# Patient Record
Sex: FEMALE | Race: WHITE | NOT HISPANIC OR LATINO | Employment: OTHER | URBAN - METROPOLITAN AREA
[De-identification: names, ages, dates, MRNs, and addresses within clinical notes are randomized per-mention and may not be internally consistent; named-entity substitution may affect disease eponyms.]

---

## 2017-01-03 ENCOUNTER — ANTI-COAG VISIT (OUTPATIENT)
Dept: CARDIOLOGY | Facility: CLINIC | Age: 67
End: 2017-01-03

## 2017-01-03 DIAGNOSIS — Z79.01 LONG TERM CURRENT USE OF ANTICOAGULANT THERAPY: ICD-10-CM

## 2017-01-03 DIAGNOSIS — I48.0 PAF (PAROXYSMAL ATRIAL FIBRILLATION): ICD-10-CM

## 2017-01-03 LAB — INR PPP: 1.9

## 2017-01-03 NOTE — PROGRESS NOTES
Pt disagrees with dosing, she states she has taken this dosing before and it knocked her level off the charts she would like for you to reconsider her dosing, she does not feel comfortable taking 5mg everyday except 2.5mg on Monday.

## 2017-01-10 ENCOUNTER — ANTI-COAG VISIT (OUTPATIENT)
Dept: CARDIOLOGY | Facility: CLINIC | Age: 67
End: 2017-01-10

## 2017-01-10 DIAGNOSIS — Z79.01 LONG TERM CURRENT USE OF ANTICOAGULANT THERAPY: ICD-10-CM

## 2017-01-10 DIAGNOSIS — I48.0 PAF (PAROXYSMAL ATRIAL FIBRILLATION): ICD-10-CM

## 2017-01-10 LAB — INR PPP: 3.2

## 2017-01-17 ENCOUNTER — ANTI-COAG VISIT (OUTPATIENT)
Dept: CARDIOLOGY | Facility: CLINIC | Age: 67
End: 2017-01-17

## 2017-01-17 DIAGNOSIS — Z79.01 LONG TERM CURRENT USE OF ANTICOAGULANT THERAPY: ICD-10-CM

## 2017-01-17 DIAGNOSIS — I48.0 PAF (PAROXYSMAL ATRIAL FIBRILLATION): ICD-10-CM

## 2017-01-17 LAB — INR PPP: 2.2

## 2017-01-18 DIAGNOSIS — J30.9 CHRONIC ALLERGIC RHINITIS: ICD-10-CM

## 2017-01-18 RX ORDER — CETIRIZINE HYDROCHLORIDE 10 MG/1
TABLET ORAL
Qty: 90 TABLET | Refills: 2 | Status: ON HOLD | OUTPATIENT
Start: 2017-01-18 | End: 2017-06-19 | Stop reason: CLARIF

## 2017-01-24 ENCOUNTER — ANTI-COAG VISIT (OUTPATIENT)
Dept: CARDIOLOGY | Facility: CLINIC | Age: 67
End: 2017-01-24
Payer: MEDICARE

## 2017-01-24 DIAGNOSIS — Z79.01 LONG TERM CURRENT USE OF ANTICOAGULANT THERAPY: ICD-10-CM

## 2017-01-24 DIAGNOSIS — I48.0 PAF (PAROXYSMAL ATRIAL FIBRILLATION): ICD-10-CM

## 2017-01-24 LAB — INR PPP: 2.4

## 2017-01-24 PROCEDURE — G0250 MD INR TEST REVIE INTER MGMT: HCPCS | Mod: ,,, | Performed by: INTERNAL MEDICINE

## 2017-01-30 DIAGNOSIS — T81.82XD EMPHYSEMA (SUBCUTANEOUS) (SURGICAL) RESULTING FROM A PROCEDURE, SUBSEQUENT ENCOUNTER: Primary | ICD-10-CM

## 2017-01-31 ENCOUNTER — ANTI-COAG VISIT (OUTPATIENT)
Dept: CARDIOLOGY | Facility: CLINIC | Age: 67
End: 2017-01-31

## 2017-01-31 DIAGNOSIS — C34.90 SMALL CELL LUNG CANCER, UNSPECIFIED LATERALITY: Primary | ICD-10-CM

## 2017-01-31 DIAGNOSIS — Z79.01 LONG TERM CURRENT USE OF ANTICOAGULANT THERAPY: ICD-10-CM

## 2017-01-31 DIAGNOSIS — I48.0 PAF (PAROXYSMAL ATRIAL FIBRILLATION): ICD-10-CM

## 2017-01-31 LAB — INR PPP: 2.1

## 2017-02-01 ENCOUNTER — PATIENT MESSAGE (OUTPATIENT)
Dept: DERMATOLOGY | Facility: CLINIC | Age: 67
End: 2017-02-01

## 2017-02-07 ENCOUNTER — TELEPHONE (OUTPATIENT)
Dept: FAMILY MEDICINE | Facility: CLINIC | Age: 67
End: 2017-02-07

## 2017-02-07 ENCOUNTER — ANTI-COAG VISIT (OUTPATIENT)
Dept: CARDIOLOGY | Facility: CLINIC | Age: 67
End: 2017-02-07

## 2017-02-07 DIAGNOSIS — Z79.01 LONG TERM CURRENT USE OF ANTICOAGULANT THERAPY: ICD-10-CM

## 2017-02-07 DIAGNOSIS — I48.0 PAF (PAROXYSMAL ATRIAL FIBRILLATION): ICD-10-CM

## 2017-02-07 LAB — INR PPP: 1.9

## 2017-02-07 NOTE — TELEPHONE ENCOUNTER
----- Message from Dalila Boswell sent at 2/7/2017  1:33 PM CST -----  Patient requesting excuse letter for sadia mackenzie/patient has trouble sitting for long time due to health issues/would like to /please call back at [phone] when ready.

## 2017-02-11 RX ORDER — ATORVASTATIN CALCIUM 40 MG/1
TABLET, FILM COATED ORAL
Qty: 90 TABLET | Refills: 2 | OUTPATIENT
Start: 2017-02-11

## 2017-02-14 ENCOUNTER — ANTI-COAG VISIT (OUTPATIENT)
Dept: CARDIOLOGY | Facility: CLINIC | Age: 67
End: 2017-02-14

## 2017-02-14 DIAGNOSIS — I48.0 PAF (PAROXYSMAL ATRIAL FIBRILLATION): ICD-10-CM

## 2017-02-14 DIAGNOSIS — Z79.01 LONG TERM CURRENT USE OF ANTICOAGULANT THERAPY: ICD-10-CM

## 2017-02-14 LAB — INR PPP: 2.9

## 2017-02-14 NOTE — PROGRESS NOTES
pt confirms correct dose; denies any changes; informed pt of dosing and next inr; pt expressed understanding

## 2017-02-21 ENCOUNTER — ANTI-COAG VISIT (OUTPATIENT)
Dept: CARDIOLOGY | Facility: CLINIC | Age: 67
End: 2017-02-21

## 2017-02-21 DIAGNOSIS — I48.0 PAF (PAROXYSMAL ATRIAL FIBRILLATION): ICD-10-CM

## 2017-02-21 DIAGNOSIS — Z79.01 LONG TERM CURRENT USE OF ANTICOAGULANT THERAPY: ICD-10-CM

## 2017-02-21 LAB — INR PPP: 2.4

## 2017-02-27 ENCOUNTER — TELEPHONE (OUTPATIENT)
Dept: FAMILY MEDICINE | Facility: CLINIC | Age: 67
End: 2017-02-27

## 2017-02-28 LAB — INR PPP: 1.4

## 2017-03-01 ENCOUNTER — ANTI-COAG VISIT (OUTPATIENT)
Dept: CARDIOLOGY | Facility: CLINIC | Age: 67
End: 2017-03-01
Payer: MEDICARE

## 2017-03-01 DIAGNOSIS — I48.0 PAF (PAROXYSMAL ATRIAL FIBRILLATION): ICD-10-CM

## 2017-03-01 DIAGNOSIS — Z79.01 LONG TERM CURRENT USE OF ANTICOAGULANT THERAPY: ICD-10-CM

## 2017-03-01 PROCEDURE — G0250 MD INR TEST REVIE INTER MGMT: HCPCS | Mod: ,,, | Performed by: INTERNAL MEDICINE

## 2017-03-06 RX ORDER — TRIAMCINOLONE ACETONIDE 1 MG/G
CREAM TOPICAL 2 TIMES DAILY
Qty: 1 TUBE | Refills: 11 | Status: SHIPPED | OUTPATIENT
Start: 2017-03-06 | End: 2019-01-01

## 2017-03-06 NOTE — TELEPHONE ENCOUNTER
----- Message from Willow Ortega sent at 3/6/2017  8:32 AM CST -----  Contact: self  Patient needs a new prescription and would like a call back. Please call patient at [phone]. Thanks!

## 2017-03-06 NOTE — TELEPHONE ENCOUNTER
Patient requesting a refill of Kenalog cream that she was originally prescribed by DR Zavaleta. States she spoke to you in reference to this and you agreed to refill it PRN for her. Needs a 3 mo supply sent to [facility].

## 2017-03-07 ENCOUNTER — ANTI-COAG VISIT (OUTPATIENT)
Dept: CARDIOLOGY | Facility: CLINIC | Age: 67
End: 2017-03-07

## 2017-03-07 DIAGNOSIS — I48.0 PAF (PAROXYSMAL ATRIAL FIBRILLATION): ICD-10-CM

## 2017-03-07 DIAGNOSIS — Z79.01 LONG TERM CURRENT USE OF ANTICOAGULANT THERAPY: ICD-10-CM

## 2017-03-07 LAB — INR PPP: 2.5

## 2017-03-14 ENCOUNTER — ANTI-COAG VISIT (OUTPATIENT)
Dept: CARDIOLOGY | Facility: CLINIC | Age: 67
End: 2017-03-14

## 2017-03-14 DIAGNOSIS — Z79.01 LONG TERM CURRENT USE OF ANTICOAGULANT THERAPY: ICD-10-CM

## 2017-03-14 DIAGNOSIS — I48.0 PAF (PAROXYSMAL ATRIAL FIBRILLATION): ICD-10-CM

## 2017-03-14 LAB — INR PPP: 2.1

## 2017-03-15 RX ORDER — DESOXIMETASONE 2.5 MG/G
CREAM TOPICAL 2 TIMES DAILY
Qty: 180 G | Refills: 3 | Status: SHIPPED | OUTPATIENT
Start: 2017-03-15 | End: 2019-01-01

## 2017-03-15 NOTE — TELEPHONE ENCOUNTER
Spoke with patient regarding her cream refill, [facility]. Please advise

## 2017-03-15 NOTE — TELEPHONE ENCOUNTER
----- Message from Neelam Santoyo sent at 3/14/2017  1:18 PM CDT -----  Contact: self  Patient called regarding a medication. Stating everything was done correctly except the way it was written. Please contact [phone] (home)

## 2017-03-21 ENCOUNTER — ANTI-COAG VISIT (OUTPATIENT)
Dept: CARDIOLOGY | Facility: CLINIC | Age: 67
End: 2017-03-21

## 2017-03-21 DIAGNOSIS — I48.0 PAF (PAROXYSMAL ATRIAL FIBRILLATION): ICD-10-CM

## 2017-03-21 DIAGNOSIS — Z79.01 LONG TERM CURRENT USE OF ANTICOAGULANT THERAPY: ICD-10-CM

## 2017-03-21 LAB — INR PPP: 2.1

## 2017-03-22 ENCOUNTER — OUTPATIENT CASE MANAGEMENT (OUTPATIENT)
Dept: ADMINISTRATIVE | Facility: OTHER | Age: 67
End: 2017-03-22

## 2017-03-22 DIAGNOSIS — I25.10 CORONARY ARTERY DISEASE INVOLVING NATIVE CORONARY ARTERY WITHOUT ANGINA PECTORIS, UNSPECIFIED WHETHER NATIVE OR TRANSPLANTED HEART: Primary | ICD-10-CM

## 2017-03-22 NOTE — PROGRESS NOTES
Please note the following patient has been assigned to  Lina Joiner RN with Outpatient Complex Care Mgmt for screening.    Please contact [facility] at ext [phone] with questions.    Thank you    Ines Douglass, SSC

## 2017-03-24 ENCOUNTER — OFFICE VISIT (OUTPATIENT)
Dept: FAMILY MEDICINE | Facility: CLINIC | Age: 67
End: 2017-03-24
Payer: MEDICARE

## 2017-03-24 VITALS
HEART RATE: 64 BPM | WEIGHT: 138.88 LBS | SYSTOLIC BLOOD PRESSURE: 110 MMHG | HEIGHT: 66 IN | BODY MASS INDEX: 22.32 KG/M2 | OXYGEN SATURATION: 98 % | DIASTOLIC BLOOD PRESSURE: 72 MMHG

## 2017-03-24 DIAGNOSIS — K59.00 CONSTIPATION, UNSPECIFIED CONSTIPATION TYPE: Primary | ICD-10-CM

## 2017-03-24 PROCEDURE — 99999 PR PBB SHADOW E&M-EST. PATIENT-LVL III: CPT | Mod: PBBFAC,,, | Performed by: FAMILY MEDICINE

## 2017-03-24 PROCEDURE — 99213 OFFICE O/P EST LOW 20 MIN: CPT | Mod: PBBFAC,PO | Performed by: FAMILY MEDICINE

## 2017-03-24 PROCEDURE — 90670 PCV13 VACCINE IM: CPT | Mod: PBBFAC,PO | Performed by: FAMILY MEDICINE

## 2017-03-24 PROCEDURE — 99214 OFFICE O/P EST MOD 30 MIN: CPT | Mod: S$PBB,,, | Performed by: FAMILY MEDICINE

## 2017-03-24 RX ORDER — BISACODYL 5 MG
5 TABLET, DELAYED RELEASE (ENTERIC COATED) ORAL DAILY PRN
Qty: 30 TABLET | Refills: 11 | COMMUNITY
Start: 2017-03-24

## 2017-03-24 RX ORDER — DILTIAZEM HYDROCHLORIDE 30 MG/1
TABLET, FILM COATED ORAL
COMMUNITY
Start: 2017-03-22 | End: 2017-03-24 | Stop reason: SDUPTHER

## 2017-03-24 RX ORDER — ESOMEPRAZOLE MAGNESIUM 40 MG/1
CAPSULE, DELAYED RELEASE ORAL
COMMUNITY
Start: 2017-01-05 | End: 2017-09-28 | Stop reason: CLARIF

## 2017-03-24 RX ORDER — DILTIAZEM HYDROCHLORIDE 30 MG/1
30 TABLET, FILM COATED ORAL
Qty: 360 TABLET | Refills: 3 | Status: SHIPPED | OUTPATIENT
Start: 2017-03-24 | End: 2018-01-31 | Stop reason: ALTCHOICE

## 2017-03-24 RX ORDER — POLYETHYLENE GLYCOL 3350 17 G/17G
17 POWDER, FOR SOLUTION ORAL DAILY
Qty: 1 BOTTLE | Refills: 11 | Status: SHIPPED | OUTPATIENT
Start: 2017-03-24 | End: 2017-07-24

## 2017-03-24 NOTE — PROGRESS NOTES
Subjective:       Patient ID: Steph Lira is a 66 y.o. female.    Chief Complaint: Follow-up (constipation and need prev 13?)    HPI     Constipation  Pt reports that she has been taking laxatives for this.   She has too loose stools after laxatives.   Symptoms have been present for a few months.   She has a colonoscopy scheduled for next year.   Otherwise, her previous exams have been negative.   Pt has taken Senokot which has helped some but she still feels like she has not emptied her bowels.   She has taken Ex-lax as a laxative.   No blood noted in stools.     Review of Systems   Constitutional: Negative for chills and fever.   HENT: Positive for rhinorrhea.    Eyes: Negative for visual disturbance.   Respiratory: Negative for cough and shortness of breath.    Cardiovascular: Negative for chest pain and leg swelling.   Gastrointestinal: Positive for constipation. Negative for abdominal pain, blood in stool, diarrhea and vomiting.   Genitourinary: Negative for difficulty urinating, dysuria and hematuria.   Musculoskeletal: Negative for arthralgias.   Neurological: Negative for dizziness and weakness.       Objective:      Physical Exam   Constitutional: She appears well-developed and well-nourished. No distress.   HENT:   Head: Normocephalic and atraumatic.   Mouth/Throat: Oropharynx is clear and moist. No oropharyngeal exudate.   Eyes: EOM are normal. Pupils are equal, round, and reactive to light.   Neck: Normal range of motion. Neck supple. No thyromegaly present.   Cardiovascular: Normal rate, regular rhythm, normal heart sounds and intact distal pulses.    Pulmonary/Chest: Effort normal and breath sounds normal. No respiratory distress. She has no wheezes.   Abdominal: Soft. Bowel sounds are normal. She exhibits no distension and no mass. There is no tenderness.   Musculoskeletal: She exhibits no edema.   Lymphadenopathy:     She has no cervical adenopathy.   Neurological: She is alert.   Skin: Skin is  warm. No rash noted. No erythema.   Psychiatric: She has a normal mood and affect. Her behavior is normal.   Vitals reviewed.      Assessment:       1. Constipation, unspecified constipation type        Plan:        1. Constipation, unspecified constipation type  - bisacodyl (DULCOLAX) 5 mg EC tablet; Take 1 tablet (5 mg total) by mouth daily as needed for Constipation.  Dispense: 30 tablet; Refill: 11  - polyethylene glycol (GLYCOLAX) 17 gram/dose powder; Take 17 g by mouth once daily.  Dispense: 1 Bottle; Refill: 11    Portions of this note were created using Dragon voice recognition software. There may be voice recognition errors found in the text, and attempts were made to correct these errors prior to signature    Stuart Landaverde MD    Family Medicine  3/24/2017

## 2017-03-24 NOTE — MR AVS SNAPSHOT
[facility]  [address]  [place] [address]  Phone: [phone]  Fax: [phone]                  Steph Lira   3/24/2017 8:00 AM   Office Visit    Description:  Female : 1950   Provider:  Stuart Landaverde MD   Department:  [facility]           Reason for Visit     Follow-up           Diagnoses this Visit        Comments    Constipation, unspecified constipation type    -  Primary            To Do List           Future Appointments        Provider Department Dept Phone    2017 9:30 AM [facility] CT1 64- LIMIT [address] [facility] [phone]    2017 10:30 AM Celina Flanagan MD [facility] - Pulmonary Services [phone]    2017 1:40 PM Stuart Landaverde MD [facility] [phone]      Goals (5 Years of Data)     None      Follow-Up and Disposition     Return in about 6 weeks (around 2017) for constipation.       These Medications        Disp Refills Start End    diltiaZEM (CARDIZEM) 30 MG tablet 360 tablet 3 3/24/2017     Take 1 tablet (30 mg total) by mouth 4 (four) times daily before meals and nightly. - Oral    Pharmacy: [facility] - [address] Ph #: [phone]       polyethylene glycol (GLYCOLAX) 17 gram/dose powder 1 Bottle 11 3/24/2017     Take 17 g by mouth once daily. - Oral    Pharmacy: [facility] [id number]  [place], [place] - [address] AT [facility] Ph #: [phone]         PURCHASE these Medications (No prescription required)        Start End    bisacodyl (DULCOLAX) 5 mg EC tablet 3/24/2017     Sig - Route: Take 1 tablet (5 mg total) by mouth daily as needed for Constipation. - Oral    Class: OTC      [facility] -  Assistance  Registered nurses in the [facility] Center provide clinical advisement, health education, appointment booking, and other advisory  services.  Call for this free service at [phone].             Medications           Message regarding Medications     Verify the changes and/or additions to your medication regime listed below are the same as discussed with your clinician today.  If any of these changes or additions are incorrect, please notify your healthcare provider.        START taking these NEW medications        Refills    diltiaZEM (CARDIZEM) 30 MG tablet 3    Sig: Take 1 tablet (30 mg total) by mouth 4 (four) times daily before meals and nightly.    Class: Normal    Route: Oral    bisacodyl (DULCOLAX) 5 mg EC tablet 11    Sig: Take 1 tablet (5 mg total) by mouth daily as needed for Constipation.    Class: OTC    Route: Oral    polyethylene glycol (GLYCOLAX) 17 gram/dose powder 11    Sig: Take 17 g by mouth once daily.    Class: Normal    Route: Oral           Verify that the below list of medications is an accurate representation of the medications you are currently taking.  If none reported, the list may be blank. If incorrect, please contact your healthcare provider. Carry this list with you in case of emergency.           Current Medications     albuterol (ACCUNEB) 1.25 mg/3 mL Nebu USE 1 VIAL (3 ML) VIA NEBULIZER EVERY 6 HOURS AS NEEDED    albuterol (PROVENTIL HFA) 90 mcg/actuation inhaler Inhale 2 puffs into the lungs every 6 (six) hours as needed for Wheezing.    atenolol (TENORMIN) 25 MG tablet Take 1 tablet (25 mg total) by mouth once daily.    atorvastatin (LIPITOR) 40 MG tablet Take 1 tablet (40 mg total) by mouth once daily.    CALCIUM CITRATE (CITRACAL) 500 mg TbEF Take 500 mg by mouth. 1 Tablet, Effervescent Oral  daily    cetirizine (ZYRTEC) 10 MG tablet TAKE 1 TABLET EVERY EVENING    desoximetasone (TOPICORT) 0.25 % cream Apply topically 2 (two) times daily.    diltiaZEM (CARDIZEM) 30 MG tablet Take 1 tablet (30 mg total) by mouth 4 (four) times daily before meals and nightly.    diphenhydrAMINE (BENADRYL) 25 mg capsule  "Take 25 mg by mouth every 6 (six) hours as needed for Itching.    flecainide (TAMBOCOR) 50 MG Tab Take 2 tablets (100 mg total) by mouth 2 (two) times daily.    fluticasone (FLONASE) 50 mcg/actuation nasal spray 1 spray by Each Nare route once daily.    fluticasone-salmeterol 250-50 mcg/dose (ADVAIR DISKUS) 250-50 mcg/dose diskus inhaler USE 1 INHALATION INTO THE LUNGS TWICE A DAY. RINSE MOUTH AFTER USE TO PREVENT THRUSH    ipratropium (ATROVENT) 0.03 % nasal spray 2 sprays by Nasal route 2 (two) times daily. As needed    levothyroxine (SYNTHROID) 88 MCG tablet TAKE 1 TABLET DAILY    montelukast (SINGULAIR) 10 mg tablet TAKE 1 TABLET EVERY EVENING    multivitamin-minerals-lutein (CENTRUM SILVER) Tab Take 1 tablet by mouth once daily. 1 Tablet Oral Every day    NEXIUM 40 mg capsule     RETIN-A 0.05 % cream APPLY THIN FILM TO FACE AT NIGHT AFTER MOISTURIZING    SPIRIVA WITH HANDIHALER 18 mcg inhalation capsule INHALE THE CONTENTS OF 1 CAPSULE DAILY    warfarin (COUMADIN) 5 MG tablet Take 0.5-1 Tablet QPM as instructed by coumadin clinic    bisacodyl (DULCOLAX) 5 mg EC tablet Take 1 tablet (5 mg total) by mouth daily as needed for Constipation.    polyethylene glycol (GLYCOLAX) 17 gram/dose powder Take 17 g by mouth once daily.    triamcinolone acetonide 0.1% (KENALOG) 0.1 % cream Apply topically 2 (two) times daily.           Clinical Reference Information           Your Vitals Were     BP Pulse Height Weight SpO2 BMI    110/72 (BP Location: Right arm, Patient Position: Sitting, BP Method: Manual) 64 5' 6" (1.676 m) 63 kg (138 lb 14.2 oz) 98% 22.42 kg/m2      Blood Pressure          Most Recent Value    BP  110/72      Allergies as of 3/24/2017     Ciprofloxacin    Doxycycline      Immunizations Administered on Date of Encounter - 3/24/2017     Name Date Dose VIS Date Route    Pneumococcal Conjugate - 13 Valent  Incomplete 0.5 mL 11/5/2015 Intramuscular      Orders Placed During Today's Visit      Normal Orders This " Visit    Pneumococcal Conjugate Vaccine (13 Valent) (IM)       Instructions    On day 1 take Miralax until adequate bowels movements. After this occurs, then stop Miralax/Glycolax and start Dulcolax, daily for 1 month. Then, as needed once daily.        Language Assistance Services     ATTENTION: Language assistance services are available, free of charge. Please call [phone].      ATENCIÓN: Si habla español, tiene a andres disposición servicios gratuitos de asistencia lingüística. Llame al [phone].     [facility] Ý: N?u b?n nói Ti?ng Vi?t, có các d?ch v? h? tr? ngôn ng? mi?n phí dành cho b?n. G?i s? [phone].         [facility] complies with applicable Federal civil rights laws and does not discriminate on the basis of race, color, national origin, age, disability, or sex.

## 2017-03-24 NOTE — PROGRESS NOTES
Administered prevnar 13 vaccine IM. Patient tolerated well. No bleeding at insertion site noted. Pain scale 0/10 with injection. 2 patient identifiers used (name, ), aseptic technique maintained.

## 2017-03-24 NOTE — PATIENT INSTRUCTIONS
On day 1 take Miralax until adequate bowels movements. After this occurs, then stop Miralax/Glycolax and start Dulcolax, daily for 1 month. Then, as needed once daily.

## 2017-03-28 ENCOUNTER — ANTI-COAG VISIT (OUTPATIENT)
Dept: CARDIOLOGY | Facility: CLINIC | Age: 67
End: 2017-03-28

## 2017-03-28 ENCOUNTER — OUTPATIENT CASE MANAGEMENT (OUTPATIENT)
Dept: ADMINISTRATIVE | Facility: OTHER | Age: 67
End: 2017-03-28

## 2017-03-28 DIAGNOSIS — Z79.01 LONG TERM CURRENT USE OF ANTICOAGULANT THERAPY: ICD-10-CM

## 2017-03-28 DIAGNOSIS — I48.0 PAF (PAROXYSMAL ATRIAL FIBRILLATION): ICD-10-CM

## 2017-03-28 LAB — INR PPP: 2.6

## 2017-03-29 ENCOUNTER — OUTPATIENT CASE MANAGEMENT (OUTPATIENT)
Dept: ADMINISTRATIVE | Facility: OTHER | Age: 67
End: 2017-03-29

## 2017-03-29 NOTE — PROGRESS NOTES
03/29/17-Received voice mail from patient to call her back at [phone]. Called patient back and left voice mail. 2'nd attempt to screen patient.

## 2017-04-03 ENCOUNTER — OUTPATIENT CASE MANAGEMENT (OUTPATIENT)
Dept: ADMINISTRATIVE | Facility: OTHER | Age: 67
End: 2017-04-03

## 2017-04-03 RX ORDER — LEVOTHYROXINE SODIUM 88 UG/1
88 TABLET ORAL DAILY
Qty: 90 TABLET | Refills: 11 | Status: SHIPPED | OUTPATIENT
Start: 2017-04-03 | End: 2018-01-24 | Stop reason: SDUPTHER

## 2017-04-03 NOTE — PROGRESS NOTES
04/03/17-Patient called back and declines OPCM at this time. Instructed her that I have a letter in the mail to her and my OPCM brochure if she would need OPCM services at a later time.

## 2017-04-03 NOTE — LETTER
April 3, 2017    Steph Lira  [address] [place] [address]             [facility] [address] [place] [address] Dear MS Lira:    I work with [facility]s outpatient case management department. We received a referral to call you to discuss your medical history. I attempted to reach you by telephone but I was unsuccessful. Please call our department that we may go over some questions with you regarding your health.     The outpatient case management department can be reached at [phone] from 8:00 am to 4:30 PM on Monday thru Friday. [facility] also has a program where a nurse is available 24/7 to answer questions or provide medical advice their number is [phone].  If you have any questions or concerns, please dont hesitate to call.     Sincerely,         Lina Joiner RN   [facility]  Outpatient Complex Care Management  [phone]

## 2017-04-03 NOTE — TELEPHONE ENCOUNTER
----- Message from Bre Centeno sent at 3/31/2017  1:23 PM CDT -----  Contact: Patient  [facility]roxine tabs 88 mcg) 90 supply with 3 refills.stated pharmacy advise her to call, Please call back at [phone] to advise when script has been sent. Thanks,

## 2017-04-03 NOTE — PROGRESS NOTES
04/03/17-Called and was unable to leave a voice mail. 3'rd attempt for Initial Screen. Will send letter, close case and dis enroll patient from the OPCM program.

## 2017-04-04 ENCOUNTER — ANTI-COAG VISIT (OUTPATIENT)
Dept: CARDIOLOGY | Facility: CLINIC | Age: 67
End: 2017-04-04
Payer: MEDICARE

## 2017-04-04 DIAGNOSIS — I48.0 PAF (PAROXYSMAL ATRIAL FIBRILLATION): ICD-10-CM

## 2017-04-04 DIAGNOSIS — Z79.01 LONG TERM CURRENT USE OF ANTICOAGULANT THERAPY: ICD-10-CM

## 2017-04-04 LAB — INR PPP: 1.7

## 2017-04-04 PROCEDURE — G0250 MD INR TEST REVIE INTER MGMT: HCPCS | Mod: ,,, | Performed by: INTERNAL MEDICINE

## 2017-04-06 ENCOUNTER — HOSPITAL ENCOUNTER (OUTPATIENT)
Dept: RADIOLOGY | Facility: HOSPITAL | Age: 67
Discharge: HOME | End: 2017-04-06
Attending: INTERNAL MEDICINE
Payer: MEDICARE

## 2017-04-06 ENCOUNTER — OFFICE VISIT (OUTPATIENT)
Dept: PULMONOLOGY | Facility: CLINIC | Age: 67
End: 2017-04-06
Payer: MEDICARE

## 2017-04-06 VITALS
DIASTOLIC BLOOD PRESSURE: 63 MMHG | HEART RATE: 56 BPM | OXYGEN SATURATION: 99 % | BODY MASS INDEX: 22.36 KG/M2 | WEIGHT: 139.13 LBS | SYSTOLIC BLOOD PRESSURE: 108 MMHG | HEIGHT: 66 IN

## 2017-04-06 DIAGNOSIS — T81.82XD EMPHYSEMA (SUBCUTANEOUS) (SURGICAL) RESULTING FROM A PROCEDURE, SUBSEQUENT ENCOUNTER: ICD-10-CM

## 2017-04-06 DIAGNOSIS — J41.0 SIMPLE CHRONIC BRONCHITIS: ICD-10-CM

## 2017-04-06 DIAGNOSIS — C34.92 SMALL CELL LUNG CANCER, LEFT: Primary | ICD-10-CM

## 2017-04-06 PROCEDURE — 99999 PR PBB SHADOW E&M-EST. PATIENT-LVL IV: CPT | Mod: PBBFAC,,, | Performed by: INTERNAL MEDICINE

## 2017-04-06 PROCEDURE — 99214 OFFICE O/P EST MOD 30 MIN: CPT | Mod: PBBFAC | Performed by: INTERNAL MEDICINE

## 2017-04-06 PROCEDURE — 99213 OFFICE O/P EST LOW 20 MIN: CPT | Mod: S$PBB,,, | Performed by: INTERNAL MEDICINE

## 2017-04-06 PROCEDURE — 71250 CT THORAX DX C-: CPT | Mod: 26,GC,, | Performed by: RADIOLOGY

## 2017-04-06 NOTE — PROGRESS NOTES
"Subjective:       Patient ID: Steph Lira is a 66 y.o. female.    Chief Complaint: Lung Cancer    HPI Comments: 66 year old here for yearly CT as history of small cell lung cancer in 1996 status post left upper lobectomy with chest wall resection.  Over the past year was hospitalized for pneumonia in May with mild RML infiltrate.  However, had been treated with antibiotics for sinus infection in March as she had no pulmonary infiltrate at that time.  Patient only has minimal dyspnea on exertion.  No chronic cough or wheeze.  Weight is stable.    Lung Cancer   Pertinent negatives include no chest pain or fever.     Review of Systems   Constitutional: Negative for fever.   HENT: Positive for trouble swallowing.    Respiratory: Negative for wheezing and dyspnea on extertion.    Cardiovascular: Positive for leg swelling. Negative for chest pain.       Adherent to advair, spiriva and only uses ventolin rarely  Objective:       Vitals:    04/06/17 1010   BP: 108/63   Pulse: (!) 56   SpO2: 99%   Weight: 63.1 kg (139 lb 1.8 oz)   Height: 5' 6" (1.676 m)     Physical Exam   Constitutional: She appears well-developed and well-nourished.   Neck: Normal range of motion. Neck supple.   Cardiovascular: Normal rate and regular rhythm.    Pulmonary/Chest: Normal expansion and hyperinflation. She has no wheezes.   Musculoskeletal: Normal range of motion. She exhibits no edema.   Lymphadenopathy: No supraclavicular adenopathy is present.     She has no cervical adenopathy.   Neurological: She is alert.        Personal Diagnostic Review  CT of chest performed on  without contrast revealed stable from previous (unofficial results).  No evidence of recurrence.  No flowsheet data found.      Assessment:       1. Small cell lung cancer, left    2. Simple chronic bronchitis        Outpatient Encounter Prescriptions as of 4/6/2017   Medication Sig Dispense Refill    atenolol (TENORMIN) 25 MG tablet Take 1 tablet (25 mg total) by mouth " once daily. 90 tablet 3    atorvastatin (LIPITOR) 40 MG tablet Take 1 tablet (40 mg total) by mouth once daily. 90 tablet 3    bisacodyl (DULCOLAX) 5 mg EC tablet Take 1 tablet (5 mg total) by mouth daily as needed for Constipation. 30 tablet 11    CALCIUM CITRATE (CITRACAL) 500 mg TbEF Take 500 mg by mouth. 1 Tablet, Effervescent Oral  daily      cetirizine (ZYRTEC) 10 MG tablet TAKE 1 TABLET EVERY EVENING 90 tablet 2    desoximetasone (TOPICORT) 0.25 % cream Apply topically 2 (two) times daily. 180 g 3    diltiaZEM (CARDIZEM) 30 MG tablet Take 1 tablet (30 mg total) by mouth 4 (four) times daily before meals and nightly. 360 tablet 3    flecainide (TAMBOCOR) 50 MG Tab Take 2 tablets (100 mg total) by mouth 2 (two) times daily. 360 tablet 3    fluticasone-salmeterol 250-50 mcg/dose (ADVAIR DISKUS) 250-50 mcg/dose diskus inhaler USE 1 INHALATION INTO THE LUNGS TWICE A DAY. RINSE MOUTH AFTER USE TO PREVENT THRUSH 3 each 3    levothyroxine (SYNTHROID) 88 MCG tablet Take 1 tablet (88 mcg total) by mouth once daily. 90 tablet 11    montelukast (SINGULAIR) 10 mg tablet TAKE 1 TABLET EVERY EVENING 90 tablet 3    multivitamin-minerals-lutein (CENTRUM SILVER) Tab Take 1 tablet by mouth once daily. 1 Tablet Oral Every day      NEXIUM 40 mg capsule       polyethylene glycol (GLYCOLAX) 17 gram/dose powder Take 17 g by mouth once daily. 1 Bottle 11    RETIN-A 0.05 % cream APPLY THIN FILM TO FACE AT NIGHT AFTER MOISTURIZING 45 g 3    SPIRIVA WITH HANDIHALER 18 mcg inhalation capsule INHALE THE CONTENTS OF 1 CAPSULE DAILY 90 capsule 3    warfarin (COUMADIN) 5 MG tablet Take 0.5-1 Tablet QPM as instructed by coumadin clinic 90 tablet 3    albuterol (ACCUNEB) 1.25 mg/3 mL Nebu USE 1 VIAL (3 ML) VIA NEBULIZER EVERY 6 HOURS AS NEEDED 90 mL 2    albuterol (PROVENTIL HFA) 90 mcg/actuation inhaler Inhale 2 puffs into the lungs every 6 (six) hours as needed for Wheezing. 3 Inhaler 4    diphenhydrAMINE (BENADRYL) 25 mg  capsule Take 25 mg by mouth every 6 (six) hours as needed for Itching.      fluticasone (FLONASE) 50 mcg/actuation nasal spray 1 spray by Each Nare route once daily. 3 Bottle 3    ipratropium (ATROVENT) 0.03 % nasal spray 2 sprays by Nasal route 2 (two) times daily. As needed (Patient taking differently: 2 sprays by Nasal route once daily. As needed) 90 mL 6    triamcinolone acetonide 0.1% (KENALOG) 0.1 % cream Apply topically 2 (two) times daily. 1 Tube 11     Facility-Administered Encounter Medications as of 4/6/2017   Medication Dose Route Frequency Provider Last Rate Last Dose    lidocaine  20 mg/mL (2%) 20 mg/mL (2 %) injection              No orders of the defined types were placed in this encounter.    Plan:       Continue current inhalers as above for COPD that is well controlled.  Follow up in a year with CT prior.

## 2017-04-11 ENCOUNTER — ANTI-COAG VISIT (OUTPATIENT)
Dept: CARDIOLOGY | Facility: CLINIC | Age: 67
End: 2017-04-11

## 2017-04-11 DIAGNOSIS — I48.0 PAF (PAROXYSMAL ATRIAL FIBRILLATION): ICD-10-CM

## 2017-04-11 DIAGNOSIS — Z79.01 LONG TERM CURRENT USE OF ANTICOAGULANT THERAPY: ICD-10-CM

## 2017-04-11 LAB — INR PPP: 2

## 2017-04-18 ENCOUNTER — ANTI-COAG VISIT (OUTPATIENT)
Dept: CARDIOLOGY | Facility: CLINIC | Age: 67
End: 2017-04-18

## 2017-04-18 DIAGNOSIS — Z79.01 LONG TERM CURRENT USE OF ANTICOAGULANT THERAPY: ICD-10-CM

## 2017-04-18 DIAGNOSIS — I48.0 PAF (PAROXYSMAL ATRIAL FIBRILLATION): ICD-10-CM

## 2017-04-18 LAB — INR PPP: 2.6

## 2017-04-25 ENCOUNTER — ANTI-COAG VISIT (OUTPATIENT)
Dept: CARDIOLOGY | Facility: CLINIC | Age: 67
End: 2017-04-25

## 2017-04-25 DIAGNOSIS — Z79.01 LONG TERM CURRENT USE OF ANTICOAGULANT THERAPY: ICD-10-CM

## 2017-04-25 DIAGNOSIS — I48.0 PAF (PAROXYSMAL ATRIAL FIBRILLATION): ICD-10-CM

## 2017-04-25 LAB — INR PPP: 1.5

## 2017-04-26 ENCOUNTER — HOSPITAL ENCOUNTER (EMERGENCY)
Facility: HOSPITAL | Age: 67
Discharge: HOME | End: 2017-04-26
Attending: EMERGENCY MEDICINE
Payer: MEDICARE

## 2017-04-26 VITALS
TEMPERATURE: 98 F | OXYGEN SATURATION: 100 % | SYSTOLIC BLOOD PRESSURE: 138 MMHG | RESPIRATION RATE: 16 BRPM | HEIGHT: 66 IN | BODY MASS INDEX: 22.18 KG/M2 | WEIGHT: 138 LBS | DIASTOLIC BLOOD PRESSURE: 80 MMHG | HEART RATE: 80 BPM

## 2017-04-26 DIAGNOSIS — R06.02 SOB (SHORTNESS OF BREATH): ICD-10-CM

## 2017-04-26 DIAGNOSIS — R06.09 EXERTIONAL DYSPNEA: ICD-10-CM

## 2017-04-26 LAB
ANION GAP SERPL CALC-SCNC: 7 MMOL/L
BASOPHILS # BLD AUTO: 0 K/UL
BASOPHILS NFR BLD: 0 %
BNP SERPL-MCNC: 115 PG/ML
BUN SERPL-MCNC: 12 MG/DL
CALCIUM SERPL-MCNC: 9.3 MG/DL
CHLORIDE SERPL-SCNC: 105 MMOL/L
CO2 SERPL-SCNC: 26 MMOL/L
CREAT SERPL-MCNC: 0.8 MG/DL
DIFFERENTIAL METHOD: ABNORMAL
EOSINOPHIL # BLD AUTO: 0.3 K/UL
EOSINOPHIL NFR BLD: 4.3 %
ERYTHROCYTE [DISTWIDTH] IN BLOOD BY AUTOMATED COUNT: 12.5 %
EST. GFR  (AFRICAN AMERICAN): >60 ML/MIN/1.73 M^2
EST. GFR  (NON AFRICAN AMERICAN): >60 ML/MIN/1.73 M^2
GLUCOSE SERPL-MCNC: 101 MG/DL
HCT VFR BLD AUTO: 35.3 %
HGB BLD-MCNC: 12.4 G/DL
LYMPHOCYTES # BLD AUTO: 1.6 K/UL
LYMPHOCYTES NFR BLD: 22.6 %
MCH RBC QN AUTO: 31.6 PG
MCHC RBC AUTO-ENTMCNC: 35.3 %
MCV RBC AUTO: 90 FL
MONOCYTES # BLD AUTO: 0.4 K/UL
MONOCYTES NFR BLD: 6.4 %
NEUTROPHILS # BLD AUTO: 4.7 K/UL
NEUTROPHILS NFR BLD: 66.7 %
PLATELET # BLD AUTO: 202 K/UL
PMV BLD AUTO: 6.9 FL
POTASSIUM SERPL-SCNC: 4.3 MMOL/L
RBC # BLD AUTO: 3.93 M/UL
SODIUM SERPL-SCNC: 138 MMOL/L
TROPONIN I SERPL DL<=0.01 NG/ML-MCNC: 0.01 NG/ML
WBC # BLD AUTO: 7 K/UL

## 2017-04-26 PROCEDURE — 25000003 PHARM REV CODE 250: Performed by: EMERGENCY MEDICINE

## 2017-04-26 PROCEDURE — 36415 COLL VENOUS BLD VENIPUNCTURE: CPT

## 2017-04-26 PROCEDURE — 83880 ASSAY OF NATRIURETIC PEPTIDE: CPT

## 2017-04-26 PROCEDURE — 99284 EMERGENCY DEPT VISIT MOD MDM: CPT | Mod: 25

## 2017-04-26 PROCEDURE — 84484 ASSAY OF TROPONIN QUANT: CPT

## 2017-04-26 PROCEDURE — 85025 COMPLETE CBC W/AUTO DIFF WBC: CPT

## 2017-04-26 PROCEDURE — 93005 ELECTROCARDIOGRAM TRACING: CPT

## 2017-04-26 PROCEDURE — 80048 BASIC METABOLIC PNL TOTAL CA: CPT

## 2017-04-26 RX ORDER — SCOLOPAMINE TRANSDERMAL SYSTEM 1 MG/1
1 PATCH, EXTENDED RELEASE TRANSDERMAL ONCE
Qty: 4 PATCH | Refills: 3 | Status: SHIPPED | OUTPATIENT
Start: 2017-04-26 | End: 2017-04-26

## 2017-04-26 RX ORDER — SCOLOPAMINE TRANSDERMAL SYSTEM 1 MG/1
1 PATCH, EXTENDED RELEASE TRANSDERMAL
Status: COMPLETED | OUTPATIENT
Start: 2017-04-26 | End: 2017-04-26

## 2017-04-26 RX ORDER — MECLIZINE HYDROCHLORIDE 25 MG/1
25 TABLET ORAL 3 TIMES DAILY PRN
Qty: 20 TABLET | Refills: 0 | Status: SHIPPED | OUTPATIENT
Start: 2017-04-26 | End: 2017-07-24

## 2017-04-26 RX ADMIN — SCOPALAMINE 1.5 MG: 1 PATCH, EXTENDED RELEASE TRANSDERMAL at 12:04

## 2017-04-26 NOTE — ED AVS SNAPSHOT
[facility] [address]  [place] [address]               Steph Lira   2017 11:54 AM   ED    Description:  Female : 1950   Department:  [facility]           Your Care was Coordinated By:     Provider Role From To    Jaquan Saravia III, MD Attending Provider [phone] --      Reason for Visit     Dizziness           Diagnoses this Visit        Comments    Positional vertigo, right    -  Primary     SOB (shortness of breath)         Exertional dyspnea           ED Disposition     None           To Do List           Follow-up Information     Follow up with Stuart Landaverde MD In 2 days.    Specialty:  Family Medicine    Contact information:    [address] [facility]  [address]  [place] [address] [phone]          Follow up with Yoanna Johnson MA.        Follow up with Samuel Covarrubias MD In 2 days.    Specialty:  Cardiology    Contact information:    [address]  [facility] [address]  [phone]         These Medications        Disp Refills Start End    scopolamine (TRANSDERM-SCOP) 1.5 mg (1 mg over 3 days) 4 patch 3 2017    Place 1 patch (1.5 mg total) onto the skin once. - Transdermal    meclizine (ANTIVERT) 25 mg tablet 20 tablet 0 2017     Take 1 tablet (25 mg total) by mouth 3 (three) times daily as needed. - Oral      [facility]     [facility] -  Assistance  Unless otherwise directed by your provider, please contact [facility], our nurse care line that is available for  assistance.     Registered nurses in the [facility] provide: appointment scheduling, clinical advisement, health education, and other advisory services.  Call: [phone] (toll free)               Medications           Message regarding Medications     Verify the changes and/or additions to your medication regime listed below are the same as discussed with your  clinician today.  If any of these changes or additions are incorrect, please notify your healthcare provider.        START taking these NEW medications        Refills    scopolamine (TRANSDERM-SCOP) 1.5 mg (1 mg over 3 days) 3    Sig: Place 1 patch (1.5 mg total) onto the skin once.    Class: Print    Route: Transdermal    meclizine (ANTIVERT) 25 mg tablet 0    Sig: Take 1 tablet (25 mg total) by mouth 3 (three) times daily as needed.    Class: Print    Route: Oral      These medications were administered today        Dose Freq    scopolamine 1.5 mg (1 mg over 3 days) 1.5 mg 1 patch ED 1 Time    Sig: Place 1 patch (1.5 mg total) onto the skin ED 1 Time.    Class: Normal    Route: Transdermal           Verify that the below list of medications is an accurate representation of the medications you are currently taking.  If none reported, the list may be blank. If incorrect, please contact your healthcare provider. Carry this list with you in case of emergency.           Current Medications     albuterol (ACCUNEB) 1.25 mg/3 mL Nebu USE 1 VIAL (3 ML) VIA NEBULIZER EVERY 6 HOURS AS NEEDED    albuterol (PROVENTIL HFA) 90 mcg/actuation inhaler Inhale 2 puffs into the lungs every 6 (six) hours as needed for Wheezing.    atenolol (TENORMIN) 25 MG tablet Take 1 tablet (25 mg total) by mouth once daily.    atorvastatin (LIPITOR) 40 MG tablet Take 1 tablet (40 mg total) by mouth once daily.    bisacodyl (DULCOLAX) 5 mg EC tablet Take 1 tablet (5 mg total) by mouth daily as needed for Constipation.    CALCIUM CITRATE (CITRACAL) 500 mg TbEF Take 500 mg by mouth. 1 Tablet, Effervescent Oral  daily    cetirizine (ZYRTEC) 10 MG tablet TAKE 1 TABLET EVERY EVENING    desoximetasone (TOPICORT) 0.25 % cream Apply topically 2 (two) times daily.    diltiaZEM (CARDIZEM) 30 MG tablet Take 1 tablet (30 mg total) by mouth 4 (four) times daily before meals and nightly.    diphenhydrAMINE (BENADRYL) 25 mg capsule Take 25 mg by mouth every 6  "(six) hours as needed for Itching.    flecainide (TAMBOCOR) 50 MG Tab Take 2 tablets (100 mg total) by mouth 2 (two) times daily.    fluticasone (FLONASE) 50 mcg/actuation nasal spray 1 spray by Each Nare route once daily.    fluticasone-salmeterol 250-50 mcg/dose (ADVAIR DISKUS) 250-50 mcg/dose diskus inhaler USE 1 INHALATION INTO THE LUNGS TWICE A DAY. RINSE MOUTH AFTER USE TO PREVENT THRUSH    ipratropium (ATROVENT) 0.03 % nasal spray 2 sprays by Nasal route 2 (two) times daily. As needed    levothyroxine (SYNTHROID) 88 MCG tablet Take 1 tablet (88 mcg total) by mouth once daily.    montelukast (SINGULAIR) 10 mg tablet TAKE 1 TABLET EVERY EVENING    multivitamin-minerals-lutein (CENTRUM SILVER) Tab Take 1 tablet by mouth once daily. 1 Tablet Oral Every day    NEXIUM 40 mg capsule     polyethylene glycol (GLYCOLAX) 17 gram/dose powder Take 17 g by mouth once daily.    RETIN-A 0.05 % cream APPLY THIN FILM TO FACE AT NIGHT AFTER MOISTURIZING    SPIRIVA WITH HANDIHALER 18 mcg inhalation capsule INHALE THE CONTENTS OF 1 CAPSULE DAILY    warfarin (COUMADIN) 5 MG tablet Take 0.5-1 Tablet QPM as instructed by coumadin clinic    lidocaine  20 mg/mL (2%) 20 mg/mL (2 %) injection     meclizine (ANTIVERT) 25 mg tablet Take 1 tablet (25 mg total) by mouth 3 (three) times daily as needed.    scopolamine (TRANSDERM-SCOP) 1.5 mg (1 mg over 3 days) Place 1 patch (1.5 mg total) onto the skin once.    triamcinolone acetonide 0.1% (KENALOG) 0.1 % cream Apply topically 2 (two) times daily.           Clinical Reference Information           Your Vitals Were     BP Pulse Temp Resp Height Weight    140/67 (BP Location: Left arm, Patient Position: Sitting) 88 97.6 °F (36.4 °C) (Oral) 20 5' 6" (1.676 m) 62.6 kg (138 lb)    SpO2 BMI             98% 22.27 kg/m2         Allergies as of 4/26/2017        Reactions    Ciprofloxacin Itching    Doxycycline Other (See Comments)    Patient had a rxn with the sun despite wearing spf 30    "   Immunizations Administered on Date of Encounter - 4/26/2017     None      ED Micro, Lab, POCT     Start Ordered       Status Ordering Provider    04/26/17 1211 04/26/17 1210  Brain natriuretic peptide  STAT      Final result     04/26/17 1211 04/26/17 1210  Basic metabolic panel  STAT      Final result     04/26/17 1211 04/26/17 1210  CBC auto differential  STAT      Final result     04/26/17 1211 04/26/17 1210  Troponin I  STAT      Final result       ED Imaging Orders     Start Ordered       Status Ordering Provider    04/26/17 1211 04/26/17 1210  X-Ray Chest AP Portable  1 time imaging      Final result         Discharge Instructions         Benign Paroxysmal Positional Vertigo  Benign paroxysmal positional vertigo (BPPV) is a problem with the inner ear. The inner ear contains the vestibular system. This system is what helps you keep your balance. BPPV causes a feeling of spinning. It is a common problem of the vestibular system.  Understanding the vestibular system  The vestibular system of the ear is made up of very tiny parts. They include the utricle, saccule, and semicircular canals. The utricle is a tiny organ that contains calcium crystals. In some people, the crystals can move into the semicircular canals. When this happens, the system no longer works as it should. This causes BPPV. Benign means it is not life-threatening. Paroxysmal means it happens suddenly. Positional means that it happens when you move your head. Vertigo is a feeling of spinning.  What causes BPPV?  Causes include injury to your head or neck. Other problems with the vestibular system may cause BPPV. In many people, the cause of BPPV is not known.  Symptoms of BPPV  You many have repeated feelings of spinning (vertigo). The vertigo usually lasts less than 1 minute. Some movements, suchas rolling over in bed, can bring on vertigo.  Diagnosing BPPV  Your primary health care provider may diagnose and treat your BPPV. Or you may see an  ear, nose, and throat doctor (otolaryngologist). In some cases, you may see a nervous system doctor (neurologist).  The health care provider will ask about your symptoms and your medical history. He or she will examine you. You may have hearing and balance tests. As part of the exam, your health care provider may have you move your head and body in certain ways. If you have BPPV, the movements can bring on vertigo. Your provider will also look for abnormal movements of your eyes. You may have other tests to check your vestibular or nervous systems.  Treatment for BPPV  Your health care provider may try to move the calcium crystals. This is done by having you move your head and neck in certain ways. This treatment is safe and often works well. You may also be told to do these movements at home. You may still have vertigo for a few weeks. Your health care provider will recheck your symptoms, usually in about a month. Special physical therapy may also be part of treatment.  In rare cases surgery may be needed for BPPV that does not go away.     When to call the health care provider  Call your health care provider right away if you have any of these:  · Symptoms that do not go away with treatment  · Symptoms that get worse  · New symptoms   Date Last Reviewed: 3/19/2015  © 8877-4111 [employer]. [address] [place] [address], [place] [address]. All rights reserved. This information is not intended as a substitute for professional medical care. Always follow your healthcare professional's instructions.          Your Scheduled Appointments     Jun 29, 2017 10:00 AM CDT   Established Patient Visit with MD [place] Hernandez - Dermatology ([facility])    [address] [facility]  [place] [address]   [phone]            Jul 13, 2017  9:20 AM CDT   Established Patient Visit with MD Colin Almanza - Family Medicine ([facility])    [address] [place] [address]  [place] [address]    [phone]              Smoking Cessation     If you would like to quit smoking:   You may be eligible for free services if you are a [place] resident and started smoking cigarettes before September 1, 1988.  Call the Smoking Cessation Trust (SCT) toll free at (502) 431-4302 or (895) 542-5211.   Call 1-800-QUIT-NOW if you do not meet the above criteria.   Contact us via email: [email or web address]   View our website for more information: [email or web address]         [facility] complies with applicable Federal civil rights laws and does not discriminate on the basis of race, color, national origin, age, disability, or sex.        Language Assistance Services     ATTENTION: Language assistance services are available, free of charge. Please call [phone].      ATENCIÓN: Si habla español, tiene a andres disposición servicios gratuitos de asistencia lingüística. Llame al [phone].     CHÚ Ý: N?u b?n nói Ti?ng Vi?t, có các d?ch v? h? tr? ngôn ng? mi?n phí dành cho b?n. G?i s? [phone].

## 2017-04-26 NOTE — DISCHARGE INSTRUCTIONS
Benign Paroxysmal Positional Vertigo  Benign paroxysmal positional vertigo (BPPV) is a problem with the inner ear. The inner ear contains the vestibular system. This system is what helps you keep your balance. BPPV causes a feeling of spinning. It is a common problem of the vestibular system.  Understanding the vestibular system  The vestibular system of the ear is made up of very tiny parts. They include the utricle, saccule, and semicircular canals. The utricle is a tiny organ that contains calcium crystals. In some people, the crystals can move into the semicircular canals. When this happens, the system no longer works as it should. This causes BPPV. Benign means it is not life-threatening. Paroxysmal means it happens suddenly. Positional means that it happens when you move your head. Vertigo is a feeling of spinning.  What causes BPPV?  Causes include injury to your head or neck. Other problems with the vestibular system may cause BPPV. In many people, the cause of BPPV is not known.  Symptoms of BPPV  You many have repeated feelings of spinning (vertigo). The vertigo usually lasts less than 1 minute. Some movements, suchas rolling over in bed, can bring on vertigo.  Diagnosing BPPV  Your primary health care provider may diagnose and treat your BPPV. Or you may see an ear, nose, and throat doctor (otolaryngologist). In some cases, you may see a nervous system doctor (neurologist).  The health care provider will ask about your symptoms and your medical history. He or she will examine you. You may have hearing and balance tests. As part of the exam, your health care provider may have you move your head and body in certain ways. If you have BPPV, the movements can bring on vertigo. Your provider will also look for abnormal movements of your eyes. You may have other tests to check your vestibular or nervous systems.  Treatment for BPPV  Your health care provider may try to move the calcium crystals. This is done  by having you move your head and neck in certain ways. This treatment is safe and often works well. You may also be told to do these movements at home. You may still have vertigo for a few weeks. Your health care provider will recheck your symptoms, usually in about a month. Special physical therapy may also be part of treatment.  In rare cases surgery may be needed for BPPV that does not go away.     When to call the health care provider  Call your health care provider right away if you have any of these:  · Symptoms that do not go away with treatment  · Symptoms that get worse  · New symptoms   Date Last Reviewed: 3/19/2015  © 9012-2883 [employer]. [address] [place] [address], [place] [address]. All rights reserved. This information is not intended as a substitute for professional medical care. Always follow your healthcare professional's instructions.

## 2017-04-26 NOTE — ED PROVIDER NOTES
"Encounter Date: 4/26/2017    SCRIBE #1 NOTE: I, Rosanna Valdez, am scribing for, and in the presence of, Dr. Saravia.       History     Chief Complaint   Patient presents with    Dizziness     "If I bend over, I almost pass out and I get short of breath".  Onset two days ago.  Worsening since yesterday.      Review of patient's allergies indicates:   Allergen Reactions    Ciprofloxacin Itching    Doxycycline Other (See Comments)     Patient had a rxn with the sun despite wearing spf 30     HPI Comments:   04/26/2017 12:03 PM     Chief Complaint: Dizziness      The patient is a 66 y.o. female who presents to the ED with an onset of worsening dizziness over the last couple of days. The dizziness is exacerbated when bending over or when ambulating. She also endorses SOB that is exacerbated with exertion over the last couple of days. The patient denies visual changes, palpitations, weakness, numbness, HA, or any other symptoms at this time. She has a PMHx of HLD, Afib, CAD, CHF, and COPD. The patient has a SHx including a double bypass with stent placement.     The history is provided by the patient.     Past Medical History:   Diagnosis Date    *Atrial fibrillation     and Hx PSVT    Allergy     chronic     Arthritis     fingers    Bronchitis March 2013    CAD (coronary artery disease) 2005    CABGx2 in 2005 and 2 MI after with 4 stents    Cancer 1996    small cell lung cancer s/p resection of 1/3 lung, 5 ribs and muscle mass then had chemo and radiation    Cataract     OD     CHF (congestive heart failure) past Hx    Chronic rhinitis     Clot past Hx    external jugular, now stented, occluded, had phlebitis; now revascularized    Colon polyp     COPD (chronic obstructive pulmonary disease)     no oxygen or nebulizers    COPD exacerbation 5/8/13    improved 5/14/13 after steroid pack,antibiotics    Former smoker     GERD (gastroesophageal reflux disease)     Heart block     Hyperlipidemia     " Hypertension     pt states does not have //    Posterior vitreous detachment of left eye     Thyroid disease      Past Surgical History:   Procedure Laterality Date    ADENOIDECTOMY      APPENDECTOMY      BACK SURGERY      lumbar    CATARACT EXTRACTION Left     OS    CATARACT EXTRACTION, BILATERAL      left eye only    CHOLECYSTECTOMY      COLONOSCOPY  03/2012    repeat in 5 years    CORONARY ARTERY BYPASS GRAFT  2005    2 vessel    EAR PINNA RECONSTRUCTION W/ RIB GRAFT  2010/2011    x2    EYE SURGERY  Dec 2012    ptosis repair    FIRST RIB REMOVAL  1996 with lung resection    HYSTERECTOMY      LUNG LOBECTOMY  1996    left lung for cancer    SHOULDER SURGERY  2010,2011    scapular entrapment after lung surgery, needed 5 ribs removed left side    SYMPOTHATIC NERVE LEFT SIDE      1996 with lung surgery    TONSILLECTOMY      TONSILLECTOMY, ADENOIDECTOMY, BILATERAL MYRINGOTOMY AND TUBES      UPPER GASTROINTESTINAL ENDOSCOPY  05/2016    Dr. Koo    VASCULAR SURGERY      stents place in jugualr vein     Family History   Problem Relation Age of Onset    Allergic rhinitis Father     Cancer Father     Hypertension Father     Allergies Father     Allergic rhinitis Son     Asthma Son     Stroke Mother     Allergies Mother     Allergic rhinitis Sister     Asthma Sister     Diverticulitis Brother     No Known Problems Daughter     No Known Problems Maternal Aunt     No Known Problems Maternal Uncle     No Known Problems Paternal Aunt     No Known Problems Paternal Uncle     No Known Problems Maternal Grandmother     No Known Problems Maternal Grandfather     No Known Problems Paternal Grandmother     No Known Problems Paternal Grandfather     No Known Problems Brother     No Known Problems Sister     Angioedema Neg Hx     Eczema Neg Hx     Immunodeficiency Neg Hx     Urticaria Neg Hx     Skin cancer Neg Hx     Amblyopia Neg Hx     Blindness Neg Hx     Cataracts Neg Hx      Diabetes Neg Hx     Glaucoma Neg Hx     Macular degeneration Neg Hx     Retinal detachment Neg Hx     Strabismus Neg Hx     Thyroid disease Neg Hx     Colon cancer Neg Hx     Colon polyps Neg Hx      Social History   Substance Use Topics    Smoking status: Former Smoker     Packs/day: 2.00     Years: 20.00     Types: Cigarettes     Quit date: 1/18/1994    Smokeless tobacco: Never Used    Alcohol use No     Review of Systems   Constitutional: Negative for chills and fever.   HENT: Negative for nosebleeds.    Eyes: Negative for visual disturbance.   Respiratory: Positive for shortness of breath.    Cardiovascular: Negative for palpitations.   Gastrointestinal: Negative for abdominal pain, diarrhea, nausea and vomiting.   Genitourinary: Negative for dysuria and hematuria.   Musculoskeletal: Negative for back pain and neck pain.   Skin: Negative for rash.   Neurological: Positive for dizziness. Negative for weakness, numbness and headaches.     Physical Exam   Initial Vitals   BP Pulse Resp Temp SpO2   04/26/17 1151 04/26/17 1151 04/26/17 1151 04/26/17 1151 04/26/17 1151   140/67 88 20 97.6 °F (36.4 °C) 98 %     Physical Exam    Nursing note and vitals reviewed.  Constitutional: She appears well-developed and well-nourished.   HENT:   Head: Normocephalic and atraumatic.   Serous effusion on right.    Eyes: Conjunctivae are normal.   Nystagmus with Courtney-Hallpike test on right fast component.    Neck: Normal range of motion. Neck supple. Carotid bruit is present (bilateral).   Cardiovascular: Normal rate. Exam reveals no gallop and no friction rub.    Murmur heard.   Systolic murmur is present with a grade of 2/6   Pulmonary/Chest: Effort normal and breath sounds normal. No respiratory distress. She has no wheezes. She has no rhonchi. She has no rales.   Abdominal: Soft. She exhibits no distension. There is no tenderness.   Musculoskeletal: Normal range of motion.   Neurological: She is alert and oriented to  person, place, and time.   Cranial nerves III-XII intact.    Skin: Skin is warm and dry. No erythema.   Psychiatric: She has a normal mood and affect.       ED Course   Procedures  Labs Reviewed   B-TYPE NATRIURETIC PEPTIDE - Abnormal; Notable for the following:        Result Value     (*)     All other components within normal limits   BASIC METABOLIC PANEL - Abnormal; Notable for the following:     Anion Gap 7 (*)     All other components within normal limits   CBC W/ AUTO DIFFERENTIAL - Abnormal; Notable for the following:     RBC 3.93 (*)     Hematocrit 35.3 (*)     MCH 31.6 (*)     MPV 6.9 (*)     All other components within normal limits   TROPONIN I     Imaging Results         X-Ray Chest AP Portable (Final result) Result time:  04/26/17 13:40:45    Final result by Klaus Neri MD (04/26/17 13:40:45)    Impression:     No acute cardiopulmonary process.  Postoperative change.  No significant change.      Electronically signed by: Klaus Neri MD  Date:     04/26/17  Time:    13:40     Narrative:    AP chest compared to 12/17/2016    Findings: There are postoperative changes of left thoracotomy.  There has been a prior sternotomy.  Vascular stents are present within the left cervical and subclavian regions.  The lungs demonstrate no consolidation, edema, or pleural effusion.  The heart size is normal.            EKG Readings: (Independently Interpreted)   Rhythm: Normal Sinus Rhythm. Heart Rate: 68. Ectopy: No Ectopy. Conduction: 1st Degree AV Block. ST Segments: Normal ST Segments. T Waves: Normal. Clinical Impression: Normal Sinus Rhythm        Medical Decision Making:   History:   Old Medical Records: I decided to obtain old medical records.  Clinical Tests:   Lab Tests: Ordered and Reviewed  Radiological Study: Reviewed and Ordered  Medical Tests: Reviewed and Ordered  ED Management:  Steph Lira is a 66 y.o. female who presents with  positional dizziness with a positive Hallpike maria e  maneuver suggestive of peripheral vertigo.  She is given a scopolamine with improved symptoms.  She is encouraged to follow-up with neurology for consideration of the Epley maneuver.  I do not suspect central vertigo.            Scribe Attestation:   Scribe #1: I performed the above scribed service and the documentation accurately describes the services I performed. I attest to the accuracy of the note.    Attending Attestation:           Physician Attestation for Scribe:  Physician Attestation Statement for Scribe #1: I, Dr. Saravia, reviewed documentation, as scribed by Rosanna Valdez in my presence, and it is both accurate and complete.                 ED Course     Clinical Impression:     1. Positional vertigo, right    2. SOB (shortness of breath)    3. Exertional dyspnea          Disposition:   Disposition: Discharged  Condition: Stable       Jaquan Saravia III, MD  04/26/17 1939

## 2017-05-02 ENCOUNTER — TELEPHONE (OUTPATIENT)
Dept: FAMILY MEDICINE | Facility: CLINIC | Age: 67
End: 2017-05-02

## 2017-05-02 ENCOUNTER — PATIENT MESSAGE (OUTPATIENT)
Dept: CARDIOLOGY | Facility: CLINIC | Age: 67
End: 2017-05-02

## 2017-05-02 ENCOUNTER — ANTI-COAG VISIT (OUTPATIENT)
Dept: CARDIOLOGY | Facility: CLINIC | Age: 67
End: 2017-05-02

## 2017-05-02 DIAGNOSIS — Z79.01 LONG TERM CURRENT USE OF ANTICOAGULANT THERAPY: ICD-10-CM

## 2017-05-02 DIAGNOSIS — I48.0 PAF (PAROXYSMAL ATRIAL FIBRILLATION): ICD-10-CM

## 2017-05-02 LAB — INR PPP: 1.6

## 2017-05-02 NOTE — TELEPHONE ENCOUNTER
Spoke with patient about appointment and referral needs. Explained about  and that the listed primary care physician needs to put the referrals in. She will be changing to Dr. Mon in July and I advised her to make sure she gets it changed when she see us. She was seen in the ER and needs a neurology referral and appointment. I advised I would send this to Dr. Landaverde since he is listed as her primary right now. I also advised her to call to be seen sooner if symptoms persist or get worse.

## 2017-05-09 ENCOUNTER — ANTI-COAG VISIT (OUTPATIENT)
Dept: CARDIOLOGY | Facility: CLINIC | Age: 67
End: 2017-05-09
Payer: MEDICARE

## 2017-05-09 DIAGNOSIS — I48.0 PAF (PAROXYSMAL ATRIAL FIBRILLATION): ICD-10-CM

## 2017-05-09 DIAGNOSIS — Z79.01 LONG TERM CURRENT USE OF ANTICOAGULANT THERAPY: ICD-10-CM

## 2017-05-09 LAB — INR PPP: 3.7

## 2017-05-09 PROCEDURE — G0250 MD INR TEST REVIE INTER MGMT: HCPCS | Mod: ,,,

## 2017-05-09 NOTE — PROGRESS NOTES
She seems sensitive to change and I believe has mentioned this in the past. She can either do as advised or hold today. Either is fine, I was just trying to avoid a low INR next week.

## 2017-05-09 NOTE — PROGRESS NOTES
Pt would like to know why you chose to have her take 2.5mg tonight instead of none since she is high at 3.7? She also wanted you to know she is leaving to go out of town today for two weeks she will be able to check her level but she is unsure what her diet will be.

## 2017-05-16 ENCOUNTER — ANTI-COAG VISIT (OUTPATIENT)
Dept: CARDIOLOGY | Facility: CLINIC | Age: 67
End: 2017-05-16

## 2017-05-16 DIAGNOSIS — Z79.01 LONG TERM CURRENT USE OF ANTICOAGULANT THERAPY: ICD-10-CM

## 2017-05-16 DIAGNOSIS — I48.0 PAF (PAROXYSMAL ATRIAL FIBRILLATION): ICD-10-CM

## 2017-05-16 LAB — INR PPP: 1.8

## 2017-05-16 RX ORDER — ATORVASTATIN CALCIUM 40 MG/1
40 TABLET, FILM COATED ORAL DAILY
Qty: 90 TABLET | Refills: 3 | Status: SHIPPED | OUTPATIENT
Start: 2017-05-16 | End: 2017-07-24 | Stop reason: SDUPTHER

## 2017-05-23 ENCOUNTER — ANTI-COAG VISIT (OUTPATIENT)
Dept: CARDIOLOGY | Facility: CLINIC | Age: 67
End: 2017-05-23

## 2017-05-23 DIAGNOSIS — Z79.01 LONG TERM CURRENT USE OF ANTICOAGULANT THERAPY: ICD-10-CM

## 2017-05-23 DIAGNOSIS — I48.0 PAF (PAROXYSMAL ATRIAL FIBRILLATION): ICD-10-CM

## 2017-05-23 LAB — INR PPP: 2

## 2017-05-24 ENCOUNTER — PATIENT MESSAGE (OUTPATIENT)
Dept: OTOLARYNGOLOGY | Facility: CLINIC | Age: 67
End: 2017-05-24

## 2017-05-30 ENCOUNTER — HOSPITAL ENCOUNTER (EMERGENCY)
Facility: HOSPITAL | Age: 67
Discharge: HOME | End: 2017-05-31
Attending: EMERGENCY MEDICINE
Payer: MEDICARE

## 2017-05-30 ENCOUNTER — ANTI-COAG VISIT (OUTPATIENT)
Dept: CARDIOLOGY | Facility: CLINIC | Age: 67
End: 2017-05-30

## 2017-05-30 VITALS
OXYGEN SATURATION: 99 % | DIASTOLIC BLOOD PRESSURE: 74 MMHG | WEIGHT: 138 LBS | RESPIRATION RATE: 17 BRPM | TEMPERATURE: 98 F | SYSTOLIC BLOOD PRESSURE: 146 MMHG | HEART RATE: 70 BPM | BODY MASS INDEX: 22.27 KG/M2

## 2017-05-30 DIAGNOSIS — Z79.01 LONG TERM CURRENT USE OF ANTICOAGULANT THERAPY: ICD-10-CM

## 2017-05-30 DIAGNOSIS — I48.0 PAF (PAROXYSMAL ATRIAL FIBRILLATION): ICD-10-CM

## 2017-05-30 DIAGNOSIS — T18.9XXA FOREIGN BODY ALIMENTARY TRACT, INITIAL ENCOUNTER: ICD-10-CM

## 2017-05-30 LAB — INR PPP: 2.3

## 2017-05-30 PROCEDURE — 63600175 PHARM REV CODE 636 W HCPCS

## 2017-05-30 PROCEDURE — 25000003 PHARM REV CODE 250

## 2017-05-30 PROCEDURE — 96374 THER/PROPH/DIAG INJ IV PUSH: CPT

## 2017-05-30 PROCEDURE — 96375 TX/PRO/DX INJ NEW DRUG ADDON: CPT

## 2017-05-30 PROCEDURE — 99284 EMERGENCY DEPT VISIT MOD MDM: CPT | Mod: 25

## 2017-05-30 RX ORDER — ONDANSETRON 2 MG/ML
INJECTION INTRAMUSCULAR; INTRAVENOUS
Status: COMPLETED
Start: 2017-05-30 | End: 2017-05-30

## 2017-05-30 RX ORDER — GLUCAGON 1 MG
KIT INJECTION
Status: COMPLETED
Start: 2017-05-30 | End: 2017-05-30

## 2017-05-30 RX ADMIN — GLUCAGON HYDROCHLORIDE 1 MG: 1 INJECTION, POWDER, FOR SOLUTION INTRAMUSCULAR; INTRAVENOUS; SUBCUTANEOUS at 02:05

## 2017-05-30 RX ADMIN — ONDANSETRON 4 MG: 2 INJECTION INTRAMUSCULAR; INTRAVENOUS at 02:05

## 2017-05-30 NOTE — ED NOTES
Presents to the ER with c/o food bolus. Patient reports that she was eating a burger one hour ago when she felt food get stuck in her throat. Patient reports that she vomited a small amount of burger and has excessive salvation. Patient reports that she is unable to keep fluids down. Patient does not have any difficulty speaking and does not show signs of respiratory distress. Mucous membranes are pink and moist. Skin is warm, dry and intact. Lungs are clear bilaterally, respirations are regular and unlabored. Denies cough, congestion, rhinorrhea or SOB. BS active x4, no tenderness with palpation, abd is soft and not distended. Denies any appetite or activity change. S1S2, capillary refill is < 2 seconds. Denies dysuria, difficulty urinating, frequency, numbness, tingling or weakness. GURWINDER AMATO

## 2017-05-30 NOTE — ED PROVIDER NOTES
"Encounter Date: 5/30/2017    SCRIBE #1 NOTE: I, Vince Dior, am scribing for, and in the presence of, Dr. Harrington.       History     Chief Complaint   Patient presents with    Foreign Body In Throat     pt reports hamburger is stuck in throat     Review of patient's allergies indicates:   Allergen Reactions    Ciprofloxacin Itching    Doxycycline Other (See Comments)     Patient had a rxn with the sun despite wearing spf 30     05/30/2017  2:32 PM     Chief Complaint: Foreign body stuck in throat      The patient is a 66 y.o. female with a PMHx of *atrial fibrillation; allergy; arthritis; bronchitis; CAD; cancer; CHF; clot; colon polyp; COPD; COPD exacerbation; former smoker; GERD; heart block; HLD; HTN; and thyroid disease who is presenting with a sudden onset of an acute foreign body stuck in throat that occurred 1 hr ago. Pt reported eating a hamburger 1 hr ago when food got stuck in her throat. Pt stated that the food will not go down and reported her previous episodes were never this bad. She reported taking Diltiazem 4 times per day. Associated symptoms of nausea, 1 episode of vomiting, and spitting up phlegm. She denied being able to keep fluids down. Pt reported vomiting up "some of the hamburger but not a whole lot." No fever. No SOB. No abd pain. Pt has a past surgical history that includes Tonsillectomy, Adenoidectomy, Bilateral yringotomy and tubes; Cholecystectomy; Hysterectomy; First rib removal; Lung lobectomy; CABG; Appendectomy; Sympothatic Nerve Left Side; Shoulder surgery; Back surgery; Ear pinna reconstruction w/ rib graft; Vascular surgery; Upper gastrointestinal endoscopy; and Colonoscopy.        The history is provided by the patient and the spouse.     Past Medical History:   Diagnosis Date    *Atrial fibrillation     and Hx PSVT    Allergy     chronic     Arthritis     fingers    Bronchitis March 2013    CAD (coronary artery disease) 2005    CABGx2 in 2005 and 2 MI after with 4 " stents    Cancer 1996    small cell lung cancer s/p resection of 1/3 lung, 5 ribs and muscle mass then had chemo and radiation    Cataract     OD     CHF (congestive heart failure) past Hx    Chronic rhinitis     Clot past Hx    external jugular, now stented, occluded, had phlebitis; now revascularized    Colon polyp     COPD (chronic obstructive pulmonary disease)     no oxygen or nebulizers    COPD exacerbation 5/8/13    improved 5/14/13 after steroid pack,antibiotics    Former smoker     GERD (gastroesophageal reflux disease)     Heart block     Hyperlipidemia     Hypertension     pt states does not have //    Posterior vitreous detachment of left eye     Thyroid disease      Past Surgical History:   Procedure Laterality Date    ADENOIDECTOMY      APPENDECTOMY      BACK SURGERY      lumbar    CATARACT EXTRACTION Left     OS    CATARACT EXTRACTION, BILATERAL      left eye only    CHOLECYSTECTOMY      COLONOSCOPY  03/2012    repeat in 5 years    CORONARY ARTERY BYPASS GRAFT  2005    2 vessel    EAR PINNA RECONSTRUCTION W/ RIB GRAFT  2010/2011    x2    EYE SURGERY  Dec 2012    ptosis repair    FIRST RIB REMOVAL  1996 with lung resection    HYSTERECTOMY      LUNG LOBECTOMY  1996    left lung for cancer    SHOULDER SURGERY  2010,2011    scapular entrapment after lung surgery, needed 5 ribs removed left side    SYMPOTHATIC NERVE LEFT SIDE      1996 with lung surgery    TONSILLECTOMY      TONSILLECTOMY, ADENOIDECTOMY, BILATERAL MYRINGOTOMY AND TUBES      UPPER GASTROINTESTINAL ENDOSCOPY  05/2016    Dr. Koo    VASCULAR SURGERY      stents place in jugualr vein     Family History   Problem Relation Age of Onset    Allergic rhinitis Father     Cancer Father     Hypertension Father     Allergies Father     Allergic rhinitis Son     Asthma Son     Stroke Mother     Allergies Mother     Allergic rhinitis Sister     Asthma Sister     Diverticulitis Brother     No Known Problems  Daughter     No Known Problems Maternal Aunt     No Known Problems Maternal Uncle     No Known Problems Paternal Aunt     No Known Problems Paternal Uncle     No Known Problems Maternal Grandmother     No Known Problems Maternal Grandfather     No Known Problems Paternal Grandmother     No Known Problems Paternal Grandfather     No Known Problems Brother     No Known Problems Sister     Angioedema Neg Hx     Eczema Neg Hx     Immunodeficiency Neg Hx     Urticaria Neg Hx     Skin cancer Neg Hx     Amblyopia Neg Hx     Blindness Neg Hx     Cataracts Neg Hx     Diabetes Neg Hx     Glaucoma Neg Hx     Macular degeneration Neg Hx     Retinal detachment Neg Hx     Strabismus Neg Hx     Thyroid disease Neg Hx     Colon cancer Neg Hx     Colon polyps Neg Hx      Social History   Substance Use Topics    Smoking status: Former Smoker     Packs/day: 2.00     Years: 20.00     Types: Cigarettes     Quit date: 1/18/1994    Smokeless tobacco: Never Used    Alcohol use No     Review of Systems   Constitutional: Negative for fever.   HENT:        +Foreign body stuck in throat.   Eyes: Negative for visual disturbance.   Respiratory: Negative for shortness of breath.    Cardiovascular: Negative for chest pain.   Gastrointestinal: Positive for nausea and vomiting (1 episode of vomiting.). Negative for abdominal pain.   Genitourinary: Negative for dysuria.   Musculoskeletal: Negative for back pain.   Skin: Negative for rash.   Neurological: Negative for weakness.   Hematological: Does not bruise/bleed easily.   Psychiatric/Behavioral: Negative for confusion.       Physical Exam     Initial Vitals [05/30/17 1427]   BP Pulse Resp Temp SpO2   (!) 146/74 70 17 98.4 °F (36.9 °C) 99 %     Physical Exam    Nursing note and vitals reviewed.  Constitutional: She appears well-developed.   HENT:   Head: Normocephalic and atraumatic.   Mouth/Throat: Oropharynx is clear and moist and mucous membranes are normal.   No  visible abnormality.   Eyes:   Pupils 3 mm and reactive to light.   Neck:   No crepitus of neck.  Trachea midline.   Cardiovascular: Normal rate, regular rhythm and intact distal pulses. Exam reveals no gallop and no friction rub.    Murmur heard.   Systolic murmur is present with a grade of 2/6   Pulses:       Radial pulses are 2+ on the right side, and 2+ on the left side.        Posterior tibial pulses are 2+ on the right side, and 2+ on the left side.   Pulmonary/Chest: Breath sounds normal. She has no wheezes. She has no rhonchi. She has no rales.   Abdominal: Soft. She exhibits no distension. There is no tenderness. There is no rebound and no guarding.   Active bowel sounds.  No palpable organomegaly.   Musculoskeletal: Normal range of motion.   No swelling noted in the legs.   Lymphadenopathy:     She has no cervical adenopathy (No anterior cervical adenopathy.).   Neurological: She is alert and oriented to person, place, and time.   Skin:   Capillary refill less than 2 seconds.   Psychiatric: She has a normal mood and affect.         ED Course   Procedures  Labs Reviewed - No data to display       X-Rays:   Independently Interpreted Readings:   Chest X-Ray: Normal heart size. X-ray chest: No acute abnormalities   Other Readings:  X-ray soft tissue neck: No foreign body visualized no acute abnormalities    Medical Decision Making:   Initial Assessment:   This is an emergent evaluation of a 66 y.o. female presenting to the ED with foreign body sensation in throat.    Differential diagnosis includes impacted food bolus, partial obstruction, perforation, and infection.              Scribe Attestation:   Scribe #1: I performed the above scribed service and the documentation accurately describes the services I performed. I attest to the accuracy of the note.    Attending Attestation:           Physician Attestation for Scribe:  Physician Attestation Statement for Scribe #1: I, Dr. Harrington, reviewed documentation,  as scribed by Vince Dior in my presence, and it is both accurate and complete.         Attending ED Notes:   Patient has food bolus likely secondary to Hamburger.  After administration of glucagon patient vomited and this cleared food bolus.  Patient reports she feels much better and would like to go home.  Patient is tolerating liquids in ER she is placed on liquid diet for the next 48 hours and instructed to advance diet slowly from there.  Patient is to follow-up with GI in the next 2-3 days for reevaluation and is to continue course of nexium.  Patient cautioned to return immediately to the ER for any worsening or any further concerns          ED Course     Clinical Impression:   The primary encounter diagnosis was Difficulty controlling bolus of food. A diagnosis of Foreign body alimentary tract, initial encounter was also pertinent to this visit.          Kristopher Harrington MD  05/31/17 0205

## 2017-06-01 NOTE — PROGRESS NOTES
Pt was in hospital day before yesterday with problems of food getting stuck when she swallowed; the gave her IV meds to relax the muscles in the hospital but no med changes post d/c

## 2017-06-05 ENCOUNTER — OFFICE VISIT (OUTPATIENT)
Dept: GASTROENTEROLOGY | Facility: CLINIC | Age: 67
End: 2017-06-05
Payer: MEDICARE

## 2017-06-05 VITALS
BODY MASS INDEX: 22.18 KG/M2 | HEART RATE: 58 BPM | SYSTOLIC BLOOD PRESSURE: 109 MMHG | WEIGHT: 138 LBS | DIASTOLIC BLOOD PRESSURE: 62 MMHG | RESPIRATION RATE: 20 BRPM | HEIGHT: 66 IN

## 2017-06-05 DIAGNOSIS — R13.14 PHARYNGOESOPHAGEAL DYSPHAGIA: Primary | ICD-10-CM

## 2017-06-05 DIAGNOSIS — R13.10 DYSPHAGIA, UNSPECIFIED TYPE: Primary | ICD-10-CM

## 2017-06-05 PROCEDURE — 99214 OFFICE O/P EST MOD 30 MIN: CPT | Mod: S$PBB,,, | Performed by: INTERNAL MEDICINE

## 2017-06-05 PROCEDURE — 99213 OFFICE O/P EST LOW 20 MIN: CPT | Mod: PBBFAC,PO | Performed by: INTERNAL MEDICINE

## 2017-06-05 PROCEDURE — 99999 PR PBB SHADOW E&M-EST. PATIENT-LVL III: CPT | Mod: PBBFAC,,, | Performed by: INTERNAL MEDICINE

## 2017-06-05 NOTE — PROGRESS NOTES
"[facility]    CC: dysphagia    HPI 66 y.o. female presents for evaluation of moderate solid food dysphagia associated with a food bolus impaction after eating a hamburger that she eventually vomited after 3-4 hours.  Since then she has difficulty swallowing any soft solids.  She feels that symptoms are getting worse.  No odynophagia.    Past Medical History:   Diagnosis Date    *Atrial fibrillation     and Hx PSVT    Allergy     chronic     Arthritis     fingers    Bronchitis March 2013    CAD (coronary artery disease) 2005    CABGx2 in 2005 and 2 MI after with 4 stents    Cancer 1996    small cell lung cancer s/p resection of 1/3 lung, 5 ribs and muscle mass then had chemo and radiation    Cataract     OD     CHF (congestive heart failure) past Hx    Chronic rhinitis     Clot past Hx    external jugular, now stented, occluded, had phlebitis; now revascularized    Colon polyp     COPD (chronic obstructive pulmonary disease)     no oxygen or nebulizers    COPD exacerbation 5/8/13    improved 5/14/13 after steroid pack,antibiotics    Former smoker     GERD (gastroesophageal reflux disease)     Heart block     Hyperlipidemia     Hypertension     pt states does not have //    Posterior vitreous detachment of left eye     Thyroid disease          Review of Systems  General ROS: negative for - chills, fever or weight loss  Cardiovascular ROS: no chest pain or dyspnea on exertion  Gastrointestinal ROS: +dysphagia; no nausea or diarrhhea    Physical Examination  /62   Pulse (!) 58   Resp 20   Ht 5' 6" (1.676 m)   Wt 62.6 kg (138 lb 0.1 oz)   BMI 22.28 kg/m²   General appearance: alert, cooperative, no distress  HENT: Normocephalic, atraumatic, neck symmetrical, no nasal discharge   Lungs: clear to auscultation bilaterally, symmetric chest wall expansion bilaterally  Heart: regular rate and rhythm without rub; no displacement of the PMI   Abdomen: soft, NT ND BS " present    Labs:  H/H 12/35    Imaging:  CXR -    Prior left lobectomy and chest surgery with removal of the first 5 ribs and vascular stents are seen in the subclavian and carotid vessels.  Prior sternotomy, multiple surgical clips noted.  No acute findings     Assessment:   65 yo female with worsening dysphagia with previous esophageal stenosis - dilated.      Plan:  Schedule EGD with dilation +/- biopsy for EoE  If symptoms persist I will consider esophagram as the next step    Kal Elise MD  [facility]  [address], [address]  [place] [address]  Office: [phone]  Fax: [phone]

## 2017-06-06 ENCOUNTER — ANTI-COAG VISIT (OUTPATIENT)
Dept: CARDIOLOGY | Facility: CLINIC | Age: 67
End: 2017-06-06

## 2017-06-06 DIAGNOSIS — Z79.01 LONG TERM CURRENT USE OF ANTICOAGULANT THERAPY: ICD-10-CM

## 2017-06-06 DIAGNOSIS — I48.0 PAF (PAROXYSMAL ATRIAL FIBRILLATION): ICD-10-CM

## 2017-06-06 LAB — INR PPP: 2.2

## 2017-06-07 ENCOUNTER — ANTI-COAG VISIT (OUTPATIENT)
Dept: CARDIOLOGY | Facility: CLINIC | Age: 67
End: 2017-06-07

## 2017-06-07 DIAGNOSIS — I48.0 PAF (PAROXYSMAL ATRIAL FIBRILLATION): ICD-10-CM

## 2017-06-07 DIAGNOSIS — Z79.01 LONG TERM CURRENT USE OF ANTICOAGULANT THERAPY: ICD-10-CM

## 2017-06-07 LAB — INR PPP: 1.8

## 2017-06-07 NOTE — PROGRESS NOTES
Pt was to have her esophagus stretched today but she states they have r/s'd the procedure to next wed the 14th due to her INR not being low enough she was told by Dr to stop taking coumadin on sat the 10th until next wed the 14th until this what will she need to take based off level from today?

## 2017-06-08 ENCOUNTER — DOCUMENTATION ONLY (OUTPATIENT)
Dept: FAMILY MEDICINE | Facility: CLINIC | Age: 67
End: 2017-06-08

## 2017-06-08 ENCOUNTER — OFFICE VISIT (OUTPATIENT)
Dept: FAMILY MEDICINE | Facility: CLINIC | Age: 67
End: 2017-06-08
Payer: MEDICARE

## 2017-06-08 VITALS
HEIGHT: 66 IN | DIASTOLIC BLOOD PRESSURE: 70 MMHG | SYSTOLIC BLOOD PRESSURE: 118 MMHG | TEMPERATURE: 98 F | OXYGEN SATURATION: 97 % | WEIGHT: 137.13 LBS | BODY MASS INDEX: 22.04 KG/M2 | HEART RATE: 70 BPM

## 2017-06-08 DIAGNOSIS — I10 ESSENTIAL HYPERTENSION: ICD-10-CM

## 2017-06-08 DIAGNOSIS — N30.01 ACUTE CYSTITIS WITH HEMATURIA: Primary | ICD-10-CM

## 2017-06-08 LAB
BILIRUB SERPL-MCNC: ABNORMAL MG/DL
BLOOD URINE, POC: 250
COLOR, POC UA: YELLOW
GLUCOSE UR QL STRIP: ABNORMAL
KETONES UR QL STRIP: ABNORMAL
LEUKOCYTE ESTERASE URINE, POC: ABNORMAL
NITRITE, POC UA: ABNORMAL
PH, POC UA: 6
PROTEIN, POC: ABNORMAL
SPECIFIC GRAVITY, POC UA: 1
UROBILINOGEN, POC UA: ABNORMAL

## 2017-06-08 PROCEDURE — 87086 URINE CULTURE/COLONY COUNT: CPT

## 2017-06-08 PROCEDURE — 99213 OFFICE O/P EST LOW 20 MIN: CPT | Mod: 25,S$GLB,, | Performed by: NURSE PRACTITIONER

## 2017-06-08 PROCEDURE — 87186 SC STD MICRODIL/AGAR DIL: CPT

## 2017-06-08 PROCEDURE — 81002 URINALYSIS NONAUTO W/O SCOPE: CPT | Mod: S$GLB,,, | Performed by: NURSE PRACTITIONER

## 2017-06-08 PROCEDURE — 87077 CULTURE AEROBIC IDENTIFY: CPT

## 2017-06-08 PROCEDURE — 1159F MED LIST DOCD IN RCRD: CPT | Mod: S$GLB,,, | Performed by: NURSE PRACTITIONER

## 2017-06-08 PROCEDURE — 87088 URINE BACTERIA CULTURE: CPT

## 2017-06-08 RX ORDER — SULFAMETHOXAZOLE AND TRIMETHOPRIM 800; 160 MG/1; MG/1
1 TABLET ORAL 2 TIMES DAILY
Qty: 6 TABLET | Refills: 0 | Status: SHIPPED | OUTPATIENT
Start: 2017-06-08 | End: 2017-06-11

## 2017-06-08 NOTE — PROGRESS NOTES
Subjective:       Patient ID: Steph Lira is a 66 y.o. female.    Chief Complaint: Urinary Tract Infection    Urinary Tract Infection    This is a new problem. The current episode started today. The problem occurs every urination. The problem has been unchanged. The quality of the pain is described as burning. There has been no fever. Associated symptoms include constipation (now resolved with stool softeners). Pertinent negatives include no chills, flank pain or possible pregnancy. She has tried nothing for the symptoms. The treatment provided no relief.     Review of Systems   Constitutional: Negative for chills and fever.   Gastrointestinal: Positive for constipation (now resolved with stool softeners). Negative for diarrhea.   Genitourinary: Positive for dysuria. Negative for difficulty urinating and flank pain.       Objective:      Physical Exam   Constitutional: She is oriented to person, place, and time. She appears well-developed and well-nourished. No distress.   HENT:   Head: Normocephalic and atraumatic.   Right Ear: External ear normal.   Left Ear: External ear normal.   Eyes: Conjunctivae and EOM are normal.   Cardiovascular: Normal rate and regular rhythm.    Pulmonary/Chest: Effort normal.   Abdominal: There is tenderness in the suprapubic area. There is no CVA tenderness.   Musculoskeletal: Normal range of motion.   Neurological: She is alert and oriented to person, place, and time.   Skin: Skin is warm and dry.   Psychiatric: She has a normal mood and affect. Her behavior is normal.   Vitals reviewed.          Current Outpatient Prescriptions:     albuterol (ACCUNEB) 1.25 mg/3 mL Nebu, USE 1 VIAL (3 ML) VIA NEBULIZER EVERY 6 HOURS AS NEEDED, Disp: 90 mL, Rfl: 2    albuterol (PROVENTIL HFA) 90 mcg/actuation inhaler, Inhale 2 puffs into the lungs every 6 (six) hours as needed for Wheezing., Disp: 3 Inhaler, Rfl: 4    atenolol (TENORMIN) 25 MG tablet, Take 1 tablet (25 mg total) by mouth once  daily., Disp: 90 tablet, Rfl: 3    atorvastatin (LIPITOR) 40 MG tablet, Take 1 tablet (40 mg total) by mouth once daily., Disp: 90 tablet, Rfl: 3    bisacodyl (DULCOLAX) 5 mg EC tablet, Take 1 tablet (5 mg total) by mouth daily as needed for Constipation., Disp: 30 tablet, Rfl: 11    CALCIUM CITRATE (CITRACAL) 500 mg TbEF, Take 500 mg by mouth. 1 Tablet, Effervescent Oral  daily, Disp: , Rfl:     cetirizine (ZYRTEC) 10 MG tablet, TAKE 1 TABLET EVERY EVENING, Disp: 90 tablet, Rfl: 2    desoximetasone (TOPICORT) 0.25 % cream, Apply topically 2 (two) times daily., Disp: 180 g, Rfl: 3    diltiaZEM (CARDIZEM) 30 MG tablet, Take 1 tablet (30 mg total) by mouth 4 (four) times daily before meals and nightly., Disp: 360 tablet, Rfl: 3    diphenhydrAMINE (BENADRYL) 25 mg capsule, Take 25 mg by mouth every 6 (six) hours as needed for Itching., Disp: , Rfl:     flecainide (TAMBOCOR) 50 MG Tab, Take 2 tablets (100 mg total) by mouth 2 (two) times daily., Disp: 360 tablet, Rfl: 3    fluticasone (FLONASE) 50 mcg/actuation nasal spray, 1 spray by Each Nare route once daily., Disp: 3 Bottle, Rfl: 3    fluticasone-salmeterol 250-50 mcg/dose (ADVAIR DISKUS) 250-50 mcg/dose diskus inhaler, USE 1 INHALATION INTO THE LUNGS TWICE A DAY. RINSE MOUTH AFTER USE TO PREVENT THRUSH, Disp: 3 each, Rfl: 3    ipratropium (ATROVENT) 0.03 % nasal spray, 2 sprays by Nasal route 2 (two) times daily. As needed (Patient taking differently: 2 sprays by Nasal route once daily. As needed), Disp: 90 mL, Rfl: 6    levothyroxine (SYNTHROID) 88 MCG tablet, Take 1 tablet (88 mcg total) by mouth once daily., Disp: 90 tablet, Rfl: 11    meclizine (ANTIVERT) 25 mg tablet, Take 1 tablet (25 mg total) by mouth 3 (three) times daily as needed., Disp: 20 tablet, Rfl: 0    montelukast (SINGULAIR) 10 mg tablet, TAKE 1 TABLET EVERY EVENING, Disp: 90 tablet, Rfl: 3    multivitamin-minerals-lutein (CENTRUM SILVER) Tab, Take 1 tablet by mouth once daily. 1  Tablet Oral Every day, Disp: , Rfl:     NEXIUM 40 mg capsule, , Disp: , Rfl:     polyethylene glycol (GLYCOLAX) 17 gram/dose powder, Take 17 g by mouth once daily., Disp: 1 Bottle, Rfl: 11    RETIN-A 0.05 % cream, APPLY THIN FILM TO FACE AT NIGHT AFTER MOISTURIZING, Disp: 45 g, Rfl: 3    SPIRIVA WITH HANDIHALER 18 mcg inhalation capsule, INHALE THE CONTENTS OF 1 CAPSULE DAILY, Disp: 90 capsule, Rfl: 3    warfarin (COUMADIN) 5 MG tablet, Take 0.5-1 Tablet QPM as instructed by coumadin clinic, Disp: 90 tablet, Rfl: 3    sulfamethoxazole-trimethoprim 800-160mg (BACTRIM DS) 800-160 mg Tab, Take 1 tablet by mouth 2 (two) times daily., Disp: 6 tablet, Rfl: 0    triamcinolone acetonide 0.1% (KENALOG) 0.1 % cream, Apply topically 2 (two) times daily., Disp: 1 Tube, Rfl: 11  No current facility-administered medications for this visit.     Facility-Administered Medications Ordered in Other Visits:     lidocaine  20 mg/mL (2%) 20 mg/mL (2 %) injection, , , ,   Assessment:       1. Acute cystitis with hematuria    2. Essential hypertension        Plan:     Acute cystitis with hematuria  Drink plenty fluids.  Notify provider if no improvement with treatment.  May take AZO OTC as needed for dysuria.  -     POCT URINE DIPSTICK WITHOUT MICROSCOPE  -     Urine culture; Future; Expected date: 06/08/2017  -     sulfamethoxazole-trimethoprim 800-160mg (BACTRIM DS) 800-160 mg Tab; Take 1 tablet by mouth 2 (two) times daily.  Dispense: 6 tablet; Refill: 0    Essential hypertension  Stable on current medication.  Patient readiness: acceptance and barriers:none    During the course of the visit the patient was educated and counseled about the following:     Hypertension:   Medication: no change.    Goals: Hypertension: Reduce Blood Pressure    Did patient meet goals/outcomes: Yes    The following self management tools provided: declined    Patient Instructions (the written plan) was given to the patient/family.     Time spent with  patient: 15 minutes

## 2017-06-08 NOTE — PATIENT INSTRUCTIONS

## 2017-06-08 NOTE — PROGRESS NOTES
Health Maintenance Due   Topic Date Due    Lipid Panel  01/26/2017    Colonoscopy  03/28/2017

## 2017-06-12 ENCOUNTER — OFFICE VISIT (OUTPATIENT)
Dept: OPTOMETRY | Facility: CLINIC | Age: 67
End: 2017-06-12
Payer: MEDICARE

## 2017-06-12 DIAGNOSIS — H04.123 DRY EYE SYNDROME, BILATERAL: Primary | ICD-10-CM

## 2017-06-12 DIAGNOSIS — H52.7 REFRACTIVE ERROR: ICD-10-CM

## 2017-06-12 DIAGNOSIS — Z96.1 PSEUDOPHAKIA: ICD-10-CM

## 2017-06-12 LAB — BACTERIA UR CULT: NORMAL

## 2017-06-12 PROCEDURE — 99211 OFF/OP EST MAY X REQ PHY/QHP: CPT | Mod: PBBFAC,PO | Performed by: OPTOMETRIST

## 2017-06-12 PROCEDURE — 92014 COMPRE OPH EXAM EST PT 1/>: CPT | Mod: S$PBB,,, | Performed by: OPTOMETRIST

## 2017-06-12 PROCEDURE — 99999 PR PBB SHADOW E&M-EST. PATIENT-LVL I: CPT | Mod: PBBFAC,,, | Performed by: OPTOMETRIST

## 2017-06-12 NOTE — PROGRESS NOTES
HPI     Presenting Complaint: Pt herer today for yearly eye exam,. Pt staets   vision has been stable woith current glasses.    (+) Dry eyes / art tears as needed    (-) headaches  (-) diplopia   (-) flashes / (-) floaters      Last edited by William Trujillo, OD on 6/12/2017  3:30 PM. (History)        ROS     Positive for: Cardiovascular (HTN), Eyes    Negative for: Constitutional, Gastrointestinal, Neurological, Skin,   Genitourinary, Musculoskeletal, HENT, Endocrine, Respiratory, Psychiatric,   Allergic/Imm, Heme/Lymph    Last edited by William Trujillo, OD on 6/12/2017  3:30 PM. (History)        Assessment /Plan     For exam results, see Encounter Report.    Dry eye syndrome, bilateral    Pseudophakia    Refractive error      DARIO OU, managed well with OTC tears. Continue drops as directed, return if worsening of symptoms or no alleviation with OTC drops.    S/p cataract extraction with good results. Mild central PCO OD, open capsule OS. Non-visually significant at this time. Monitor yearly.    Pt doing well with current specs, denies refraction. Return prn for updated spec rx.      RTC in 1 year for comprehensive eye exam, or sooner prn.

## 2017-06-13 ENCOUNTER — ANTI-COAG VISIT (OUTPATIENT)
Dept: CARDIOLOGY | Facility: CLINIC | Age: 67
End: 2017-06-13

## 2017-06-13 DIAGNOSIS — I48.0 PAF (PAROXYSMAL ATRIAL FIBRILLATION): ICD-10-CM

## 2017-06-13 DIAGNOSIS — Z79.01 LONG TERM CURRENT USE OF ANTICOAGULANT THERAPY: ICD-10-CM

## 2017-06-13 DIAGNOSIS — Z86.010 HX OF COLONIC POLYPS: Primary | ICD-10-CM

## 2017-06-13 LAB — INR PPP: 1.1

## 2017-06-13 NOTE — PROGRESS NOTES
Pt states she is not on coumadin and did not see the point in testing while she had not been no coumadin, she states she is having a colonoscopy done on 06/19

## 2017-06-13 NOTE — PROGRESS NOTES
"Pt states she knows she is low enough because "to be honest I didn't follow your instructions because I wasn't going to have it cancelled again". Pt states she didn't have time to talk right now because she is in the middle of getting tires put on the car. We do need holding instructions for colonoscopy on 6/19  "

## 2017-06-13 NOTE — PROGRESS NOTES
pt advised of dosing, holding and next INR chk date; pt advised of importance of following clinic instructions; pt expressed understanding

## 2017-06-14 ENCOUNTER — SURGERY (OUTPATIENT)
Age: 67
End: 2017-06-14

## 2017-06-14 ENCOUNTER — ANESTHESIA EVENT (OUTPATIENT)
Dept: ENDOSCOPY | Facility: HOSPITAL | Age: 67
End: 2017-06-14
Payer: MEDICARE

## 2017-06-14 ENCOUNTER — ANESTHESIA (OUTPATIENT)
Dept: ENDOSCOPY | Facility: HOSPITAL | Age: 67
End: 2017-06-14
Payer: MEDICARE

## 2017-06-14 ENCOUNTER — HOSPITAL ENCOUNTER (OUTPATIENT)
Facility: HOSPITAL | Age: 67
Discharge: HOME | End: 2017-06-14
Attending: INTERNAL MEDICINE | Admitting: INTERNAL MEDICINE
Payer: MEDICARE

## 2017-06-14 VITALS — RESPIRATION RATE: 13 BRPM

## 2017-06-14 DIAGNOSIS — R13.10 DYSPHAGIA: ICD-10-CM

## 2017-06-14 PROCEDURE — 27201012 HC FORCEPS, HOT/COLD, DISP: Performed by: INTERNAL MEDICINE

## 2017-06-14 PROCEDURE — D9220A PRA ANESTHESIA: Mod: CRNA,,, | Performed by: NURSE ANESTHETIST, CERTIFIED REGISTERED

## 2017-06-14 PROCEDURE — 25000003 PHARM REV CODE 250: Performed by: NURSE ANESTHETIST, CERTIFIED REGISTERED

## 2017-06-14 PROCEDURE — 37000009 HC ANESTHESIA EA ADD 15 MINS: Performed by: INTERNAL MEDICINE

## 2017-06-14 PROCEDURE — D9220A PRA ANESTHESIA: Mod: ANES,,, | Performed by: ANESTHESIOLOGY

## 2017-06-14 PROCEDURE — 43239 EGD BIOPSY SINGLE/MULTIPLE: CPT | Mod: ,,, | Performed by: INTERNAL MEDICINE

## 2017-06-14 PROCEDURE — 88305 TISSUE EXAM BY PATHOLOGIST: CPT | Performed by: PATHOLOGY

## 2017-06-14 PROCEDURE — 43239 EGD BIOPSY SINGLE/MULTIPLE: CPT | Performed by: INTERNAL MEDICINE

## 2017-06-14 PROCEDURE — 43248 EGD GUIDE WIRE INSERTION: CPT | Mod: ,,, | Performed by: INTERNAL MEDICINE

## 2017-06-14 PROCEDURE — 63600175 PHARM REV CODE 636 W HCPCS: Performed by: NURSE ANESTHETIST, CERTIFIED REGISTERED

## 2017-06-14 PROCEDURE — 25000003 PHARM REV CODE 250: Performed by: INTERNAL MEDICINE

## 2017-06-14 PROCEDURE — 37000008 HC ANESTHESIA 1ST 15 MINUTES: Performed by: INTERNAL MEDICINE

## 2017-06-14 PROCEDURE — 43248 EGD GUIDE WIRE INSERTION: CPT | Performed by: INTERNAL MEDICINE

## 2017-06-14 RX ORDER — LIDOCAINE HCL/PF 100 MG/5ML
SYRINGE (ML) INTRAVENOUS
Status: DISCONTINUED | OUTPATIENT
Start: 2017-06-14 | End: 2017-06-14

## 2017-06-14 RX ORDER — PROPOFOL 10 MG/ML
VIAL (ML) INTRAVENOUS
Status: DISCONTINUED | OUTPATIENT
Start: 2017-06-14 | End: 2017-06-14

## 2017-06-14 RX ORDER — PROPOFOL 10 MG/ML
INJECTION, EMULSION INTRAVENOUS
Status: DISCONTINUED
Start: 2017-06-14 | End: 2017-06-14 | Stop reason: HOSPADM

## 2017-06-14 RX ORDER — LIDOCAINE HYDROCHLORIDE 20 MG/ML
5 SOLUTION OROPHARYNGEAL ONCE
Status: COMPLETED | OUTPATIENT
Start: 2017-06-14 | End: 2017-06-14

## 2017-06-14 RX ORDER — LIDOCAINE HYDROCHLORIDE 20 MG/ML
INJECTION, SOLUTION EPIDURAL; INFILTRATION; INTRACAUDAL; PERINEURAL
Status: DISCONTINUED
Start: 2017-06-14 | End: 2017-06-14 | Stop reason: HOSPADM

## 2017-06-14 RX ORDER — SODIUM CHLORIDE 9 MG/ML
INJECTION, SOLUTION INTRAVENOUS CONTINUOUS
Status: DISCONTINUED | OUTPATIENT
Start: 2017-06-14 | End: 2017-06-14 | Stop reason: HOSPADM

## 2017-06-14 RX ADMIN — PROPOFOL 40 MG: 10 INJECTION, EMULSION INTRAVENOUS at 08:06

## 2017-06-14 RX ADMIN — SODIUM CHLORIDE 1000 ML: 0.9 INJECTION, SOLUTION INTRAVENOUS at 08:06

## 2017-06-14 RX ADMIN — PROPOFOL 80 MG: 10 INJECTION, EMULSION INTRAVENOUS at 08:06

## 2017-06-14 RX ADMIN — LIDOCAINE HYDROCHLORIDE 75 MG: 20 INJECTION, SOLUTION INTRAVENOUS at 08:06

## 2017-06-14 RX ADMIN — LIDOCAINE HYDROCHLORIDE 5 ML: 20 SOLUTION ORAL; TOPICAL at 09:06

## 2017-06-14 NOTE — PLAN OF CARE

## 2017-06-14 NOTE — H&P (VIEW-ONLY)
"[facility]    CC: dysphagia    HPI 66 y.o. female presents for evaluation of moderate solid food dysphagia associated with a food bolus impaction after eating a hamburger that she eventually vomited after 3-4 hours.  Since then she has difficulty swallowing any soft solids.  She feels that symptoms are getting worse.  No odynophagia.    Past Medical History:   Diagnosis Date    *Atrial fibrillation     and Hx PSVT    Allergy     chronic     Arthritis     fingers    Bronchitis March 2013    CAD (coronary artery disease) 2005    CABGx2 in 2005 and 2 MI after with 4 stents    Cancer 1996    small cell lung cancer s/p resection of 1/3 lung, 5 ribs and muscle mass then had chemo and radiation    Cataract     OD     CHF (congestive heart failure) past Hx    Chronic rhinitis     Clot past Hx    external jugular, now stented, occluded, had phlebitis; now revascularized    Colon polyp     COPD (chronic obstructive pulmonary disease)     no oxygen or nebulizers    COPD exacerbation 5/8/13    improved 5/14/13 after steroid pack,antibiotics    Former smoker     GERD (gastroesophageal reflux disease)     Heart block     Hyperlipidemia     Hypertension     pt states does not have //    Posterior vitreous detachment of left eye     Thyroid disease          Review of Systems  General ROS: negative for - chills, fever or weight loss  Cardiovascular ROS: no chest pain or dyspnea on exertion  Gastrointestinal ROS: +dysphagia; no nausea or diarrhhea    Physical Examination  /62   Pulse (!) 58   Resp 20   Ht 5' 6" (1.676 m)   Wt 62.6 kg (138 lb 0.1 oz)   BMI 22.28 kg/m²   General appearance: alert, cooperative, no distress  HENT: Normocephalic, atraumatic, neck symmetrical, no nasal discharge   Lungs: clear to auscultation bilaterally, symmetric chest wall expansion bilaterally  Heart: regular rate and rhythm without rub; no displacement of the PMI   Abdomen: soft, NT ND BS " present    Labs:  H/H 12/35    Imaging:  CXR -    Prior left lobectomy and chest surgery with removal of the first 5 ribs and vascular stents are seen in the subclavian and carotid vessels.  Prior sternotomy, multiple surgical clips noted.  No acute findings     Assessment:   65 yo female with worsening dysphagia with previous esophageal stenosis - dilated.      Plan:  Schedule EGD with dilation +/- biopsy for EoE  If symptoms persist I will consider esophagram as the next step    Kal Elise MD  [facility]  [address], [address]  [place] [address]  Office: [phone]  Fax: [phone]

## 2017-06-14 NOTE — ANESTHESIA PREPROCEDURE EVALUATION
06/14/2017  Steph Lira is a 66 y.o., female.    Pre-op Assessment    I have reviewed the Patient Summary Reports.     I have reviewed the Nursing Notes.   I have reviewed the Medications.     Review of Systems  Anesthesia Hx:  No problems with previous Anesthesia    Cardiovascular:   Hypertension, well controlled CAD asymptomatic CABG/stent Dysrhythmias  Angina CHF CAD - S/P CABG.  MI with stents post subsequent to CABG - stable now   Pulmonary:   Pneumonia COPD, mild Asthma asymptomatic    Hepatic/GI:   GERD, well controlled    Neurological:   Neuromuscular Disease,    Endocrine:   Hypothyroidism        Physical Exam  General:  Well nourished    Airway/Jaw/Neck:  Airway Findings: Mouth Opening: Normal Tongue: Normal  General Airway Assessment: Adult, Good  Mallampati: II  TM Distance: Normal, at least 6 cm       Chest/Lungs:  Chest/Lungs Findings: Clear to auscultation, Normal Respiratory Rate     Heart/Vascular:  Heart Findings: Rate: Normal  Rhythm: Regular Rhythm  Sounds: Normal  Heart Murmur  Systolic  Systolic Heart Murmur Description: L Upper Sternal Border  Systolic Heart Murmur Grade: Grade II     Abdomen:  Abdomen Findings: Normal    Musculoskeletal:  Musculoskeletal Findings: Normal   Skin:  Skin Findings: Normal    Mental Status:  Mental Status Findings:  Cooperative, Alert and Oriented         Anesthesia Plan  Type of Anesthesia, risks & benefits discussed:  Anesthesia Type:  general  Patient's Preference:   Intra-op Monitoring Plan:   Intra-op Monitoring Plan Comments:   Post Op Pain Control Plan:   Post Op Pain Control Plan Comments:   Induction:   IV  Beta Blocker:  Patient is on a Beta-Blocker and has received one dose within the past 24 hours (No further documentation required).       Informed Consent: Patient understands risks and agrees with Anesthesia plan.  Questions answered.  Anesthesia consent signed with patient.  ASA Score: 3     Day of Surgery Review of History & Physical: I have interviewed and examined the patient. I have reviewed the patient's H&P dated:  There are no significant changes.  H&P update referred to the provider.         Ready For Surgery From Anesthesia Perspective.

## 2017-06-14 NOTE — ANESTHESIA POSTPROCEDURE EVALUATION
"Anesthesia Post Evaluation    Patient: Steph Lira    Procedure(s) Performed: Procedure(s) (LRB):  ESOPHAGOGASTRODUODENOSCOPY (EGD) (N/A)    Final Anesthesia Type: general  Patient location during evaluation: PACU  Patient participation: Yes- Able to Participate  Level of consciousness: awake and alert  Post-procedure vital signs: reviewed and stable  Pain management: adequate  Airway patency: patent  PONV status at discharge: No PONV  Anesthetic complications: no      Cardiovascular status: hemodynamically stable  Respiratory status: unassisted and room air  Hydration status: euvolemic  Follow-up not needed.        Visit Vitals  /62   Pulse 68   Temp 36.7 °C (98.1 °F)   Resp 16   Ht 5' 7" (1.702 m)   Wt 63.5 kg (140 lb)   SpO2 96%   Breastfeeding? No   BMI 21.93 kg/m²       Pain/Tamera Score: Pain Assessment Performed: Yes (6/14/2017  9:15 AM)  Presence of Pain: denies (6/14/2017  9:15 AM)  Tamera Score: 10 (6/14/2017  9:15 AM)      "

## 2017-06-14 NOTE — TRANSFER OF CARE
"Anesthesia Transfer of Care Note    Patient: Steph Lira    Procedure(s) Performed: Procedure(s) (LRB):  ESOPHAGOGASTRODUODENOSCOPY (EGD) (N/A)    Patient location: PACU    Anesthesia Type: general    Transport from OR: Transported from OR on room air with adequate spontaneous ventilation    Post pain: adequate analgesia    Post assessment: no apparent anesthetic complications and tolerated procedure well    Post vital signs: stable    Level of consciousness: awake, alert and oriented    Nausea/Vomiting: no nausea/vomiting    Complications: none    Transfer of care protocol was followed      Last vitals:   Visit Vitals  /62   Pulse 64   Temp 36.7 °C (98.1 °F)   Resp 16   Ht 5' 7" (1.702 m)   Wt 63.5 kg (140 lb)   SpO2 97%   Breastfeeding? No   BMI 21.93 kg/m²     "

## 2017-06-15 VITALS
HEART RATE: 68 BPM | OXYGEN SATURATION: 96 % | DIASTOLIC BLOOD PRESSURE: 62 MMHG | TEMPERATURE: 98 F | HEIGHT: 67 IN | WEIGHT: 140 LBS | RESPIRATION RATE: 16 BRPM | SYSTOLIC BLOOD PRESSURE: 131 MMHG | BODY MASS INDEX: 21.97 KG/M2

## 2017-06-19 ENCOUNTER — SURGERY (OUTPATIENT)
Age: 67
End: 2017-06-19

## 2017-06-19 ENCOUNTER — ANESTHESIA EVENT (OUTPATIENT)
Dept: ENDOSCOPY | Facility: HOSPITAL | Age: 67
End: 2017-06-19
Payer: MEDICARE

## 2017-06-19 ENCOUNTER — ANESTHESIA (OUTPATIENT)
Dept: ENDOSCOPY | Facility: HOSPITAL | Age: 67
End: 2017-06-19
Payer: MEDICARE

## 2017-06-19 ENCOUNTER — HOSPITAL ENCOUNTER (OUTPATIENT)
Facility: HOSPITAL | Age: 67
Discharge: HOME | End: 2017-06-19
Attending: INTERNAL MEDICINE | Admitting: INTERNAL MEDICINE
Payer: MEDICARE

## 2017-06-19 VITALS — RESPIRATION RATE: 14 BRPM

## 2017-06-19 DIAGNOSIS — Z86.010 HISTORY OF COLON POLYPS: ICD-10-CM

## 2017-06-19 PROCEDURE — 45380 COLONOSCOPY AND BIOPSY: CPT | Performed by: INTERNAL MEDICINE

## 2017-06-19 PROCEDURE — 63600175 PHARM REV CODE 636 W HCPCS: Performed by: NURSE ANESTHETIST, CERTIFIED REGISTERED

## 2017-06-19 PROCEDURE — 27201012 HC FORCEPS, HOT/COLD, DISP: Performed by: INTERNAL MEDICINE

## 2017-06-19 PROCEDURE — 25000003 PHARM REV CODE 250: Performed by: INTERNAL MEDICINE

## 2017-06-19 PROCEDURE — 88305 TISSUE EXAM BY PATHOLOGIST: CPT | Performed by: PATHOLOGY

## 2017-06-19 PROCEDURE — D9220A PRA ANESTHESIA: Mod: PT,ANES,, | Performed by: ANESTHESIOLOGY

## 2017-06-19 PROCEDURE — 25000003 PHARM REV CODE 250: Performed by: NURSE ANESTHETIST, CERTIFIED REGISTERED

## 2017-06-19 PROCEDURE — D9220A PRA ANESTHESIA: Mod: PT,CRNA,,

## 2017-06-19 PROCEDURE — 37000008 HC ANESTHESIA 1ST 15 MINUTES: Performed by: INTERNAL MEDICINE

## 2017-06-19 PROCEDURE — 63600175 PHARM REV CODE 636 W HCPCS: Performed by: INTERNAL MEDICINE

## 2017-06-19 PROCEDURE — 37000009 HC ANESTHESIA EA ADD 15 MINS: Performed by: INTERNAL MEDICINE

## 2017-06-19 PROCEDURE — 45380 COLONOSCOPY AND BIOPSY: CPT | Mod: PT,,, | Performed by: INTERNAL MEDICINE

## 2017-06-19 RX ORDER — ONDANSETRON 2 MG/ML
INJECTION INTRAMUSCULAR; INTRAVENOUS
Status: DISCONTINUED
Start: 2017-06-19 | End: 2017-06-19 | Stop reason: HOSPADM

## 2017-06-19 RX ORDER — PROPOFOL 10 MG/ML
INJECTION, EMULSION INTRAVENOUS
Status: DISCONTINUED
Start: 2017-06-19 | End: 2017-06-19 | Stop reason: HOSPADM

## 2017-06-19 RX ORDER — ONDANSETRON 2 MG/ML
4 INJECTION INTRAMUSCULAR; INTRAVENOUS ONCE
Status: COMPLETED | OUTPATIENT
Start: 2017-06-19 | End: 2017-06-19

## 2017-06-19 RX ORDER — SODIUM CHLORIDE 9 MG/ML
INJECTION, SOLUTION INTRAVENOUS CONTINUOUS
Status: DISCONTINUED | OUTPATIENT
Start: 2017-06-19 | End: 2017-06-19 | Stop reason: HOSPADM

## 2017-06-19 RX ORDER — LIDOCAINE HYDROCHLORIDE 10 MG/ML
INJECTION, SOLUTION INTRAVENOUS
Status: DISCONTINUED | OUTPATIENT
Start: 2017-06-19 | End: 2017-06-19

## 2017-06-19 RX ORDER — PROPOFOL 10 MG/ML
VIAL (ML) INTRAVENOUS
Status: DISCONTINUED | OUTPATIENT
Start: 2017-06-19 | End: 2017-06-19

## 2017-06-19 RX ORDER — LIDOCAINE HYDROCHLORIDE 10 MG/ML
INJECTION, SOLUTION EPIDURAL; INFILTRATION; INTRACAUDAL; PERINEURAL
Status: DISCONTINUED
Start: 2017-06-19 | End: 2017-06-19 | Stop reason: HOSPADM

## 2017-06-19 RX ADMIN — PROPOFOL 30 MG: 10 INJECTION, EMULSION INTRAVENOUS at 09:06

## 2017-06-19 RX ADMIN — PROPOFOL 50 MG: 10 INJECTION, EMULSION INTRAVENOUS at 09:06

## 2017-06-19 RX ADMIN — PROPOFOL 50 MG: 10 INJECTION, EMULSION INTRAVENOUS at 08:06

## 2017-06-19 RX ADMIN — ONDANSETRON 4 MG: 2 INJECTION INTRAMUSCULAR; INTRAVENOUS at 09:06

## 2017-06-19 RX ADMIN — LIDOCAINE HYDROCHLORIDE 50 MG: 10 INJECTION, SOLUTION INTRAVENOUS at 08:06

## 2017-06-19 RX ADMIN — SODIUM CHLORIDE 1000 ML: 0.9 INJECTION, SOLUTION INTRAVENOUS at 08:06

## 2017-06-19 RX ADMIN — PROPOFOL 20 MG: 10 INJECTION, EMULSION INTRAVENOUS at 09:06

## 2017-06-19 RX ADMIN — PROPOFOL 100 MG: 10 INJECTION, EMULSION INTRAVENOUS at 08:06

## 2017-06-19 NOTE — TRANSFER OF CARE
Anesthesia Transfer of Care Note    Patient: Stpeh Lira    Procedure(s) Performed: Procedure(s) (LRB):  COLONOSCOPY (N/A)    Patient location: GI    Anesthesia Type: general    Transport from OR: Transported from OR on room air with adequate spontaneous ventilation    Post pain: adequate analgesia    Post assessment: no apparent anesthetic complications    Post vital signs: stable    Level of consciousness: awake    Nausea/Vomiting: no nausea/vomiting    Complications: none    Transfer of care protocol was followed      Last vitals:   Visit Vitals  /65   Breastfeeding? No

## 2017-06-19 NOTE — ANESTHESIA POSTPROCEDURE EVALUATION
Anesthesia Post Evaluation    Patient: Steph Lira    Procedure(s) Performed: Procedure(s) (LRB):  COLONOSCOPY (N/A)    Final Anesthesia Type: general  Patient location during evaluation: PACU  Patient participation: Yes- Able to Participate  Level of consciousness: awake and alert and oriented  Post-procedure vital signs: reviewed and stable  Pain management: adequate  Airway patency: patent  PONV status at discharge: No PONV  Anesthetic complications: no      Cardiovascular status: blood pressure returned to baseline  Respiratory status: unassisted, spontaneous ventilation and room air  Hydration status: euvolemic  Follow-up not needed.        Visit Vitals  BP (!) 133/58   Pulse 66   Resp 18   SpO2 (!) 65%   Breastfeeding? No       Pain/Tamera Score: Pain Assessment Performed: Yes (6/19/2017  9:40 AM)  Presence of Pain: denies (6/19/2017  9:40 AM)  Tamera Score: 10 (6/19/2017  9:58 AM)  Modified Tamera Score: 12 (6/19/2017  9:40 AM)

## 2017-06-19 NOTE — H&P
[facility]    CC: colon polyps    HPI 66 y.o. female presents for surveillance colonoscopy due to a personal history of colon polyps.      Past Medical History:   Diagnosis Date    *Atrial fibrillation     and Hx PSVT    Allergy     chronic     Arthritis     fingers    Bronchitis March 2013    CAD (coronary artery disease) 2005    CABGx2 in 2005 and 2 MI after with 4 stents    Cancer 1996    small cell lung cancer s/p resection of 1/3 lung, 5 ribs and muscle mass then had chemo and radiation    Cataract     OD     CHF (congestive heart failure) past Hx    Chronic rhinitis     Clot past Hx    external jugular, now stented, occluded, had phlebitis; now revascularized    Colon polyp     COPD (chronic obstructive pulmonary disease)     no oxygen or nebulizers    COPD exacerbation 5/8/13    improved 5/14/13 after steroid pack,antibiotics    Former smoker     GERD (gastroesophageal reflux disease)     Heart block     Hyperlipidemia     Hypertension     pt states does not have //    Posterior vitreous detachment of left eye     Thyroid disease          Review of Systems  General ROS: negative for - chills, fever or weight loss    Physical Examination  /65   Breastfeeding? No   General appearance: alert, cooperative, no distress  HENT: Normocephalic, atraumatic, neck symmetrical, no nasal discharge   Abdomen: soft, non tender, non distended BS present  Extremities: extremities symmetric; no clubbing, cyanosis, or edema    Assessment:   66 y.o. female presents for surveillance colonoscopy due to a personal history of colon polyps..    Plan:  Surveillance colonoscopy today    [facility]  [address], [address]  [place] [address]  Office: [phone]  Fax: [phone]

## 2017-06-19 NOTE — ANESTHESIA PREPROCEDURE EVALUATION
06/19/2017  Steph Lira is a 66 y.o., female.    Anesthesia Evaluation    I have reviewed the Patient Summary Reports.    I have reviewed the Nursing Notes.   I have reviewed the Medications.     Review of Systems  Cardiovascular:   Hypertension CAD  Dysrhythmias atrial fibrillation Angina, with exertion CHF    Pulmonary:   Pneumonia COPD, moderate Asthma asymptomatic    Hepatic/GI:   GERD, well controlled    Neurological:   Neuromuscular Disease,    Endocrine:   Hypothyroidism           Anesthesia Plan  Type of Anesthesia, risks & benefits discussed:  Anesthesia Type:  general  Patient's Preference:   Intra-op Monitoring Plan:   Intra-op Monitoring Plan Comments:   Post Op Pain Control Plan:   Post Op Pain Control Plan Comments:   Induction:   IV  Beta Blocker:  Patient is on a Beta-Blocker and has received one dose within the past 24 hours (No further documentation required).       Informed Consent: Patient understands risks and agrees with Anesthesia plan.  Questions answered. Anesthesia consent signed with patient.  ASA Score: 3     Day of Surgery Review of History & Physical: I have interviewed and examined the patient. I have reviewed the patient's H&P dated:  There are no significant changes.          Ready For Surgery From Anesthesia Perspective.                                                                                                                  06/19/2017  Steph Lira is a 66 y.o., female.    Pre-op Assessment    I have reviewed the Patient Summary Reports.     I have reviewed the Nursing Notes.   I have reviewed the Medications.     Review of Systems  Anesthesia Hx:  No problems with previous Anesthesia    Cardiovascular:   Hypertension, well controlled CAD asymptomatic CABG/stent Dysrhythmias  Angina CHF CAD - S/P CABG.  MI with stents post subsequent to CABG - stable now    Pulmonary:   Pneumonia COPD, mild Asthma asymptomatic    Hepatic/GI:   GERD, well controlled    Neurological:   Neuromuscular Disease,    Endocrine:   Hypothyroidism        Physical Exam  General:  Well nourished    Airway/Jaw/Neck:  Airway Findings: Mouth Opening: Normal Tongue: Normal  General Airway Assessment: Adult, Good  Mallampati: II  TM Distance: Normal, at least 6 cm       Chest/Lungs:  Chest/Lungs Findings: Clear to auscultation, Normal Respiratory Rate     Heart/Vascular:  Heart Findings: Rate: Normal  Rhythm: Regular Rhythm  Sounds: Normal  Heart Murmur  Systolic  Systolic Heart Murmur Description: L Upper Sternal Border  Systolic Heart Murmur Grade: Grade II     Abdomen:  Abdomen Findings: Normal    Musculoskeletal:  Musculoskeletal Findings: Normal   Skin:  Skin Findings: Normal    Mental Status:  Mental Status Findings:  Cooperative, Alert and Oriented         Anesthesia Plan  Type of Anesthesia, risks & benefits discussed:  Anesthesia Type:  general  Patient's Preference:   Intra-op Monitoring Plan:   Intra-op Monitoring Plan Comments:   Post Op Pain Control Plan:   Post Op Pain Control Plan Comments:   Induction:   IV  Beta Blocker:  Patient is on a Beta-Blocker and has received one dose within the past 24 hours (No further documentation required).       Informed Consent: Patient understands risks and agrees with Anesthesia plan.  Questions answered. Anesthesia consent signed with patient.  ASA Score: 3     Day of Surgery Review of History & Physical: I have interviewed and examined the patient. I have reviewed the patient's H&P dated:  There are no significant changes.  H&P update referred to the provider.         Ready For Surgery From Anesthesia Perspective.

## 2017-06-19 NOTE — OR NURSING
Pt arrived from GI lab awake and c/o gassy pain in abd and nausea.   MD informed and new order for zofran 4 mg obtained.  Zofran administered. Pt vomiting small amount of yellowish liquid with lots of bubbles.  Passing moderate amt of air without difficulty.

## 2017-06-19 NOTE — OR NURSING
"Pt is passing larger amts of air and is consequently feeling less nauseaous.  Pt has not vomited since last note entry.  States she "is feeling better"   Continuing to monitor  "

## 2017-06-20 ENCOUNTER — PATIENT MESSAGE (OUTPATIENT)
Dept: GASTROENTEROLOGY | Facility: CLINIC | Age: 67
End: 2017-06-20

## 2017-06-20 VITALS
OXYGEN SATURATION: 96 % | DIASTOLIC BLOOD PRESSURE: 69 MMHG | HEART RATE: 66 BPM | RESPIRATION RATE: 18 BRPM | SYSTOLIC BLOOD PRESSURE: 138 MMHG

## 2017-06-23 RX ORDER — FLUTICASONE PROPIONATE AND SALMETEROL 250; 50 UG/1; UG/1
POWDER RESPIRATORY (INHALATION)
Qty: 3 EACH | Refills: 3 | Status: SHIPPED | OUTPATIENT
Start: 2017-06-23 | End: 2018-07-17 | Stop reason: SDUPTHER

## 2017-06-23 RX ORDER — ATENOLOL 25 MG/1
25 TABLET ORAL DAILY
Qty: 90 TABLET | Refills: 3 | Status: SHIPPED | OUTPATIENT
Start: 2017-06-23 | End: 2018-01-31

## 2017-06-23 RX ORDER — MONTELUKAST SODIUM 10 MG/1
10 TABLET ORAL NIGHTLY
Qty: 90 TABLET | Refills: 3 | Status: SHIPPED | OUTPATIENT
Start: 2017-06-23 | End: 2017-06-28 | Stop reason: SDUPTHER

## 2017-06-23 NOTE — TELEPHONE ENCOUNTER
----- Message from Johnathan Chavarria sent at 6/23/2017  9:59 AM CDT -----  Contact: [facility]   Patient is needing a new 90 day prescription with 3 refills for on   Rx Advair Discus 250/50  Rx Montelukast Sodium tablets 10 mg  Rx Atenolol tablets 25 mg    Call back [phone] fax [phone]  If it is sent electronically [address] [place] [address]  Thanks!

## 2017-06-26 ENCOUNTER — PATIENT MESSAGE (OUTPATIENT)
Dept: GASTROENTEROLOGY | Facility: CLINIC | Age: 67
End: 2017-06-26

## 2017-06-27 ENCOUNTER — ANTI-COAG VISIT (OUTPATIENT)
Dept: CARDIOLOGY | Facility: CLINIC | Age: 67
End: 2017-06-27

## 2017-06-27 DIAGNOSIS — I48.0 PAF (PAROXYSMAL ATRIAL FIBRILLATION): ICD-10-CM

## 2017-06-27 DIAGNOSIS — Z79.01 LONG TERM CURRENT USE OF ANTICOAGULANT THERAPY: ICD-10-CM

## 2017-06-27 LAB — INR PPP: 1.4

## 2017-06-28 NOTE — TELEPHONE ENCOUNTER
----- Message from Rocío Vasquez sent at 6/28/2017  9:03 AM CDT -----  Contact: Patient  Steph, patient [phone], Calling about notice from [facility] for failure to reach doctor regarding the Rx, montelukast (SINGULAIR) 10 mg tablet. Please send NEW Rx to pharmacy.   90 day supply with three refills.    [facility] - [address] [address]  Phone: [phone] Fax: [phone]    Please advise. Thanks.

## 2017-06-29 ENCOUNTER — OFFICE VISIT (OUTPATIENT)
Dept: DERMATOLOGY | Facility: CLINIC | Age: 67
End: 2017-06-29
Payer: MEDICARE

## 2017-06-29 DIAGNOSIS — B07.9 VIRAL WARTS, UNSPECIFIED TYPE: Primary | ICD-10-CM

## 2017-06-29 PROCEDURE — 17110 DESTRUCTION B9 LES UP TO 14: CPT | Mod: S$PBB,,, | Performed by: DERMATOLOGY

## 2017-06-29 PROCEDURE — 1159F MED LIST DOCD IN RCRD: CPT | Mod: ,,, | Performed by: DERMATOLOGY

## 2017-06-29 PROCEDURE — 17110 DESTRUCTION B9 LES UP TO 14: CPT | Mod: PBBFAC,PO | Performed by: DERMATOLOGY

## 2017-06-29 PROCEDURE — 99213 OFFICE O/P EST LOW 20 MIN: CPT | Mod: 25,S$PBB,, | Performed by: DERMATOLOGY

## 2017-06-29 PROCEDURE — 99999 PR PBB SHADOW E&M-EST. PATIENT-LVL II: CPT | Mod: PBBFAC,,, | Performed by: DERMATOLOGY

## 2017-06-29 PROCEDURE — 99212 OFFICE O/P EST SF 10 MIN: CPT | Mod: PBBFAC,PO | Performed by: DERMATOLOGY

## 2017-06-29 RX ORDER — MONTELUKAST SODIUM 10 MG/1
10 TABLET ORAL NIGHTLY
Qty: 90 TABLET | Refills: 3 | Status: SHIPPED | OUTPATIENT
Start: 2017-06-29 | End: 2018-11-20 | Stop reason: SDUPTHER

## 2017-06-29 NOTE — PROGRESS NOTES
Subjective:       Patient ID:  Steph Lira is a 66 y.o. female who presents for   Chief Complaint   Patient presents with    Lesion     Pt c/o tender lesions on left hand x 6 months. Pt had 3 lesions on left finger.  No prev tx.  Pt feels one lesion raised and peels off but is still sore and tender.  Getting larger. Other 2 lesions  Are still present.         Review of Systems   Constitutional: Negative for fever and chills.   Respiratory: Negative for cough and shortness of breath.    Skin: Negative for itching and rash.        Objective:    Physical Exam   Constitutional: She appears well-developed and well-nourished. No distress.   Neurological: She is alert and oriented to person, place, and time. She is not disoriented.   Psychiatric: She has a normal mood and affect.   Skin:   Areas Examined (abnormalities noted in diagram):   RUE Inspected  LUE Inspection Performed             Diagram Legend     Erythematous scaling macule/papule c/w actinic keratosis       Vascular papule c/w angioma      Pigmented verrucoid papule/plaque c/w seborrheic keratosis      Yellow umbilicated papule c/w sebaceous hyperplasia      Irregularly shaped tan macule c/w lentigo     1-2 mm smooth white papules consistent with Milia      Movable subcutaneous cyst with punctum c/w epidermal inclusion cyst      Subcutaneous movable cyst c/w pilar cyst      Firm pink to brown papule c/w dermatofibroma      Pedunculated fleshy papule(s) c/w skin tag(s)      Evenly pigmented macule c/w junctional nevus     Mildly variegated pigmented, slightly irregular-bordered macule c/w mildly atypical nevus      Flesh colored to evenly pigmented papule c/w intradermal nevus       Pink pearly papule/plaque c/w basal cell carcinoma      Erythematous hyperkeratotic cursted plaque c/w SCC      Surgical scar with no sign of skin cancer recurrence      Open and closed comedones      Inflammatory papules and pustules      Verrucoid papule consistent  consistent with wart     Erythematous eczematous patches and plaques     Dystrophic onycholytic nail with subungual debris c/w onychomycosis     Umbilicated papule    Erythematous-base heme-crusted tan verrucoid plaque consistent with inflamed seborrheic keratosis     Erythematous Silvery Scaling Plaque c/w Psoriasis     See annotation      Assessment / Plan:        Viral warts, unspecified type    Cryosurgery procedure note:    Verbal consent from the patient is obtained. Liquid nitrogen cryosurgery is applied to 3 verruca  as detailed in the physical exam, to produce a freeze injury. 1 consecutive freeze thaw cycles are applied to each verruca. The patient is aware that blisters (possibly blood blisters) may form.      Recommend home maintenance for wart(s). After nightly soaking, patient should apply 40% salicylic acid patch and cover. Remove in a.m. (or 12 hours later). Repeat nightly except night prior to next appointment. Instructional brochure provided.           Return in about 6 weeks (around 8/10/2017).

## 2017-07-03 ENCOUNTER — ANTI-COAG VISIT (OUTPATIENT)
Dept: CARDIOLOGY | Facility: CLINIC | Age: 67
End: 2017-07-03
Payer: MEDICARE

## 2017-07-03 DIAGNOSIS — I48.0 PAF (PAROXYSMAL ATRIAL FIBRILLATION): ICD-10-CM

## 2017-07-03 DIAGNOSIS — Z79.01 LONG TERM CURRENT USE OF ANTICOAGULANT THERAPY: ICD-10-CM

## 2017-07-03 LAB — INR PPP: 2.2

## 2017-07-03 PROCEDURE — G0250 MD INR TEST REVIE INTER MGMT: HCPCS | Mod: ,,, | Performed by: INTERNAL MEDICINE

## 2017-07-07 ENCOUNTER — LAB VISIT (OUTPATIENT)
Dept: LAB | Facility: HOSPITAL | Age: 67
End: 2017-07-07
Attending: INTERNAL MEDICINE
Payer: MEDICARE

## 2017-07-07 DIAGNOSIS — I25.10 CORONARY ARTERY DISEASE INVOLVING NATIVE CORONARY ARTERY OF NATIVE HEART WITHOUT ANGINA PECTORIS: ICD-10-CM

## 2017-07-07 DIAGNOSIS — Z79.01 LONG TERM CURRENT USE OF ANTICOAGULANT THERAPY: ICD-10-CM

## 2017-07-07 DIAGNOSIS — I10 ESSENTIAL HYPERTENSION: ICD-10-CM

## 2017-07-07 LAB
ALBUMIN SERPL BCP-MCNC: 3.6 G/DL
ALP SERPL-CCNC: 67 U/L
ALT SERPL W/O P-5'-P-CCNC: 21 U/L
ANION GAP SERPL CALC-SCNC: 6 MMOL/L
AST SERPL-CCNC: 26 U/L
BASOPHILS # BLD AUTO: 0.09 K/UL
BASOPHILS NFR BLD: 1.3 %
BILIRUB SERPL-MCNC: 0.7 MG/DL
BUN SERPL-MCNC: 8 MG/DL
CALCIUM SERPL-MCNC: 9.1 MG/DL
CHLORIDE SERPL-SCNC: 104 MMOL/L
CHOLEST/HDLC SERPL: 3 {RATIO}
CO2 SERPL-SCNC: 28 MMOL/L
CREAT SERPL-MCNC: 0.7 MG/DL
DIFFERENTIAL METHOD: ABNORMAL
EOSINOPHIL # BLD AUTO: 0.4 K/UL
EOSINOPHIL NFR BLD: 5.1 %
ERYTHROCYTE [DISTWIDTH] IN BLOOD BY AUTOMATED COUNT: 12.9 %
EST. GFR  (AFRICAN AMERICAN): >60 ML/MIN/1.73 M^2
EST. GFR  (NON AFRICAN AMERICAN): >60 ML/MIN/1.73 M^2
GLUCOSE SERPL-MCNC: 84 MG/DL
HCT VFR BLD AUTO: 40.3 %
HDL/CHOLESTEROL RATIO: 32.9 %
HDLC SERPL-MCNC: 146 MG/DL
HDLC SERPL-MCNC: 48 MG/DL
HGB BLD-MCNC: 12.8 G/DL
LDLC SERPL CALC-MCNC: 80.2 MG/DL
LYMPHOCYTES # BLD AUTO: 2.5 K/UL
LYMPHOCYTES NFR BLD: 37.1 %
MCH RBC QN AUTO: 29.9 PG
MCHC RBC AUTO-ENTMCNC: 31.8 %
MCV RBC AUTO: 94 FL
MONOCYTES # BLD AUTO: 0.6 K/UL
MONOCYTES NFR BLD: 8.1 %
NEUTROPHILS # BLD AUTO: 3.3 K/UL
NEUTROPHILS NFR BLD: 48.3 %
NONHDLC SERPL-MCNC: 98 MG/DL
PLATELET # BLD AUTO: 222 K/UL
PMV BLD AUTO: 9.4 FL
POTASSIUM SERPL-SCNC: 4.2 MMOL/L
PROT SERPL-MCNC: 7 G/DL
RBC # BLD AUTO: 4.28 M/UL
SODIUM SERPL-SCNC: 138 MMOL/L
TRIGL SERPL-MCNC: 89 MG/DL
WBC # BLD AUTO: 6.8 K/UL

## 2017-07-07 PROCEDURE — 80053 COMPREHEN METABOLIC PANEL: CPT

## 2017-07-07 PROCEDURE — 85025 COMPLETE CBC W/AUTO DIFF WBC: CPT

## 2017-07-07 PROCEDURE — 36415 COLL VENOUS BLD VENIPUNCTURE: CPT | Mod: PO

## 2017-07-07 PROCEDURE — 80061 LIPID PANEL: CPT

## 2017-07-11 ENCOUNTER — ANTI-COAG VISIT (OUTPATIENT)
Dept: CARDIOLOGY | Facility: CLINIC | Age: 67
End: 2017-07-11

## 2017-07-11 DIAGNOSIS — Z79.01 LONG TERM CURRENT USE OF ANTICOAGULANT THERAPY: ICD-10-CM

## 2017-07-11 DIAGNOSIS — I48.0 PAF (PAROXYSMAL ATRIAL FIBRILLATION): ICD-10-CM

## 2017-07-11 LAB — INR PPP: 2.7

## 2017-07-12 ENCOUNTER — OFFICE VISIT (OUTPATIENT)
Dept: CARDIOLOGY | Facility: CLINIC | Age: 67
End: 2017-07-12
Payer: MEDICARE

## 2017-07-12 VITALS
DIASTOLIC BLOOD PRESSURE: 56 MMHG | BODY MASS INDEX: 22.08 KG/M2 | HEART RATE: 70 BPM | WEIGHT: 137.38 LBS | SYSTOLIC BLOOD PRESSURE: 122 MMHG | HEIGHT: 66 IN | OXYGEN SATURATION: 96 %

## 2017-07-12 DIAGNOSIS — I25.810 CORONARY ARTERY DISEASE INVOLVING AUTOLOGOUS ARTERY CORONARY BYPASS GRAFT WITHOUT ANGINA PECTORIS: ICD-10-CM

## 2017-07-12 DIAGNOSIS — I48.0 PAROXYSMAL ATRIAL FIBRILLATION: ICD-10-CM

## 2017-07-12 DIAGNOSIS — I10 ESSENTIAL HYPERTENSION: ICD-10-CM

## 2017-07-12 DIAGNOSIS — I77.9 CAROTID ARTERY DISEASE, UNSPECIFIED LATERALITY: ICD-10-CM

## 2017-07-12 PROCEDURE — 1126F AMNT PAIN NOTED NONE PRSNT: CPT | Mod: ,,, | Performed by: INTERNAL MEDICINE

## 2017-07-12 PROCEDURE — 1159F MED LIST DOCD IN RCRD: CPT | Mod: ,,, | Performed by: INTERNAL MEDICINE

## 2017-07-12 PROCEDURE — 99999 PR PBB SHADOW E&M-EST. PATIENT-LVL V: CPT | Mod: PBBFAC,,, | Performed by: INTERNAL MEDICINE

## 2017-07-12 PROCEDURE — 99215 OFFICE O/P EST HI 40 MIN: CPT | Mod: PBBFAC,PO | Performed by: INTERNAL MEDICINE

## 2017-07-12 PROCEDURE — 99215 OFFICE O/P EST HI 40 MIN: CPT | Mod: S$PBB,,, | Performed by: INTERNAL MEDICINE

## 2017-07-12 NOTE — PROGRESS NOTES
[facility]    CC: Atrial fibrillation  Chief Complaint   Patient presents with    [facility]       Patient ID: Steph Lira is a 66 y.o. female with a past medical history of CAD s/p CABG, Carotid stent, PAF  HPI  She presented today with request to switch from coumadin to NOAC. She has PAF and has undergone cardioversion and has been on flecainide for years.She had a stress test in 2014 which was negative for ischemia and has not had any change in her clinical status since then. She specifically denies chest pain, orthopnea, palpitation, PND, Syncope or pre-syncope.      Past Medical History:   Diagnosis Date    *Atrial fibrillation     and Hx PSVT    Allergy     chronic     Arthritis     fingers    Bronchitis March 2013    CAD (coronary artery disease) 2005    CABGx2 in 2005 and 2 MI after with 4 stents    Cancer 1996    small cell lung cancer s/p resection of 1/3 lung, 5 ribs and muscle mass then had chemo and radiation    Cataract     OD     CHF (congestive heart failure) past Hx    Chronic rhinitis     Clot past Hx    external jugular, now stented, occluded, had phlebitis; now revascularized    Colon polyp     COPD (chronic obstructive pulmonary disease)     no oxygen or nebulizers    COPD exacerbation 5/8/13    improved 5/14/13 after steroid pack,antibiotics    Former smoker     GERD (gastroesophageal reflux disease)     Heart block     Hyperlipidemia     Hypertension     pt states does not have //    Posterior vitreous detachment of left eye     Thyroid disease      Past Surgical History:   Procedure Laterality Date    ADENOIDECTOMY      APPENDECTOMY      BACK SURGERY      lumbar    CARDIAC SURGERY      CATARACT EXTRACTION Left     OS    CATARACT EXTRACTION, BILATERAL      left eye only    CHOLECYSTECTOMY      COLONOSCOPY  03/2012    repeat in 5 years    COLONOSCOPY N/A 6/19/2017    Procedure: COLONOSCOPY;  Surgeon: Kal Elise MD;  Location: [facility];   Service: Endoscopy;  Laterality: N/A;    CORONARY ARTERY BYPASS GRAFT      2 vessel    EAR PINNA RECONSTRUCTION W/ RIB GRAFT  2010/2011    x2    EYE SURGERY  Dec 2012    ptosis repair    FIRST RIB REMOVAL   with lung resection    HYSTERECTOMY      LUNG LOBECTOMY      left lung for cancer    SHOULDER SURGERY      scapular entrapment after lung surgery, needed 5 ribs removed left side    SYMPOTHATIC NERVE LEFT SIDE       with lung surgery    TONSILLECTOMY      TONSILLECTOMY, ADENOIDECTOMY, BILATERAL MYRINGOTOMY AND TUBES      UPPER GASTROINTESTINAL ENDOSCOPY  2016    Dr. Koo    VASCULAR SURGERY      stents place in jugualr vein     Social History     Social History    Marital status:      Spouse name: N/A    Number of children: N/A    Years of education: N/A     Occupational History    retired      Social History Main Topics    Smoking status: Former Smoker     Packs/day: 2.00     Years: 20.00     Types: Cigarettes     Quit date: 1994    Smokeless tobacco: Never Used    Alcohol use No    Drug use: No    Sexual activity: Yes     Partners: Male     Other Topics Concern    Are You Pregnant Or Think You May Be? No    Breast-Feeding No     Social History Narrative    No narrative on file     Family History   Problem Relation Age of Onset    Allergic rhinitis Father     Cancer Father     Hypertension Father     Allergies Father     Allergic rhinitis Son     Asthma Son     Stroke Mother     Allergies Mother     Allergic rhinitis Sister     Asthma Sister     Diverticulitis Brother     No Known Problems Daughter     No Known Problems Maternal Aunt     No Known Problems Maternal Uncle     No Known Problems Paternal Aunt     No Known Problems Paternal Uncle     Colon polyps Maternal Grandmother       of colon blockage    No Known Problems Maternal Grandfather     No Known Problems Paternal Grandmother     No Known Problems Paternal  Grandfather     No Known Problems Brother     No Known Problems Sister     Angioedema Neg Hx     Eczema Neg Hx     Immunodeficiency Neg Hx     Urticaria Neg Hx     Skin cancer Neg Hx     Amblyopia Neg Hx     Blindness Neg Hx     Cataracts Neg Hx     Diabetes Neg Hx     Glaucoma Neg Hx     Macular degeneration Neg Hx     Retinal detachment Neg Hx     Strabismus Neg Hx     Thyroid disease Neg Hx     Colon cancer Neg Hx     Melanoma Neg Hx        Review of patient's allergies indicates:   Allergen Reactions    Ciprofloxacin Itching    Doxycycline Other (See Comments)     Patient had a rxn with the sun despite wearing spf 30       Medication List with Changes/Refills   New Medications    APIXABAN 5 MG TAB    Take 1 tablet (5 mg total) by mouth 2 (two) times daily.   Current Medications    ALBUTEROL (ACCUNEB) 1.25 MG/3 ML NEBU    USE 1 VIAL (3 ML) VIA NEBULIZER EVERY 6 HOURS AS NEEDED    ALBUTEROL (PROVENTIL HFA) 90 MCG/ACTUATION INHALER    Inhale 2 puffs into the lungs every 6 (six) hours as needed for Wheezing.    ATENOLOL (TENORMIN) 25 MG TABLET    Take 1 tablet (25 mg total) by mouth once daily.    ATORVASTATIN (LIPITOR) 40 MG TABLET    Take 1 tablet (40 mg total) by mouth once daily.    BISACODYL (DULCOLAX) 5 MG EC TABLET    Take 1 tablet (5 mg total) by mouth daily as needed for Constipation.    CALCIUM CITRATE (CITRACAL) 500 MG TBEF    Take 500 mg by mouth. 1 Tablet, Effervescent Oral  daily    DESOXIMETASONE (TOPICORT) 0.25 % CREAM    Apply topically 2 (two) times daily.    DILTIAZEM (CARDIZEM) 30 MG TABLET    Take 1 tablet (30 mg total) by mouth 4 (four) times daily before meals and nightly.    DIPHENHYDRAMINE (BENADRYL) 25 MG CAPSULE    Take 25 mg by mouth every 6 (six) hours as needed for Itching.    FLECAINIDE (TAMBOCOR) 50 MG TAB    Take 2 tablets (100 mg total) by mouth 2 (two) times daily.    FLUTICASONE (FLONASE) 50 MCG/ACTUATION NASAL SPRAY    1 spray by Each Nare route once daily.     "FLUTICASONE-SALMETEROL 250-50 MCG/DOSE (ADVAIR DISKUS) 250-50 MCG/DOSE DISKUS INHALER    USE 1 INHALATION INTO THE LUNGS TWICE A DAY. RINSE MOUTH AFTER USE TO PREVENT THRUSH    IPRATROPIUM (ATROVENT) 0.03 % NASAL SPRAY    2 sprays by Nasal route 2 (two) times daily. As needed    LEVOTHYROXINE (SYNTHROID) 88 MCG TABLET    Take 1 tablet (88 mcg total) by mouth once daily.    MECLIZINE (ANTIVERT) 25 MG TABLET    Take 1 tablet (25 mg total) by mouth 3 (three) times daily as needed.    MONTELUKAST (SINGULAIR) 10 MG TABLET    Take 1 tablet (10 mg total) by mouth every evening.    MULTIVITAMIN-MINERALS-LUTEIN (CENTRUM SILVER) TAB    Take 1 tablet by mouth once daily. 1 Tablet Oral Every day    NEXIUM 40 MG CAPSULE        POLYETHYLENE GLYCOL (GLYCOLAX) 17 GRAM/DOSE POWDER    Take 17 g by mouth once daily.    RETIN-A 0.05 % CREAM    APPLY THIN FILM TO FACE AT NIGHT AFTER MOISTURIZING    SPIRIVA WITH HANDIHALER 18 MCG INHALATION CAPSULE    INHALE THE CONTENTS OF 1 CAPSULE DAILY    TRIAMCINOLONE ACETONIDE 0.1% (KENALOG) 0.1 % CREAM    Apply topically 2 (two) times daily.   Discontinued Medications    WARFARIN (COUMADIN) 5 MG TABLET    Take 0.5-1 Tablet QPM as instructed by coumadin clinic       Review of Systems  Constitution: Denies chills, fever, and sweats.  HENT: Denies headaches or blurry vision.  Cardiovascular: Denies chest pain or irregular heart beat.  Respiratory: Denies cough or shortness of breath.  Gastrointestinal: Denies abdominal pain, nausea, or vomiting.  Musculoskeletal: Denies muscle cramps.  Neurological: Denies dizziness or focal weakness.  Psychiatric/Behavioral: Normal mental status.  Hematologic/Lymphatic: Denies bleeding problem or easy bruising/bleeding.  Skin: Denies rash or suspicious lesions    Physical Examination  BP (!) 122/56 (BP Location: Left arm)   Pulse 70   Ht 5' 6" (1.676 m)   Wt 62.3 kg (137 lb 5.6 oz)   SpO2 96%   BMI 22.17 kg/m²     Constitutional: No acute distress, " conversant  HEENT: Sclera anicteric, Pupils equal, round and reactive to light, extraocular motions intact, Oropharynx clear  Neck: No JVD, BILATERAL  carotid bruits  Cardiovascular: regular rate and rhythm,  normal S1/S2,soft ESM  Pulmonary: Clear to auscultation bilaterally  Abdominal: Abdomen soft, nontender, nondistended, positive bowel sounds  Extremities: No lower extremity edema,   Pulses:  Carotid pulses are 2+ on the right side, and 2+ on the left side.  Radial pulses are 2+ on the right side, and 2+ on the left side.   Femoral pulses are 2+ on the right side, and 2+ on the left side.  Popliteal pulses are 2+ on the right side, and 2+ on the left side.   Dorsalis pedis pulses are 2+ on the right side, and 2+ on the left side.   Posterior tibial pulses are 2+ on the right side, and 2+ on the left side.    Skin: No ecchymosis, erythema, or ulcers  Psych: Alert and oriented x 3, appropriate affect  Neuro: CNII-XII intact, no focal deficits    Labs:  Most Recent Data  CBC:   Lab Results   Component Value Date    WBC 6.80 07/07/2017    HGB 12.8 07/07/2017    HCT 40.3 07/07/2017     07/07/2017    MCV 94 07/07/2017    RDW 12.9 07/07/2017     BMP:   Lab Results   Component Value Date     07/07/2017    K 4.2 07/07/2017     07/07/2017    CO2 28 07/07/2017    BUN 8 07/07/2017    CREATININE 0.7 07/07/2017    GLU 84 07/07/2017    CALCIUM 9.1 07/07/2017    MG 2.0 09/30/2016     LFTS;   Lab Results   Component Value Date    PROT 7.0 07/07/2017    ALBUMIN 3.6 07/07/2017    BILITOT 0.7 07/07/2017    AST 26 07/07/2017    ALKPHOS 67 07/07/2017    ALT 21 07/07/2017     COAGS:   Lab Results   Component Value Date    INR 2.7 07/11/2017     FLP:   Lab Results   Component Value Date    CHOL 146 07/07/2017    HDL 48 07/07/2017    LDLCALC 80.2 07/07/2017    TRIG 89 07/07/2017    CHOLHDL 32.9 07/07/2017     CARDIAC:   Lab Results   Component Value Date    TROPONINI 0.008 04/26/2017     (H) 04/26/2017        Imaging:    EKG: NSR. Non specific ST-T wave changes.    Echo: 2016  CONCLUSIONS     1 - Low normal to mildly depressed left ventricular systolic function (EF 50-55%).     2 - Mild mitral regurgitation.     3 - The estimated PA systolic pressure is 39 mmHg.     4 - Intermediate central venous pressure.     5 - Suggestion for reduce LVEF from Echo in 2014.     6 - Difficult windows.       Stress Testin-No evidence of ischemia.  I have personally reviewed these images and echo data    Assessment/Plan:  Steph was seen today for establish care.    Diagnoses and all orders for this visit:    Paroxysmal atrial fibrillation  -     CBC auto differential; Future  -     Comprehensive metabolic panel; Future    Essential hypertension    Carotid artery disease, unspecified laterality    Coronary artery disease involving autologous artery coronary bypass graft without angina pectoris    Other orders  -     apixaban 5 mg Tab; Take 1 tablet (5 mg total) by mouth 2 (two) times daily.    We discussed the pro-cons of NOAC vs coumadin. NOAC have been shown to be non-inferior to coumadin in both bleeding and clotting risk. NOAC does not have significant dietary interactions.   Withing the NOAC Elliquis has the lowest bleeding risk . I have recommended Eliquis 5 mg bid. She is aware that she needs to stop coumadin and few days and once INR <2 SHE CAN START eliquis 5 mg bid.  We will see her in 4 month with repeat labs to assess her tolerability for Eliquis. She is aware that she is at a higher risk for bleeding while she is on Eliquis sicne it is a blood thinner.  He [id number] score is 4 and her HAS Bled score is 1 so the risk of bleeding risk is much lower when compared to the benefits that anti-coagulation will offer her.  We also discussed that use of flecanide would have to be carefully monitored in her given her underlying CAD. We will perform a stress test in the next 1 year . If at any point her CAD becomes  unstable wither by history or by objective evidence we will stop the flecanide and look for alternatives.             Total duration of face to face visit time 60 minutes.  Total time spent counseling greater than fifty percent of total visit time.  Counseling included discussion regarding imaging findings, diagnosis, possibilities, treatment options, risks and benefits.  The patient had many questions regarding the options and long-term effect which were all answered to my best ability.      Henry Wei MD,MRCP,RPVI,FACC,FSCAI.  Interventional Cardiology   Phone [phone]

## 2017-07-12 NOTE — PATIENT INSTRUCTIONS
1. Stop coumadin.  2.When INR drops below 2 then start Eliquis 5mg BID.  3. CBC,CMP IN 4 MONTHS.  4.f/u IN 4 months

## 2017-07-14 ENCOUNTER — ANTI-COAG VISIT (OUTPATIENT)
Dept: CARDIOLOGY | Facility: CLINIC | Age: 67
End: 2017-07-14

## 2017-07-21 ENCOUNTER — DOCUMENTATION ONLY (OUTPATIENT)
Dept: FAMILY MEDICINE | Facility: CLINIC | Age: 67
End: 2017-07-21

## 2017-07-21 NOTE — PROGRESS NOTES
Pre-Visit Chart Review  For Appointment Scheduled on 7-24-17    There are no preventive care reminders to display for this patient.

## 2017-07-24 ENCOUNTER — OFFICE VISIT (OUTPATIENT)
Dept: FAMILY MEDICINE | Facility: CLINIC | Age: 67
End: 2017-07-24
Payer: MEDICARE

## 2017-07-24 ENCOUNTER — HOSPITAL ENCOUNTER (OUTPATIENT)
Dept: RADIOLOGY | Facility: CLINIC | Age: 67
Discharge: HOME | End: 2017-07-24
Attending: FAMILY MEDICINE
Payer: MEDICARE

## 2017-07-24 VITALS
BODY MASS INDEX: 22.53 KG/M2 | SYSTOLIC BLOOD PRESSURE: 120 MMHG | HEIGHT: 66 IN | HEART RATE: 74 BPM | TEMPERATURE: 98 F | WEIGHT: 140.19 LBS | DIASTOLIC BLOOD PRESSURE: 68 MMHG

## 2017-07-24 DIAGNOSIS — M25.531 RIGHT WRIST PAIN: Primary | ICD-10-CM

## 2017-07-24 DIAGNOSIS — E78.00 HYPERCHOLESTEREMIA: ICD-10-CM

## 2017-07-24 DIAGNOSIS — I10 ESSENTIAL HYPERTENSION: ICD-10-CM

## 2017-07-24 DIAGNOSIS — E03.9 ACQUIRED HYPOTHYROIDISM: ICD-10-CM

## 2017-07-24 DIAGNOSIS — M25.531 RIGHT WRIST PAIN: ICD-10-CM

## 2017-07-24 PROCEDURE — 99999 PR PBB SHADOW E&M-EST. PATIENT-LVL III: CPT | Mod: PBBFAC,,, | Performed by: FAMILY MEDICINE

## 2017-07-24 PROCEDURE — 73110 X-RAY EXAM OF WRIST: CPT | Mod: 26,RT,S$GLB, | Performed by: RADIOLOGY

## 2017-07-24 PROCEDURE — 99214 OFFICE O/P EST MOD 30 MIN: CPT | Mod: S$PBB,,, | Performed by: FAMILY MEDICINE

## 2017-07-24 PROCEDURE — 1126F AMNT PAIN NOTED NONE PRSNT: CPT | Mod: ,,, | Performed by: FAMILY MEDICINE

## 2017-07-24 PROCEDURE — 1159F MED LIST DOCD IN RCRD: CPT | Mod: ,,, | Performed by: FAMILY MEDICINE

## 2017-07-24 PROCEDURE — 73110 X-RAY EXAM OF WRIST: CPT | Mod: TC,PO,RT

## 2017-07-24 RX ORDER — ATORVASTATIN CALCIUM 40 MG/1
40 TABLET, FILM COATED ORAL DAILY
Qty: 90 TABLET | Refills: 3 | Status: SHIPPED | OUTPATIENT
Start: 2017-07-24 | End: 2018-05-30 | Stop reason: SDUPTHER

## 2017-07-24 RX ORDER — DICLOFENAC SODIUM 10 MG/G
2 GEL TOPICAL DAILY
Qty: 100 G | Status: SHIPPED | OUTPATIENT
Start: 2017-07-24 | End: 2018-12-17 | Stop reason: ALTCHOICE

## 2017-07-24 NOTE — PROGRESS NOTES
Subjective:       Patient ID: Steph Lira is a 66 y.o. female.    Chief Complaint: Establish Care    Patient Active Problem List   Diagnosis    GERD (gastroesophageal reflux disease)    Chronic rhinitis    Hypothyroid    Aneurysm of heart (wall)    Benign neoplasm of skin of upper limb, including shoulder    Intermediate coronary syndrome    Phlebitis and thrombophlebitis of superficial veins of upper extremities    Simple chronic conjunctivitis    Supraventricular premature beats    Vascular disorder of skin    Chronic external jugular vein thrombosis    Long term current use of anticoagulant therapy    Magdiel's syndrome - Left Eye    Ptosis    Post-operative state    Chest pain at rest    Anxiety    CAD (coronary artery disease)    S/P CABG x 2, 2005    HTN (hypertension)    Hypercholesteremia    PAF (paroxysmal atrial fibrillation), onset 2002    Intercostal neuralgia    Pain    Small cell lung cancer    Unstable angina    Radius and ulna distal fracture    Personal history of lung cancer in 1996 S/P left lobectomy    Blurred vision, bilateral    Panlobular emphysema    Carotid artery disease    Pneumonia    Atrial fibrillation with RVR    COPD (chronic obstructive pulmonary disease)    Uncontrolled atrial flutter with rvr    Anticoagulated on Coumadin    Allergic rhinitis    Hypoalbuminemia    History of smoking 10-25 pack years, 15 pack-years, quit 1994    Persistent cough for 3 weeks or longer, onset 12/2015    S/P CABG (coronary artery bypass graft)    Supraventricular bigeminy    Chest pain, unspecified    Allergic drug rash    Dysphagia    History of colon polyps    Coronary artery disease involving autologous artery coronary bypass graft     C/o right wrist pain lately.  Worse with use.  Had fracture 2 years ago    HPI  Review of Systems   Constitutional: Negative for fatigue and unexpected weight change.   Respiratory: Negative for chest tightness and  shortness of breath.    Cardiovascular: Negative for chest pain, palpitations and leg swelling.   Gastrointestinal: Negative for abdominal pain.   Endocrine: Negative for polydipsia, polyphagia and polyuria.   Musculoskeletal: Positive for arthralgias and joint swelling.   Neurological: Negative for dizziness, syncope, light-headedness and headaches.       Objective:      Physical Exam   Constitutional: She is oriented to person, place, and time. She appears well-developed and well-nourished.   Cardiovascular: Normal rate, regular rhythm and normal heart sounds.    Pulmonary/Chest: Effort normal and breath sounds normal.   Musculoskeletal: She exhibits no edema.        Right wrist: She exhibits normal range of motion, no tenderness, no bony tenderness, no swelling, no effusion, no crepitus, no deformity and no laceration.   Neurological: She is alert and oriented to person, place, and time.   Skin: Skin is warm and dry.   Psychiatric: She has a normal mood and affect.   Nursing note and vitals reviewed.      Assessment:       1. Right wrist pain    2. Essential hypertension    3. Hypercholesteremia        Plan:       1. Right wrist pain  Suspect OA.  Tylenol prn and wrist brace  - X-Ray Wrist Complete Right; Future  - Ambulatory Referral to Orthopedics  - diclofenac sodium (VOLTAREN) 1 % Gel; Apply 2 g topically once daily.  Dispense: 100 g; Refill: prn    2. Essential hypertension  Controlled on current medications.  Continue current medications.      3. Hypercholesteremia  Stable condition.  Continue current medications.  Will adjust based on lab findings or if condition changes.    - atorvastatin (LIPITOR) 40 MG tablet; Take 1 tablet (40 mg total) by mouth once daily.  Dispense: 90 tablet; Refill: 3        [facility] goal documentation:  Patient readiness: acceptance and barriers:readiness  During the course of the visit the patient was educated and counseled about the following: Hypertension:   Dietary sodium  restriction.  Regular aerobic exercise.  Goals: Hypertension: Reduce Blood Pressure  Goal/Outcomes met:Hypertension  The following self management tools provided:none  Patient Instructions (the written plan) was given to the patient/family: Yes  Time spent with patient: 20 minutes    Patient with be reevaluated in 6 months or sooner prn    Greater than 50% of this visit was spent counseling as described in above documentation:Yes

## 2017-08-10 ENCOUNTER — OFFICE VISIT (OUTPATIENT)
Dept: DERMATOLOGY | Facility: CLINIC | Age: 67
End: 2017-08-10
Payer: MEDICARE

## 2017-08-10 DIAGNOSIS — B07.9 VIRAL WARTS, UNSPECIFIED TYPE: ICD-10-CM

## 2017-08-10 DIAGNOSIS — L29.9 PRURITIC DISORDER: Primary | ICD-10-CM

## 2017-08-10 DIAGNOSIS — L57.0 AK (ACTINIC KERATOSIS): ICD-10-CM

## 2017-08-10 PROCEDURE — 3008F BODY MASS INDEX DOCD: CPT | Mod: ,,, | Performed by: DERMATOLOGY

## 2017-08-10 PROCEDURE — 99999 PR PBB SHADOW E&M-EST. PATIENT-LVL II: CPT | Mod: PBBFAC,,, | Performed by: DERMATOLOGY

## 2017-08-10 PROCEDURE — 99213 OFFICE O/P EST LOW 20 MIN: CPT | Mod: 25,S$PBB,, | Performed by: DERMATOLOGY

## 2017-08-10 PROCEDURE — 17000 DESTRUCT PREMALG LESION: CPT | Mod: 59,S$PBB,, | Performed by: DERMATOLOGY

## 2017-08-10 PROCEDURE — 17110 DESTRUCTION B9 LES UP TO 14: CPT | Mod: PBBFAC,PO | Performed by: DERMATOLOGY

## 2017-08-10 PROCEDURE — 17110 DESTRUCTION B9 LES UP TO 14: CPT | Mod: S$PBB,,, | Performed by: DERMATOLOGY

## 2017-08-10 PROCEDURE — 1159F MED LIST DOCD IN RCRD: CPT | Mod: ,,, | Performed by: DERMATOLOGY

## 2017-08-10 PROCEDURE — 17000 DESTRUCT PREMALG LESION: CPT | Mod: 59,PBBFAC,PO | Performed by: DERMATOLOGY

## 2017-08-10 PROCEDURE — 99212 OFFICE O/P EST SF 10 MIN: CPT | Mod: PBBFAC,PO | Performed by: DERMATOLOGY

## 2017-08-10 NOTE — PROGRESS NOTES
Subjective:       Patient ID:  Steph Lira is a 66 y.o. female who presents for   Chief Complaint   Patient presents with    Warts     Pt here today for a wart follow up tx with cryosurgery and 40 % sal acid paste.Tx helped.  Last use was a few weeks ago. Pt still sees small white area there. Rough. Not tender.   C/o itching on left shoulder. Itches.  Had surgery on this site.  Unsure what is going on here.  Used cerave lotion .  Did not help.   C/o scaly area on nose .  Pt states was bx years ago. newe area of scaling .        Warts         Review of Systems   Skin: Negative for itching and rash.   Hematologic/Lymphatic: Does not bruise/bleed easily.        Objective:    Physical Exam   Constitutional: She appears well-developed and well-nourished. No distress.   Neurological: She is alert and oriented to person, place, and time. She is not disoriented.   Psychiatric: She has a normal mood and affect.   Skin:   Areas Examined (abnormalities noted in diagram):   Head / Face Inspection Performed                  Diagram Legend     Erythematous scaling macule/papule c/w actinic keratosis       Vascular papule c/w angioma      Pigmented verrucoid papule/plaque c/w seborrheic keratosis      Yellow umbilicated papule c/w sebaceous hyperplasia      Irregularly shaped tan macule c/w lentigo     1-2 mm smooth white papules consistent with Milia      Movable subcutaneous cyst with punctum c/w epidermal inclusion cyst      Subcutaneous movable cyst c/w pilar cyst      Firm pink to brown papule c/w dermatofibroma      Pedunculated fleshy papule(s) c/w skin tag(s)      Evenly pigmented macule c/w junctional nevus     Mildly variegated pigmented, slightly irregular-bordered macule c/w mildly atypical nevus      Flesh colored to evenly pigmented papule c/w intradermal nevus       Pink pearly papule/plaque c/w basal cell carcinoma      Erythematous hyperkeratotic cursted plaque c/w SCC      Surgical scar with no sign of skin  cancer recurrence      Open and closed comedones      Inflammatory papules and pustules      Verrucoid papule consistent consistent with wart     Erythematous eczematous patches and plaques     Dystrophic onycholytic nail with subungual debris c/w onychomycosis     Umbilicated papule    Erythematous-base heme-crusted tan verrucoid plaque consistent with inflamed seborrheic keratosis     Erythematous Silvery Scaling Plaque c/w Psoriasis     See annotation      Assessment / Plan:        Pruritic disorder  Likely secondary to nerve damage in this area from surgery   Good skin care regimen discussed including limiting to one bath or shower/day, using lukewarm water with mild soap and moisturizing cream to skin 1 - 2x/day. Brochure was provided and reviewed with patient.  Tac 0.1% Cream bid   Ice prn     Viral warts, unspecified type  Cryosurgery procedure note:    Verbal consent from the patient is obtained. Liquid nitrogen cryosurgery is applied to 1 verruca  as detailed in the physical exam, to produce a freeze injury. 1 consecutive freeze thaw cycles are applied to each verruca. The patient is aware that blisters (possibly blood blisters) may form.    AK (actinic keratosis)- no record of bx in this site in epic or OCW on nose  Cryosurgery Procedure Note    Verbal consent from the patient is obtained and the patient is aware of the precancerous quality and need for treatment of these lesions. Liquid nitrogen cryosurgery is applied to the 1 actinic keratoses, as detailed in the physical exam, to produce a freeze injury. The patient is aware that blisters may form and is instructed on wound care with gentle cleansing and use of vaseline ointment to keep moist until healed. The patient is supplied a handout on cryosurgery and is instructed to call if lesions do not completely resolve.             Return in about 6 months (around 2/10/2018), or if symptoms worsen or fail to improve.

## 2017-08-16 ENCOUNTER — OFFICE VISIT (OUTPATIENT)
Dept: ORTHOPEDICS | Facility: CLINIC | Age: 67
End: 2017-08-16
Attending: ORTHOPAEDIC SURGERY
Payer: MEDICARE

## 2017-08-16 VITALS — HEIGHT: 66 IN | BODY MASS INDEX: 22.5 KG/M2 | WEIGHT: 140 LBS

## 2017-08-16 DIAGNOSIS — M19.039 WRIST ARTHRITIS: Primary | ICD-10-CM

## 2017-08-16 PROCEDURE — 20605 DRAIN/INJ JOINT/BURSA W/O US: CPT | Mod: S$PBB,RT,, | Performed by: ORTHOPAEDIC SURGERY

## 2017-08-16 PROCEDURE — 99999 PR PBB SHADOW E&M-EST. PATIENT-LVL III: CPT | Mod: PBBFAC,,, | Performed by: ORTHOPAEDIC SURGERY

## 2017-08-16 PROCEDURE — 1125F AMNT PAIN NOTED PAIN PRSNT: CPT | Mod: ,,, | Performed by: ORTHOPAEDIC SURGERY

## 2017-08-16 PROCEDURE — 99213 OFFICE O/P EST LOW 20 MIN: CPT | Mod: PBBFAC | Performed by: ORTHOPAEDIC SURGERY

## 2017-08-16 PROCEDURE — 99203 OFFICE O/P NEW LOW 30 MIN: CPT | Mod: S$PBB,25,, | Performed by: ORTHOPAEDIC SURGERY

## 2017-08-16 PROCEDURE — 20605 DRAIN/INJ JOINT/BURSA W/O US: CPT | Mod: PBBFAC | Performed by: ORTHOPAEDIC SURGERY

## 2017-08-16 PROCEDURE — 1159F MED LIST DOCD IN RCRD: CPT | Mod: ,,, | Performed by: ORTHOPAEDIC SURGERY

## 2017-08-16 RX ORDER — CELECOXIB 100 MG/1
100 CAPSULE ORAL DAILY
Qty: 30 CAPSULE | Refills: 3 | Status: ON HOLD | OUTPATIENT
Start: 2017-08-16 | End: 2017-12-22 | Stop reason: CLARIF

## 2017-08-16 RX ORDER — TRIAMCINOLONE ACETONIDE 40 MG/ML
40 INJECTION, SUSPENSION INTRA-ARTICULAR; INTRAMUSCULAR
Status: COMPLETED | OUTPATIENT
Start: 2017-08-16 | End: 2017-08-16

## 2017-08-16 RX ADMIN — TRIAMCINOLONE ACETONIDE 40 MG: 40 INJECTION, SUSPENSION INTRA-ARTICULAR; INTRAMUSCULAR at 02:08

## 2017-08-16 NOTE — PROGRESS NOTES
INITIAL VISIT    HISTORY:  A 66-year-old female presents for evaluation of right wrist pain of   several months' duration.  She did break her wrist a few years ago and thinks   this may be related to that.  Symptoms are worse with use, occasionally having   some difficulty with gripping.    PAST MEDICAL HISTORY:  Significant for atrial fibrillation, allergies,   arthritis, bronchitis, coronary artery disease, cancer, congestive heart   failure, rhinitis, COPD, GERD, heart block, hyperlipidemia, hypertension,   thyroid disease.    PAST SURGICAL HISTORY:  Includes tonsillectomy, adenoidectomy, cholecystectomy,   hysterectomy, lung lobectomy, appendectomy, tonsillectomy, eye surgery, back   surgery, vascular surgery, heart surgery, colonoscopy.    FAMILY HISTORY:  Positive for diverticulitis, allergic rhinitis, allergies.    SOCIAL HISTORY:  The patient is a former smoker, 40-pack-year history, not   currently.  Denies alcohol.    REVIEW OF SYSTEMS:  Negative fever, chills, rashes.    CURRENT MEDICATIONS:  Reviewed on chart.    ALLERGIES:  Cipro and doxycycline.    PHYSICAL EXAMINATION:  GENERAL:  Well-developed, well-nourished female in no acute distress, alert,   oriented x3.  EXTREMITIES:  Examination of the upper extremities significant for the right   hand and wrist, demonstrating no significant swelling.  There is some tenderness   over the radioscaphoid articulation, just distal to Remi tubercle.  Range of   motion in wrist full.  Some mild pain on hyperextension of the wrist.  No   instability.  Tinel sign negative.  Range of motion of fingers full.     strength slightly decreased.  Sensation intact in all digits.    X-RAYS:  AP and lateral, right wrist, demonstrate evidence of an old fracture,   well healed, of the distal radius, but there is a slight irregularity,   interarticular, which may represent an intraarticular step-off with some   moderate degenerative changes noted at the radioscaphoid  articulation.    IMPRESSION:  Posttraumatic arthritis, right wrist.    PLAN:  I explained the nature of the problem to the patient.  Recommended an   injection.  She was in agreement.  After pause for timeout, the patient   identified the right wrist; injected dorsally with a combination of Kenalog 20   mg and 0.5 mL Xylocaine, sterile technique.    I have also given her a wrist splint for parttime use and recommended some   Celebrex for occasional use only.  She is on a blood thinner, but I think a low   dose on occasion would be fine.  Follow up in one to two months or p.r.n.      YURIY/DEIDRA  dd: 08/16/2017 14:06:53 (CDT)  td: 08/17/2017 00:53:44 (CDT)  Doc ID   #[id number]  Job ID #[id number]    CC:

## 2017-08-16 NOTE — LETTER
August 16, 2017        Maida Mon MD  [address] [facility] [address]             [facility]  [address], [address]  [facility] [address]  Phone: [phone]   Patient: Steph Lira   MR Number: [id number]   YOB: 1950   Date of Visit: 8/16/2017       Dear Dr. Mon:    Thank you for referring Steph Lira to me for evaluation. Below are the relevant portions of my assessment and plan of care.            If you have questions, please do not hesitate to call me. I look forward to following Steph along with you.    Sincerely,      Mahesh Zepeda Jr., MD           CC  No Recipients

## 2017-08-16 NOTE — Clinical Note
August 16, 2017      Maida Mon MD  [address] [facility] [address]           [facility]  [address], [address]  [facility] [address]  Phone: [phone]          Patient: Steph Lira   MR Number: [id number]   YOB: 1950   Date of Visit: 8/16/2017       Dear Dr. Maida Mon:    Thank you for referring Steph Lira to me for evaluation. Attached you will find relevant portions of my assessment and plan of care.    If you have questions, please do not hesitate to call me. I look forward to following Steph Lira along with you.    Sincerely,    Mahesh Zepeda Jr., MD    Enclosure  CC:  No Recipients    If you would like to receive this communication electronically, please contact [email or web address] or [phone] to request more information on light Link access.    For providers and/or their staff who would like to refer a patient to [facility], please contact us through our one-stop-shop provider referral line, [facility] , at [phone].    If you feel you have received this communication in error or would no longer like to receive these types of communications, please e-mail [email or web address]

## 2017-09-26 DIAGNOSIS — L73.8 SEBACEOUS HYPERPLASIA: ICD-10-CM

## 2017-09-27 RX ORDER — TRETINOIN 0.5 MG/G
CREAM TOPICAL
Qty: 45 G | Refills: 3 | Status: SHIPPED | OUTPATIENT
Start: 2017-09-27 | End: 2019-02-25 | Stop reason: SDUPTHER

## 2017-09-28 ENCOUNTER — OFFICE VISIT (OUTPATIENT)
Dept: GASTROENTEROLOGY | Facility: CLINIC | Age: 67
End: 2017-09-28
Payer: MEDICARE

## 2017-09-28 ENCOUNTER — OFFICE VISIT (OUTPATIENT)
Dept: FAMILY MEDICINE | Facility: CLINIC | Age: 67
End: 2017-09-28
Payer: MEDICARE

## 2017-09-28 VITALS
OXYGEN SATURATION: 96 % | HEIGHT: 66 IN | TEMPERATURE: 99 F | SYSTOLIC BLOOD PRESSURE: 112 MMHG | DIASTOLIC BLOOD PRESSURE: 64 MMHG | BODY MASS INDEX: 22.78 KG/M2 | RESPIRATION RATE: 20 BRPM | WEIGHT: 141.75 LBS | HEART RATE: 71 BPM

## 2017-09-28 VITALS
WEIGHT: 140.44 LBS | BODY MASS INDEX: 22.57 KG/M2 | DIASTOLIC BLOOD PRESSURE: 74 MMHG | HEIGHT: 66 IN | HEART RATE: 70 BPM | SYSTOLIC BLOOD PRESSURE: 134 MMHG | RESPIRATION RATE: 20 BRPM

## 2017-09-28 DIAGNOSIS — R13.10 DYSPHAGIA, UNSPECIFIED TYPE: Primary | ICD-10-CM

## 2017-09-28 DIAGNOSIS — R12 HEARTBURN: ICD-10-CM

## 2017-09-28 DIAGNOSIS — N39.0 URINARY TRACT INFECTION WITHOUT HEMATURIA, SITE UNSPECIFIED: Primary | ICD-10-CM

## 2017-09-28 LAB
BILIRUB SERPL-MCNC: NEGATIVE MG/DL
BLOOD, POC UA: NEGATIVE
GLUCOSE UR QL STRIP: 50
KETONES UR QL STRIP: NORMAL
LEUKOCYTE ESTERASE URINE, POC: NORMAL
NITRITE, POC UA: POSITIVE
PH, POC UA: 6
PROTEIN, POC: NEGATIVE
SPECIFIC GRAVITY, POC UA: 1
UROBILINOGEN, POC UA: NORMAL

## 2017-09-28 PROCEDURE — 87086 URINE CULTURE/COLONY COUNT: CPT

## 2017-09-28 PROCEDURE — 99999 PR PBB SHADOW E&M-EST. PATIENT-LVL III: CPT | Mod: PBBFAC,,, | Performed by: FAMILY MEDICINE

## 2017-09-28 PROCEDURE — 1159F MED LIST DOCD IN RCRD: CPT | Mod: ,,, | Performed by: FAMILY MEDICINE

## 2017-09-28 PROCEDURE — 87077 CULTURE AEROBIC IDENTIFY: CPT

## 2017-09-28 PROCEDURE — 99213 OFFICE O/P EST LOW 20 MIN: CPT | Mod: PBBFAC,PO | Performed by: FAMILY MEDICINE

## 2017-09-28 PROCEDURE — 99999 PR PBB SHADOW E&M-EST. PATIENT-LVL III: CPT | Mod: PBBFAC,,, | Performed by: INTERNAL MEDICINE

## 2017-09-28 PROCEDURE — 87186 SC STD MICRODIL/AGAR DIL: CPT

## 2017-09-28 PROCEDURE — 81000 URINALYSIS NONAUTO W/SCOPE: CPT | Mod: PBBFAC,PO | Performed by: FAMILY MEDICINE

## 2017-09-28 PROCEDURE — 99214 OFFICE O/P EST MOD 30 MIN: CPT | Mod: S$PBB,,, | Performed by: INTERNAL MEDICINE

## 2017-09-28 PROCEDURE — 3075F SYST BP GE 130 - 139MM HG: CPT | Mod: ,,, | Performed by: INTERNAL MEDICINE

## 2017-09-28 PROCEDURE — 3078F DIAST BP <80 MM HG: CPT | Mod: ,,, | Performed by: INTERNAL MEDICINE

## 2017-09-28 PROCEDURE — 3078F DIAST BP <80 MM HG: CPT | Mod: ,,, | Performed by: FAMILY MEDICINE

## 2017-09-28 PROCEDURE — 1126F AMNT PAIN NOTED NONE PRSNT: CPT | Mod: ,,, | Performed by: INTERNAL MEDICINE

## 2017-09-28 PROCEDURE — 99214 OFFICE O/P EST MOD 30 MIN: CPT | Mod: S$PBB,,, | Performed by: FAMILY MEDICINE

## 2017-09-28 PROCEDURE — 99213 OFFICE O/P EST LOW 20 MIN: CPT | Mod: PBBFAC,27,PO | Performed by: INTERNAL MEDICINE

## 2017-09-28 PROCEDURE — 1159F MED LIST DOCD IN RCRD: CPT | Mod: ,,, | Performed by: INTERNAL MEDICINE

## 2017-09-28 PROCEDURE — 87088 URINE BACTERIA CULTURE: CPT

## 2017-09-28 PROCEDURE — 3074F SYST BP LT 130 MM HG: CPT | Mod: ,,, | Performed by: FAMILY MEDICINE

## 2017-09-28 RX ORDER — SULFAMETHOXAZOLE AND TRIMETHOPRIM 800; 160 MG/1; MG/1
1 TABLET ORAL 2 TIMES DAILY
Qty: 20 TABLET | Refills: 0 | Status: SHIPPED | OUTPATIENT
Start: 2017-09-28 | End: 2017-10-08

## 2017-09-28 RX ORDER — PHENAZOPYRIDINE HYDROCHLORIDE 200 MG/1
200 TABLET, FILM COATED ORAL 3 TIMES DAILY PRN
Qty: 6 TABLET | Refills: 0 | Status: SHIPPED | OUTPATIENT
Start: 2017-09-28 | End: 2017-09-30

## 2017-09-28 RX ORDER — PANTOPRAZOLE SODIUM 40 MG/1
40 TABLET, DELAYED RELEASE ORAL DAILY
Qty: 90 TABLET | Refills: 3 | Status: SHIPPED | OUTPATIENT
Start: 2017-09-28 | End: 2018-09-06 | Stop reason: SDUPTHER

## 2017-09-28 NOTE — PROGRESS NOTES
"[facility]    CC: Dysphagia    HPI 67 y.o. female with history of afib on Eliquis presents with chronic, intermittent, solid food dysphagia not associated with weight loss. These symptoms are chronic however more severe lately. She underwent EGD with esophageal biopsies (negative for EoE) and dilation of distal esophageal intrinsic narrowing in June 2017, which improved her dysphagia. She takes PPI daily and notes occasional heartburn. She also had a barium esophagram October 2016 which showed esophageal dysmotility, and has been on Diltizem 30 mg qid since that time (unsure if this improves her symptoms).     Past Medical History:   Diagnosis Date    *Atrial fibrillation     and Hx PSVT    Allergy     chronic     Arthritis     fingers    Bronchitis March 2013    CAD (coronary artery disease) 2005    CABGx2 in 2005 and 2 MI after with 4 stents    Cancer 1996    small cell lung cancer s/p resection of 1/3 lung, 5 ribs and muscle mass then had chemo and radiation    Cataract     OD     CHF (congestive heart failure) past Hx    Chronic rhinitis     Clot past Hx    external jugular, now stented, occluded, had phlebitis; now revascularized    Colon polyp     COPD (chronic obstructive pulmonary disease)     no oxygen or nebulizers    COPD exacerbation 5/8/13    improved 5/14/13 after steroid pack,antibiotics    Former smoker     GERD (gastroesophageal reflux disease)     Heart block     Hyperlipidemia     Hypertension     pt states does not have //    Posterior vitreous detachment of left eye     Thyroid disease          Review of Systems  General ROS: negative for - chills, fever or weight loss  Cardiovascular ROS: no chest pain or dyspnea on exertion  Gastrointestinal ROS: + dysphagia for solids, no blood in stool, + heartburn    Physical Examination  /74   Pulse 70   Resp 20   Ht 5' 6" (1.676 m)   Wt 63.7 kg (140 lb 6.9 oz)   BMI 22.67 kg/m²   General appearance: alert, " cooperative, no distress  HENT: Normocephalic, atraumatic, neck symmetrical, no nasal discharge, sclera anicteric   Lungs: clear to auscultation bilaterally, symmetric chest wall expansion bilaterally  Heart: regular rate and rhythm without rub; no displacement of the PMI   Abdomen: soft, nontender, nondistended, BS normoactive, no organomegaly appreciated  Extremities: extremities symmetric; no clubbing, cyanosis, or edema    Labs:  Lab Results   Component Value Date    WBC 6.80 07/07/2017    HGB 12.8 07/07/2017    HCT 40.3 07/07/2017    MCV 94 07/07/2017     07/07/2017         Imaging:  Barium esophagram was independently visualized and reviewed by me and showed esophageal dysmotility, lower esophageal narrowing    Assessment:   67 y.o. female with chronic dysphagia, esophageal dysmotility and esophageal narrowing that previously responded to dilation with 54 Fr Savary dilator.     Plan:  -EGD with dilation- pending endscopic appearance 54 vs 56 Fr Savary  -Chew food thoroughly  -Continue PPI for heartburn- will change to Pantoprazole per insurance formulary    Hue Wright MD  [facility]  [address], [address]  [place] [address]  Office: [phone]  Fax: [phone]

## 2017-09-28 NOTE — PROGRESS NOTES
Subjective:       Patient ID: Steph Lira is a 67 y.o. female.    Chief Complaint: Urinary Tract Infection    Urinary Tract Infection    This is a new problem. The current episode started yesterday. The problem occurs every urination. The problem has been gradually worsening. The quality of the pain is described as aching and burning. The pain is moderate. There has been no fever. Associated symptoms include frequency and urgency. Pertinent negatives include no flank pain, hematuria, nausea or rash. She has tried increased fluids for the symptoms. The treatment provided mild relief.     Review of Systems   Constitutional: Negative for fever.   Respiratory: Negative for shortness of breath.    Cardiovascular: Negative for chest pain.   Gastrointestinal: Negative for abdominal pain and nausea.   Genitourinary: Positive for frequency and urgency. Negative for flank pain and hematuria.   Skin: Negative for rash.   All other systems reviewed and are negative.      Objective:      Physical Exam   Constitutional: She appears well-developed. No distress.   Cardiovascular: Normal rate and regular rhythm.    No murmur heard.  Pulmonary/Chest: Effort normal and breath sounds normal.   Abdominal: Soft. There is tenderness in the suprapubic area. There is no CVA tenderness.       Assessment:       1. Urinary tract infection without hematuria, site unspecified        Plan:         Steph was seen today for urinary tract infection.    Diagnoses and all orders for this visit:    Urinary tract infection without hematuria, site unspecified  -     POCT Urinalysis  -     Urine culture  -     sulfamethoxazole-trimethoprim 800-160mg (BACTRIM DS) 800-160 mg Tab; Take 1 tablet by mouth 2 (two) times daily.  -     phenazopyridine (PYRIDIUM) 200 MG tablet; Take 1 tablet (200 mg total) by mouth 3 (three) times daily as needed for Pain.

## 2017-10-01 LAB — BACTERIA UR CULT: NORMAL

## 2017-10-02 ENCOUNTER — TELEPHONE (OUTPATIENT)
Dept: FAMILY MEDICINE | Facility: CLINIC | Age: 67
End: 2017-10-02

## 2017-10-02 NOTE — TELEPHONE ENCOUNTER
----- Message from Licha Anglin PA-C sent at 10/2/2017  9:37 AM CDT -----  Antibiotics prescribed are appropriate for the infection. Call the clinic if symptoms worsen, new symptoms develop or if you are not any better after completion of your antibiotics.

## 2017-10-02 NOTE — TELEPHONE ENCOUNTER
Results given to patient.  Patient states she is feeling much better.  Verbalized understanding.

## 2017-10-03 ENCOUNTER — ANESTHESIA (OUTPATIENT)
Dept: ENDOSCOPY | Facility: HOSPITAL | Age: 67
End: 2017-10-03
Payer: MEDICARE

## 2017-10-03 ENCOUNTER — ANESTHESIA EVENT (OUTPATIENT)
Dept: ENDOSCOPY | Facility: HOSPITAL | Age: 67
End: 2017-10-03
Payer: MEDICARE

## 2017-10-03 ENCOUNTER — SURGERY (OUTPATIENT)
Age: 67
End: 2017-10-03

## 2017-10-03 ENCOUNTER — HOSPITAL ENCOUNTER (OUTPATIENT)
Facility: HOSPITAL | Age: 67
Discharge: HOME | End: 2017-10-03
Attending: INTERNAL MEDICINE | Admitting: INTERNAL MEDICINE
Payer: MEDICARE

## 2017-10-03 VITALS — RESPIRATION RATE: 65 BRPM

## 2017-10-03 DIAGNOSIS — R13.10 DYSPHAGIA, UNSPECIFIED TYPE: ICD-10-CM

## 2017-10-03 DIAGNOSIS — K22.89 ESOPHAGEAL MASS: Primary | ICD-10-CM

## 2017-10-03 PROBLEM — K29.70 GASTRITIS: Status: ACTIVE | Noted: 2017-10-03

## 2017-10-03 PROBLEM — B37.81 ESOPHAGEAL CANDIDIASIS: Status: ACTIVE | Noted: 2017-10-03

## 2017-10-03 PROCEDURE — 88341 IMHCHEM/IMCYTCHM EA ADD ANTB: CPT | Mod: 26,,, | Performed by: PATHOLOGY

## 2017-10-03 PROCEDURE — 27200946 HC BRUSH, CYTOLOGY: Performed by: INTERNAL MEDICINE

## 2017-10-03 PROCEDURE — 37000008 HC ANESTHESIA 1ST 15 MINUTES: Performed by: INTERNAL MEDICINE

## 2017-10-03 PROCEDURE — 27201012 HC FORCEPS, HOT/COLD, DISP: Performed by: INTERNAL MEDICINE

## 2017-10-03 PROCEDURE — 25000003 PHARM REV CODE 250: Performed by: INTERNAL MEDICINE

## 2017-10-03 PROCEDURE — D9220A PRA ANESTHESIA: Mod: CRNA,,, | Performed by: NURSE ANESTHETIST, CERTIFIED REGISTERED

## 2017-10-03 PROCEDURE — 87210 SMEAR WET MOUNT SALINE/INK: CPT

## 2017-10-03 PROCEDURE — 37000009 HC ANESTHESIA EA ADD 15 MINS: Performed by: INTERNAL MEDICINE

## 2017-10-03 PROCEDURE — D9220A PRA ANESTHESIA: Mod: ANES,,, | Performed by: ANESTHESIOLOGY

## 2017-10-03 PROCEDURE — 43239 EGD BIOPSY SINGLE/MULTIPLE: CPT | Performed by: INTERNAL MEDICINE

## 2017-10-03 PROCEDURE — 43239 EGD BIOPSY SINGLE/MULTIPLE: CPT | Mod: ,,, | Performed by: INTERNAL MEDICINE

## 2017-10-03 PROCEDURE — 88305 TISSUE EXAM BY PATHOLOGIST: CPT | Performed by: PATHOLOGY

## 2017-10-03 PROCEDURE — 88342 IMHCHEM/IMCYTCHM 1ST ANTB: CPT | Mod: 26,,, | Performed by: PATHOLOGY

## 2017-10-03 PROCEDURE — 63600175 PHARM REV CODE 636 W HCPCS: Performed by: NURSE ANESTHETIST, CERTIFIED REGISTERED

## 2017-10-03 RX ORDER — LIDOCAINE HYDROCHLORIDE 20 MG/ML
INJECTION, SOLUTION EPIDURAL; INFILTRATION; INTRACAUDAL; PERINEURAL
Status: DISCONTINUED
Start: 2017-10-03 | End: 2017-10-03 | Stop reason: HOSPADM

## 2017-10-03 RX ORDER — PROPOFOL 10 MG/ML
INJECTION, EMULSION INTRAVENOUS
Status: DISCONTINUED
Start: 2017-10-03 | End: 2017-10-03 | Stop reason: HOSPADM

## 2017-10-03 RX ORDER — SODIUM CHLORIDE 9 MG/ML
INJECTION, SOLUTION INTRAVENOUS CONTINUOUS
Status: DISCONTINUED | OUTPATIENT
Start: 2017-10-03 | End: 2017-10-03 | Stop reason: HOSPADM

## 2017-10-03 RX ORDER — PROPOFOL 10 MG/ML
VIAL (ML) INTRAVENOUS
Status: DISCONTINUED | OUTPATIENT
Start: 2017-10-03 | End: 2017-10-03

## 2017-10-03 RX ORDER — LIDOCAINE HCL/PF 100 MG/5ML
SYRINGE (ML) INTRAVENOUS
Status: DISCONTINUED | OUTPATIENT
Start: 2017-10-03 | End: 2017-10-03

## 2017-10-03 RX ADMIN — LIDOCAINE HYDROCHLORIDE 50 MG: 20 INJECTION, SOLUTION INTRAVENOUS at 09:10

## 2017-10-03 RX ADMIN — SODIUM CHLORIDE: 0.9 INJECTION, SOLUTION INTRAVENOUS at 08:10

## 2017-10-03 RX ADMIN — PROPOFOL 80 MG: 10 INJECTION, EMULSION INTRAVENOUS at 09:10

## 2017-10-03 RX ADMIN — PROPOFOL 40 MG: 10 INJECTION, EMULSION INTRAVENOUS at 09:10

## 2017-10-03 NOTE — OR NURSING
Patient awake, alert and oriented. Vital signs within defined limits. No distress observed. Tolerating PO fluids.No complaints of any pain or discomfort.  and MD present at bedside. Tolerated procedure well. Patient ready for transport home.

## 2017-10-03 NOTE — ANESTHESIA POSTPROCEDURE EVALUATION
"Anesthesia Post Evaluation    Patient: Steph Lira    Procedure(s) Performed: Procedure(s) (LRB):  ESOPHAGOGASTRODUODENOSCOPY (EGD) (N/A)    Final Anesthesia Type: general  Patient location during evaluation: PACU  Patient participation: Yes- Able to Participate  Level of consciousness: awake and alert and oriented  Post-procedure vital signs: reviewed and stable  Pain management: adequate  Airway patency: patent  PONV status at discharge: No PONV  Anesthetic complications: no      Cardiovascular status: blood pressure returned to baseline  Respiratory status: unassisted, spontaneous ventilation and room air  Hydration status: euvolemic  Follow-up not needed.        Visit Vitals  /72   Pulse 86   Temp 36.9 °C (98.4 °F) (Tympanic)   Resp (!) 22   Ht 5' 6" (1.676 m)   Wt 63.5 kg (140 lb)   SpO2 99%   Breastfeeding? No   BMI 22.60 kg/m²       Pain/Tamera Score: No Data Recorded      "

## 2017-10-03 NOTE — TRANSFER OF CARE
"Anesthesia Transfer of Care Note    Patient: Steph Lira    Procedure(s) Performed: Procedure(s) (LRB):  ESOPHAGOGASTRODUODENOSCOPY (EGD) (N/A)    Patient location: PACU    Anesthesia Type: general    Transport from OR: Transported from OR on room air with adequate spontaneous ventilation    Post pain: adequate analgesia    Post assessment: no apparent anesthetic complications and tolerated procedure well    Post vital signs: stable    Level of consciousness: awake, alert and oriented    Nausea/Vomiting: no nausea/vomiting    Complications: none    Transfer of care protocol was followed      Last vitals:   Visit Vitals  /72   Pulse 86   Temp 36.9 °C (98.4 °F) (Tympanic)   Resp (!) 22   Ht 5' 6" (1.676 m)   Wt 63.5 kg (140 lb)   SpO2 99%   Breastfeeding? No   BMI 22.60 kg/m²     "

## 2017-10-03 NOTE — DISCHARGE INSTRUCTIONS
Gastritis (Adult)    Gastritis is inflammation and irritation of the stomach lining. It can be present for a short time (acute) or be long lasting (chronic). Gastritis is often caused by infection with bacteria called H pylori. More than a third of people in the [place] have this bacteria in their bodies. In many cases, H pylori causes no problems or symptoms. In some people, though, the infection irritates the stomach lining and causes gastritis. Other causes of stomach irritation include drinking alcohol or taking pain-relieving medicines called NSAIDs (such as aspirin or ibuprofen).   Symptoms of gastritis can include:  · Abdominal pain or bloating  · Loss of appetite  · Nausea or vomiting  · Vomiting blood or having black stools  · Feeling more tired than usual  An inflamed and irritated stomach lining is more likely to develop a sore called an ulcer. To help prevent this, gastritis should be treated.  Home care  If needed, medicines may be prescribed. If you have H pylori infection, treating it will likely relieve your symptoms. Other changes can help reduce stomach irritation and help it heal.  · If you have been prescribed medicines for H pylori infection, take them as directed. Take all of the medicine until it is finished or your healthcare provider tells you to stop, even if you feel better.  · Your healthcare provider may recommend avoiding NSAIDs. If you take daily aspirin for your heart or other medical reasons, do not stop without talking to your healthcare provider first.  · Avoid drinking alcohol.  · Stop smoking. Smoking can irritate the stomach and delay healing. As much as possible, stay away from second hand smoke.  Follow-up care  Follow up with your healthcare provider, or as advised by our staff. Testing may be needed to check for inflammation or an ulcer.  When to seek medical advice  Call your healthcare provider for any of the following:  · Stomach pain that gets worse or moves to the lower  right abdomen (appendix area)  · Chest pain that appears or gets worse, or spreads to the back, neck, shoulder, or arm  · Frequent vomiting (cant keep down liquids)  · Blood in the stool or vomit (red or black in color)  · Feeling weak or dizzy  · Fever of 100.4ºF (38ºC) or higher, or as directed by your healthcare provider  Date Last Reviewed: 6/22/2015  © 6529-2291 [employer]. [address] [place] [address]. All rights reserved. This information is not intended as a substitute for professional medical care. Always follow your healthcare professional's instructions.

## 2017-10-03 NOTE — H&P
H&P completed on 9-28-18has been reviewed, the patient has been examined and:  I concur with the findings and no changes have occurred since H&P was written.    There are no hospital problems to display for this patient.

## 2017-10-03 NOTE — ANESTHESIA PREPROCEDURE EVALUATION
10/03/2017  Steph Lira is a 67 y.o., female.    Pre-op Assessment    I have reviewed the Patient Summary Reports.     I have reviewed the Nursing Notes.   I have reviewed the Medications.     Review of Systems  Anesthesia Hx:  No problems with previous Anesthesia    Cardiovascular:   Hypertension CAD  Dysrhythmias atrial fibrillation Angina, with exertion CHF    Pulmonary:   Pneumonia COPD, moderate Asthma asymptomatic    Hepatic/GI:   GERD, well controlled    Neurological:   Neuromuscular Disease,    Endocrine:   Hypothyroidism             Anesthesia Plan  Type of Anesthesia, risks & benefits discussed:  Anesthesia Type:  general  Patient's Preference:   Intra-op Monitoring Plan: standard ASA monitors  Intra-op Monitoring Plan Comments:   Post Op Pain Control Plan:   Post Op Pain Control Plan Comments:   Induction:   IV  Beta Blocker:  Patient is on a Beta-Blocker and has received one dose within the past 24 hours (No further documentation required).       Informed Consent: Patient understands risks and agrees with Anesthesia plan.  Questions answered. Anesthesia consent signed with patient.  ASA Score: 3     Day of Surgery Review of History & Physical: I have interviewed and examined the patient. I have reviewed the patient's H&P dated:  There are no significant changes.          Ready For Surgery From Anesthesia Perspective.                                                                                                                  10/03/2017  Steph Lira is a 67 y.o., female.    Pre-op Assessment    I have reviewed the Patient Summary Reports.     I have reviewed the Nursing Notes.   I have reviewed the Medications.     Review of Systems  Anesthesia Hx:  No problems with previous Anesthesia    Cardiovascular:   Hypertension, well controlled CAD asymptomatic CABG/stent Dysrhythmias  Angina CHF  CAD - S/P CABG.  MI with stents post subsequent to CABG - stable now   Pulmonary:   Pneumonia COPD, mild Asthma asymptomatic    Hepatic/GI:   GERD, well controlled    Neurological:   Neuromuscular Disease,    Endocrine:   Hypothyroidism        Physical Exam  General:  Well nourished    Airway/Jaw/Neck:  Airway Findings: Mouth Opening: Normal Tongue: Normal  General Airway Assessment: Adult, Good  Mallampati: II  TM Distance: Normal, at least 6 cm       Chest/Lungs:  Chest/Lungs Findings: Clear to auscultation, Normal Respiratory Rate     Heart/Vascular:  Heart Findings: Rate: Normal  Rhythm: Regular Rhythm  Sounds: Normal  Heart Murmur  Systolic  Systolic Heart Murmur Description: L Upper Sternal Border  Systolic Heart Murmur Grade: Grade II     Abdomen:  Abdomen Findings: Normal    Musculoskeletal:  Musculoskeletal Findings: Normal   Skin:  Skin Findings: Normal    Mental Status:  Mental Status Findings:  Cooperative, Alert and Oriented         Anesthesia Plan  Type of Anesthesia, risks & benefits discussed:  Anesthesia Type:  general  Patient's Preference:   Intra-op Monitoring Plan:   Intra-op Monitoring Plan Comments:   Post Op Pain Control Plan:   Post Op Pain Control Plan Comments:   Induction:   IV  Beta Blocker:  Patient is on a Beta-Blocker and has received one dose within the past 24 hours (No further documentation required).       Informed Consent: Patient understands risks and agrees with Anesthesia plan.  Questions answered. Anesthesia consent signed with patient.  ASA Score: 3     Day of Surgery Review of History & Physical: I have interviewed and examined the patient. I have reviewed the patient's H&P dated:  There are no significant changes.  H&P update referred to the provider.         Ready For Surgery From Anesthesia Perspective.

## 2017-10-04 VITALS
TEMPERATURE: 98 F | BODY MASS INDEX: 22.5 KG/M2 | DIASTOLIC BLOOD PRESSURE: 67 MMHG | WEIGHT: 140 LBS | HEART RATE: 84 BPM | HEIGHT: 66 IN | OXYGEN SATURATION: 98 % | SYSTOLIC BLOOD PRESSURE: 136 MMHG | RESPIRATION RATE: 20 BRPM

## 2017-10-04 LAB — KOH PREP SPEC: NORMAL

## 2017-10-09 ENCOUNTER — PATIENT MESSAGE (OUTPATIENT)
Dept: GASTROENTEROLOGY | Facility: CLINIC | Age: 67
End: 2017-10-09

## 2017-10-10 ENCOUNTER — TELEPHONE (OUTPATIENT)
Dept: GASTROENTEROLOGY | Facility: CLINIC | Age: 67
End: 2017-10-10

## 2017-10-10 DIAGNOSIS — K22.9 ESOPHAGEAL LESION: Primary | ICD-10-CM

## 2017-10-10 NOTE — TELEPHONE ENCOUNTER
----- Message from Lamont Akins MD sent at 10/10/2017  4:03 PM CDT -----  Ok. Make sure you copy Sarah on responses so that she can follow-up with scheduling. Thx.    ----- Message -----  From: Robbie Gonzales MD  Sent: 10/10/2017  12:28 PM  To: Lamont Akins MD    I think is better at [facility]. This look like an EMR.  Robbie  ----- Message -----  From: Lamont Akins MD  Sent: 10/10/2017  12:00 PM  To: Adriane Mcdonough MA, Robbie Gonzales MD, #    Hi Hue- Interesting. Will definitely get her in soon. EMR and/or ablation would be appropriate. Will likely schedule with Robbie Gonzales, as he's been doing cryoablation for our group. Thx.    Sarah- Please arrange for EGD/EUS/EMR +/- cryoablation. They have cryo in the endo unit at [place], so it might also be easier to do this there, instead of getting it from the OR at [facility] (will leave this to whatever Dr. Gonzales thinks is best).    Robbie- squamous papilloma causing dysphagia. Please look at images, and then decide whether this would be best done by you at [facility] or [place].      ----- Message -----  From: Hue Wright MD  Sent: 10/10/2017  10:44 AM  To: Lamont Akins MD    Hey! 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

## 2017-10-11 ENCOUNTER — TELEPHONE (OUTPATIENT)
Dept: GASTROENTEROLOGY | Facility: CLINIC | Age: 67
End: 2017-10-11

## 2017-10-11 ENCOUNTER — TELEPHONE (OUTPATIENT)
Dept: GASTROENTEROLOGY | Facility: HOSPITAL | Age: 67
End: 2017-10-11

## 2017-10-11 DIAGNOSIS — K22.9 ESOPHAGEAL LESION: Primary | ICD-10-CM

## 2017-10-11 NOTE — TELEPHONE ENCOUNTER
----- Message from Robbie Gonzales MD sent at 10/10/2017  5:49 PM CDT -----  This patient need to be schedule at the main [place] for EGD/EUS and possible EMR. Indication esophageal lesion.  Robbie Gonzales MD  ----- Message -----  From: Lamont Akins MD  Sent: 10/10/2017   4:03 PM  To: Robbie Gonzales MD, Tashi Mejia. Make sure you copy Sarah on responses so that she can follow-up with scheduling. Thx.    ----- Message -----  From: Robbie Gonzales MD  Sent: 10/10/2017  12:28 PM  To: Lamont Akins MD    I think is better at [facility]. This look like an EMR.  Robbie  ----- Message -----  From: Lamont Akins MD  Sent: 10/10/2017  12:00 PM  To: Adriane Mcdonough MA, Robbie Gonzales MD, #    Hi Hue- Interesting. Will definitely get her in soon. EMR and/or ablation would be appropriate. Will likely schedule with Robbie Gonzales, as he's been doing cryoablation for our group. Thx.    Sarah- Please arrange for EGD/EUS/EMR +/- cryoablation. They have cryo in the endo unit at [facility], so it might also be easier to do this there, instead of getting it from the OR at [facility] (will leave this to whatever Dr. Gonzales thinks is best).    Robbie- squamous papilloma causing dysphagia. Please look at images, and then decide whether this would be best done by you at [facility] or [place].      ----- Message -----  From: Hue Wright MD  Sent: 10/10/2017  10:44 AM  To: Lamont Akins MD    Hey! 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

## 2017-10-11 NOTE — TELEPHONE ENCOUNTER
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 We dicussed the next step is to follow up with advanced endoscopist for removal, either with EMR or ablation. She understands and agrees to this plan. I have messaged  who is coordinating procedure with his nursing staff (likely  to perform procedure).

## 2017-10-12 ENCOUNTER — IMMUNIZATION (OUTPATIENT)
Dept: FAMILY MEDICINE | Facility: CLINIC | Age: 67
End: 2017-10-12
Payer: MEDICARE

## 2017-10-12 PROCEDURE — G0008 ADMIN INFLUENZA VIRUS VAC: HCPCS | Mod: PBBFAC,PO

## 2017-10-17 ENCOUNTER — TELEPHONE (OUTPATIENT)
Dept: ENDOSCOPY | Facility: HOSPITAL | Age: 67
End: 2017-10-17

## 2017-10-17 NOTE — TELEPHONE ENCOUNTER
Patient needs to be scheduled for an EGD/ possible EMR and EUS. Is it okay for patient to hold Eliquis 2 days prior to procedure? Please advise.    Thank you,  Shelli

## 2017-10-17 NOTE — TELEPHONE ENCOUNTER
Contacted patient. EGD/EUS scheduled 10/31/17. Patient instructed to hold Eliquis 2 days prior to procedures, per Dr. Wei. Patient agrees to and understands risks while hold Eliquis. Patient instructed to do a PM Prep. Prep instructions mailed to patient. Patient verbalized understanding and has no further questions at this time.

## 2017-10-17 NOTE — TELEPHONE ENCOUNTER
Patient is scheduled with you on 10/31/17. Please see message below regarding restarting Eliquis after procedure. Note also made in case.    Thank you,  Shelli

## 2017-10-19 PROBLEM — K29.70 GASTRITIS: Status: ACTIVE | Noted: 2017-10-19

## 2017-10-19 NOTE — H&P
"[facility]    CC: Dysphagia    HPI 67 y.o. female with history of esophageal ring s/p dilation presents with recurrent dysphagia    Past Medical History:   Diagnosis Date    *Atrial fibrillation     and Hx PSVT    Allergy     chronic     Arthritis     fingers    Bronchitis March 2013    CAD (coronary artery disease) 2005    CABGx2 in 2005 and 2 MI after with 4 stents    Cancer 1996    small cell lung cancer s/p resection of 1/3 lung, 5 ribs and muscle mass then had chemo and radiation    Cataract     OD     CHF (congestive heart failure) past Hx    Chronic rhinitis     Clot past Hx    external jugular, now stented, occluded, had phlebitis; now revascularized    Colon polyp     COPD (chronic obstructive pulmonary disease)     no oxygen or nebulizers    COPD exacerbation 5/8/13    improved 5/14/13 after steroid pack,antibiotics    Former smoker     GERD (gastroesophageal reflux disease)     Heart block     Hyperlipidemia     Hypertension     pt states does not have //    MI (myocardial infarction) 2005    Posterior vitreous detachment of left eye     Thyroid disease          Review of Systems  General ROS: negative for - chills, fever or weight loss  Cardiovascular ROS: no chest pain or dyspnea on exertion  Gastrointestinal ROS: + dysphagia, no blood in stool, no hematemesis    Physical Examination  /67   Pulse 84   Temp 98.1 °F (36.7 °C)   Resp 20   Ht 5' 6" (1.676 m)   Wt 63.5 kg (140 lb)   SpO2 98%   Breastfeeding? No   BMI 22.60 kg/m²   General appearance: alert, cooperative, no distress  HENT: Normocephalic, atraumatic, neck symmetrical, no nasal discharge, sclera anicteric   Lungs: clear to auscultation bilaterally, symmetric chest wall expansion bilaterally  Heart: regular rate and rhythm without rub; no displacement of the PMI   Abdomen: soft, nontender, nondistended  Extremities: extremities symmetric; no clubbing, cyanosis, or edema    Assessment:   67 " y.o. female with history of esophageal ring presents with dysphagia    Plan:  -proceed to EGD    Hue Wright MD  [facility]  [address], [address]  [place] [address]  Office: [phone]  Fax: [phone]

## 2017-10-26 ENCOUNTER — TELEPHONE (OUTPATIENT)
Dept: FAMILY MEDICINE | Facility: CLINIC | Age: 67
End: 2017-10-26

## 2017-10-26 ENCOUNTER — OFFICE VISIT (OUTPATIENT)
Dept: FAMILY MEDICINE | Facility: CLINIC | Age: 67
End: 2017-10-26
Payer: MEDICARE

## 2017-10-26 VITALS
HEART RATE: 65 BPM | OXYGEN SATURATION: 98 % | BODY MASS INDEX: 22.77 KG/M2 | SYSTOLIC BLOOD PRESSURE: 108 MMHG | DIASTOLIC BLOOD PRESSURE: 55 MMHG | RESPIRATION RATE: 17 BRPM | TEMPERATURE: 98 F | WEIGHT: 141.13 LBS

## 2017-10-26 DIAGNOSIS — J32.4 PANSINUSITIS, UNSPECIFIED CHRONICITY: ICD-10-CM

## 2017-10-26 PROCEDURE — 96372 THER/PROPH/DIAG INJ SC/IM: CPT | Mod: PBBFAC,PO

## 2017-10-26 PROCEDURE — 99213 OFFICE O/P EST LOW 20 MIN: CPT | Mod: PBBFAC,PO | Performed by: FAMILY MEDICINE

## 2017-10-26 PROCEDURE — 99214 OFFICE O/P EST MOD 30 MIN: CPT | Mod: S$PBB,,, | Performed by: FAMILY MEDICINE

## 2017-10-26 PROCEDURE — 99999 PR PBB SHADOW E&M-EST. PATIENT-LVL III: CPT | Mod: PBBFAC,,, | Performed by: FAMILY MEDICINE

## 2017-10-26 RX ORDER — AMOXICILLIN AND CLAVULANATE POTASSIUM 875; 125 MG/1; MG/1
1 TABLET, FILM COATED ORAL 2 TIMES DAILY
Qty: 20 TABLET | Refills: 0 | Status: SHIPPED | OUTPATIENT
Start: 2017-10-26 | End: 2017-11-05

## 2017-10-26 RX ORDER — BETAMETHASONE SODIUM PHOSPHATE AND BETAMETHASONE ACETATE 3; 3 MG/ML; MG/ML
9 INJECTION, SUSPENSION INTRA-ARTICULAR; INTRALESIONAL; INTRAMUSCULAR; SOFT TISSUE
Status: COMPLETED | OUTPATIENT
Start: 2017-10-26 | End: 2017-10-26

## 2017-10-26 RX ADMIN — BETAMETHASONE SODIUM PHOSPHATE AND BETAMETHASONE ACETATE 9 MG: 3; 3 INJECTION, SUSPENSION INTRA-ARTICULAR; INTRALESIONAL; INTRAMUSCULAR at 02:10

## 2017-10-26 NOTE — PROGRESS NOTES
Subjective:       Patient ID: Steph Lira is a 67 y.o. female.    Chief Complaint: URI    Sinusitis   This is a new problem. The current episode started yesterday. The problem has been rapidly worsening since onset. Maximum temperature: subj. The pain is mild. Associated symptoms include congestion, coughing, shortness of breath and sinus pressure.     Review of Systems   Constitutional: Negative for fever.   HENT: Positive for congestion and sinus pressure.    Respiratory: Positive for cough and shortness of breath.    Cardiovascular: Negative for chest pain.   Gastrointestinal: Negative for abdominal pain and nausea.   Skin: Negative for rash.   All other systems reviewed and are negative.      Objective:      Physical Exam   Constitutional: She appears well-developed. No distress.   HENT:   Right Ear: Tympanic membrane is not erythematous.   Left Ear: Tympanic membrane is not erythematous.   Nose: Mucosal edema present. Right sinus exhibits maxillary sinus tenderness. Left sinus exhibits maxillary sinus tenderness.   Mouth/Throat: Posterior oropharyngeal erythema present.   Neck: Neck supple.   Cardiovascular: Normal rate and regular rhythm.    No murmur heard.  Pulmonary/Chest: Effort normal and breath sounds normal.   Lymphadenopathy:     She has no cervical adenopathy.       Assessment:       1. Pansinusitis, unspecified chronicity        Plan:         Steph was seen today for uri.    Diagnoses and all orders for this visit:    Pansinusitis, unspecified chronicity  -     amoxicillin-clavulanate 875-125mg (AUGMENTIN) 875-125 mg per tablet; Take 1 tablet by mouth 2 (two) times daily. Take after meals.  -     betamethasone acetate-betamethasone sodium phosphate injection 9 mg; Inject 1.5 mLs (9 mg total) into the muscle one time.

## 2017-10-26 NOTE — TELEPHONE ENCOUNTER
----- Message from Nancy Miller sent at 10/25/2017  1:07 PM CDT -----  Contact: pt  Pt is having surgery on Tuesday to remove tumor from throat and today came up with green discharge nose/coughing up white mucus would like to be seen as soon as possible with anyone,please...[phone] (home)

## 2017-10-26 NOTE — PROGRESS NOTES
2 patient identifiers used ( name &  ).  Administered 9 mg Celestone IM RUOQG.  Patient tolerated well.  No bleeding at insertion site noted.  Pain scale 0/10.  Allergies reviewed.    Aseptic technique maintained

## 2017-10-31 ENCOUNTER — SURGERY (OUTPATIENT)
Age: 67
End: 2017-10-31

## 2017-10-31 ENCOUNTER — ANESTHESIA EVENT (OUTPATIENT)
Dept: ENDOSCOPY | Facility: HOSPITAL | Age: 67
End: 2017-10-31
Payer: MEDICARE

## 2017-10-31 ENCOUNTER — ANESTHESIA (OUTPATIENT)
Dept: ENDOSCOPY | Facility: HOSPITAL | Age: 67
End: 2017-10-31
Payer: MEDICARE

## 2017-10-31 ENCOUNTER — HOSPITAL ENCOUNTER (OUTPATIENT)
Facility: HOSPITAL | Age: 67
Discharge: HOME | End: 2017-10-31
Attending: INTERNAL MEDICINE | Admitting: INTERNAL MEDICINE
Payer: MEDICARE

## 2017-10-31 VITALS
TEMPERATURE: 98 F | SYSTOLIC BLOOD PRESSURE: 126 MMHG | WEIGHT: 140 LBS | HEIGHT: 66 IN | OXYGEN SATURATION: 99 % | BODY MASS INDEX: 22.5 KG/M2 | DIASTOLIC BLOOD PRESSURE: 73 MMHG | RESPIRATION RATE: 18 BRPM | HEART RATE: 70 BPM

## 2017-10-31 DIAGNOSIS — K22.9 ESOPHAGEAL LESION: Primary | ICD-10-CM

## 2017-10-31 LAB — KOH PREP SPEC: NORMAL

## 2017-10-31 PROCEDURE — 88305 TISSUE EXAM BY PATHOLOGIST: CPT | Performed by: PATHOLOGY

## 2017-10-31 PROCEDURE — 37000009 HC ANESTHESIA EA ADD 15 MINS: Performed by: INTERNAL MEDICINE

## 2017-10-31 PROCEDURE — 43237 ENDOSCOPIC US EXAM ESOPH: CPT | Mod: ,,, | Performed by: INTERNAL MEDICINE

## 2017-10-31 PROCEDURE — 25000003 PHARM REV CODE 250: Performed by: INTERNAL MEDICINE

## 2017-10-31 PROCEDURE — 88312 SPECIAL STAINS GROUP 1: CPT | Mod: 26,,, | Performed by: PATHOLOGY

## 2017-10-31 PROCEDURE — 88305 TISSUE EXAM BY PATHOLOGIST: CPT | Mod: 26,,, | Performed by: PATHOLOGY

## 2017-10-31 PROCEDURE — 37000008 HC ANESTHESIA 1ST 15 MINUTES: Performed by: INTERNAL MEDICINE

## 2017-10-31 PROCEDURE — 43239 EGD BIOPSY SINGLE/MULTIPLE: CPT | Mod: 59,,, | Performed by: INTERNAL MEDICINE

## 2017-10-31 PROCEDURE — D9220A PRA ANESTHESIA: Mod: ANES,,, | Performed by: ANESTHESIOLOGY

## 2017-10-31 PROCEDURE — 63600175 PHARM REV CODE 636 W HCPCS: Performed by: NURSE ANESTHETIST, CERTIFIED REGISTERED

## 2017-10-31 PROCEDURE — 27201012 HC FORCEPS, HOT/COLD, DISP: Performed by: INTERNAL MEDICINE

## 2017-10-31 PROCEDURE — 87210 SMEAR WET MOUNT SALINE/INK: CPT

## 2017-10-31 PROCEDURE — 27200946 HC BRUSH, CYTOLOGY: Performed by: INTERNAL MEDICINE

## 2017-10-31 PROCEDURE — D9220A PRA ANESTHESIA: Mod: CRNA,,, | Performed by: NURSE ANESTHETIST, CERTIFIED REGISTERED

## 2017-10-31 PROCEDURE — 43239 EGD BIOPSY SINGLE/MULTIPLE: CPT | Performed by: INTERNAL MEDICINE

## 2017-10-31 RX ORDER — PROPOFOL 10 MG/ML
VIAL (ML) INTRAVENOUS CONTINUOUS PRN
Status: DISCONTINUED | OUTPATIENT
Start: 2017-10-31 | End: 2017-10-31

## 2017-10-31 RX ORDER — MIDAZOLAM HYDROCHLORIDE 1 MG/ML
INJECTION, SOLUTION INTRAMUSCULAR; INTRAVENOUS
Status: DISCONTINUED | OUTPATIENT
Start: 2017-10-31 | End: 2017-10-31

## 2017-10-31 RX ORDER — FENTANYL CITRATE 50 UG/ML
INJECTION, SOLUTION INTRAMUSCULAR; INTRAVENOUS
Status: DISCONTINUED | OUTPATIENT
Start: 2017-10-31 | End: 2017-10-31

## 2017-10-31 RX ORDER — PROPOFOL 10 MG/ML
VIAL (ML) INTRAVENOUS
Status: DISCONTINUED | OUTPATIENT
Start: 2017-10-31 | End: 2017-10-31

## 2017-10-31 RX ORDER — LIDOCAINE HCL/PF 100 MG/5ML
SYRINGE (ML) INTRAVENOUS
Status: DISCONTINUED | OUTPATIENT
Start: 2017-10-31 | End: 2017-10-31

## 2017-10-31 RX ORDER — SODIUM CHLORIDE 9 MG/ML
INJECTION, SOLUTION INTRAVENOUS CONTINUOUS
Status: DISCONTINUED | OUTPATIENT
Start: 2017-10-31 | End: 2017-10-31 | Stop reason: HOSPADM

## 2017-10-31 RX ADMIN — PROPOFOL 200 MCG/KG/MIN: 10 INJECTION, EMULSION INTRAVENOUS at 01:10

## 2017-10-31 RX ADMIN — PROPOFOL 40 MG: 10 INJECTION, EMULSION INTRAVENOUS at 01:10

## 2017-10-31 RX ADMIN — FENTANYL CITRATE 50 MCG: 50 INJECTION, SOLUTION INTRAMUSCULAR; INTRAVENOUS at 01:10

## 2017-10-31 RX ADMIN — LIDOCAINE HYDROCHLORIDE 50 MG: 20 INJECTION, SOLUTION INTRAVENOUS at 01:10

## 2017-10-31 RX ADMIN — SODIUM CHLORIDE: 0.9 INJECTION, SOLUTION INTRAVENOUS at 01:10

## 2017-10-31 RX ADMIN — MIDAZOLAM HYDROCHLORIDE 2 MG: 1 INJECTION, SOLUTION INTRAMUSCULAR; INTRAVENOUS at 01:10

## 2017-10-31 NOTE — DISCHARGE INSTRUCTIONS
Home Care Instructions  UPPER EUS/EGD    ACTIVITY LEVEL:  If you received sedation or an anesthetic, you may feel sleepy for several hours. Resume your normal activity in 24 hours.    DIET:  At home, if there is no nausea, eat a soft diet. Gradually resume your normal diet. You may experience a sore throat for 24-48 hours. You may use throat lozenges or gargle  with warm salt water to relieve the discomfort. Because air was put into your stomach during the procedure, you may also experience some belching.    BATHING:  No restrictions.    Go directly to the emergency room if you notice any of the following:   Fever and/or chills greater than 101°F (38.4ºC)   Persistent vomiting or vomiting with blood   Severe abdominal pain, other than gas cramps   Severe chest pain   Black, tarry stools    FOR EMERGENCIES:  If any unusual problems or difficulties occur, contact Dr. Robbie Gonzales or the resident at [phone] (page ) or at the Clinic office, [phone].

## 2017-10-31 NOTE — ANESTHESIA POSTPROCEDURE EVALUATION
"Anesthesia Post Evaluation    Patient: Steph Lira    Procedure(s) Performed: Procedure(s) (LRB):  ESOPHAGOGASTRODUODENOSCOPY (EGD)/Poss EMR (N/A)  ULTRASOUND-ENDOSCOPIC-UPPER (N/A)    Final Anesthesia Type: general  Patient location during evaluation: PACU  Patient participation: Yes- Able to Participate  Level of consciousness: awake and alert and oriented  Post-procedure vital signs: reviewed and stable  Pain management: adequate  Airway patency: patent  PONV status at discharge: No PONV  Anesthetic complications: no      Cardiovascular status: stable  Respiratory status: unassisted, spontaneous ventilation and room air  Hydration status: euvolemic  Follow-up not needed.        Visit Vitals  BP (!) 142/82 (BP Location: Left arm, Patient Position: Sitting)   Pulse 67   Temp 36.6 °C (97.9 °F) (Temporal)   Resp 18   Ht 5' 6" (1.676 m)   Wt 63.5 kg (140 lb)   SpO2 98%   Breastfeeding? No   BMI 22.60 kg/m²       Pain/Tamera Score: Pain Assessment Performed: Yes (10/31/2017 12:56 PM)  Presence of Pain: denies (10/31/2017 12:56 PM)      "

## 2017-10-31 NOTE — ANESTHESIA PREPROCEDURE EVALUATION
10/31/2017  Steph Lira is a 67 y.o., female.    Anesthesia Evaluation         Review of Systems  Anesthesia Hx:  No problems with previous Anesthesia   Social:  Non-Smoker, Alcohol Use    Hematology/Oncology:  Hematology Normal      Oncology Comments: SCCA lung 1996, ROSALVA now.    Cardiovascular:   Hypertension, well controlled Past MI (2005 MI times 2.) CAD asymptomatic CABG/stent (cabg 2005)   Denies Angina.        Pulmonary:   Asthma mild    Renal/:   Denies Chronic Renal Disease.     Hepatic/GI:   Denies Liver Disease.    Neurological:   Denies TIA. Denies CVA. Denies Seizures.    Endocrine:   Denies Diabetes.        Physical Exam  General:  Well nourished    Airway/Jaw/Neck:  Airway Findings: Mouth Opening: Normal Tongue: Normal  General Airway Assessment: Adult, Average  Mallampati: II  TM Distance: Normal, at least 6 cm  Jaw/Neck Findings:  Neck ROM: Normal ROM        Heart/Vascular:  Heart Findings: Rate: Normal  Rhythm: Regular Rhythm        Mental Status:  Mental Status Findings:  Cooperative, Alert and Oriented         Anesthesia Plan  Type of Anesthesia, risks & benefits discussed:  Anesthesia Type:  general  Patient's Preference:   Intra-op Monitoring Plan:   Intra-op Monitoring Plan Comments:   Post Op Pain Control Plan:   Post Op Pain Control Plan Comments: As per surgeon's plan  Induction:   IV  Beta Blocker:  Patient is not currently on a Beta-Blocker (No further documentation required).       Informed Consent: Patient understands risks and agrees with Anesthesia plan.  Questions answered. Anesthesia consent signed with patient.  ASA Score: 2     Day of Surgery Review of History & Physical:    H&P update referred to the surgeon.         Ready For Surgery From Anesthesia Perspective.

## 2017-10-31 NOTE — ANESTHESIA RELEASE NOTE
"Anesthesia Release from PACU Note    Patient: Steph Lira    Procedure(s) Performed: Procedure(s) (LRB):  ESOPHAGOGASTRODUODENOSCOPY (EGD)/Poss EMR (N/A)  ULTRASOUND-ENDOSCOPIC-UPPER (N/A)    Anesthesia type: GEN    Post pain: Adequate analgesia reported    Post assessment: no apparent anesthetic complications, tolerated procedure well and no evidence of recall    Post vital signs: BP (!) 142/82 (BP Location: Left arm, Patient Position: Sitting)   Pulse 67   Temp 36.6 °C (97.9 °F) (Temporal)   Resp 18   Ht 5' 6" (1.676 m)   Wt 63.5 kg (140 lb)   SpO2 98%   Breastfeeding? No   BMI 22.60 kg/m²     Level of consciousness: awake, alert and oriented    Nausea/Vomiting: no nausea/no vomiting    Complications: none    Airway Patency: patent    Respiratory: unassisted, spontaneous ventilation, room air    Cardiovascular: stable and blood pressure at baseline    Hydration: euvolemic    "

## 2017-10-31 NOTE — DISCHARGE SUMMARY
Discharge Summary/Instructions after an Endoscopic Procedure    Patient Name: Steph Raymundo  Patient MRN: [id number]  Patient YOB: 1950 Tuesday, October 31, 2017  Robbie Gonzales MD    RESTRICTIONS:  During your procedure today, you received medications for sedation.  These medications may affect your judgment, balance and coordination.  Therefore, for 24 hours, you have the following restrictions:     - DO NOT drive a car, operate machinery, make legal/financial decisions, sign important papers or drink alcohol.      ACTIVITY:  The following day: return to full activity including work, except no heavy lifting, straining or running for 3 days if polyps were removed.    DIET:  Eat and drink normally unless instructed otherwise.     TREATMENT FOR COMMON SIDE EFFECTS:  - Mild abdominal pain, belching, bloating or excessive gas: rest, eat lightly and use a heating pad.  - Sore Throat: treat with throat lozenges and/or gargle with warm salt water.    SYMPTOMS TO WATCH FOR AND REPORT TO YOUR PHYSICIAN:  1. Abdominal pain or bloating, other than gas cramps.  2. Chest pain.  3. Back pain.  4. Chills or fever occurring within 24 hours after the procedure.  5. Rectal bleeding, which would show as bright red, maroon, or black stools. (A tablespoon of blood from the rectum is not serious, especially if hemorrhoids are present.)  6. Vomiting.  7. Weakness or dizziness.  8. Because air was used during the procedure, expelling large amounts of air from your rectum or belching is normal.  9. If a bowel prep was taken, you may not have a bowel movement for 1-3 days.  This is normal.    GO DIRECTLY TO THE NEAREST EMERGENCY ROOM IF YOU HAVE ANY OF THE FOLLOWING:     Difficulty breathing  Chills and/or fever over 101 F   Persistent vomiting and/or vomiting blood   Severe abdominal pain   Severe chest pain   Black, tarry stools   Bleeding- more than one tablespoon   Any other symptom or condition that you may feel needs urgent  attention    Your doctor recommends these additional instructions:  If any biopsies were taken, your doctor s clinic will contact you in 1 to 2 weeks with any results.    You are being discharged to home.   Await path results and brushing.   Your physician has recommended a CT scan (computed tomography) of the chest with contrast at appointment to be scheduled.   Eat a mechanical soft diet.    For questions, problems or results please call your physician - Robbie Gonzales MD at Work:  [phone].    [facility] EMERGENCY ROOM PHONE NUMBER: [phone]    IF A COMPLICATION OR EMERGENCY SITUATION ARISES AND YOU ARE UNABLE TO REACH YOUR PHYSICIAN - GO DIRECTLY TO THE EMERGENCY ROOM.

## 2017-10-31 NOTE — PATIENT INSTRUCTIONS
Discharge Summary/Instructions after an Endoscopic Procedure  Patient Name: Steph Raymundo  Patient MRN: [id number]  Patient YOB: 1950 Tuesday, October 31, 2017  Robbie Gonzales MD  RESTRICTIONS:  During your procedure today, you received medications for sedation.  These   medications may affect your judgment, balance and coordination.  Therefore,   for 24 hours, you have the following restrictions:   - DO NOT drive a car, operate machinery, make legal/financial decisions,   sign important papers or drink alcohol.    ACTIVITY:  The following day: return to full activity including work, except no heavy   lifting, straining or running for 3 days if polyps were removed.  DIET:  Eat and drink normally unless instructed otherwise.     TREATMENT FOR COMMON SIDE EFFECTS:  - Mild abdominal pain, belching, bloating or excessive gas: rest, eat   lightly and use a heating pad.  - Sore Throat: treat with throat lozenges and/or gargle with warm salt   water.  SYMPTOMS TO WATCH FOR AND REPORT TO YOUR PHYSICIAN:  1. Abdominal pain or bloating, other than gas cramps.  2. Chest pain.  3. Back pain.  4. Chills or fever occurring within 24 hours after the procedure.  5. Rectal bleeding, which would show as bright red, maroon, or black stools.   (A tablespoon of blood from the rectum is not serious, especially if   hemorrhoids are present.)  6. Vomiting.  7. Weakness or dizziness.  8. Because air was used during the procedure, expelling large amounts of air   from your rectum or belching is normal.  9. If a bowel prep was taken, you may not have a bowel movement for 1-3   days.  This is normal.  GO DIRECTLY TO THE NEAREST EMERGENCY ROOM IF YOU HAVE ANY OF THE FOLLOWING:      Difficulty breathing  Chills and/or fever over 101 F   Persistent vomiting and/or vomiting blood   Severe abdominal pain   Severe chest pain   Black, tarry stools   Bleeding- more than one tablespoon   Any other symptom or condition that you may feel needs  urgent attention  Your doctor recommends these additional instructions:  If any biopsies were taken, your doctor s clinic will contact you in 1 to 2   weeks with any results.  You are being discharged to home.   Await path results and brushing.   Your physician has recommended a CT scan (computed tomography) of the chest   with contrast at appointment to be scheduled.   Eat a mechanical soft diet.  For questions, problems or results please call your physician - Robbie Gonzales MD at Work:  [phone].  [facility], EMERGENCY ROOM PHONE NUMBER: [phone]  IF A COMPLICATION OR EMERGENCY SITUATION ARISES AND YOU ARE UNABLE TO REACH   YOUR PHYSICIAN - GO DIRECTLY TO THE EMERGENCY ROOM.  Robbie Gonzales MD  10/31/2017 2:35:48 PM  This report has been verified and signed electronically.

## 2017-10-31 NOTE — PLAN OF CARE
Plan of care reviewed with pt & spouse, both verbalized understanding, pt progressing with plan of care, denies nausea & pain, tolerating PO, reviewed all DC instructions, home meds, scripts, when to call MD, when to follow-up, answered questions.

## 2017-10-31 NOTE — H&P
History & Physical - Short Stay  Gastroenterology      SUBJECTIVE:     Procedure: EUS    Chief Complaint/Indication for Procedure: Esophageal lesion    History of Present Illness:  Patient is a 67 y.o. female presents with dysphagia and recent abnormal upper endoscopy. The procedure was done for symptoms of dysphagia to solid. Currently on soft diet.    EGD on 10/3/2017    Partially obstructing, likely malignant esophageal                        tumor was found in the upper third of the esophagus.                        Biopsied.                       - Monilial esophagitis. Brushings performed.                       - Erythematous mucosa in the stomach. Biopsied.    PTA Medications   Medication Sig    albuterol (ACCUNEB) 1.25 mg/3 mL Nebu USE 1 VIAL (3 ML) VIA NEBULIZER EVERY 6 HOURS AS NEEDED    albuterol (PROVENTIL HFA) 90 mcg/actuation inhaler Inhale 2 puffs into the lungs every 6 (six) hours as needed for Wheezing.    amoxicillin-clavulanate 875-125mg (AUGMENTIN) 875-125 mg per tablet Take 1 tablet by mouth 2 (two) times daily. Take after meals.    apixaban 5 mg Tab Take 1 tablet (5 mg total) by mouth 2 (two) times daily.    atenolol (TENORMIN) 25 MG tablet Take 1 tablet (25 mg total) by mouth once daily.    atorvastatin (LIPITOR) 40 MG tablet Take 1 tablet (40 mg total) by mouth once daily.    bisacodyl (DULCOLAX) 5 mg EC tablet Take 1 tablet (5 mg total) by mouth daily as needed for Constipation.    CALCIUM CITRATE (CITRACAL) 500 mg TbEF Take 500 mg by mouth. 1 Tablet, Effervescent Oral  daily    celecoxib (CELEBREX) 100 MG capsule Take 1 capsule (100 mg total) by mouth once daily.    desoximetasone (TOPICORT) 0.25 % cream Apply topically 2 (two) times daily.    diclofenac sodium (VOLTAREN) 1 % Gel Apply 2 g topically once daily.    diltiaZEM (CARDIZEM) 30 MG tablet Take 1 tablet (30 mg total) by mouth 4 (four) times daily before meals and nightly.    diphenhydrAMINE (BENADRYL) 25 mg capsule Take  25 mg by mouth every 6 (six) hours as needed for Itching.    flecainide (TAMBOCOR) 50 MG Tab Take 2 tablets (100 mg total) by mouth 2 (two) times daily.    fluticasone (FLONASE) 50 mcg/actuation nasal spray 1 spray by Each Nare route once daily.    fluticasone-salmeterol 250-50 mcg/dose (ADVAIR DISKUS) 250-50 mcg/dose diskus inhaler USE 1 INHALATION INTO THE LUNGS TWICE A DAY. RINSE MOUTH AFTER USE TO PREVENT THRUSH    ipratropium (ATROVENT) 0.03 % nasal spray 2 sprays by Nasal route 2 (two) times daily. As needed (Patient taking differently: 2 sprays by Nasal route once daily. As needed)    levothyroxine (SYNTHROID) 88 MCG tablet Take 1 tablet (88 mcg total) by mouth once daily.    montelukast (SINGULAIR) 10 mg tablet Take 1 tablet (10 mg total) by mouth every evening.    multivitamin-minerals-lutein (CENTRUM SILVER) Tab Take 1 tablet by mouth once daily. 1 Tablet Oral Every day    pantoprazole (PROTONIX) 40 MG tablet Take 1 tablet (40 mg total) by mouth once daily. Take 30 minutes prior to breakfast    RETIN-A 0.05 % cream APPLY THIN FILM TO FACE AT NIGHT AFTER MOISTURIZING    SPIRIVA WITH HANDIHALER 18 mcg inhalation capsule INHALE THE CONTENTS OF 1 CAPSULE DAILY    triamcinolone acetonide 0.1% (KENALOG) 0.1 % cream Apply topically 2 (two) times daily.       Review of patient's allergies indicates:   Allergen Reactions    Ciprofloxacin Itching    Doxycycline Other (See Comments)     Patient had a rxn with the sun despite wearing spf 30        Past Medical History:   Diagnosis Date    *Atrial fibrillation     and Hx PSVT    Allergy     chronic     Arthritis     fingers    Bronchitis March 2013    CAD (coronary artery disease) 2005    CABGx2 in 2005 and 2 MI after with 4 stents    Cancer 1996    small cell lung cancer s/p resection of 1/3 lung, 5 ribs and muscle mass then had chemo and radiation    Cataract     OD     CHF (congestive heart failure) past Hx    Chronic rhinitis     Clot past Hx     external jugular, now stented, occluded, had phlebitis; now revascularized    Colon polyp     COPD (chronic obstructive pulmonary disease)     no oxygen or nebulizers    COPD exacerbation 5/8/13    improved 5/14/13 after steroid pack,antibiotics    Former smoker     GERD (gastroesophageal reflux disease)     Heart block     Hyperlipidemia     Hypertension     pt states does not have //    MI (myocardial infarction) 2005    Posterior vitreous detachment of left eye     Thyroid disease      Past Surgical History:   Procedure Laterality Date    ADENOIDECTOMY      APPENDECTOMY      BACK SURGERY      lumbar    CARDIAC SURGERY      CATARACT EXTRACTION Left     OS    CATARACT EXTRACTION, BILATERAL      left eye only    CHOLECYSTECTOMY      COLONOSCOPY  03/2012    repeat in 5 years    COLONOSCOPY N/A 6/19/2017    Procedure: COLONOSCOPY;  Surgeon: Kal Elise MD;  Location: [facility];  Service: Endoscopy;  Laterality: N/A;    CORONARY ARTERY BYPASS GRAFT  2005    2 vessel    EAR PINNA RECONSTRUCTION W/ RIB GRAFT  2010/2011    x2    EYE SURGERY  Dec 2012    ptosis repair    FIRST RIB REMOVAL  1996 with lung resection    HYSTERECTOMY      LUNG LOBECTOMY  1996    left lung for cancer    SHOULDER SURGERY  2010,2011    scapular entrapment after lung surgery, needed 5 ribs removed left side    SYMPOTHATIC NERVE LEFT SIDE      1996 with lung surgery    TONSILLECTOMY      TONSILLECTOMY, ADENOIDECTOMY, BILATERAL MYRINGOTOMY AND TUBES      UPPER GASTROINTESTINAL ENDOSCOPY  05/2016    Dr. Koo    VASCULAR SURGERY      stents place in jugualr vein     Family History   Problem Relation Age of Onset    Allergic rhinitis Father     Cancer Father     Hypertension Father     Allergies Father     Allergic rhinitis Son     Asthma Son     Stroke Mother     Allergies Mother     Allergic rhinitis Sister     Asthma Sister     Diverticulitis Brother     No Known Problems Daughter     No Known  Problems Maternal Aunt     No Known Problems Maternal Uncle     No Known Problems Paternal Aunt     No Known Problems Paternal Uncle     Colon polyps Maternal Grandmother       of colon blockage    No Known Problems Maternal Grandfather     No Known Problems Paternal Grandmother     No Known Problems Paternal Grandfather     No Known Problems Brother     No Known Problems Sister     Angioedema Neg Hx     Eczema Neg Hx     Immunodeficiency Neg Hx     Urticaria Neg Hx     Skin cancer Neg Hx     Amblyopia Neg Hx     Blindness Neg Hx     Cataracts Neg Hx     Diabetes Neg Hx     Glaucoma Neg Hx     Macular degeneration Neg Hx     Retinal detachment Neg Hx     Strabismus Neg Hx     Thyroid disease Neg Hx     Colon cancer Neg Hx     Melanoma Neg Hx      Social History   Substance Use Topics    Smoking status: Former Smoker     Packs/day: 2.00     Years: 20.00     Types: Cigarettes     Quit date: 1994    Smokeless tobacco: Never Used    Alcohol use No       Review of Systems:  Constitutional: no fever or chills  Gastrointestinal: positive for dysphagia    OBJECTIVE:     Vital Signs (Most Recent)       Physical Exam:  General: well developed, well nourished  Lungs:  clear to auscultation bilaterally and normal respiratory effort  Heart: regular rate, S1, S2 normal  Abdomen: soft, non-tender non-distented; bowel sounds normal; no masses,  no organomegaly    Laboratory  CBC: No results for input(s): WBC, RBC, HGB, HCT, PLT, MCV, MCH, MCHC in the last 168 hours.  CMP: No results for input(s): GLU, CALCIUM, ALBUMIN, PROT, NA, K, CO2, CL, BUN, CREATININE, ALKPHOS, ALT, AST, BILITOT in the last 168 hours.  Coagulation: No results for input(s): LABPROT, INR, APTT in the last 168 hours.      Diagnostic Results:      ASSESSMENT/PLAN:     Esophageal lesion    Plan: EUS    Anesthesia Plan: MAC/General    ASA Grade: ASA 2 - Patient with mild systemic disease with no functional limitations      The  impression and plan was discussed in detail with the patient. All questions have been answered and the patient voices understanding of our plan at this point. The risk of the procedure was discussed in detail which includes but not limited to bleeding, infection, perforation in some cases requiring surgery with its spectrum of complications.

## 2017-10-31 NOTE — TRANSFER OF CARE
"Anesthesia Transfer of Care Note    Patient: Steph Lira    Procedure(s) Performed: Procedure(s) (LRB):  ESOPHAGOGASTRODUODENOSCOPY (EGD)/Poss EMR (N/A)  ULTRASOUND-ENDOSCOPIC-UPPER (N/A)    Patient location: [facility]    Anesthesia Type: general    Transport from OR: Transported from OR on room air with adequate spontaneous ventilation    Post pain: adequate analgesia    Post assessment: no apparent anesthetic complications    Post vital signs: stable    Level of consciousness: awake, alert and oriented    Nausea/Vomiting: no nausea/vomiting    Complications: none    Transfer of care protocol was followed      Last vitals:   Visit Vitals  BP (!) 142/82 (BP Location: Left arm, Patient Position: Sitting)   Pulse 70   Temp 37.1 °C (98.8 °F) (Oral)   Resp 18   Ht 5' 6" (1.676 m)   Wt 63.5 kg (140 lb)   SpO2 97%   Breastfeeding? No   BMI 22.60 kg/m²     "

## 2017-11-01 ENCOUNTER — TELEPHONE (OUTPATIENT)
Dept: GASTROENTEROLOGY | Facility: CLINIC | Age: 67
End: 2017-11-01

## 2017-11-01 DIAGNOSIS — K22.9 ESOPHAGEAL LESION: Primary | ICD-10-CM

## 2017-11-01 NOTE — TELEPHONE ENCOUNTER
The patient need referral to Cardiothoracic surgery for upper esophageal mass. She also need CT scan of the chest. Pathology pending.

## 2017-11-02 ENCOUNTER — PATIENT MESSAGE (OUTPATIENT)
Dept: FAMILY MEDICINE | Facility: CLINIC | Age: 67
End: 2017-11-02

## 2017-11-02 ENCOUNTER — TELEPHONE (OUTPATIENT)
Dept: CARDIOTHORACIC SURGERY | Facility: CLINIC | Age: 67
End: 2017-11-02

## 2017-11-02 NOTE — TELEPHONE ENCOUNTER
"Received a referral from Dr. Gonzales re: esophageal lesion noted on EUS.     EUS from 10/31/17 revealed   "The Olympus scope CV--NI5 ([id number]) was introduced  through the mouth, and advanced to the upper third of esophagus. The Olympus scope GIF- ([id number]) was introduced through the mouth, and advanced to the second part of duodenum. The upper EUS was technically difficult and complex due to a partially obstructing mass. The patient tolerated    the procedure well.  Endoscopic Finding :  A medium-sized, with finger like projection lesion with no bleeding and no stigmata of recent bleeding was found in the upper third of the esophagus, 18 to 21 cm from the incisors. The lesion was partially obstructing and partially circumferential (involving two thirds of the lumen circumference). Biopsies were taken with a cold  forceps for histology. Diffuse candidiasis was found in the middle third of the esophagus  and in the lower third of the esophagus."    Dr. Gonzales ordered a CT chest, scheduled in [place] for 11/8/17. Scheduled appt with Dr. Laird on 11/9/17 at 11:15a. Spoke with the pt and she is agreeable to all appointments. Moved her lab appt to [facility] prior to her appt with Dr. Laird.   Mailed appt slip along with campus map. Dr. Gonzales's staff is aware of the scheduled appts.  "

## 2017-11-08 ENCOUNTER — TELEPHONE (OUTPATIENT)
Dept: GASTROENTEROLOGY | Facility: CLINIC | Age: 67
End: 2017-11-08

## 2017-11-08 ENCOUNTER — HOSPITAL ENCOUNTER (OUTPATIENT)
Dept: RADIOLOGY | Facility: HOSPITAL | Age: 67
Discharge: HOME | End: 2017-11-08
Attending: INTERNAL MEDICINE
Payer: MEDICARE

## 2017-11-08 DIAGNOSIS — K22.9 ESOPHAGEAL LESION: ICD-10-CM

## 2017-11-08 PROCEDURE — 25500020 PHARM REV CODE 255

## 2017-11-08 PROCEDURE — 71260 CT THORAX DX C+: CPT | Mod: 26,,, | Performed by: RADIOLOGY

## 2017-11-08 PROCEDURE — 71260 CT THORAX DX C+: CPT | Mod: TC

## 2017-11-08 RX ADMIN — IOHEXOL 75 ML: 350 INJECTION, SOLUTION INTRAVENOUS at 08:11

## 2017-11-08 NOTE — TELEPHONE ENCOUNTER
----- Message from Robbie Gonzales MD sent at 11/8/2017  3:27 PM CST -----  Please let the patient know the CT scan showed    1.  Stable appearance to the chest demonstrating left upper lobectomy and thoracotomy/thoracoplasty changes are without definite recurrent mass or new metastatic disease in this patient with history of small cell lung cancer.  2. Left internal jugular, subclavian and brachiocephalic vein stents which appear occluded, likely a chronic finding. There is collateralization of venous flow within the left anterior chest wall.      The biopsies of the esophageal lesion showed papilloma, no cancer.    Need to be seen by Cardiothoracic surgery for an opinion.  Case need to be presented and the [facility]

## 2017-11-09 ENCOUNTER — OFFICE VISIT (OUTPATIENT)
Dept: CARDIOTHORACIC SURGERY | Facility: CLINIC | Age: 67
End: 2017-11-09
Payer: MEDICARE

## 2017-11-09 ENCOUNTER — TELEPHONE (OUTPATIENT)
Dept: CARDIOLOGY | Facility: CLINIC | Age: 67
End: 2017-11-09

## 2017-11-09 VITALS
HEART RATE: 65 BPM | WEIGHT: 140.19 LBS | DIASTOLIC BLOOD PRESSURE: 66 MMHG | OXYGEN SATURATION: 96 % | HEIGHT: 66 IN | SYSTOLIC BLOOD PRESSURE: 113 MMHG | BODY MASS INDEX: 22.53 KG/M2

## 2017-11-09 DIAGNOSIS — K22.89 ESOPHAGEAL MASS: Primary | ICD-10-CM

## 2017-11-09 PROCEDURE — 99999 PR PBB SHADOW E&M-EST. PATIENT-LVL IV: CPT | Mod: PBBFAC,,, | Performed by: THORACIC SURGERY (CARDIOTHORACIC VASCULAR SURGERY)

## 2017-11-09 PROCEDURE — 99214 OFFICE O/P EST MOD 30 MIN: CPT | Mod: PBBFAC | Performed by: THORACIC SURGERY (CARDIOTHORACIC VASCULAR SURGERY)

## 2017-11-09 PROCEDURE — 99205 OFFICE O/P NEW HI 60 MIN: CPT | Mod: S$PBB,,, | Performed by: THORACIC SURGERY (CARDIOTHORACIC VASCULAR SURGERY)

## 2017-11-09 RX ORDER — CETIRIZINE HYDROCHLORIDE 10 MG/1
TABLET ORAL
COMMUNITY
Start: 2017-09-26 | End: 2018-01-19 | Stop reason: SDUPTHER

## 2017-11-09 NOTE — PROGRESS NOTES
History & Physical    SUBJECTIVE:     History of Present Illness:    Patient is a 66 yo female with history of small cell lung cancer (s/p resection 1996, chemo, and radiation) with multiple reconstructions of left chest wall (2011 and 2012), HTN, CABG x2 in 2005 with 2 MI after now with 4 cardiac stents on Apixaban, atrial flutter s/p cardioversion x 2, COPD, and hypothyroidism presenting for surgical evaluation of upper 1/3 esophageal mass. Patient notes onset of dysphagia apx 6 months ago. Subsequent dilation. Dysphagia/odynophagia progressed and underwent EGD with a lesion identified and biopsied at OSH. Told it was a benign lesion and referred to Dr. Gonzales. EUS 10/31/17 noted finger like projection partially obstructing at 18-21cm from incisors and diffuse esophageal candidiasis. She is only able to tolerate a soft diet. Consumes mostly liquids/soups. She notes more recently started with hoarseness and left neck tightness. Denies fever, chills, CP, SOB, nausea, vomiting, weight loss.     PSH: Left thoracotomy with JUDD resection, left chest wall resection - 5 ribs and reconstruction x 2, jugular vein stent (occuluded), CABG x 2, lumbar sx, tonsillectomy, cholecystectomy, hysterectomy     Former smoker. Quit 22 years ago. 40 pack year history.       Chief Complaint   Patient presents with    Consult       Review of patient's allergies indicates:   Allergen Reactions    Ciprofloxacin Itching    Doxycycline Other (See Comments)     Patient had a rxn with the sun despite wearing spf 30       Current Outpatient Prescriptions   Medication Sig Dispense Refill    albuterol (ACCUNEB) 1.25 mg/3 mL Nebu USE 1 VIAL (3 ML) VIA NEBULIZER EVERY 6 HOURS AS NEEDED 90 mL 2    albuterol (PROVENTIL HFA) 90 mcg/actuation inhaler Inhale 2 puffs into the lungs every 6 (six) hours as needed for Wheezing. 3 Inhaler 4    apixaban 5 mg Tab Take 1 tablet (5 mg total) by mouth 2 (two) times daily. 180 tablet 3    atenolol (TENORMIN)  25 MG tablet Take 1 tablet (25 mg total) by mouth once daily. 90 tablet 3    atorvastatin (LIPITOR) 40 MG tablet Take 1 tablet (40 mg total) by mouth once daily. 90 tablet 3    bisacodyl (DULCOLAX) 5 mg EC tablet Take 1 tablet (5 mg total) by mouth daily as needed for Constipation. 30 tablet 11    CALCIUM CITRATE (CITRACAL) 500 mg TbEF Take 500 mg by mouth. 1 Tablet, Effervescent Oral  daily      celecoxib (CELEBREX) 100 MG capsule Take 1 capsule (100 mg total) by mouth once daily. 30 capsule 3    cetirizine (ZYRTEC) 10 MG tablet       desoximetasone (TOPICORT) 0.25 % cream Apply topically 2 (two) times daily. 180 g 3    diclofenac sodium (VOLTAREN) 1 % Gel Apply 2 g topically once daily. 100 g prn    diltiaZEM (CARDIZEM) 30 MG tablet Take 1 tablet (30 mg total) by mouth 4 (four) times daily before meals and nightly. 360 tablet 3    diphenhydrAMINE (BENADRYL) 25 mg capsule Take 25 mg by mouth every 6 (six) hours as needed for Itching.      flecainide (TAMBOCOR) 50 MG Tab Take 2 tablets (100 mg total) by mouth 2 (two) times daily. 360 tablet 3    fluticasone (FLONASE) 50 mcg/actuation nasal spray 1 spray by Each Nare route once daily. 3 Bottle 3    fluticasone-salmeterol 250-50 mcg/dose (ADVAIR DISKUS) 250-50 mcg/dose diskus inhaler USE 1 INHALATION INTO THE LUNGS TWICE A DAY. RINSE MOUTH AFTER USE TO PREVENT THRUSH 3 each 3    ipratropium (ATROVENT) 0.03 % nasal spray 2 sprays by Nasal route 2 (two) times daily. As needed (Patient taking differently: 2 sprays by Nasal route once daily. As needed) 90 mL 6    levothyroxine (SYNTHROID) 88 MCG tablet Take 1 tablet (88 mcg total) by mouth once daily. 90 tablet 11    montelukast (SINGULAIR) 10 mg tablet Take 1 tablet (10 mg total) by mouth every evening. 90 tablet 3    multivitamin-minerals-lutein (CENTRUM SILVER) Tab Take 1 tablet by mouth once daily. 1 Tablet Oral Every day      pantoprazole (PROTONIX) 40 MG tablet Take 1 tablet (40 mg total) by mouth  once daily. Take 30 minutes prior to breakfast 90 tablet 3    RETIN-A 0.05 % cream APPLY THIN FILM TO FACE AT NIGHT AFTER MOISTURIZING 45 g 3    SPIRIVA WITH HANDIHALER 18 mcg inhalation capsule INHALE THE CONTENTS OF 1 CAPSULE DAILY 90 capsule 3    triamcinolone acetonide 0.1% (KENALOG) 0.1 % cream Apply topically 2 (two) times daily. 1 Tube 11     No current facility-administered medications for this visit.      Facility-Administered Medications Ordered in Other Visits   Medication Dose Route Frequency Provider Last Rate Last Dose    lidocaine  20 mg/mL (2%) 20 mg/mL (2 %) injection                Past Medical History:   Diagnosis Date    *Atrial fibrillation     and Hx PSVT    Allergy     chronic     Arthritis     fingers    Bronchitis March 2013    CAD (coronary artery disease) 2005    CABGx2 in 2005 and 2 MI after with 4 stents    Cancer 1996    small cell lung cancer s/p resection of 1/3 lung, 5 ribs and muscle mass then had chemo and radiation    Cataract     OD     CHF (congestive heart failure) past Hx    Chronic rhinitis     Clot past Hx    external jugular, now stented, occluded, had phlebitis; now revascularized    Colon polyp     COPD (chronic obstructive pulmonary disease)     no oxygen or nebulizers    COPD exacerbation 5/8/13    improved 5/14/13 after steroid pack,antibiotics    Former smoker     GERD (gastroesophageal reflux disease)     Heart block     Hyperlipidemia     Hypertension     pt states does not have //    MI (myocardial infarction) 2005    Posterior vitreous detachment of left eye     Thyroid disease      Past Surgical History:   Procedure Laterality Date    ADENOIDECTOMY      APPENDECTOMY      BACK SURGERY      lumbar    CARDIAC SURGERY      CATARACT EXTRACTION Left     OS    CATARACT EXTRACTION, BILATERAL      left eye only    CHOLECYSTECTOMY      COLONOSCOPY  03/2012    repeat in 5 years    COLONOSCOPY N/A 6/19/2017    Procedure: COLONOSCOPY;   Surgeon: Kal Elise MD;  Location: [facility] END[facility];  Service: Endoscopy;  Laterality: N/A;    CORONARY ARTERY BYPASS GRAFT      2 vessel    EAR PINNA RECONSTRUCTION W/ RIB GRAFT  2010/2011    x2    EYE SURGERY  Dec 2012    ptosis repair    FIRST RIB REMOVAL   with lung resection    HYSTERECTOMY      LUNG LOBECTOMY  1996    left lung for cancer    SHOULDER SURGERY      scapular entrapment after lung surgery, needed 5 ribs removed left side    SYMPOTHATIC NERVE LEFT SIDE       with lung surgery    TONSILLECTOMY      TONSILLECTOMY, ADENOIDECTOMY, BILATERAL MYRINGOTOMY AND TUBES      UPPER GASTROINTESTINAL ENDOSCOPY  2016    Dr. Koo    VASCULAR SURGERY      stents place in jugualr vein     Family History   Problem Relation Age of Onset    Allergic rhinitis Father     Cancer Father     Hypertension Father     Allergies Father     Allergic rhinitis Son     Asthma Son     Stroke Mother     Allergies Mother     Allergic rhinitis Sister     Asthma Sister     Diverticulitis Brother     No Known Problems Daughter     No Known Problems Maternal Aunt     No Known Problems Maternal Uncle     No Known Problems Paternal Aunt     No Known Problems Paternal Uncle     Colon polyps Maternal Grandmother       of colon blockage    No Known Problems Maternal Grandfather     No Known Problems Paternal Grandmother     No Known Problems Paternal Grandfather     No Known Problems Brother     No Known Problems Sister     Angioedema Neg Hx     Eczema Neg Hx     Immunodeficiency Neg Hx     Urticaria Neg Hx     Skin cancer Neg Hx     Amblyopia Neg Hx     Blindness Neg Hx     Cataracts Neg Hx     Diabetes Neg Hx     Glaucoma Neg Hx     Macular degeneration Neg Hx     Retinal detachment Neg Hx     Strabismus Neg Hx     Thyroid disease Neg Hx     Colon cancer Neg Hx     Melanoma Neg Hx      Social History   Substance Use Topics    Smoking status: Former Smoker      "Packs/day: 2.00     Years: 20.00     Types: Cigarettes     Quit date: 1/18/1994    Smokeless tobacco: Never Used    Alcohol use No        Review of Systems:  Review of Systems   Constitutional: Positive for appetite change. Negative for activity change, chills, fatigue and fever.   HENT:        Hoarseness   Respiratory: Positive for shortness of breath (stable OLIVARES). Negative for apnea and cough.    Cardiovascular: Negative for chest pain and palpitations.   Gastrointestinal: Negative for abdominal pain, nausea and vomiting.        Dysphagia, odynophagia   Genitourinary: Negative for difficulty urinating.   Skin: Negative for color change and rash.   Neurological: Negative for dizziness.   Hematological: Negative for adenopathy.   Psychiatric/Behavioral: Negative for agitation. The patient is not nervous/anxious.        OBJECTIVE:     Vital Signs (Most Recent)  Pulse: 65 (11/09/17 1130)  BP: 113/66 (11/09/17 1130)  SpO2: 96 % (11/09/17 1130)  5' 6" (1.676 m)  63.6 kg (140 lb 3.4 oz)     Physical Exam:  Physical Exam   Constitutional: She is oriented to person, place, and time. She appears well-developed and well-nourished.   HENT:   Head: Normocephalic and atraumatic.   Eyes: EOM are normal.   Neck: Normal range of motion. Neck supple. No tracheal deviation present.   Cardiovascular: Normal rate and regular rhythm.    Pulmonary/Chest: Effort normal and breath sounds normal. She exhibits deformity (posterior post-op changes; well healed thoracotomy scar). She exhibits no tenderness.   Abdominal: Soft.   Musculoskeletal: Normal range of motion.   Lymphadenopathy:     She has no cervical adenopathy.   Neurological: She is alert and oriented to person, place, and time.   Skin: Skin is warm and dry.   Psychiatric: She has a normal mood and affect. Thought content normal.   Vitals reviewed.    Diagnostic Results:    Pathology   ESOPHAGUS, 18-21 CM, HALF CIRCUMFERENCE OF THE LESION (BIOPSY):  -Inflamed squamous papilloma " with reactive atypia  -GMS is positive for fungus    CT chest 11/8/17:  1.  Stable appearance to the chest demonstrating left upper lobectomy and thoracotomy/thoracoplasty changes are without definite recurrent mass or new metastatic disease in this patient with history of small cell lung cancer.  2. Left internal jugular, subclavian and brachiocephalic vein stents which appear occluded, likely a chronic finding. There is collateralization of venous flow within the left anterior chest wall.    ASSESSMENT/PLAN:     67 year old with complicated past medical history here today for evaluation of upper 1/3 partially obstructing esophageal lesion s/p biopsy revealing inflamed squamous papilloma with reactive atypia here today for surgical evaluation. EUS unable to assess layers involved.      PLAN:Plan     Will discuss case with Dr. Gonzales at upper GI conference neck week and call patient with further recommendations.   Continue soft diet and diflucan.     ATTENDING ATTESTATION:    I evaluated the patient and I agree with the assessment and plan.  Proximal esophageal mass causing dysphagia.  Unlikely to have an surgical options for management, but will present at multi-disciplinary Upper GI oncology conference

## 2017-11-09 NOTE — LETTER
November 10, 2017      Robbie Gonzales MD  [address]  [facility] [address]           [facility]  [address]  [facility] [address]  Phone: [phone]  Fax: [phone]          Patient: Steph Lira   MR Number: [id number]   YOB: 1950   Date of Visit: 11/9/2017       Dear Dr. Robbie Gonzales:    Thank you for referring Steph Lira to me for evaluation. Attached you will find relevant portions of my assessment and plan of care.    If you have questions, please do not hesitate to call me. I look forward to following Steph Lira along with you.    Sincerely,    Bola Laird MD    Enclosure  CC:  No Recipients    If you would like to receive this communication electronically, please contact [email or web address] or [phone] to request more information on [employer] access.    For providers and/or their staff who would like to refer a patient to [facility], please contact us through our one-stop-shop provider referral line, [facility], at [phone].    If you feel you have received this communication in error or would no longer like to receive these types of communications, please e-mail [email or web address]

## 2017-11-09 NOTE — TELEPHONE ENCOUNTER
Called pt to reschedule appointment on 11/16/2017 pt rescheduled to 11/27/2017 at 1:00pm. Pt said she has a tumor in her throat and has an appointment today at main campus and if she has to change her appointment she will call us. Pt verbalized understanding. No further issues discussed.

## 2017-11-13 RX ORDER — FLECAINIDE ACETATE 50 MG/1
TABLET ORAL
Qty: 360 TABLET | Refills: 3 | Status: SHIPPED | OUTPATIENT
Start: 2017-11-13 | End: 2018-01-31 | Stop reason: ALTCHOICE

## 2017-11-15 ENCOUNTER — PATIENT MESSAGE (OUTPATIENT)
Dept: CARDIOTHORACIC SURGERY | Facility: CLINIC | Age: 67
End: 2017-11-15

## 2017-11-17 ENCOUNTER — PATIENT MESSAGE (OUTPATIENT)
Dept: CARDIOTHORACIC SURGERY | Facility: CLINIC | Age: 67
End: 2017-11-17

## 2017-11-21 ENCOUNTER — TELEPHONE (OUTPATIENT)
Dept: GASTROENTEROLOGY | Facility: CLINIC | Age: 67
End: 2017-11-21

## 2017-11-21 ENCOUNTER — PATIENT MESSAGE (OUTPATIENT)
Dept: GASTROENTEROLOGY | Facility: CLINIC | Age: 67
End: 2017-11-21

## 2017-11-21 ENCOUNTER — PATIENT MESSAGE (OUTPATIENT)
Dept: PULMONOLOGY | Facility: CLINIC | Age: 67
End: 2017-11-21

## 2017-11-21 DIAGNOSIS — K22.9 LESION OF ESOPHAGUS: Primary | ICD-10-CM

## 2017-11-21 RX ORDER — FLUTICASONE PROPIONATE 50 MCG
1 SPRAY, SUSPENSION (ML) NASAL DAILY
Qty: 3 BOTTLE | Refills: 3 | Status: SHIPPED | OUTPATIENT
Start: 2017-11-21 | End: 2020-01-01 | Stop reason: SDUPTHER

## 2017-11-22 ENCOUNTER — TELEPHONE (OUTPATIENT)
Dept: GASTROENTEROLOGY | Facility: CLINIC | Age: 67
End: 2017-11-22

## 2017-11-22 ENCOUNTER — TELEPHONE (OUTPATIENT)
Dept: ENDOSCOPY | Facility: HOSPITAL | Age: 67
End: 2017-11-22

## 2017-11-22 NOTE — TELEPHONE ENCOUNTER
Spoke with patient. EGD scheduled for 11/29 at 9:30a pending ok to hold Eliquis. Reviewed prep instructions with patient. Ms Lira verbalized understanding and understands that if we do not get Ok to hold Eliquis, procedure will need to be rescheduled.

## 2017-11-22 NOTE — TELEPHONE ENCOUNTER
Spoke with patient about instructions for EGD scheduled 11/29/17 at 0930.  She will hold Eliquis for 2 days prior to procedure as she did for procedure on 10/31/17 with permission from Dr Henry Wei.

## 2017-11-22 NOTE — TELEPHONE ENCOUNTER
Dr Henry Wei, may we hold Eliquis for 2 days prior to EGD scheduled 11/29/17 as we did for procedure done on 10/31/17?  Thank you.

## 2017-11-27 ENCOUNTER — OFFICE VISIT (OUTPATIENT)
Dept: CARDIOLOGY | Facility: CLINIC | Age: 67
End: 2017-11-27
Payer: MEDICARE

## 2017-11-27 VITALS
HEIGHT: 66 IN | OXYGEN SATURATION: 97 % | BODY MASS INDEX: 22.92 KG/M2 | HEART RATE: 67 BPM | SYSTOLIC BLOOD PRESSURE: 115 MMHG | DIASTOLIC BLOOD PRESSURE: 58 MMHG | WEIGHT: 142.63 LBS

## 2017-11-27 DIAGNOSIS — I48.0 PAF (PAROXYSMAL ATRIAL FIBRILLATION): ICD-10-CM

## 2017-11-27 DIAGNOSIS — Z95.1 S/P CABG X 2: ICD-10-CM

## 2017-11-27 DIAGNOSIS — I10 ESSENTIAL HYPERTENSION: Primary | ICD-10-CM

## 2017-11-27 PROCEDURE — 99999 PR PBB SHADOW E&M-EST. PATIENT-LVL III: CPT | Mod: PBBFAC,,, | Performed by: INTERNAL MEDICINE

## 2017-11-27 PROCEDURE — 99213 OFFICE O/P EST LOW 20 MIN: CPT | Mod: PBBFAC,PO | Performed by: INTERNAL MEDICINE

## 2017-11-27 PROCEDURE — 99213 OFFICE O/P EST LOW 20 MIN: CPT | Mod: S$PBB,,, | Performed by: INTERNAL MEDICINE

## 2017-11-27 NOTE — PROGRESS NOTES
[facility]    CC: Recommendation regarding Eliquis  Chief Complaint   Patient presents with    Follow-up       Patient ID: Steph Lira is a 67 y.o. female with a past medical history of paroxysmal atrial fibrillation    HPI  He is due to undergo an esophageal biopsy for possible neoplastic lesion.  She was here to get recommendations regarding whether to discontinue the Eliquis.    Past Medical History:   Diagnosis Date    *Atrial fibrillation     and Hx PSVT    Allergy     chronic     Arthritis     fingers    Bronchitis March 2013    CAD (coronary artery disease) 2005    CABGx2 in 2005 and 2 MI after with 4 stents    Cancer 1996    small cell lung cancer s/p resection of 1/3 lung, 5 ribs and muscle mass then had chemo and radiation    Cataract     OD     CHF (congestive heart failure) past Hx    Chronic rhinitis     Clot past Hx    external jugular, now stented, occluded, had phlebitis; now revascularized    Colon polyp     COPD (chronic obstructive pulmonary disease)     no oxygen or nebulizers    COPD exacerbation 5/8/13    improved 5/14/13 after steroid pack,antibiotics    Former smoker     GERD (gastroesophageal reflux disease)     Heart block     Hyperlipidemia     Hypertension     pt states does not have //    MI (myocardial infarction) 2005    Posterior vitreous detachment of left eye     Thyroid disease      Past Surgical History:   Procedure Laterality Date    ADENOIDECTOMY      APPENDECTOMY      BACK SURGERY      lumbar    CARDIAC SURGERY      CATARACT EXTRACTION Left     OS    CATARACT EXTRACTION, BILATERAL      left eye only    CHOLECYSTECTOMY      COLONOSCOPY  03/2012    repeat in 5 years    COLONOSCOPY N/A 6/19/2017    Procedure: COLONOSCOPY;  Surgeon: Kal Elise MD;  Location: [facility];  Service: Endoscopy;  Laterality: N/A;    CORONARY ARTERY BYPASS GRAFT  2005    2 vessel    EAR PINNA RECONSTRUCTION W/ RIB GRAFT  2010/2011    x2    EYE  SURGERY  Dec 2012    ptosis repair    FIRST RIB REMOVAL  1996 with lung resection    HYSTERECTOMY      LUNG LOBECTOMY  1996    left lung for cancer    SHOULDER SURGERY  ,    scapular entrapment after lung surgery, needed 5 ribs removed left side    SYMPOTHATIC NERVE LEFT SIDE       with lung surgery    TONSILLECTOMY      TONSILLECTOMY, ADENOIDECTOMY, BILATERAL MYRINGOTOMY AND TUBES      UPPER GASTROINTESTINAL ENDOSCOPY  2016    Dr. Koo    VASCULAR SURGERY      stents place in jugualr vein     Social History     Social History    Marital status:      Spouse name: N/A    Number of children: N/A    Years of education: N/A     Occupational History    retired      Social History Main Topics    Smoking status: Former Smoker     Packs/day: 2.00     Years: 20.00     Types: Cigarettes     Quit date: 1994    Smokeless tobacco: Never Used    Alcohol use No    Drug use: No    Sexual activity: Yes     Partners: Male     Other Topics Concern    Are You Pregnant Or Think You May Be? No    Breast-Feeding No     Social History Narrative    No narrative on file     Family History   Problem Relation Age of Onset    Allergic rhinitis Father     Cancer Father     Hypertension Father     Allergies Father     Allergic rhinitis Son     Asthma Son     Stroke Mother     Allergies Mother     Allergic rhinitis Sister     Asthma Sister     Diverticulitis Brother     No Known Problems Daughter     No Known Problems Maternal Aunt     No Known Problems Maternal Uncle     No Known Problems Paternal Aunt     No Known Problems Paternal Uncle     Colon polyps Maternal Grandmother       of colon blockage    No Known Problems Maternal Grandfather     No Known Problems Paternal Grandmother     No Known Problems Paternal Grandfather     No Known Problems Brother     No Known Problems Sister     Angioedema Neg Hx     Eczema Neg Hx     Immunodeficiency Neg Hx     Urticaria Neg  Hx     Skin cancer Neg Hx     Amblyopia Neg Hx     Blindness Neg Hx     Cataracts Neg Hx     Diabetes Neg Hx     Glaucoma Neg Hx     Macular degeneration Neg Hx     Retinal detachment Neg Hx     Strabismus Neg Hx     Thyroid disease Neg Hx     Colon cancer Neg Hx     Melanoma Neg Hx        Review of patient's allergies indicates:   Allergen Reactions    Ciprofloxacin Itching    Doxycycline Other (See Comments)     Patient had a rxn with the sun despite wearing spf 30       Medication List with Changes/Refills   Current Medications    ALBUTEROL (ACCUNEB) 1.25 MG/3 ML NEBU    USE 1 VIAL (3 ML) VIA NEBULIZER EVERY 6 HOURS AS NEEDED    ALBUTEROL (PROVENTIL HFA) 90 MCG/ACTUATION INHALER    Inhale 2 puffs into the lungs every 6 (six) hours as needed for Wheezing.    APIXABAN 5 MG TAB    Take 1 tablet (5 mg total) by mouth 2 (two) times daily.    ATENOLOL (TENORMIN) 25 MG TABLET    Take 1 tablet (25 mg total) by mouth once daily.    ATORVASTATIN (LIPITOR) 40 MG TABLET    Take 1 tablet (40 mg total) by mouth once daily.    BISACODYL (DULCOLAX) 5 MG EC TABLET    Take 1 tablet (5 mg total) by mouth daily as needed for Constipation.    CALCIUM CITRATE (CITRACAL) 500 MG TBEF    Take 500 mg by mouth. 1 Tablet, Effervescent Oral  daily    CELECOXIB (CELEBREX) 100 MG CAPSULE    Take 1 capsule (100 mg total) by mouth once daily.    CETIRIZINE (ZYRTEC) 10 MG TABLET        DESOXIMETASONE (TOPICORT) 0.25 % CREAM    Apply topically 2 (two) times daily.    DICLOFENAC SODIUM (VOLTAREN) 1 % GEL    Apply 2 g topically once daily.    DILTIAZEM (CARDIZEM) 30 MG TABLET    Take 1 tablet (30 mg total) by mouth 4 (four) times daily before meals and nightly.    DIPHENHYDRAMINE (BENADRYL) 25 MG CAPSULE    Take 25 mg by mouth every 6 (six) hours as needed for Itching.    FLECAINIDE (TAMBOCOR) 50 MG TAB    TAKE 2 TABLETS TWICE A DAY    FLUTICASONE (FLONASE) 50 MCG/ACTUATION NASAL SPRAY    1 spray by Each Nare route once daily.     "FLUTICASONE-SALMETEROL 250-50 MCG/DOSE (ADVAIR DISKUS) 250-50 MCG/DOSE DISKUS INHALER    USE 1 INHALATION INTO THE LUNGS TWICE A DAY. RINSE MOUTH AFTER USE TO PREVENT THRUSH    IPRATROPIUM (ATROVENT) 0.03 % NASAL SPRAY    2 sprays by Nasal route 2 (two) times daily. As needed    LEVOTHYROXINE (SYNTHROID) 88 MCG TABLET    Take 1 tablet (88 mcg total) by mouth once daily.    MONTELUKAST (SINGULAIR) 10 MG TABLET    Take 1 tablet (10 mg total) by mouth every evening.    MULTIVITAMIN-MINERALS-LUTEIN (CENTRUM SILVER) TAB    Take 1 tablet by mouth once daily. 1 Tablet Oral Every day    PANTOPRAZOLE (PROTONIX) 40 MG TABLET    Take 1 tablet (40 mg total) by mouth once daily. Take 30 minutes prior to breakfast    RETIN-A 0.05 % CREAM    APPLY THIN FILM TO FACE AT NIGHT AFTER MOISTURIZING    SPIRIVA WITH HANDIHALER 18 MCG INHALATION CAPSULE    INHALE THE CONTENTS OF 1 CAPSULE DAILY    TRIAMCINOLONE ACETONIDE 0.1% (KENALOG) 0.1 % CREAM    Apply topically 2 (two) times daily.       Review of Systems  Constitution: Denies chills, fever, and sweats.  HENT: Denies headaches or blurry vision.  Cardiovascular: Denies chest pain or irregular heart beat.  Respiratory: Denies cough or shortness of breath.  Gastrointestinal: Denies abdominal pain, nausea, or vomiting.  Musculoskeletal: Denies muscle cramps.  Neurological: Denies dizziness or focal weakness.  Psychiatric/Behavioral: Normal mental status.  Hematologic/Lymphatic: Denies bleeding problem or easy bruising/bleeding.  Skin: Denies rash or suspicious lesions    Physical Examination  BP (!) 115/58 (BP Location: Left arm)   Pulse 67   Ht 5' 6" (1.676 m)   Wt 64.7 kg (142 lb 10.2 oz)   SpO2 97%   BMI 23.02 kg/m²     Constitutional: No acute distress, conversant  HEENT: Sclera anicteric, Pupils equal, round and reactive to light, extraocular motions intact, Oropharynx clear  Neck: No JVD, no carotid bruits  Cardiovascular: regular rate and rhythm, no murmur, rubs or gallops, " normal S1/S2  Pulmonary: Clear to auscultation bilaterally  Abdominal: Abdomen soft, nontender, nondistended, positive bowel sounds  Extremities: No lower extremity edema,   Pulses:  Carotid pulses are 2+ on the right side, and 2+ on the left side.  Radial pulses are 2+ on the right side, and 2+ on the left side.     Skin: No ecchymosis, erythema, or ulcers  Psych: Alert and oriented x 3, appropriate affect  Neuro: CNII-XII intact, no focal deficits    Labs:  Most Recent Data  CBC:   Lab Results   Component Value Date    WBC 6.80 07/07/2017    HGB 12.8 07/07/2017    HCT 40.3 07/07/2017     07/07/2017    MCV 94 07/07/2017    RDW 12.9 07/07/2017     BMP:   Lab Results   Component Value Date     11/08/2017    K 4.5 11/08/2017     11/08/2017    CO2 27 11/08/2017    BUN 15 11/08/2017    CREATININE 0.8 11/08/2017    GLU 91 11/08/2017    CALCIUM 9.6 11/08/2017    MG 2.0 09/30/2016     LFTS;   Lab Results   Component Value Date    PROT 7.2 11/08/2017    ALBUMIN 3.5 11/08/2017    BILITOT 0.7 11/08/2017    AST 19 11/08/2017    ALKPHOS 70 11/08/2017    ALT 15 11/08/2017     COAGS:   Lab Results   Component Value Date    INR 2.7 07/11/2017     FLP:   Lab Results   Component Value Date    CHOL 146 07/07/2017    HDL 48 07/07/2017    LDLCALC 80.2 07/07/2017    TRIG 89 07/07/2017    CHOLHDL 32.9 07/07/2017     CARDIAC:   Lab Results   Component Value Date    TROPONINI 0.008 04/26/2017     (H) 04/26/2017       Imaging:      I have personally reviewed these images and echo data    Assessment/Plan:  Steph was seen today for follow-up.    Diagnoses and all orders for this visit:    Essential hypertension    S/P CABG x 2, 2005    PAF (paroxysmal atrial fibrillation), onset 2002      I am okay for her to come off the Eliquis 2 days it to her esophageal biopsy.  Postbiopsy she should be started back on Eliquis soon as the surgeon deems it safe.  I will see her back in the office in a year's time.    Return in  about 1 year (around 11/27/2018).           Total duration of face to face visit time 30 minutes.  Total time spent counseling greater than fifty percent of total visit time.  Counseling included discussion regarding imaging findings, diagnosis, possibilities, treatment options, risks and benefits.  The patient had many questions regarding the options and long-term effect which were all answered to my best ability.      Henry Wei MD,MRCP,RPVI,FACC,FSCAI.  Interventional Cardiology   Phone [phone]

## 2017-11-28 ENCOUNTER — ANESTHESIA EVENT (OUTPATIENT)
Dept: ENDOSCOPY | Facility: HOSPITAL | Age: 67
End: 2017-11-28
Payer: MEDICARE

## 2017-11-29 ENCOUNTER — SURGERY (OUTPATIENT)
Age: 67
End: 2017-11-29

## 2017-11-29 ENCOUNTER — ANESTHESIA (OUTPATIENT)
Dept: ENDOSCOPY | Facility: HOSPITAL | Age: 67
End: 2017-11-29
Payer: MEDICARE

## 2017-11-29 ENCOUNTER — HOSPITAL ENCOUNTER (OUTPATIENT)
Facility: HOSPITAL | Age: 67
Discharge: HOME | End: 2017-11-29
Attending: INTERNAL MEDICINE | Admitting: INTERNAL MEDICINE
Payer: MEDICARE

## 2017-11-29 VITALS
WEIGHT: 141 LBS | TEMPERATURE: 98 F | SYSTOLIC BLOOD PRESSURE: 123 MMHG | HEART RATE: 63 BPM | HEIGHT: 66 IN | BODY MASS INDEX: 22.66 KG/M2 | OXYGEN SATURATION: 100 % | RESPIRATION RATE: 18 BRPM | DIASTOLIC BLOOD PRESSURE: 57 MMHG

## 2017-11-29 DIAGNOSIS — K22.89 ESOPHAGEAL MASS: ICD-10-CM

## 2017-11-29 PROCEDURE — D9220A PRA ANESTHESIA: Mod: ANES,,, | Performed by: ANESTHESIOLOGY

## 2017-11-29 PROCEDURE — 88342 IMHCHEM/IMCYTCHM 1ST ANTB: CPT | Mod: 26,,, | Performed by: PATHOLOGY

## 2017-11-29 PROCEDURE — 88312 SPECIAL STAINS GROUP 1: CPT | Mod: 26,,, | Performed by: PATHOLOGY

## 2017-11-29 PROCEDURE — 43239 EGD BIOPSY SINGLE/MULTIPLE: CPT | Mod: ,,, | Performed by: INTERNAL MEDICINE

## 2017-11-29 PROCEDURE — D9220A PRA ANESTHESIA: Mod: CRNA,,, | Performed by: NURSE ANESTHETIST, CERTIFIED REGISTERED

## 2017-11-29 PROCEDURE — 88305 TISSUE EXAM BY PATHOLOGIST: CPT | Mod: 26,,, | Performed by: PATHOLOGY

## 2017-11-29 PROCEDURE — 27201012 HC FORCEPS, HOT/COLD, DISP: Performed by: INTERNAL MEDICINE

## 2017-11-29 PROCEDURE — 25000003 PHARM REV CODE 250: Performed by: INTERNAL MEDICINE

## 2017-11-29 PROCEDURE — 88305 TISSUE EXAM BY PATHOLOGIST: CPT | Performed by: PATHOLOGY

## 2017-11-29 PROCEDURE — 37000008 HC ANESTHESIA 1ST 15 MINUTES: Performed by: INTERNAL MEDICINE

## 2017-11-29 PROCEDURE — 43239 EGD BIOPSY SINGLE/MULTIPLE: CPT | Performed by: INTERNAL MEDICINE

## 2017-11-29 PROCEDURE — 37000009 HC ANESTHESIA EA ADD 15 MINS: Performed by: INTERNAL MEDICINE

## 2017-11-29 PROCEDURE — 63600175 PHARM REV CODE 636 W HCPCS: Performed by: NURSE ANESTHETIST, CERTIFIED REGISTERED

## 2017-11-29 RX ORDER — PROPOFOL 10 MG/ML
VIAL (ML) INTRAVENOUS
Status: DISCONTINUED | OUTPATIENT
Start: 2017-11-29 | End: 2017-11-29

## 2017-11-29 RX ORDER — PROPOFOL 10 MG/ML
VIAL (ML) INTRAVENOUS CONTINUOUS PRN
Status: DISCONTINUED | OUTPATIENT
Start: 2017-11-29 | End: 2017-11-29

## 2017-11-29 RX ORDER — SODIUM CHLORIDE 9 MG/ML
INJECTION, SOLUTION INTRAVENOUS CONTINUOUS
Status: DISCONTINUED | OUTPATIENT
Start: 2017-11-29 | End: 2017-11-29 | Stop reason: HOSPADM

## 2017-11-29 RX ORDER — FENTANYL CITRATE 50 UG/ML
25 INJECTION, SOLUTION INTRAMUSCULAR; INTRAVENOUS EVERY 5 MIN PRN
Status: DISCONTINUED | OUTPATIENT
Start: 2017-11-29 | End: 2017-11-29 | Stop reason: HOSPADM

## 2017-11-29 RX ORDER — ONDANSETRON 2 MG/ML
4 INJECTION INTRAMUSCULAR; INTRAVENOUS DAILY PRN
Status: DISCONTINUED | OUTPATIENT
Start: 2017-11-29 | End: 2017-11-29 | Stop reason: HOSPADM

## 2017-11-29 RX ORDER — LIDOCAINE HCL/PF 100 MG/5ML
SYRINGE (ML) INTRAVENOUS
Status: DISCONTINUED | OUTPATIENT
Start: 2017-11-29 | End: 2017-11-29

## 2017-11-29 RX ADMIN — SODIUM CHLORIDE: 0.9 INJECTION, SOLUTION INTRAVENOUS at 08:11

## 2017-11-29 RX ADMIN — PROPOFOL 40 MG: 10 INJECTION, EMULSION INTRAVENOUS at 09:11

## 2017-11-29 RX ADMIN — PROPOFOL 150 MCG/KG/MIN: 10 INJECTION, EMULSION INTRAVENOUS at 09:11

## 2017-11-29 RX ADMIN — LIDOCAINE HYDROCHLORIDE 60 MG: 20 INJECTION, SOLUTION INTRAVENOUS at 09:11

## 2017-11-29 NOTE — TRANSFER OF CARE
"Anesthesia Transfer of Care Note    Patient: Steph Lira    Procedure(s) Performed: Procedure(s) (LRB):  ESOPHAGOGASTRODUODENOSCOPY (EGD) (N/A)    Patient location: [facility]    Anesthesia Type: general    Transport from OR: Transported from OR on room air with adequate spontaneous ventilation    Post pain: adequate analgesia    Post assessment: no apparent anesthetic complications    Post vital signs: stable    Level of consciousness: awake, alert and oriented    Nausea/Vomiting: no nausea/vomiting    Complications: none    Transfer of care protocol was followed      Last vitals:   Visit Vitals  BP (!) 105/55 (BP Location: Left arm, Patient Position: Lying)   Pulse 61   Temp 36.6 °C (97.9 °F) (Temporal)   Resp 18   Ht 5' 6" (1.676 m)   Wt 64 kg (141 lb)   SpO2 98%   Breastfeeding? No   BMI 22.76 kg/m²     "

## 2017-11-29 NOTE — ANESTHESIA POSTPROCEDURE EVALUATION
"Anesthesia Post Evaluation    Patient: Steph Lira    Procedure(s) Performed: Procedure(s) (LRB):  ESOPHAGOGASTRODUODENOSCOPY (EGD) (N/A)    Final Anesthesia Type: general  Patient location during evaluation: PACU  Patient participation: Yes- Able to Participate  Level of consciousness: awake and alert and oriented  Post-procedure vital signs: reviewed and stable  Pain management: adequate  Airway patency: patent  PONV status at discharge: No PONV  Anesthetic complications: no      Cardiovascular status: hemodynamically stable  Respiratory status: unassisted  Hydration status: euvolemic  Follow-up not needed.        Visit Vitals  BP (!) 123/57   Pulse 63   Temp 36.7 °C (98.1 °F) (Temporal)   Resp 18   Ht 5' 6" (1.676 m)   Wt 64 kg (141 lb)   SpO2 100%   Breastfeeding? No   BMI 22.76 kg/m²       Pain/Tamera Score: Pain Assessment Performed: Yes (11/29/2017 10:16 AM)  Presence of Pain: denies (11/29/2017 10:16 AM)  Tamera Score: 9 (11/29/2017  9:39 AM)      "

## 2017-11-29 NOTE — H&P
Short Stay Endoscopy History and Physical    PCP - Maida Mon MD  Referring Physician - Bola Laird MD  [address] [place] [address]    Procedure - egd  ASA - per anesthesia  Mallampati - per anesthesia  History of Anesthesia problems - no  Family history Anesthesia problems -  no   Plan of anesthesia - General    HPI:  This is a 67 y.o. female here for evaluation of: esophageal lesion    Reflux - no  Dysphagia - no  Abdominal pain - no  Diarrhea - no    ROS:  Constitutional: No fevers, chills, No weight loss  CV: No chest pain  Pulm: No cough, No shortness of breath  Ophtho: No vision changes  GI: see HPI  Derm: No rash    Medical History:  has a past medical history of *Atrial fibrillation; Allergy; Arthritis; Bronchitis (March 2013); CAD (coronary artery disease) (2005); Cancer (1996); Cataract; CHF (congestive heart failure) (past Hx); Chronic rhinitis; Clot (past Hx); Colon polyp; COPD (chronic obstructive pulmonary disease); COPD exacerbation (5/8/13); Former smoker; GERD (gastroesophageal reflux disease); Heart block; Hyperlipidemia; Hypertension; MI (myocardial infarction) (2005); Posterior vitreous detachment of left eye; and Thyroid disease.    Surgical History:  has a past surgical history that includes TONSILLECTOMY, ADENOIDECTOMY, BILATERAL yringotomy and tubes; Cholecystectomy; Hysterectomy; Cataract extraction, bilateral; First rib removal (1996 with lung resection); Lung lobectomy (1996); Coronary artery bypass graft (2005); Appendectomy; SYMPOTHATIC NERVE LEFT SIDE; Tonsillectomy; Adenoidectomy; Eye surgery (Dec 2012); Shoulder surgery (2010,2011); Back surgery; Cataract extraction (Left); Ear pinna reconstruction w/ rib graft (2010/2011); Vascular surgery; Upper gastrointestinal endoscopy (05/2016); Colonoscopy (03/2012); Cardiac surgery; and Colonoscopy (N/A, 6/19/2017).    Family History: family history includes Allergic rhinitis in her father, sister, and son;  Allergies in her father and mother; Asthma in her sister and son; Cancer in her father; Colon polyps in her maternal grandmother; Diverticulitis in her brother; Hypertension in her father; No Known Problems in her brother, daughter, maternal aunt, maternal grandfather, maternal uncle, paternal aunt, paternal grandfather, paternal grandmother, paternal uncle, and sister; Stroke in her mother..    Social History:  reports that she quit smoking about 23 years ago. Her smoking use included Cigarettes. She has a 40.00 pack-year smoking history. She has never used smokeless tobacco. She reports that she does not drink alcohol or use drugs.    Review of patient's allergies indicates:   Allergen Reactions    Ciprofloxacin Itching    Doxycycline Other (See Comments)     Patient had a rxn with the sun despite wearing spf 30       Medications:   Prescriptions Prior to Admission   Medication Sig Dispense Refill Last Dose    albuterol (ACCUNEB) 1.25 mg/3 mL Nebu USE 1 VIAL (3 ML) VIA NEBULIZER EVERY 6 HOURS AS NEEDED 90 mL 2 Past Week at Unknown time    albuterol (PROVENTIL HFA) 90 mcg/actuation inhaler Inhale 2 puffs into the lungs every 6 (six) hours as needed for Wheezing. 3 Inhaler 4 Past Week at Unknown time    apixaban 5 mg Tab Take 1 tablet (5 mg total) by mouth 2 (two) times daily. 180 tablet 3 11/28/2017 at Unknown time    atenolol (TENORMIN) 25 MG tablet Take 1 tablet (25 mg total) by mouth once daily. 90 tablet 3 11/29/2017 at Unknown time    atorvastatin (LIPITOR) 40 MG tablet Take 1 tablet (40 mg total) by mouth once daily. 90 tablet 3 11/28/2017 at Unknown time    bisacodyl (DULCOLAX) 5 mg EC tablet Take 1 tablet (5 mg total) by mouth daily as needed for Constipation. 30 tablet 11 11/28/2017 at Unknown time    CALCIUM CITRATE (CITRACAL) 500 mg TbEF Take 500 mg by mouth. 1 Tablet, Effervescent Oral  daily   11/28/2017 at Unknown time    celecoxib (CELEBREX) 100 MG capsule Take 1 capsule (100 mg total) by  mouth once daily. 30 capsule 3 11/28/2017 at Unknown time    cetirizine (ZYRTEC) 10 MG tablet    11/28/2017 at Unknown time    desoximetasone (TOPICORT) 0.25 % cream Apply topically 2 (two) times daily. 180 g 3 11/28/2017 at Unknown time    diclofenac sodium (VOLTAREN) 1 % Gel Apply 2 g topically once daily. 100 g prn 11/28/2017 at Unknown time    diltiaZEM (CARDIZEM) 30 MG tablet Take 1 tablet (30 mg total) by mouth 4 (four) times daily before meals and nightly. 360 tablet 3 11/29/2017 at Unknown time    diphenhydrAMINE (BENADRYL) 25 mg capsule Take 25 mg by mouth every 6 (six) hours as needed for Itching.   11/28/2017 at Unknown time    flecainide (TAMBOCOR) 50 MG Tab TAKE 2 TABLETS TWICE A  tablet 3 11/29/2017 at Unknown time    fluticasone (FLONASE) 50 mcg/actuation nasal spray 1 spray by Each Nare route once daily. 3 Bottle 3 Past Week at Unknown time    fluticasone-salmeterol 250-50 mcg/dose (ADVAIR DISKUS) 250-50 mcg/dose diskus inhaler USE 1 INHALATION INTO THE LUNGS TWICE A DAY. RINSE MOUTH AFTER USE TO PREVENT THRUSH 3 each 3 11/29/2017 at Unknown time    ipratropium (ATROVENT) 0.03 % nasal spray 2 sprays by Nasal route 2 (two) times daily. As needed (Patient taking differently: 2 sprays by Nasal route once daily. As needed) 90 mL 6 11/28/2017 at Unknown time    levothyroxine (SYNTHROID) 88 MCG tablet Take 1 tablet (88 mcg total) by mouth once daily. 90 tablet 11 11/28/2017 at Unknown time    montelukast (SINGULAIR) 10 mg tablet Take 1 tablet (10 mg total) by mouth every evening. 90 tablet 3 11/28/2017 at Unknown time    multivitamin-minerals-lutein (CENTRUM SILVER) Tab Take 1 tablet by mouth once daily. 1 Tablet Oral Every day   11/28/2017 at Unknown time    pantoprazole (PROTONIX) 40 MG tablet Take 1 tablet (40 mg total) by mouth once daily. Take 30 minutes prior to breakfast 90 tablet 3 11/28/2017 at Unknown time    RETIN-A 0.05 % cream APPLY THIN FILM TO FACE AT NIGHT AFTER  MOISTURIZING 45 g 3 11/28/2017 at Unknown time    SPIRIVA WITH HANDIHALER 18 mcg inhalation capsule INHALE THE CONTENTS OF 1 CAPSULE DAILY 90 capsule 3 11/29/2017 at Unknown time    triamcinolone acetonide 0.1% (KENALOG) 0.1 % cream Apply topically 2 (two) times daily. 1 Tube 11 Taking       Physical Exam:    Vital Signs:   Vitals:    11/29/17 0849   BP: 136/75   Pulse: 64   Resp: 18   Temp: 97.6 °F (36.4 °C)       General Appearance: Well appearing in no acute distress  Eyes:    No scleral icterus  ENT: Neck supple  Lungs: CTA anteriorly  Heart:  Regular rate  Abdomen: Soft, non tender, non distended with normal bowel sounds.  Extremities: No edema  Skin: No rash    Labs:  Lab Results   Component Value Date    WBC 6.80 07/07/2017    HGB 12.8 07/07/2017    HCT 40.3 07/07/2017     07/07/2017    CHOL 146 07/07/2017    TRIG 89 07/07/2017    HDL 48 07/07/2017    ALT 15 11/08/2017    AST 19 11/08/2017     11/08/2017    K 4.5 11/08/2017     11/08/2017    CREATININE 0.8 11/08/2017    BUN 15 11/08/2017    CO2 27 11/08/2017    TSH 1.164 10/26/2016    INR 2.7 07/11/2017       I have explained the risks and benefits of this endoscopic procedure to the patient including but not limited to bleeding, inflammation, infection, perforation, and death.      Daniel Gillette MD

## 2017-11-29 NOTE — ANESTHESIA PREPROCEDURE EVALUATION
11/29/2017  Steph Lira is a 67 y.o., female.  Pre-operative evaluation for ESOPHAGOGASTRODUODENOSCOPY (EGD) (N/A)    Chief Complaint:esophageal mass    PMH:   *Atrial fibrillation       and Hx PSVT    Allergy       chronic     Arthritis       fingers    Bronchitis March 2013    CAD (coronary artery disease) 2005     CABGx2 in 2005 and 2 MI after with 4 stents    Cancer 1996     small cell lung cancer s/p resection of 1/3 lung, 5 ribs and muscle mass then had chemo and radiation    Cataract       OD     CHF (congestive heart failure) past Hx    Chronic rhinitis      Clot past Hx     external jugular, now stented, occluded, had phlebitis; now revascularized    Colon polyp      COPD (chronic obstructive pulmonary disease)       no oxygen or nebulizers    COPD exacerbation 5/8/13     improved 5/14/13 after steroid pack,antibiotics    Former smoker      GERD (gastroesophageal reflux disease)      Heart block      Hyperlipidemia      Hypertension       pt states does not have //    Posterior vitreous detachment of left eye      Thyroid             Past Surgical History:   Procedure Laterality Date    ADENOIDECTOMY      APPENDECTOMY      BACK SURGERY      lumbar    CARDIAC SURGERY      CATARACT EXTRACTION Left     OS    CATARACT EXTRACTION, BILATERAL      left eye only    CHOLECYSTECTOMY      COLONOSCOPY  03/2012    repeat in 5 years    COLONOSCOPY N/A 6/19/2017    Procedure: COLONOSCOPY;  Surgeon: Kal Elise MD;  [facility];  Service: Endoscopy;  Laterality: N/A;    CORONARY ARTERY BYPASS GRAFT  2005    2 vessel    EAR PINNA RECONSTRUCTION W/ RIB GRAFT  2010/2011    x2    EYE SURGERY  Dec 2012    ptosis repair    FIRST RIB REMOVAL  1996 with lung resection    HYSTERECTOMY      LUNG LOBECTOMY  1996    left lung for cancer    SHOULDER SURGERY  2010,2011     scapular entrapment after lung surgery, needed 5 ribs removed left side    SYMPOTHATIC NERVE LEFT SIDE       with lung surgery    TONSILLECTOMY      TONSILLECTOMY, ADENOIDECTOMY, BILATERAL MYRINGOTOMY AND TUBES      UPPER GASTROINTESTINAL ENDOSCOPY  2016    Dr. Koo    VASCULAR SURGERY      stents place in jugualr vein         Vital Signs Range (Last 24H):  Temp:  [36.4 °C (97.6 °F)]   Pulse:  [64]   Resp:  [18]   BP: (136)/(75)   SpO2:  [99 %]       CBC:   No results for input(s): WBC, RBC, HGB, HCT, PLT, MCV, MCH, MCHC in the last 720 hours.    CMP:   Recent Labs  Lab 17  0815      K 4.5      CO2 27   BUN 15   CREATININE 0.8   GLU 91   CALCIUM 9.6   ALBUMIN 3.5   PROT 7.2   ALKPHOS 70   ALT 15   AST 19   BILITOT 0.7       INR:  No results for input(s): INR, PROTIME, APTT in the last 720 hours.    Invalid input(s): PT      Diagnostic Studies:      E[facility] Echo:  Anesthesia Evaluation    I have reviewed the Patient Summary Reports.    I have reviewed the Nursing Notes.   I have reviewed the Medications.     Review of Systems  Anesthesia Hx:  No problems with previous Anesthesia    Social:  Former Smoker    Cardiovascular:  Cardiovascular Normal     Pulmonary:  Pulmonary Normal    Neurological:  Neurology Normal        Physical Exam  General:  Well nourished    Airway/Jaw/Neck:  Airway Findings: Mouth Opening: Normal Tongue: Normal  General Airway Assessment: Good  Mallampati: I  TM Distance: Normal, at least 6 cm  Jaw/Neck Findings:  Neck ROM: Normal ROM      Dental:  Dental Findings: In tact    Chest/Lungs:  Chest/Lungs Findings: Clear to auscultation, Normal Respiratory Rate     Heart/Vascular:  Heart Findings: Rate: Normal  Rhythm: Regular Rhythm  Sounds: Normal        Mental Status:  Mental Status Findings:  Cooperative, Alert and Oriented         Anesthesia Plan  Type of Anesthesia, risks & benefits discussed:  Anesthesia Type:  general  Patient's Preference:   Intra-op  Monitoring Plan:   Intra-op Monitoring Plan Comments:   Post Op Pain Control Plan:   Post Op Pain Control Plan Comments:   Induction:   IV  Beta Blocker:  Patient is on a Beta-Blocker and has received one dose within the past 24 hours (No further documentation required).       Informed Consent: Patient understands risks and agrees with Anesthesia plan.  Questions answered. Anesthesia consent signed with patient.  ASA Score: 4     Day of Surgery Review of History & Physical:    H&P update referred to the surgeon.         Ready For Surgery From Anesthesia Perspective.

## 2017-11-30 NOTE — PATIENT INSTRUCTIONS
Discharge Summary/Instructions after an Endoscopic Procedure  Patient Name: Steph Raymundo  Patient MRN: [id number]  Patient YOB: 1950 Wednesday, November 29, 2017  Daniel Gillette MD  RESTRICTIONS:  During your procedure today, you received medications for sedation.  These   medications may affect your judgment, balance and coordination.  Therefore,   for 24 hours, you have the following restrictions:   - DO NOT drive a car, operate machinery, make legal/financial decisions,   sign important papers or drink alcohol.    ACTIVITY:  The following day: return to full activity including work, except no heavy   lifting, straining or running for 3 days if polyps were removed.  DIET:  Eat and drink normally unless instructed otherwise.     TREATMENT FOR COMMON SIDE EFFECTS:  - Mild abdominal pain, belching, bloating or excessive gas: rest, eat   lightly and use a heating pad.  - Sore Throat: treat with throat lozenges and/or gargle with warm salt   water.  SYMPTOMS TO WATCH FOR AND REPORT TO YOUR PHYSICIAN:  1. Abdominal pain or bloating, other than gas cramps.  2. Chest pain.  3. Back pain.  4. Chills or fever occurring within 24 hours after the procedure.  5. Rectal bleeding, which would show as bright red, maroon, or black stools.   (A tablespoon of blood from the rectum is not serious, especially if   hemorrhoids are present.)  6. Vomiting.  7. Weakness or dizziness.  8. Because air was used during the procedure, expelling large amounts of air   from your rectum or belching is normal.  9. If a bowel prep was taken, you may not have a bowel movement for 1-3   days.  This is normal.  GO DIRECTLY TO THE NEAREST EMERGENCY ROOM IF YOU HAVE ANY OF THE FOLLOWING:      Difficulty breathing  Chills and/or fever over 101 F   Persistent vomiting and/or vomiting blood   Severe abdominal pain   Severe chest pain   Black, tarry stools   Bleeding- more than one tablespoon   Any other symptom or condition that you may feel  needs urgent attention  Your doctor recommends these additional instructions:  If any biopsies were taken, your doctor s clinic will contact you in 1 to 2   weeks with any results.  We are waiting for your pathology results.   You have a contact number available for emergencies.  The signs and symptoms   of potential delayed complications were discussed with you.  You may return   to normal activities tomorrow.  Written discharge instructions were   provided to you.   You are being discharged to home.   Continue your present medications.   Return to your referring physician as previously scheduled.  For questions, problems or results please call your physician - Daniel Gillette MD at Work:  [phone].  [facility], EMERGENCY ROOM PHONE NUMBER: [phone]  IF A COMPLICATION OR EMERGENCY SITUATION ARISES AND YOU ARE UNABLE TO REACH   YOUR PHYSICIAN - GO DIRECTLY TO THE EMERGENCY ROOM.  Daniel Gillette MD  11/30/2017 8:44:40 AM

## 2017-12-04 ENCOUNTER — PATIENT MESSAGE (OUTPATIENT)
Dept: GASTROENTEROLOGY | Facility: CLINIC | Age: 67
End: 2017-12-04

## 2017-12-06 ENCOUNTER — TELEPHONE (OUTPATIENT)
Dept: GASTROENTEROLOGY | Facility: CLINIC | Age: 67
End: 2017-12-06

## 2017-12-06 DIAGNOSIS — C15.9 MALIGNANT NEOPLASM OF ESOPHAGUS, UNSPECIFIED LOCATION: Primary | ICD-10-CM

## 2017-12-06 NOTE — TELEPHONE ENCOUNTER
----- Message from Anuradha Minor MA sent at 12/6/2017  2:45 PM CST -----  Contact: Home: [phone]   Christian  Calling for  Pathology results  Home: [phone]

## 2017-12-08 ENCOUNTER — TELEPHONE (OUTPATIENT)
Dept: GASTROENTEROLOGY | Facility: CLINIC | Age: 67
End: 2017-12-08

## 2017-12-08 ENCOUNTER — TELEPHONE (OUTPATIENT)
Dept: ENDOSCOPY | Facility: HOSPITAL | Age: 67
End: 2017-12-08

## 2017-12-08 NOTE — TELEPHONE ENCOUNTER
Spoke with patient about instructions for EGD scheduled 12/14/17 at 1330.  Instructions sent to patient's [facility].  She will hold Eliquis for 2 days prior to procedure with permission from Dr Henry Wei.

## 2017-12-08 NOTE — TELEPHONE ENCOUNTER
Spoke with patient and . EGD with Cryo scheduled for 12/15 at 2pm. Reviewed prep instructions. Mr and Mrs Lira verbalized understanding.

## 2017-12-14 ENCOUNTER — SURGERY (OUTPATIENT)
Age: 67
End: 2017-12-14

## 2017-12-14 ENCOUNTER — HOSPITAL ENCOUNTER (OUTPATIENT)
Facility: HOSPITAL | Age: 67
Discharge: HOME | End: 2017-12-14
Attending: INTERNAL MEDICINE | Admitting: INTERNAL MEDICINE
Payer: MEDICARE

## 2017-12-14 ENCOUNTER — ANESTHESIA EVENT (OUTPATIENT)
Dept: ENDOSCOPY | Facility: HOSPITAL | Age: 67
End: 2017-12-14
Payer: MEDICARE

## 2017-12-14 ENCOUNTER — ANESTHESIA (OUTPATIENT)
Dept: ENDOSCOPY | Facility: HOSPITAL | Age: 67
End: 2017-12-14
Payer: MEDICARE

## 2017-12-14 VITALS
HEART RATE: 70 BPM | SYSTOLIC BLOOD PRESSURE: 138 MMHG | DIASTOLIC BLOOD PRESSURE: 71 MMHG | BODY MASS INDEX: 23.3 KG/M2 | TEMPERATURE: 98 F | OXYGEN SATURATION: 100 % | WEIGHT: 145 LBS | HEIGHT: 66 IN | RESPIRATION RATE: 16 BRPM

## 2017-12-14 DIAGNOSIS — K22.9 ESOPHAGEAL LESION: Primary | ICD-10-CM

## 2017-12-14 LAB — KOH PREP SPEC: NORMAL

## 2017-12-14 PROCEDURE — 37000009 HC ANESTHESIA EA ADD 15 MINS: Performed by: INTERNAL MEDICINE

## 2017-12-14 PROCEDURE — D9220A PRA ANESTHESIA: Mod: ANES,,, | Performed by: ANESTHESIOLOGY

## 2017-12-14 PROCEDURE — 25000003 PHARM REV CODE 250: Performed by: INTERNAL MEDICINE

## 2017-12-14 PROCEDURE — 37000008 HC ANESTHESIA 1ST 15 MINUTES: Performed by: INTERNAL MEDICINE

## 2017-12-14 PROCEDURE — 43235 EGD DIAGNOSTIC BRUSH WASH: CPT | Mod: ,,, | Performed by: INTERNAL MEDICINE

## 2017-12-14 PROCEDURE — 63600175 PHARM REV CODE 636 W HCPCS: Performed by: NURSE ANESTHETIST, CERTIFIED REGISTERED

## 2017-12-14 PROCEDURE — D9220A PRA ANESTHESIA: Mod: CRNA,,, | Performed by: NURSE ANESTHETIST, CERTIFIED REGISTERED

## 2017-12-14 PROCEDURE — 43235 EGD DIAGNOSTIC BRUSH WASH: CPT | Performed by: INTERNAL MEDICINE

## 2017-12-14 PROCEDURE — 87210 SMEAR WET MOUNT SALINE/INK: CPT

## 2017-12-14 RX ORDER — FLUCONAZOLE 10 MG/ML
100 POWDER, FOR SUSPENSION ORAL DAILY
Qty: 210 ML | Refills: 0 | Status: SHIPPED | OUTPATIENT
Start: 2017-12-14 | End: 2018-01-04

## 2017-12-14 RX ORDER — SODIUM CHLORIDE 9 MG/ML
INJECTION, SOLUTION INTRAVENOUS CONTINUOUS
Status: DISCONTINUED | OUTPATIENT
Start: 2017-12-14 | End: 2017-12-14 | Stop reason: HOSPADM

## 2017-12-14 RX ORDER — LIDOCAINE HCL/PF 100 MG/5ML
SYRINGE (ML) INTRAVENOUS
Status: DISCONTINUED | OUTPATIENT
Start: 2017-12-14 | End: 2017-12-14

## 2017-12-14 RX ORDER — PROPOFOL 10 MG/ML
VIAL (ML) INTRAVENOUS CONTINUOUS PRN
Status: DISCONTINUED | OUTPATIENT
Start: 2017-12-14 | End: 2017-12-14

## 2017-12-14 RX ORDER — PROPOFOL 10 MG/ML
VIAL (ML) INTRAVENOUS
Status: DISCONTINUED | OUTPATIENT
Start: 2017-12-14 | End: 2017-12-14

## 2017-12-14 RX ADMIN — PROPOFOL 150 MCG/KG/MIN: 10 INJECTION, EMULSION INTRAVENOUS at 02:12

## 2017-12-14 RX ADMIN — SODIUM CHLORIDE: 0.9 INJECTION, SOLUTION INTRAVENOUS at 02:12

## 2017-12-14 RX ADMIN — LIDOCAINE HYDROCHLORIDE 50 MG: 20 INJECTION, SOLUTION INTRAVENOUS at 02:12

## 2017-12-14 RX ADMIN — PROPOFOL 50 MG: 10 INJECTION, EMULSION INTRAVENOUS at 02:12

## 2017-12-14 NOTE — DISCHARGE SUMMARY
Discharge Summary/Instructions after an Endoscopic Procedure    Patient Name: Steph Raymundo  Patient MRN: [id number]  Patient YOB: 1950 Thursday, December 14, 2017  Robbie Gonzales MD    RESTRICTIONS:  During your procedure today, you received medications for sedation.  These medications may affect your judgment, balance and coordination.  Therefore, for 24 hours, you have the following restrictions:     - DO NOT drive a car, operate machinery, make legal/financial decisions, sign important papers or drink alcohol.      ACTIVITY:  The following day: return to full activity including work, except no heavy lifting, straining or running for 3 days if polyps were removed.    DIET:  Eat and drink normally unless instructed otherwise.     TREATMENT FOR COMMON SIDE EFFECTS:  - Mild abdominal pain, belching, bloating or excessive gas: rest, eat lightly and use a heating pad.  - Sore Throat: treat with throat lozenges and/or gargle with warm salt water.    SYMPTOMS TO WATCH FOR AND REPORT TO YOUR PHYSICIAN:  1. Abdominal pain or bloating, other than gas cramps.  2. Chest pain.  3. Back pain.  4. Chills or fever occurring within 24 hours after the procedure.  5. Rectal bleeding, which would show as bright red, maroon, or black stools. (A tablespoon of blood from the rectum is not serious, especially if hemorrhoids are present.)  6. Vomiting.  7. Weakness or dizziness.  8. Because air was used during the procedure, expelling large amounts of air from your rectum or belching is normal.  9. If a bowel prep was taken, you may not have a bowel movement for 1-3 days.  This is normal.    GO DIRECTLY TO THE NEAREST EMERGENCY ROOM IF YOU HAVE ANY OF THE FOLLOWING:     Difficulty breathing  Chills and/or fever over 101 F   Persistent vomiting and/or vomiting blood   Severe abdominal pain   Severe chest pain   Black, tarry stools   Bleeding- more than one tablespoon   Any other symptom or condition that you may feel needs  urgent attention    Your doctor recommends these additional instructions:  If any biopsies were taken, your doctor s clinic will contact you in 1 to 2 weeks with any results.    You are being discharged to home.   Take Diflucan (fluconazole) 50 mg by mouth once a day for 13 days.    For questions, problems or results please call your physician - Robbie Gonzales MD at Work:  [phone].    [facility], EMERGENCY ROOM PHONE NUMBER: [phone]    IF A COMPLICATION OR EMERGENCY SITUATION ARISES AND YOU ARE UNABLE TO REACH YOUR PHYSICIAN - GO DIRECTLY TO THE EMERGENCY ROOM.

## 2017-12-14 NOTE — ANESTHESIA POSTPROCEDURE EVALUATION
"Anesthesia Post Evaluation    Patient: Steph Lira    Procedure(s) Performed: Procedure(s) (LRB):  ESOPHAGOGASTRODUODENOSCOPY (EGD)/poss Cryo (N/A)    Final Anesthesia Type: general  Patient location during evaluation: PACU  Patient participation: Yes- Able to Participate  Level of consciousness: awake and alert  Post-procedure vital signs: reviewed and stable  Pain management: adequate  Airway patency: patent  PONV status at discharge: No PONV  Anesthetic complications: no      Cardiovascular status: stable  Respiratory status: unassisted, spontaneous ventilation and room air  Hydration status: euvolemic  Follow-up not needed.        Visit Vitals  /70   Pulse 66   Temp 36.5 °C (97.7 °F) (Temporal)   Resp 17   Ht 5' 6" (1.676 m)   Wt 65.8 kg (145 lb)   SpO2 100%   Breastfeeding? No   BMI 23.40 kg/m²       Pain/Tamera Score: Pain Assessment Performed: Yes (12/14/2017  2:55 PM)  Presence of Pain: denies (12/14/2017  2:55 PM)  Tamera Score: 7 (12/14/2017  2:55 PM)      "

## 2017-12-14 NOTE — ANESTHESIA RELEASE NOTE
"Anesthesia Release from PACU Note    Patient: Steph Lira    Procedure(s) Performed: Procedure(s) (LRB):  ESOPHAGOGASTRODUODENOSCOPY (EGD)/poss Cryo (N/A)    Anesthesia type: general    Post pain: Adequate analgesia    Post assessment: no apparent anesthetic complications    Last Vitals:   Visit Vitals  /70   Pulse 66   Temp 36.5 °C (97.7 °F) (Temporal)   Resp 17   Ht 5' 6" (1.676 m)   Wt 65.8 kg (145 lb)   SpO2 100%   Breastfeeding? No   BMI 23.40 kg/m²       Post vital signs: stable    Level of consciousness: awake    Nausea/Vomiting: no nausea/no vomiting    Complications: none    Airway Patency: patent    Respiratory: unassisted    Cardiovascular: stable and blood pressure at baseline    Hydration: euvolemic  "

## 2017-12-14 NOTE — PROVATION PATIENT INSTRUCTIONS
Discharge Summary/Instructions after an Endoscopic Procedure  Patient Name: Steph Raymundo  Patient MRN: [id number]  Patient YOB: 1950 Thursday, December 14, 2017  Robbie Gonzales MD  RESTRICTIONS:  During your procedure today, you received medications for sedation.  These   medications may affect your judgment, balance and coordination.  Therefore,   for 24 hours, you have the following restrictions:   - DO NOT drive a car, operate machinery, make legal/financial decisions,   sign important papers or drink alcohol.    ACTIVITY:  The following day: return to full activity including work, except no heavy   lifting, straining or running for 3 days if polyps were removed.  DIET:  Eat and drink normally unless instructed otherwise.     TREATMENT FOR COMMON SIDE EFFECTS:  - Mild abdominal pain, belching, bloating or excessive gas: rest, eat   lightly and use a heating pad.  - Sore Throat: treat with throat lozenges and/or gargle with warm salt   water.  SYMPTOMS TO WATCH FOR AND REPORT TO YOUR PHYSICIAN:  1. Abdominal pain or bloating, other than gas cramps.  2. Chest pain.  3. Back pain.  4. Chills or fever occurring within 24 hours after the procedure.  5. Rectal bleeding, which would show as bright red, maroon, or black stools.   (A tablespoon of blood from the rectum is not serious, especially if   hemorrhoids are present.)  6. Vomiting.  7. Weakness or dizziness.  8. Because air was used during the procedure, expelling large amounts of air   from your rectum or belching is normal.  9. If a bowel prep was taken, you may not have a bowel movement for 1-3   days.  This is normal.  GO DIRECTLY TO THE NEAREST EMERGENCY ROOM IF YOU HAVE ANY OF THE FOLLOWING:      Difficulty breathing  Chills and/or fever over 101 F   Persistent vomiting and/or vomiting blood   Severe abdominal pain   Severe chest pain   Black, tarry stools   Bleeding- more than one tablespoon   Any other symptom or condition that you may feel  needs urgent attention  Your doctor recommends these additional instructions:  If any biopsies were taken, your doctor s clinic will contact you in 1 to 2   weeks with any results.  You are being discharged to home.   Take Diflucan (fluconazole) 50 mg by mouth once a day for 13 days.  For questions, problems or results please call your physician - Robbie Gonzales MD at Work:  [phone].  [facility], EMERGENCY ROOM PHONE NUMBER: [phone]  IF A COMPLICATION OR EMERGENCY SITUATION ARISES AND YOU ARE UNABLE TO REACH   YOUR PHYSICIAN - GO DIRECTLY TO THE EMERGENCY ROOM.  Robbie Gonzales MD  12/14/2017 3:05:06 PM  This report has been verified and signed electronically.

## 2017-12-14 NOTE — ANESTHESIA PREPROCEDURE EVALUATION
12/14/2017  Steph Lira is a 67 y.o., female.  Pre-operative evaluation for Procedure(s) (LRB):  ESOPHAGOGASTRODUODENOSCOPY (EGD)/poss Cryo (N/A)    Steph Lira is a 67 y.o. female     Patient Active Problem List   Diagnosis    GERD (gastroesophageal reflux disease)    Chronic rhinitis    Hypothyroid    Aneurysm of heart (wall)    Benign neoplasm of skin of upper limb, including shoulder    Intermediate coronary syndrome    Phlebitis and thrombophlebitis of superficial veins of upper extremities    Simple chronic conjunctivitis    Supraventricular premature beats    Vascular disorder of skin    Chronic external jugular vein thrombosis    Long term current use of anticoagulant therapy    Magdiel's syndrome - Left Eye    Ptosis    Post-operative state    Chest pain at rest    Anxiety    CAD (coronary artery disease)    S/P CABG x 2, 2005    HTN (hypertension)    Hypercholesteremia    PAF (paroxysmal atrial fibrillation), onset 2002    Intercostal neuralgia    Pain    Small cell lung cancer    Unstable angina    Radius and ulna distal fracture    Personal history of lung cancer in 1996 S/P left lobectomy    Blurred vision, bilateral    Panlobular emphysema    Carotid artery disease    Pneumonia    Atrial fibrillation with RVR    COPD (chronic obstructive pulmonary disease)    Uncontrolled atrial flutter with rvr    Anticoagulated on Coumadin    Allergic rhinitis    Hypoalbuminemia    History of smoking 10-25 pack years, 15 pack-years, quit 1994    Persistent cough for 3 weeks or longer, onset 12/2015    S/P CABG (coronary artery bypass graft)    Supraventricular bigeminy    Chest pain, unspecified    Allergic drug rash    Dysphagia    History of colon polyps    Coronary artery disease involving autologous artery coronary bypass graft    Wrist arthritis     Esophageal mass    Esophageal candidiasis    Gastritis    Esophageal lesion       Review of patient's allergies indicates:   Allergen Reactions    Ciprofloxacin Itching    Doxycycline Other (See Comments)     Patient had a rxn with the sun despite wearing spf 30       Current Facility-Administered Medications on File Prior to Encounter   Medication Dose Route Frequency Provider Last Rate Last Dose    lidocaine  20 mg/mL (2%) 20 mg/mL (2 %) injection              Current Outpatient Prescriptions on File Prior to Encounter   Medication Sig Dispense Refill    albuterol (ACCUNEB) 1.25 mg/3 mL Nebu USE 1 VIAL (3 ML) VIA NEBULIZER EVERY 6 HOURS AS NEEDED 90 mL 2    albuterol (PROVENTIL HFA) 90 mcg/actuation inhaler Inhale 2 puffs into the lungs every 6 (six) hours as needed for Wheezing. 3 Inhaler 4    atenolol (TENORMIN) 25 MG tablet Take 1 tablet (25 mg total) by mouth once daily. 90 tablet 3    atorvastatin (LIPITOR) 40 MG tablet Take 1 tablet (40 mg total) by mouth once daily. 90 tablet 3    bisacodyl (DULCOLAX) 5 mg EC tablet Take 1 tablet (5 mg total) by mouth daily as needed for Constipation. 30 tablet 11    CALCIUM CITRATE (CITRACAL) 500 mg TbEF Take 500 mg by mouth. 1 Tablet, Effervescent Oral  daily      cetirizine (ZYRTEC) 10 MG tablet       desoximetasone (TOPICORT) 0.25 % cream Apply topically 2 (two) times daily. 180 g 3    diclofenac sodium (VOLTAREN) 1 % Gel Apply 2 g topically once daily. 100 g prn    diltiaZEM (CARDIZEM) 30 MG tablet Take 1 tablet (30 mg total) by mouth 4 (four) times daily before meals and nightly. 360 tablet 3    diphenhydrAMINE (BENADRYL) 25 mg capsule Take 25 mg by mouth every 6 (six) hours as needed for Itching.      flecainide (TAMBOCOR) 50 MG Tab TAKE 2 TABLETS TWICE A  tablet 3    fluticasone (FLONASE) 50 mcg/actuation nasal spray 1 spray by Each Nare route once daily. 3 Bottle 3    fluticasone-salmeterol 250-50 mcg/dose (ADVAIR DISKUS) 250-50 mcg/dose  diskus inhaler USE 1 INHALATION INTO THE LUNGS TWICE A DAY. RINSE MOUTH AFTER USE TO PREVENT THRUSH 3 each 3    ipratropium (ATROVENT) 0.03 % nasal spray 2 sprays by Nasal route 2 (two) times daily. As needed (Patient taking differently: 2 sprays by Nasal route once daily. As needed) 90 mL 6    levothyroxine (SYNTHROID) 88 MCG tablet Take 1 tablet (88 mcg total) by mouth once daily. 90 tablet 11    montelukast (SINGULAIR) 10 mg tablet Take 1 tablet (10 mg total) by mouth every evening. 90 tablet 3    multivitamin-minerals-lutein (CENTRUM SILVER) Tab Take 1 tablet by mouth once daily. 1 Tablet Oral Every day      pantoprazole (PROTONIX) 40 MG tablet Take 1 tablet (40 mg total) by mouth once daily. Take 30 minutes prior to breakfast 90 tablet 3    RETIN-A 0.05 % cream APPLY THIN FILM TO FACE AT NIGHT AFTER MOISTURIZING 45 g 3    apixaban 5 mg Tab Take 1 tablet (5 mg total) by mouth 2 (two) times daily. 180 tablet 3    celecoxib (CELEBREX) 100 MG capsule Take 1 capsule (100 mg total) by mouth once daily. 30 capsule 3    SPIRIVA WITH HANDIHALER 18 mcg inhalation capsule INHALE THE CONTENTS OF 1 CAPSULE DAILY 90 capsule 3    triamcinolone acetonide 0.1% (KENALOG) 0.1 % cream Apply topically 2 (two) times daily. 1 Tube 11       Past Surgical History:   Procedure Laterality Date    ADENOIDECTOMY      APPENDECTOMY      BACK SURGERY      lumbar    CARDIAC SURGERY      CATARACT EXTRACTION Left     OS    CATARACT EXTRACTION, BILATERAL      left eye only    CHOLECYSTECTOMY      COLONOSCOPY  03/2012    repeat in 5 years    COLONOSCOPY N/A 6/19/2017    Procedure: COLONOSCOPY;  Surgeon: Kal Elise MD;  Location[facility];  Service: Endoscopy;  Laterality: N/A;    CORONARY ARTERY BYPASS GRAFT  2005    2 vessel    EAR PINNA RECONSTRUCTION W/ RIB GRAFT  2010/2011    x2    EYE SURGERY  Dec 2012    ptosis repair    FIRST RIB REMOVAL  1996 with lung resection    HYSTERECTOMY      LUNG LOBECTOMY  1996     left lung for cancer    SHOULDER SURGERY  ,    scapular entrapment after lung surgery, needed 5 ribs removed left side    SYMPOTHATIC NERVE LEFT SIDE       with lung surgery    TONSILLECTOMY      TONSILLECTOMY, ADENOIDECTOMY, BILATERAL MYRINGOTOMY AND TUBES      UPPER GASTROINTESTINAL ENDOSCOPY  2016    Dr. Koo    VASCULAR SURGERY      stents place in jugualr vein       Social History     Social History    Marital status:      Spouse name: N/A    Number of children: N/A    Years of education: N/A     Occupational History    retired      Social History Main Topics    Smoking status: Former Smoker     Packs/day: 2.00     Years: 20.00     Types: Cigarettes     Quit date: 1994    Smokeless tobacco: Never Used    Alcohol use No    Drug use: No    Sexual activity: Yes     Partners: Male     Other Topics Concern    Are You Pregnant Or Think You May Be? No    Breast-Feeding No     Social History Narrative    No narrative on file         Vital Signs Range (Last 24H):  Temp:  [36.7 °C (98.1 °F)]   Pulse:  [65]   Resp:  [16]   BP: (125)/(67)   SpO2:  [100 %]       CBC: No results for input(s): WBC, RBC, HGB, HCT, PLT, MCV, MCH, MCHC in the last 72 hours.    CMP: No results for input(s): NA, K, CL, CO2, BUN, CREATININE, GLU, MG, PHOS, CALCIUM, ALBUMIN, PROT, ALKPHOS, ALT, AST, BILITOT in the last 72 hours.    INR  No results for input(s): PT, INR, PROTIME, APTT in the last 72 hours.        Diagnostic Studies:      [facility] Echo:      Anesthesia Evaluation    I have reviewed the Patient Summary Reports.    I have reviewed the Nursing Notes.   I have reviewed the Medications.     Review of Systems  Anesthesia Hx:  No problems with previous Anesthesia  History of prior surgery of interest to airway management or planning: Denies Family Hx of Anesthesia complications.   Denies Personal Hx of Anesthesia complications.   Social:  Former Smoker    Cardiovascular:   Hypertension Past  MI CAD  CABG/stent Dysrhythmias  Angina CHF            Pulmonary:   Pneumonia COPD, moderate Asthma moderate    Hepatic/GI:   GERD    Neurological:   Neuromuscular Disease,    Endocrine:   Hypothyroidism        Physical Exam  General:  Well nourished    Airway/Jaw/Neck:  Airway Findings: Mouth Opening: Normal Tongue: Normal  Mallampati: I  TM Distance: Normal, at least 6 cm      Dental:  Dental Findings:    Chest/Lungs:  Chest/Lungs Findings: Clear to auscultation, Normal Respiratory Rate     Heart/Vascular:  Heart Findings: Rate: Normal  Rhythm: Regular Rhythm  Sounds: Normal             Anesthesia Plan  Type of Anesthesia, risks & benefits discussed:  Anesthesia Type:  general  Patient's Preference:   Intra-op Monitoring Plan: standard ASA monitors  Intra-op Monitoring Plan Comments:   Post Op Pain Control Plan:   Post Op Pain Control Plan Comments:   Induction:   IV  Beta Blocker:  Patient is on a Beta-Blocker and has received one dose within the past 24 hours (No further documentation required).       Informed Consent: Patient understands risks and agrees with Anesthesia plan.  Questions answered. Anesthesia consent signed with patient.  ASA Score: 4     Day of Surgery Review of History & Physical:    H&P update referred to the provider.         Ready For Surgery From Anesthesia Perspective.

## 2017-12-14 NOTE — H&P
History & Physical - Short Stay  Gastroenterology      SUBJECTIVE:     Procedure: EGD    Chief Complaint/Indication for Procedure: Esophageal lesion    History of Present Illness:  Patient is a 67 y.o. female presents with esophageal lesion at 20 cm. The lesion is partially obstructive. 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 Patient is here for possible ablation.    PTA Medications   Medication Sig    albuterol (ACCUNEB) 1.25 mg/3 mL Nebu USE 1 VIAL (3 ML) VIA NEBULIZER EVERY 6 HOURS AS NEEDED    albuterol (PROVENTIL HFA) 90 mcg/actuation inhaler Inhale 2 puffs into the lungs every 6 (six) hours as needed for Wheezing.    atenolol (TENORMIN) 25 MG tablet Take 1 tablet (25 mg total) by mouth once daily.    atorvastatin (LIPITOR) 40 MG tablet Take 1 tablet (40 mg total) by mouth once daily.    bisacodyl (DULCOLAX) 5 mg EC tablet Take 1 tablet (5 mg total) by mouth daily as needed for Constipation.    CALCIUM CITRATE (CITRACAL) 500 mg TbEF Take 500 mg by mouth. 1 Tablet, Effervescent Oral  daily    cetirizine (ZYRTEC) 10 MG tablet     desoximetasone (TOPICORT) 0.25 % cream Apply topically 2 (two) times daily.    diclofenac sodium (VOLTAREN) 1 % Gel Apply 2 g topically once daily.    diltiaZEM (CARDIZEM) 30 MG tablet Take 1 tablet (30 mg total) by mouth 4 (four) times daily before meals and nightly.    diphenhydrAMINE (BENADRYL) 25 mg capsule Take 25 mg by mouth every 6 (six) hours as needed for Itching.    flecainide (TAMBOCOR) 50 MG Tab TAKE 2 TABLETS TWICE A DAY    fluticasone (FLONASE) 50 mcg/actuation nasal spray 1 spray by Each Nare route once daily.    fluticasone-salmeterol 250-50 mcg/dose (ADVAIR DISKUS) 250-50 mcg/dose diskus inhaler USE 1 INHALATION INTO THE LUNGS TWICE A DAY. RINSE MOUTH AFTER USE TO PREVENT THRUSH    ipratropium (ATROVENT) 0.03 % nasal spray 2 sprays by Nasal route 2 (two) times daily. As needed (Patient taking differently: 2 sprays by Nasal route once daily. As  needed)    levothyroxine (SYNTHROID) 88 MCG tablet Take 1 tablet (88 mcg total) by mouth once daily.    montelukast (SINGULAIR) 10 mg tablet Take 1 tablet (10 mg total) by mouth every evening.    multivitamin-minerals-lutein (CENTRUM SILVER) Tab Take 1 tablet by mouth once daily. 1 Tablet Oral Every day    pantoprazole (PROTONIX) 40 MG tablet Take 1 tablet (40 mg total) by mouth once daily. Take 30 minutes prior to breakfast    RETIN-A 0.05 % cream APPLY THIN FILM TO FACE AT NIGHT AFTER MOISTURIZING    apixaban 5 mg Tab Take 1 tablet (5 mg total) by mouth 2 (two) times daily.    celecoxib (CELEBREX) 100 MG capsule Take 1 capsule (100 mg total) by mouth once daily.    SPIRIVA WITH HANDIHALER 18 mcg inhalation capsule INHALE THE CONTENTS OF 1 CAPSULE DAILY    triamcinolone acetonide 0.1% (KENALOG) 0.1 % cream Apply topically 2 (two) times daily.       Review of patient's allergies indicates:   Allergen Reactions    Ciprofloxacin Itching    Doxycycline Other (See Comments)     Patient had a rxn with the sun despite wearing spf 30        Past Medical History:   Diagnosis Date    *Atrial fibrillation     and Hx PSVT    Allergy     chronic     Arthritis     fingers    Bronchitis March 2013    CAD (coronary artery disease) 2005    CABGx2 in 2005 and 2 MI after with 4 stents    Cancer 1996    small cell lung cancer s/p resection of 1/3 lung, 5 ribs and muscle mass then had chemo and radiation    Cataract     OD     CHF (congestive heart failure) past Hx    Chronic rhinitis     Clot past Hx    external jugular, now stented, occluded, had phlebitis; now revascularized    Colon polyp     COPD (chronic obstructive pulmonary disease)     no oxygen or nebulizers    COPD exacerbation 5/8/13    improved 5/14/13 after steroid pack,antibiotics    Former smoker     GERD (gastroesophageal reflux disease)     Heart block     Hyperlipidemia     Hypertension     pt states does not have //    MI (myocardial  infarction)     Posterior vitreous detachment of left eye     Thyroid disease      Past Surgical History:   Procedure Laterality Date    ADENOIDECTOMY      APPENDECTOMY      BACK SURGERY      lumbar    CARDIAC SURGERY      CATARACT EXTRACTION Left     OS    CATARACT EXTRACTION, BILATERAL      left eye only    CHOLECYSTECTOMY      COLONOSCOPY  2012    repeat in 5 years    COLONOSCOPY N/A 2017    Procedure: COLONOSCOPY;  Surgeon: Kal Elise MD;  Location: [facility] ENDO;  Service: Endoscopy;  Laterality: N/A;    CORONARY ARTERY BYPASS GRAFT      2 vessel    EAR PINNA RECONSTRUCTION W/ RIB GRAFT  2010/2011    x2    EYE SURGERY  Dec 2012    ptosis repair    FIRST RIB REMOVAL   with lung resection    HYSTERECTOMY      LUNG LOBECTOMY  1996    left lung for cancer    SHOULDER SURGERY      scapular entrapment after lung surgery, needed 5 ribs removed left side    SYMPOTHATIC NERVE LEFT SIDE       with lung surgery    TONSILLECTOMY      TONSILLECTOMY, ADENOIDECTOMY, BILATERAL MYRINGOTOMY AND TUBES      UPPER GASTROINTESTINAL ENDOSCOPY  2016    Dr. Koo    VASCULAR SURGERY      stents place in jugualr vein     Family History   Problem Relation Age of Onset    Allergic rhinitis Father     Cancer Father     Hypertension Father     Allergies Father     Allergic rhinitis Son     Asthma Son     Stroke Mother     Allergies Mother     Allergic rhinitis Sister     Asthma Sister     Diverticulitis Brother     No Known Problems Daughter     No Known Problems Maternal Aunt     No Known Problems Maternal Uncle     No Known Problems Paternal Aunt     No Known Problems Paternal Uncle     Colon polyps Maternal Grandmother       of colon blockage    No Known Problems Maternal Grandfather     No Known Problems Paternal Grandmother     No Known Problems Paternal Grandfather     No Known Problems Brother     No Known Problems Sister     Angioedema Neg Hx      Eczema Neg Hx     Immunodeficiency Neg Hx     Urticaria Neg Hx     Skin cancer Neg Hx     Amblyopia Neg Hx     Blindness Neg Hx     Cataracts Neg Hx     Diabetes Neg Hx     Glaucoma Neg Hx     Macular degeneration Neg Hx     Retinal detachment Neg Hx     Strabismus Neg Hx     Thyroid disease Neg Hx     Colon cancer Neg Hx     Melanoma Neg Hx      Social History   Substance Use Topics    Smoking status: Former Smoker     Packs/day: 2.00     Years: 20.00     Types: Cigarettes     Quit date: 1/18/1994    Smokeless tobacco: Never Used    Alcohol use No       Review of Systems:  Constitutional: no fever or chills  Respiratory: no cough or shortness of breath  Cardiovascular: no chest pain or palpitations  Gastrointestinal: positive for dysphagia    OBJECTIVE:     Vital Signs (Most Recent)  Temp: 98.1 °F (36.7 °C) (12/14/17 1316)  Pulse: 65 (12/14/17 1316)  Resp: 16 (12/14/17 1316)  BP: 125/67 (12/14/17 1316)  SpO2: 100 % (12/14/17 1316)    Physical Exam:  General: well developed, well nourished  Lungs:  clear to auscultation bilaterally and normal respiratory effort  Heart: regular rate, S1, S2 normal  Abdomen: soft, non-tender non-distented; bowel sounds normal; no masses,  no organomegaly    Laboratory  CBC: No results for input(s): WBC, RBC, HGB, HCT, PLT, MCV, MCH, MCHC in the last 168 hours.  CMP: No results for input(s): GLU, CALCIUM, ALBUMIN, PROT, NA, K, CO2, CL, BUN, CREATININE, ALKPHOS, ALT, AST, BILITOT in the last 168 hours.  Coagulation: No results for input(s): LABPROT, INR, APTT in the last 168 hours.      Diagnostic Results:      ASSESSMENT/PLAN:     Esophageal lesion    Plan: EGD and possible ablation therapy    Anesthesia Plan: MAC    ASA Grade: ASA 2 - Patient with mild systemic disease with no functional limitations     The impression and plan was discussed in detail with the patient. All questions have been answered and the patient voices understanding of our plan at this point.  The risk of the procedure was discussed in detail which includes but not limited to bleeding, infection, perforation in some cases requiring surgery with its spectrum of complications.

## 2017-12-14 NOTE — DISCHARGE INSTRUCTIONS

## 2017-12-14 NOTE — TRANSFER OF CARE
"Anesthesia Transfer of Care Note    Patient: Steph Lira    Procedure(s) Performed: Procedure(s) (LRB):  ESOPHAGOGASTRODUODENOSCOPY (EGD)/poss Cryo (N/A)    Patient location: PACU    Anesthesia Type: general    Transport from OR: Transported from OR on 2-3 L/min O2 by NC with adequate spontaneous ventilation    Post pain: adequate analgesia    Post assessment: no apparent anesthetic complications    Post vital signs: stable    Level of consciousness: sedated    Nausea/Vomiting: no nausea/vomiting    Complications: none    Transfer of care protocol was followed      Last vitals:   Visit Vitals  /67   Pulse 65   Temp 36.7 °C (98.1 °F)   Resp 16   Ht 5' 6" (1.676 m)   Wt 65.8 kg (145 lb)   SpO2 100%   Breastfeeding? No   BMI 23.40 kg/m²     "

## 2017-12-15 ENCOUNTER — TELEPHONE (OUTPATIENT)
Dept: GASTROENTEROLOGY | Facility: CLINIC | Age: 67
End: 2017-12-15

## 2017-12-15 DIAGNOSIS — K22.9 ESOPHAGEAL LESION: Primary | ICD-10-CM

## 2017-12-15 NOTE — TELEPHONE ENCOUNTER
----- Message from Adriane Mcdonough MA sent at 12/15/2017  1:15 PM CST -----  Can you get her in next Thursday or Friday?  ----- Message -----  From: Robbie Gonzales MD  Sent: 12/15/2017  12:58 PM  To: Christian Avalos V Staff    Please let the patient know the brushing of the esophagus showed fungus. She will need to take diflucan for 21 days. Need EGD with cryotherapy in 7-10 days.

## 2017-12-15 NOTE — TELEPHONE ENCOUNTER
Message   Received: Today   Message Contents   MD OMAR Hudson Staff             Please let the patient know the brushing of the esophagus showed fungus. She will need to take diflucan for 21 days. Need EGD with cryotherapy in 7-10 days.      Patient informed. She will have EGD at [place].

## 2017-12-20 ENCOUNTER — PATIENT MESSAGE (OUTPATIENT)
Dept: GASTROENTEROLOGY | Facility: CLINIC | Age: 67
End: 2017-12-20

## 2017-12-20 DIAGNOSIS — J43.1 PANLOBULAR EMPHYSEMA: ICD-10-CM

## 2017-12-20 RX ORDER — ALBUTEROL SULFATE 1.25 MG/3ML
SOLUTION RESPIRATORY (INHALATION)
Qty: 90 ML | Refills: 2 | Status: SHIPPED | OUTPATIENT
Start: 2017-12-20

## 2017-12-21 RX ORDER — TIOTROPIUM BROMIDE 18 UG/1
CAPSULE ORAL; RESPIRATORY (INHALATION)
Qty: 90 CAPSULE | Refills: 3 | Status: SHIPPED | OUTPATIENT
Start: 2017-12-21 | End: 2019-01-01 | Stop reason: SDUPTHER

## 2017-12-22 ENCOUNTER — ANESTHESIA (OUTPATIENT)
Dept: ENDOSCOPY | Facility: HOSPITAL | Age: 67
End: 2017-12-22
Payer: MEDICARE

## 2017-12-22 ENCOUNTER — HOSPITAL ENCOUNTER (OUTPATIENT)
Facility: HOSPITAL | Age: 67
Discharge: HOME | End: 2017-12-22
Attending: INTERNAL MEDICINE | Admitting: INTERNAL MEDICINE
Payer: MEDICARE

## 2017-12-22 ENCOUNTER — SURGERY (OUTPATIENT)
Age: 67
End: 2017-12-22

## 2017-12-22 ENCOUNTER — ANESTHESIA EVENT (OUTPATIENT)
Dept: ENDOSCOPY | Facility: HOSPITAL | Age: 67
End: 2017-12-22
Payer: MEDICARE

## 2017-12-22 VITALS
OXYGEN SATURATION: 100 % | DIASTOLIC BLOOD PRESSURE: 68 MMHG | BODY MASS INDEX: 22.5 KG/M2 | TEMPERATURE: 98 F | WEIGHT: 140 LBS | HEIGHT: 66 IN | SYSTOLIC BLOOD PRESSURE: 115 MMHG | RESPIRATION RATE: 17 BRPM | HEART RATE: 68 BPM

## 2017-12-22 DIAGNOSIS — K22.9 LESION OF ESOPHAGUS: ICD-10-CM

## 2017-12-22 DIAGNOSIS — K22.9 ESOPHAGEAL LESION: Primary | ICD-10-CM

## 2017-12-22 DIAGNOSIS — Z01.818 PREOP EXAMINATION: ICD-10-CM

## 2017-12-22 PROCEDURE — 43257 EGD W/THRML TXMNT GERD: CPT | Performed by: INTERNAL MEDICINE

## 2017-12-22 PROCEDURE — 63600175 PHARM REV CODE 636 W HCPCS: Performed by: NURSE ANESTHETIST, CERTIFIED REGISTERED

## 2017-12-22 PROCEDURE — 25000003 PHARM REV CODE 250: Performed by: INTERNAL MEDICINE

## 2017-12-22 PROCEDURE — 43270 EGD LESION ABLATION: CPT | Performed by: INTERNAL MEDICINE

## 2017-12-22 PROCEDURE — 25000003 PHARM REV CODE 250: Performed by: NURSE ANESTHETIST, CERTIFIED REGISTERED

## 2017-12-22 PROCEDURE — 37000009 HC ANESTHESIA EA ADD 15 MINS: Performed by: INTERNAL MEDICINE

## 2017-12-22 PROCEDURE — 93005 ELECTROCARDIOGRAM TRACING: CPT

## 2017-12-22 PROCEDURE — 37000008 HC ANESTHESIA 1ST 15 MINUTES: Performed by: INTERNAL MEDICINE

## 2017-12-22 PROCEDURE — 27201679 HC KIT, CRYOTHERAPY SPRAY: Performed by: INTERNAL MEDICINE

## 2017-12-22 PROCEDURE — 43270 EGD LESION ABLATION: CPT | Mod: ,,, | Performed by: INTERNAL MEDICINE

## 2017-12-22 RX ORDER — PROPOFOL 10 MG/ML
VIAL (ML) INTRAVENOUS CONTINUOUS PRN
Status: DISCONTINUED | OUTPATIENT
Start: 2017-12-22 | End: 2017-12-22

## 2017-12-22 RX ORDER — SODIUM CHLORIDE 9 MG/ML
INJECTION, SOLUTION INTRAVENOUS CONTINUOUS
Status: DISCONTINUED | OUTPATIENT
Start: 2017-12-22 | End: 2017-12-22 | Stop reason: HOSPADM

## 2017-12-22 RX ORDER — LIDOCAINE HCL/PF 100 MG/5ML
SYRINGE (ML) INTRAVENOUS
Status: DISCONTINUED | OUTPATIENT
Start: 2017-12-22 | End: 2017-12-22

## 2017-12-22 RX ORDER — PROPOFOL 10 MG/ML
VIAL (ML) INTRAVENOUS
Status: DISCONTINUED | OUTPATIENT
Start: 2017-12-22 | End: 2017-12-22

## 2017-12-22 RX ORDER — SUCRALFATE 1 G/10ML
1 SUSPENSION ORAL 2 TIMES DAILY
Qty: 280 ML | Refills: 0 | Status: SHIPPED | OUTPATIENT
Start: 2017-12-22 | End: 2018-01-05

## 2017-12-22 RX ADMIN — PROPOFOL 150 MCG/KG/MIN: 10 INJECTION, EMULSION INTRAVENOUS at 12:12

## 2017-12-22 RX ADMIN — SODIUM CHLORIDE: 0.9 INJECTION, SOLUTION INTRAVENOUS at 10:12

## 2017-12-22 RX ADMIN — PROPOFOL 50 MG: 10 INJECTION, EMULSION INTRAVENOUS at 12:12

## 2017-12-22 RX ADMIN — LIDOCAINE HYDROCHLORIDE 100 MG: 20 INJECTION, SOLUTION INTRAVENOUS at 12:12

## 2017-12-22 RX ADMIN — TOPICAL ANESTHETIC 1 EACH: 200 SPRAY DENTAL; PERIODONTAL at 12:12

## 2017-12-22 NOTE — OR NURSING
Dr. Gonzales spoke with patient and spouse at bedside  Dc order discussed at length  Both verbalized understanding

## 2017-12-22 NOTE — DISCHARGE SUMMARY
Discharge Summary/Instructions for after EGD with or without Biopsy    Steph Raymundo    Friday, December 22, 2017  Robbie Gonzales MD    1.  Do Not eat or drink anything for 1 hour.  Try sips of water first.  If tolerated, resume your regular diet or one recommended by your physician.  2.  Do not drive, operate machinery, make critical decisions, or do activities that require coordination or balance for 24 hours.  3.  You may experience a sore throat for 24 to 48 hours.  You may use throat lozenges or gargle with warm salt water to relieve the discomfort.  4.  Because air was put into your stomach during the procedure, you may experience some belching.  5.  Go directly to the emergency room if you notice any of the following:     Chills and/or fever over 101   Persistent vomiting or vomiting with blood   Severe abdominal pain, other than gas cramps   Severe chest pain   Black, tarry stools    Your doctor recommends these additional instructions:    None    You are being discharged to home.   Eat a soft diet.   Take Protonix (pantoprazole) 40 mg by mouth twice a day.   Take Carafate (sucralfate) suspension 1 gram by mouth twice a day.   Your physician has recommended a repeat upper endoscopy in 10 weeks for retreatment.      If you have any questions or problems, please call your physician.  EMERGENCY PHONE NUMBER: [phone]  LAB RESULTS: [phone]

## 2017-12-22 NOTE — H&P
History & Physical - Short Stay  Gastroenterology      SUBJECTIVE:     Procedure: EGD    Chief Complaint/Indication for Procedure: Dysphagia    History of Present Illness:  Patient is a 67 y.o. female presents with evidence of an esophageal lesion with biopsies demonstrating evidence of papilloma. The patient is here for possible Cryotherapy. Last EGD showed small amount of Candida. Patient is currently on Diflucan.    PTA Medications   Medication Sig    albuterol (ACCUNEB) 1.25 mg/3 mL Nebu USE 1 VIAL (3 ML) VIA NEBULIZER EVERY 6 HOURS AS NEEDED    albuterol (PROVENTIL HFA) 90 mcg/actuation inhaler Inhale 2 puffs into the lungs every 6 (six) hours as needed for Wheezing.    apixaban 5 mg Tab Take 1 tablet (5 mg total) by mouth 2 (two) times daily.    atenolol (TENORMIN) 25 MG tablet Take 1 tablet (25 mg total) by mouth once daily.    atorvastatin (LIPITOR) 40 MG tablet Take 1 tablet (40 mg total) by mouth once daily.    bisacodyl (DULCOLAX) 5 mg EC tablet Take 1 tablet (5 mg total) by mouth daily as needed for Constipation.    CALCIUM CITRATE (CITRACAL) 500 mg TbEF Take 500 mg by mouth. 1 Tablet, Effervescent Oral  daily    celecoxib (CELEBREX) 100 MG capsule Take 1 capsule (100 mg total) by mouth once daily.    cetirizine (ZYRTEC) 10 MG tablet     desoximetasone (TOPICORT) 0.25 % cream Apply topically 2 (two) times daily.    diclofenac sodium (VOLTAREN) 1 % Gel Apply 2 g topically once daily.    diltiaZEM (CARDIZEM) 30 MG tablet Take 1 tablet (30 mg total) by mouth 4 (four) times daily before meals and nightly.    diphenhydrAMINE (BENADRYL) 25 mg capsule Take 25 mg by mouth every 6 (six) hours as needed for Itching.    flecainide (TAMBOCOR) 50 MG Tab TAKE 2 TABLETS TWICE A DAY    fluconazole (DIFLUCAN) 10 mg/mL suspension Take 10 mLs (100 mg total) by mouth once daily.    fluticasone (FLONASE) 50 mcg/actuation nasal spray 1 spray by Each Nare route once daily.    fluticasone-salmeterol 250-50  mcg/dose (ADVAIR DISKUS) 250-50 mcg/dose diskus inhaler USE 1 INHALATION INTO THE LUNGS TWICE A DAY. RINSE MOUTH AFTER USE TO PREVENT THRUSH    ipratropium (ATROVENT) 0.03 % nasal spray 2 sprays by Nasal route 2 (two) times daily. As needed (Patient taking differently: 2 sprays by Nasal route once daily. As needed)    levothyroxine (SYNTHROID) 88 MCG tablet Take 1 tablet (88 mcg total) by mouth once daily.    montelukast (SINGULAIR) 10 mg tablet Take 1 tablet (10 mg total) by mouth every evening.    multivitamin-minerals-lutein (CENTRUM SILVER) Tab Take 1 tablet by mouth once daily. 1 Tablet Oral Every day    pantoprazole (PROTONIX) 40 MG tablet Take 1 tablet (40 mg total) by mouth once daily. Take 30 minutes prior to breakfast    RETIN-A 0.05 % cream APPLY THIN FILM TO FACE AT NIGHT AFTER MOISTURIZING    SPIRIVA WITH HANDIHALER 18 mcg inhalation capsule INHALE THE CONTENTS OF 1 CAPSULE DAILY    triamcinolone acetonide 0.1% (KENALOG) 0.1 % cream Apply topically 2 (two) times daily.       Review of patient's allergies indicates:   Allergen Reactions    Ciprofloxacin Itching    Doxycycline Other (See Comments)     Patient had a rxn with the sun despite wearing spf 30        Past Medical History:   Diagnosis Date    *Atrial fibrillation     and Hx PSVT    Allergy     chronic     Arthritis     fingers    Bronchitis March 2013    CAD (coronary artery disease) 2005    CABGx2 in 2005 and 2 MI after with 4 stents    Cancer 1996    small cell lung cancer s/p resection of 1/3 lung, 5 ribs and muscle mass then had chemo and radiation    Cataract     OD     CHF (congestive heart failure) past Hx    Chronic rhinitis     Clot past Hx    external jugular, now stented, occluded, had phlebitis; now revascularized    Colon polyp     COPD (chronic obstructive pulmonary disease)     no oxygen or nebulizers    COPD exacerbation 5/8/13    improved 5/14/13 after steroid pack,antibiotics    Former smoker     GERD  (gastroesophageal reflux disease)     Heart block     Hyperlipidemia     Hypertension     pt states does not have //    MI (myocardial infarction)     Posterior vitreous detachment of left eye     Thyroid disease      Past Surgical History:   Procedure Laterality Date    ADENOIDECTOMY      APPENDECTOMY      BACK SURGERY      lumbar    CARDIAC SURGERY      CATARACT EXTRACTION Left     OS    CATARACT EXTRACTION, BILATERAL      left eye only    CHOLECYSTECTOMY      COLONOSCOPY  2012    repeat in 5 years    COLONOSCOPY N/A 2017    Procedure: COLONOSCOPY;  Surgeon: Kal Elise MD;  Location[facility] END[facility];  Service: Endoscopy;  Laterality: N/A;    CORONARY ARTERY BYPASS GRAFT      2 vessel    EAR PINNA RECONSTRUCTION W/ RIB GRAFT  2010/2011    x2    EYE SURGERY  Dec 2012    ptosis repair    FIRST RIB REMOVAL   with lung resection    HYSTERECTOMY      LUNG LOBECTOMY      left lung for cancer    SHOULDER SURGERY      scapular entrapment after lung surgery, needed 5 ribs removed left side    SYMPOTHATIC NERVE LEFT SIDE       with lung surgery    TONSILLECTOMY      TONSILLECTOMY, ADENOIDECTOMY, BILATERAL MYRINGOTOMY AND TUBES      UPPER GASTROINTESTINAL ENDOSCOPY  2016    Dr. Koo    VASCULAR SURGERY      stents place in jugualr vein     Family History   Problem Relation Age of Onset    Allergic rhinitis Father     Cancer Father     Hypertension Father     Allergies Father     Allergic rhinitis Son     Asthma Son     Stroke Mother     Allergies Mother     Allergic rhinitis Sister     Asthma Sister     Diverticulitis Brother     No Known Problems Daughter     No Known Problems Maternal Aunt     No Known Problems Maternal Uncle     No Known Problems Paternal Aunt     No Known Problems Paternal Uncle     Colon polyps Maternal Grandmother       of colon blockage    No Known Problems Maternal Grandfather     No Known Problems Paternal  Grandmother     No Known Problems Paternal Grandfather     No Known Problems Brother     No Known Problems Sister     Angioedema Neg Hx     Eczema Neg Hx     Immunodeficiency Neg Hx     Urticaria Neg Hx     Skin cancer Neg Hx     Amblyopia Neg Hx     Blindness Neg Hx     Cataracts Neg Hx     Diabetes Neg Hx     Glaucoma Neg Hx     Macular degeneration Neg Hx     Retinal detachment Neg Hx     Strabismus Neg Hx     Thyroid disease Neg Hx     Colon cancer Neg Hx     Melanoma Neg Hx      Social History   Substance Use Topics    Smoking status: Former Smoker     Packs/day: 2.00     Years: 20.00     Types: Cigarettes     Quit date: 1/18/1994    Smokeless tobacco: Never Used    Alcohol use No       Review of Systems:  No change    OBJECTIVE:     Vital Signs (Most Recent)       Physical Exam:  General: well developed, well nourished  Lungs:  clear to auscultation bilaterally and normal respiratory effort  Heart: regular rate, S1, S2 normal  Abdomen: soft, non-tender non-distented; bowel sounds normal; no masses,  no organomegaly    Laboratory  CBC: No results for input(s): WBC, RBC, HGB, HCT, PLT, MCV, MCH, MCHC in the last 168 hours.  CMP: No results for input(s): GLU, CALCIUM, ALBUMIN, PROT, NA, K, CO2, CL, BUN, CREATININE, ALKPHOS, ALT, AST, BILITOT in the last 168 hours.  Coagulation: No results for input(s): LABPROT, INR, APTT in the last 168 hours.      Diagnostic Results:      ASSESSMENT/PLAN:     Esophageal papilloma  Dysphagia    Plan: EGD and possible cryotherapy    Anesthesia Plan: MAC    ASA Grade: ASA 2 - Patient with mild systemic disease with no functional limitations     The impression and plan was discussed in detail with the patient and family. All questions have been answered and the patient voices understanding of our plan at this point. The risk of the procedure was discussed in detail which includes but not limited to bleeding, infection, perforation in some cases requiring  surgery with its spectrum of complications.

## 2017-12-22 NOTE — ANESTHESIA POSTPROCEDURE EVALUATION
"Anesthesia Post Evaluation    Patient: Steph Lira    Procedure(s) Performed: Procedure(s) (LRB):  ESOPHAGOGASTRODUODENOSCOPY (EGD) W/CRYOTHERAPY (N/A)    Final Anesthesia Type: MAC  Patient location during evaluation: GI PACU  Patient participation: Yes- Able to Participate  Level of consciousness: awake and alert and oriented  Post-procedure vital signs: reviewed and stable  Pain management: adequate  Airway patency: patent  PONV status at discharge: No PONV  Anesthetic complications: no      Cardiovascular status: blood pressure returned to baseline and hemodynamically stable  Respiratory status: unassisted  Hydration status: euvolemic  Follow-up not needed.        Visit Vitals  BP (!) 123/58   Pulse 60   Temp 36.7 °C (98.1 °F)   Resp 16   Ht 5' 6" (1.676 m)   Wt 63.5 kg (140 lb)   SpO2 100%   Breastfeeding? No   BMI 22.60 kg/m²       Pain/Tamera Score: Pain Assessment Performed: Yes (12/22/2017 10:48 AM)  Presence of Pain: denies (12/22/2017 10:48 AM)      "

## 2017-12-22 NOTE — TRANSFER OF CARE
"Anesthesia Transfer of Care Note    Patient: Steph Lira    Procedure(s) Performed: Procedure(s) (LRB):  ESOPHAGOGASTRODUODENOSCOPY (EGD) W/CRYOTHERAPY (N/A)    Patient location: GI    Anesthesia Type: MAC    Transport from OR: Transported from OR on room air with adequate spontaneous ventilation    Post pain: adequate analgesia    Post assessment: no apparent anesthetic complications and tolerated procedure well    Post vital signs: stable    Level of consciousness: awake, alert and oriented    Nausea/Vomiting: no nausea/vomiting    Complications: none          Last vitals:   Visit Vitals  BP (!) 123/58   Pulse 60   Temp 36.7 °C (98.1 °F)   Resp 16   Ht 5' 6" (1.676 m)   Wt 63.5 kg (140 lb)   SpO2 100%   Breastfeeding? No   BMI 22.60 kg/m²     "

## 2017-12-22 NOTE — DISCHARGE INSTRUCTIONS
Instructions Post EGD with Radio Frequency Ablation (RFA):    Steph ROMERO Soraya  12/22/2017  Robbie Gonzales MD    1. Do not drive car or operate machinery, make critical decisions, or do activities that   require coordination or balance for 24 hours.  2. The day following your procedure, you may return to usual activities including work.  3. Full liquid diet for 24 hours, then advance to soft, bland diet for 7 days.  4. Avoid aspirin or non-steroidal anti-inflammatory medications for 7 days (as directed by physician).    You may experience:  *Chest discomfort  *Sore throat  *Pain with swallowing  *Nausea/vomiting    These symptoms should improve with each day. Should any of your symptoms become more severe in nature or longer in duration please contact your physician.     Go directly to the emergency room if you notice any of the following:                              Fever over 100.4                Severe abdominal pain     Chest pain                Difficulty breathing     Difficulty swallowing                Vomiting with blood                Blood in stool              If you have any questions or problems, please call your Physician:    Robbie Gonzales MD    If a complication or emergency situation arises and you are unable to reach your Physician - GO TO THE EMERGENCY ROOM.

## 2017-12-22 NOTE — ANESTHESIA PREPROCEDURE EVALUATION
12/22/2017  Steph Lira is a 67 y.o., female.  Pre-operative evaluation for Procedure(s) (LRB):  ESOPHAGOGASTRODUODENOSCOPY (EGD)/poss Cryo (N/A)    Steph Lira is a 67 y.o. female     Patient Active Problem List   Diagnosis    GERD (gastroesophageal reflux disease)    Chronic rhinitis    Hypothyroid    Aneurysm of heart (wall)    Benign neoplasm of skin of upper limb, including shoulder    Intermediate coronary syndrome    Phlebitis and thrombophlebitis of superficial veins of upper extremities    Simple chronic conjunctivitis    Supraventricular premature beats    Vascular disorder of skin    Chronic external jugular vein thrombosis    Long term current use of anticoagulant therapy    Magdiel's syndrome - Left Eye    Ptosis    Post-operative state    Chest pain at rest    Anxiety    CAD (coronary artery disease)    S/P CABG x 2, 2005    HTN (hypertension)    Hypercholesteremia    PAF (paroxysmal atrial fibrillation), onset 2002    Intercostal neuralgia    Pain    Small cell lung cancer    Unstable angina    Radius and ulna distal fracture    Personal history of lung cancer in 1996 S/P left lobectomy    Blurred vision, bilateral    Panlobular emphysema    Carotid artery disease    Pneumonia    Atrial fibrillation with RVR    COPD (chronic obstructive pulmonary disease)    Uncontrolled atrial flutter with rvr    Anticoagulated on Coumadin    Allergic rhinitis    Hypoalbuminemia    History of smoking 10-25 pack years, 15 pack-years, quit 1994    Persistent cough for 3 weeks or longer, onset 12/2015    S/P CABG (coronary artery bypass graft)    Supraventricular bigeminy    Chest pain, unspecified    Allergic drug rash    Dysphagia    History of colon polyps    Coronary artery disease involving autologous artery coronary bypass graft    Wrist arthritis     Esophageal mass    Esophageal candidiasis    Gastritis    Esophageal lesion    Lesion of esophagus       Review of patient's allergies indicates:   Allergen Reactions    Ciprofloxacin Itching    Doxycycline Other (See Comments)     Patient had a rxn with the sun despite wearing spf 30     Pre-op Assessment    I have reviewed the Patient Summary Reports.     I have reviewed the Nursing Notes.   I have reviewed the Medications.     Review of Systems  Anesthesia Hx:  No problems with previous Anesthesia  History of prior surgery of interest to airway management or planning: Denies Family Hx of Anesthesia complications.   Denies Personal Hx of Anesthesia complications.   Social:  Former Smoker    Cardiovascular:   Exercise tolerance: good Hypertension Past MI CAD  CABG/stent Dysrhythmias atrial fibrillation Angina CHF ECG has been reviewed.    Pulmonary:   Pneumonia COPD, moderate Asthma moderate Pulm HTN PAS 39   Hepatic/GI:   GERD    Endocrine:   Hypothyroidism        Physical Exam  General:  Well nourished    Airway/Jaw/Neck:  Airway Findings: Mouth Opening: Normal Tongue: Normal  Mallampati: I  TM Distance: Normal, at least 6 cm      Dental:  Dental Findings:    Chest/Lungs:  Chest/Lungs Findings: Clear to auscultation, Normal Respiratory Rate     Heart/Vascular:  Heart Findings: Rate: Normal  Rhythm: Regular Rhythm  Sounds: Normal           Lab Results   Component Value Date    WBC 6.80 07/07/2017    HGB 12.8 07/07/2017    HCT 40.3 07/07/2017     07/07/2017    CHOL 146 07/07/2017    TRIG 89 07/07/2017    HDL 48 07/07/2017    ALT 15 11/08/2017    AST 19 11/08/2017     11/08/2017    K 4.5 11/08/2017     11/08/2017    CREATININE 0.8 11/08/2017    BUN 15 11/08/2017    CO2 27 11/08/2017    TSH 1.164 10/26/2016    INR 2.7 07/11/2017     CONCLUSIONS     1 - Low normal to mildly depressed left ventricular systolic function (EF 50-55%).     2 - Mild mitral regurgitation.     3 - The estimated PA  systolic pressure is 39 mmHg.     4 - Intermediate central venous pressure.     5 - Suggestion for reduce LVEF from Echo in 11/2014.     6 - Difficult windows.             This document has been electronically    SIGNED BY: Federico Quigley MD On: 05/19/2016 13:10    Anesthesia Plan  Type of Anesthesia, risks & benefits discussed:  Anesthesia Type:  general  Patient's Preference:   Intra-op Monitoring Plan: standard ASA monitors  Intra-op Monitoring Plan Comments:   Post Op Pain Control Plan:   Post Op Pain Control Plan Comments:   Induction:   IV  Beta Blocker:  Patient is on a Beta-Blocker and has received one dose within the past 24 hours (No further documentation required).       Informed Consent: Patient understands risks and agrees with Anesthesia plan.  Questions answered. Anesthesia consent signed with patient.  ASA Score: 3     Day of Surgery Review of History & Physical:    H&P update referred to the provider.         Ready For Surgery From Anesthesia Perspective.

## 2017-12-29 ENCOUNTER — TELEPHONE (OUTPATIENT)
Dept: ENDOSCOPY | Facility: HOSPITAL | Age: 67
End: 2017-12-29

## 2018-01-01 ENCOUNTER — PATIENT MESSAGE (OUTPATIENT)
Dept: CARDIOTHORACIC SURGERY | Facility: CLINIC | Age: 68
End: 2018-01-01

## 2018-01-02 ENCOUNTER — OFFICE VISIT (OUTPATIENT)
Dept: CARDIOLOGY | Facility: CLINIC | Age: 68
End: 2018-01-02
Payer: MEDICARE

## 2018-01-02 ENCOUNTER — PATIENT MESSAGE (OUTPATIENT)
Dept: CARDIOLOGY | Facility: CLINIC | Age: 68
End: 2018-01-02

## 2018-01-02 ENCOUNTER — CLINICAL SUPPORT (OUTPATIENT)
Dept: CARDIOLOGY | Facility: CLINIC | Age: 68
End: 2018-01-02
Attending: INTERNAL MEDICINE
Payer: MEDICARE

## 2018-01-02 ENCOUNTER — LAB VISIT (OUTPATIENT)
Dept: LAB | Facility: HOSPITAL | Age: 68
End: 2018-01-02
Attending: INTERNAL MEDICINE
Payer: MEDICARE

## 2018-01-02 ENCOUNTER — TELEPHONE (OUTPATIENT)
Dept: CARDIOTHORACIC SURGERY | Facility: CLINIC | Age: 68
End: 2018-01-02

## 2018-01-02 VITALS
HEART RATE: 70 BPM | HEIGHT: 66 IN | WEIGHT: 142.44 LBS | SYSTOLIC BLOOD PRESSURE: 120 MMHG | OXYGEN SATURATION: 98 % | DIASTOLIC BLOOD PRESSURE: 69 MMHG | BODY MASS INDEX: 22.89 KG/M2

## 2018-01-02 DIAGNOSIS — R53.83 FATIGUE, UNSPECIFIED TYPE: Primary | ICD-10-CM

## 2018-01-02 DIAGNOSIS — I48.0 PAF (PAROXYSMAL ATRIAL FIBRILLATION): ICD-10-CM

## 2018-01-02 DIAGNOSIS — I48.0 PAF (PAROXYSMAL ATRIAL FIBRILLATION): Primary | ICD-10-CM

## 2018-01-02 DIAGNOSIS — R53.83 FATIGUE, UNSPECIFIED TYPE: ICD-10-CM

## 2018-01-02 LAB
ALBUMIN SERPL BCP-MCNC: 3.9 G/DL
ALP SERPL-CCNC: 74 U/L
ALT SERPL W/O P-5'-P-CCNC: 19 U/L
ANION GAP SERPL CALC-SCNC: 6 MMOL/L
AST SERPL-CCNC: 20 U/L
BASOPHILS # BLD AUTO: 0.1 K/UL
BASOPHILS NFR BLD: 0.8 %
BILIRUB SERPL-MCNC: 0.6 MG/DL
BUN SERPL-MCNC: 11 MG/DL
CALCIUM SERPL-MCNC: 9.7 MG/DL
CHLORIDE SERPL-SCNC: 103 MMOL/L
CO2 SERPL-SCNC: 29 MMOL/L
CREAT SERPL-MCNC: 0.8 MG/DL
DIFFERENTIAL METHOD: ABNORMAL
EOSINOPHIL # BLD AUTO: 0.3 K/UL
EOSINOPHIL NFR BLD: 3.4 %
ERYTHROCYTE [DISTWIDTH] IN BLOOD BY AUTOMATED COUNT: 12.6 %
EST. GFR  (AFRICAN AMERICAN): >60 ML/MIN/1.73 M^2
EST. GFR  (NON AFRICAN AMERICAN): >60 ML/MIN/1.73 M^2
GLUCOSE SERPL-MCNC: 87 MG/DL
HCT VFR BLD AUTO: 40 %
HGB BLD-MCNC: 13.6 G/DL
LYMPHOCYTES # BLD AUTO: 2.1 K/UL
LYMPHOCYTES NFR BLD: 27.8 %
MCH RBC QN AUTO: 31.3 PG
MCHC RBC AUTO-ENTMCNC: 34 G/DL
MCV RBC AUTO: 92 FL
MONOCYTES # BLD AUTO: 0.4 K/UL
MONOCYTES NFR BLD: 4.7 %
NEUTROPHILS # BLD AUTO: 4.8 K/UL
NEUTROPHILS NFR BLD: 63.3 %
PLATELET # BLD AUTO: 298 K/UL
PMV BLD AUTO: 7.6 FL
POTASSIUM SERPL-SCNC: 4.6 MMOL/L
PROT SERPL-MCNC: 7.6 G/DL
RBC # BLD AUTO: 4.34 M/UL
SODIUM SERPL-SCNC: 138 MMOL/L
T4 FREE SERPL-MCNC: 1.39 NG/DL
TSH SERPL DL<=0.005 MIU/L-ACNC: 0.36 UIU/ML
WBC # BLD AUTO: 7.5 K/UL

## 2018-01-02 PROCEDURE — 99213 OFFICE O/P EST LOW 20 MIN: CPT | Mod: PBBFAC,PO,25 | Performed by: INTERNAL MEDICINE

## 2018-01-02 PROCEDURE — 84443 ASSAY THYROID STIM HORMONE: CPT

## 2018-01-02 PROCEDURE — 93226 XTRNL ECG REC<48 HR SCAN A/R: CPT | Mod: PBBFAC | Performed by: INTERNAL MEDICINE

## 2018-01-02 PROCEDURE — 36415 COLL VENOUS BLD VENIPUNCTURE: CPT

## 2018-01-02 PROCEDURE — 93225 XTRNL ECG REC<48 HRS REC: CPT | Mod: PBBFAC,PO | Performed by: INTERNAL MEDICINE

## 2018-01-02 PROCEDURE — 99213 OFFICE O/P EST LOW 20 MIN: CPT | Mod: S$PBB,,, | Performed by: INTERNAL MEDICINE

## 2018-01-02 PROCEDURE — 93010 ELECTROCARDIOGRAM REPORT: CPT | Mod: S$PBB,,, | Performed by: INTERNAL MEDICINE

## 2018-01-02 PROCEDURE — 85025 COMPLETE CBC W/AUTO DIFF WBC: CPT

## 2018-01-02 PROCEDURE — 93005 ELECTROCARDIOGRAM TRACING: CPT | Mod: PBBFAC,PO | Performed by: INTERNAL MEDICINE

## 2018-01-02 PROCEDURE — 99999 PR PBB SHADOW E&M-EST. PATIENT-LVL III: CPT | Mod: PBBFAC,,, | Performed by: INTERNAL MEDICINE

## 2018-01-02 PROCEDURE — 80053 COMPREHEN METABOLIC PANEL: CPT

## 2018-01-02 PROCEDURE — 84439 ASSAY OF FREE THYROXINE: CPT

## 2018-01-02 PROCEDURE — 93227 XTRNL ECG REC<48 HR R&I: CPT | Mod: ,,, | Performed by: INTERNAL MEDICINE

## 2018-01-02 NOTE — TELEPHONE ENCOUNTER
Called and offered Ms. Choi an appt for today 01/02/2018 @ 2pm due to her going in and out of Afib. She accepted no further issues noted

## 2018-01-02 NOTE — PROGRESS NOTES
"[facility]    CC: Fatigue, tiredness    Patient ID: Steph iLra is a 67 y.o. female with a past medical history of paroxysmal atrial fibrillation  HPI  She underwent a procedure for the neoplastic lesion in her esophagus 22nd of December.  4 days later she started feeling significant amount of fatigue.   She had some "buzzing" sensation in her chest to  thought that could be related to episodes of atrial fibrillation.  Denies any exertional chest pains.  She denies any blood in her stools.    Past Medical History:   Diagnosis Date    *Atrial fibrillation     and Hx PSVT    Allergy     chronic     Arthritis     fingers    Bronchitis March 2013    CAD (coronary artery disease) 2005    CABGx2 in 2005 and 2 MI after with 4 stents    Cancer 1996    small cell lung cancer s/p resection of 1/3 lung, 5 ribs and muscle mass then had chemo and radiation    Cataract     OD     CHF (congestive heart failure) past Hx    Chronic rhinitis     Clot past Hx    external jugular, now stented, occluded, had phlebitis; now revascularized    Colon polyp     COPD (chronic obstructive pulmonary disease)     no oxygen or nebulizers    COPD exacerbation 5/8/13    improved 5/14/13 after steroid pack,antibiotics    Former smoker     GERD (gastroesophageal reflux disease)     Heart block     Hyperlipidemia     Hypertension     pt states does not have //    MI (myocardial infarction) 2005    Posterior vitreous detachment of left eye     Thyroid disease      Past Surgical History:   Procedure Laterality Date    ADENOIDECTOMY      APPENDECTOMY      BACK SURGERY      lumbar    CARDIAC SURGERY      CATARACT EXTRACTION Left     OS    CATARACT EXTRACTION, BILATERAL      left eye only    CHOLECYSTECTOMY      COLONOSCOPY  03/2012    repeat in 5 years    COLONOSCOPY N/A 6/19/2017    Procedure: COLONOSCOPY;  Surgeon: Kal Elise MD;  Location: [facility];  Service: Endoscopy;  Laterality: N/A;    " CORONARY ARTERY BYPASS GRAFT      2 vessel    EAR PINNA RECONSTRUCTION W/ RIB GRAFT  2010/2011    x2    EYE SURGERY  Dec 2012    ptosis repair    FIRST RIB REMOVAL   with lung resection    HYSTERECTOMY      LUNG LOBECTOMY  1996    left lung for cancer    SHOULDER SURGERY      scapular entrapment after lung surgery, needed 5 ribs removed left side    SYMPOTHATIC NERVE LEFT SIDE       with lung surgery    TONSILLECTOMY      TONSILLECTOMY, ADENOIDECTOMY, BILATERAL MYRINGOTOMY AND TUBES      UPPER GASTROINTESTINAL ENDOSCOPY  2016    Dr. Koo    VASCULAR SURGERY      stents place in jugualr vein     Social History     Social History    Marital status:      Spouse name: N/A    Number of children: N/A    Years of education: N/A     Occupational History    retired      Social History Main Topics    Smoking status: Former Smoker     Packs/day: 2.00     Years: 20.00     Types: Cigarettes     Quit date: 1994    Smokeless tobacco: Never Used    Alcohol use No    Drug use: No    Sexual activity: Yes     Partners: Male     Other Topics Concern    Are You Pregnant Or Think You May Be? No    Breast-Feeding No     Social History Narrative    No narrative on file     Family History   Problem Relation Age of Onset    Allergic rhinitis Father     Cancer Father     Hypertension Father     Allergies Father     Allergic rhinitis Son     Asthma Son     Stroke Mother     Allergies Mother     Allergic rhinitis Sister     Asthma Sister     Diverticulitis Brother     No Known Problems Daughter     No Known Problems Maternal Aunt     No Known Problems Maternal Uncle     No Known Problems Paternal Aunt     No Known Problems Paternal Uncle     Colon polyps Maternal Grandmother       of colon blockage    No Known Problems Maternal Grandfather     No Known Problems Paternal Grandmother     No Known Problems Paternal Grandfather     No Known Problems Brother      No Known Problems Sister     Angioedema Neg Hx     Eczema Neg Hx     Immunodeficiency Neg Hx     Urticaria Neg Hx     Skin cancer Neg Hx     Amblyopia Neg Hx     Blindness Neg Hx     Cataracts Neg Hx     Diabetes Neg Hx     Glaucoma Neg Hx     Macular degeneration Neg Hx     Retinal detachment Neg Hx     Strabismus Neg Hx     Thyroid disease Neg Hx     Colon cancer Neg Hx     Melanoma Neg Hx        Review of patient's allergies indicates:   Allergen Reactions    Ciprofloxacin Itching    Doxycycline Other (See Comments)     Patient had a rxn with the sun despite wearing spf 30       Medication List with Changes/Refills   Current Medications    ALBUTEROL (ACCUNEB) 1.25 MG/3 ML NEBU    USE 1 VIAL (3 ML) VIA NEBULIZER EVERY 6 HOURS AS NEEDED    ALBUTEROL (PROVENTIL HFA) 90 MCG/ACTUATION INHALER    Inhale 2 puffs into the lungs every 6 (six) hours as needed for Wheezing.    APIXABAN 5 MG TAB    Take 1 tablet (5 mg total) by mouth 2 (two) times daily.    ATENOLOL (TENORMIN) 25 MG TABLET    Take 1 tablet (25 mg total) by mouth once daily.    ATORVASTATIN (LIPITOR) 40 MG TABLET    Take 1 tablet (40 mg total) by mouth once daily.    BISACODYL (DULCOLAX) 5 MG EC TABLET    Take 1 tablet (5 mg total) by mouth daily as needed for Constipation.    CALCIUM CITRATE (CITRACAL) 500 MG TBEF    Take 500 mg by mouth. 1 Tablet, Effervescent Oral  daily    CETIRIZINE (ZYRTEC) 10 MG TABLET        DESOXIMETASONE (TOPICORT) 0.25 % CREAM    Apply topically 2 (two) times daily.    DICLOFENAC SODIUM (VOLTAREN) 1 % GEL    Apply 2 g topically once daily.    DILTIAZEM (CARDIZEM) 30 MG TABLET    Take 1 tablet (30 mg total) by mouth 4 (four) times daily before meals and nightly.    DIPHENHYDRAMINE (BENADRYL) 25 MG CAPSULE    Take 25 mg by mouth every 6 (six) hours as needed for Itching.    FLECAINIDE (TAMBOCOR) 50 MG TAB    TAKE 2 TABLETS TWICE A DAY    FLUCONAZOLE (DIFLUCAN) 10 MG/ML SUSPENSION    Take 10 mLs (100 mg total) by  "mouth once daily.    FLUTICASONE (FLONASE) 50 MCG/ACTUATION NASAL SPRAY    1 spray by Each Nare route once daily.    FLUTICASONE-SALMETEROL 250-50 MCG/DOSE (ADVAIR DISKUS) 250-50 MCG/DOSE DISKUS INHALER    USE 1 INHALATION INTO THE LUNGS TWICE A DAY. RINSE MOUTH AFTER USE TO PREVENT THRUSH    IPRATROPIUM (ATROVENT) 0.03 % NASAL SPRAY    2 sprays by Nasal route 2 (two) times daily. As needed    LEVOTHYROXINE (SYNTHROID) 88 MCG TABLET    Take 1 tablet (88 mcg total) by mouth once daily.    MONTELUKAST (SINGULAIR) 10 MG TABLET    Take 1 tablet (10 mg total) by mouth every evening.    MULTIVITAMIN-MINERALS-LUTEIN (CENTRUM SILVER) TAB    Take 1 tablet by mouth once daily. 1 Tablet Oral Every day    PANTOPRAZOLE (PROTONIX) 40 MG TABLET    Take 1 tablet (40 mg total) by mouth once daily. Take 30 minutes prior to breakfast    RETIN-A 0.05 % CREAM    APPLY THIN FILM TO FACE AT NIGHT AFTER MOISTURIZING    SPIRIVA WITH HANDIHALER 18 MCG INHALATION CAPSULE    INHALE THE CONTENTS OF 1 CAPSULE DAILY    SUCRALFATE (CARAFATE) 100 MG/ML SUSPENSION    Take 10 mLs (1 g total) by mouth 2 (two) times daily.    TRIAMCINOLONE ACETONIDE 0.1% (KENALOG) 0.1 % CREAM    Apply topically 2 (two) times daily.       Review of Systems  Constitution: Denies chills, fever, and sweats.  HENT: Denies headaches or blurry vision.  Cardiovascular: Denies chest pain or irregular heart beat.  Respiratory: Denies cough or shortness of breath.  Gastrointestinal: Denies abdominal pain, nausea, or vomiting.  Musculoskeletal: Denies muscle cramps.  Neurological: Denies dizziness or focal weakness.  Psychiatric/Behavioral: Normal mental status.  Hematologic/Lymphatic: Denies bleeding problem or easy bruising/bleeding.  Skin: Denies rash or suspicious lesions    Physical Examination  /69 (BP Location: Right arm, Patient Position: Sitting)   Pulse 70   Ht 5' 6" (1.676 m)   Wt 64.6 kg (142 lb 6.7 oz)   SpO2 98%   BMI 22.99 kg/m²     Constitutional: No " acute distress, conversant  HEENT: Sclera anicteric, Pupils equal, round and reactive to light, extraocular motions intact, Oropharynx clear  Neck: No JVD, no carotid bruits  Cardiovascular: regular rate and rhythm, esm murmur, rubs or gallops, normal S1/S2  Pulmonary: Clear to auscultation bilaterally  Abdominal: Abdomen soft, nontender, nondistended, positive bowel sounds  Extremities: No lower extremity edema,   Pulses:  Carotid pulses are 2+ on the right side, and 2+ on the left side.  Radial pulses are 2+ on the right side, and 2+ on the left side.     Skin: No ecchymosis, erythema, or ulcers  Psych: Alert and oriented x 3, appropriate affect  Neuro: CNII-XII intact, no focal deficits    Labs:  Most Recent Data  CBC:   Lab Results   Component Value Date    WBC 6.80 07/07/2017    HGB 12.8 07/07/2017    HCT 40.3 07/07/2017     07/07/2017    MCV 94 07/07/2017    RDW 12.9 07/07/2017     BMP:   Lab Results   Component Value Date     11/08/2017    K 4.5 11/08/2017     11/08/2017    CO2 27 11/08/2017    BUN 15 11/08/2017    CREATININE 0.8 11/08/2017    GLU 91 11/08/2017    CALCIUM 9.6 11/08/2017    MG 2.0 09/30/2016     LFTS;   Lab Results   Component Value Date    PROT 7.2 11/08/2017    ALBUMIN 3.5 11/08/2017    BILITOT 0.7 11/08/2017    AST 19 11/08/2017    ALKPHOS 70 11/08/2017    ALT 15 11/08/2017     COAGS:   Lab Results   Component Value Date    INR 2.7 07/11/2017     FLP:   Lab Results   Component Value Date    CHOL 146 07/07/2017    HDL 48 07/07/2017    LDLCALC 80.2 07/07/2017    TRIG 89 07/07/2017    CHOLHDL 32.9 07/07/2017     CARDIAC:   Lab Results   Component Value Date    TROPONINI 0.008 04/26/2017     (H) 04/26/2017       Imaging:    EKG: Normal sinus rhythm.  Nonspecific ST-T wave changes.      I have personally reviewed these images and echo data    Assessment/Plan:  Diagnoses and all orders for this visit:    Fatigue, unspecified type  -     CBC auto differential; Future  -      Comprehensive metabolic panel; Future  -     TSH; Future  -     Holter monitor - 48 hour; Future  -     2D Echo w/ Color Flow Doppler; Future    PAF (paroxysmal atrial fibrillation)  -     EKG 12-lead  -     CBC auto differential; Future  -     Comprehensive metabolic panel; Future  -     TSH; Future  -     Holter monitor - 48 hour; Future  -     2D Echo w/ Color Flow Doppler; Future       I am not clear about the etiology of this.  I don't want to make changes still I have objective evidence of paroxysmal atrial fibrillation.  We will get the above tests and see her back in a few weeks time  Return in about 2 weeks (around 1/16/2018).          Total duration of face to face visit time 30 minutes.  Total time spent counseling greater than fifty percent of total visit time.  Counseling included discussion regarding imaging findings, diagnosis, possibilities, treatment options, risks and benefits.  The patient had many questions regarding the options and long-term effect which were all answered to my best ability.      Henry Wei MD,MRCP,RPVI,FACC,FSCAI.  Interventional Cardiology   Phone [phone]

## 2018-01-02 NOTE — TELEPHONE ENCOUNTER
Returned call in reference to [facility] message concerning feeling tired and going in and out of a-fib.  Given name of cardiologist in [place] that she recently saw to f/u on a-fib.  Verbalized understanding.

## 2018-01-04 ENCOUNTER — CLINICAL SUPPORT (OUTPATIENT)
Dept: CARDIOLOGY | Facility: CLINIC | Age: 68
End: 2018-01-04
Attending: INTERNAL MEDICINE
Payer: MEDICARE

## 2018-01-04 ENCOUNTER — PATIENT MESSAGE (OUTPATIENT)
Dept: FAMILY MEDICINE | Facility: CLINIC | Age: 68
End: 2018-01-04

## 2018-01-04 DIAGNOSIS — R53.83 FATIGUE, UNSPECIFIED TYPE: ICD-10-CM

## 2018-01-04 DIAGNOSIS — I48.0 PAF (PAROXYSMAL ATRIAL FIBRILLATION): ICD-10-CM

## 2018-01-04 LAB
AORTIC VALVE REGURGITATION: ABNORMAL
DIASTOLIC DYSFUNCTION: YES
ESTIMATED PA SYSTOLIC PRESSURE: 16.16
MITRAL VALVE REGURGITATION: ABNORMAL
RETIRED EF AND QEF - SEE NOTES: 40 (ref 55–65)

## 2018-01-04 PROCEDURE — 93306 TTE W/DOPPLER COMPLETE: CPT | Mod: PBBFAC,PO | Performed by: INTERNAL MEDICINE

## 2018-01-16 ENCOUNTER — OFFICE VISIT (OUTPATIENT)
Dept: CARDIOLOGY | Facility: CLINIC | Age: 68
End: 2018-01-16
Payer: MEDICARE

## 2018-01-16 VITALS
WEIGHT: 143.75 LBS | SYSTOLIC BLOOD PRESSURE: 97 MMHG | HEART RATE: 66 BPM | HEIGHT: 66 IN | OXYGEN SATURATION: 95 % | BODY MASS INDEX: 23.1 KG/M2 | DIASTOLIC BLOOD PRESSURE: 67 MMHG

## 2018-01-16 DIAGNOSIS — Z95.1 S/P CABG (CORONARY ARTERY BYPASS GRAFT): Primary | ICD-10-CM

## 2018-01-16 DIAGNOSIS — I10 ESSENTIAL HYPERTENSION: ICD-10-CM

## 2018-01-16 DIAGNOSIS — I48.0 PAF (PAROXYSMAL ATRIAL FIBRILLATION): ICD-10-CM

## 2018-01-16 DIAGNOSIS — R53.83 FATIGUE, UNSPECIFIED TYPE: ICD-10-CM

## 2018-01-16 PROCEDURE — 99999 PR PBB SHADOW E&M-EST. PATIENT-LVL III: CPT | Mod: PBBFAC,,, | Performed by: INTERNAL MEDICINE

## 2018-01-16 PROCEDURE — 99213 OFFICE O/P EST LOW 20 MIN: CPT | Mod: PBBFAC,PO | Performed by: INTERNAL MEDICINE

## 2018-01-16 PROCEDURE — 99213 OFFICE O/P EST LOW 20 MIN: CPT | Mod: S$PBB,,, | Performed by: INTERNAL MEDICINE

## 2018-01-16 NOTE — PROGRESS NOTES
[facility]    CC: Follow up for test results  Chief Complaint   Patient presents with    Follow-up       Patient ID: Steph Lira is a 67 y.o. female with a past medical history of coronary artery bypass grafting, paroxysmal atrial fibrillation  HPI  Her symptoms of fatigue have mostly resolved .  Based on the Holter I do not see excessive burdens of atrial fibrillation.  I spoke to her GI and her primary care physician.  Her GI specialist recommended not to perform any cardiac intervention which may require antiplatelet therapy for at least next few weeks.  She is due for repeat endoscopy end of  February      Past Medical History:   Diagnosis Date    *Atrial fibrillation     and Hx PSVT    Allergy     chronic     Arthritis     fingers    Bronchitis March 2013    CAD (coronary artery disease) 2005    CABGx2 in 2005 and 2 MI after with 4 stents    Cancer 1996    small cell lung cancer s/p resection of 1/3 lung, 5 ribs and muscle mass then had chemo and radiation    Cataract     OD     CHF (congestive heart failure) past Hx    Chronic rhinitis     Clot past Hx    external jugular, now stented, occluded, had phlebitis; now revascularized    Colon polyp     COPD (chronic obstructive pulmonary disease)     no oxygen or nebulizers    COPD exacerbation 5/8/13    improved 5/14/13 after steroid pack,antibiotics    Former smoker     GERD (gastroesophageal reflux disease)     Heart block     Hyperlipidemia     Hypertension     pt states does not have //    MI (myocardial infarction) 2005    Posterior vitreous detachment of left eye     Thyroid disease      Past Surgical History:   Procedure Laterality Date    ADENOIDECTOMY      APPENDECTOMY      BACK SURGERY      lumbar    CARDIAC SURGERY      CATARACT EXTRACTION Left     OS    CATARACT EXTRACTION, BILATERAL      left eye only    CHOLECYSTECTOMY      COLONOSCOPY  03/2012    repeat in 5 years    COLONOSCOPY N/A 6/19/2017     Procedure: COLONOSCOPY;  Surgeon: Kal Elise MD;  Location: [facility] END[facility];  Service: Endoscopy;  Laterality: N/A;    CORONARY ARTERY BYPASS GRAFT      2 vessel    EAR PINNA RECONSTRUCTION W/ RIB GRAFT  2010/2011    x2    EYE SURGERY  Dec 2012    ptosis repair    FIRST RIB REMOVAL   with lung resection    HYSTERECTOMY      LUNG LOBECTOMY  1996    left lung for cancer    SHOULDER SURGERY      scapular entrapment after lung surgery, needed 5 ribs removed left side    SYMPOTHATIC NERVE LEFT SIDE       with lung surgery    TONSILLECTOMY      TONSILLECTOMY, ADENOIDECTOMY, BILATERAL MYRINGOTOMY AND TUBES      UPPER GASTROINTESTINAL ENDOSCOPY  2016    Dr. Koo    VASCULAR SURGERY      stents place in jugualr vein     Social History     Social History    Marital status:      Spouse name: N/A    Number of children: N/A    Years of education: N/A     Occupational History    retired      Social History Main Topics    Smoking status: Former Smoker     Packs/day: 2.00     Years: 20.00     Types: Cigarettes     Quit date: 1994    Smokeless tobacco: Never Used    Alcohol use No    Drug use: No    Sexual activity: Yes     Partners: Male     Other Topics Concern    Are You Pregnant Or Think You May Be? No    Breast-Feeding No     Social History Narrative    No narrative on file     Family History   Problem Relation Age of Onset    Allergic rhinitis Father     Cancer Father     Hypertension Father     Allergies Father     Allergic rhinitis Son     Asthma Son     Stroke Mother     Allergies Mother     Allergic rhinitis Sister     Asthma Sister     Diverticulitis Brother     No Known Problems Daughter     No Known Problems Maternal Aunt     No Known Problems Maternal Uncle     No Known Problems Paternal Aunt     No Known Problems Paternal Uncle     Colon polyps Maternal Grandmother       of colon blockage    No Known Problems Maternal Grandfather      No Known Problems Paternal Grandmother     No Known Problems Paternal Grandfather     No Known Problems Brother     No Known Problems Sister     Angioedema Neg Hx     Eczema Neg Hx     Immunodeficiency Neg Hx     Urticaria Neg Hx     Skin cancer Neg Hx     Amblyopia Neg Hx     Blindness Neg Hx     Cataracts Neg Hx     Diabetes Neg Hx     Glaucoma Neg Hx     Macular degeneration Neg Hx     Retinal detachment Neg Hx     Strabismus Neg Hx     Thyroid disease Neg Hx     Colon cancer Neg Hx     Melanoma Neg Hx        Review of patient's allergies indicates:   Allergen Reactions    Ciprofloxacin Itching    Doxycycline Other (See Comments)     Patient had a rxn with the sun despite wearing spf 30       Medication List with Changes/Refills   Current Medications    ALBUTEROL (ACCUNEB) 1.25 MG/3 ML NEBU    USE 1 VIAL (3 ML) VIA NEBULIZER EVERY 6 HOURS AS NEEDED    ALBUTEROL (PROVENTIL HFA) 90 MCG/ACTUATION INHALER    Inhale 2 puffs into the lungs every 6 (six) hours as needed for Wheezing.    APIXABAN 5 MG TAB    Take 1 tablet (5 mg total) by mouth 2 (two) times daily.    ATENOLOL (TENORMIN) 25 MG TABLET    Take 1 tablet (25 mg total) by mouth once daily.    ATORVASTATIN (LIPITOR) 40 MG TABLET    Take 1 tablet (40 mg total) by mouth once daily.    BISACODYL (DULCOLAX) 5 MG EC TABLET    Take 1 tablet (5 mg total) by mouth daily as needed for Constipation.    CALCIUM CITRATE (CITRACAL) 500 MG TBEF    Take 500 mg by mouth. 1 Tablet, Effervescent Oral  daily    CETIRIZINE (ZYRTEC) 10 MG TABLET        DESOXIMETASONE (TOPICORT) 0.25 % CREAM    Apply topically 2 (two) times daily.    DICLOFENAC SODIUM (VOLTAREN) 1 % GEL    Apply 2 g topically once daily.    DILTIAZEM (CARDIZEM) 30 MG TABLET    Take 1 tablet (30 mg total) by mouth 4 (four) times daily before meals and nightly.    DIPHENHYDRAMINE (BENADRYL) 25 MG CAPSULE    Take 25 mg by mouth every 6 (six) hours as needed for Itching.    FLECAINIDE (TAMBOCOR) 50  "MG TAB    TAKE 2 TABLETS TWICE A DAY    FLUTICASONE (FLONASE) 50 MCG/ACTUATION NASAL SPRAY    1 spray by Each Nare route once daily.    FLUTICASONE-SALMETEROL 250-50 MCG/DOSE (ADVAIR DISKUS) 250-50 MCG/DOSE DISKUS INHALER    USE 1 INHALATION INTO THE LUNGS TWICE A DAY. RINSE MOUTH AFTER USE TO PREVENT THRUSH    IPRATROPIUM (ATROVENT) 0.03 % NASAL SPRAY    2 sprays by Nasal route 2 (two) times daily. As needed    LEVOTHYROXINE (SYNTHROID) 88 MCG TABLET    Take 1 tablet (88 mcg total) by mouth once daily.    MONTELUKAST (SINGULAIR) 10 MG TABLET    Take 1 tablet (10 mg total) by mouth every evening.    MULTIVITAMIN-MINERALS-LUTEIN (CENTRUM SILVER) TAB    Take 1 tablet by mouth once daily. 1 Tablet Oral Every day    PANTOPRAZOLE (PROTONIX) 40 MG TABLET    Take 1 tablet (40 mg total) by mouth once daily. Take 30 minutes prior to breakfast    RETIN-A 0.05 % CREAM    APPLY THIN FILM TO FACE AT NIGHT AFTER MOISTURIZING    SPIRIVA WITH HANDIHALER 18 MCG INHALATION CAPSULE    INHALE THE CONTENTS OF 1 CAPSULE DAILY    TRIAMCINOLONE ACETONIDE 0.1% (KENALOG) 0.1 % CREAM    Apply topically 2 (two) times daily.       Review of Systems  Constitution: Denies chills, fever, and sweats.  HENT: Denies headaches or blurry vision.  Cardiovascular: Denies chest pain or irregular heart beat.  Respiratory: Denies cough or shortness of breath.  Gastrointestinal: Denies abdominal pain, nausea, or vomiting.  Musculoskeletal: Denies muscle cramps.  Neurological: Denies dizziness or focal weakness.  Psychiatric/Behavioral: Normal mental status.  Hematologic/Lymphatic: Denies bleeding problem or easy bruising/bleeding.  Skin: Denies rash or suspicious lesions    Physical Examination  BP 97/67 (BP Location: Left arm)   Pulse 66   Ht 5' 6" (1.676 m)   Wt 65.2 kg (143 lb 11.8 oz)   SpO2 95%   BMI 23.20 kg/m²     Constitutional: No acute distress, conversant  HEENT: Sclera anicteric, Pupils equal, round and reactive to light, extraocular motions " intact, Oropharynx clear  Neck: No JVD, no carotid bruits  Cardiovascular: regular rate and rhythm, no murmur, rubs or gallops, normal S1/S2  Pulmonary: Clear to auscultation bilaterally  Abdominal: Abdomen soft, nontender, nondistended, positive bowel sounds  Extremities: No lower extremity edema,   Pulses:  Carotid pulses are 2+ on the right side, and 2+ on the left side.  Radial pulses are 2+ on the right side, and 2+ on the left side.   .    Skin: No ecchymosis, erythema, or ulcers  Psych: Alert and oriented x 3, appropriate affect  Neuro: CNII-XII intact, no focal deficits    Labs:  Most Recent Data  CBC:   Lab Results   Component Value Date    WBC 7.50 01/02/2018    HGB 13.6 01/02/2018    HCT 40.0 01/02/2018     01/02/2018    MCV 92 01/02/2018    RDW 12.6 01/02/2018     BMP:   Lab Results   Component Value Date     01/02/2018    K 4.6 01/02/2018     01/02/2018    CO2 29 01/02/2018    BUN 11 01/02/2018    CREATININE 0.8 01/02/2018    GLU 87 01/02/2018    CALCIUM 9.7 01/02/2018    MG 2.0 09/30/2016     LFTS;   Lab Results   Component Value Date    PROT 7.6 01/02/2018    ALBUMIN 3.9 01/02/2018    BILITOT 0.6 01/02/2018    AST 20 01/02/2018    ALKPHOS 74 01/02/2018    ALT 19 01/02/2018     COAGS:   Lab Results   Component Value Date    INR 2.7 07/11/2017     FLP:   Lab Results   Component Value Date    CHOL 146 07/07/2017    HDL 48 07/07/2017    LDLCALC 80.2 07/07/2017    TRIG 89 07/07/2017    CHOLHDL 32.9 07/07/2017     CARDIAC:   Lab Results   Component Value Date    TROPONINI 0.008 04/26/2017     (H) 04/26/2017       Imaging:    Holter  DIARY  1. The diary was returned, but not completed    2. There were 2 episodes of heart raced reported. The corresponding rhythm strips revealed the following:             During event 1 the rhythm was sinus rhythm at 74 bpm.             During event 2 the rhythm was sinus rhythm at 75 bpm.     3. There was 1 episode of legs weak reported. The  corresponding rhythm strips revealed the following:             During event 1 the rhythm was sinus rhythm at 72 bpm.     4. There were 2 episodes of weak reported. The corresponding rhythm strips revealed the following:             During event 1 the rhythm was sinus rhythm at 72 bpm.             During event 2 the rhythm was sinus rhythm at 96 bpm.     MISCELLANEOUS  1. This was a tape of adequate length (48 hrs).     Echo:  CONCLUSIONS     1 - Mildly to moderately depressed left ventricular systolic function (EF 40-45%).     2 - Impaired LV relaxation, normal LAP (grade 1 diastolic dysfunction).     3 - Mild aortic regurgitation.     4 - Mild mitral regurgitation.     Stress Testing:      I have personally reviewed these images and echo data    Assessment/Plan:  Steph was seen today for follow-up.    Diagnoses and all orders for this visit:    S/P CABG (coronary artery bypass graft)    PAF (paroxysmal atrial fibrillation), onset 2002    Essential hypertension    Fatigue, unspecified type        Went over the results of her echo and her Holter with her.  She is due to see her primary care physician.  I had spoken with her PCP regarding the abnormal thyroid results.  I don't think we need to do anything currently  from a cardiovascular perspective .We will see her back in few months time to reassess her    Follow-up in about 3 months (around 4/16/2018).          Total duration of face to face visit time 30 minutes.  Total time spent counseling greater than fifty percent of total visit time.  Counseling included discussion regarding imaging findings, diagnosis, possibilities, treatment options, risks and benefits.  The patient had many questions regarding the options and long-term effect which were all answered to my best ability.      Henry Wei MD,MRCP,RPVI,FACC,FSCAI.  Interventional Cardiology   Phone [phone]

## 2018-01-18 ENCOUNTER — LAB VISIT (OUTPATIENT)
Dept: LAB | Facility: HOSPITAL | Age: 68
End: 2018-01-18
Attending: FAMILY MEDICINE
Payer: MEDICARE

## 2018-01-18 DIAGNOSIS — E03.9 ACQUIRED HYPOTHYROIDISM: ICD-10-CM

## 2018-01-18 DIAGNOSIS — E78.00 HYPERCHOLESTEREMIA: ICD-10-CM

## 2018-01-18 LAB
ALBUMIN SERPL BCP-MCNC: 3.8 G/DL
ALP SERPL-CCNC: 66 U/L
ALT SERPL W/O P-5'-P-CCNC: 20 U/L
ANION GAP SERPL CALC-SCNC: 7 MMOL/L
AST SERPL-CCNC: 21 U/L
BASOPHILS # BLD AUTO: 0.1 K/UL
BASOPHILS NFR BLD: 1.4 %
BILIRUB SERPL-MCNC: 0.8 MG/DL
BUN SERPL-MCNC: 10 MG/DL
CALCIUM SERPL-MCNC: 9.7 MG/DL
CHLORIDE SERPL-SCNC: 104 MMOL/L
CHOLEST SERPL-MCNC: 160 MG/DL
CHOLEST/HDLC SERPL: 2.8 {RATIO}
CO2 SERPL-SCNC: 28 MMOL/L
CREAT SERPL-MCNC: 0.8 MG/DL
DIFFERENTIAL METHOD: NORMAL
EOSINOPHIL # BLD AUTO: 0.3 K/UL
EOSINOPHIL NFR BLD: 3.9 %
ERYTHROCYTE [DISTWIDTH] IN BLOOD BY AUTOMATED COUNT: 12.2 %
EST. GFR  (AFRICAN AMERICAN): >60 ML/MIN/1.73 M^2
EST. GFR  (NON AFRICAN AMERICAN): >60 ML/MIN/1.73 M^2
GLUCOSE SERPL-MCNC: 89 MG/DL
HCT VFR BLD AUTO: 40.8 %
HDLC SERPL-MCNC: 57 MG/DL
HDLC SERPL: 35.6 %
HGB BLD-MCNC: 13.3 G/DL
IMM GRANULOCYTES # BLD AUTO: 0.01 K/UL
IMM GRANULOCYTES NFR BLD AUTO: 0.1 %
LDLC SERPL CALC-MCNC: 79.2 MG/DL
LYMPHOCYTES # BLD AUTO: 2.5 K/UL
LYMPHOCYTES NFR BLD: 34.1 %
MCH RBC QN AUTO: 30.3 PG
MCHC RBC AUTO-ENTMCNC: 32.6 G/DL
MCV RBC AUTO: 93 FL
MONOCYTES # BLD AUTO: 0.5 K/UL
MONOCYTES NFR BLD: 7.4 %
NEUTROPHILS # BLD AUTO: 3.8 K/UL
NEUTROPHILS NFR BLD: 53.1 %
NONHDLC SERPL-MCNC: 103 MG/DL
NRBC BLD-RTO: 0 /100 WBC
PLATELET # BLD AUTO: 268 K/UL
PMV BLD AUTO: 9.6 FL
POTASSIUM SERPL-SCNC: 4.3 MMOL/L
PROT SERPL-MCNC: 7.4 G/DL
RBC # BLD AUTO: 4.39 M/UL
SODIUM SERPL-SCNC: 139 MMOL/L
T4 FREE SERPL-MCNC: 1.44 NG/DL
TRIGL SERPL-MCNC: 119 MG/DL
TSH SERPL DL<=0.005 MIU/L-ACNC: 0.62 UIU/ML
WBC # BLD AUTO: 7.18 K/UL

## 2018-01-18 PROCEDURE — 36415 COLL VENOUS BLD VENIPUNCTURE: CPT | Mod: PO

## 2018-01-18 PROCEDURE — 85025 COMPLETE CBC W/AUTO DIFF WBC: CPT

## 2018-01-18 PROCEDURE — 84443 ASSAY THYROID STIM HORMONE: CPT

## 2018-01-18 PROCEDURE — 84439 ASSAY OF FREE THYROXINE: CPT

## 2018-01-18 PROCEDURE — 80061 LIPID PANEL: CPT

## 2018-01-18 PROCEDURE — 80053 COMPREHEN METABOLIC PANEL: CPT

## 2018-01-19 DIAGNOSIS — J30.9 CHRONIC ALLERGIC RHINITIS, UNSPECIFIED SEASONALITY, UNSPECIFIED TRIGGER: Primary | ICD-10-CM

## 2018-01-19 RX ORDER — CETIRIZINE HYDROCHLORIDE 10 MG/1
10 TABLET ORAL DAILY
Qty: 90 TABLET | Refills: 3 | COMMUNITY
Start: 2018-01-19 | End: 2018-01-24 | Stop reason: SDUPTHER

## 2018-01-20 NOTE — PROGRESS NOTES
pt confirms correct dose; pt informed of dosing and next inr   normal normal/delayed, but at baseline as per FOC

## 2018-01-22 ENCOUNTER — PATIENT MESSAGE (OUTPATIENT)
Dept: GASTROENTEROLOGY | Facility: CLINIC | Age: 68
End: 2018-01-22

## 2018-01-23 ENCOUNTER — TELEPHONE (OUTPATIENT)
Dept: GASTROENTEROLOGY | Facility: CLINIC | Age: 68
End: 2018-01-23

## 2018-01-23 DIAGNOSIS — C15.9 MALIGNANT NEOPLASM OF ESOPHAGUS, UNSPECIFIED LOCATION: Primary | ICD-10-CM

## 2018-01-24 ENCOUNTER — TELEPHONE (OUTPATIENT)
Dept: GASTROENTEROLOGY | Facility: CLINIC | Age: 68
End: 2018-01-24

## 2018-01-24 ENCOUNTER — TELEPHONE (OUTPATIENT)
Dept: CARDIOLOGY | Facility: CLINIC | Age: 68
End: 2018-01-24

## 2018-01-24 ENCOUNTER — DOCUMENTATION ONLY (OUTPATIENT)
Dept: FAMILY MEDICINE | Facility: CLINIC | Age: 68
End: 2018-01-24

## 2018-01-24 ENCOUNTER — OFFICE VISIT (OUTPATIENT)
Dept: FAMILY MEDICINE | Facility: CLINIC | Age: 68
End: 2018-01-24
Payer: MEDICARE

## 2018-01-24 VITALS
HEART RATE: 128 BPM | HEIGHT: 66 IN | WEIGHT: 144.63 LBS | DIASTOLIC BLOOD PRESSURE: 67 MMHG | TEMPERATURE: 98 F | BODY MASS INDEX: 23.24 KG/M2 | SYSTOLIC BLOOD PRESSURE: 101 MMHG

## 2018-01-24 DIAGNOSIS — I48.91 ATRIAL FIBRILLATION, UNSPECIFIED TYPE: ICD-10-CM

## 2018-01-24 DIAGNOSIS — E78.00 HYPERCHOLESTEREMIA: ICD-10-CM

## 2018-01-24 DIAGNOSIS — J43.1 PANLOBULAR EMPHYSEMA: ICD-10-CM

## 2018-01-24 DIAGNOSIS — I77.9 CAROTID ARTERY DISEASE, UNSPECIFIED LATERALITY: ICD-10-CM

## 2018-01-24 DIAGNOSIS — Z85.118 PERSONAL HISTORY OF LUNG CANCER: ICD-10-CM

## 2018-01-24 DIAGNOSIS — K22.9 ESOPHAGEAL LESION: ICD-10-CM

## 2018-01-24 DIAGNOSIS — I10 ESSENTIAL HYPERTENSION: ICD-10-CM

## 2018-01-24 DIAGNOSIS — E03.9 ACQUIRED HYPOTHYROIDISM: ICD-10-CM

## 2018-01-24 DIAGNOSIS — I20.0 INTERMEDIATE CORONARY SYNDROME: ICD-10-CM

## 2018-01-24 DIAGNOSIS — J41.0 SIMPLE CHRONIC BRONCHITIS: ICD-10-CM

## 2018-01-24 DIAGNOSIS — J30.9 CHRONIC ALLERGIC RHINITIS, UNSPECIFIED SEASONALITY, UNSPECIFIED TRIGGER: ICD-10-CM

## 2018-01-24 DIAGNOSIS — I48.91 ATRIAL FIBRILLATION WITH RAPID VENTRICULAR RESPONSE: Primary | ICD-10-CM

## 2018-01-24 DIAGNOSIS — I48.0 PAF (PAROXYSMAL ATRIAL FIBRILLATION): ICD-10-CM

## 2018-01-24 DIAGNOSIS — R00.0 TACHYCARDIA: ICD-10-CM

## 2018-01-24 PROBLEM — B37.81 ESOPHAGEAL CANDIDIASIS: Status: RESOLVED | Noted: 2017-10-03 | Resolved: 2018-01-24

## 2018-01-24 PROCEDURE — 99213 OFFICE O/P EST LOW 20 MIN: CPT | Mod: PBBFAC,25,PO | Performed by: FAMILY MEDICINE

## 2018-01-24 PROCEDURE — 1159F MED LIST DOCD IN RCRD: CPT | Mod: ,,, | Performed by: FAMILY MEDICINE

## 2018-01-24 PROCEDURE — 93010 ELECTROCARDIOGRAM REPORT: CPT | Mod: ,,, | Performed by: INTERNAL MEDICINE

## 2018-01-24 PROCEDURE — 99999 PR PBB SHADOW E&M-EST. PATIENT-LVL III: CPT | Mod: PBBFAC,,, | Performed by: FAMILY MEDICINE

## 2018-01-24 PROCEDURE — 1126F AMNT PAIN NOTED NONE PRSNT: CPT | Mod: ,,, | Performed by: FAMILY MEDICINE

## 2018-01-24 PROCEDURE — 93005 ELECTROCARDIOGRAM TRACING: CPT | Mod: PBBFAC,PO | Performed by: FAMILY MEDICINE

## 2018-01-24 PROCEDURE — 99214 OFFICE O/P EST MOD 30 MIN: CPT | Mod: S$PBB,,, | Performed by: FAMILY MEDICINE

## 2018-01-24 RX ORDER — CETIRIZINE HYDROCHLORIDE 10 MG/1
10 TABLET ORAL DAILY
Qty: 90 TABLET | Refills: 3 | Status: SHIPPED | OUTPATIENT
Start: 2018-01-24 | End: 2018-03-09 | Stop reason: SDUPTHER

## 2018-01-24 RX ORDER — LEVOTHYROXINE SODIUM 88 UG/1
88 TABLET ORAL DAILY
Qty: 90 TABLET | Refills: 3 | Status: SHIPPED | OUTPATIENT
Start: 2018-01-24 | End: 2018-11-20 | Stop reason: SDUPTHER

## 2018-01-24 NOTE — TELEPHONE ENCOUNTER
Robbie Gonzales MD   to Steph Lira           1/22/18 3:54 PM   Sorry to hear that you continue to have problems. We probably will have to the EGD in 6-8 weeks instead of 10 weeks.   Thanks   Robbie Gonzales MD     Malena,   Please see Dr Gonzales's message. Please schedule patient for EGD  Thanks  Sarah

## 2018-01-24 NOTE — TELEPHONE ENCOUNTER
----- Message from Camilla Padilla sent at 1/24/2018 12:24 PM CST -----  Contact: Chance turner/ GOKUL turner/ GOKUL calling in regards to a Doctor to Doctor call. Dr Swain wants to speak with Dr Wei. Please advise. Call to pod. No answer.   Call back Dr Swain at   Thanks!

## 2018-01-24 NOTE — PROGRESS NOTES
Subjective:       Patient ID: Steph Lira is a 67 y.o. female.    Chief Complaint: Follow-up    Patient Active Problem List   Diagnosis    GERD (gastroesophageal reflux disease)    Chronic rhinitis    Hypothyroid    Aneurysm of heart (wall)    Benign neoplasm of skin of upper limb, including shoulder    Intermediate coronary syndrome    Phlebitis and thrombophlebitis of superficial veins of upper extremities    Simple chronic conjunctivitis    Supraventricular premature beats    Vascular disorder of skin    Chronic external jugular vein thrombosis    Long term current use of anticoagulant therapy    Magdiel's syndrome - Left Eye    Ptosis    Post-operative state    Chest pain at rest    Anxiety    CAD (coronary artery disease)    S/P CABG x 2, 2005    HTN (hypertension)    Hypercholesteremia    PAF (paroxysmal atrial fibrillation), onset 2002    Intercostal neuralgia    Pain    Radius and ulna distal fracture    Personal history of lung cancer in 1996 S/P left lobectomy-small cell    Blurred vision, bilateral    Panlobular emphysema    Carotid artery disease    Atrial fibrillation with RVR    COPD (chronic obstructive pulmonary disease)    Uncontrolled atrial flutter with rvr    Anticoagulated on Coumadin    Allergic rhinitis    Hypoalbuminemia    History of smoking 10-25 pack years, 15 pack-years, quit 1994    Persistent cough for 3 weeks or longer, onset 12/2015    S/P CABG (coronary artery bypass graft)    Supraventricular bigeminy    Chest pain, unspecified    Allergic drug rash    Dysphagia    History of colon polyps    Coronary artery disease involving autologous artery coronary bypass graft    Wrist arthritis    Esophageal mass    Gastritis    Esophageal lesion    Lesion of esophagus    Fatigue   c/o feeling tired and heart pounding in chest intermittently for 3-4 weeks.    Reviewed recent labs  Lab Visit on 01/18/2018   Component Date Value Ref Range Status     WBC 01/18/2018 7.18  3.90 - 12.70 K/uL Final    RBC 01/18/2018 4.39  4.00 - 5.40 M/uL Final    Hemoglobin 01/18/2018 13.3  12.0 - 16.0 g/dL Final    Hematocrit 01/18/2018 40.8  37.0 - 48.5 % Final    MCV 01/18/2018 93  82 - 98 fL Final    MCH 01/18/2018 30.3  27.0 - 31.0 pg Final    MCHC 01/18/2018 32.6  32.0 - 36.0 g/dL Final    RDW 01/18/2018 12.2  11.5 - 14.5 % Final    Platelets 01/18/2018 268  150 - 350 K/uL Final    MPV 01/18/2018 9.6  9.2 - 12.9 fL Final    Immature Granulocytes 01/18/2018 0.1  0.0 - 0.5 % Final    Gran # 01/18/2018 3.8  1.8 - 7.7 K/uL Final    Immature Grans (Abs) 01/18/2018 0.01  0.00 - 0.04 K/uL Final    Lymph # 01/18/2018 2.5  1.0 - 4.8 K/uL Final    Mono # 01/18/2018 0.5  0.3 - 1.0 K/uL Final    Eos # 01/18/2018 0.3  0.0 - 0.5 K/uL Final    Baso # 01/18/2018 0.10  0.00 - 0.20 K/uL Final    nRBC 01/18/2018 0  0 /100 WBC Final    Gran% 01/18/2018 53.1  38.0 - 73.0 % Final    Lymph% 01/18/2018 34.1  18.0 - 48.0 % Final    Mono% 01/18/2018 7.4  4.0 - 15.0 % Final    Eosinophil% 01/18/2018 3.9  0.0 - 8.0 % Final    Basophil% 01/18/2018 1.4  0.0 - 1.9 % Final    Differential Method 01/18/2018 Automated   Final    Sodium 01/18/2018 139  136 - 145 mmol/L Final    Potassium 01/18/2018 4.3  3.5 - 5.1 mmol/L Final    Chloride 01/18/2018 104  95 - 110 mmol/L Final    CO2 01/18/2018 28  23 - 29 mmol/L Final    Glucose 01/18/2018 89  70 - 110 mg/dL Final    BUN, Bld 01/18/2018 10  8 - 23 mg/dL Final    Creatinine 01/18/2018 0.8  0.5 - 1.4 mg/dL Final    Calcium 01/18/2018 9.7  8.7 - 10.5 mg/dL Final    Total Protein 01/18/2018 7.4  6.0 - 8.4 g/dL Final    Albumin 01/18/2018 3.8  3.5 - 5.2 g/dL Final    Total Bilirubin 01/18/2018 0.8  0.1 - 1.0 mg/dL Final    Alkaline Phosphatase 01/18/2018 66  55 - 135 U/L Final    AST 01/18/2018 21  10 - 40 U/L Final    ALT 01/18/2018 20  10 - 44 U/L Final    Anion Gap 01/18/2018 7* 8 - 16 mmol/L Final    eGFR if African  American 01/18/2018 >60  >60 mL/min/1.73 m^2 Final    eGFR if non African American 01/18/2018 >60  >60 mL/min/1.73 m^2 Final    TSH 01/18/2018 0.621  0.400 - 4.000 uIU/mL Final    Free T4 01/18/2018 1.44  0.71 - 1.51 ng/dL Final    Cholesterol 01/18/2018 160  120 - 199 mg/dL Final    Triglycerides 01/18/2018 119  30 - 150 mg/dL Final    HDL 01/18/2018 57  40 - 75 mg/dL Final    LDL Cholesterol 01/18/2018 79.2  63.0 - 159.0 mg/dL Final    HDL/Chol Ratio 01/18/2018 35.6  20.0 - 50.0 % Final    Total Cholesterol/HDL Ratio 01/18/2018 2.8  2.0 - 5.0 Final    Non-HDL Cholesterol 01/18/2018 103  mg/dL Final   Clinical Support on 01/04/2018   Component Date Value Ref Range Status    EF 01/04/2018 40* 55 - 65 Final    Mitral Valve Regurgitation 01/04/2018 MILD   Final    Diastolic Dysfunction 01/04/2018 Yes*  Final    Aortic Valve Regurgitation 01/04/2018 MILD   Final    Est. PA Systolic Pressure 01/04/2018 16.16   Final   Lab Visit on 01/02/2018   Component Date Value Ref Range Status    WBC 01/02/2018 7.50  3.90 - 12.70 K/uL Final    RBC 01/02/2018 4.34  4.00 - 5.40 M/uL Final    Hemoglobin 01/02/2018 13.6  12.0 - 16.0 g/dL Final    Hematocrit 01/02/2018 40.0  37.0 - 48.5 % Final    MCV 01/02/2018 92  82 - 98 fL Final    MCH 01/02/2018 31.3* 27.0 - 31.0 pg Final    MCHC 01/02/2018 34.0  32.0 - 36.0 g/dL Final    RDW 01/02/2018 12.6  11.5 - 14.5 % Final    Platelets 01/02/2018 298  150 - 350 K/uL Final    MPV 01/02/2018 7.6* 9.2 - 12.9 fL Final    Gran # 01/02/2018 4.8  1.8 - 7.7 K/uL Final    Lymph # 01/02/2018 2.1  1.0 - 4.8 K/uL Final    Mono # 01/02/2018 0.4  0.3 - 1.0 K/uL Final    Eos # 01/02/2018 0.3  0.0 - 0.5 K/uL Final    Baso # 01/02/2018 0.10  0.00 - 0.20 K/uL Final    Gran% 01/02/2018 63.3  38.0 - 73.0 % Final    Lymph% 01/02/2018 27.8  18.0 - 48.0 % Final    Mono% 01/02/2018 4.7  4.0 - 15.0 % Final    Eosinophil% 01/02/2018 3.4  0.0 - 8.0 % Final    Basophil% 01/02/2018 0.8   0.0 - 1.9 % Final    Differential Method 01/02/2018 Automated   Final    Sodium 01/02/2018 138  136 - 145 mmol/L Final    Potassium 01/02/2018 4.6  3.5 - 5.1 mmol/L Final    Chloride 01/02/2018 103  95 - 110 mmol/L Final    CO2 01/02/2018 29  23 - 29 mmol/L Final    Glucose 01/02/2018 87  70 - 110 mg/dL Final    BUN, Bld 01/02/2018 11  8 - 23 mg/dL Final    Creatinine 01/02/2018 0.8  0.5 - 1.4 mg/dL Final    Calcium 01/02/2018 9.7  8.7 - 10.5 mg/dL Final    Total Protein 01/02/2018 7.6  6.0 - 8.4 g/dL Final    Albumin 01/02/2018 3.9  3.5 - 5.2 g/dL Final    Total Bilirubin 01/02/2018 0.6  0.1 - 1.0 mg/dL Final    Alkaline Phosphatase 01/02/2018 74  55 - 135 U/L Final    AST 01/02/2018 20  10 - 40 U/L Final    ALT 01/02/2018 19  10 - 44 U/L Final    Anion Gap 01/02/2018 6* 8 - 16 mmol/L Final    eGFR if African American 01/02/2018 >60  >60 mL/min/1.73 m^2 Final    eGFR if non African American 01/02/2018 >60  >60 mL/min/1.73 m^2 Final    TSH 01/02/2018 0.362* 0.400 - 4.000 uIU/mL Final    Free T4 01/02/2018 1.39  0.71 - 1.51 ng/dL Final   [facility]Ad EGD 12/22/17-had squamous papilloma with atypia of esophagus s/p cryo ablation causing persistent dysphagia.        Dr. Wei Card 1/16/18 reviewed and found holter -no evidence of afib.  Fairly normal echo and stress.    HPI  Review of Systems   Constitutional: Negative for fatigue and unexpected weight change.   Respiratory: Negative for chest tightness and shortness of breath.    Cardiovascular: Negative for chest pain, palpitations and leg swelling.   Gastrointestinal: Negative for abdominal pain.   Musculoskeletal: Negative for arthralgias.   Neurological: Negative for dizziness, syncope, light-headedness and headaches.       Objective:      Physical Exam   Constitutional: She is oriented to person, place, and time. She appears well-developed and well-nourished.   Cardiovascular: Normal rate, regular rhythm and normal heart sounds.    Pulmonary/Chest:  Effort normal and breath sounds normal.   Musculoskeletal: She exhibits no edema.   Neurological: She is alert and oriented to person, place, and time.   Skin: Skin is warm and dry.   Psychiatric: She has a normal mood and affect.   Nursing note and vitals reviewed.      Assessment:       1. Atrial fibrillation with rapid ventricular response    2. Chronic allergic rhinitis, unspecified seasonality, unspecified trigger    3. Tachycardia    4. Atrial fibrillation, unspecified type    5. PAF (paroxysmal atrial fibrillation), onset 2002    6. Essential hypertension    7. Hypercholesteremia    8. Acquired hypothyroidism    9. Esophageal lesion    10. Panlobular emphysema    11. Simple chronic bronchitis    12. Intermediate coronary syndrome    13. Carotid artery disease, unspecified laterality    14. Personal history of lung cancer in 1996 S/P left lobectomy-small cell        Plan:       1. Chronic allergic rhinitis, unspecified seasonality, unspecified trigger  Recommend otc non-sedating antihistamine such as Loratadine and/or steroid nasal spray such as Flonase as directed and as needed.  Please return to clinic if symptoms persist after these interventions.    - cetirizine (ZYRTEC) 10 MG tablet; Take 1 tablet (10 mg total) by mouth once daily.  Dispense: 90 tablet; Refill: 3    2. Tachycardia  Screen and treat as indicated:    - IN OFFICE EKG 12-LEAD (to Muse)    3. Atrial fibrillation, unspecified type  Screen and treat as indicated:    - IN OFFICE EKG 12-LEAD (to Muse)    4. PAF (paroxysmal atrial fibrillation), onset 2002  Cont current meds      5. Essential hypertension  Controlled on current medications.  Continue current medications.      6. Hypercholesteremia  Stable condition.  Continue current medications.  Will adjust based on lab findings or if condition changes.      7. Acquired hypothyroidism  Stable condition.  Continue current medications.  Will adjust based on lab findings or if condition  changes.      8. Esophageal lesion  Cont treatment as per gi    9. Panlobular emphysema  Cont pulm caere2    10. Simple chronic bronchitis    11. Intermediate coronary syndrome  Cont card care and monitoring     12. Carotid artery disease, unspecified laterality  Cont card care    13. Personal history of lung cancer in 1996 S/P left lobectomy-small cell  cONT oNC MONTIORING    14. Atrial fibrillation with rapid ventricular response  CONT CARD MONITORING2  - Refer to Emergency Dept.    [facility] goal documentation:  Patient readiness: acceptance and barriers:readiness  During the course of the visit the patient was educated and counseled about the following: Hypertension:   Dietary sodium restriction.  Regular aerobic exercise.  Goals: Hypertension: Reduce Blood Pressure  Goal/Outcomes met:Hypertension  The following self management tools provided:none  Patient Instructions (the written plan) was given to the patient/family: Yes  Time spent with patient: 20 minutes    Patient with be reevaluated in 3 months or sooner prn    Greater than 50% of this visit was spent counseling as described in above documentation:Yes

## 2018-01-24 NOTE — PROGRESS NOTES
Pre-Visit Chart Review  For Appointment Scheduled on 1-24-18    There are no preventive care reminders to display for this patient.

## 2018-01-25 NOTE — TELEPHONE ENCOUNTER
----- Message from Adriane Mcdonough MA sent at 1/25/2018 10:07 AM CST -----  Contact: [phone]/self  Malena,   Patient is returning your call. She is currently in the hospital, but will likely be discharged tomorrow.  Sarah  ----- Message -----  From: Sandra Rosenberg  Sent: 1/25/2018   9:05 AM  To: Christian Avalos V Staff    Patient states she is returning your call. Please advise.

## 2018-01-27 ENCOUNTER — PATIENT MESSAGE (OUTPATIENT)
Dept: CARDIOLOGY | Facility: CLINIC | Age: 68
End: 2018-01-27

## 2018-01-29 ENCOUNTER — TELEPHONE (OUTPATIENT)
Dept: CARDIOLOGY | Facility: CLINIC | Age: 68
End: 2018-01-29

## 2018-01-29 ENCOUNTER — TELEPHONE (OUTPATIENT)
Dept: GASTROENTEROLOGY | Facility: CLINIC | Age: 68
End: 2018-01-29

## 2018-01-29 NOTE — TELEPHONE ENCOUNTER
Spoke with patient. EGD with Cryo scheduled for 2/1 at 2p. Reviewed prep instructions. Ms Lira verbalized understanding.

## 2018-01-29 NOTE — TELEPHONE ENCOUNTER
Called and spoke with Ms. Lira, Offered her an appt with Dr. Wei on 01/31/2018 @ 3:40. She accepted. She also asked that we send in a Rx for Torpol XL 50mg. I advised I will have Dr. Wei do it tomorrow when he gets back in clinic. She verbalized understnding. No further issues noted.

## 2018-01-29 NOTE — TELEPHONE ENCOUNTER
----- Message from Johnathan Chavarria sent at 1/29/2018  9:35 AM CST -----  Contact: Steph   Calling to get an appointment for this week she was discharged from [facility] on Friday. Dr. Wei wants to see her 1 week. Please call [phone] (home)   thanks

## 2018-01-30 ENCOUNTER — TELEPHONE (OUTPATIENT)
Dept: GASTROENTEROLOGY | Facility: CLINIC | Age: 68
End: 2018-01-30

## 2018-01-30 RX ORDER — FLUCONAZOLE 200 MG/1
200 TABLET ORAL DAILY
Qty: 14 TABLET | Refills: 0 | Status: SHIPPED | OUTPATIENT
Start: 2018-01-30 | End: 2018-02-13

## 2018-01-31 ENCOUNTER — OFFICE VISIT (OUTPATIENT)
Dept: CARDIOLOGY | Facility: CLINIC | Age: 68
End: 2018-01-31
Payer: MEDICARE

## 2018-01-31 VITALS
WEIGHT: 144.81 LBS | HEIGHT: 66 IN | OXYGEN SATURATION: 97 % | HEART RATE: 66 BPM | SYSTOLIC BLOOD PRESSURE: 108 MMHG | BODY MASS INDEX: 23.27 KG/M2 | DIASTOLIC BLOOD PRESSURE: 55 MMHG

## 2018-01-31 DIAGNOSIS — I10 ESSENTIAL HYPERTENSION: ICD-10-CM

## 2018-01-31 DIAGNOSIS — I48.0 PAF (PAROXYSMAL ATRIAL FIBRILLATION): Primary | ICD-10-CM

## 2018-01-31 DIAGNOSIS — Z95.1 S/P CABG X 2: ICD-10-CM

## 2018-01-31 DIAGNOSIS — I48.0 PAF (PAROXYSMAL ATRIAL FIBRILLATION): ICD-10-CM

## 2018-01-31 DIAGNOSIS — I25.10 CORONARY ARTERY DISEASE INVOLVING NATIVE CORONARY ARTERY OF NATIVE HEART WITHOUT ANGINA PECTORIS: Primary | ICD-10-CM

## 2018-01-31 PROCEDURE — 1159F MED LIST DOCD IN RCRD: CPT | Mod: ,,, | Performed by: INTERNAL MEDICINE

## 2018-01-31 PROCEDURE — 93010 ELECTROCARDIOGRAM REPORT: CPT | Mod: S$PBB,,, | Performed by: INTERNAL MEDICINE

## 2018-01-31 PROCEDURE — 93005 ELECTROCARDIOGRAM TRACING: CPT | Mod: PBBFAC,PO | Performed by: INTERNAL MEDICINE

## 2018-01-31 PROCEDURE — 1126F AMNT PAIN NOTED NONE PRSNT: CPT | Mod: ,,, | Performed by: INTERNAL MEDICINE

## 2018-01-31 PROCEDURE — 99999 PR PBB SHADOW E&M-EST. PATIENT-LVL III: CPT | Mod: PBBFAC,,, | Performed by: INTERNAL MEDICINE

## 2018-01-31 PROCEDURE — 99214 OFFICE O/P EST MOD 30 MIN: CPT | Mod: S$PBB,,, | Performed by: INTERNAL MEDICINE

## 2018-01-31 PROCEDURE — 99213 OFFICE O/P EST LOW 20 MIN: CPT | Mod: PBBFAC,PO | Performed by: INTERNAL MEDICINE

## 2018-01-31 RX ORDER — LISINOPRIL 5 MG/1
5 TABLET ORAL DAILY
Qty: 90 TABLET | Refills: 3 | Status: SHIPPED | OUTPATIENT
Start: 2018-01-31 | End: 2018-03-19 | Stop reason: SDUPTHER

## 2018-01-31 RX ORDER — METOPROLOL SUCCINATE 50 MG/1
TABLET, EXTENDED RELEASE ORAL
COMMUNITY
Start: 2018-01-26 | End: 2018-03-13 | Stop reason: SDUPTHER

## 2018-01-31 RX ORDER — METOPROLOL SUCCINATE 50 MG/1
50 TABLET, EXTENDED RELEASE ORAL DAILY
Qty: 30 TABLET | Refills: 11 | Status: SHIPPED | OUTPATIENT
Start: 2018-01-31 | End: 2018-02-01 | Stop reason: SDUPTHER

## 2018-01-31 RX ORDER — FLUCONAZOLE 10 MG/ML
POWDER, FOR SUSPENSION ORAL
Refills: 0 | COMMUNITY
Start: 2017-12-15 | End: 2018-01-31

## 2018-01-31 RX ORDER — FLECAINIDE ACETATE 50 MG/1
100 TABLET ORAL EVERY 12 HOURS
Qty: 120 TABLET | Refills: 11 | Status: SHIPPED | OUTPATIENT
Start: 2018-01-31 | End: 2019-01-20 | Stop reason: SDUPTHER

## 2018-01-31 NOTE — PROGRESS NOTES
[facility]    CC: Follow-up from hospital visit  Chief Complaint   Patient presents with    Hospital Follow Up       Patient ID: Steph Lira is a 67 y.o. female with a past medical history of paroxysmal atrial fibrillation, coronary artery disease, cardiomyopathy    HPI  She was in the hospital recently with ULYSSES retana with RVR.  I performed a DC cardioversion. She is in sinus rhythm today.  However she is still tired.    Past Medical History:   Diagnosis Date    *Atrial fibrillation     and Hx PSVT    Allergy     chronic     Arthritis     fingers    Bronchitis March 2013    CAD (coronary artery disease) 2005    CABGx2 in 2005 and 2 MI after with 4 stents    Cancer 1996    small cell lung cancer s/p resection of 1/3 lung, 5 ribs and muscle mass then had chemo and radiation    Cataract     OD     CHF (congestive heart failure) past Hx    Chronic rhinitis     Clot past Hx    external jugular, now stented, occluded, had phlebitis; now revascularized    Colon polyp     COPD (chronic obstructive pulmonary disease)     no oxygen or nebulizers    COPD exacerbation 5/8/13    improved 5/14/13 after steroid pack,antibiotics    Former smoker     GERD (gastroesophageal reflux disease)     Heart block     Hyperlipidemia     Hypertension     pt states does not have //    MI (myocardial infarction) 2005    Posterior vitreous detachment of left eye     Thyroid disease      Past Surgical History:   Procedure Laterality Date    ADENOIDECTOMY      APPENDECTOMY      BACK SURGERY      lumbar    CARDIAC SURGERY      CATARACT EXTRACTION Left     OS    CATARACT EXTRACTION, BILATERAL      left eye only    CHOLECYSTECTOMY      COLONOSCOPY  03/2012    repeat in 5 years    COLONOSCOPY N/A 6/19/2017    Procedure: COLONOSCOPY;  Surgeon: Kal Elise MD;  Location: [facility];  Service: Endoscopy;  Laterality: N/A;    CORONARY ARTERY BYPASS GRAFT  2005    2 vessel    EAR PINNA RECONSTRUCTION W/  RIB GRAFT  2010/2011    x2    EYE SURGERY  Dec 2012    ptosis repair    FIRST RIB REMOVAL   with lung resection    HYSTERECTOMY      LUNG LOBECTOMY  1996    left lung for cancer    SHOULDER SURGERY      scapular entrapment after lung surgery, needed 5 ribs removed left side    SYMPOTHATIC NERVE LEFT SIDE       with lung surgery    TONSILLECTOMY      TONSILLECTOMY, ADENOIDECTOMY, BILATERAL MYRINGOTOMY AND TUBES      UPPER GASTROINTESTINAL ENDOSCOPY  2016    Dr. Koo    VASCULAR SURGERY      stents place in jugualr vein     Social History     Social History    Marital status:      Spouse name: N/A    Number of children: N/A    Years of education: N/A     Occupational History    retired      Social History Main Topics    Smoking status: Former Smoker     Packs/day: 2.00     Years: 20.00     Types: Cigarettes     Quit date: 1994    Smokeless tobacco: Never Used    Alcohol use No    Drug use: No    Sexual activity: Yes     Partners: Male     Other Topics Concern    Are You Pregnant Or Think You May Be? No    Breast-Feeding No     Social History Narrative    No narrative on file     Family History   Problem Relation Age of Onset    Allergic rhinitis Father     Cancer Father     Hypertension Father     Allergies Father     Allergic rhinitis Son     Asthma Son     Stroke Mother     Allergies Mother     Allergic rhinitis Sister     Asthma Sister     Diverticulitis Brother     No Known Problems Daughter     No Known Problems Maternal Aunt     No Known Problems Maternal Uncle     No Known Problems Paternal Aunt     No Known Problems Paternal Uncle     Colon polyps Maternal Grandmother       of colon blockage    No Known Problems Maternal Grandfather     No Known Problems Paternal Grandmother     No Known Problems Paternal Grandfather     No Known Problems Brother     No Known Problems Sister     Angioedema Neg Hx     Eczema Neg Hx      Immunodeficiency Neg Hx     Urticaria Neg Hx     Skin cancer Neg Hx     Amblyopia Neg Hx     Blindness Neg Hx     Cataracts Neg Hx     Diabetes Neg Hx     Glaucoma Neg Hx     Macular degeneration Neg Hx     Retinal detachment Neg Hx     Strabismus Neg Hx     Thyroid disease Neg Hx     Colon cancer Neg Hx     Melanoma Neg Hx        Review of patient's allergies indicates:   Allergen Reactions    Ciprofloxacin Itching    Doxycycline Other (See Comments)     Patient had a rxn with the sun despite wearing spf 30       Medication List with Changes/Refills   New Medications    FLECAINIDE (TAMBOCOR) 50 MG TAB    Take 2 tablets (100 mg total) by mouth every 12 (twelve) hours.    LISINOPRIL (PRINIVIL,ZESTRIL) 5 MG TABLET    Take 1 tablet (5 mg total) by mouth once daily.    METOPROLOL SUCCINATE (TOPROL-XL) 50 MG 24 HR TABLET    Take 1 tablet (50 mg total) by mouth once daily.   Current Medications    ALBUTEROL (ACCUNEB) 1.25 MG/3 ML NEBU    USE 1 VIAL (3 ML) VIA NEBULIZER EVERY 6 HOURS AS NEEDED    ALBUTEROL (PROVENTIL HFA) 90 MCG/ACTUATION INHALER    Inhale 2 puffs into the lungs every 6 (six) hours as needed for Wheezing.    APIXABAN 5 MG TAB    Take 1 tablet (5 mg total) by mouth 2 (two) times daily.    ATORVASTATIN (LIPITOR) 40 MG TABLET    Take 1 tablet (40 mg total) by mouth once daily.    BISACODYL (DULCOLAX) 5 MG EC TABLET    Take 1 tablet (5 mg total) by mouth daily as needed for Constipation.    CALCIUM CITRATE (CITRACAL) 500 MG TBEF    Take 500 mg by mouth. 1 Tablet, Effervescent Oral  daily    CETIRIZINE (ZYRTEC) 10 MG TABLET    Take 1 tablet (10 mg total) by mouth once daily.    DESOXIMETASONE (TOPICORT) 0.25 % CREAM    Apply topically 2 (two) times daily.    DICLOFENAC SODIUM (VOLTAREN) 1 % GEL    Apply 2 g topically once daily.    DIPHENHYDRAMINE (BENADRYL) 25 MG CAPSULE    Take 25 mg by mouth every 6 (six) hours as needed for Itching.    FLUCONAZOLE (DIFLUCAN) 200 MG TAB    Take 1 tablet (200  mg total) by mouth once daily.    FLUTICASONE (FLONASE) 50 MCG/ACTUATION NASAL SPRAY    1 spray by Each Nare route once daily.    FLUTICASONE-SALMETEROL 250-50 MCG/DOSE (ADVAIR DISKUS) 250-50 MCG/DOSE DISKUS INHALER    USE 1 INHALATION INTO THE LUNGS TWICE A DAY. RINSE MOUTH AFTER USE TO PREVENT THRUSH    IPRATROPIUM (ATROVENT) 0.03 % NASAL SPRAY    2 sprays by Nasal route 2 (two) times daily. As needed    LEVOTHYROXINE (SYNTHROID) 88 MCG TABLET    Take 1 tablet (88 mcg total) by mouth once daily.    METOPROLOL SUCCINATE (TOPROL-XL) 50 MG 24 HR TABLET        MONTELUKAST (SINGULAIR) 10 MG TABLET    Take 1 tablet (10 mg total) by mouth every evening.    MULTIVITAMIN-MINERALS-LUTEIN (CENTRUM SILVER) TAB    Take 1 tablet by mouth once daily. 1 Tablet Oral Every day    PANTOPRAZOLE (PROTONIX) 40 MG TABLET    Take 1 tablet (40 mg total) by mouth once daily. Take 30 minutes prior to breakfast    RETIN-A 0.05 % CREAM    APPLY THIN FILM TO FACE AT NIGHT AFTER MOISTURIZING    SPIRIVA WITH HANDIHALER 18 MCG INHALATION CAPSULE    INHALE THE CONTENTS OF 1 CAPSULE DAILY    TRIAMCINOLONE ACETONIDE 0.1% (KENALOG) 0.1 % CREAM    Apply topically 2 (two) times daily.   Discontinued Medications    ATENOLOL (TENORMIN) 25 MG TABLET    Take 1 tablet (25 mg total) by mouth once daily.    DILTIAZEM (CARDIZEM) 30 MG TABLET    Take 1 tablet (30 mg total) by mouth 4 (four) times daily before meals and nightly.    FLECAINIDE (TAMBOCOR) 50 MG TAB    TAKE 2 TABLETS TWICE A DAY    FLUCONAZOLE (DIFLUCAN) 10 MG/ML SUSPENSION    TK 10 ML PO ONCE D       Review of Systems  Constitution: Denies chills, fever, and sweats.  HENT: Denies headaches or blurry vision.  Cardiovascular: Denies chest pain or irregular heart beat.  Respiratory: Denies cough or shortness of breath.  Gastrointestinal: Denies abdominal pain, nausea, or vomiting.  Musculoskeletal: Denies muscle cramps.  Neurological: Denies dizziness or focal weakness.  Psychiatric/Behavioral:  "Normal mental status.  Hematologic/Lymphatic: Denies bleeding problem or easy bruising/bleeding.  Skin: Denies rash or suspicious lesions    Physical Examination  BP (!) 108/55 (BP Location: Left arm)   Pulse 66   Ht 5' 6" (1.676 m)   Wt 65.7 kg (144 lb 13.5 oz)   SpO2 97%   BMI 23.38 kg/m²     Constitutional: No acute distress, conversant  HEENT: Sclera anicteric, Pupils equal, round and reactive to light, extraocular motions intact, Oropharynx clear  Neck: No JVD, no carotid bruits  Cardiovascular: regular rate and rhythm, no murmur, rubs or gallops, normal S1/S2  Pulmonary: Clear to auscultation bilaterally  Abdominal: Abdomen soft, nontender, nondistended, positive bowel sounds  Extremities: No lower extremity edema,   Pulses:  Carotid pulses are 2+ on the right side, and 2+ on the left side.  Radial pulses are 2+ on the right side, and 2+ on the left side.       Skin: No ecchymosis, erythema, or ulcers  Psych: Alert and oriented x 3, appropriate affect  Neuro: CNII-XII intact, no focal deficits    Labs:  Most Recent Data  CBC:   Lab Results   Component Value Date    WBC 7.18 01/18/2018    HGB 13.3 01/18/2018    HCT 40.8 01/18/2018     01/18/2018    MCV 93 01/18/2018    RDW 12.2 01/18/2018     BMP:   Lab Results   Component Value Date     01/18/2018    K 4.3 01/18/2018     01/18/2018    CO2 28 01/18/2018    BUN 10 01/18/2018    CREATININE 0.8 01/18/2018    GLU 89 01/18/2018    CALCIUM 9.7 01/18/2018    MG 2.0 09/30/2016     LFTS;   Lab Results   Component Value Date    PROT 7.4 01/18/2018    ALBUMIN 3.8 01/18/2018    BILITOT 0.8 01/18/2018    AST 21 01/18/2018    ALKPHOS 66 01/18/2018    ALT 20 01/18/2018     COAGS:   Lab Results   Component Value Date    INR 2.7 07/11/2017     FLP:   Lab Results   Component Value Date    CHOL 160 01/18/2018    HDL 57 01/18/2018    LDLCALC 79.2 01/18/2018    TRIG 119 01/18/2018    CHOLHDL 35.6 01/18/2018     CARDIAC:   Lab Results   Component Value Date "    TROPONINI 0.008 04/26/2017     (H) 04/26/2017       Imaging:    EKG: Normal sinus rhythm.      I have personally reviewed these images and echo data    Assessment/Plan:  Steph was seen today for hospital follow up.    Diagnoses and all orders for this visit:    Coronary artery disease involving native coronary artery of native heart without angina pectoris    PAF (paroxysmal atrial fibrillation)  -     EKG 12-lead    S/P CABG x 2, 2005    Essential hypertension    Other orders  -     metoprolol succinate (TOPROL-XL) 50 MG 24 hr tablet; Take 1 tablet (50 mg total) by mouth once daily.  -     flecainide (TAMBOCOR) 50 MG Tab; Take 2 tablets (100 mg total) by mouth every 12 (twelve) hours.  -     lisinopril (PRINIVIL,ZESTRIL) 5 MG tablet; Take 1 tablet (5 mg total) by mouth once daily.         We will continue with antiarrhythmics for the time being.  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  Her ejection fraction is at least moderately reduced.  I have started her on an ACE inhibitor..  We will try Entresto as the next line.  She can come off the anticoagulation 3 weeks after the successful cardioversion.  It has to be restarted back as soon as patient is done with the esophageal procedure.        Total duration of face to face visit time 30 minutes.  Total time spent counseling greater than fifty percent of total visit time.  Counseling included discussion regarding imaging findings, diagnosis, possibilities, treatment options, risks and benefits.  The patient had many questions regarding the options and long-term effect which were all answered to my best ability.      Henry Wei MD,MRCP,RPVI,FACC,FSCAI.  Interventional Cardiology   Phone [phone]

## 2018-02-01 ENCOUNTER — TELEPHONE (OUTPATIENT)
Dept: GASTROENTEROLOGY | Facility: CLINIC | Age: 68
End: 2018-02-01

## 2018-02-01 DIAGNOSIS — C15.9 MALIGNANT NEOPLASM OF ESOPHAGUS, UNSPECIFIED LOCATION: Primary | ICD-10-CM

## 2018-02-01 RX ORDER — METOPROLOL SUCCINATE 50 MG/1
50 TABLET, EXTENDED RELEASE ORAL DAILY
Qty: 90 TABLET | Refills: 3 | Status: SHIPPED | OUTPATIENT
Start: 2018-02-01 | End: 2018-02-09 | Stop reason: SDUPTHER

## 2018-02-01 NOTE — TELEPHONE ENCOUNTER
EGD scheduled for 03/05/2018 with Dr. Gonzales. Patient had cardioversion yesterday 01/31/2018) and must wait a minimum of three weeks for procedure.

## 2018-02-12 RX ORDER — METOPROLOL SUCCINATE 50 MG/1
50 TABLET, EXTENDED RELEASE ORAL DAILY
Qty: 90 TABLET | Refills: 3 | Status: SHIPPED | OUTPATIENT
Start: 2018-02-12 | End: 2018-06-01

## 2018-02-23 ENCOUNTER — TELEPHONE (OUTPATIENT)
Dept: FAMILY MEDICINE | Facility: CLINIC | Age: 68
End: 2018-02-23

## 2018-02-23 NOTE — TELEPHONE ENCOUNTER
----- Message from Dionne Valenzuela sent at 2/19/2018  1:04 PM CST -----  Contact: patient   Patient calling to speak with the Nurse. Please advise. Call to pod. No answer.   Call back   Thanks!

## 2018-02-23 NOTE — TELEPHONE ENCOUNTER
Patient calling to schedule an appt to follow up recent hospitalization. Appt scheduled with ANTONIA Baer.

## 2018-02-28 ENCOUNTER — LAB VISIT (OUTPATIENT)
Dept: LAB | Facility: HOSPITAL | Age: 68
End: 2018-02-28
Attending: PHYSICIAN ASSISTANT
Payer: MEDICARE

## 2018-02-28 ENCOUNTER — OFFICE VISIT (OUTPATIENT)
Dept: FAMILY MEDICINE | Facility: CLINIC | Age: 68
End: 2018-02-28
Payer: MEDICARE

## 2018-02-28 VITALS
SYSTOLIC BLOOD PRESSURE: 121 MMHG | TEMPERATURE: 98 F | HEART RATE: 71 BPM | HEIGHT: 66 IN | OXYGEN SATURATION: 98 % | WEIGHT: 141.13 LBS | BODY MASS INDEX: 22.68 KG/M2 | DIASTOLIC BLOOD PRESSURE: 69 MMHG | RESPIRATION RATE: 12 BRPM

## 2018-02-28 DIAGNOSIS — L98.9 SKIN LESION: ICD-10-CM

## 2018-02-28 DIAGNOSIS — I10 ESSENTIAL HYPERTENSION: ICD-10-CM

## 2018-02-28 DIAGNOSIS — R79.89 ELEVATED BRAIN NATRIURETIC PEPTIDE (BNP) LEVEL: ICD-10-CM

## 2018-02-28 DIAGNOSIS — R73.9 HYPERGLYCEMIA: ICD-10-CM

## 2018-02-28 DIAGNOSIS — I48.91 ATRIAL FIBRILLATION WITH RVR: ICD-10-CM

## 2018-02-28 DIAGNOSIS — L65.9 HAIR LOSS: ICD-10-CM

## 2018-02-28 DIAGNOSIS — K22.89 ESOPHAGEAL MASS: Primary | ICD-10-CM

## 2018-02-28 LAB
ANION GAP SERPL CALC-SCNC: 6 MMOL/L
BNP SERPL-MCNC: 91 PG/ML
BUN SERPL-MCNC: 10 MG/DL
CALCIUM SERPL-MCNC: 9.7 MG/DL
CHLORIDE SERPL-SCNC: 101 MMOL/L
CO2 SERPL-SCNC: 27 MMOL/L
CREAT SERPL-MCNC: 0.8 MG/DL
EST. GFR  (AFRICAN AMERICAN): >60 ML/MIN/1.73 M^2
EST. GFR  (NON AFRICAN AMERICAN): >60 ML/MIN/1.73 M^2
ESTIMATED AVG GLUCOSE: 105 MG/DL
GLUCOSE SERPL-MCNC: 96 MG/DL
HBA1C MFR BLD HPLC: 5.3 %
POTASSIUM SERPL-SCNC: 4.6 MMOL/L
SODIUM SERPL-SCNC: 134 MMOL/L

## 2018-02-28 PROCEDURE — 99999 PR PBB SHADOW E&M-EST. PATIENT-LVL V: CPT | Mod: PBBFAC,,, | Performed by: PHYSICIAN ASSISTANT

## 2018-02-28 PROCEDURE — 99214 OFFICE O/P EST MOD 30 MIN: CPT | Mod: S$PBB,,, | Performed by: PHYSICIAN ASSISTANT

## 2018-02-28 PROCEDURE — 83036 HEMOGLOBIN GLYCOSYLATED A1C: CPT

## 2018-02-28 PROCEDURE — 83880 ASSAY OF NATRIURETIC PEPTIDE: CPT

## 2018-02-28 PROCEDURE — 36415 COLL VENOUS BLD VENIPUNCTURE: CPT | Mod: PO

## 2018-02-28 PROCEDURE — 99215 OFFICE O/P EST HI 40 MIN: CPT | Mod: PBBFAC,PO | Performed by: PHYSICIAN ASSISTANT

## 2018-02-28 PROCEDURE — 80048 BASIC METABOLIC PNL TOTAL CA: CPT

## 2018-02-28 NOTE — PATIENT INSTRUCTIONS
Established High Blood Pressure    High blood pressure (hypertension) is a chronic disease. Often, healthcare providers dont know what causes it. But it can be caused by certain health conditions and medicines.  If you have high blood pressure, you may not have any symptoms. If you do have symptoms, they may include headache, dizziness, changes in your vision, chest pain, and shortness of breath. But even without symptoms, high blood pressure thats not treated raises your risk for heart attack and stroke. High blood pressure is a serious health risk and shouldnt be ignored.  A blood pressure reading is made up of two numbers: a higher number over a lower number. The top number is the systolic pressure. The bottom number is the diastolic pressure. A normal blood pressure is a systolic pressure of  less than 120 over a diastolic pressure of less than 80. You will see your blood pressure readings written together. For example, a person with a systolic pressure of 188 and a diastolic pressure of 78 will have 118/78 written in the medical record.  High blood pressure is when either the top number is 140 or higher, or the bottom number is 90 or higher. This must be the result when taking your blood pressure a number of times. The blood pressures between normal and high are called prehypertension.  Home care  If you have high blood pressure, you should do what is listed below to lower your blood pressure. If you are taking medicines for high blood pressure, these methods may reduce or end your need for medicines in the future.  · Begin a weight-loss program if you are overweight.  · Cut back on how much salt you get in your diet. Heres how to do this:  ¨ Dont eat foods that have a lot of salt. These include olives, pickles, smoked meats, and salted potato chips.  ¨ Dont add salt to your food at the table.  ¨ Use only small amounts of salt when cooking.  · Start an exercise program. Talk with your healthcare  provider about the type of exercise program that would be best for you. It doesn't have to be hard. Even brisk walking for 20 minutes 3 times a week is a good form of exercise.  · Dont take medicines that stimulate the heart. This includes many over-the-counter cold and sinus decongestant pills and sprays, as well as diet pills. Check the warnings about hypertension on the label. Before buying any over-the-counter medicines or supplements, always ask the pharmacist about the product's potential interaction with your high blood pressure and your high blood pressure medicines.  · Stimulants such as amphetamine or cocaine could be deadly for someone with high blood pressure. Never take these.  · Limit how much caffeine you get in your diet. Switch to caffeine-free products.  · Stop smoking. If you are a long-time smoker, this can be hard. Talk to your healthcare provider about medicines and nicotine replacement options to help you. Also, enroll in a stop-smoking program to make it more likely that you will quit for good.  · Learn how to handle stress. This is an important part of any program to lower blood pressure. Learn about relaxation methods like meditation, yoga, or biofeedback.  · If your provider prescribed medicines, take them exactly as directed. Missing doses may cause your blood pressure get out of control.  · If you miss a dose or doses, check with your healthcare provider or pharmacist about what to do.  · Consider buying an automatic blood pressure machine. Ask your provider for a recommendation. You can get one of these at most pharmacies.     The American Heart Association recommends the following guidelines for home blood pressure monitoring:  · Don't smoke or drink coffee for 30 minutes before taking your blood pressure.  · Go to the bathroom before the test.  · Relax for 5 minutes before taking the measurement.  · Sit with your back supported (don't sit on a couch or soft chair); keep your feet on  the floor uncrossed. Place your arm on a solid flat surface (like a table) with the upper part of the arm at heart level. Place the middle of the cuff directly above the eye of the elbow. Check the monitor's instruction manual for an illustration.  · Take multiple readings. When you measure, take 2 to 3 readings one minute apart and record all of the results.  · Take your blood pressure at the same time every day, or as your healthcare provider recommends.  · Record the date, time, and blood pressure reading.  · Take the record with you to your next medical appointment. If your blood pressure monitor has a built-in memory, simply take the monitor with you to your next appointment.  · Call your provider if you have several high readings. Don't be frightened by a single high blood pressure reading, but if you get several high readings, check in with your healthcare provider.  · Note: When blood pressure reaches a systolic (top number) of 180 or higher OR diastolic (bottom number) of 110 or higher, seek emergency medical treatment.  Follow-up care  You will need to see your healthcare provider regularly. This is to check your blood pressure and to make changes to your medicines. Make a follow-up appointment as directed. Bring the record of your home blood pressure readings to the appointment.  When to seek medical advice  Call your healthcare provider right away if any of these occur:  · Blood pressure reaches a systolic (upper number) of 180 or higher OR a diastolic (bottom number) of 110 or higher  · Chest pain or shortness of breath  · Severe headache  · Throbbing or rushing sound in the ears  · Nosebleed  · Sudden severe pain in your belly (abdomen)  · Extreme drowsiness, confusion, or fainting  · Dizziness or spinning sensation (vertigo)  · Weakness of an arm or leg or one side of the face  · You have problems speaking or seeing   Date Last Reviewed: 12/1/2016  © 0984-0261 [employer]. [address] [place] [address]  [place] [address]. All rights reserved. This information is not intended as a substitute for professional medical care. Always follow your healthcare professional's instructions.

## 2018-02-28 NOTE — PROGRESS NOTES
"Subjective:       Patient ID: Steph Lira is a 67 y.o. female.    Chief Complaint: Transitional Care    Ms. Lira comes to clinic today for hospital follow up. The patient was admitted to [facility] for atrial fibrillation with RVR. The patient was admitted on 01/24/18 and underwent cardioversion with Dr. Wei. The patient was discharged on 01/26/18. The patient reports that she is feeling well and tolerated the cardioversion well. She was started on Toprol XL and atenolol was discontinued. The patient had a mild elevated in BNP and mild elevation in blood sugar.  The patient is scheduled to follow up with Dr. Wei March 15th. The patient is undergoing an EGD with Dr. Gonzales to evaluate and esophageal mass. The patient will have EGD on March 5th. The patient does complain of a "spot" on her right leg. She reports that she believes there was a bug bit. It continues to itch. The patient also complains hair loss; she has noticed this in the last several months. Her recent labs revealed that her thyroid labs were within normal range.       Review of Systems   Constitutional: Negative for activity change, appetite change and fever.   HENT: Negative for postnasal drip, rhinorrhea and sinus pressure.    Eyes: Negative for visual disturbance.   Respiratory: Negative for cough and shortness of breath.    Cardiovascular: Negative for chest pain.   Gastrointestinal: Negative for abdominal distention and abdominal pain.   Genitourinary: Negative for difficulty urinating and dysuria.   Musculoskeletal: Negative for arthralgias and myalgias.   Skin: Positive for color change and wound.   Neurological: Negative for headaches.   Hematological: Negative for adenopathy.   Psychiatric/Behavioral: The patient is not nervous/anxious.        Objective:      Physical Exam   Constitutional: She is oriented to person, place, and time.   HENT:   Mouth/Throat: Oropharynx is clear and moist. No oropharyngeal exudate.   Eyes: Conjunctivae are " normal. Pupils are equal, round, and reactive to light.   Cardiovascular: Normal rate and regular rhythm.    Pulmonary/Chest: Effort normal and breath sounds normal. She has no wheezes.   Abdominal: Soft. Bowel sounds are normal. There is no tenderness.   Musculoskeletal: She exhibits no edema.   Lymphadenopathy:     She has no cervical adenopathy.   Neurological: She is alert and oriented to person, place, and time.   Skin: No erythema.        Psychiatric: Her behavior is normal.       Assessment:       1. Esophageal mass    2. Essential hypertension    3. Atrial fibrillation with RVR    4. Elevated brain natriuretic peptide (BNP) level    5. Hyperglycemia    6. Hair loss    7. Skin lesion        Plan:     Steph was seen today for transitional care.    Diagnoses and all orders for this visit:    Esophageal mass  Follow up with Dr. Gonzales as scheduled  Essential hypertension  Controlled  Low salt diet  Continue current medication  Atrial fibrillation with RVR  S/p cardioversion  Continue metoprolol  Follow up with Dr. Wei  Elevated brain natriuretic peptide (BNP) level  -     Brain natriuretic peptide; Future    Hyperglycemia  -     Basic metabolic panel; Future  -     Hemoglobin A1c; Future    Hair loss  -     Ambulatory referral to Dermatology    Skin lesion  -     Ambulatory referral to Dermatology

## 2018-03-01 DIAGNOSIS — E87.1 HYPONATREMIA: Primary | ICD-10-CM

## 2018-03-04 ENCOUNTER — ANESTHESIA EVENT (OUTPATIENT)
Dept: ENDOSCOPY | Facility: HOSPITAL | Age: 68
End: 2018-03-04
Payer: MEDICARE

## 2018-03-05 ENCOUNTER — SURGERY (OUTPATIENT)
Age: 68
End: 2018-03-05

## 2018-03-05 ENCOUNTER — HOSPITAL ENCOUNTER (OUTPATIENT)
Facility: HOSPITAL | Age: 68
Discharge: HOME | End: 2018-03-05
Attending: INTERNAL MEDICINE | Admitting: INTERNAL MEDICINE
Payer: MEDICARE

## 2018-03-05 ENCOUNTER — ANESTHESIA (OUTPATIENT)
Dept: ENDOSCOPY | Facility: HOSPITAL | Age: 68
End: 2018-03-05
Payer: MEDICARE

## 2018-03-05 DIAGNOSIS — K22.9 LESION OF ESOPHAGUS: Primary | ICD-10-CM

## 2018-03-05 PROCEDURE — 27201679 HC KIT, CRYOTHERAPY SPRAY: Performed by: INTERNAL MEDICINE

## 2018-03-05 PROCEDURE — 25000003 PHARM REV CODE 250: Performed by: NURSE ANESTHETIST, CERTIFIED REGISTERED

## 2018-03-05 PROCEDURE — 37000008 HC ANESTHESIA 1ST 15 MINUTES: Performed by: INTERNAL MEDICINE

## 2018-03-05 PROCEDURE — 43257 EGD W/THRML TXMNT GERD: CPT | Performed by: INTERNAL MEDICINE

## 2018-03-05 PROCEDURE — 37000009 HC ANESTHESIA EA ADD 15 MINS: Performed by: INTERNAL MEDICINE

## 2018-03-05 PROCEDURE — 43270 EGD LESION ABLATION: CPT | Mod: ,,, | Performed by: INTERNAL MEDICINE

## 2018-03-05 PROCEDURE — 25000003 PHARM REV CODE 250: Performed by: INTERNAL MEDICINE

## 2018-03-05 PROCEDURE — 63600175 PHARM REV CODE 636 W HCPCS: Performed by: NURSE ANESTHETIST, CERTIFIED REGISTERED

## 2018-03-05 RX ORDER — PROPOFOL 10 MG/ML
VIAL (ML) INTRAVENOUS
Status: DISCONTINUED | OUTPATIENT
Start: 2018-03-05 | End: 2018-03-05

## 2018-03-05 RX ORDER — SODIUM CHLORIDE 9 MG/ML
INJECTION, SOLUTION INTRAVENOUS CONTINUOUS
Status: DISCONTINUED | OUTPATIENT
Start: 2018-03-05 | End: 2018-03-05 | Stop reason: HOSPADM

## 2018-03-05 RX ORDER — LIDOCAINE HCL/PF 100 MG/5ML
SYRINGE (ML) INTRAVENOUS
Status: DISCONTINUED | OUTPATIENT
Start: 2018-03-05 | End: 2018-03-05

## 2018-03-05 RX ORDER — PROPOFOL 10 MG/ML
VIAL (ML) INTRAVENOUS CONTINUOUS PRN
Status: DISCONTINUED | OUTPATIENT
Start: 2018-03-05 | End: 2018-03-05

## 2018-03-05 RX ADMIN — PROPOFOL 50 MG: 10 INJECTION, EMULSION INTRAVENOUS at 08:03

## 2018-03-05 RX ADMIN — TOPICAL ANESTHETIC 1 EACH: 200 SPRAY DENTAL; PERIODONTAL at 08:03

## 2018-03-05 RX ADMIN — SODIUM CHLORIDE: 0.9 INJECTION, SOLUTION INTRAVENOUS at 08:03

## 2018-03-05 RX ADMIN — LIDOCAINE HYDROCHLORIDE 100 MG: 20 INJECTION, SOLUTION INTRAVENOUS at 08:03

## 2018-03-05 RX ADMIN — PROPOFOL 150 MCG/KG/MIN: 10 INJECTION, EMULSION INTRAVENOUS at 08:03

## 2018-03-05 NOTE — PLAN OF CARE
DISCHARGE INSTRUCTIONS GIVEN TO PATIENT AND SPOUSE. BOTH VERBALIZED UNDERSTANDING AND NO FURTHER QUESTIONS OR CONCERNS AT THIS TIME. DR. NGUYEN AT BEDSIDE AND SPOKE TO PATIENT AND SPOUSE. PATIENT VSS AND READY FOR DISCHARGE.

## 2018-03-05 NOTE — DISCHARGE SUMMARY
Discharge Summary/Instructions after an Endoscopic Procedure    Patient Name: Steph Raymundo  Patient MRN: [id number]  Patient YOB: 1950 Monday, March 05, 2018  Robbie Gonzales MD    RESTRICTIONS:  During your procedure today, you received medications for sedation.  These medications may affect your judgment, balance and coordination.  Therefore, for 24 hours, you have the following restrictions:     - DO NOT drive a car, operate machinery, make legal/financial decisions, sign important papers or drink alcohol.      ACTIVITY:  The following day: return to full activity including work, except no heavy lifting, straining or running for 3 days if polyps were removed.    DIET:  Eat and drink normally unless instructed otherwise.     TREATMENT FOR COMMON SIDE EFFECTS:  - Mild abdominal pain, nausea, belching, bloating or excessive gas:  rest, eat lightly and use a heating pad.  - Sore Throat: treat with throat lozenges and/or gargle with warm salt water.  - Because air was used during the procedure, expelling large amounts of air from your rectum or belching is normal.  - If a bowel prep was taken, you may not have a bowel movement for 1-3 days.  This is normal.      SYMPTOMS TO WATCH FOR AND REPORT TO YOUR PHYSICIAN:  1. Abdominal pain or bloating, other than gas cramps.  2. Chest pain.  3. Back pain.  4. Signs of infection such as: chills or fever occurring within 24 hours after the procedure.  5. Rectal bleeding, which would show as bright red, maroon, or black stools. (A tablespoon of blood from the rectum is not serious, especially if hemorrhoids are present.)  6. Vomiting.  7. Weakness or dizziness.      GO DIRECTLY TO THE NEAREST EMERGENCY ROOM IF YOU HAVE ANY OF THE FOLLOWING:     Difficulty breathing  Chills and/or fever over 101 F   Persistent vomiting and/or vomiting blood   Severe abdominal pain   Severe chest pain   Black, tarry stools   Bleeding- more than one tablespoon   Any other symptom or  condition that you feel may need urgent attention    Your doctor recommends these additional instructions:  If any biopsies were taken, your doctors clinic will contact you in 1 to 2 weeks with any results.    You are being discharged to home.   Eat a mechanical soft diet for two days, then advance as tolerated to resume your previous diet.   Your physician has recommended a repeat upper endoscopy in six weeks for retreatment.    For questions, problems or results please call your physician - Robbie Gonzales MD at Work:  [phone].    EMERGENCY PHONE NUMBER: [phone],  LAB RESULTS: [phone]    IF A COMPLICATION OR EMERGENCY SITUATION ARISES AND YOU ARE UNABLE TO REACH YOUR PHYSICIAN - GO DIRECTLY TO THE EMERGENCY ROOM.

## 2018-03-05 NOTE — TRANSFER OF CARE
"Anesthesia Transfer of Care Note    Patient: Steph Lira    Procedure(s) Performed: Procedure(s) (LRB):  ESOPHAGOGASTRODUODENOSCOPY (EGD) with cryo (N/A)    Patient location: GI    Anesthesia Type: MAC    Transport from OR: Transported from OR on room air with adequate spontaneous ventilation    Post pain: adequate analgesia    Post assessment: no apparent anesthetic complications and tolerated procedure well    Post vital signs: stable    Level of consciousness: awake, alert and oriented    Nausea/Vomiting: no nausea/vomiting    Complications: none          Last vitals:   Visit Vitals  BP (!) 111/58 (BP Location: Left arm, Patient Position: Lying)   Pulse 65   Temp 36.7 °C (98 °F) (Oral)   Resp 16   Ht 5' 6" (1.676 m)   Wt 63.5 kg (140 lb)   SpO2 95%   Breastfeeding? No   BMI 22.60 kg/m²     "

## 2018-03-05 NOTE — ANESTHESIA PREPROCEDURE EVALUATION
03/05/2018  Steph Lira is a 67 y.o., female.  Pre-operative evaluation for Procedure(s) (LRB):  ESOPHAGOGASTRODUODENOSCOPY (EGD)/poss Cryo (N/A)    Steph Lira is a 67 y.o. female     Patient Active Problem List   Diagnosis    GERD (gastroesophageal reflux disease)    Chronic rhinitis    Hypothyroid    Aneurysm of heart (wall)    Benign neoplasm of skin of upper limb, including shoulder    Intermediate coronary syndrome    Phlebitis and thrombophlebitis of superficial veins of upper extremities    Simple chronic conjunctivitis    Supraventricular premature beats    Vascular disorder of skin    Chronic external jugular vein thrombosis    Long term current use of anticoagulant therapy    Magdiel's syndrome - Left Eye    Ptosis    Post-operative state    Chest pain at rest    Anxiety    CAD (coronary artery disease)    S/P CABG x 2, 2005    HTN (hypertension)    Hypercholesteremia    PAF (paroxysmal atrial fibrillation), onset 2002    Intercostal neuralgia    Pain    Radius and ulna distal fracture    Personal history of lung cancer in 1996 S/P left lobectomy-small cell    Blurred vision, bilateral    Panlobular emphysema    Carotid artery disease    Atrial fibrillation with RVR    COPD (chronic obstructive pulmonary disease)    Uncontrolled atrial flutter with rvr    Anticoagulated on Coumadin    Allergic rhinitis    Hypoalbuminemia    History of smoking 10-25 pack years, 15 pack-years, quit 1994    Persistent cough for 3 weeks or longer, onset 12/2015    S/P CABG (coronary artery bypass graft)    Supraventricular bigeminy    Chest pain, unspecified    Allergic drug rash    Dysphagia    History of colon polyps    Coronary artery disease involving autologous artery coronary bypass graft    Wrist arthritis    Esophageal mass    Gastritis    Esophageal lesion     Lesion of esophagus    Fatigue       Review of patient's allergies indicates:   Allergen Reactions    Ciprofloxacin Itching    Doxycycline Other (See Comments)     Patient had a rxn with the sun despite wearing spf 30     Pre-op Assessment    I have reviewed the Patient Summary Reports.     I have reviewed the Nursing Notes.   I have reviewed the Medications.     Review of Systems  Anesthesia Hx:  No problems with previous Anesthesia  History of prior surgery of interest to airway management or planning: Denies Family Hx of Anesthesia complications.   Denies Personal Hx of Anesthesia complications.   Social:  Former Smoker    Cardiovascular:   Exercise tolerance: good Hypertension Past MI CAD  CABG/stent Dysrhythmias atrial fibrillation Angina CHF ECG has been reviewed.    Pulmonary:   Pneumonia COPD, moderate Asthma moderate Pulm HTN PAS 39   Hepatic/GI:   GERD    Endocrine:   Hypothyroidism        Physical Exam  General:  Well nourished    Airway/Jaw/Neck:  Airway Findings: Mouth Opening: Normal Tongue: Normal  Mallampati: I  TM Distance: Normal, at least 6 cm      Dental:  Dental Findings:    Chest/Lungs:  Chest/Lungs Findings: Clear to auscultation, Normal Respiratory Rate     Heart/Vascular:  Heart Findings: Rate: Normal  Rhythm: Regular Rhythm  Sounds: Normal           Lab Results   Component Value Date    WBC 7.18 01/18/2018    HGB 13.3 01/18/2018    HCT 40.8 01/18/2018     01/18/2018    CHOL 160 01/18/2018    TRIG 119 01/18/2018    HDL 57 01/18/2018    ALT 20 01/18/2018    AST 21 01/18/2018     (L) 02/28/2018    K 4.6 02/28/2018     02/28/2018    CREATININE 0.8 02/28/2018    BUN 10 02/28/2018    CO2 27 02/28/2018    TSH 0.621 01/18/2018    INR 2.7 07/11/2017    HGBA1C 5.3 02/28/2018     CONCLUSIONS     1 - Low normal to mildly depressed left ventricular systolic function (EF 50-55%).     2 - Mild mitral regurgitation.     3 - The estimated PA systolic pressure is 39 mmHg.     4 -  Intermediate central venous pressure.     5 - Suggestion for reduce LVEF from Echo in 11/2014.     6 - Difficult windows.             This document has been electronically    SIGNED BY: Federico Quigley MD On: 05/19/2016 13:10    Anesthesia Plan  Type of Anesthesia, risks & benefits discussed:  Anesthesia Type:  general  Patient's Preference:   Intra-op Monitoring Plan: standard ASA monitors  Intra-op Monitoring Plan Comments:   Post Op Pain Control Plan:   Post Op Pain Control Plan Comments:   Induction:   IV  Beta Blocker:  Patient is on a Beta-Blocker and has received one dose within the past 24 hours (No further documentation required).       Informed Consent: Patient understands risks and agrees with Anesthesia plan.  Questions answered. Anesthesia consent signed with patient.  ASA Score: 3     Day of Surgery Review of History & Physical:    H&P update referred to the provider.         Ready For Surgery From Anesthesia Perspective.

## 2018-03-05 NOTE — PLAN OF CARE
PIV DISCONTINUED WITH CATHETER INTACT. NO SIGNS/SYMPTOMS OF SWELLING OR REDNESS AT INSERTION SITE. PRESSURE DRESSING APPLIED WITH GAUZE AND COBAN. INSTRUCTED PATIENT TO KEEP DRESSING ON FOR AT LEAST 20-30 MINUTES.

## 2018-03-05 NOTE — ANESTHESIA POSTPROCEDURE EVALUATION
"Anesthesia Post Evaluation    Patient: Stpeh Lira    Procedure(s) Performed: Procedure(s) (LRB):  ESOPHAGOGASTRODUODENOSCOPY (EGD) with cryo (N/A)    Final Anesthesia Type: MAC  Patient location during evaluation: GI PACU  Patient participation: Yes- Able to Participate  Level of consciousness: awake and alert and oriented  Post-procedure vital signs: reviewed and stable  Pain management: adequate  Airway patency: patent  PONV status at discharge: No PONV  Anesthetic complications: no      Cardiovascular status: blood pressure returned to baseline and hemodynamically stable  Respiratory status: unassisted  Hydration status: euvolemic  Follow-up not needed.        Visit Vitals  BP (!) 111/58 (BP Location: Left arm, Patient Position: Lying)   Pulse 65   Temp 36.7 °C (98 °F) (Oral)   Resp 16   Ht 5' 6" (1.676 m)   Wt 63.5 kg (140 lb)   SpO2 95%   Breastfeeding? No   BMI 22.60 kg/m²       Pain/Tamera Score: Pain Assessment Performed: Yes (3/5/2018  8:01 AM)  Presence of Pain: denies (3/5/2018  8:01 AM)  Pain Rating Prior to Med Admin: 0 (3/5/2018  8:01 AM)  Pain Rating Post Med Admin: 0 (3/5/2018  8:01 AM)      "

## 2018-03-05 NOTE — DISCHARGE INSTRUCTIONS
Post EGD Discharge Instruction    Steph Lira  3/5/2018  Robbie Gonzales MD    RESTRICTIONS ON ACTIVITY:    -DO NOT drive a car, operate machinery or make critical decisions, or do activities that require coordination or balance for 24 hours.  -Following Day: Return to full activities including work.  -Diet: Eat and drink normally unless instructed otherwise.    TREATMENT FOR COMMON SIDE EFFECTS:  *Sore Throat - treat with throat lozenges, gargle with warm salt water.  *Mild abdominal pain & bloating- rest and take liquids only.    SYMPTOMS TO WATCH FOR AND REPORT TO YOUR PHYSICIAN:  1. Chills or fever occurring 24 hours after procedure.  2. Pain in chest.  3. SEVERE abdominal pain or bloating.  4. Rectal bleeding which could be maroon or black.    If you have any questions or problems, please call your Physician:    Robbie Gonzales MD     If a complication or emergency situation arises and you are unable to reach your Physician - GO TO THE EMERGENCY ROOM.

## 2018-03-05 NOTE — PROVATION PATIENT INSTRUCTIONS
Discharge Summary/Instructions after an Endoscopic Procedure  Patient Name: Steph Raymundo  Patient MRN: [id number]  Patient YOB: 1950 Monday, March 05, 2018  Robbie Gonzales MD  RESTRICTIONS:  During your procedure today, you received medications for sedation.  These   medications may affect your judgment, balance and coordination.  Therefore,   for 24 hours, you have the following restrictions:   - DO NOT drive a car, operate machinery, make legal/financial decisions,   sign important papers or drink alcohol.    ACTIVITY:  The following day: return to full activity including work, except no heavy   lifting, straining or running for 3 days if polyps were removed.  DIET:  Eat and drink normally unless instructed otherwise.     TREATMENT FOR COMMON SIDE EFFECTS:  - Mild abdominal pain, nausea, belching, bloating or excessive gas:  rest,   eat lightly and use a heating pad.  - Sore Throat: treat with throat lozenges and/or gargle with warm salt   water.  - Because air was used during the procedure, expelling large amounts of air   from your rectum or belching is normal.  - If a bowel prep was taken, you may not have a bowel movement for 1-3 days.    This is normal.  SYMPTOMS TO WATCH FOR AND REPORT TO YOUR PHYSICIAN:  1. Abdominal pain or bloating, other than gas cramps.  2. Chest pain.  3. Back pain.  4. Signs of infection such as: chills or fever occurring within 24 hours   after the procedure.  5. Rectal bleeding, which would show as bright red, maroon, or black stools.   (A tablespoon of blood from the rectum is not serious, especially if   hemorrhoids are present.)  6. Vomiting.  7. Weakness or dizziness.  GO DIRECTLY TO THE NEAREST EMERGENCY ROOM IF YOU HAVE ANY OF THE FOLLOWING:      Difficulty breathing  Chills and/or fever over 101 F   Persistent vomiting and/or vomiting blood   Severe abdominal pain   Severe chest pain   Black, tarry stools   Bleeding- more than one tablespoon   Any other symptom or  condition that you feel may need urgent attention  Your doctor recommends these additional instructions:  If any biopsies were taken, your doctors clinic will contact you in 1 to 2   weeks with any results.  You are being discharged to home.   Eat a mechanical soft diet for two days, then advance as tolerated to resume   your previous diet.   Your physician has recommended a repeat upper endoscopy in six weeks for   retreatment.  For questions, problems or results please call your physician - Robbie Gonzales MD at Work:  [phone].  EMERGENCY PHONE NUMBER: [phone],  LAB RESULTS: [phone]  IF A COMPLICATION OR EMERGENCY SITUATION ARISES AND YOU ARE UNABLE TO REACH   YOUR PHYSICIAN - GO DIRECTLY TO THE EMERGENCY ROOM.  Robbie Gonzales MD  3/5/2018 9:17:00 AM  This report has been verified and signed electronically.

## 2018-03-05 NOTE — H&P
History & Physical - Short Stay  Gastroenterology      SUBJECTIVE:     Procedure: EGD    Chief Complaint/Indication for Procedure: Esophageal lesion    History of Present Illness:  Patient is a 67 y.o. female presents with squamous papilloma with atypia S/P cryotherapy here for retreatment. Continue with dysphagia.    PTA Medications   Medication Sig    albuterol (ACCUNEB) 1.25 mg/3 mL Nebu USE 1 VIAL (3 ML) VIA NEBULIZER EVERY 6 HOURS AS NEEDED    albuterol (PROVENTIL HFA) 90 mcg/actuation inhaler Inhale 2 puffs into the lungs every 6 (six) hours as needed for Wheezing.    apixaban 5 mg Tab Take 1 tablet (5 mg total) by mouth 2 (two) times daily.    atorvastatin (LIPITOR) 40 MG tablet Take 1 tablet (40 mg total) by mouth once daily.    bisacodyl (DULCOLAX) 5 mg EC tablet Take 1 tablet (5 mg total) by mouth daily as needed for Constipation.    CALCIUM CITRATE (CITRACAL) 500 mg TbEF Take 500 mg by mouth. 1 Tablet, Effervescent Oral  daily    cetirizine (ZYRTEC) 10 MG tablet Take 1 tablet (10 mg total) by mouth once daily.    desoximetasone (TOPICORT) 0.25 % cream Apply topically 2 (two) times daily.    diclofenac sodium (VOLTAREN) 1 % Gel Apply 2 g topically once daily.    diphenhydrAMINE (BENADRYL) 25 mg capsule Take 25 mg by mouth every 6 (six) hours as needed for Itching.    flecainide (TAMBOCOR) 50 MG Tab Take 2 tablets (100 mg total) by mouth every 12 (twelve) hours.    fluticasone (FLONASE) 50 mcg/actuation nasal spray 1 spray by Each Nare route once daily.    fluticasone-salmeterol 250-50 mcg/dose (ADVAIR DISKUS) 250-50 mcg/dose diskus inhaler USE 1 INHALATION INTO THE LUNGS TWICE A DAY. RINSE MOUTH AFTER USE TO PREVENT THRUSH    ipratropium (ATROVENT) 0.03 % nasal spray 2 sprays by Nasal route 2 (two) times daily. As needed (Patient taking differently: 2 sprays by Nasal route once daily. As needed)    levothyroxine (SYNTHROID) 88 MCG tablet Take 1 tablet (88 mcg total) by mouth once daily.     lisinopril (PRINIVIL,ZESTRIL) 5 MG tablet Take 1 tablet (5 mg total) by mouth once daily.    metoprolol succinate (TOPROL-XL) 50 MG 24 hr tablet     metoprolol succinate (TOPROL-XL) 50 MG 24 hr tablet Take 1 tablet (50 mg total) by mouth once daily.    montelukast (SINGULAIR) 10 mg tablet Take 1 tablet (10 mg total) by mouth every evening.    multivitamin-minerals-lutein (CENTRUM SILVER) Tab Take 1 tablet by mouth once daily. 1 Tablet Oral Every day    pantoprazole (PROTONIX) 40 MG tablet Take 1 tablet (40 mg total) by mouth once daily. Take 30 minutes prior to breakfast    RETIN-A 0.05 % cream APPLY THIN FILM TO FACE AT NIGHT AFTER MOISTURIZING    SPIRIVA WITH HANDIHALER 18 mcg inhalation capsule INHALE THE CONTENTS OF 1 CAPSULE DAILY    triamcinolone acetonide 0.1% (KENALOG) 0.1 % cream Apply topically 2 (two) times daily.       Review of patient's allergies indicates:   Allergen Reactions    Ciprofloxacin Itching    Doxycycline Other (See Comments)     Patient had a rxn with the sun despite wearing spf 30        Past Medical History:   Diagnosis Date    *Atrial fibrillation     and Hx PSVT    Allergy     chronic     Arthritis     fingers    Bronchitis March 2013    CAD (coronary artery disease) 2005    CABGx2 in 2005 and 2 MI after with 4 stents    Cancer 1996    small cell lung cancer s/p resection of 1/3 lung, 5 ribs and muscle mass then had chemo and radiation    Cataract     OD     CHF (congestive heart failure) past Hx    Chronic rhinitis     Clot past Hx    external jugular, now stented, occluded, had phlebitis; now revascularized    Colon polyp     COPD (chronic obstructive pulmonary disease)     no oxygen or nebulizers    COPD exacerbation 5/8/13    improved 5/14/13 after steroid pack,antibiotics    Former smoker     GERD (gastroesophageal reflux disease)     Heart block     Hyperlipidemia     Hypertension     pt states does not have //    MI (myocardial infarction) 2005     Posterior vitreous detachment of left eye     Thyroid disease      Past Surgical History:   Procedure Laterality Date    ADENOIDECTOMY      APPENDECTOMY      BACK SURGERY      lumbar    CARDIAC SURGERY      CATARACT EXTRACTION Left     OS    CATARACT EXTRACTION, BILATERAL      left eye only    CHOLECYSTECTOMY      COLONOSCOPY  2012    repeat in 5 years    COLONOSCOPY N/A 2017    Procedure: COLONOSCOPY;  Surgeon: Kal Elise MD;  [facility] END[facility];  Service: Endoscopy;  Laterality: N/A;    CORONARY ARTERY BYPASS GRAFT      2 vessel    EAR PINNA RECONSTRUCTION W/ RIB GRAFT  2010/2011    x2    EYE SURGERY  Dec 2012    ptosis repair    FIRST RIB REMOVAL   with lung resection    HYSTERECTOMY      LUNG LOBECTOMY  1996    left lung for cancer    SHOULDER SURGERY      scapular entrapment after lung surgery, needed 5 ribs removed left side    SYMPOTHATIC NERVE LEFT SIDE       with lung surgery    TONSILLECTOMY      TONSILLECTOMY, ADENOIDECTOMY, BILATERAL MYRINGOTOMY AND TUBES      UPPER GASTROINTESTINAL ENDOSCOPY  2016    Dr. Koo    VASCULAR SURGERY      stents place in jugualr vein     Family History   Problem Relation Age of Onset    Allergic rhinitis Father     Cancer Father     Hypertension Father     Allergies Father     Allergic rhinitis Son     Asthma Son     Stroke Mother     Allergies Mother     Allergic rhinitis Sister     Asthma Sister     Diverticulitis Brother     No Known Problems Daughter     No Known Problems Maternal Aunt     No Known Problems Maternal Uncle     No Known Problems Paternal Aunt     No Known Problems Paternal Uncle     Colon polyps Maternal Grandmother       of colon blockage    No Known Problems Maternal Grandfather     No Known Problems Paternal Grandmother     No Known Problems Paternal Grandfather     No Known Problems Brother     No Known Problems Sister     Angioedema Neg Hx     Eczema Neg Hx      Immunodeficiency Neg Hx     Urticaria Neg Hx     Skin cancer Neg Hx     Amblyopia Neg Hx     Blindness Neg Hx     Cataracts Neg Hx     Diabetes Neg Hx     Glaucoma Neg Hx     Macular degeneration Neg Hx     Retinal detachment Neg Hx     Strabismus Neg Hx     Thyroid disease Neg Hx     Colon cancer Neg Hx     Melanoma Neg Hx      Social History   Substance Use Topics    Smoking status: Former Smoker     Packs/day: 2.00     Years: 20.00     Types: Cigarettes     Quit date: 1/18/1994    Smokeless tobacco: Never Used    Alcohol use No       Review of Systems:  Constitutional: no fever or chills  Respiratory: no cough or shortness of breath  Cardiovascular: no chest pain or palpitations  Gastrointestinal: positive for dysphagia    OBJECTIVE:     Vital Signs (Most Recent)       Physical Exam:  General: well developed, well nourished  Lungs:  clear to auscultation bilaterally and normal respiratory effort  Heart: regular rate, S1, S2 normal  Abdomen: soft, non-tender non-distented; bowel sounds normal; no masses,  no organomegaly    Laboratory  CBC: No results for input(s): WBC, RBC, HGB, HCT, PLT, MCV, MCH, MCHC in the last 168 hours.  CMP:   Recent Labs  Lab 02/28/18  1315   GLU 96   CALCIUM 9.7   *   K 4.6   CO2 27      BUN 10   CREATININE 0.8     Coagulation: No results for input(s): LABPROT, INR, APTT in the last 168 hours.      Diagnostic Results:      ASSESSMENT/PLAN:     Esophageal squamous papilloma    Plan: EGD    Anesthesia Plan: MAC    ASA Grade: ASA 3 - Patient with moderate systemic disease with functional limitations     The impression and plan was discussed in detail with the patient and family. All questions have been answered and the patient voices understanding of our plan at this point. The risk of the procedure was discussed in detail which includes but not limited to bleeding, infection, perforation in some cases requiring surgery with its spectrum of complications.

## 2018-03-06 VITALS
WEIGHT: 140 LBS | TEMPERATURE: 98 F | SYSTOLIC BLOOD PRESSURE: 103 MMHG | RESPIRATION RATE: 20 BRPM | OXYGEN SATURATION: 98 % | DIASTOLIC BLOOD PRESSURE: 65 MMHG | BODY MASS INDEX: 22.5 KG/M2 | HEART RATE: 70 BPM | HEIGHT: 66 IN

## 2018-03-07 ENCOUNTER — DOCUMENTATION ONLY (OUTPATIENT)
Dept: FAMILY MEDICINE | Facility: CLINIC | Age: 68
End: 2018-03-07

## 2018-03-07 ENCOUNTER — TELEPHONE (OUTPATIENT)
Dept: FAMILY MEDICINE | Facility: CLINIC | Age: 68
End: 2018-03-07

## 2018-03-07 ENCOUNTER — TELEPHONE (OUTPATIENT)
Dept: GASTROENTEROLOGY | Facility: CLINIC | Age: 68
End: 2018-03-07

## 2018-03-07 DIAGNOSIS — Z12.31 ENCOUNTER FOR SCREENING MAMMOGRAM FOR MALIGNANT NEOPLASM OF BREAST: Primary | ICD-10-CM

## 2018-03-07 DIAGNOSIS — C34.90 SMALL CELL CARCINOMA OF LUNG, UNSPECIFIED LATERALITY: Primary | ICD-10-CM

## 2018-03-07 DIAGNOSIS — C15.9 MALIGNANT NEOPLASM OF ESOPHAGUS, UNSPECIFIED LOCATION: Primary | ICD-10-CM

## 2018-03-07 NOTE — TELEPHONE ENCOUNTER
----- Message from Nancy Miller sent at 3/7/2018 12:23 PM CST -----  Contact: pt  Pt states that she has received a letter that she is past time for her to do her annual mammogram would like to have orders put in system then scheduled...[phone] (home)

## 2018-03-09 ENCOUNTER — PATIENT MESSAGE (OUTPATIENT)
Dept: DERMATOLOGY | Facility: CLINIC | Age: 68
End: 2018-03-09

## 2018-03-09 ENCOUNTER — TELEPHONE (OUTPATIENT)
Dept: GASTROENTEROLOGY | Facility: CLINIC | Age: 68
End: 2018-03-09

## 2018-03-09 DIAGNOSIS — C15.9 MALIGNANT NEOPLASM OF ESOPHAGUS, UNSPECIFIED LOCATION: Primary | ICD-10-CM

## 2018-03-09 DIAGNOSIS — J30.9 CHRONIC ALLERGIC RHINITIS, UNSPECIFIED SEASONALITY, UNSPECIFIED TRIGGER: ICD-10-CM

## 2018-03-09 RX ORDER — CETIRIZINE HYDROCHLORIDE 10 MG/1
TABLET ORAL
Qty: 90 TABLET | Refills: 2 | Status: SHIPPED | OUTPATIENT
Start: 2018-03-09 | End: 2018-12-04 | Stop reason: SDUPTHER

## 2018-03-12 ENCOUNTER — TELEPHONE (OUTPATIENT)
Dept: GASTROENTEROLOGY | Facility: CLINIC | Age: 68
End: 2018-03-12

## 2018-03-12 NOTE — TELEPHONE ENCOUNTER
Spoke with patient. EGD with Cryo scheduled for 4/16 at 11. Reviewed prep instructions. Ms Lira verbalized understanding.

## 2018-03-13 ENCOUNTER — OFFICE VISIT (OUTPATIENT)
Dept: SURGERY | Facility: CLINIC | Age: 68
End: 2018-03-13
Payer: MEDICARE

## 2018-03-13 ENCOUNTER — NURSE TRIAGE (OUTPATIENT)
Dept: ADMINISTRATIVE | Facility: CLINIC | Age: 68
End: 2018-03-13

## 2018-03-13 ENCOUNTER — TELEPHONE (OUTPATIENT)
Dept: FAMILY MEDICINE | Facility: CLINIC | Age: 68
End: 2018-03-13

## 2018-03-13 ENCOUNTER — OFFICE VISIT (OUTPATIENT)
Dept: FAMILY MEDICINE | Facility: CLINIC | Age: 68
End: 2018-03-13
Payer: MEDICARE

## 2018-03-13 VITALS
BODY MASS INDEX: 22.82 KG/M2 | TEMPERATURE: 99 F | HEIGHT: 66 IN | SYSTOLIC BLOOD PRESSURE: 103 MMHG | HEART RATE: 75 BPM | DIASTOLIC BLOOD PRESSURE: 68 MMHG | WEIGHT: 142 LBS

## 2018-03-13 VITALS
SYSTOLIC BLOOD PRESSURE: 103 MMHG | HEIGHT: 66 IN | BODY MASS INDEX: 22.82 KG/M2 | DIASTOLIC BLOOD PRESSURE: 68 MMHG | HEART RATE: 75 BPM | TEMPERATURE: 99 F | WEIGHT: 142 LBS

## 2018-03-13 DIAGNOSIS — K64.5 EXTERNAL HEMORRHOID, THROMBOSED: Primary | ICD-10-CM

## 2018-03-13 DIAGNOSIS — I10 ESSENTIAL HYPERTENSION: ICD-10-CM

## 2018-03-13 DIAGNOSIS — K64.3 GRADE IV HEMORRHOIDS: Primary | ICD-10-CM

## 2018-03-13 DIAGNOSIS — K64.9 HEMORRHOIDS, UNSPECIFIED HEMORRHOID TYPE: Primary | ICD-10-CM

## 2018-03-13 DIAGNOSIS — Z79.01 CHRONIC ANTICOAGULATION: ICD-10-CM

## 2018-03-13 DIAGNOSIS — I48.0 PAF (PAROXYSMAL ATRIAL FIBRILLATION): ICD-10-CM

## 2018-03-13 PROCEDURE — 99999 PR PBB SHADOW E&M-EST. PATIENT-LVL III: CPT | Mod: PBBFAC,,, | Performed by: SURGERY

## 2018-03-13 PROCEDURE — 99203 OFFICE O/P NEW LOW 30 MIN: CPT | Mod: S$PBB,,, | Performed by: SURGERY

## 2018-03-13 PROCEDURE — 99213 OFFICE O/P EST LOW 20 MIN: CPT | Mod: PBBFAC,27,PO | Performed by: SURGERY

## 2018-03-13 PROCEDURE — 99213 OFFICE O/P EST LOW 20 MIN: CPT | Mod: S$PBB,,, | Performed by: NURSE PRACTITIONER

## 2018-03-13 PROCEDURE — 99215 OFFICE O/P EST HI 40 MIN: CPT | Mod: PBBFAC,PO | Performed by: NURSE PRACTITIONER

## 2018-03-13 PROCEDURE — 99999 PR PBB SHADOW E&M-EST. PATIENT-LVL V: CPT | Mod: PBBFAC,,, | Performed by: NURSE PRACTITIONER

## 2018-03-13 RX ORDER — LIDOCAINE HYDROCHLORIDE 20 MG/ML
JELLY TOPICAL
Qty: 60 ML | Refills: 0 | Status: SHIPPED | OUTPATIENT
Start: 2018-03-13 | End: 2018-03-22

## 2018-03-13 RX ORDER — HYDROCODONE BITARTRATE AND ACETAMINOPHEN 5; 325 MG/1; MG/1
1 TABLET ORAL EVERY 6 HOURS PRN
Qty: 28 TABLET | Refills: 0 | Status: ON HOLD | OUTPATIENT
Start: 2018-03-13 | End: 2018-04-16 | Stop reason: HOSPADM

## 2018-03-13 NOTE — PROGRESS NOTES
Subjective:       Patient ID: Steph Lira is a 67 y.o. female.    Chief Complaint: Rectal Bleeding and Hemorrhoids    HPI   Chief Complaint  Chief Complaint   Patient presents with    Rectal Bleeding    Hemorrhoids       HPI  Steph Lira is a 67 y.o. female with medical diagnoses as listed in the medical history and problem list that presents with c/o rectal bleeding due to a painful hemorrhoid. Has noticed a small amount of blood to toilet tissue. On Sunday took sennokot 2 tablets, on Monday had some diarrhea and then soft stool.  Irritated hemorrhoid that describes as large, external and firm and painful.  Has been using Preparation H without relief. Aggravating factors include sitting, passing gas.  Symptoms are constant.  Patient regularly treated for hypertension and blood pressure is well controlled today 103/68.  BMI 22.60 and patient has gained 2 pounds since last visit. Established patient with last clinic appointment 2/28/2018.        PAST MEDICAL HISTORY:  Past Medical History:   Diagnosis Date    *Atrial fibrillation     and Hx PSVT    Allergy     chronic     Arthritis     fingers    Bronchitis March 2013    CAD (coronary artery disease) 2005    CABGx2 in 2005 and 2 MI after with 4 stents    Cancer 1996    small cell lung cancer s/p resection of 1/3 lung, 5 ribs and muscle mass then had chemo and radiation    Cataract     OD     CHF (congestive heart failure) past Hx    Chronic rhinitis     Clot past Hx    external jugular, now stented, occluded, had phlebitis; now revascularized    Colon polyp     COPD (chronic obstructive pulmonary disease)     no oxygen or nebulizers    COPD exacerbation 5/8/13    improved 5/14/13 after steroid pack,antibiotics    Former smoker     GERD (gastroesophageal reflux disease)     Heart block     Hyperlipidemia     Hypertension     pt states does not have //    MI (myocardial infarction) 2005    Posterior vitreous detachment of left eye      Thyroid disease        PAST SURGICAL HISTORY:  Past Surgical History:   Procedure Laterality Date    ADENOIDECTOMY      APPENDECTOMY      BACK SURGERY      lumbar    CARDIAC SURGERY      CATARACT EXTRACTION Left     OS    CATARACT EXTRACTION, BILATERAL      left eye only    CHOLECYSTECTOMY      COLONOSCOPY  03/2012    repeat in 5 years    COLONOSCOPY N/A 6/19/2017    Procedure: COLONOSCOPY;  Surgeon: Kal Elise MD;  [facility] END[facility];  Service: Endoscopy;  Laterality: N/A;    CORONARY ARTERY BYPASS GRAFT  2005    2 vessel    EAR PINNA RECONSTRUCTION W/ RIB GRAFT  2010/2011    x2    EYE SURGERY  Dec 2012    ptosis repair    FIRST RIB REMOVAL  1996 with lung resection    HYSTERECTOMY      LUNG LOBECTOMY  1996    left lung for cancer    SHOULDER SURGERY  2010,2011    scapular entrapment after lung surgery, needed 5 ribs removed left side    SYMPOTHATIC NERVE LEFT SIDE      1996 with lung surgery    TONSILLECTOMY      TONSILLECTOMY, ADENOIDECTOMY, BILATERAL MYRINGOTOMY AND TUBES      UPPER GASTROINTESTINAL ENDOSCOPY  05/2016    Dr. Koo    VASCULAR SURGERY      stents place in jugualr vein       SOCIAL HISTORY:  Social History     Social History    Marital status:      Spouse name: N/A    Number of children: N/A    Years of education: N/A     Occupational History    retired      Social History Main Topics    Smoking status: Former Smoker     Packs/day: 2.00     Years: 20.00     Types: Cigarettes     Quit date: 1/18/1994    Smokeless tobacco: Never Used    Alcohol use No    Drug use: No    Sexual activity: Yes     Partners: Male     Other Topics Concern    Are You Pregnant Or Think You May Be? No    Breast-Feeding No     Social History Narrative    No narrative on file       FAMILY HISTORY:  Family History   Problem Relation Age of Onset    Allergic rhinitis Father     Cancer Father     Hypertension Father     Allergies Father     Allergic rhinitis Son     Asthma  Son     Stroke Mother     Allergies Mother     Allergic rhinitis Sister     Asthma Sister     Diverticulitis Brother     No Known Problems Daughter     No Known Problems Maternal Aunt     No Known Problems Maternal Uncle     No Known Problems Paternal Aunt     No Known Problems Paternal Uncle     Colon polyps Maternal Grandmother       of colon blockage    No Known Problems Maternal Grandfather     No Known Problems Paternal Grandmother     No Known Problems Paternal Grandfather     No Known Problems Brother     No Known Problems Sister     Angioedema Neg Hx     Eczema Neg Hx     Immunodeficiency Neg Hx     Urticaria Neg Hx     Skin cancer Neg Hx     Amblyopia Neg Hx     Blindness Neg Hx     Cataracts Neg Hx     Diabetes Neg Hx     Glaucoma Neg Hx     Macular degeneration Neg Hx     Retinal detachment Neg Hx     Strabismus Neg Hx     Thyroid disease Neg Hx     Colon cancer Neg Hx     Melanoma Neg Hx        ALLERGIES AND MEDICATIONS: updated and reviewed.  Review of patient's allergies indicates:   Allergen Reactions    Ciprofloxacin Itching    Doxycycline Other (See Comments)     Patient had a rxn with the sun despite wearing spf 30     Current Outpatient Prescriptions   Medication Sig Dispense Refill    albuterol (ACCUNEB) 1.25 mg/3 mL Nebu USE 1 VIAL (3 ML) VIA NEBULIZER EVERY 6 HOURS AS NEEDED 90 mL 2    albuterol (PROVENTIL HFA) 90 mcg/actuation inhaler Inhale 2 puffs into the lungs every 6 (six) hours as needed for Wheezing. 3 Inhaler 4    apixaban 5 mg Tab Take 1 tablet (5 mg total) by mouth 2 (two) times daily. 180 tablet 3    atorvastatin (LIPITOR) 40 MG tablet Take 1 tablet (40 mg total) by mouth once daily. 90 tablet 3    bisacodyl (DULCOLAX) 5 mg EC tablet Take 1 tablet (5 mg total) by mouth daily as needed for Constipation. 30 tablet 11    CALCIUM CITRATE (CITRACAL) 500 mg TbEF Take 500 mg by mouth. 1 Tablet, Effervescent Oral  daily      cetirizine (ZYRTEC)  10 MG tablet TAKE 1 TABLET EVERY EVENING 90 tablet 2    desoximetasone (TOPICORT) 0.25 % cream Apply topically 2 (two) times daily. 180 g 3    diclofenac sodium (VOLTAREN) 1 % Gel Apply 2 g topically once daily. 100 g prn    diphenhydrAMINE (BENADRYL) 25 mg capsule Take 25 mg by mouth every 6 (six) hours as needed for Itching.      flecainide (TAMBOCOR) 50 MG Tab Take 2 tablets (100 mg total) by mouth every 12 (twelve) hours. 120 tablet 11    fluticasone (FLONASE) 50 mcg/actuation nasal spray 1 spray by Each Nare route once daily. 3 Bottle 3    fluticasone-salmeterol 250-50 mcg/dose (ADVAIR DISKUS) 250-50 mcg/dose diskus inhaler USE 1 INHALATION INTO THE LUNGS TWICE A DAY. RINSE MOUTH AFTER USE TO PREVENT THRUSH 3 each 3    ipratropium (ATROVENT) 0.03 % nasal spray 2 sprays by Nasal route 2 (two) times daily. As needed (Patient taking differently: 2 sprays by Nasal route once daily. As needed) 90 mL 6    levothyroxine (SYNTHROID) 88 MCG tablet Take 1 tablet (88 mcg total) by mouth once daily. 90 tablet 3    lisinopril (PRINIVIL,ZESTRIL) 5 MG tablet Take 1 tablet (5 mg total) by mouth once daily. 90 tablet 3    metoprolol succinate (TOPROL-XL) 50 MG 24 hr tablet Take 1 tablet (50 mg total) by mouth once daily. 90 tablet 3    montelukast (SINGULAIR) 10 mg tablet Take 1 tablet (10 mg total) by mouth every evening. 90 tablet 3    multivitamin-minerals-lutein (CENTRUM SILVER) Tab Take 1 tablet by mouth once daily. 1 Tablet Oral Every day      pantoprazole (PROTONIX) 40 MG tablet Take 1 tablet (40 mg total) by mouth once daily. Take 30 minutes prior to breakfast 90 tablet 3    RETIN-A 0.05 % cream APPLY THIN FILM TO FACE AT NIGHT AFTER MOISTURIZING 45 g 3    SPIRIVA WITH HANDIHALER 18 mcg inhalation capsule INHALE THE CONTENTS OF 1 CAPSULE DAILY 90 capsule 3    triamcinolone acetonide 0.1% (KENALOG) 0.1 % cream Apply topically 2 (two) times daily. 1 Tube 11     No current facility-administered medications  "for this visit.      Facility-Administered Medications Ordered in Other Visits   Medication Dose Route Frequency Provider Last Rate Last Dose    lidocaine  20 mg/mL (2%) 20 mg/mL (2 %) injection              Review of Systems   Constitutional: Negative for appetite change, chills, fatigue and fever.   Respiratory: Negative for cough and shortness of breath.    Cardiovascular: Negative for chest pain and palpitations.   Gastrointestinal: Positive for constipation. Negative for abdominal pain, diarrhea, nausea and vomiting.        Frequent constipation treated with OTC medication, current hemorrhoid   Genitourinary: Negative for difficulty urinating.   Skin: Negative for rash and wound.   Neurological: Negative for dizziness, numbness and headaches.   Psychiatric/Behavioral: Positive for sleep disturbance.        Sleep disturbed due to hemorrhoid pain.       Objective:       Vitals:    03/13/18 1033   BP: 103/68   BP Location: Right arm   Patient Position: Sitting   BP Method: Large (Automatic)   Pulse: 75   Temp: 98.9 °F (37.2 °C)   TempSrc: Oral   Weight: 64.4 kg (142 lb)   Height: 5' 6" (1.676 m)     Physical Exam   Constitutional: She is oriented to person, place, and time. Vital signs are normal. She appears well-developed and well-nourished. No distress.   HENT:   Head: Normocephalic and atraumatic.   Neck: Normal carotid pulses present. Carotid bruit is not present.   Cardiovascular: Normal rate, regular rhythm, S1 normal, S2 normal, normal heart sounds, intact distal pulses and normal pulses.    No murmur heard.  Sinus rhythm   Pulmonary/Chest: Effort normal and breath sounds normal. No respiratory distress. She has no decreased breath sounds. She has no wheezes. She has no rhonchi. She has no rales.   Genitourinary: Rectal exam shows external hemorrhoid.   Genitourinary Comments: 2 large external thrombosed hemorrhoids at rectum, one about 3 cm in diameter, 1 about 2 cm in diameter, purple discoloration " noted with some scant blood drainage, exquisitely painful to palpation   Musculoskeletal: Normal range of motion.   Neurological: She is alert and oriented to person, place, and time. She has normal strength.   Skin: Skin is warm, dry and intact. She is not diaphoretic. No pallor.   Psychiatric: She has a normal mood and affect. Her speech is normal and behavior is normal. Judgment and thought content normal. Cognition and memory are normal.   Nursing note and vitals reviewed.      Assessment:       1. External hemorrhoid, thrombosed    2. Essential hypertension    3. PAF (paroxysmal atrial fibrillation), onset 2002    4. Chronic anticoagulation    5. BMI 22.0-22.9, adult        Plan:   Steph was seen today for rectal bleeding and hemorrhoids.    Diagnoses and all orders for this visit:    External hemorrhoid, thrombosed  -     Ambulatory referral to General Surgery  - Patient sent straight to Dr. Nunes office    Essential hypertension   Controlled 103/68 on current medication  PAF (paroxysmal atrial fibrillation), onset 2002   Auscultated Sr, stable on current medications  Chronic anticoagulation   Stable on eliquis   Scant blood drainage noted from hemorrhoids  BMI 22.0-22.9, adult   Healthy weight    Follow-up if symptoms worsen or fail to improve.     Patient readiness: acceptance and barriers:none    During the course of the visit the patient was educated and counseled about the following:     Hypertension:   Medication: no change.  Dietary sodium restriction.  Regular aerobic exercise.    Goals: Hypertension: Reduce Blood Pressure    Did patient meet goals/outcomes: Yes    The following self management tools provided: declined    Patient Instructions (the written plan) was given to the patient/family.     Time spent with patient: 15 minutes    Barriers to medications present (no )    Adverse reactions to current medications (no)    Over the counter medications reviewed (Yes)

## 2018-03-13 NOTE — TELEPHONE ENCOUNTER
queried without inappropriate activity.  Hydrocodone acetaminophen 5/325 mg 1 PO Q6 hours as needed for pain #28, 0 TOBIN Delgado

## 2018-03-13 NOTE — PROGRESS NOTES
Subjective:       Patient ID: Steph Lira is a 67 y.o. female.    Chief Complaint: Consult (Thrombosed hemorrhoid)    HPI  Pleasant 66 yo F referred to me in consultation from Dr Mon. Pt notes that she developed significant  Swelling and pressure in the perianal area over the past 72 hours.  NOtes a firm hard knot in the area that is painful.  Denies drainage. n o fever/chills.  Pt with PMHx of CAD, HTN and carotid disease.  NO significant abdominal or surgical history.  Has had very minima bleeding with BM  Review of Systems   Constitutional: Negative for activity change, appetite change, fever and unexpected weight change.   HENT: Negative for congestion and facial swelling.    Respiratory: Negative for chest tightness, shortness of breath and wheezing.    Cardiovascular: Negative for chest pain.   Gastrointestinal: Positive for anal bleeding and rectal pain. Negative for abdominal distention, abdominal pain, blood in stool, constipation, diarrhea, nausea and vomiting.   Genitourinary: Negative for difficulty urinating, dysuria and frequency.   Musculoskeletal: Negative for arthralgias and joint swelling.   Skin: Negative for color change and wound.   Neurological: Negative for dizziness.   Hematological: Negative for adenopathy. Does not bruise/bleed easily.   Psychiatric/Behavioral: Negative for agitation and decreased concentration.       Objective:      Physical Exam   Constitutional: She is oriented to person, place, and time. She appears well-developed and well-nourished.   HENT:   Head: Normocephalic and atraumatic.   Eyes: Pupils are equal, round, and reactive to light.   Neck: Normal range of motion. Neck supple. No tracheal deviation present. No thyromegaly present.   Cardiovascular: Normal rate, regular rhythm and normal heart sounds.    No murmur heard.  Pulmonary/Chest: Effort normal and breath sounds normal. She exhibits no tenderness.   Abdominal: Soft. Bowel sounds are normal. She exhibits  no distension, no abdominal bruit, no pulsatile midline mass and no mass. There is no hepatosplenomegaly. There is no tenderness. There is no rigidity, no rebound, no guarding, no tenderness at McBurney's point and negative Solis's sign. No hernia. Hernia confirmed negative in the ventral area.   Genitourinary: Rectum normal.   Genitourinary Comments: Ext exam demonstrates large swollen inflamed Grade IV hemorrhoids in the R ant and R post position   Musculoskeletal: Normal range of motion.   Lymphadenopathy:     She has no cervical adenopathy.   Neurological: She is alert and oriented to person, place, and time.   Skin: Skin is warm. No rash noted. No erythema.   Psychiatric: She has a normal mood and affect.   Vitals reviewed.      Assessment:     Inflamed hemorrhoids  No diagnosis found.    Plan:       D/w pt.  Pt with Gr IV hemorrhoids and would benefit from hemorrhoidectomy of the R ant and R post columns.  D/w pt that she is at high risk for bleeding complications as she takes Eliquis.  Safest approach if able would be to defer surgery until Friday as the took a dose yesterday PM.  Pt prefers to defer surgery until Friday.  Unfortunately I am out of town and will arrange f/u with Dr Naranjo.

## 2018-03-13 NOTE — LETTER
March 13, 2018      Sandy Conklin, JANIE  [address]  [facility] [address]           [facility]  [address]  [facility] [address]  Phone: [phone]          Patient: Steph Lira   MR Number: [id number]   YOB: 1950   Date of Visit: 3/13/2018       Dear Sandy Conklin:    Thank you for referring Steph Lira to me for evaluation. Attached you will find relevant portions of my assessment and plan of care.    If you have questions, please do not hesitate to call me. I look forward to following Steph Lira along with you.    Sincerely,    Joaquim Nunes MD    Enclosure  CC:  No Recipients    If you would like to receive this communication electronically, please contact [email or web address] or [phone] to request more information on [employer] access.    For providers and/or their staff who would like to refer a patient to [facility], please contact us through our one-stop-shop provider referral line, [facility], at [phone].    If you feel you have received this communication in error or would no longer like to receive these types of communications, please e-mail [email or web address]

## 2018-03-13 NOTE — TELEPHONE ENCOUNTER
Notify patient :  Lidocaine jelly to affected area and referred to Dr. Nunes for eval-make appt for pt

## 2018-03-13 NOTE — TELEPHONE ENCOUNTER
Reason for Disposition   MODERATE-SEVERE rectal pain (i.e., interferes with school, work, or sleep)    Protocols used: ST RECTAL SYMPTOMS-A-OH    Pt states that she has a hemorrhoid and does not know what to do. Pain a 5/10 if she strains or sits down. Care advice given, appt made.

## 2018-03-14 ENCOUNTER — HOSPITAL ENCOUNTER (OUTPATIENT)
Dept: PREADMISSION TESTING | Facility: HOSPITAL | Age: 68
Discharge: HOME | End: 2018-03-14
Attending: SURGERY
Payer: MEDICARE

## 2018-03-14 ENCOUNTER — TELEPHONE (OUTPATIENT)
Dept: SURGERY | Facility: CLINIC | Age: 68
End: 2018-03-14

## 2018-03-14 ENCOUNTER — OFFICE VISIT (OUTPATIENT)
Dept: SURGERY | Facility: CLINIC | Age: 68
End: 2018-03-14
Payer: MEDICARE

## 2018-03-14 VITALS
WEIGHT: 142 LBS | HEIGHT: 66 IN | BODY MASS INDEX: 22.82 KG/M2 | SYSTOLIC BLOOD PRESSURE: 103 MMHG | DIASTOLIC BLOOD PRESSURE: 68 MMHG

## 2018-03-14 VITALS — WEIGHT: 142 LBS | BODY MASS INDEX: 22.82 KG/M2 | HEIGHT: 66 IN

## 2018-03-14 DIAGNOSIS — K64.1 GRADE II HEMORRHOIDS: Primary | ICD-10-CM

## 2018-03-14 DIAGNOSIS — K64.9 HEMORRHOIDS: ICD-10-CM

## 2018-03-14 LAB
ALBUMIN SERPL BCP-MCNC: 3.5 G/DL
ALP SERPL-CCNC: 70 U/L
ALT SERPL W/O P-5'-P-CCNC: 15 U/L
ANION GAP SERPL CALC-SCNC: 7 MMOL/L
AST SERPL-CCNC: 20 U/L
BASOPHILS # BLD AUTO: 0 K/UL
BASOPHILS NFR BLD: 0.6 %
BILIRUB SERPL-MCNC: 0.6 MG/DL
BUN SERPL-MCNC: 6 MG/DL
CALCIUM SERPL-MCNC: 9.4 MG/DL
CHLORIDE SERPL-SCNC: 101 MMOL/L
CO2 SERPL-SCNC: 27 MMOL/L
CREAT SERPL-MCNC: 0.7 MG/DL
DIFFERENTIAL METHOD: ABNORMAL
EOSINOPHIL # BLD AUTO: 0.2 K/UL
EOSINOPHIL NFR BLD: 2.9 %
ERYTHROCYTE [DISTWIDTH] IN BLOOD BY AUTOMATED COUNT: 12.6 %
EST. GFR  (AFRICAN AMERICAN): >60 ML/MIN/1.73 M^2
EST. GFR  (NON AFRICAN AMERICAN): >60 ML/MIN/1.73 M^2
GLUCOSE SERPL-MCNC: 86 MG/DL
HCT VFR BLD AUTO: 37 %
HGB BLD-MCNC: 12.4 G/DL
LYMPHOCYTES # BLD AUTO: 1.5 K/UL
LYMPHOCYTES NFR BLD: 22.1 %
MCH RBC QN AUTO: 30.6 PG
MCHC RBC AUTO-ENTMCNC: 33.5 G/DL
MCV RBC AUTO: 91 FL
MONOCYTES # BLD AUTO: 0.5 K/UL
MONOCYTES NFR BLD: 7 %
NEUTROPHILS # BLD AUTO: 4.7 K/UL
NEUTROPHILS NFR BLD: 67.4 %
PLATELET # BLD AUTO: 273 K/UL
PMV BLD AUTO: 7.1 FL
POTASSIUM SERPL-SCNC: 3.9 MMOL/L
PROT SERPL-MCNC: 6.6 G/DL
RBC # BLD AUTO: 4.06 M/UL
SODIUM SERPL-SCNC: 135 MMOL/L
WBC # BLD AUTO: 6.9 K/UL

## 2018-03-14 PROCEDURE — 99900103 DSU ONLY-NO CHARGE-INITIAL HR (STAT)

## 2018-03-14 PROCEDURE — 99213 OFFICE O/P EST LOW 20 MIN: CPT | Mod: PBBFAC,PO | Performed by: SURGERY

## 2018-03-14 PROCEDURE — 85025 COMPLETE CBC W/AUTO DIFF WBC: CPT

## 2018-03-14 PROCEDURE — 99999 PR PBB SHADOW E&M-EST. PATIENT-LVL III: CPT | Mod: PBBFAC,,, | Performed by: SURGERY

## 2018-03-14 PROCEDURE — 99213 OFFICE O/P EST LOW 20 MIN: CPT | Mod: S$PBB,,, | Performed by: SURGERY

## 2018-03-14 PROCEDURE — 80053 COMPREHEN METABOLIC PANEL: CPT

## 2018-03-14 PROCEDURE — 36415 COLL VENOUS BLD VENIPUNCTURE: CPT

## 2018-03-14 RX ORDER — SODIUM CHLORIDE 9 MG/ML
INJECTION, SOLUTION INTRAVENOUS CONTINUOUS
Status: CANCELLED | OUTPATIENT
Start: 2018-03-14

## 2018-03-14 NOTE — TELEPHONE ENCOUNTER
Patient was seen by Dr. Nunes on Tuesday, 3/13/18 at 11:30am and he referred her to Dr. Mandujano on Wednesday, 3/14/18 at 10:15am.  Dr. Nunes will be out of town on Friday.  [place]

## 2018-03-14 NOTE — PROGRESS NOTES
Subjective:       Patient ID: Steph Lira is a 67 y.o. female.    Chief Complaint: Consult (hemorrhoids, has stopped Eliquis)      HPI 67-year-old female presents after seeing Dr. Nunes yesterday for grade 4 internal hemorrhoids.  This started after she took some Senokot for constipation.  The hemorrhoids were very large.  She had a small amount of bleeding this morning.  She's got no real relief and this has not been improving.  Dr. Nunes recommended surgical excision with the patient is on Eliquis taken her last dose on Monday.  I was asked to see her to proceed with surgery as he will be out of town.    Past Medical History:   Diagnosis Date    *Atrial fibrillation     and Hx PSVT    Allergy     chronic     Arthritis     fingers    Benign tumor of throat     Bronchitis March 2013    CAD (coronary artery disease) 2005    CABGx2 in 2005 and 2 MI after with 4 stents    Cancer 1996    small cell lung cancer s/p resection of 1/3 lung, 5 ribs and muscle mass then had chemo and radiation    Cataract     OD     CHF (congestive heart failure) past Hx    Chronic rhinitis     Clot past Hx    external jugular, now stented, occluded, had phlebitis; now revascularized    Colon polyp     COPD (chronic obstructive pulmonary disease)     no oxygen or nebulizers    COPD exacerbation 5/8/13    improved 5/14/13 after steroid pack,antibiotics    Former smoker     GERD (gastroesophageal reflux disease)     Heart block     Hyperlipidemia     Hypertension     pt states does not have //    MI (myocardial infarction) 2005    Posterior vitreous detachment of left eye     Thyroid disease      Past Surgical History:   Procedure Laterality Date    ADENOIDECTOMY      APPENDECTOMY      BACK SURGERY      lumbar    CARDIAC SURGERY      CATARACT EXTRACTION Left     OS    CATARACT EXTRACTION, BILATERAL      left eye only    CHOLECYSTECTOMY      COLONOSCOPY  03/2012    repeat in 5 years    COLONOSCOPY N/A 6/19/2017     Procedure: COLONOSCOPY;  Surgeon: Kal Elise MD;  Location: [facility];  Service: Endoscopy;  Laterality: N/A;    CORONARY ARTERY BYPASS GRAFT  2005    2 vessel    EYE SURGERY  Dec 2012    ptosis repair    FIRST RIB REMOVAL  1996 with lung resection    HYSTERECTOMY      LUNG LOBECTOMY  1996    left lung for cancer    SHOULDER SURGERY  2010,2011    scapular entrapment after lung surgery, needed 5 ribs removed left side    SYMPOTHATIC NERVE LEFT SIDE      1996 with lung surgery    TONSILLECTOMY      TONSILLECTOMY, ADENOIDECTOMY, BILATERAL MYRINGOTOMY AND TUBES      UPPER GASTROINTESTINAL ENDOSCOPY  05/2016    Dr. Koo    VASCULAR SURGERY      stents place in jugualr vein         Current Outpatient Prescriptions:     albuterol (ACCUNEB) 1.25 mg/3 mL Nebu, USE 1 VIAL (3 ML) VIA NEBULIZER EVERY 6 HOURS AS NEEDED, Disp: 90 mL, Rfl: 2    albuterol (PROVENTIL HFA) 90 mcg/actuation inhaler, Inhale 2 puffs into the lungs every 6 (six) hours as needed for Wheezing., Disp: 3 Inhaler, Rfl: 4    atorvastatin (LIPITOR) 40 MG tablet, Take 1 tablet (40 mg total) by mouth once daily., Disp: 90 tablet, Rfl: 3    bisacodyl (DULCOLAX) 5 mg EC tablet, Take 1 tablet (5 mg total) by mouth daily as needed for Constipation., Disp: 30 tablet, Rfl: 11    CALCIUM CITRATE (CITRACAL) 500 mg TbEF, Take 500 mg by mouth. 1 Tablet, Effervescent Oral  daily, Disp: , Rfl:     cetirizine (ZYRTEC) 10 MG tablet, TAKE 1 TABLET EVERY EVENING, Disp: 90 tablet, Rfl: 2    desoximetasone (TOPICORT) 0.25 % cream, Apply topically 2 (two) times daily., Disp: 180 g, Rfl: 3    diclofenac sodium (VOLTAREN) 1 % Gel, Apply 2 g topically once daily., Disp: 100 g, Rfl: prn    diphenhydrAMINE (BENADRYL) 25 mg capsule, Take 25 mg by mouth every 6 (six) hours as needed for Itching., Disp: , Rfl:     flecainide (TAMBOCOR) 50 MG Tab, Take 2 tablets (100 mg total) by mouth every 12 (twelve) hours., Disp: 120 tablet, Rfl: 11    fluticasone  (FLONASE) 50 mcg/actuation nasal spray, 1 spray by Each Nare route once daily., Disp: 3 Bottle, Rfl: 3    fluticasone-salmeterol 250-50 mcg/dose (ADVAIR DISKUS) 250-50 mcg/dose diskus inhaler, USE 1 INHALATION INTO THE LUNGS TWICE A DAY. RINSE MOUTH AFTER USE TO PREVENT THRUSH, Disp: 3 each, Rfl: 3    hydrocodone-acetaminophen 5-325mg (NORCO) 5-325 mg per tablet, Take 1 tablet by mouth every 6 (six) hours as needed for Pain., Disp: 28 tablet, Rfl: 0    ipratropium (ATROVENT) 0.03 % nasal spray, 2 sprays by Nasal route 2 (two) times daily. As needed (Patient taking differently: 2 sprays by Nasal route once daily. As needed), Disp: 90 mL, Rfl: 6    levothyroxine (SYNTHROID) 88 MCG tablet, Take 1 tablet (88 mcg total) by mouth once daily., Disp: 90 tablet, Rfl: 3    lidocaine HCL 2% (XYLOCAINE) 2 % jelly, Apply topically as needed., Disp: 60 mL, Rfl: 0    lisinopril (PRINIVIL,ZESTRIL) 5 MG tablet, Take 1 tablet (5 mg total) by mouth once daily., Disp: 90 tablet, Rfl: 3    metoprolol succinate (TOPROL-XL) 50 MG 24 hr tablet, Take 1 tablet (50 mg total) by mouth once daily., Disp: 90 tablet, Rfl: 3    montelukast (SINGULAIR) 10 mg tablet, Take 1 tablet (10 mg total) by mouth every evening., Disp: 90 tablet, Rfl: 3    multivitamin-minerals-lutein (CENTRUM SILVER) Tab, Take 1 tablet by mouth once daily. 1 Tablet Oral Every day, Disp: , Rfl:     pantoprazole (PROTONIX) 40 MG tablet, Take 1 tablet (40 mg total) by mouth once daily. Take 30 minutes prior to breakfast, Disp: 90 tablet, Rfl: 3    RETIN-A 0.05 % cream, APPLY THIN FILM TO FACE AT NIGHT AFTER MOISTURIZING, Disp: 45 g, Rfl: 3    SPIRIVA WITH HANDIHALER 18 mcg inhalation capsule, INHALE THE CONTENTS OF 1 CAPSULE DAILY, Disp: 90 capsule, Rfl: 3    apixaban 5 mg Tab, Take 1 tablet (5 mg total) by mouth 2 (two) times daily., Disp: 180 tablet, Rfl: 3    triamcinolone acetonide 0.1% (KENALOG) 0.1 % cream, Apply topically 2 (two) times daily., Disp: 1 Tube,  Rfl: 11  No current facility-administered medications for this visit.     Facility-Administered Medications Ordered in Other Visits:     lidocaine  20 mg/mL (2%) 20 mg/mL (2 %) injection, , , ,     Review of patient's allergies indicates:   Allergen Reactions    Ciprofloxacin Itching    Doxycycline Other (See Comments)     Patient had a rxn with the sun despite wearing spf 30       Family History   Problem Relation Age of Onset    Allergic rhinitis Father     Cancer Father     Hypertension Father     Allergies Father     Allergic rhinitis Son     Asthma Son     Stroke Mother     Allergies Mother     Allergic rhinitis Sister     Asthma Sister     Diverticulitis Brother     No Known Problems Daughter     No Known Problems Maternal Aunt     No Known Problems Maternal Uncle     No Known Problems Paternal Aunt     No Known Problems Paternal Uncle     Colon polyps Maternal Grandmother       of colon blockage    No Known Problems Maternal Grandfather     No Known Problems Paternal Grandmother     No Known Problems Paternal Grandfather     No Known Problems Brother     No Known Problems Sister     Angioedema Neg Hx     Eczema Neg Hx     Immunodeficiency Neg Hx     Urticaria Neg Hx     Skin cancer Neg Hx     Amblyopia Neg Hx     Blindness Neg Hx     Cataracts Neg Hx     Diabetes Neg Hx     Glaucoma Neg Hx     Macular degeneration Neg Hx     Retinal detachment Neg Hx     Strabismus Neg Hx     Thyroid disease Neg Hx     Colon cancer Neg Hx     Melanoma Neg Hx      Social History     Social History    Marital status:      Spouse name: N/A    Number of children: N/A    Years of education: N/A     Occupational History    retired      Social History Main Topics    Smoking status: Former Smoker     Packs/day: 2.00     Years: 20.00     Types: Cigarettes     Quit date: 1994    Smokeless tobacco: Never Used    Alcohol use No    Drug use: No    Sexual activity: Yes      Partners: Male     Other Topics Concern    Are You Pregnant Or Think You May Be? No    Breast-Feeding No     Social History Narrative    No narrative on file       Review of Systems   Constitutional: Negative for activity change, chills, fever and unexpected weight change.   HENT: Negative for congestion, sore throat, trouble swallowing and voice change.    Eyes: Negative for redness and visual disturbance.   Respiratory: Negative for cough, shortness of breath and wheezing.    Cardiovascular: Negative for chest pain and palpitations.   Gastrointestinal: Positive for anal bleeding and rectal pain. Negative for abdominal pain, nausea and vomiting.   Endocrine: Negative.    Genitourinary: Negative for dysuria, frequency and hematuria.   Musculoskeletal: Negative for arthralgias, back pain and neck pain.   Skin: Negative for rash and wound.   Allergic/Immunologic: Negative.    Neurological: Negative for dizziness, weakness and headaches.   Hematological: Negative for adenopathy.   Psychiatric/Behavioral: Negative for agitation and dysphoric mood. The patient is not nervous/anxious.      Objective:     Physical Exam   Constitutional: She is oriented to person, place, and time. She appears well-developed and well-nourished. No distress.   HENT:   Head: Normocephalic and atraumatic.   Eyes: Conjunctivae and EOM are normal. Pupils are equal, round, and reactive to light. No scleral icterus.   Neck: Normal range of motion. No thyromegaly present.   Cardiovascular: Normal rate and regular rhythm.    No murmur heard.  Pulmonary/Chest: Effort normal and breath sounds normal. She has no wheezes. She has no rales.   Abdominal: Soft. Bowel sounds are normal. She exhibits no distension and no mass. There is no tenderness. No hernia.   Genitourinary:   Genitourinary Comments: Large prolapsing internal hemorrhoids on the right.  There are 2 distinct columns but these above each other immediately.  There is no active bleeding.   Rectal exam was quite difficult secondary to pain.   Musculoskeletal: Normal range of motion. She exhibits no edema.   Lymphadenopathy:     She has no cervical adenopathy.   Neurological: She is alert and oriented to person, place, and time. No cranial nerve deficit.   Skin: Skin is warm and dry. No rash noted. No erythema.   Psychiatric: She has a normal mood and affect. Her behavior is normal.     Assessment:     Encounter Diagnoses   Name Primary?    Grade II hemorrhoids Yes    Hemorrhoids        Plan:      1.  Plan hemorrhoidectomy.  2. Risks and benefits of the planned procedure were discussed at length with the patient.  Risks and benefits of not proceeding with the procedure were discussed as well. All questions were answered. The patient expressed clear understanding and would like to proceed with the procedure as discussed.

## 2018-03-14 NOTE — TELEPHONE ENCOUNTER
----- Message from Maria Guadalupe Davalos sent at 3/14/2018  9:25 AM CDT -----  Please call pt for appt for hemorrhoids

## 2018-03-15 ENCOUNTER — ANESTHESIA EVENT (OUTPATIENT)
Dept: SURGERY | Facility: HOSPITAL | Age: 68
End: 2018-03-15
Payer: MEDICARE

## 2018-03-15 ENCOUNTER — OFFICE VISIT (OUTPATIENT)
Dept: CARDIOLOGY | Facility: CLINIC | Age: 68
End: 2018-03-15
Payer: MEDICARE

## 2018-03-15 VITALS
OXYGEN SATURATION: 99 % | WEIGHT: 138 LBS | HEIGHT: 66 IN | SYSTOLIC BLOOD PRESSURE: 108 MMHG | HEART RATE: 64 BPM | BODY MASS INDEX: 22.18 KG/M2 | DIASTOLIC BLOOD PRESSURE: 72 MMHG

## 2018-03-15 DIAGNOSIS — I25.810 CORONARY ARTERY DISEASE INVOLVING AUTOLOGOUS ARTERY CORONARY BYPASS GRAFT WITHOUT ANGINA PECTORIS: ICD-10-CM

## 2018-03-15 DIAGNOSIS — Z01.818 PREOPERATIVE CLEARANCE: ICD-10-CM

## 2018-03-15 DIAGNOSIS — I25.5 ISCHEMIC CARDIOMYOPATHY: Primary | ICD-10-CM

## 2018-03-15 DIAGNOSIS — I10 ESSENTIAL HYPERTENSION: ICD-10-CM

## 2018-03-15 DIAGNOSIS — I48.0 PAF (PAROXYSMAL ATRIAL FIBRILLATION): ICD-10-CM

## 2018-03-15 PROCEDURE — 99213 OFFICE O/P EST LOW 20 MIN: CPT | Mod: PBBFAC,PO | Performed by: INTERNAL MEDICINE

## 2018-03-15 PROCEDURE — 99999 PR PBB SHADOW E&M-EST. PATIENT-LVL III: CPT | Mod: PBBFAC,,, | Performed by: INTERNAL MEDICINE

## 2018-03-15 PROCEDURE — 93010 ELECTROCARDIOGRAM REPORT: CPT | Mod: S$PBB,,, | Performed by: INTERNAL MEDICINE

## 2018-03-15 PROCEDURE — 93005 ELECTROCARDIOGRAM TRACING: CPT | Mod: PBBFAC,PO | Performed by: INTERNAL MEDICINE

## 2018-03-15 PROCEDURE — 99214 OFFICE O/P EST MOD 30 MIN: CPT | Mod: S$PBB,,, | Performed by: INTERNAL MEDICINE

## 2018-03-15 NOTE — PROGRESS NOTES
[facility]    CC: Preoperative risk stratification  Chief Complaint   Patient presents with    Follow-up     Cardiac Clearance       Patient ID: Steph Lira is a 67 y.o. female with a past medical history of coronary artery bypass grafting, moderately reduced ejection fraction, paroxysmal atrial fibrillation     HPI  Patient is due to undergo hemorrhoid surgery.  She has all ready stopped her Eliquis few days back.  She denies any chest pain, shortness of breath, orthopnea, PND, syncope or presyncope    Past Medical History:   Diagnosis Date    *Atrial fibrillation     and Hx PSVT    Allergy     chronic     Arthritis     fingers    Benign tumor of throat     Bronchitis March 2013    CAD (coronary artery disease) 2005    CABGx2 in 2005 and 2 MI after with 4 stents    Cancer 1996    small cell lung cancer s/p resection of 1/3 lung, 5 ribs and muscle mass then had chemo and radiation    Cataract     OD     CHF (congestive heart failure) past Hx    Chronic rhinitis     Clot past Hx    external jugular, now stented, occluded, had phlebitis; now revascularized    Colon polyp     COPD (chronic obstructive pulmonary disease)     no oxygen or nebulizers    COPD exacerbation 5/8/13    improved 5/14/13 after steroid pack,antibiotics    Former smoker     GERD (gastroesophageal reflux disease)     Heart block     Hyperlipidemia     Hypertension     pt states does not have //    MI (myocardial infarction) 2005    Posterior vitreous detachment of left eye     Thyroid disease      Past Surgical History:   Procedure Laterality Date    ADENOIDECTOMY      APPENDECTOMY      BACK SURGERY      lumbar    CARDIAC SURGERY      CATARACT EXTRACTION Left     OS    CATARACT EXTRACTION, BILATERAL      left eye only    CHOLECYSTECTOMY      COLONOSCOPY  03/2012    repeat in 5 years    COLONOSCOPY N/A 6/19/2017    Procedure: COLONOSCOPY;  Surgeon: Kal Elise MD;  [facility];  Service:  Endoscopy;  Laterality: N/A;    CORONARY ARTERY BYPASS GRAFT      2 vessel    EYE SURGERY  Dec 2012    ptosis repair    FIRST RIB REMOVAL   with lung resection    HYSTERECTOMY      LUNG LOBECTOMY      left lung for cancer    SHOULDER SURGERY  ,    scapular entrapment after lung surgery, needed 5 ribs removed left side    SYMPOTHATIC NERVE LEFT SIDE       with lung surgery    TONSILLECTOMY      TONSILLECTOMY, ADENOIDECTOMY, BILATERAL MYRINGOTOMY AND TUBES      UPPER GASTROINTESTINAL ENDOSCOPY  2016    Dr. Koo    VASCULAR SURGERY      stents place in jugualr vein     Social History     Social History    Marital status:      Spouse name: N/A    Number of children: N/A    Years of education: N/A     Occupational History    retired      Social History Main Topics    Smoking status: Former Smoker     Packs/day: 2.00     Years: 20.00     Types: Cigarettes     Quit date: 1994    Smokeless tobacco: Never Used    Alcohol use No    Drug use: No    Sexual activity: Yes     Partners: Male     Other Topics Concern    Are You Pregnant Or Think You May Be? No    Breast-Feeding No     Social History Narrative    No narrative on file     Family History   Problem Relation Age of Onset    Allergic rhinitis Father     Cancer Father     Hypertension Father     Allergies Father     Allergic rhinitis Son     Asthma Son     Stroke Mother     Allergies Mother     Allergic rhinitis Sister     Asthma Sister     Diverticulitis Brother     No Known Problems Daughter     No Known Problems Maternal Aunt     No Known Problems Maternal Uncle     No Known Problems Paternal Aunt     No Known Problems Paternal Uncle     Colon polyps Maternal Grandmother       of colon blockage    No Known Problems Maternal Grandfather     No Known Problems Paternal Grandmother     No Known Problems Paternal Grandfather     No Known Problems Brother     No Known Problems Sister      Angioedema Neg Hx     Eczema Neg Hx     Immunodeficiency Neg Hx     Urticaria Neg Hx     Skin cancer Neg Hx     Amblyopia Neg Hx     Blindness Neg Hx     Cataracts Neg Hx     Diabetes Neg Hx     Glaucoma Neg Hx     Macular degeneration Neg Hx     Retinal detachment Neg Hx     Strabismus Neg Hx     Thyroid disease Neg Hx     Colon cancer Neg Hx     Melanoma Neg Hx        Review of patient's allergies indicates:   Allergen Reactions    Ciprofloxacin Itching    Doxycycline Other (See Comments)     Patient had a rxn with the sun despite wearing spf 30       Medication List with Changes/Refills   Current Medications    ALBUTEROL (ACCUNEB) 1.25 MG/3 ML NEBU    USE 1 VIAL (3 ML) VIA NEBULIZER EVERY 6 HOURS AS NEEDED    ALBUTEROL (PROVENTIL HFA) 90 MCG/ACTUATION INHALER    Inhale 2 puffs into the lungs every 6 (six) hours as needed for Wheezing.    APIXABAN 5 MG TAB    Take 1 tablet (5 mg total) by mouth 2 (two) times daily.    ATORVASTATIN (LIPITOR) 40 MG TABLET    Take 1 tablet (40 mg total) by mouth once daily.    BISACODYL (DULCOLAX) 5 MG EC TABLET    Take 1 tablet (5 mg total) by mouth daily as needed for Constipation.    CALCIUM CITRATE (CITRACAL) 500 MG TBEF    Take 500 mg by mouth. 1 Tablet, Effervescent Oral  daily    CETIRIZINE (ZYRTEC) 10 MG TABLET    TAKE 1 TABLET EVERY EVENING    DESOXIMETASONE (TOPICORT) 0.25 % CREAM    Apply topically 2 (two) times daily.    DICLOFENAC SODIUM (VOLTAREN) 1 % GEL    Apply 2 g topically once daily.    DIPHENHYDRAMINE (BENADRYL) 25 MG CAPSULE    Take 25 mg by mouth every 6 (six) hours as needed for Itching.    FLECAINIDE (TAMBOCOR) 50 MG TAB    Take 2 tablets (100 mg total) by mouth every 12 (twelve) hours.    FLUTICASONE (FLONASE) 50 MCG/ACTUATION NASAL SPRAY    1 spray by Each Nare route once daily.    FLUTICASONE-SALMETEROL 250-50 MCG/DOSE (ADVAIR DISKUS) 250-50 MCG/DOSE DISKUS INHALER    USE 1 INHALATION INTO THE LUNGS TWICE A DAY. RINSE MOUTH AFTER USE  "TO PREVENT THRUSH    HYDROCODONE-ACETAMINOPHEN 5-325MG (NORCO) 5-325 MG PER TABLET    Take 1 tablet by mouth every 6 (six) hours as needed for Pain.    IPRATROPIUM (ATROVENT) 0.03 % NASAL SPRAY    2 sprays by Nasal route 2 (two) times daily. As needed    LEVOTHYROXINE (SYNTHROID) 88 MCG TABLET    Take 1 tablet (88 mcg total) by mouth once daily.    LIDOCAINE HCL 2% (XYLOCAINE) 2 % JELLY    Apply topically as needed.    LISINOPRIL (PRINIVIL,ZESTRIL) 5 MG TABLET    Take 1 tablet (5 mg total) by mouth once daily.    METOPROLOL SUCCINATE (TOPROL-XL) 50 MG 24 HR TABLET    Take 1 tablet (50 mg total) by mouth once daily.    MONTELUKAST (SINGULAIR) 10 MG TABLET    Take 1 tablet (10 mg total) by mouth every evening.    MULTIVITAMIN-MINERALS-LUTEIN (CENTRUM SILVER) TAB    Take 1 tablet by mouth once daily. 1 Tablet Oral Every day    PANTOPRAZOLE (PROTONIX) 40 MG TABLET    Take 1 tablet (40 mg total) by mouth once daily. Take 30 minutes prior to breakfast    RETIN-A 0.05 % CREAM    APPLY THIN FILM TO FACE AT NIGHT AFTER MOISTURIZING    SPIRIVA WITH HANDIHALER 18 MCG INHALATION CAPSULE    INHALE THE CONTENTS OF 1 CAPSULE DAILY    TRIAMCINOLONE ACETONIDE 0.1% (KENALOG) 0.1 % CREAM    Apply topically 2 (two) times daily.       Review of Systems  Constitution: Denies chills, fever, and sweats.  HENT: Denies headaches or blurry vision.  Cardiovascular: Denies chest pain or irregular heart beat.  Respiratory: Denies cough or shortness of breath.  Gastrointestinal: Denies abdominal pain, nausea, or vomiting.  Musculoskeletal: Denies muscle cramps.  Neurological: Denies dizziness or focal weakness.  Psychiatric/Behavioral: Normal mental status.  Hematologic/Lymphatic: Denies bleeding problem or easy bruising/bleeding.  Skin: Denies rash or suspicious lesions    Physical Examination  /72 (BP Location: Left arm)   Pulse 64   Ht 5' 6" (1.676 m)   Wt 62.6 kg (138 lb 0.1 oz)   SpO2 99%   BMI 22.28 kg/m²     Constitutional: No " acute distress, conversant  HEENT: Sclera anicteric, Pupils equal, round and reactive to light, extraocular motions intact, Oropharynx clear  Neck: No JVD, no carotid bruits  Cardiovascular: regular rate and rhythm, no murmur, rubs or gallops, normal S1/S2  Pulmonary: Clear to auscultation bilaterally  Abdominal: Abdomen soft, nontender, nondistended, positive bowel sounds  Extremities: No lower extremity edema,   Pulses:  Carotid pulses are 2+ on the right side, and 2+ on the left side.  Radial pulses are 2+ on the right side, and 2+ on the left side.       Skin: No ecchymosis, erythema, or ulcers  Psych: Alert and oriented x 3, appropriate affect  Neuro: CNII-XII intact, no focal deficits    Labs:  Most Recent Data  CBC:   Lab Results   Component Value Date    WBC 6.90 03/14/2018    HGB 12.4 03/14/2018    HCT 37.0 03/14/2018     03/14/2018    MCV 91 03/14/2018    RDW 12.6 03/14/2018     BMP:   Lab Results   Component Value Date     (L) 03/14/2018    K 3.9 03/14/2018     03/14/2018    CO2 27 03/14/2018    BUN 6 (L) 03/14/2018    CREATININE 0.7 03/14/2018    GLU 86 03/14/2018    CALCIUM 9.4 03/14/2018    MG 2.0 09/30/2016     LFTS;   Lab Results   Component Value Date    PROT 6.6 03/14/2018    ALBUMIN 3.5 03/14/2018    BILITOT 0.6 03/14/2018    AST 20 03/14/2018    ALKPHOS 70 03/14/2018    ALT 15 03/14/2018     COAGS:   Lab Results   Component Value Date    INR 2.7 07/11/2017     FLP:   Lab Results   Component Value Date    CHOL 160 01/18/2018    HDL 57 01/18/2018    LDLCALC 79.2 01/18/2018    TRIG 119 01/18/2018    CHOLHDL 35.6 01/18/2018     CARDIAC:   Lab Results   Component Value Date    TROPONINI 0.008 04/26/2017    BNP 91 02/28/2018       Imaging:    EKG: Normal sinus rhythm.  Non- Specific T-wave inversion in the septal leads    Echo:  2018  CONCLUSIONS     1 - Mildly to moderately depressed left ventricular systolic function (EF 40-45%).     2 - Impaired LV relaxation, normal LAP (grade 1  diastolic dysfunction).     3 - Mild aortic regurgitation.     4 - Mild mitral regurgitation.       I have personally reviewed these images and echo data    Assessment/Plan:  Steph was seen today for follow-up.    Diagnoses and all orders for this visit:    Ischemic cardiomyopathy  -     2D Echo w/ Color Flow Doppler; Future    Coronary artery disease involving autologous artery coronary bypass graft without angina pectoris    PAF (paroxysmal atrial fibrillation), onset 2002    Essential hypertension    Preoperative clearance        Patient is currently stable from a cardiovascular perspective.  She is due for hemorrhoid surgery.  She is already off her anticoagulation.  She needs to be started back on as soon as it is deemed to be appropriate by the surgeon.    As far as her overall cardiovascular mortality and morbidity goes she is at least at an intermediate risk because of her underlying cardiovascular status.  SHe is already on a beta blocker.  Her vitals need to be closely monitored during the surgery.  No further tests are currently required prior to her upcoming hemorrhoid surgery tomorrow    Follow-up in about 5 months (around 8/15/2018).          Total duration of face to face visit time 30 minutes.  Total time spent counseling greater than fifty percent of total visit time.  Counseling included discussion regarding imaging findings, diagnosis, possibilities, treatment options, risks and benefits.  The patient had many questions regarding the options and long-term effect which were all answered to my best ability.      Henry Wei MD,MRCP,RPVI,FACC,FSCAI.  Interventional Cardiology   Phone [phone]

## 2018-03-16 ENCOUNTER — TELEPHONE (OUTPATIENT)
Dept: SURGERY | Facility: CLINIC | Age: 68
End: 2018-03-16

## 2018-03-16 ENCOUNTER — HOSPITAL ENCOUNTER (OUTPATIENT)
Facility: HOSPITAL | Age: 68
Discharge: HOME | End: 2018-03-16
Attending: SURGERY | Admitting: SURGERY
Payer: MEDICARE

## 2018-03-16 ENCOUNTER — SURGERY (OUTPATIENT)
Age: 68
End: 2018-03-16

## 2018-03-16 ENCOUNTER — ANESTHESIA (OUTPATIENT)
Dept: SURGERY | Facility: HOSPITAL | Age: 68
End: 2018-03-16
Payer: MEDICARE

## 2018-03-16 ENCOUNTER — PATIENT MESSAGE (OUTPATIENT)
Dept: FAMILY MEDICINE | Facility: CLINIC | Age: 68
End: 2018-03-16

## 2018-03-16 DIAGNOSIS — K64.1 GRADE II HEMORRHOIDS: Primary | ICD-10-CM

## 2018-03-16 PROCEDURE — 25000003 PHARM REV CODE 250: Performed by: ANESTHESIOLOGY

## 2018-03-16 PROCEDURE — 71000033 HC RECOVERY, INTIAL HOUR: Performed by: SURGERY

## 2018-03-16 PROCEDURE — 36000707: Performed by: SURGERY

## 2018-03-16 PROCEDURE — 63600175 PHARM REV CODE 636 W HCPCS: Performed by: SURGERY

## 2018-03-16 PROCEDURE — 27201423 OPTIME MED/SURG SUP & DEVICES STERILE SUPPLY: Performed by: SURGERY

## 2018-03-16 PROCEDURE — 63600175 PHARM REV CODE 636 W HCPCS: Performed by: NURSE ANESTHETIST, CERTIFIED REGISTERED

## 2018-03-16 PROCEDURE — 37000008 HC ANESTHESIA 1ST 15 MINUTES: Performed by: SURGERY

## 2018-03-16 PROCEDURE — 25000003 PHARM REV CODE 250: Performed by: NURSE ANESTHETIST, CERTIFIED REGISTERED

## 2018-03-16 PROCEDURE — D9220A PRA ANESTHESIA: Mod: ANES,,, | Performed by: ANESTHESIOLOGY

## 2018-03-16 PROCEDURE — 63600175 PHARM REV CODE 636 W HCPCS: Performed by: ANESTHESIOLOGY

## 2018-03-16 PROCEDURE — 36000706: Performed by: SURGERY

## 2018-03-16 PROCEDURE — 88304 TISSUE EXAM BY PATHOLOGIST: CPT | Performed by: PATHOLOGY

## 2018-03-16 PROCEDURE — 25000003 PHARM REV CODE 250: Performed by: SURGERY

## 2018-03-16 PROCEDURE — C9290 INJ, BUPIVACAINE LIPOSOME: HCPCS | Performed by: SURGERY

## 2018-03-16 PROCEDURE — 37000009 HC ANESTHESIA EA ADD 15 MINS: Performed by: SURGERY

## 2018-03-16 PROCEDURE — D9220A PRA ANESTHESIA: Mod: CRNA,,, | Performed by: NURSE ANESTHETIST, CERTIFIED REGISTERED

## 2018-03-16 PROCEDURE — 99900103 DSU ONLY-NO CHARGE-INITIAL HR (STAT): Performed by: SURGERY

## 2018-03-16 PROCEDURE — 71000015 HC POSTOP RECOV 1ST HR: Performed by: SURGERY

## 2018-03-16 PROCEDURE — 46260 REMOVE IN/EX HEM GROUPS 2+: CPT | Mod: ,,, | Performed by: SURGERY

## 2018-03-16 PROCEDURE — 71000039 HC RECOVERY, EACH ADD'L HOUR: Performed by: SURGERY

## 2018-03-16 PROCEDURE — 99900104 DSU ONLY-NO CHARGE-EA ADD'L HR (STAT): Performed by: SURGERY

## 2018-03-16 RX ORDER — MIDAZOLAM HYDROCHLORIDE 1 MG/ML
INJECTION, SOLUTION INTRAMUSCULAR; INTRAVENOUS
Status: DISCONTINUED | OUTPATIENT
Start: 2018-03-16 | End: 2018-03-16

## 2018-03-16 RX ORDER — LIDOCAINE HYDROCHLORIDE 10 MG/ML
5 INJECTION, SOLUTION EPIDURAL; INFILTRATION; INTRACAUDAL; PERINEURAL ONCE
Status: DISCONTINUED | OUTPATIENT
Start: 2018-03-16 | End: 2018-03-16 | Stop reason: HOSPADM

## 2018-03-16 RX ORDER — LIDOCAINE HCL/PF 100 MG/5ML
SYRINGE (ML) INTRAVENOUS
Status: DISCONTINUED | OUTPATIENT
Start: 2018-03-16 | End: 2018-03-16

## 2018-03-16 RX ORDER — HYDROCODONE BITARTRATE AND ACETAMINOPHEN 5; 325 MG/1; MG/1
1 TABLET ORAL EVERY 4 HOURS PRN
Status: CANCELLED | OUTPATIENT
Start: 2018-03-16

## 2018-03-16 RX ORDER — FENTANYL CITRATE 50 UG/ML
25 INJECTION, SOLUTION INTRAMUSCULAR; INTRAVENOUS EVERY 5 MIN PRN
Status: DISCONTINUED | OUTPATIENT
Start: 2018-03-16 | End: 2018-03-16 | Stop reason: HOSPADM

## 2018-03-16 RX ORDER — HYDROCODONE BITARTRATE AND ACETAMINOPHEN 7.5; 325 MG/1; MG/1
1 TABLET ORAL EVERY 4 HOURS PRN
Qty: 30 TABLET | Refills: 0 | Status: SHIPPED | OUTPATIENT
Start: 2018-03-16 | End: 2018-03-22

## 2018-03-16 RX ORDER — SODIUM CHLORIDE 0.9 % (FLUSH) 0.9 %
3 SYRINGE (ML) INJECTION
Status: DISCONTINUED | OUTPATIENT
Start: 2018-03-16 | End: 2018-03-16 | Stop reason: HOSPADM

## 2018-03-16 RX ORDER — BUPIVACAINE HYDROCHLORIDE AND EPINEPHRINE 5; 5 MG/ML; UG/ML
INJECTION, SOLUTION EPIDURAL; INTRACAUDAL; PERINEURAL
Status: DISCONTINUED | OUTPATIENT
Start: 2018-03-16 | End: 2018-03-16 | Stop reason: HOSPADM

## 2018-03-16 RX ORDER — OXYCODONE AND ACETAMINOPHEN 5; 325 MG/1; MG/1
1 TABLET ORAL
Status: DISCONTINUED | OUTPATIENT
Start: 2018-03-16 | End: 2018-03-16 | Stop reason: HOSPADM

## 2018-03-16 RX ORDER — SODIUM CHLORIDE 9 MG/ML
INJECTION, SOLUTION INTRAVENOUS CONTINUOUS
Status: CANCELLED | OUTPATIENT
Start: 2018-03-16

## 2018-03-16 RX ORDER — METOCLOPRAMIDE HYDROCHLORIDE 5 MG/ML
10 INJECTION INTRAMUSCULAR; INTRAVENOUS EVERY 10 MIN PRN
Status: COMPLETED | OUTPATIENT
Start: 2018-03-16 | End: 2018-03-16

## 2018-03-16 RX ORDER — ACETAMINOPHEN 10 MG/ML
INJECTION, SOLUTION INTRAVENOUS
Status: DISCONTINUED | OUTPATIENT
Start: 2018-03-16 | End: 2018-03-16

## 2018-03-16 RX ORDER — ROCURONIUM BROMIDE 10 MG/ML
INJECTION, SOLUTION INTRAVENOUS
Status: DISCONTINUED | OUTPATIENT
Start: 2018-03-16 | End: 2018-03-16

## 2018-03-16 RX ORDER — KETOROLAC TROMETHAMINE 30 MG/ML
INJECTION, SOLUTION INTRAMUSCULAR; INTRAVENOUS
Status: DISCONTINUED | OUTPATIENT
Start: 2018-03-16 | End: 2018-03-16

## 2018-03-16 RX ORDER — FENTANYL CITRATE 50 UG/ML
INJECTION, SOLUTION INTRAMUSCULAR; INTRAVENOUS
Status: DISCONTINUED | OUTPATIENT
Start: 2018-03-16 | End: 2018-03-16

## 2018-03-16 RX ORDER — SUCCINYLCHOLINE CHLORIDE 20 MG/ML
INJECTION INTRAMUSCULAR; INTRAVENOUS
Status: DISCONTINUED | OUTPATIENT
Start: 2018-03-16 | End: 2018-03-16

## 2018-03-16 RX ORDER — PROPOFOL 10 MG/ML
VIAL (ML) INTRAVENOUS
Status: DISCONTINUED | OUTPATIENT
Start: 2018-03-16 | End: 2018-03-16

## 2018-03-16 RX ORDER — CEFAZOLIN SODIUM 2 G/50ML
2 SOLUTION INTRAVENOUS
Status: COMPLETED | OUTPATIENT
Start: 2018-03-16 | End: 2018-03-16

## 2018-03-16 RX ORDER — PHENYLEPHRINE HYDROCHLORIDE 10 MG/ML
INJECTION INTRAVENOUS
Status: DISCONTINUED | OUTPATIENT
Start: 2018-03-16 | End: 2018-03-16

## 2018-03-16 RX ORDER — DEXAMETHASONE SODIUM PHOSPHATE 4 MG/ML
INJECTION, SOLUTION INTRA-ARTICULAR; INTRALESIONAL; INTRAMUSCULAR; INTRAVENOUS; SOFT TISSUE
Status: DISCONTINUED | OUTPATIENT
Start: 2018-03-16 | End: 2018-03-16

## 2018-03-16 RX ORDER — ONDANSETRON 2 MG/ML
INJECTION INTRAMUSCULAR; INTRAVENOUS
Status: DISCONTINUED | OUTPATIENT
Start: 2018-03-16 | End: 2018-03-16

## 2018-03-16 RX ADMIN — MIDAZOLAM 2 MG: 1 INJECTION INTRAMUSCULAR; INTRAVENOUS at 01:03

## 2018-03-16 RX ADMIN — KETOROLAC TROMETHAMINE 30 MG: 30 INJECTION, SOLUTION INTRAMUSCULAR; INTRAVENOUS at 02:03

## 2018-03-16 RX ADMIN — SUCCINYLCHOLINE CHLORIDE 140 MG: 20 INJECTION, SOLUTION INTRAMUSCULAR; INTRAVENOUS at 01:03

## 2018-03-16 RX ADMIN — LIDOCAINE HYDROCHLORIDE 75 MG: 20 INJECTION, SOLUTION INTRAVENOUS at 01:03

## 2018-03-16 RX ADMIN — BUPIVACAINE 266 MG: 13.3 INJECTION, SUSPENSION, LIPOSOMAL INFILTRATION at 01:03

## 2018-03-16 RX ADMIN — FENTANYL CITRATE 50 MCG: 50 INJECTION, SOLUTION INTRAMUSCULAR; INTRAVENOUS at 01:03

## 2018-03-16 RX ADMIN — SODIUM CHLORIDE, SODIUM GLUCONATE, SODIUM ACETATE, POTASSIUM CHLORIDE, MAGNESIUM CHLORIDE, SODIUM PHOSPHATE, DIBASIC, AND POTASSIUM PHOSPHATE: .53; .5; .37; .037; .03; .012; .00082 INJECTION, SOLUTION INTRAVENOUS at 02:03

## 2018-03-16 RX ADMIN — PHENYLEPHRINE HYDROCHLORIDE 100 MCG: 10 INJECTION INTRAVENOUS at 01:03

## 2018-03-16 RX ADMIN — SODIUM CHLORIDE, SODIUM GLUCONATE, SODIUM ACETATE, POTASSIUM CHLORIDE, MAGNESIUM CHLORIDE, SODIUM PHOSPHATE, DIBASIC, AND POTASSIUM PHOSPHATE: .53; .5; .37; .037; .03; .012; .00082 INJECTION, SOLUTION INTRAVENOUS at 12:03

## 2018-03-16 RX ADMIN — BACITRACIN ZINC NEOMYCIN SULFATE POLYMYXIN B SULFATE 1 TUBE: 400; 3.5; 5 OINTMENT TOPICAL at 02:03

## 2018-03-16 RX ADMIN — PROPOFOL 120 MG: 10 INJECTION, EMULSION INTRAVENOUS at 01:03

## 2018-03-16 RX ADMIN — ONDANSETRON 4 MG: 2 INJECTION, SOLUTION INTRAMUSCULAR; INTRAVENOUS at 01:03

## 2018-03-16 RX ADMIN — FENTANYL CITRATE 50 MCG: 50 INJECTION, SOLUTION INTRAMUSCULAR; INTRAVENOUS at 02:03

## 2018-03-16 RX ADMIN — BUPIVACAINE HYDROCHLORIDE AND EPINEPHRINE BITARTRATE 30 ML: 5; .0091 INJECTION, SOLUTION EPIDURAL; INTRACAUDAL; PERINEURAL at 01:03

## 2018-03-16 RX ADMIN — GELATIN ABSORBABLE SPONGE SIZE 100 1 APPLICATOR: MISC at 02:03

## 2018-03-16 RX ADMIN — METOCLOPRAMIDE 10 MG: 5 INJECTION, SOLUTION INTRAMUSCULAR; INTRAVENOUS at 02:03

## 2018-03-16 RX ADMIN — ROCURONIUM BROMIDE 5 MG: 10 INJECTION, SOLUTION INTRAVENOUS at 01:03

## 2018-03-16 RX ADMIN — CEFAZOLIN SODIUM 2 G: 2 SOLUTION INTRAVENOUS at 01:03

## 2018-03-16 RX ADMIN — DEXAMETHASONE SODIUM PHOSPHATE 8 MG: 4 INJECTION, SOLUTION INTRAMUSCULAR; INTRAVENOUS at 01:03

## 2018-03-16 RX ADMIN — ACETAMINOPHEN 1000 MG: 10 INJECTION, SOLUTION INTRAVENOUS at 01:03

## 2018-03-16 NOTE — ANESTHESIA POSTPROCEDURE EVALUATION
"Anesthesia Post Evaluation    Patient: Steph Lira    Procedure(s) Performed: Procedure(s) (LRB):  HEMORRHOIDECTOMY (N/A)    Final Anesthesia Type: general  Patient location during evaluation: PACU  Patient participation: Yes- Able to Participate  Level of consciousness: awake and alert  Post-procedure vital signs: reviewed and stable  Pain management: adequate  Airway patency: patent  PONV status at discharge: No PONV  Anesthetic complications: no      Cardiovascular status: blood pressure returned to baseline  Respiratory status: unassisted  Hydration status: euvolemic  Follow-up not needed.        Visit Vitals  BP (!) 148/77 (BP Location: Right arm, Patient Position: Lying)   Pulse 60   Temp 36.5 °C (97.7 °F) (Skin)   Resp 18   Ht 5' 6" (1.676 m)   Wt 62.6 kg (138 lb)   SpO2 100%   Breastfeeding? No   BMI 22.27 kg/m²       Pain/Tamera Score: Pain Assessment Performed: Yes (3/16/2018 12:19 PM)  Presence of Pain: non-verbal indicators absent (3/16/2018  2:25 PM)  Tamera Score: 9 (3/16/2018  2:35 PM)      "

## 2018-03-16 NOTE — BRIEF OP NOTE
Patient: Steph Lira     Date of Procedure: 3/16/2018    Procedure: Hemorrhoidectomy    Surgeon: Klaus Naranjo MD    Assistant: Ade Denney    Pre-op Diagnosis: Grade II hemorrhoids [K64.1]     Post-op Diagnosis: Grade II hemorrhoids [K64.1]    Findings: Grade 3 hemorrhoids    Specimen: Left column  Right anterior column  Right posterior column    EBL: 35 cc    Complications: None

## 2018-03-16 NOTE — H&P (VIEW-ONLY)
Subjective:       Patient ID: Steph Lira is a 67 y.o. female.    Chief Complaint: Consult (hemorrhoids, has stopped Eliquis)      HPI 67-year-old female presents after seeing Dr. Nunes yesterday for grade 4 internal hemorrhoids.  This started after she took some Senokot for constipation.  The hemorrhoids were very large.  She had a small amount of bleeding this morning.  She's got no real relief and this has not been improving.  Dr. Nunes recommended surgical excision with the patient is on Eliquis taken her last dose on Monday.  I was asked to see her to proceed with surgery as he will be out of town.    Past Medical History:   Diagnosis Date    *Atrial fibrillation     and Hx PSVT    Allergy     chronic     Arthritis     fingers    Benign tumor of throat     Bronchitis March 2013    CAD (coronary artery disease) 2005    CABGx2 in 2005 and 2 MI after with 4 stents    Cancer 1996    small cell lung cancer s/p resection of 1/3 lung, 5 ribs and muscle mass then had chemo and radiation    Cataract     OD     CHF (congestive heart failure) past Hx    Chronic rhinitis     Clot past Hx    external jugular, now stented, occluded, had phlebitis; now revascularized    Colon polyp     COPD (chronic obstructive pulmonary disease)     no oxygen or nebulizers    COPD exacerbation 5/8/13    improved 5/14/13 after steroid pack,antibiotics    Former smoker     GERD (gastroesophageal reflux disease)     Heart block     Hyperlipidemia     Hypertension     pt states does not have //    MI (myocardial infarction) 2005    Posterior vitreous detachment of left eye     Thyroid disease      Past Surgical History:   Procedure Laterality Date    ADENOIDECTOMY      APPENDECTOMY      BACK SURGERY      lumbar    CARDIAC SURGERY      CATARACT EXTRACTION Left     OS    CATARACT EXTRACTION, BILATERAL      left eye only    CHOLECYSTECTOMY      COLONOSCOPY  03/2012    repeat in 5 years    COLONOSCOPY N/A 6/19/2017     Procedure: COLONOSCOPY;  Surgeon: Kal Elise MD;  Location: [facility];  Service: Endoscopy;  Laterality: N/A;    CORONARY ARTERY BYPASS GRAFT  2005    2 vessel    EYE SURGERY  Dec 2012    ptosis repair    FIRST RIB REMOVAL  1996 with lung resection    HYSTERECTOMY      LUNG LOBECTOMY  1996    left lung for cancer    SHOULDER SURGERY  2010,2011    scapular entrapment after lung surgery, needed 5 ribs removed left side    SYMPOTHATIC NERVE LEFT SIDE      1996 with lung surgery    TONSILLECTOMY      TONSILLECTOMY, ADENOIDECTOMY, BILATERAL MYRINGOTOMY AND TUBES      UPPER GASTROINTESTINAL ENDOSCOPY  05/2016    Dr. Koo    VASCULAR SURGERY      stents place in jugualr vein         Current Outpatient Prescriptions:     albuterol (ACCUNEB) 1.25 mg/3 mL Nebu, USE 1 VIAL (3 ML) VIA NEBULIZER EVERY 6 HOURS AS NEEDED, Disp: 90 mL, Rfl: 2    albuterol (PROVENTIL HFA) 90 mcg/actuation inhaler, Inhale 2 puffs into the lungs every 6 (six) hours as needed for Wheezing., Disp: 3 Inhaler, Rfl: 4    atorvastatin (LIPITOR) 40 MG tablet, Take 1 tablet (40 mg total) by mouth once daily., Disp: 90 tablet, Rfl: 3    bisacodyl (DULCOLAX) 5 mg EC tablet, Take 1 tablet (5 mg total) by mouth daily as needed for Constipation., Disp: 30 tablet, Rfl: 11    CALCIUM CITRATE (CITRACAL) 500 mg TbEF, Take 500 mg by mouth. 1 Tablet, Effervescent Oral  daily, Disp: , Rfl:     cetirizine (ZYRTEC) 10 MG tablet, TAKE 1 TABLET EVERY EVENING, Disp: 90 tablet, Rfl: 2    desoximetasone (TOPICORT) 0.25 % cream, Apply topically 2 (two) times daily., Disp: 180 g, Rfl: 3    diclofenac sodium (VOLTAREN) 1 % Gel, Apply 2 g topically once daily., Disp: 100 g, Rfl: prn    diphenhydrAMINE (BENADRYL) 25 mg capsule, Take 25 mg by mouth every 6 (six) hours as needed for Itching., Disp: , Rfl:     flecainide (TAMBOCOR) 50 MG Tab, Take 2 tablets (100 mg total) by mouth every 12 (twelve) hours., Disp: 120 tablet, Rfl: 11    fluticasone  (FLONASE) 50 mcg/actuation nasal spray, 1 spray by Each Nare route once daily., Disp: 3 Bottle, Rfl: 3    fluticasone-salmeterol 250-50 mcg/dose (ADVAIR DISKUS) 250-50 mcg/dose diskus inhaler, USE 1 INHALATION INTO THE LUNGS TWICE A DAY. RINSE MOUTH AFTER USE TO PREVENT THRUSH, Disp: 3 each, Rfl: 3    hydrocodone-acetaminophen 5-325mg (NORCO) 5-325 mg per tablet, Take 1 tablet by mouth every 6 (six) hours as needed for Pain., Disp: 28 tablet, Rfl: 0    ipratropium (ATROVENT) 0.03 % nasal spray, 2 sprays by Nasal route 2 (two) times daily. As needed (Patient taking differently: 2 sprays by Nasal route once daily. As needed), Disp: 90 mL, Rfl: 6    levothyroxine (SYNTHROID) 88 MCG tablet, Take 1 tablet (88 mcg total) by mouth once daily., Disp: 90 tablet, Rfl: 3    lidocaine HCL 2% (XYLOCAINE) 2 % jelly, Apply topically as needed., Disp: 60 mL, Rfl: 0    lisinopril (PRINIVIL,ZESTRIL) 5 MG tablet, Take 1 tablet (5 mg total) by mouth once daily., Disp: 90 tablet, Rfl: 3    metoprolol succinate (TOPROL-XL) 50 MG 24 hr tablet, Take 1 tablet (50 mg total) by mouth once daily., Disp: 90 tablet, Rfl: 3    montelukast (SINGULAIR) 10 mg tablet, Take 1 tablet (10 mg total) by mouth every evening., Disp: 90 tablet, Rfl: 3    multivitamin-minerals-lutein (CENTRUM SILVER) Tab, Take 1 tablet by mouth once daily. 1 Tablet Oral Every day, Disp: , Rfl:     pantoprazole (PROTONIX) 40 MG tablet, Take 1 tablet (40 mg total) by mouth once daily. Take 30 minutes prior to breakfast, Disp: 90 tablet, Rfl: 3    RETIN-A 0.05 % cream, APPLY THIN FILM TO FACE AT NIGHT AFTER MOISTURIZING, Disp: 45 g, Rfl: 3    SPIRIVA WITH HANDIHALER 18 mcg inhalation capsule, INHALE THE CONTENTS OF 1 CAPSULE DAILY, Disp: 90 capsule, Rfl: 3    apixaban 5 mg Tab, Take 1 tablet (5 mg total) by mouth 2 (two) times daily., Disp: 180 tablet, Rfl: 3    triamcinolone acetonide 0.1% (KENALOG) 0.1 % cream, Apply topically 2 (two) times daily., Disp: 1 Tube,  Rfl: 11  No current facility-administered medications for this visit.     Facility-Administered Medications Ordered in Other Visits:     lidocaine  20 mg/mL (2%) 20 mg/mL (2 %) injection, , , ,     Review of patient's allergies indicates:   Allergen Reactions    Ciprofloxacin Itching    Doxycycline Other (See Comments)     Patient had a rxn with the sun despite wearing spf 30       Family History   Problem Relation Age of Onset    Allergic rhinitis Father     Cancer Father     Hypertension Father     Allergies Father     Allergic rhinitis Son     Asthma Son     Stroke Mother     Allergies Mother     Allergic rhinitis Sister     Asthma Sister     Diverticulitis Brother     No Known Problems Daughter     No Known Problems Maternal Aunt     No Known Problems Maternal Uncle     No Known Problems Paternal Aunt     No Known Problems Paternal Uncle     Colon polyps Maternal Grandmother       of colon blockage    No Known Problems Maternal Grandfather     No Known Problems Paternal Grandmother     No Known Problems Paternal Grandfather     No Known Problems Brother     No Known Problems Sister     Angioedema Neg Hx     Eczema Neg Hx     Immunodeficiency Neg Hx     Urticaria Neg Hx     Skin cancer Neg Hx     Amblyopia Neg Hx     Blindness Neg Hx     Cataracts Neg Hx     Diabetes Neg Hx     Glaucoma Neg Hx     Macular degeneration Neg Hx     Retinal detachment Neg Hx     Strabismus Neg Hx     Thyroid disease Neg Hx     Colon cancer Neg Hx     Melanoma Neg Hx      Social History     Social History    Marital status:      Spouse name: N/A    Number of children: N/A    Years of education: N/A     Occupational History    retired      Social History Main Topics    Smoking status: Former Smoker     Packs/day: 2.00     Years: 20.00     Types: Cigarettes     Quit date: 1994    Smokeless tobacco: Never Used    Alcohol use No    Drug use: No    Sexual activity: Yes      Partners: Male     Other Topics Concern    Are You Pregnant Or Think You May Be? No    Breast-Feeding No     Social History Narrative    No narrative on file       Review of Systems   Constitutional: Negative for activity change, chills, fever and unexpected weight change.   HENT: Negative for congestion, sore throat, trouble swallowing and voice change.    Eyes: Negative for redness and visual disturbance.   Respiratory: Negative for cough, shortness of breath and wheezing.    Cardiovascular: Negative for chest pain and palpitations.   Gastrointestinal: Positive for anal bleeding and rectal pain. Negative for abdominal pain, nausea and vomiting.   Endocrine: Negative.    Genitourinary: Negative for dysuria, frequency and hematuria.   Musculoskeletal: Negative for arthralgias, back pain and neck pain.   Skin: Negative for rash and wound.   Allergic/Immunologic: Negative.    Neurological: Negative for dizziness, weakness and headaches.   Hematological: Negative for adenopathy.   Psychiatric/Behavioral: Negative for agitation and dysphoric mood. The patient is not nervous/anxious.      Objective:     Physical Exam   Constitutional: She is oriented to person, place, and time. She appears well-developed and well-nourished. No distress.   HENT:   Head: Normocephalic and atraumatic.   Eyes: Conjunctivae and EOM are normal. Pupils are equal, round, and reactive to light. No scleral icterus.   Neck: Normal range of motion. No thyromegaly present.   Cardiovascular: Normal rate and regular rhythm.    No murmur heard.  Pulmonary/Chest: Effort normal and breath sounds normal. She has no wheezes. She has no rales.   Abdominal: Soft. Bowel sounds are normal. She exhibits no distension and no mass. There is no tenderness. No hernia.   Genitourinary:   Genitourinary Comments: Large prolapsing internal hemorrhoids on the right.  There are 2 distinct columns but these above each other immediately.  There is no active bleeding.   Rectal exam was quite difficult secondary to pain.   Musculoskeletal: Normal range of motion. She exhibits no edema.   Lymphadenopathy:     She has no cervical adenopathy.   Neurological: She is alert and oriented to person, place, and time. No cranial nerve deficit.   Skin: Skin is warm and dry. No rash noted. No erythema.   Psychiatric: She has a normal mood and affect. Her behavior is normal.     Assessment:     Encounter Diagnoses   Name Primary?    Grade II hemorrhoids Yes    Hemorrhoids        Plan:      1.  Plan hemorrhoidectomy.  2. Risks and benefits of the planned procedure were discussed at length with the patient.  Risks and benefits of not proceeding with the procedure were discussed as well. All questions were answered. The patient expressed clear understanding and would like to proceed with the procedure as discussed.

## 2018-03-16 NOTE — OR NURSING
Pt tolerated procedure well. Pt states no complaints. Discharge instructions given to both patient and spouse with verbalized understanding. Pt did have an episode of incontinence when needing to go to the restroom, Dr. Mandujano informed and no new orders given.

## 2018-03-16 NOTE — DISCHARGE SUMMARY
Discharge Summary  General Surgery      Admit Date: 3/16/2018    Discharge Date :3/16/2018    Attending Physician: Klaus Mandujano     Discharge Physician: Klaus Mandujano    Discharged Condition: good    Discharge Diagnosis: Grade II hemorrhoids [K64.1]    Treatments/Procedures: Procedure(s) (LRB):  HEMORRHOIDECTOMY (N/A)    Hospital Course: Uneventful; Discharged home from [facility]    Significant Diagnostic Studies: none    Disposition: Home or Self Care    Diet: Regular    Follow up: Office 10-14 days    Activity: No heavy lifting till seen in office.    Patient Instructions:   Current Discharge Medication List      START taking these medications    Details   hydrocodone-acetaminophen 7.5-325mg (NORCO) 7.5-325 mg per tablet Take 1 tablet by mouth every 4 (four) hours as needed for Pain.  Qty: 30 tablet, Refills: 0         CONTINUE these medications which have NOT CHANGED    Details   albuterol (PROVENTIL HFA) 90 mcg/actuation inhaler Inhale 2 puffs into the lungs every 6 (six) hours as needed for Wheezing.  Qty: 3 Inhaler, Refills: 4      atorvastatin (LIPITOR) 40 MG tablet Take 1 tablet (40 mg total) by mouth once daily.  Qty: 90 tablet, Refills: 3    Associated Diagnoses: Hypercholesteremia      bisacodyl (DULCOLAX) 5 mg EC tablet Take 1 tablet (5 mg total) by mouth daily as needed for Constipation.  Qty: 30 tablet, Refills: 11    Associated Diagnoses: Constipation, unspecified constipation type      CALCIUM CITRATE (CITRACAL) 500 mg TbEF Take 500 mg by mouth. 1 Tablet, Effervescent Oral  daily      cetirizine (ZYRTEC) 10 MG tablet TAKE 1 TABLET EVERY EVENING  Qty: 90 tablet, Refills: 2    Associated Diagnoses: Chronic allergic rhinitis, unspecified seasonality, unspecified trigger      diclofenac sodium (VOLTAREN) 1 % Gel Apply 2 g topically once daily.  Qty: 100 g, Refills: prn    Associated Diagnoses: Right wrist pain      diphenhydrAMINE (BENADRYL) 25 mg capsule Take 25 mg by mouth every 6 (six) hours as  needed for Itching.      flecainide (TAMBOCOR) 50 MG Tab Take 2 tablets (100 mg total) by mouth every 12 (twelve) hours.  Qty: 120 tablet, Refills: 11      fluticasone (FLONASE) 50 mcg/actuation nasal spray 1 spray by Each Nare route once daily.  Qty: 3 Bottle, Refills: 3      fluticasone-salmeterol 250-50 mcg/dose (ADVAIR DISKUS) 250-50 mcg/dose diskus inhaler USE 1 INHALATION INTO THE LUNGS TWICE A DAY. RINSE MOUTH AFTER USE TO PREVENT THRUSH  Qty: 3 each, Refills: 3      hydrocodone-acetaminophen 5-325mg (NORCO) 5-325 mg per tablet Take 1 tablet by mouth every 6 (six) hours as needed for Pain.  Qty: 28 tablet, Refills: 0    Associated Diagnoses: External hemorrhoid, thrombosed      ipratropium (ATROVENT) 0.03 % nasal spray 2 sprays by Nasal route 2 (two) times daily. As needed  Qty: 90 mL, Refills: 6    Associated Diagnoses: Chronic allergic rhinitis      levothyroxine (SYNTHROID) 88 MCG tablet Take 1 tablet (88 mcg total) by mouth once daily.  Qty: 90 tablet, Refills: 3      lidocaine HCL 2% (XYLOCAINE) 2 % jelly Apply topically as needed.  Qty: 60 mL, Refills: 0    Associated Diagnoses: Hemorrhoids, unspecified hemorrhoid type      lisinopril (PRINIVIL,ZESTRIL) 5 MG tablet Take 1 tablet (5 mg total) by mouth once daily.  Qty: 90 tablet, Refills: 3      metoprolol succinate (TOPROL-XL) 50 MG 24 hr tablet Take 1 tablet (50 mg total) by mouth once daily.  Qty: 90 tablet, Refills: 3      montelukast (SINGULAIR) 10 mg tablet Take 1 tablet (10 mg total) by mouth every evening.  Qty: 90 tablet, Refills: 3      multivitamin-minerals-lutein (CENTRUM SILVER) Tab Take 1 tablet by mouth once daily. 1 Tablet Oral Every day      pantoprazole (PROTONIX) 40 MG tablet Take 1 tablet (40 mg total) by mouth once daily. Take 30 minutes prior to breakfast  Qty: 90 tablet, Refills: 3      SPIRIVA WITH HANDIHALER 18 mcg inhalation capsule INHALE THE CONTENTS OF 1 CAPSULE DAILY  Qty: 90 capsule, Refills: 3    Associated Diagnoses:  Panlobular emphysema      albuterol (ACCUNEB) 1.25 mg/3 mL Nebu USE 1 VIAL (3 ML) VIA NEBULIZER EVERY 6 HOURS AS NEEDED  Qty: 90 mL, Refills: 2      apixaban 5 mg Tab Take 1 tablet (5 mg total) by mouth 2 (two) times daily.  Qty: 180 tablet, Refills: 3      desoximetasone (TOPICORT) 0.25 % cream Apply topically 2 (two) times daily.  Qty: 180 g, Refills: 3      RETIN-A 0.05 % cream APPLY THIN FILM TO FACE AT NIGHT AFTER MOISTURIZING  Qty: 45 g, Refills: 3    Associated Diagnoses: Sebaceous hyperplasia      triamcinolone acetonide 0.1% (KENALOG) 0.1 % cream Apply topically 2 (two) times daily.  Qty: 1 Tube, Refills: 11               Discharge Procedure Orders  Diet general     Activity as tolerated

## 2018-03-16 NOTE — INTERVAL H&P NOTE
The patient has been examined and the H&P has been reviewed:    I concur with the findings and no changes have occurred since H&P was written.    Anesthesia/Surgery risks, benefits and alternative options discussed and understood by patient/family.          Active Hospital Problems    Diagnosis  POA    Grade II hemorrhoids [K64.1]  Yes      Resolved Hospital Problems    Diagnosis Date Resolved POA   No resolved problems to display.

## 2018-03-16 NOTE — TRANSFER OF CARE
"Anesthesia Transfer of Care Note    Patient: Steph Lira    Procedure(s) Performed: Procedure(s) (LRB):  HEMORRHOIDECTOMY (N/A)    Patient location: PACU    Anesthesia Type: general    Transport from OR: Transported from OR on 2-3 L/min O2 by NC with adequate spontaneous ventilation    Post pain: adequate analgesia    Post assessment: no apparent anesthetic complications    Post vital signs: stable    Level of consciousness: sedated    Nausea/Vomiting: no nausea/vomiting    Complications: none    Transfer of care protocol was followed      Last vitals:   Visit Vitals  BP (!) 148/77 (BP Location: Right arm, Patient Position: Lying)   Pulse 62   Temp 36.5 °C (97.7 °F) (Skin)   Resp 18   Ht 5' 6" (1.676 m)   Wt 62.6 kg (138 lb)   SpO2 100%   Breastfeeding? No   BMI 22.27 kg/m²     "

## 2018-03-16 NOTE — ANESTHESIA PREPROCEDURE EVALUATION
03/16/2018  Steph Lira is a 67 y.o., female.    Pre-op Assessment    I have reviewed the Patient Summary Reports.     I have reviewed the Nursing Notes.   I have reviewed the Medications.     Review of Systems  Anesthesia Hx:  No problems with previous Anesthesia  Denies Family Hx of Anesthesia complications.   Denies Personal Hx of Anesthesia complications.   Cardiovascular:   Hypertension, well controlled Past MI CAD asymptomatic CABG/stent Dysrhythmias  Angina CHF CAD - S/P CABG.  MI with stents post subsequent to CABG - stable now   Pulmonary:   Pneumonia COPD, mild Asthma mild    Hepatic/GI:   GERD, well controlled    Neurological:   Neuromuscular Disease,    Endocrine:   Hypothyroidism        Physical Exam  General:  Well nourished    Airway/Jaw/Neck:  Airway Findings: Mouth Opening: Normal Tongue: Normal  General Airway Assessment: Adult, Good  Mallampati: II  TM Distance: Normal, at least 6 cm       Chest/Lungs:  Chest/Lungs Findings: Clear to auscultation, Normal Respiratory Rate     Heart/Vascular:  Heart Findings: Rate: Normal  Rhythm: Regular Rhythm  Sounds: Normal  Heart Murmur  Systolic  Systolic Heart Murmur Description: L Upper Sternal Border  Systolic Heart Murmur Grade: Grade II     Abdomen:  Abdomen Findings: Normal    Musculoskeletal:  Musculoskeletal Findings: Normal   Skin:  Skin Findings: Normal    Mental Status:  Mental Status Findings:  Cooperative, Alert and Oriented         Anesthesia Plan  Type of Anesthesia, risks & benefits discussed:  Anesthesia Type:  general  Patient's Preference:   Intra-op Monitoring Plan: standard ASA monitors  Intra-op Monitoring Plan Comments:   Post Op Pain Control Plan:   Post Op Pain Control Plan Comments:   Induction:   IV  Beta Blocker:  Patient is on a Beta-Blocker and has received one dose within the past 24 hours (No further documentation  required).       Informed Consent: Patient understands risks and agrees with Anesthesia plan.  Questions answered. Anesthesia consent signed with patient.  ASA Score: 3     Day of Surgery Review of History & Physical:    H&P update referred to the surgeon.         Ready For Surgery From Anesthesia Perspective.

## 2018-03-16 NOTE — DISCHARGE INSTRUCTIONS
General Information:    1.  Do not drink alcoholic beverages including beer for 24 hours or as long as you are on pain medication..  2.  Do not drive a motor vehicle, operate machinery or power tools, or signs legal papers for 24 hours or as long as you are on pain medication.   3.  You may experience light-headedness, dizziness, and sleepiness following surgery. Please do not stay alone. A responsible adult should be with you for this 24 hour period.  4.  Go home and rest.    5. Progress slowly to a normal diet unless instructed.  Otherwise, begin with liquids such as soft drinks, then soup and crackers working up to solid foods. Drink plenty of nonalcoholic fluids.  6.  Certain anesthetics and pain medications produce nausea and vomiting in certain       individuals. If nausea becomes a problem at home, call you doctor.    7. A nurse will be calling you sometime after surgery. Do not be alarmed. This is our way of finding out how you are doing.    8. Several times every hour while you are awake, take 2-3 deep breaths and cough. If you had stomach surgery hold a pillow or rolled towel firmly against your stomach before you cough. This will help with any pain the cough might cause.  9. Several times every hour while you are awake, pump and flex your feet 5-6 times and do foot circles. This will help prevent blood clots.    10.Call your doctor for severe pain, bleeding, fever, or signs or symptoms of infection (pain, swelling, redness, foul odor, drainage).    Post op instructions for prevention of DVT  What is deep vein thrombosis?  Deep vein thrombosis (DVT) is the medical term for blood clots in the deep veins of the leg.  These blood clots can be dangerous.  A DVT can block a blood vessel and keep blood from getting where it needs to go.  Another problem is that the clot can travel to other parts of the body such as the lungs.  A clot that travels to the lungs is called a pulmonary embolus (PE) and can cause  serious problems with breathing which can lead to death.  Am I at risk for DVT/PE?  If you are not very active, you are at risk of DVT.  Anyone confined to bed, sitting for long periods of time, recovering from surgery, etc. increases the risk of DVT.  Other risk factors are cancer diagnosis, certain medications, estrogen replacement in any form,older age, obesity, pregnancy, smoking, history of clotting disorders, and dehydration.  How will I know if I have a DVT?   Swelling in the lower leg   Pain   Warmth, redness, hardness or bulging of the vein  If you have any of these symptoms, call your doctors office right away.  Some people will not have any symptoms until the clot moves to the lungs.  What are the symptoms of a PE?   Panting, shortness of breath, or trouble breathing   Sharp, knife-like chest pain when you breathe   Coughing or coughing up blood   Rapid heartbeat  If you have any of these symptoms or get worse quickly, call 911 for emergency treatment.  How can I prevent a DVT?   Avoid long periods of inactivity and dont cross your legs--get up and walk around every hour or so.   Stay active--walking after surgery is highly encouraged.  This means you should get out of the house and walk in the neighborhood.  Going up and down stairs will not impair healing (unless advised against such activity by your doctor).     Drink plenty of noncaffeinated, nonalcoholic fluids each day to prevent dehydration.   Wear special support stockings, if they have been advised by your doctor.   If you travel, stop at least once an hour and walk around.   Avoid smoking (assistance with stopping is available through your healthcare provider)  Always notify your doctor if you are not able to follow the post operative instructions that are given to you at the time of discharge.  It may be necessary to prescribe one of the medications available to prevent DVT.    Discharge Instructions: After Your  "Surgery/Procedure  Youve just had surgery. During surgery you were given medicine called anesthesia to keep you relaxed and free of pain. After surgery you may have some pain or nausea. This is common. Here are some tips for feeling better and getting well after surgery.     Stay on schedule with your medication.   Going home  Your doctor or nurse will show you how to take care of yourself when you go home. He or she will also answer your questions. Have an adult family member or friend drive you home.      For your safety we recommend these precaution for the first 24 hours after your procedure:  · Do not drive or use heavy equipment.  · Do not make important decisions or sign legal papers.  · Do not drink alcohol.  · Have someone stay with you, if needed. He or she can watch for problems and help keep you safe.  · Your concentration, balance, coordination, and judgement may be impaired for many hours after anesthesia.  Use caution when ambulating or standing up.     · You may feel weak and "washed out" after anesthesia and surgery.      Subtle residual effects of general anesthesia or sedation with regional / local anesthesia can last more than 24 hours.  Rest for the remainder of the day or longer if your Doctor/Surgeon has advised you to do so.  Although you may feel normal within the first 24 hours, your reflexes and mental ability may be impaired without you realizing it.  You may feel dizzy, lightheaded or sleepy for 24 hours or longer.      Be sure to go to all follow-up visits with your doctor. And rest after your surgery for as long as your doctor tells you to.  Coping with pain  If you have pain after surgery, pain medicine will help you feel better. Take it as told, before pain becomes severe. Also, ask your doctor or pharmacist about other ways to control pain. This might be with heat, ice, or relaxation. And follow any other instructions your surgeon or nurse gives you.  Tips for taking pain " medicine  To get the best relief possible, remember these points:  · Pain medicines can upset your stomach. Taking them with a little food may help.  · Most pain relievers taken by mouth need at least 20 to 30 minutes to start to work.  · Taking medicine on a schedule can help you remember to take it. Try to time your medicine so that you can take it before starting an activity. This might be before you get dressed, go for a walk, or sit down for dinner.  · Constipation is a common side effect of pain medicines. Call your doctor before taking any medicines such as laxatives or stool softeners to help ease constipation. Also ask if you should skip any foods. Drinking lots of fluids and eating foods such as fruits and vegetables that are high in fiber can also help. Remember, do not take laxatives unless your surgeon has prescribed them.  · Drinking alcohol and taking pain medicine can cause dizziness and slow your breathing. It can even be deadly. Do not drink alcohol while taking pain medicine.  · Pain medicine can make you react more slowly to things. Do not drive or run machinery while taking pain medicine.  Your health care provider may tell you to take acetaminophen to help ease your pain. Ask him or her how much you are supposed to take each day. Acetaminophen or other pain relievers may interact with your prescription medicines or other over-the-counter (OTC) drugs. Some prescription medicines have acetaminophen and other ingredients. Using both prescription and OTC acetaminophen for pain can cause you to overdose. Read the labels on your OTC medicines with care. This will help you to clearly know the list of ingredients, how much to take, and any warnings. It may also help you not take too much acetaminophen. If you have questions or do not understand the information, ask your pharmacist or health care provider to explain it to you before you take the OTC medicine.  Managing nausea  Some people have an upset  stomach after surgery. This is often because of anesthesia, pain, or pain medicine, or the stress of surgery. These tips will help you handle nausea and eat healthy foods as you get better. If you were on a special food plan before surgery, ask your doctor if you should follow it while you get better. These tips may help:  · Do not push yourself to eat. Your body will tell you when to eat and how much.  · Start off with clear liquids and soup. They are easier to digest.  · Next try semi-solid foods, such as mashed potatoes, applesauce, and gelatin, as you feel ready.  · Slowly move to solid foods. Dont eat fatty, rich, or spicy foods at first.  · Do not force yourself to have 3 large meals a day. Instead eat smaller amounts more often.  · Take pain medicines with a small amount of solid food, such as crackers or toast, to avoid nausea.     Call your surgeon if  · You still have pain an hour after taking medicine. The medicine may not be strong enough.  · You feel too sleepy, dizzy, or groggy. The medicine may be too strong.  · You have side effects like nausea, vomiting, or skin changes, such as rash, itching, or hives.       If you have obstructive sleep apnea  You were given anesthesia medicine during surgery to keep you comfortable and free of pain. After surgery, you may have more apnea spells because of this medicine and other medicines you were given. The spells may last longer than usual.   At home:  · Keep using the continuous positive airway pressure (CPAP) device when you sleep. Unless your health care provider tells you not to, use it when you sleep, day or night. CPAP is a common device used to treat obstructive sleep apnea.  · Talk with your provider before taking any pain medicine, muscle relaxants, or sedatives. Your provider will tell you about the possible dangers of taking these medicines.  © 7950-6313 [employer]. [address] [place] [address], [place] [address]. All rights reserved. This  information is not intended as a substitute for professional medical care. Always follow your healthcare professional's instructions.    Using Opioids for Pain Management     Your doctor has given instructions for you to take an opioid.  This is a drug for bad pain.  It helps control pain without causing bleeding and kidney problems.  Common opioid names are morphine, hydromorphone, oxycodone, and methadone. These drugs are called narcotics.    There are several safety concerns you need to know.     · It is against the law to give or sell this drug to another person.  You must keep this medicine safely locked.    · You may have side effects from taking this medication.  These include nausea, itching, sweating, sleepiness, a change in your ability to breathe, and depression.  · Do not take alcohol or sleeping pills opioids.    · Long-term opoid use may no longer giver you relief from pain.  It can cause you stomach pain, mental anxiety, and headaches.  Long-term opoid use can potentially lead to unlawful street drug abuse and reduce your ability to stay employed.    · Your body may become opioid tolerant if you need to take more to get relief.    · You must stop taking opioids if you begin having more pain as a result of the medicine.    · Opioid withdrawal occurs when you have to stop taking the drug.  It can cause you to have nausea, vomiting, diarrhea, stomach pain, anxiety, and dilated pupils in your eyes. This condition means you are opioid dependent.    · Addiction is a drug induced brain disease. It means there are changes in how your brain is working.  Children, teens, and young adults under 25 years old are more likely to get addicted to opioids.      · Addiction can happen with repeated opioid use.  It does not happen with short-term use of two weeks or less.       For more information, please speak with your doctor or pharmacist.        Discharge Instructions for Hemorrhoid Surgery    You had surgery to  remove hemorrhoids. These are large, swollen veins inside and outside the anus. Hemorrhoids are caused by too much pressure on the anus. This is often due to straining during bowel movements or pressure during pregnancy. After surgery, it may take a few weeks or longer to recover. This sheet tells you how to care for yourself once youre home.   Home care  You may have some bleeding, discharge, or itching for a short time after surgery. This is common. Once at home, be sure to:  · Take prescribed pain medicines on time as directed. Dont skip doses or wait until pain gets bad, as it may be harder to control.  · Take sitz baths. Fill a tub with 3 inches of warm water. Sit in the basin or tub for 10 to 20 minutes a few times a day and after each bowel movement.  · Avoid straining to pass stool. This can increase pressure on the anus. It can also lead to swelling.  · Avoid constipation:  ¨ Use a laxative or stool softener as advised.  ¨ Eat more high-fiber foods. These include whole grains, fruit, and veggies.  ¨ Drink plenty of fluids.  · Avoid heavy lifting and strenuous activity for 1 to 2 weeks.  · Use suppositories and pads, if needed. These can help relieve symptoms.  · Avoid driving until youre able to sit and move without pain. Ask someone to drive you to appointments, if needed.  · Practice good bowel habits. Dont ignore the urge to go. But avoid spending too much time on the toilet.  Follow-up  Youll have a follow-up visit with the healthcare provider. During this visit, the healthcare provider will check how well youre healing. This visit will likely happen within 1 to 2 weeks.  When to call your healthcare provider  Call your healthcare provider right away if you have any of the following:  · Fever of 100.4°F (38.0°C) or higher, or as directed by your healthcare provider  · A large amount of drainage or bleeding from the rectum  · Trouble urinating  · No bowel movement for more than 48 hours   Date Last  Reviewed: 7/1/2016 © 2000-2017 [employer]. [address] [place] [address], [place] [address]. All rights reserved. This information is not intended as a substitute for professional medical care. Always follow your healthcare professional's instructions.        Eating a High-Fiber Diet  Fiber is what gives strength and structure to plants. Most grains, beans, vegetables, and fruits contain fiber. Foods rich in fiber are often low in calories and fat, and they fill you up more. They may also reduce your risks for certain health problems. To find out the amount of fiber in canned, packaged, or frozen foods, read the Nutrition Facts label. It tells you how much fiber is in a serving.    Types of fiber and their benefits  There are two types of fiber: insoluble and soluble. They both aid digestion and help you maintain a healthy weight.  · Insoluble fiber. This is found in whole grains, cereals, certain fruits and vegetables such as apple skin, corn, and carrots. Insoluble fiber may prevent constipation and reduce the risk for certain types of cancer.  · Soluble fiber. This type of fiber is in oats, beans, and certain fruits and vegetables such as strawberries and peas. Soluble fiber can reduce cholesterol, which may help lower the risk for heart disease. It also helps control blood sugar levels.  Look for high-fiber foods  Try these foods to add fiber to your diet:  · Whole-grain breads and cereals. Try to eat 6 to 8 ounces a day. Include wheat and oat bran cereals, whole-wheat muffins or toast, and corn tortillas in your meals.  · Fruits. Try to eat 2 cups a day. Apples, oranges, strawberries, pears, and bananas are good sources. (Note: Fruit juice is low in fiber.)  · Vegetables. Try to eat at least 2.5 cups a day. Add asparagus, carrots, broccoli, peas, and corn to your meals.  · Beans. One cup of cooked lentils gives you over 15 grams of fiber. Try navy beans, lentils, and chickpeas.  · Seeds. A small  handful of seeds gives you about 3 grams of fiber. Try sunflower seeds.  Keep track of your fiber  Keep track of how much fiber you eat. Start by reading food labels. Then eat a variety of foods high in fiber. As you begin to eat more fiber, ask your healthcare provider how much water you should be drinking to keep your digestive system working smoothly.  You should aim for a certain amount of fiber in your diet each day. If you are a woman, that amount is between 25 and 28 grams per day. Men should aim for 30 to 33 grams per day. After age 50, your daily fiber needs drop to 22 grams for women and 28 grams for men.  Before you reach for the fiber supplements, think about this. Fiber is found naturally in healthy whole foods. It gives you that feeling of fullness after you eat. Taking fiber supplements or eating fiber-enriched foods will not give you this full feeling.  Your fiber intake is a good measure for the quality of your overall diet. If you are missing out on your daily amount of fiber, you may be lacking other important nutrients as well.  Date Last Reviewed: 5/11/2015  © 4160-0281 [employer]. [address] [place] [address]. All rights reserved. This information is not intended as a substitute for professional medical care. Always follow your healthcare professional's instructions.

## 2018-03-16 NOTE — TELEPHONE ENCOUNTER
----- Message from Scarlet Rosa sent at 3/16/2018  3:52 PM CDT -----  Contact: Nurse with Post-Op  Patient needs a 2 week post-op scheduled and there are no available appointments for me to scheduled.  Please call patient to schedule.   Call Back#[phone]  Thanks

## 2018-03-16 NOTE — PLAN OF CARE
"Nausea now a 1/0 almost gone states much better " declined pain pill for home had tylenol and toradol numbing meds  instr on high fiber diet and poc  No rectal bleeding noted HAS PACKING IN  02 sat on ra 98 % no emesis Ivf cont   "

## 2018-03-17 NOTE — OP NOTE
DATE OF PROCEDURE:  03/16/2018.    PREOPERATIVE DIAGNOSIS:  Grade III hemorrhoids.    POSTOPERATIVE DIAGNOSIS:  Grade III hemorrhoids.    SURGEON:  Klaus Mandujano Jr., M.D.    ASSISTANT:  Jennifer Denney.    SPECIMEN:  1.  Left hemorrhoid column.  2.  Right anterior hemorrhoid column.  3.  Right posterior hemorrhoid column.    SURGEON:  Klaus Mandujano Jr., M.D.    PROCEDURE IN DETAIL:  After appropriate consent was obtained, consent forms   signed and questions answered, the patient was taken to the Operating Room where   general endotracheal anesthesia was induced without difficulty.  She was placed   in the prone position.  The buttocks were exposed and taped in the usual   fashion to allow access to the anus.  All pressure points were padded.  Rectal   exam was performed.  There are no palpable masses other than the hemorrhoids.    Attention was first turned to the left hemorrhoid column.  This was excised with   the Harmonic scalpel and once that was completed, was sent to Pathology.  The   apex of the incision was transfixed with a 2-0 chromic suture and then the   defect was closed with a running 2-0 chromic suture and locking the suture out   on to the area of anal skin and then unlocked simple stitch.  This procedure was   then repeated for both anterior and posterior columns on the right side.  The   hemorrhoid columns were sent separately and the defects were closed with 2-0   chromic suture.  After that was completed, the area was irrigated.  Adequate   hemostasis was ensured.  Exparel and Marcaine with epinephrine were injected for   local anesthetic into the suture line and then antibiotic coated Gelfoam was   placed in the anus to aid hemostasis.  The patient was awakened, extubated and   transferred to the Recovery Room in stable condition.  She tolerated the   procedure without complication.  Estimated blood loss was approximately 35 mL.    Counts were correct at the end of the  procedure.      RTL/HN  dd: 03/16/2018 14:43:59 (CDT)  td: 03/16/2018 21:53:32 (CDT)  Doc ID   #[id number]  Job ID #[id number]    CC:

## 2018-03-18 ENCOUNTER — PATIENT MESSAGE (OUTPATIENT)
Dept: CARDIOLOGY | Facility: CLINIC | Age: 68
End: 2018-03-18

## 2018-03-19 VITALS
BODY MASS INDEX: 22.18 KG/M2 | RESPIRATION RATE: 18 BRPM | TEMPERATURE: 98 F | WEIGHT: 138 LBS | DIASTOLIC BLOOD PRESSURE: 65 MMHG | HEART RATE: 66 BPM | OXYGEN SATURATION: 98 % | HEIGHT: 66 IN | SYSTOLIC BLOOD PRESSURE: 128 MMHG

## 2018-03-19 RX ORDER — LISINOPRIL 5 MG/1
5 TABLET ORAL DAILY
Qty: 90 TABLET | Refills: 3 | Status: SHIPPED | OUTPATIENT
Start: 2018-03-19 | End: 2018-04-27 | Stop reason: ALTCHOICE

## 2018-03-21 ENCOUNTER — PATIENT MESSAGE (OUTPATIENT)
Dept: SURGERY | Facility: CLINIC | Age: 68
End: 2018-03-21

## 2018-03-22 ENCOUNTER — TELEPHONE (OUTPATIENT)
Dept: ENDOSCOPY | Facility: HOSPITAL | Age: 68
End: 2018-03-22

## 2018-03-22 ENCOUNTER — HOSPITAL ENCOUNTER (EMERGENCY)
Facility: HOSPITAL | Age: 68
Discharge: HOME | End: 2018-03-23
Attending: EMERGENCY MEDICINE
Payer: MEDICARE

## 2018-03-22 VITALS
HEIGHT: 66 IN | WEIGHT: 140 LBS | OXYGEN SATURATION: 97 % | BODY MASS INDEX: 22.5 KG/M2 | SYSTOLIC BLOOD PRESSURE: 135 MMHG | DIASTOLIC BLOOD PRESSURE: 65 MMHG | RESPIRATION RATE: 18 BRPM | TEMPERATURE: 98 F | HEART RATE: 70 BPM

## 2018-03-22 DIAGNOSIS — T17.208A FOREIGN BODY IN THROAT, INITIAL ENCOUNTER: Primary | ICD-10-CM

## 2018-03-22 PROCEDURE — 63600175 PHARM REV CODE 636 W HCPCS: Performed by: NURSE PRACTITIONER

## 2018-03-22 PROCEDURE — 96372 THER/PROPH/DIAG INJ SC/IM: CPT

## 2018-03-22 PROCEDURE — 99283 EMERGENCY DEPT VISIT LOW MDM: CPT | Mod: 25

## 2018-03-22 RX ORDER — NITROGLYCERIN 0.4 MG/1
0.4 TABLET SUBLINGUAL
Status: COMPLETED | OUTPATIENT
Start: 2018-03-22 | End: 2018-03-22

## 2018-03-22 RX ORDER — NITROGLYCERIN 0.4 MG/1
0.4 TABLET SUBLINGUAL EVERY 5 MIN PRN
Status: DISCONTINUED | OUTPATIENT
Start: 2018-03-23 | End: 2018-03-22

## 2018-03-22 RX ORDER — GLUCAGON 1 MG
1 KIT INJECTION
Status: COMPLETED | OUTPATIENT
Start: 2018-03-22 | End: 2018-03-22

## 2018-03-22 RX ADMIN — NITROGLYCERIN 0.4 MG: 0.4 TABLET SUBLINGUAL at 11:03

## 2018-03-22 RX ADMIN — GLUCAGON HYDROCHLORIDE 1 MG: 1 INJECTION, POWDER, FOR SOLUTION INTRAMUSCULAR; INTRAVENOUS; SUBCUTANEOUS at 11:03

## 2018-03-22 NOTE — TELEPHONE ENCOUNTER
Dr Henry Wei, may we hold Eliquis for 2 days prior to EGD tentatively scheduled 4/16/18?  Thank you.

## 2018-03-23 ENCOUNTER — TELEPHONE (OUTPATIENT)
Dept: ENDOSCOPY | Facility: HOSPITAL | Age: 68
End: 2018-03-23

## 2018-03-23 NOTE — TELEPHONE ENCOUNTER
Instructions mailed for EGD scheduled 4/16/18 at 1100.  She will hold Eliquis for 2 days prior to procedure with permission from Dr Henry Wei.

## 2018-03-23 NOTE — ED PROVIDER NOTES
Encounter Date: 3/22/2018    SCRIBE #1 NOTE: IDesirae, kathy scribing for, and in the presence of, .       History     Chief Complaint   Patient presents with    Foreign Body In Throat     food bolus        03/22/2018 11:15 PM     Chief complaint: throat complaint      Steph Lira is a 67 y.o. Female presenting with throat complaint. Patient states she was eating vegetable soup when she believes she got a piece of beef stuck in her throat. She explains do to a tumor growth in her throat she has a narrowed esophagus has been trying for 3 hours to dislodge the food bolus. Patient denies any difficulty breathing or vomiting. She states she otherwise feels fine however has had this problem in the pass             Review of patient's allergies indicates:   Allergen Reactions    Ciprofloxacin Itching    Doxycycline Other (See Comments)     Patient had a rxn with the sun despite wearing spf 30     Past Medical History:   Diagnosis Date    *Atrial fibrillation     and Hx PSVT    Allergy     chronic     Arthritis     fingers    Benign tumor of throat     Bronchitis March 2013    CAD (coronary artery disease) 2005    CABGx2 in 2005 and 2 MI after with 4 stents    Cancer 1996    small cell lung cancer s/p resection of 1/3 lung, 5 ribs and muscle mass then had chemo and radiation    Cataract     OD     CHF (congestive heart failure) past Hx    Chronic rhinitis     Clot past Hx    external jugular, now stented, occluded, had phlebitis; now revascularized    Colon polyp     COPD (chronic obstructive pulmonary disease)     no oxygen or nebulizers    COPD exacerbation 5/8/13    improved 5/14/13 after steroid pack,antibiotics    Former smoker     GERD (gastroesophageal reflux disease)     Heart block     Hyperlipidemia     Hypertension     pt states does not have //    MI (myocardial infarction) 2005    Posterior vitreous detachment of left eye     Thyroid disease      Past Surgical  History:   Procedure Laterality Date    ADENOIDECTOMY      APPENDECTOMY      BACK SURGERY      lumbar    CARDIAC SURGERY      CATARACT EXTRACTION Left     OS    CATARACT EXTRACTION, BILATERAL      left eye only    CHOLECYSTECTOMY      COLONOSCOPY  2012    repeat in 5 years    COLONOSCOPY N/A 2017    Procedure: COLONOSCOPY;  Surgeon: Kal Elise MD;  Location[facility];  Service: Endoscopy;  Laterality: N/A;    CORONARY ARTERY BYPASS GRAFT      2 vessel    EYE SURGERY  Dec 2012    ptosis repair    FIRST RIB REMOVAL   with lung resection    HYSTERECTOMY      LUNG LOBECTOMY      left lung for cancer    SHOULDER SURGERY      scapular entrapment after lung surgery, needed 5 ribs removed left side    SYMPOTHATIC NERVE LEFT SIDE       with lung surgery    TONSILLECTOMY      TONSILLECTOMY, ADENOIDECTOMY, BILATERAL MYRINGOTOMY AND TUBES      UPPER GASTROINTESTINAL ENDOSCOPY  2016    Dr. Koo    VASCULAR SURGERY      stents place in jugualr vein     Family History   Problem Relation Age of Onset    Allergic rhinitis Father     Cancer Father     Hypertension Father     Allergies Father     Allergic rhinitis Son     Asthma Son     Stroke Mother     Allergies Mother     Allergic rhinitis Sister     Asthma Sister     Diverticulitis Brother     No Known Problems Daughter     No Known Problems Maternal Aunt     No Known Problems Maternal Uncle     No Known Problems Paternal Aunt     No Known Problems Paternal Uncle     Colon polyps Maternal Grandmother       of colon blockage    No Known Problems Maternal Grandfather     No Known Problems Paternal Grandmother     No Known Problems Paternal Grandfather     No Known Problems Brother     No Known Problems Sister     Angioedema Neg Hx     Eczema Neg Hx     Immunodeficiency Neg Hx     Urticaria Neg Hx     Skin cancer Neg Hx     Amblyopia Neg Hx     Blindness Neg Hx     Cataracts Neg Hx      Diabetes Neg Hx     Glaucoma Neg Hx     Macular degeneration Neg Hx     Retinal detachment Neg Hx     Strabismus Neg Hx     Thyroid disease Neg Hx     Colon cancer Neg Hx     Melanoma Neg Hx      Social History   Substance Use Topics    Smoking status: Former Smoker     Packs/day: 2.00     Years: 20.00     Types: Cigarettes     Quit date: 1/18/1994    Smokeless tobacco: Never Used    Alcohol use No     Review of Systems   Constitutional: Negative for fever.   HENT: Positive for trouble swallowing. Negative for sore throat.    Respiratory: Negative for shortness of breath.    Cardiovascular: Negative for chest pain.   Gastrointestinal: Negative for nausea.   Genitourinary: Negative for dysuria.   Musculoskeletal: Negative for back pain.   Skin: Negative for rash.   Neurological: Negative for weakness.   Hematological: Does not bruise/bleed easily.       Physical Exam     Initial Vitals [03/22/18 2302]   BP Pulse Resp Temp SpO2   135/65 70 18 98.3 °F (36.8 °C) 97 %      MAP       88.33         Physical Exam    Nursing note and vitals reviewed.  Constitutional: Vital signs are normal. She appears well-developed and well-nourished.   HENT:   Head: Normocephalic and atraumatic.   Mouth/Throat: Oropharynx is clear and moist.   No food bolus noted in on physical examination.   Eyes: Pupils are equal, round, and reactive to light.   Neck: Trachea normal, full passive range of motion without pain and phonation normal. Neck supple.   Cardiovascular: Normal rate, regular rhythm, normal heart sounds and intact distal pulses. Exam reveals no gallop and no friction rub.    No murmur heard.  Pulmonary/Chest: Breath sounds normal. No respiratory distress. She has no wheezes. She has no rhonchi. She has no rales. She exhibits no tenderness.   Abdominal: Soft. Normal appearance and bowel sounds are normal.   Neurological: She is alert and oriented to person, place, and time. She has normal strength.   Skin: Skin is  warm, dry and intact.   Psychiatric: She has a normal mood and affect. Her speech is normal and behavior is normal.         ED Course   Procedures  Labs Reviewed - No data to display          Medical Decision Making:   History:   Old Medical Records: I decided to obtain old medical records.  Differential Diagnosis:   Esophagitis  FB in throat  ludwigs angina        APC / Resident Notes:   Patient is a 67 y.o. female who presents to the ED 03/23/2018 who underwent emergent evaluation for foreign body in throat.  Patient reports a piece of beef and her esophagus from 3 hours ago.  There was no bone in the mean according to the patient's  who conducted.  Do not think patient would benefit from further diagnostic studies.  Patient given sublingual nitroglycerin and glucagon and a Coke and patient with pleat resolution of symptoms.  Patient states she feels as though she passed the meat as she felt it go down with the coke.  She has no wheezing, stridor, tachypnea, hypoxia, dysphonia or dysphasia.  No signs of airway obstruction.  Do not think epiglottitis.  There is no mass.  Do not think Norberto's angina.  Than resolution of symptoms with treatment do not think esophagitis.  Based on my clinical evaluation, I do not appreciate any immediate, emergent, or life threatening condition or etiology that warrants additional workup today and feel that the patient can be discharged with close follow up care. Case discussed with Dr. Hadley who is agreeable to plan of care. Follow up and return precautions discussed; patient verbalized understanding and is agreeable to plan of care. Patient discharged home in stable condition.          Scribe Attestation:   Scribe #1: I performed the above scribed service and the documentation accurately describes the services I performed. I attest to the accuracy of the note.    Attending Attestation:     Physician Attestation Statement for NP/PA:   I discussed this assessment and plan of  this patient with the NP/PA, but I did not personally examine the patient. The face to face encounter was performed by the NP/PA.    Other NP/PA Attestation Additions:    History of Present Illness: 67-year-old female presented with a chief complaint of a possible food bolus.    Medical Decision Making: Initial differential included but not limited to food bolus, esophagitis, and pharyngitis.  The patient was treated with glucagon, sublingual nitroglycerin, and a Coke, with resolution of her likely food bolus in the emergency department.  The patient was tolerating by mouth after this.  She is stable for discharge to home.  She is to follow-up with her physician for further care.  I am in agreement with the nurse practitioner's assessment, treatment, and plan of care.       Physician Attestation for Scribe:  Physician Attestation Statement for Scribe #1: I, Keira Austin, reviewed documentation, as scribed by in my presence, and it is both accurate and complete.     Comments: I, CORRINE Cazares, personally performed the services described in this documentation. All medical record entries made by the scribe were at my direction and in my presence.  I have reviewed the chart and agree that the record reflects my personal performance and is accurate and complete. CORRINE Cazares.  1:07 AM 03/23/2018                Clinical Impression:   The encounter diagnosis was Foreign body in throat, initial encounter.    Disposition:   Disposition: Discharged  Condition: Stable                        Keira Austin NP  03/23/18 0108       Hermann Hadley MD  03/23/18 0146

## 2018-03-27 ENCOUNTER — PATIENT MESSAGE (OUTPATIENT)
Dept: ENDOSCOPY | Facility: HOSPITAL | Age: 68
End: 2018-03-27

## 2018-03-27 RX ORDER — FLUCONAZOLE 40 MG/ML
200 POWDER, FOR SUSPENSION ORAL DAILY
Qty: 105 ML | Refills: 0 | Status: SHIPPED | OUTPATIENT
Start: 2018-03-27 | End: 2018-04-17

## 2018-03-28 ENCOUNTER — OFFICE VISIT (OUTPATIENT)
Dept: SURGERY | Facility: CLINIC | Age: 68
End: 2018-03-28
Payer: MEDICARE

## 2018-03-28 VITALS
DIASTOLIC BLOOD PRESSURE: 58 MMHG | SYSTOLIC BLOOD PRESSURE: 106 MMHG | BODY MASS INDEX: 22.92 KG/M2 | HEART RATE: 66 BPM | WEIGHT: 142 LBS

## 2018-03-28 DIAGNOSIS — Z98.890 POST-OPERATIVE STATE: Primary | ICD-10-CM

## 2018-03-28 PROCEDURE — 99213 OFFICE O/P EST LOW 20 MIN: CPT | Mod: PBBFAC,PO | Performed by: SURGERY

## 2018-03-28 PROCEDURE — 99024 POSTOP FOLLOW-UP VISIT: CPT | Mod: POP,,, | Performed by: SURGERY

## 2018-03-28 PROCEDURE — 99999 PR PBB SHADOW E&M-EST. PATIENT-LVL III: CPT | Mod: PBBFAC,,, | Performed by: SURGERY

## 2018-03-28 NOTE — TELEPHONE ENCOUNTER
Dr Wei,  Please see Dr Gonzales's question below.    Please check with Dr Henry Mckinley office ([facility]) if is ok to use the Diflucan with her antiarrythmic medication (Flecainide)?   Thanks   Robbie Gonzales MD (Routing comment)     Thanks  Sarah

## 2018-03-28 NOTE — PROGRESS NOTES
POST-OP NOTE    PROCEDURE: Hemorrhoidecomy    COMPLAINTS: None. Had significant pain after Experel wore off but getting better now. No continence problems.    EXAM: Incision well approximated. No drainage. No infection.      IMPRESSION: Doing well    PLAN: FU as needed.

## 2018-03-29 ENCOUNTER — TELEPHONE (OUTPATIENT)
Dept: GASTROENTEROLOGY | Facility: CLINIC | Age: 68
End: 2018-03-29

## 2018-03-29 DIAGNOSIS — I49.9 ARRHYTHMIA: ICD-10-CM

## 2018-03-29 DIAGNOSIS — I49.1 SUPRAVENTRICULAR PREMATURE BEATS: Primary | ICD-10-CM

## 2018-04-02 ENCOUNTER — OFFICE VISIT (OUTPATIENT)
Dept: DERMATOLOGY | Facility: CLINIC | Age: 68
End: 2018-04-02
Payer: MEDICARE

## 2018-04-02 ENCOUNTER — PATIENT MESSAGE (OUTPATIENT)
Dept: FAMILY MEDICINE | Facility: CLINIC | Age: 68
End: 2018-04-02

## 2018-04-02 DIAGNOSIS — L28.0 LSC (LICHEN SIMPLEX CHRONICUS): Primary | ICD-10-CM

## 2018-04-02 DIAGNOSIS — B07.8 COMMON WART: ICD-10-CM

## 2018-04-02 DIAGNOSIS — L65.0 TELOGEN EFFLUVIUM: ICD-10-CM

## 2018-04-02 PROCEDURE — 99213 OFFICE O/P EST LOW 20 MIN: CPT | Mod: 25,S$PBB,, | Performed by: DERMATOLOGY

## 2018-04-02 PROCEDURE — 11900 INJECT SKIN LESIONS </W 7: CPT | Mod: PBBFAC,PO | Performed by: DERMATOLOGY

## 2018-04-02 PROCEDURE — 17110 DESTRUCTION B9 LES UP TO 14: CPT | Mod: 59,PBBFAC,PO | Performed by: DERMATOLOGY

## 2018-04-02 PROCEDURE — 99212 OFFICE O/P EST SF 10 MIN: CPT | Mod: PBBFAC,PO | Performed by: DERMATOLOGY

## 2018-04-02 PROCEDURE — 17110 DESTRUCTION B9 LES UP TO 14: CPT | Mod: S$PBB,,, | Performed by: DERMATOLOGY

## 2018-04-02 PROCEDURE — 99999 PR PBB SHADOW E&M-EST. PATIENT-LVL II: CPT | Mod: PBBFAC,,, | Performed by: DERMATOLOGY

## 2018-04-02 PROCEDURE — 11900 INJECT SKIN LESIONS </W 7: CPT | Mod: 59,S$PBB,, | Performed by: DERMATOLOGY

## 2018-04-02 RX ORDER — KETOCONAZOLE 20 MG/ML
SHAMPOO, SUSPENSION TOPICAL
Qty: 120 ML | Refills: 5 | Status: SHIPPED | OUTPATIENT
Start: 2018-04-02 | End: 2019-01-01

## 2018-04-02 NOTE — PROGRESS NOTES
Subjective:       Patient ID:  Steph Lira is a 67 y.o. female who presents for   Chief Complaint   Patient presents with    Hair Loss    Warts    Insect Bite     Pt presnets for several issues.  Most concerning hair loss x 2 months.  Has thyroid problems and also a few surgeries/health problems since 01/2018.  Has had 8 endoscopies bc of papilloma virus. Also had cardioversion.  Not itchy. No tx.  Also has recurrent wart left index.  X years.  Went away for awhile.  No current tx.  Also has on-going bug bite x 3 months right thigh.  Was a blister.  tx with rx topical steroid. No change.       Hair Loss     Warts     Insect Bite         Review of Systems   Constitutional: Positive for malaise. Negative for fever, chills and fatigue.   Skin: Positive for daily sunscreen use. Negative for tendency to form keloidal scars.   Hematologic/Lymphatic: Does not bruise/bleed easily.        Objective:    Physical Exam   Constitutional: She appears well-developed and well-nourished. No distress.   Neurological: She is alert and oriented to person, place, and time. She is not disoriented.   Psychiatric: She has a normal mood and affect.   Skin:   Areas Examined (abnormalities noted in diagram):   Scalp / Hair Palpated and Inspected  RLE Inspected  Nails and Digits Inspection Performed                       Diagram Legend     Erythematous scaling macule/papule c/w actinic keratosis       Vascular papule c/w angioma      Pigmented verrucoid papule/plaque c/w seborrheic keratosis      Yellow umbilicated papule c/w sebaceous hyperplasia      Irregularly shaped tan macule c/w lentigo     1-2 mm smooth white papules consistent with Milia      Movable subcutaneous cyst with punctum c/w epidermal inclusion cyst      Subcutaneous movable cyst c/w pilar cyst      Firm pink to brown papule c/w dermatofibroma      Pedunculated fleshy papule(s) c/w skin tag(s)      Evenly pigmented macule c/w junctional nevus     Mildly variegated  pigmented, slightly irregular-bordered macule c/w mildly atypical nevus      Flesh colored to evenly pigmented papule c/w intradermal nevus       Pink pearly papule/plaque c/w basal cell carcinoma      Erythematous hyperkeratotic cursted plaque c/w SCC      Surgical scar with no sign of skin cancer recurrence      Open and closed comedones      Inflammatory papules and pustules      Verrucoid papule consistent consistent with wart     Erythematous eczematous patches and plaques     Dystrophic onycholytic nail with subungual debris c/w onychomycosis     Umbilicated papule    Erythematous-base heme-crusted tan verrucoid plaque consistent with inflamed seborrheic keratosis     Erythematous Silvery Scaling Plaque c/w Psoriasis     See annotation      Assessment / Plan:        LSC (lichen simplex chronicus)  Intralesional Kenalog 2.0mg/cc (0.2 cc total) injected into 1 lesions on the right thigh today after obtaining verbal consent including risk of surrounding hypopigmentation. Patient tolerated procedure well.  D/c manipulation and use ice prn itching    NDC for Kenalog 10mg/cc:  0399-6583-96    Common wart  Cryosurgery procedure note:    Verbal consent from the patient is obtained. Liquid nitrogen cryosurgery is applied to 1 verruca with prior paring, as detailed in the physical exam, to produce a freeze injury. 1 consecutive freeze thaw cycles are applied to each verruca. The patient is aware that blisters (possibly blood blisters) may form.    Telogen effluvium  Reassurance, secondary to multiple procedures  Viviscal or Nutrafol   -     ketoconazole (NIZORAL) 2 % shampoo; Wash hair with medicated shampoo at least 2x/week - let sit on scalp at least 5 minutes prior to rinsing  Dispense: 120 mL; Refill: 5             Follow-up in about 2 months (around 6/2/2018).

## 2018-04-05 ENCOUNTER — TELEPHONE (OUTPATIENT)
Dept: CARDIOLOGY | Facility: CLINIC | Age: 68
End: 2018-04-05

## 2018-04-05 NOTE — TELEPHONE ENCOUNTER
Called pt to schedule weekly ECG per Dr Wei for a long as pt is taking Diflucan.  Scheduled pt for 4/12 @ 930am. Pt verbalized understanding and OK with date and time.   No further issues discussed.

## 2018-04-12 ENCOUNTER — CLINICAL SUPPORT (OUTPATIENT)
Dept: CARDIOLOGY | Facility: CLINIC | Age: 68
End: 2018-04-12
Payer: MEDICARE

## 2018-04-12 DIAGNOSIS — I48.0 PAF (PAROXYSMAL ATRIAL FIBRILLATION): ICD-10-CM

## 2018-04-12 PROCEDURE — 93005 ELECTROCARDIOGRAM TRACING: CPT | Mod: PBBFAC,PO | Performed by: INTERNAL MEDICINE

## 2018-04-12 PROCEDURE — 93010 ELECTROCARDIOGRAM REPORT: CPT | Mod: S$PBB,,, | Performed by: INTERNAL MEDICINE

## 2018-04-16 ENCOUNTER — ANESTHESIA (OUTPATIENT)
Dept: ENDOSCOPY | Facility: HOSPITAL | Age: 68
End: 2018-04-16
Payer: MEDICARE

## 2018-04-16 ENCOUNTER — ANESTHESIA EVENT (OUTPATIENT)
Dept: ENDOSCOPY | Facility: HOSPITAL | Age: 68
End: 2018-04-16
Payer: MEDICARE

## 2018-04-16 ENCOUNTER — SURGERY (OUTPATIENT)
Age: 68
End: 2018-04-16

## 2018-04-16 ENCOUNTER — HOSPITAL ENCOUNTER (OUTPATIENT)
Facility: HOSPITAL | Age: 68
Discharge: HOME | End: 2018-04-16
Attending: INTERNAL MEDICINE | Admitting: INTERNAL MEDICINE
Payer: MEDICARE

## 2018-04-16 VITALS
HEART RATE: 66 BPM | WEIGHT: 140 LBS | SYSTOLIC BLOOD PRESSURE: 123 MMHG | DIASTOLIC BLOOD PRESSURE: 57 MMHG | BODY MASS INDEX: 22.5 KG/M2 | OXYGEN SATURATION: 100 % | RESPIRATION RATE: 18 BRPM | HEIGHT: 66 IN | TEMPERATURE: 98 F

## 2018-04-16 DIAGNOSIS — K22.9 ESOPHAGEAL LESION: Primary | ICD-10-CM

## 2018-04-16 PROCEDURE — D9220A PRA ANESTHESIA: Mod: ANES,,, | Performed by: ANESTHESIOLOGY

## 2018-04-16 PROCEDURE — 43270 EGD LESION ABLATION: CPT | Mod: 52,,, | Performed by: INTERNAL MEDICINE

## 2018-04-16 PROCEDURE — 43270 EGD LESION ABLATION: CPT | Performed by: INTERNAL MEDICINE

## 2018-04-16 PROCEDURE — 37000009 HC ANESTHESIA EA ADD 15 MINS: Performed by: INTERNAL MEDICINE

## 2018-04-16 PROCEDURE — C1886 CATHETER, ABLATION: HCPCS | Performed by: INTERNAL MEDICINE

## 2018-04-16 PROCEDURE — 63600175 PHARM REV CODE 636 W HCPCS: Performed by: NURSE ANESTHETIST, CERTIFIED REGISTERED

## 2018-04-16 PROCEDURE — D9220A PRA ANESTHESIA: Mod: CRNA,,, | Performed by: NURSE ANESTHETIST, CERTIFIED REGISTERED

## 2018-04-16 PROCEDURE — 37000008 HC ANESTHESIA 1ST 15 MINUTES: Performed by: INTERNAL MEDICINE

## 2018-04-16 PROCEDURE — 25000003 PHARM REV CODE 250: Performed by: INTERNAL MEDICINE

## 2018-04-16 RX ORDER — PROPOFOL 10 MG/ML
VIAL (ML) INTRAVENOUS
Status: DISCONTINUED | OUTPATIENT
Start: 2018-04-16 | End: 2018-04-16

## 2018-04-16 RX ORDER — ONDANSETRON 2 MG/ML
4 INJECTION INTRAMUSCULAR; INTRAVENOUS ONCE AS NEEDED
Status: DISCONTINUED | OUTPATIENT
Start: 2018-04-16 | End: 2018-04-16 | Stop reason: HOSPADM

## 2018-04-16 RX ORDER — FENTANYL CITRATE 50 UG/ML
25 INJECTION, SOLUTION INTRAMUSCULAR; INTRAVENOUS EVERY 5 MIN PRN
Status: DISCONTINUED | OUTPATIENT
Start: 2018-04-16 | End: 2018-04-16 | Stop reason: HOSPADM

## 2018-04-16 RX ORDER — DIPHENHYDRAMINE HYDROCHLORIDE 50 MG/ML
25 INJECTION INTRAMUSCULAR; INTRAVENOUS EVERY 6 HOURS PRN
Status: DISCONTINUED | OUTPATIENT
Start: 2018-04-16 | End: 2018-04-16 | Stop reason: HOSPADM

## 2018-04-16 RX ORDER — MEPERIDINE HYDROCHLORIDE 50 MG/ML
12.5 INJECTION INTRAMUSCULAR; INTRAVENOUS; SUBCUTANEOUS ONCE AS NEEDED
Status: DISCONTINUED | OUTPATIENT
Start: 2018-04-16 | End: 2018-04-16 | Stop reason: HOSPADM

## 2018-04-16 RX ORDER — LIDOCAINE HCL/PF 100 MG/5ML
SYRINGE (ML) INTRAVENOUS
Status: DISCONTINUED | OUTPATIENT
Start: 2018-04-16 | End: 2018-04-16

## 2018-04-16 RX ORDER — SODIUM CHLORIDE 9 MG/ML
INJECTION, SOLUTION INTRAVENOUS CONTINUOUS
Status: DISCONTINUED | OUTPATIENT
Start: 2018-04-16 | End: 2018-04-16 | Stop reason: HOSPADM

## 2018-04-16 RX ORDER — PROPOFOL 10 MG/ML
VIAL (ML) INTRAVENOUS CONTINUOUS PRN
Status: DISCONTINUED | OUTPATIENT
Start: 2018-04-16 | End: 2018-04-16

## 2018-04-16 RX ADMIN — PROPOFOL 150 MCG/KG/MIN: 10 INJECTION, EMULSION INTRAVENOUS at 11:04

## 2018-04-16 RX ADMIN — SODIUM CHLORIDE: 0.9 INJECTION, SOLUTION INTRAVENOUS at 11:04

## 2018-04-16 RX ADMIN — LIDOCAINE HYDROCHLORIDE 50 MG: 20 INJECTION, SOLUTION INTRAVENOUS at 11:04

## 2018-04-16 RX ADMIN — PROPOFOL 80 MG: 10 INJECTION, EMULSION INTRAVENOUS at 11:04

## 2018-04-16 NOTE — ANESTHESIA PREPROCEDURE EVALUATION
04/16/2018  Steph Lira is a 67 y.o., female.    Anesthesia Evaluation         Review of Systems  Anesthesia Hx:  No problems with previous Anesthesia   Social:  Non-Smoker    Cardiovascular:   Exercise tolerance: good Denies Hypertension. Past MI CAD   CABG/stent   Denies Angina.  Functional Capacity Can you climb two flights of stairs? ==> Yes    Pulmonary:   Asthma Denies Recent URI.  Denies Sleep Apnea.    Renal/:  Renal/ Normal     Hepatic/GI:   Denies PUD. Denies Hiatal Hernia. GERD Denies Liver Disease.  Denies Hepatitis.    Neurological:   Denies CVA. Denies Seizures.    Endocrine:   Denies Diabetes. Hypothyroidism        Physical Exam  General:  Well nourished    Airway/Jaw/Neck:  Airway Findings: Mouth Opening: Normal Tongue: Normal  General Airway Assessment: Adult  Mallampati: I  TM Distance: Normal, at least 6 cm  Jaw/Neck Findings:  Neck ROM: Normal ROM  Neck Findings:     Eyes/Ears/Nose:  EYES/EARS/NOSE FINDINGS: Normal   Dental:  Dental Findings: In tact   Chest/Lungs:  Chest/Lungs Findings: Clear to auscultation     Heart/Vascular:  Heart Findings: Rate: Normal  Rhythm: Regular Rhythm  Sounds: Normal        Mental Status:  Mental Status Findings:  Alert and Oriented         Anesthesia Plan  Type of Anesthesia, risks & benefits discussed:  Anesthesia Type:  general  Patient's Preference: Proceed with anesthesia understanding that the risks are very small but could be serious or life threatening.  Intra-op Monitoring Plan: standard ASA monitors  Intra-op Monitoring Plan Comments:   Post Op Pain Control Plan:   Post Op Pain Control Plan Comments:   Induction:   IV  Beta Blocker:  Patient is not currently on a Beta-Blocker (No further documentation required).       Informed Consent: Patient understands risks and agrees with Anesthesia plan.  Questions answered. Anesthesia consent signed  with patient.  ASA Score: 3     Day of Surgery Review of History & Physical: I have interviewed and examined the patient. I have reviewed the patient's H&P dated:            Ready For Surgery From Anesthesia Perspective.

## 2018-04-16 NOTE — H&P
History & Physical - Short Stay  Gastroenterology      SUBJECTIVE:     Procedure: EGD    Chief Complaint/Indication for Procedure: Dysphagia    History of Present Illness:  Patient is a 67 y.o. female presents with dysphagia and evidence of an esophageal lesion (papilloma) treated with cryotherapy. The patient continue with symptoms of dysphagia despite responding to the cryotherapy (tissue destruction).     PTA Medications   Medication Sig    apixaban 5 mg Tab Take 1 tablet (5 mg total) by mouth 2 (two) times daily.    atorvastatin (LIPITOR) 40 MG tablet Take 1 tablet (40 mg total) by mouth once daily.    bisacodyl (DULCOLAX) 5 mg EC tablet Take 1 tablet (5 mg total) by mouth daily as needed for Constipation.    CALCIUM CITRATE (CITRACAL) 500 mg TbEF Take 500 mg by mouth. 1 Tablet, Effervescent Oral  daily    cetirizine (ZYRTEC) 10 MG tablet TAKE 1 TABLET EVERY EVENING    diphenhydrAMINE (BENADRYL) 25 mg capsule Take 25 mg by mouth every 6 (six) hours as needed for Itching.    flecainide (TAMBOCOR) 50 MG Tab Take 2 tablets (100 mg total) by mouth every 12 (twelve) hours.    fluconazole 40 mg/ml (DIFLUCAN) 40 mg/mL suspension Take 5 mLs (200 mg total) by mouth once daily.    fluticasone (FLONASE) 50 mcg/actuation nasal spray 1 spray by Each Nare route once daily.    fluticasone-salmeterol 250-50 mcg/dose (ADVAIR DISKUS) 250-50 mcg/dose diskus inhaler USE 1 INHALATION INTO THE LUNGS TWICE A DAY. RINSE MOUTH AFTER USE TO PREVENT THRUSH    ketoconazole (NIZORAL) 2 % shampoo Wash hair with medicated shampoo at least 2x/week - let sit on scalp at least 5 minutes prior to rinsing    levothyroxine (SYNTHROID) 88 MCG tablet Take 1 tablet (88 mcg total) by mouth once daily.    lisinopril (PRINIVIL,ZESTRIL) 5 MG tablet Take 1 tablet (5 mg total) by mouth once daily.    metoprolol succinate (TOPROL-XL) 50 MG 24 hr tablet Take 1 tablet (50 mg total) by mouth once daily.    montelukast (SINGULAIR) 10 mg tablet  Take 1 tablet (10 mg total) by mouth every evening.    multivitamin-minerals-lutein (CENTRUM SILVER) Tab Take 1 tablet by mouth once daily. 1 Tablet Oral Every day    pantoprazole (PROTONIX) 40 MG tablet Take 1 tablet (40 mg total) by mouth once daily. Take 30 minutes prior to breakfast    RETIN-A 0.05 % cream APPLY THIN FILM TO FACE AT NIGHT AFTER MOISTURIZING    SPIRIVA WITH HANDIHALER 18 mcg inhalation capsule INHALE THE CONTENTS OF 1 CAPSULE DAILY    albuterol (ACCUNEB) 1.25 mg/3 mL Nebu USE 1 VIAL (3 ML) VIA NEBULIZER EVERY 6 HOURS AS NEEDED    albuterol (PROVENTIL HFA) 90 mcg/actuation inhaler Inhale 2 puffs into the lungs every 6 (six) hours as needed for Wheezing.    desoximetasone (TOPICORT) 0.25 % cream Apply topically 2 (two) times daily.    diclofenac sodium (VOLTAREN) 1 % Gel Apply 2 g topically once daily.    hydrocodone-acetaminophen 5-325mg (NORCO) 5-325 mg per tablet Take 1 tablet by mouth every 6 (six) hours as needed for Pain.    ipratropium (ATROVENT) 0.03 % nasal spray 2 sprays by Nasal route 2 (two) times daily. As needed (Patient taking differently: 2 sprays by Nasal route once daily. As needed)    triamcinolone acetonide 0.1% (KENALOG) 0.1 % cream Apply topically 2 (two) times daily.       Review of patient's allergies indicates:   Allergen Reactions    Ciprofloxacin Itching    Doxycycline Other (See Comments)     Patient had a rxn with the sun despite wearing spf 30        Past Medical History:   Diagnosis Date    *Atrial fibrillation     and Hx PSVT    Allergy     chronic     Arthritis     fingers    Benign tumor of throat     Bronchitis March 2013    CAD (coronary artery disease) 2005    CABGx2 in 2005 and 2 MI after with 4 stents    Cancer 1996    small cell lung cancer s/p resection of 1/3 lung, 5 ribs and muscle mass then had chemo and radiation    Cataract     OD     CHF (congestive heart failure) past Hx    Chronic rhinitis     Clot past Hx    external jugular,  now stented, occluded, had phlebitis; now revascularized    Colon polyp     COPD (chronic obstructive pulmonary disease)     no oxygen or nebulizers    COPD exacerbation 5/8/13    improved 5/14/13 after steroid pack,antibiotics    Former smoker     GERD (gastroesophageal reflux disease)     Heart block     Hyperlipidemia     Hypertension     pt states does not have //    MI (myocardial infarction) 2005    Posterior vitreous detachment of left eye     Thyroid disease      Past Surgical History:   Procedure Laterality Date    ADENOIDECTOMY      APPENDECTOMY      BACK SURGERY      lumbar    CARDIAC SURGERY      CATARACT EXTRACTION Left     OS    CATARACT EXTRACTION, BILATERAL      left eye only    CHOLECYSTECTOMY      COLONOSCOPY  03/2012    repeat in 5 years    COLONOSCOPY N/A 6/19/2017    Procedure: COLONOSCOPY;  Surgeon: Kal Elise MD;  Location[facility];  Service: Endoscopy;  Laterality: N/A;    CORONARY ARTERY BYPASS GRAFT  2005    2 vessel    EYE SURGERY  Dec 2012    ptosis repair    FIRST RIB REMOVAL  1996 with lung resection    HYSTERECTOMY      LUNG LOBECTOMY  1996    left lung for cancer    SHOULDER SURGERY  2010,2011    scapular entrapment after lung surgery, needed 5 ribs removed left side    SYMPOTHATIC NERVE LEFT SIDE      1996 with lung surgery    TONSILLECTOMY      TONSILLECTOMY, ADENOIDECTOMY, BILATERAL MYRINGOTOMY AND TUBES      UPPER GASTROINTESTINAL ENDOSCOPY  05/2016    Dr. Koo    VASCULAR SURGERY      stents place in jugualr vein     Family History   Problem Relation Age of Onset    Allergic rhinitis Father     Cancer Father     Hypertension Father     Allergies Father     Allergic rhinitis Son     Asthma Son     Stroke Mother     Allergies Mother     Allergic rhinitis Sister     Asthma Sister     Diverticulitis Brother     No Known Problems Daughter     No Known Problems Maternal Aunt     No Known Problems Maternal Uncle     No Known  Problems Paternal Aunt     No Known Problems Paternal Uncle     Colon polyps Maternal Grandmother       of colon blockage    No Known Problems Maternal Grandfather     No Known Problems Paternal Grandmother     No Known Problems Paternal Grandfather     No Known Problems Brother     No Known Problems Sister     Angioedema Neg Hx     Eczema Neg Hx     Immunodeficiency Neg Hx     Urticaria Neg Hx     Skin cancer Neg Hx     Amblyopia Neg Hx     Blindness Neg Hx     Cataracts Neg Hx     Diabetes Neg Hx     Glaucoma Neg Hx     Macular degeneration Neg Hx     Retinal detachment Neg Hx     Strabismus Neg Hx     Thyroid disease Neg Hx     Colon cancer Neg Hx     Melanoma Neg Hx      Social History   Substance Use Topics    Smoking status: Former Smoker     Packs/day: 2.00     Years: 20.00     Types: Cigarettes     Quit date: 1994    Smokeless tobacco: Never Used    Alcohol use No       Review of Systems:  Constitutional: no fever or chills  Respiratory: no cough or shortness of breath  Cardiovascular: no chest pain or palpitations  Gastrointestinal: no nausea or vomiting, no abdominal pain or change in bowel habits    OBJECTIVE:     Vital Signs (Most Recent)  Temp: 98.1 °F (36.7 °C) (18 1042)  Pulse: 68 (18 1042)  Resp: 18 (18 1042)  BP: (!) 143/71 (18 1042)  SpO2: 97 % (18 1042)    Physical Exam:  General: well developed, well nourished  Lungs:  clear to auscultation bilaterally and normal respiratory effort  Heart: regular rate, S1, S2 normal  Abdomen: soft, non-tender non-distented; bowel sounds normal; no masses,  no organomegaly    Laboratory  CBC: No results for input(s): WBC, RBC, HGB, HCT, PLT, MCV, MCH, MCHC in the last 168 hours.  CMP: No results for input(s): GLU, CALCIUM, ALBUMIN, PROT, NA, K, CO2, CL, BUN, CREATININE, ALKPHOS, ALT, AST, BILITOT in the last 168 hours.  Coagulation: No results for input(s): LABPROT, INR, APTT in the last 168  hours.      Diagnostic Results:    ASSESSMENT/PLAN:     Esophageal lesion    Plan: EGD    Anesthesia Plan: MAC    ASA Grade: ASA 2 - Patient with mild systemic disease with no functional limitations     The impression and plan was discussed in detail with the patient. All questions have been answered and the patient voices understanding of our plan at this point. The risk of the procedure was discussed in detail which includes but not limited to bleeding, infection, perforation in some cases requiring surgery with its spectrum of complications.

## 2018-04-16 NOTE — PROVATION PATIENT INSTRUCTIONS
Discharge Summary/Instructions after an Endoscopic Procedure  Patient Name: Steph Raymundo  Patient MRN: [id number]  Patient YOB: 1950 Monday, April 16, 2018  Robbie Gonzales MD  RESTRICTIONS:  During your procedure today, you received medications for sedation.  These   medications may affect your judgment, balance and coordination.  Therefore,   for 24 hours, you have the following restrictions:   - DO NOT drive a car, operate machinery, make legal/financial decisions,   sign important papers or drink alcohol.    ACTIVITY:  The following day: return to full activity including work, except no heavy   lifting, straining or running for 3 days if polyps were removed.  DIET:  Eat and drink normally unless instructed otherwise.     TREATMENT FOR COMMON SIDE EFFECTS:  - Mild abdominal pain, nausea, belching, bloating or excessive gas:  rest,   eat lightly and use a heating pad.  - Sore Throat: treat with throat lozenges and/or gargle with warm salt   water.  - Because air was used during the procedure, expelling large amounts of air   from your rectum or belching is normal.  - If a bowel prep was taken, you may not have a bowel movement for 1-3 days.    This is normal.  SYMPTOMS TO WATCH FOR AND REPORT TO YOUR PHYSICIAN:  1. Abdominal pain or bloating, other than gas cramps.  2. Chest pain.  3. Back pain.  4. Signs of infection such as: chills or fever occurring within 24 hours   after the procedure.  5. Rectal bleeding, which would show as bright red, maroon, or black stools.   (A tablespoon of blood from the rectum is not serious, especially if   hemorrhoids are present.)  6. Vomiting.  7. Weakness or dizziness.  GO DIRECTLY TO THE NEAREST EMERGENCY ROOM IF YOU HAVE ANY OF THE FOLLOWING:      Difficulty breathing              Chills and/or fever over 101 F   Persistent vomiting and/or vomiting blood   Severe abdominal pain   Severe chest pain   Black, tarry stools   Bleeding- more than one tablespoon   Any  other symptom or condition that you feel may need urgent attention  Your doctor recommends these additional instructions:  If any biopsies were taken, your doctors clinic will contact you in 1 to 2   weeks with any results.  - Discharge patient to home (ambulatory).   - Repeat upper endoscopy in 12 weeks for retreatment.  For questions, problems or results please call your physician - Robbie Gonzales MD at Work:  [phone].  [facility], EMERGENCY ROOM PHONE NUMBER: [phone]  IF A COMPLICATION OR EMERGENCY SITUATION ARISES AND YOU ARE UNABLE TO REACH   YOUR PHYSICIAN - GO DIRECTLY TO THE EMERGENCY ROOM.  Robbie Gonzales MD  4/16/2018 12:09:46 PM  This report has been verified and signed electronically.

## 2018-04-16 NOTE — PLAN OF CARE
Patient and spouse state they are ready to be discharged. Instructions and report given to patient and family. Both verbalize understanding. Patient tolerating po liquids with no difficulty. Patient denies pain. Anesthesia consent and surgical consent in chart upon patient's discharge from [facility].

## 2018-04-16 NOTE — DISCHARGE SUMMARY
Discharge Summary/Instructions after an Endoscopic Procedure    Patient Name: Steph Raymundo  Patient MRN: [id number]  Patient YOB: 1950 Monday, April 16, 2018  Robbie Gonzales MD    RESTRICTIONS:  During your procedure today, you received medications for sedation.  These medications may affect your judgment, balance and coordination.  Therefore, for 24 hours, you have the following restrictions:     - DO NOT drive a car, operate machinery, make legal/financial decisions, sign important papers or drink alcohol.      ACTIVITY:  The following day: return to full activity including work, except no heavy lifting, straining or running for 3 days if polyps were removed.    DIET:  Eat and drink normally unless instructed otherwise.     TREATMENT FOR COMMON SIDE EFFECTS:  - Mild abdominal pain, nausea, belching, bloating or excessive gas:  rest, eat lightly and use a heating pad.  - Sore Throat: treat with throat lozenges and/or gargle with warm salt water.  - Because air was used during the procedure, expelling large amounts of air from your rectum or belching is normal.  - If a bowel prep was taken, you may not have a bowel movement for 1-3 days.  This is normal.      SYMPTOMS TO WATCH FOR AND REPORT TO YOUR PHYSICIAN:  1. Abdominal pain or bloating, other than gas cramps.  2. Chest pain.  3. Back pain.  4. Signs of infection such as: chills or fever occurring within 24 hours after the procedure.  5. Rectal bleeding, which would show as bright red, maroon, or black stools. (A tablespoon of blood from the rectum is not serious, especially if hemorrhoids are present.)  6. Vomiting.  7. Weakness or dizziness.      GO DIRECTLY TO THE NEAREST EMERGENCY ROOM IF YOU HAVE ANY OF THE FOLLOWING:     Difficulty breathing              Chills and/or fever over 101 F   Persistent vomiting and/or vomiting blood   Severe abdominal pain   Severe chest pain   Black, tarry stools   Bleeding- more than one tablespoon   Any other  symptom or condition that you feel may need urgent attention    Your doctor recommends these additional instructions:  If any biopsies were taken, your doctors clinic will contact you in 1 to 2 weeks with any results.    - Discharge patient to home (ambulatory).   - Repeat upper endoscopy in 12 weeks for retreatment.    For questions, problems or results please call your physician - Robbie Gonzales MD at Work:  [phone].    [facility], EMERGENCY ROOM PHONE NUMBER: [phone]    IF A COMPLICATION OR EMERGENCY SITUATION ARISES AND YOU ARE UNABLE TO REACH YOUR PHYSICIAN - GO DIRECTLY TO THE EMERGENCY ROOM.

## 2018-04-16 NOTE — ANESTHESIA POSTPROCEDURE EVALUATION
"Anesthesia Post Evaluation    Patient: Steph Lira    Procedure(s) Performed: Procedure(s) (LRB):  ESOPHAGOGASTRODUODENOSCOPY (EGD)/cryo (N/A)    Final Anesthesia Type: general  Patient location during evaluation: PACU  Patient participation: Yes- Able to Participate  Level of consciousness: awake and alert  Post-procedure vital signs: reviewed and stable  Pain management: adequate  Airway patency: patent  PONV status at discharge: No PONV  Anesthetic complications: no      Cardiovascular status: blood pressure returned to baseline  Respiratory status: unassisted  Hydration status: euvolemic  Follow-up not needed.        Visit Vitals  BP (!) 123/57   Pulse 66   Temp 36.8 °C (98.3 °F) (Skin)   Resp 18   Ht 5' 6" (1.676 m)   Wt 63.5 kg (140 lb)   SpO2 100%   Breastfeeding? No   BMI 22.60 kg/m²       Pain/Tamera Score: Pain Assessment Performed: Yes (4/16/2018 12:45 PM)  Presence of Pain: denies (4/16/2018 12:45 PM)  Tamera Score: 10 (4/16/2018 12:15 PM)      "

## 2018-04-16 NOTE — DISCHARGE INSTRUCTIONS
Upper GI Endoscopy     During endoscopy, a long, flexible tube is used to view the inside of your upper GI tract.      Upper GI endoscopy allows your healthcare provider to look directly into the beginning of your gastrointestinal (GI) tract. The esophagus, stomach, and duodenum (the first part of the small intestine) make up the upper GI tract.   Before the exam  Follow these and any other instructions you are given before your endoscopy. If you dont follow the healthcare providers instructions carefully, the test may need to be canceled or done over:  · Don't eat or drink anything after midnight the night before your exam. If your exam is in the afternoon, drink only clear liquids in the morning. Don't eat or drink anything for 8 hours before the exam. In some cases, you may be able to take medicines with sips of water until 2 hours before the procedure. Speak with your healthcare provider about this.   · Bring your X-rays and any other test results you have.  · Because you will be sedated, arrange for an adult to drive you home after the exam.  · Tell your healthcare provider before the exam if you are taking any medicines or have any medical problems.  The procedure  Here is what to expect:  · You will lie on the endoscopy table. Usually patients lie on the left side.  · You will be monitored and given oxygen.  · Your throat may be numbed with a spray or gargle. You are given medicine through an intravenous (IV) line that will help you relax and remain comfortable. You may be awake or asleep during the procedure.  · The healthcare provider will put the endoscope in your mouth and down your esophagus. It is thinner than most pieces of food that you swallow. It will not affect your breathing. The medicine helps keep you from gagging.  · Air is put into your GI tract to expand it. It can make you burp.  · During the procedure, the healthcare provider can take biopsies (tissue samples), remove abnormalities,  such as polyps, or treat abnormalities through a variety of devices placed through the endoscope. You will not feel this.   · The endoscope carries images of your upper GI tract to a video screen. If you are awake, you may be able to look at the images.  · After the procedure is done, you will rest for a time. An adult must drive you home.  When to call your healthcare provider  Contact your healthcare provider if you have:  · Black or tarry stools, or blood in your stool  · Fever  · Pain in your belly that does not go away  · Nausea and vomiting, or vomiting blood   Date Last Reviewed: 7/1/2016  © 6626-8368 [employer]. [address] [place] [address], [place] [address]. All rights reserved. This information is not intended as a substitute for professional medical care. Always follow your healthcare professional's instructions.      Recovery After Procedural Sedation (Adult)  You have been given medicine by vein to make you sleep during your surgery. This may have included both a pain medicine and sleeping medicine. Most of the effects have worn off. But you may still have some drowsiness for the next 6 to 8 hours.  Home care  Follow these guidelines when you get home:  · For the next 8 hours, you should be watched by a responsible adult. This person should make sure your condition is not getting worse.  · Don't drink any alcohol for the next 24 hours.  · Don't drive, operate dangerous machinery, or make important business or personal decisions during the next 24 hours.  Note: Your healthcare provider may tell you not to take any medicine by mouth for pain or sleep in the next 4 hours. These medicines may react with the medicines you were given in the hospital. This could cause a much stronger response than usual.  Follow-up care  Follow up with your healthcare provider if you are not alert and back to your usual level of activity within 12 hours.  When to seek medical advice  Call your healthcare provider  right away if any of these occur:  · Drowsiness gets worse  · Weakness or dizziness gets worse  · Repeated vomiting  · You can't be awakened   Date Last Reviewed: 10/18/2016  © 1547-8329 [employer]. [address] [place] [address], [place] [address]. All rights reserved. This information is not intended as a substitute for professional medical care. Always follow your healthcare professional's instructions.

## 2018-04-16 NOTE — TRANSFER OF CARE
"Anesthesia Transfer of Care Note    Patient: Steph Lira    Procedure(s) Performed: Procedure(s) (LRB):  ESOPHAGOGASTRODUODENOSCOPY (EGD)/cryo (N/A)    Patient location: [facility]    Anesthesia Type: general    Transport from OR: Transported from OR on room air with adequate spontaneous ventilation    Post pain: adequate analgesia    Post assessment: no apparent anesthetic complications and tolerated procedure well    Post vital signs: stable    Level of consciousness: awake    Nausea/Vomiting: no nausea/vomiting    Complications: none    Transfer of care protocol was followed      Last vitals:   Visit Vitals  BP (!) 143/71 (Patient Position: Sitting)   Pulse 68   Temp 36.7 °C (98.1 °F) (Tympanic)   Resp 18   Ht 5' 6" (1.676 m)   Wt 63.5 kg (140 lb)   SpO2 97%   Breastfeeding? No   BMI 22.60 kg/m²     "

## 2018-04-19 ENCOUNTER — CLINICAL SUPPORT (OUTPATIENT)
Dept: CARDIOLOGY | Facility: CLINIC | Age: 68
End: 2018-04-19
Payer: MEDICARE

## 2018-04-19 ENCOUNTER — TELEPHONE (OUTPATIENT)
Dept: GASTROENTEROLOGY | Facility: CLINIC | Age: 68
End: 2018-04-19

## 2018-04-19 DIAGNOSIS — C15.9 MALIGNANT NEOPLASM OF ESOPHAGUS, UNSPECIFIED LOCATION: Primary | ICD-10-CM

## 2018-04-19 DIAGNOSIS — I48.0 PAF (PAROXYSMAL ATRIAL FIBRILLATION): ICD-10-CM

## 2018-04-19 DIAGNOSIS — I48.0 PAF (PAROXYSMAL ATRIAL FIBRILLATION): Primary | ICD-10-CM

## 2018-04-19 PROCEDURE — 93010 ELECTROCARDIOGRAM REPORT: CPT | Mod: S$PBB,,, | Performed by: INTERNAL MEDICINE

## 2018-04-19 PROCEDURE — 93005 ELECTROCARDIOGRAM TRACING: CPT | Mod: PBBFAC,PO | Performed by: INTERNAL MEDICINE

## 2018-04-23 ENCOUNTER — PATIENT MESSAGE (OUTPATIENT)
Dept: CARDIOLOGY | Facility: CLINIC | Age: 68
End: 2018-04-23

## 2018-04-23 ENCOUNTER — TELEPHONE (OUTPATIENT)
Dept: CARDIOLOGY | Facility: CLINIC | Age: 68
End: 2018-04-23

## 2018-04-23 NOTE — TELEPHONE ENCOUNTER
Called pt and scheduled her to see Dr Wei 4/26 @ 555r. Pt verbalized OK with date and time. No further issues discussed.

## 2018-04-26 ENCOUNTER — OFFICE VISIT (OUTPATIENT)
Dept: PULMONOLOGY | Facility: CLINIC | Age: 68
End: 2018-04-26
Payer: MEDICARE

## 2018-04-26 ENCOUNTER — HOSPITAL ENCOUNTER (OUTPATIENT)
Dept: RADIOLOGY | Facility: HOSPITAL | Age: 68
Discharge: HOME | End: 2018-04-26
Attending: INTERNAL MEDICINE
Payer: MEDICARE

## 2018-04-26 VITALS
DIASTOLIC BLOOD PRESSURE: 52 MMHG | OXYGEN SATURATION: 97 % | WEIGHT: 138.88 LBS | BODY MASS INDEX: 22.32 KG/M2 | HEART RATE: 64 BPM | SYSTOLIC BLOOD PRESSURE: 90 MMHG | HEIGHT: 66 IN

## 2018-04-26 DIAGNOSIS — Z85.118 HISTORY OF LUNG CANCER: Primary | ICD-10-CM

## 2018-04-26 DIAGNOSIS — C34.90 SMALL CELL CARCINOMA OF LUNG: ICD-10-CM

## 2018-04-26 DIAGNOSIS — J43.2 CENTRILOBULAR EMPHYSEMA: ICD-10-CM

## 2018-04-26 DIAGNOSIS — J30.89 SEASONAL ALLERGIC RHINITIS DUE TO OTHER ALLERGIC TRIGGER: ICD-10-CM

## 2018-04-26 PROCEDURE — 99999 PR PBB SHADOW E&M-EST. PATIENT-LVL III: CPT | Mod: PBBFAC,,, | Performed by: INTERNAL MEDICINE

## 2018-04-26 PROCEDURE — 99213 OFFICE O/P EST LOW 20 MIN: CPT | Mod: PBBFAC,25 | Performed by: INTERNAL MEDICINE

## 2018-04-26 PROCEDURE — 71250 CT THORAX DX C-: CPT | Mod: TC

## 2018-04-26 PROCEDURE — 99214 OFFICE O/P EST MOD 30 MIN: CPT | Mod: S$PBB,,, | Performed by: INTERNAL MEDICINE

## 2018-04-26 PROCEDURE — 71250 CT THORAX DX C-: CPT | Mod: 26,,, | Performed by: RADIOLOGY

## 2018-04-26 NOTE — PROGRESS NOTES
"Subjective:       Patient ID: Steph Lira is a 67 y.o. female.    Chief Complaint: COPD    66 year old here for yearly CT as history of small cell lung cancer in 1996 status post left upper lobectomy with chest wall resection.  Over the past year was diagnosed with squamous papilloma of esophagus and has dysphagia associated with it.  Has changed her diet.  Currently, blood pressure has been low and having symptoms associated with it. Currently complaining of allergic rhinitis.  No fevers, chills or sweats.  No headaches.  History of paroxysmal a-fib.  Metoprolol is a new medication.      Review of Systems   Constitutional: Positive for fatigue (since starting metoprolol).     No pulmonary hospitalization this past year.  Objective:     Vitals:    04/26/18 1449   BP: (!) 90/52   Pulse: 64   SpO2: 97%   Weight: 63 kg (138 lb 14.2 oz)   Height: 5' 6" (1.676 m)     Physical Exam   Constitutional: She is oriented to person, place, and time. She appears well-developed and well-nourished.   Cardiovascular: Normal rate and regular rhythm.    Pulmonary/Chest: Normal expansion. She has no wheezes. She has no rales.   Neurological: She is alert and oriented to person, place, and time.   Skin: Skin is warm.   Psychiatric: She has a normal mood and affect.        Personal Diagnostic Review  CT of chest performed on today as compared to previous without contrast revealed Stable postsurgical changes of a left upper lobectomy and thoracoplasty.  No evidence of recurrent disease.    Solid and sub solid opacities within the right lung are stable over multiple prior examinations.  No new nodules or masses.    Unchanged postsurgical changes of a CABG and extensive venous stents in the left jugular, brachiocephalic and subclavian veins.    Additional findings as detailed in the body of the report..  No flowsheet data found.      Assessment:       1. History of lung cancer    2. Centrilobular emphysema    3. Seasonal allergic " rhinitis due to other allergic trigger        Outpatient Encounter Prescriptions as of 4/26/2018   Medication Sig Dispense Refill    albuterol (PROVENTIL HFA) 90 mcg/actuation inhaler Inhale 2 puffs into the lungs every 6 (six) hours as needed for Wheezing. 3 Inhaler 4    apixaban 5 mg Tab Take 1 tablet (5 mg total) by mouth 2 (two) times daily. 180 tablet 3    atorvastatin (LIPITOR) 40 MG tablet Take 1 tablet (40 mg total) by mouth once daily. 90 tablet 3    bisacodyl (DULCOLAX) 5 mg EC tablet Take 1 tablet (5 mg total) by mouth daily as needed for Constipation. 30 tablet 11    CALCIUM CITRATE (CITRACAL) 500 mg TbEF Take 500 mg by mouth. 1 Tablet, Effervescent Oral  daily      cetirizine (ZYRTEC) 10 MG tablet TAKE 1 TABLET EVERY EVENING 90 tablet 2    flecainide (TAMBOCOR) 50 MG Tab Take 2 tablets (100 mg total) by mouth every 12 (twelve) hours. 120 tablet 11    fluticasone (FLONASE) 50 mcg/actuation nasal spray 1 spray by Each Nare route once daily. 3 Bottle 3    fluticasone-salmeterol 250-50 mcg/dose (ADVAIR DISKUS) 250-50 mcg/dose diskus inhaler USE 1 INHALATION INTO THE LUNGS TWICE A DAY. RINSE MOUTH AFTER USE TO PREVENT THRUSH 3 each 3    ketoconazole (NIZORAL) 2 % shampoo Wash hair with medicated shampoo at least 2x/week - let sit on scalp at least 5 minutes prior to rinsing 120 mL 5    levothyroxine (SYNTHROID) 88 MCG tablet Take 1 tablet (88 mcg total) by mouth once daily. 90 tablet 3    lisinopril (PRINIVIL,ZESTRIL) 5 MG tablet Take 1 tablet (5 mg total) by mouth once daily. 90 tablet 3    metoprolol succinate (TOPROL-XL) 50 MG 24 hr tablet Take 1 tablet (50 mg total) by mouth once daily. 90 tablet 3    montelukast (SINGULAIR) 10 mg tablet Take 1 tablet (10 mg total) by mouth every evening. 90 tablet 3    multivitamin-minerals-lutein (CENTRUM SILVER) Tab Take 1 tablet by mouth once daily. 1 Tablet Oral Every day      pantoprazole (PROTONIX) 40 MG tablet Take 1 tablet (40 mg total) by mouth  once daily. Take 30 minutes prior to breakfast 90 tablet 3    RETIN-A 0.05 % cream APPLY THIN FILM TO FACE AT NIGHT AFTER MOISTURIZING 45 g 3    SPIRIVA WITH HANDIHALER 18 mcg inhalation capsule INHALE THE CONTENTS OF 1 CAPSULE DAILY 90 capsule 3    albuterol (ACCUNEB) 1.25 mg/3 mL Nebu USE 1 VIAL (3 ML) VIA NEBULIZER EVERY 6 HOURS AS NEEDED 90 mL 2    desoximetasone (TOPICORT) 0.25 % cream Apply topically 2 (two) times daily. 180 g 3    diclofenac sodium (VOLTAREN) 1 % Gel Apply 2 g topically once daily. 100 g prn    diphenhydrAMINE (BENADRYL) 25 mg capsule Take 25 mg by mouth every 6 (six) hours as needed for Itching.      triamcinolone acetonide 0.1% (KENALOG) 0.1 % cream Apply topically 2 (two) times daily. 1 Tube 11     Facility-Administered Encounter Medications as of 4/26/2018   Medication Dose Route Frequency Provider Last Rate Last Dose    lidocaine  20 mg/mL (2%) 20 mg/mL (2 %) injection              No orders of the defined types were placed in this encounter.    Plan:     Problem List Items Addressed This Visit     History of lung cancer - Primary    Overview     Small cell lung cancer 1999 status post resection.  Needs yearly follow up.  Stable disease.         Current Assessment & Plan     No evidence of recurrence.   Yearly followup         COPD (chronic obstructive pulmonary disease)    Overview     Controlled on spiriva daily and advair BID.  Albuterol for rescue and prevention         Current Assessment & Plan     Controlled on spiriva daily and advair BID.  Albuterol for rescue and prevention           Allergic rhinitis    Overview     Continue zyrtec (may change to zyrtec D while hypotensive and allergies).  Continue flonase  Simply saline mist

## 2018-04-27 ENCOUNTER — OFFICE VISIT (OUTPATIENT)
Dept: CARDIOLOGY | Facility: CLINIC | Age: 68
End: 2018-04-27
Payer: MEDICARE

## 2018-04-27 VITALS
HEIGHT: 66 IN | HEART RATE: 63 BPM | BODY MASS INDEX: 22.39 KG/M2 | WEIGHT: 139.31 LBS | DIASTOLIC BLOOD PRESSURE: 68 MMHG | SYSTOLIC BLOOD PRESSURE: 104 MMHG | OXYGEN SATURATION: 97 % | RESPIRATION RATE: 16 BRPM

## 2018-04-27 DIAGNOSIS — Z95.1 S/P CABG X 2: ICD-10-CM

## 2018-04-27 DIAGNOSIS — I25.10 CORONARY ARTERY DISEASE INVOLVING NATIVE CORONARY ARTERY OF NATIVE HEART WITHOUT ANGINA PECTORIS: ICD-10-CM

## 2018-04-27 DIAGNOSIS — I42.9 CARDIOMYOPATHY, UNSPECIFIED TYPE: Primary | ICD-10-CM

## 2018-04-27 DIAGNOSIS — I48.0 PAF (PAROXYSMAL ATRIAL FIBRILLATION): ICD-10-CM

## 2018-04-27 PROCEDURE — 99999 PR PBB SHADOW E&M-EST. PATIENT-LVL III: CPT | Mod: PBBFAC,,, | Performed by: INTERNAL MEDICINE

## 2018-04-27 PROCEDURE — 93005 ELECTROCARDIOGRAM TRACING: CPT | Mod: PBBFAC,PO | Performed by: INTERNAL MEDICINE

## 2018-04-27 PROCEDURE — 99214 OFFICE O/P EST MOD 30 MIN: CPT | Mod: S$PBB,,, | Performed by: INTERNAL MEDICINE

## 2018-04-27 PROCEDURE — 99213 OFFICE O/P EST LOW 20 MIN: CPT | Mod: PBBFAC,PO,25 | Performed by: INTERNAL MEDICINE

## 2018-04-27 PROCEDURE — 93010 ELECTROCARDIOGRAM REPORT: CPT | Mod: S$PBB,,, | Performed by: INTERNAL MEDICINE

## 2018-04-27 NOTE — PROGRESS NOTES
[facility]    CC: Feels tired  Chief Complaint   Patient presents with    Follow-up     BP issues       Patient ID: Steph Lira is a 67 y.o. female with a past medical history of coronary artery disease status post bypass grafting, cardiomyopathy, paroxysmal atrial fibrillation    HPI  Patient continues to feel tired and fatigued.  Her blood pressure has been running in the lower range.. Denies any chest pains or shortness of breath.    Past Medical History:   Diagnosis Date    *Atrial fibrillation     and Hx PSVT    Allergy     chronic     Arthritis     fingers    Benign tumor of throat     Bronchitis March 2013    CAD (coronary artery disease) 2005    CABGx2 in 2005 and 2 MI after with 4 stents    Cancer 1996    small cell lung cancer s/p resection of 1/3 lung, 5 ribs and muscle mass then had chemo and radiation    Cataract     OD     CHF (congestive heart failure) past Hx    Chronic rhinitis     Clot past Hx    external jugular, now stented, occluded, had phlebitis; now revascularized    Colon polyp     COPD (chronic obstructive pulmonary disease)     no oxygen or nebulizers    COPD exacerbation 5/8/13    improved 5/14/13 after steroid pack,antibiotics    Former smoker     GERD (gastroesophageal reflux disease)     Heart block     Hyperlipidemia     Hypertension     pt states does not have //    MI (myocardial infarction) 2005    Posterior vitreous detachment of left eye     Thyroid disease      Past Surgical History:   Procedure Laterality Date    ADENOIDECTOMY      APPENDECTOMY      BACK SURGERY      lumbar    CARDIAC SURGERY      CATARACT EXTRACTION Left     OS    CATARACT EXTRACTION, BILATERAL      left eye only    CHOLECYSTECTOMY      COLONOSCOPY  03/2012    repeat in 5 years    COLONOSCOPY N/A 6/19/2017    Procedure: COLONOSCOPY;  Surgeon: Kal Elise MD;  Location: [facility];  Service: Endoscopy;  Laterality: N/A;    CORONARY ARTERY BYPASS GRAFT  2005     2 vessel    EYE SURGERY  Dec 2012    ptosis repair    FIRST RIB REMOVAL  1996 with lung resection    HYSTERECTOMY      LUNG LOBECTOMY  1996    left lung for cancer    SHOULDER SURGERY  ,    scapular entrapment after lung surgery, needed 5 ribs removed left side    SYMPOTHATIC NERVE LEFT SIDE       with lung surgery    TONSILLECTOMY      TONSILLECTOMY, ADENOIDECTOMY, BILATERAL MYRINGOTOMY AND TUBES      UPPER GASTROINTESTINAL ENDOSCOPY  2016    Dr. Koo    VASCULAR SURGERY      stents place in jugualr vein     Social History     Social History    Marital status:      Spouse name: N/A    Number of children: N/A    Years of education: N/A     Occupational History    retired      Social History Main Topics    Smoking status: Former Smoker     Packs/day: 2.00     Years: 20.00     Types: Cigarettes     Quit date: 1994    Smokeless tobacco: Never Used    Alcohol use No    Drug use: No    Sexual activity: Yes     Partners: Male     Other Topics Concern    Are You Pregnant Or Think You May Be? No    Breast-Feeding No     Social History Narrative    No narrative on file     Family History   Problem Relation Age of Onset    Allergic rhinitis Father     Cancer Father     Hypertension Father     Allergies Father     Allergic rhinitis Son     Asthma Son     Stroke Mother     Allergies Mother     Allergic rhinitis Sister     Asthma Sister     Diverticulitis Brother     No Known Problems Daughter     No Known Problems Maternal Aunt     No Known Problems Maternal Uncle     No Known Problems Paternal Aunt     No Known Problems Paternal Uncle     Colon polyps Maternal Grandmother       of colon blockage    No Known Problems Maternal Grandfather     No Known Problems Paternal Grandmother     No Known Problems Paternal Grandfather     No Known Problems Brother     No Known Problems Sister     Angioedema Neg Hx     Eczema Neg Hx     Immunodeficiency Neg  Hx     Urticaria Neg Hx     Skin cancer Neg Hx     Amblyopia Neg Hx     Blindness Neg Hx     Cataracts Neg Hx     Diabetes Neg Hx     Glaucoma Neg Hx     Macular degeneration Neg Hx     Retinal detachment Neg Hx     Strabismus Neg Hx     Thyroid disease Neg Hx     Colon cancer Neg Hx     Melanoma Neg Hx        Review of patient's allergies indicates:   Allergen Reactions    Ciprofloxacin Itching    Doxycycline Other (See Comments)     Patient had a rxn with the sun despite wearing spf 30       Medication List with Changes/Refills   Current Medications    ALBUTEROL (ACCUNEB) 1.25 MG/3 ML NEBU    USE 1 VIAL (3 ML) VIA NEBULIZER EVERY 6 HOURS AS NEEDED    ALBUTEROL (PROVENTIL HFA) 90 MCG/ACTUATION INHALER    Inhale 2 puffs into the lungs every 6 (six) hours as needed for Wheezing.    APIXABAN 5 MG TAB    Take 1 tablet (5 mg total) by mouth 2 (two) times daily.    ATORVASTATIN (LIPITOR) 40 MG TABLET    Take 1 tablet (40 mg total) by mouth once daily.    BISACODYL (DULCOLAX) 5 MG EC TABLET    Take 1 tablet (5 mg total) by mouth daily as needed for Constipation.    CALCIUM CITRATE (CITRACAL) 500 MG TBEF    Take 500 mg by mouth. 1 Tablet, Effervescent Oral  daily    CETIRIZINE (ZYRTEC) 10 MG TABLET    TAKE 1 TABLET EVERY EVENING    DESOXIMETASONE (TOPICORT) 0.25 % CREAM    Apply topically 2 (two) times daily.    DICLOFENAC SODIUM (VOLTAREN) 1 % GEL    Apply 2 g topically once daily.    DIPHENHYDRAMINE (BENADRYL) 25 MG CAPSULE    Take 25 mg by mouth every 6 (six) hours as needed for Itching.    FLECAINIDE (TAMBOCOR) 50 MG TAB    Take 2 tablets (100 mg total) by mouth every 12 (twelve) hours.    FLUTICASONE (FLONASE) 50 MCG/ACTUATION NASAL SPRAY    1 spray by Each Nare route once daily.    FLUTICASONE-SALMETEROL 250-50 MCG/DOSE (ADVAIR DISKUS) 250-50 MCG/DOSE DISKUS INHALER    USE 1 INHALATION INTO THE LUNGS TWICE A DAY. RINSE MOUTH AFTER USE TO PREVENT THRUSH    KETOCONAZOLE (NIZORAL) 2 % SHAMPOO    Wash hair  "with medicated shampoo at least 2x/week - let sit on scalp at least 5 minutes prior to rinsing    LEVOTHYROXINE (SYNTHROID) 88 MCG TABLET    Take 1 tablet (88 mcg total) by mouth once daily.    METOPROLOL SUCCINATE (TOPROL-XL) 50 MG 24 HR TABLET    Take 1 tablet (50 mg total) by mouth once daily.    MONTELUKAST (SINGULAIR) 10 MG TABLET    Take 1 tablet (10 mg total) by mouth every evening.    MULTIVITAMIN-MINERALS-LUTEIN (CENTRUM SILVER) TAB    Take 1 tablet by mouth once daily. 1 Tablet Oral Every day    PANTOPRAZOLE (PROTONIX) 40 MG TABLET    Take 1 tablet (40 mg total) by mouth once daily. Take 30 minutes prior to breakfast    RETIN-A 0.05 % CREAM    APPLY THIN FILM TO FACE AT NIGHT AFTER MOISTURIZING    SPIRIVA WITH HANDIHALER 18 MCG INHALATION CAPSULE    INHALE THE CONTENTS OF 1 CAPSULE DAILY    TRIAMCINOLONE ACETONIDE 0.1% (KENALOG) 0.1 % CREAM    Apply topically 2 (two) times daily.   Discontinued Medications    LISINOPRIL (PRINIVIL,ZESTRIL) 5 MG TABLET    Take 1 tablet (5 mg total) by mouth once daily.       Review of Systems  Constitution: Fatigue  HENT: Denies headaches or blurry vision.  Cardiovascular: Denies chest pain or irregular heart beat.  Respiratory: Denies cough or shortness of breath.  Gastrointestinal: Denies abdominal pain, nausea, or vomiting.  Musculoskeletal: Denies muscle cramps.  Neurological: Denies dizziness or focal weakness.  Psychiatric/Behavioral: Normal mental status.  Hematologic/Lymphatic: Denies bleeding problem or easy bruising/bleeding.  Skin: Denies rash or suspicious lesions    Physical Examination  /68 (BP Location: Right arm, Patient Position: Sitting)   Pulse 63   Resp 16   Ht 5' 6" (1.676 m)   Wt 63.2 kg (139 lb 5.3 oz)   SpO2 97%   BMI 22.49 kg/m²     Constitutional: No acute distress, conversant  HEENT: Sclera anicteric, Pupils equal, round and reactive to light, extraocular motions intact, Oropharynx clear  Neck: No JVD, no carotid bruits  Cardiovascular: " regular rate and rhythm, no murmur, rubs or gallops, normal S1/S2  Pulmonary: Clear to auscultation bilaterally  Abdominal: Abdomen soft, nontender, nondistended, positive bowel sounds  Extremities: No lower extremity edema,   Pulses:  Carotid pulses are 2+ on the right side, and 2+ on the left side.  Radial pulses are 2+ on the right side, and 2+ on the left side.      Skin: No ecchymosis, erythema, or ulcers  Psych: Alert and oriented x 3, appropriate affect  Neuro: CNII-XII intact, no focal deficits    Labs:  Most Recent Data  CBC:   Lab Results   Component Value Date    WBC 6.90 03/14/2018    HGB 12.4 03/14/2018    HCT 37.0 03/14/2018     03/14/2018    MCV 91 03/14/2018    RDW 12.6 03/14/2018     BMP:   Lab Results   Component Value Date     (L) 03/14/2018    K 3.9 03/14/2018     03/14/2018    CO2 27 03/14/2018    BUN 6 (L) 03/14/2018    CREATININE 0.7 03/14/2018    GLU 86 03/14/2018    CALCIUM 9.4 03/14/2018    MG 2.0 09/30/2016     LFTS;   Lab Results   Component Value Date    PROT 6.6 03/14/2018    ALBUMIN 3.5 03/14/2018    BILITOT 0.6 03/14/2018    AST 20 03/14/2018    ALKPHOS 70 03/14/2018    ALT 15 03/14/2018     COAGS:   Lab Results   Component Value Date    INR 2.7 07/11/2017     FLP:   Lab Results   Component Value Date    CHOL 160 01/18/2018    HDL 57 01/18/2018    LDLCALC 79.2 01/18/2018    TRIG 119 01/18/2018    CHOLHDL 35.6 01/18/2018     CARDIAC:   Lab Results   Component Value Date    TROPONINI 0.008 04/26/2017    BNP 91 02/28/2018       Imaging:    EKG: Normal sinus rhythm.  Nonspecific ST-T wave changes    Echo:  CONCLUSIONS     1 - Mildly to moderately depressed left ventricular systolic function (EF 40-45%).     2 - Impaired LV relaxation, normal LAP (grade 1 diastolic dysfunction).     3 - Mild aortic regurgitation.     4 - Mild mitral regurgitation.      I have personally reviewed these images and echo data    Assessment/Plan:  Steph was seen today for  follow-up.    Diagnoses and all orders for this visit:    Cardiomyopathy, unspecified type  -     Stress test with myocardial perfusion (CUPID ONLY-[facility] [place]); Future    PAF (paroxysmal atrial fibrillation)  -     EKG 12-lead  -     Stress test with myocardial perfusion (CUPID ONLY-[facility] [place]); Future    S/P CABG x 2, 2005    Coronary artery disease involving native coronary artery of native heart without angina pectoris         I have stopped her lisinopril.  Perform a stress test to evaluate for underlying ischemia burden.  She has not felt issues with her arrhythmia anymore.      Total duration of face to face visit time 30 minutes.  Total time spent counseling greater than fifty percent of total visit time.  Counseling included discussion regarding imaging findings, diagnosis, possibilities, treatment options, risks and benefits.  The patient had many questions regarding the options and long-term effect which were all answered to my best ability.      Henry Wei MD,MRCP,RPVI,FACC,FSCAI.  Interventional Cardiology   Phone [phone]

## 2018-05-02 ENCOUNTER — DOCUMENTATION ONLY (OUTPATIENT)
Dept: FAMILY MEDICINE | Facility: CLINIC | Age: 68
End: 2018-05-02

## 2018-05-02 ENCOUNTER — OFFICE VISIT (OUTPATIENT)
Dept: FAMILY MEDICINE | Facility: CLINIC | Age: 68
End: 2018-05-02
Payer: MEDICARE

## 2018-05-02 VITALS
BODY MASS INDEX: 22.32 KG/M2 | HEART RATE: 73 BPM | DIASTOLIC BLOOD PRESSURE: 62 MMHG | WEIGHT: 138.88 LBS | HEIGHT: 66 IN | SYSTOLIC BLOOD PRESSURE: 96 MMHG | TEMPERATURE: 98 F

## 2018-05-02 DIAGNOSIS — N39.0 URINARY TRACT INFECTION WITHOUT HEMATURIA, SITE UNSPECIFIED: Primary | ICD-10-CM

## 2018-05-02 PROCEDURE — 99999 PR PBB SHADOW E&M-EST. PATIENT-LVL III: CPT | Mod: PBBFAC,,, | Performed by: FAMILY MEDICINE

## 2018-05-02 PROCEDURE — 99213 OFFICE O/P EST LOW 20 MIN: CPT | Mod: S$PBB,,, | Performed by: FAMILY MEDICINE

## 2018-05-02 PROCEDURE — 99213 OFFICE O/P EST LOW 20 MIN: CPT | Mod: PBBFAC,PO | Performed by: FAMILY MEDICINE

## 2018-05-02 RX ORDER — SULFAMETHOXAZOLE AND TRIMETHOPRIM 800; 160 MG/1; MG/1
1 TABLET ORAL 2 TIMES DAILY
Qty: 6 TABLET | Refills: 0 | Status: SHIPPED | OUTPATIENT
Start: 2018-05-02 | End: 2018-06-01 | Stop reason: ALTCHOICE

## 2018-05-02 NOTE — PROGRESS NOTES
Pre-Visit Chart Review  For Appointment Scheduled on 5-2-18    There are no preventive care reminders to display for this patient.

## 2018-05-03 ENCOUNTER — PATIENT MESSAGE (OUTPATIENT)
Dept: CARDIOLOGY | Facility: CLINIC | Age: 68
End: 2018-05-03

## 2018-05-04 ENCOUNTER — LAB VISIT (OUTPATIENT)
Dept: LAB | Facility: HOSPITAL | Age: 68
End: 2018-05-04
Attending: FAMILY MEDICINE
Payer: MEDICARE

## 2018-05-04 ENCOUNTER — PATIENT MESSAGE (OUTPATIENT)
Dept: FAMILY MEDICINE | Facility: CLINIC | Age: 68
End: 2018-05-04

## 2018-05-04 DIAGNOSIS — N39.0 URINARY TRACT INFECTION WITHOUT HEMATURIA, SITE UNSPECIFIED: Primary | ICD-10-CM

## 2018-05-04 DIAGNOSIS — N39.0 URINARY TRACT INFECTION WITHOUT HEMATURIA, SITE UNSPECIFIED: ICD-10-CM

## 2018-05-04 PROCEDURE — 87086 URINE CULTURE/COLONY COUNT: CPT

## 2018-05-04 PROCEDURE — 81001 URINALYSIS AUTO W/SCOPE: CPT

## 2018-05-04 NOTE — TELEPHONE ENCOUNTER
Type: Urinary Symptoms    Describe symptoms:  Discomfort  Duration? 5/2/18  Fever? No  Blood in Urine?Unk  Dark in color: Unk  Pain? Discomfort  Pain level    Location: lower abdomen  Has the patient used any over the counter medications? No  If so, what?   Have you recently been treated for this?: Yes  Medication allergies? Cipro, Doxy  Preferred pharmacy? [facility]  Additional information:

## 2018-05-05 LAB
BACTERIA #/AREA URNS AUTO: ABNORMAL /HPF
BILIRUB UR QL STRIP: NEGATIVE
CLARITY UR REFRACT.AUTO: ABNORMAL
COLOR UR AUTO: YELLOW
GLUCOSE UR QL STRIP: NEGATIVE
HGB UR QL STRIP: NEGATIVE
KETONES UR QL STRIP: NEGATIVE
LEUKOCYTE ESTERASE UR QL STRIP: ABNORMAL
MICROSCOPIC COMMENT: ABNORMAL
NITRITE UR QL STRIP: NEGATIVE
PH UR STRIP: 7 [PH] (ref 5–8)
PROT UR QL STRIP: NEGATIVE
RBC #/AREA URNS AUTO: 1 /HPF (ref 0–4)
SP GR UR STRIP: 1 (ref 1–1.03)
SQUAMOUS #/AREA URNS AUTO: 0 /HPF
URN SPEC COLLECT METH UR: ABNORMAL
UROBILINOGEN UR STRIP-ACNC: NEGATIVE EU/DL
WBC #/AREA URNS AUTO: 20 /HPF (ref 0–5)

## 2018-05-06 LAB — BACTERIA UR CULT: NO GROWTH

## 2018-05-09 ENCOUNTER — TELEPHONE (OUTPATIENT)
Dept: GASTROENTEROLOGY | Facility: CLINIC | Age: 68
End: 2018-05-09

## 2018-05-09 NOTE — TELEPHONE ENCOUNTER
Spoke with patient. EGD/CRYO scheduled for 7/5 at 11a. Reviewed prep instructions. Ms Lira verbalized understanding.

## 2018-05-13 NOTE — PROGRESS NOTES
Subjective:       Patient ID: Steph Lira is a 67 y.o. female.    Chief Complaint: Urinary Tract Infection    Patient Active Problem List   Diagnosis    GERD (gastroesophageal reflux disease)    Chronic rhinitis    Hypothyroid    Aneurysm of heart (wall)    Benign neoplasm of skin of upper limb, including shoulder    Intermediate coronary syndrome    Phlebitis and thrombophlebitis of superficial veins of upper extremities    Simple chronic conjunctivitis    Supraventricular premature beats    Vascular disorder of skin    Chronic external jugular vein thrombosis    Long term current use of anticoagulant therapy    Magdiel's syndrome - Left Eye    Ptosis    Post-operative state    Chest pain at rest    Anxiety    CAD (coronary artery disease)    S/P CABG x 2, 2005    HTN (hypertension)    Hypercholesteremia    PAF (paroxysmal atrial fibrillation), onset 2002    Intercostal neuralgia    Pain    Radius and ulna distal fracture    History of lung cancer    Blurred vision, bilateral    Panlobular emphysema    Carotid artery disease    Atrial fibrillation with RVR    COPD (chronic obstructive pulmonary disease)    Uncontrolled atrial flutter with rvr    Anticoagulated on Coumadin    Allergic rhinitis    Hypoalbuminemia    History of smoking 10-25 pack years, 15 pack-years, quit 1994    Persistent cough for 3 weeks or longer, onset 12/2015    S/P CABG (coronary artery bypass graft)    Supraventricular bigeminy    Chest pain, unspecified    Allergic drug rash    Dysphagia    History of colon polyps    Coronary artery disease involving autologous artery coronary bypass graft    Wrist arthritis    Esophageal mass    Gastritis    Esophageal lesion    Lesion of esophagus    Fatigue    Preoperative clearance    Grade II hemorrhoids    Cardiomyopathy     HPI  Review of Systems   Constitutional: Negative for chills and fever.   Gastrointestinal: Positive for abdominal pain.  Negative for nausea and vomiting.   Genitourinary: Positive for dysuria, frequency and urgency.   Skin: Negative for rash.       Objective:      Physical Exam   Constitutional: She is oriented to person, place, and time. She appears well-developed and well-nourished.   Cardiovascular: Normal rate, regular rhythm and normal heart sounds.    Pulmonary/Chest: Effort normal and breath sounds normal. No respiratory distress.   Abdominal: Soft. Bowel sounds are normal. She exhibits no distension and no mass. There is tenderness in the suprapubic area. There is no rebound and no guarding. No hernia.   Neurological: She is alert and oriented to person, place, and time.   Skin: Skin is warm and dry.   Psychiatric: She has a normal mood and affect.   Nursing note and vitals reviewed.      Assessment:       1. Urinary tract infection without hematuria, site unspecified        Plan:       1. Urinary tract infection without hematuria, site unspecified  Patient was counseled to increase fluid intake.  Avoid carbonated beverages.  Empty your bladder frequently, before and after intercourse, and wipe front to back when cleaning after using the bathroom.  Cranberry juice intake may help.  Notify MD if you have fever > 100.4, severe abdominal pain, blood in urine or if you see no improvement in 3 days.    - sulfamethoxazole-trimethoprim 800-160mg (BACTRIM DS) 800-160 mg Tab; Take 1 tablet by mouth 2 (two) times daily.  Dispense: 6 tablet; Refill: 0

## 2018-05-28 ENCOUNTER — PATIENT MESSAGE (OUTPATIENT)
Dept: CARDIOLOGY | Facility: CLINIC | Age: 68
End: 2018-05-28

## 2018-05-30 ENCOUNTER — HOSPITAL ENCOUNTER (OUTPATIENT)
Dept: CARDIOLOGY | Facility: HOSPITAL | Age: 68
Discharge: HOME | End: 2018-05-30
Attending: INTERNAL MEDICINE
Payer: MEDICARE

## 2018-05-30 ENCOUNTER — HOSPITAL ENCOUNTER (OUTPATIENT)
Dept: RADIOLOGY | Facility: HOSPITAL | Age: 68
Discharge: HOME | End: 2018-05-30
Attending: INTERNAL MEDICINE
Payer: MEDICARE

## 2018-05-30 VITALS — SYSTOLIC BLOOD PRESSURE: 141 MMHG | HEART RATE: 62 BPM | DIASTOLIC BLOOD PRESSURE: 77 MMHG | RESPIRATION RATE: 16 BRPM

## 2018-05-30 DIAGNOSIS — I48.0 PAF (PAROXYSMAL ATRIAL FIBRILLATION): ICD-10-CM

## 2018-05-30 DIAGNOSIS — E78.00 HYPERCHOLESTEREMIA: ICD-10-CM

## 2018-05-30 DIAGNOSIS — I42.9 CARDIOMYOPATHY, UNSPECIFIED TYPE: ICD-10-CM

## 2018-05-30 PROCEDURE — 93017 CV STRESS TEST TRACING ONLY: CPT

## 2018-05-30 PROCEDURE — 93018 CV STRESS TEST I&R ONLY: CPT | Mod: ,,, | Performed by: INTERNAL MEDICINE

## 2018-05-30 PROCEDURE — 78452 HT MUSCLE IMAGE SPECT MULT: CPT | Mod: 26,,, | Performed by: INTERNAL MEDICINE

## 2018-05-30 PROCEDURE — 93016 CV STRESS TEST SUPVJ ONLY: CPT | Mod: ,,, | Performed by: INTERNAL MEDICINE

## 2018-05-30 PROCEDURE — 63600175 PHARM REV CODE 636 W HCPCS

## 2018-05-30 PROCEDURE — A9502 TC99M TETROFOSMIN: HCPCS

## 2018-05-30 RX ORDER — ATORVASTATIN CALCIUM 40 MG/1
40 TABLET, FILM COATED ORAL DAILY
Qty: 90 TABLET | Refills: 3 | Status: SHIPPED | OUTPATIENT
Start: 2018-05-30 | End: 2019-01-01

## 2018-05-30 RX ORDER — REGADENOSON 0.08 MG/ML
INJECTION, SOLUTION INTRAVENOUS
Status: COMPLETED
Start: 2018-05-30 | End: 2018-05-30

## 2018-05-30 RX ORDER — REGADENOSON 0.08 MG/ML
0.4 INJECTION, SOLUTION INTRAVENOUS ONCE
Status: COMPLETED | OUTPATIENT
Start: 2018-05-30 | End: 2018-05-30

## 2018-05-30 RX ADMIN — REGADENOSON 0.4 MG: 0.08 INJECTION, SOLUTION INTRAVENOUS at 10:05

## 2018-05-31 ENCOUNTER — DOCUMENTATION ONLY (OUTPATIENT)
Dept: FAMILY MEDICINE | Facility: CLINIC | Age: 68
End: 2018-05-31

## 2018-05-31 LAB
CV STRESS BASE HR: 62
CVPEAKDIABP: 78
CVPEAKSYSBP: 159
CVRESTDIABP: 77
CVRESTSYSBP: 141
STRESS ECHO POST ESTIMATED WORKLOAD: 1 METS
STRESS ECHO POST EXERCISE DUR MIN: 1 MIN
STRESS ECHO POST EXERCISE DUR SEC: 25

## 2018-05-31 NOTE — PROGRESS NOTES
Pre-Visit Chart Review  For Appointment Scheduled on 6/1/2018    There are no preventive care reminders to display for this patient.

## 2018-06-01 ENCOUNTER — OFFICE VISIT (OUTPATIENT)
Dept: FAMILY MEDICINE | Facility: CLINIC | Age: 68
End: 2018-06-01
Payer: MEDICARE

## 2018-06-01 VITALS
HEART RATE: 70 BPM | TEMPERATURE: 98 F | HEIGHT: 66 IN | DIASTOLIC BLOOD PRESSURE: 72 MMHG | WEIGHT: 142.63 LBS | SYSTOLIC BLOOD PRESSURE: 118 MMHG | BODY MASS INDEX: 22.92 KG/M2

## 2018-06-01 DIAGNOSIS — K59.00 CONSTIPATION, UNSPECIFIED CONSTIPATION TYPE: ICD-10-CM

## 2018-06-01 DIAGNOSIS — I10 ESSENTIAL HYPERTENSION: Primary | ICD-10-CM

## 2018-06-01 DIAGNOSIS — R53.83 FATIGUE, UNSPECIFIED TYPE: ICD-10-CM

## 2018-06-01 DIAGNOSIS — Z12.31 ENCOUNTER FOR SCREENING MAMMOGRAM FOR BREAST CANCER: ICD-10-CM

## 2018-06-01 DIAGNOSIS — E03.9 HYPOTHYROIDISM, UNSPECIFIED TYPE: ICD-10-CM

## 2018-06-01 DIAGNOSIS — I42.9 CARDIOMYOPATHY, UNSPECIFIED TYPE: ICD-10-CM

## 2018-06-01 DIAGNOSIS — I48.0 PAF (PAROXYSMAL ATRIAL FIBRILLATION): ICD-10-CM

## 2018-06-01 DIAGNOSIS — K22.9 ESOPHAGEAL LESION: ICD-10-CM

## 2018-06-01 PROCEDURE — 99214 OFFICE O/P EST MOD 30 MIN: CPT | Mod: S$PBB,,, | Performed by: PHYSICIAN ASSISTANT

## 2018-06-01 PROCEDURE — 99999 PR PBB SHADOW E&M-EST. PATIENT-LVL V: CPT | Mod: PBBFAC,,, | Performed by: PHYSICIAN ASSISTANT

## 2018-06-01 PROCEDURE — 99215 OFFICE O/P EST HI 40 MIN: CPT | Mod: PBBFAC,PO | Performed by: PHYSICIAN ASSISTANT

## 2018-06-01 RX ORDER — METOPROLOL SUCCINATE 25 MG/1
25 TABLET, EXTENDED RELEASE ORAL DAILY
Qty: 90 TABLET | Refills: 1 | Status: SHIPPED | OUTPATIENT
Start: 2018-06-01 | End: 2018-07-06

## 2018-06-01 RX ORDER — POLYETHYLENE GLYCOL 3350 17 G/17G
17 POWDER, FOR SOLUTION ORAL DAILY
Qty: 289 G | Refills: 2 | Status: SHIPPED | OUTPATIENT
Start: 2018-06-01 | End: 2018-07-27 | Stop reason: SDUPTHER

## 2018-06-01 NOTE — PROGRESS NOTES
MSubjective:       Patient ID: Steph Lira is a 67 y.o. female.    Chief Complaint: Follow-up    Mrs. Lira is a 67 year old female who presents to clinic for 3 month follow up on chronic conditions. She is a new patient to me. Since her last visit she underwent nuclear stress, which was negative for ischemia and EF 57%. She continues to complain of fatigue by the end of the day, but reports her overall energy level has improved with discontinuation of lisinopril and decreasing toprol XL 25 mg daily. BP remains well controlled. She is scheduled for repeat endoscopy with Dr. Gonzales in July to re-evaluate esophageal lesion. She states her appetite is good and she has gained approximately 4 lbs since last office visit, despite eating only softened foods. She is sleeping well. She is due for mammogram.       Review of Systems   Constitutional: Positive for fatigue. Negative for activity change, appetite change, chills and fever.   Eyes: Negative for visual disturbance.   Respiratory: Negative for cough and shortness of breath.    Cardiovascular: Negative for chest pain, palpitations and leg swelling.   Gastrointestinal: Positive for constipation. Negative for abdominal pain, diarrhea, nausea and vomiting.        Dysphagia   Musculoskeletal: Negative for arthralgias.   Neurological: Negative for dizziness, weakness, light-headedness and headaches.       Objective:      Vitals:    06/01/18 1003   BP: 118/72   Pulse: 70   Temp: 98.2 °F (36.8 °C)     Physical Exam   Constitutional: She is oriented to person, place, and time. She appears well-developed and well-nourished.   HENT:   Head: Normocephalic and atraumatic.   Eyes: Conjunctivae and EOM are normal. Pupils are equal, round, and reactive to light.   Cardiovascular: Normal rate, regular rhythm, normal heart sounds and intact distal pulses.    Pulmonary/Chest: Effort normal and breath sounds normal.   Neurological: She is alert and oriented to person, place, and  time.   Skin: Skin is warm and dry.   Psychiatric: She has a normal mood and affect. Her behavior is normal.   Vitals reviewed.      Assessment:       1. Essential hypertension    2. Fatigue, unspecified type    3. Cardiomyopathy, unspecified type    4. PAF (paroxysmal atrial fibrillation), onset 2002    5. Esophageal lesion    6. Hypothyroidism, unspecified type    7. Constipation, unspecified constipation type    8. Encounter for screening mammogram for breast cancer        Plan:       Essential hypertension       - Stable, continue current medications    Fatigue, unspecified type       - Improving, recent cardiac work up reassuring.    Cardiomyopathy, unspecified type  -     metoprolol succinate (TOPROL-XL) 25 MG 24 hr tablet; Take 1 tablet (25 mg total) by mouth once daily.  Dispense: 90 tablet; Refill: 1        - Continue per Dr. Wei    PAF (paroxysmal atrial fibrillation), onset 2002        - Continue per Dr. Wei    Esophageal lesion        - Continue to follow with GI    Hypothyroidism, unspecified type        - Stable, labs in 1/18, continue current meds    Constipation, unspecified constipation type  -     polyethylene glycol (GLYCOLAX) 17 gram/dose powder; Take 17 g by mouth once daily.  Dispense: 289 g; Refill: 2    Encounter for screening mammogram for breast cancer  -     Mammo Digital Screening Bilateral With CAD; Future; Expected date: 06/01/2018      Follow up with Dr. Mon in 6 months    Patient readiness: acceptance and barriers:none    During the course of the visit the patient was educated and counseled about the following:     Hypertension:   Medication: no change.    Goals: Hypertension: Reduce Blood Pressure    Did patient meet goals/outcomes: No    The following self management tools provided: declined    Patient Instructions (the written plan) was given to the patient/family.     Time spent with patient: 15 minutes

## 2018-06-04 ENCOUNTER — HOSPITAL ENCOUNTER (OUTPATIENT)
Dept: RADIOLOGY | Facility: CLINIC | Age: 68
Discharge: HOME | End: 2018-06-04
Attending: PHYSICIAN ASSISTANT
Payer: MEDICARE

## 2018-06-04 DIAGNOSIS — Z12.31 ENCOUNTER FOR SCREENING MAMMOGRAM FOR BREAST CANCER: ICD-10-CM

## 2018-06-04 PROCEDURE — 77063 BREAST TOMOSYNTHESIS BI: CPT | Mod: 26,,, | Performed by: RADIOLOGY

## 2018-06-04 PROCEDURE — 77067 SCR MAMMO BI INCL CAD: CPT | Mod: TC,PO

## 2018-06-04 PROCEDURE — 77067 SCR MAMMO BI INCL CAD: CPT | Mod: 26,,, | Performed by: RADIOLOGY

## 2018-06-06 ENCOUNTER — OFFICE VISIT (OUTPATIENT)
Dept: DERMATOLOGY | Facility: CLINIC | Age: 68
End: 2018-06-06
Payer: MEDICARE

## 2018-06-06 DIAGNOSIS — B07.8 COMMON WART: ICD-10-CM

## 2018-06-06 DIAGNOSIS — L28.0 LSC (LICHEN SIMPLEX CHRONICUS): ICD-10-CM

## 2018-06-06 DIAGNOSIS — L65.0 TELOGEN EFFLUVIUM: Primary | ICD-10-CM

## 2018-06-06 PROCEDURE — 17110 DESTRUCTION B9 LES UP TO 14: CPT | Mod: S$PBB,,, | Performed by: DERMATOLOGY

## 2018-06-06 PROCEDURE — 99213 OFFICE O/P EST LOW 20 MIN: CPT | Mod: 25,S$PBB,, | Performed by: DERMATOLOGY

## 2018-06-06 PROCEDURE — 17110 DESTRUCTION B9 LES UP TO 14: CPT | Mod: PBBFAC,PO | Performed by: DERMATOLOGY

## 2018-06-06 PROCEDURE — 99999 PR PBB SHADOW E&M-EST. PATIENT-LVL III: CPT | Mod: PBBFAC,,, | Performed by: DERMATOLOGY

## 2018-06-06 PROCEDURE — 99213 OFFICE O/P EST LOW 20 MIN: CPT | Mod: PBBFAC,PO | Performed by: DERMATOLOGY

## 2018-06-06 NOTE — PROGRESS NOTES
Subjective:       Patient ID:  Steph Lira is a 67 y.o. female who presents for   Chief Complaint   Patient presents with    Insect Bite    Alopecia    Warts     Pt presnets for 2 month follow up.  Hair loss is maybe a little better. 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  Using ketoconazole shampoo 2x/ week and viviscal.  Wart fu, about the same.  Cryo improved some but wart is still present. Not doing any home treatments.   Also big bite right thigh is had reaction this weekend.  After being in the heat  over the weekend became swollen and changed colors.  Better today; last visit tx with ilk       Insect Bite     Alopecia     Warts         Review of Systems   Constitutional: Negative for fever, chills, fatigue and malaise.   Skin: Negative for itching and rash.   Hematologic/Lymphatic: Does not bruise/bleed easily.        Objective:    Physical Exam   Constitutional: She appears well-developed and well-nourished. No distress.   Neurological: She is alert and oriented to person, place, and time. She is not disoriented.   Psychiatric: She has a normal mood and affect.   Skin:   Areas Examined (abnormalities noted in diagram):   Scalp / Hair Palpated and Inspected  RLE Inspected  Nails and Digits Inspection Performed                       Diagram Legend     Erythematous scaling macule/papule c/w actinic keratosis       Vascular papule c/w angioma      Pigmented verrucoid papule/plaque c/w seborrheic keratosis      Yellow umbilicated papule c/w sebaceous hyperplasia      Irregularly shaped tan macule c/w lentigo     1-2 mm smooth white papules consistent with Milia      Movable subcutaneous cyst with punctum c/w epidermal inclusion cyst      Subcutaneous movable cyst c/w pilar cyst      Firm pink to brown papule c/w dermatofibroma      Pedunculated fleshy papule(s) c/w skin tag(s)      Evenly pigmented macule c/w junctional nevus     Mildly variegated pigmented,  slightly irregular-bordered macule c/w mildly atypical nevus      Flesh colored to evenly pigmented papule c/w intradermal nevus       Pink pearly papule/plaque c/w basal cell carcinoma      Erythematous hyperkeratotic cursted plaque c/w SCC      Surgical scar with no sign of skin cancer recurrence      Open and closed comedones      Inflammatory papules and pustules      Verrucoid papule consistent consistent with wart     Erythematous eczematous patches and plaques     Dystrophic onycholytic nail with subungual debris c/w onychomycosis     Umbilicated papule    Erythematous-base heme-crusted tan verrucoid plaque consistent with inflamed seborrheic keratosis     Erythematous Silvery Scaling Plaque c/w Psoriasis     See annotation      Assessment / Plan:        Telogen effluvium  Continue ketoconazole shampoo 2% and viviscal  Reassurance    Common wart  Cryosurgery procedure note:    Verbal consent from the patient is obtained including, but not limited to, risk of hypopigmentation/hyperpigmentation, scar, recurrence of lesion. Liquid nitrogen cryosurgery is applied to 1 verruca with prior paring, as detailed in the physical exam, to produce a freeze injury. 1 consecutive freeze thaw cycles are applied to each verruca. The patient is aware that blisters (possibly blood blisters) may form.    Recommend home maintenance for wart(s). After nightly soaking, patient should apply 40% salicylic acid patch and cover. Remove in a.m. (or 12 hours later). Repeat nightly except night prior to next appointment. Instructional brochure provided.    LSC (lichen simplex chronicus)  S/p ILK . Resolved.            Follow-up if symptoms worsen or fail to improve.

## 2018-06-12 ENCOUNTER — OFFICE VISIT (OUTPATIENT)
Dept: OPTOMETRY | Facility: CLINIC | Age: 68
End: 2018-06-12
Payer: MEDICARE

## 2018-06-12 ENCOUNTER — TELEPHONE (OUTPATIENT)
Dept: ENDOSCOPY | Facility: HOSPITAL | Age: 68
End: 2018-06-12

## 2018-06-12 DIAGNOSIS — H52.7 REFRACTIVE ERROR: ICD-10-CM

## 2018-06-12 DIAGNOSIS — H53.8 BLURRY VISION, BILATERAL: Primary | ICD-10-CM

## 2018-06-12 DIAGNOSIS — H04.123 DRY EYE SYNDROME, BILATERAL: ICD-10-CM

## 2018-06-12 PROCEDURE — 92015 DETERMINE REFRACTIVE STATE: CPT | Mod: ,,, | Performed by: OPTOMETRIST

## 2018-06-12 PROCEDURE — 99999 PR PBB SHADOW E&M-EST. PATIENT-LVL II: CPT | Mod: PBBFAC,,, | Performed by: OPTOMETRIST

## 2018-06-12 PROCEDURE — 92014 COMPRE OPH EXAM EST PT 1/>: CPT | Mod: S$PBB,,, | Performed by: OPTOMETRIST

## 2018-06-12 PROCEDURE — 99212 OFFICE O/P EST SF 10 MIN: CPT | Mod: PBBFAC,PO | Performed by: OPTOMETRIST

## 2018-06-12 NOTE — TELEPHONE ENCOUNTER
This pt is scheduled for f/u egd on 7/5/18 at 11am, she is on Eliquis and we need permission for her to hold med 2 days prior to procedure. Please message back with response for documentation.

## 2018-06-12 NOTE — PROGRESS NOTES
HPI     Presenting Complaint: Pt here today for yearly eye exam. Pt states   distance vision has been stable. Pt has noticed reading small print has   started getting blurry.     Ophthalmic medication / drops: Art tears as needed for dryness / OTC   allergy drops as needed for itchy eyes    (-) headaches  (-) diplopia   (-) flashes / (-) floaters      Last edited by William Trujillo, OD on 6/12/2018  8:43 AM. (History)            Assessment /Plan     For exam results, see Encounter Report.    Blurry vision, bilateral    Refractive error    Dry eye syndrome, bilateral      Overall good ocular health OU. Dispensed updated spectacle Rx. Discussed various spectacle lens options. Discussed adaptation period to new specs.  Demonstrated new spec Rx vs current specs in phoropter with patient satisfaction.    Mild DARIO OU. Pt well controlled with OTC ATs. Continue OTC ATs as needed throughout day. Return if worsening or no alleviation with use of drops.      RTC in 1 year for comprehensive eye exam, or sooner prn.

## 2018-06-13 ENCOUNTER — PATIENT MESSAGE (OUTPATIENT)
Dept: FAMILY MEDICINE | Facility: CLINIC | Age: 68
End: 2018-06-13

## 2018-06-15 ENCOUNTER — TELEPHONE (OUTPATIENT)
Dept: ENDOSCOPY | Facility: HOSPITAL | Age: 68
End: 2018-06-15

## 2018-06-15 RX ORDER — FLUCONAZOLE 200 MG/1
200 TABLET ORAL DAILY
Qty: 14 TABLET | Refills: 0 | Status: SHIPPED | OUTPATIENT
Start: 2018-06-15 | End: 2018-06-29

## 2018-06-15 NOTE — TELEPHONE ENCOUNTER
Pt is scheduled for EGD/cryo on 7/5/18 11am with Dr Gonzales. Pt stated she needs Rx for Diflucan called in to [facility] in [place], she said she needs to take 1 pill daily for 12 days prior to egd and that Dr Gonzales prescribes medication.

## 2018-06-25 ENCOUNTER — PATIENT MESSAGE (OUTPATIENT)
Dept: FAMILY MEDICINE | Facility: CLINIC | Age: 68
End: 2018-06-25

## 2018-07-02 RX ORDER — APIXABAN 5 MG/1
TABLET, FILM COATED ORAL
Qty: 180 TABLET | Refills: 3 | Status: SHIPPED | OUTPATIENT
Start: 2018-07-02 | End: 2018-09-19

## 2018-07-05 ENCOUNTER — ANESTHESIA (OUTPATIENT)
Dept: ENDOSCOPY | Facility: HOSPITAL | Age: 68
End: 2018-07-05
Payer: MEDICARE

## 2018-07-05 ENCOUNTER — ANESTHESIA EVENT (OUTPATIENT)
Dept: ENDOSCOPY | Facility: HOSPITAL | Age: 68
End: 2018-07-05
Payer: MEDICARE

## 2018-07-05 ENCOUNTER — HOSPITAL ENCOUNTER (OUTPATIENT)
Facility: HOSPITAL | Age: 68
Discharge: HOME | End: 2018-07-05
Attending: INTERNAL MEDICINE | Admitting: INTERNAL MEDICINE
Payer: MEDICARE

## 2018-07-05 ENCOUNTER — DOCUMENTATION ONLY (OUTPATIENT)
Dept: FAMILY MEDICINE | Facility: CLINIC | Age: 68
End: 2018-07-05

## 2018-07-05 ENCOUNTER — SURGERY (OUTPATIENT)
Age: 68
End: 2018-07-05

## 2018-07-05 VITALS
DIASTOLIC BLOOD PRESSURE: 65 MMHG | OXYGEN SATURATION: 100 % | RESPIRATION RATE: 16 BRPM | WEIGHT: 145 LBS | TEMPERATURE: 98 F | HEIGHT: 66 IN | SYSTOLIC BLOOD PRESSURE: 128 MMHG | BODY MASS INDEX: 23.3 KG/M2 | HEART RATE: 75 BPM

## 2018-07-05 DIAGNOSIS — K22.9 ESOPHAGEAL LESION: Primary | ICD-10-CM

## 2018-07-05 DIAGNOSIS — K22.9 LESION OF ESOPHAGUS: ICD-10-CM

## 2018-07-05 PROCEDURE — D9220A PRA ANESTHESIA: Mod: CRNA,,, | Performed by: NURSE ANESTHETIST, CERTIFIED REGISTERED

## 2018-07-05 PROCEDURE — 37000009 HC ANESTHESIA EA ADD 15 MINS: Performed by: INTERNAL MEDICINE

## 2018-07-05 PROCEDURE — 63600175 PHARM REV CODE 636 W HCPCS: Performed by: NURSE ANESTHETIST, CERTIFIED REGISTERED

## 2018-07-05 PROCEDURE — 37000008 HC ANESTHESIA 1ST 15 MINUTES: Performed by: INTERNAL MEDICINE

## 2018-07-05 PROCEDURE — 43270 EGD LESION ABLATION: CPT | Performed by: INTERNAL MEDICINE

## 2018-07-05 PROCEDURE — C1886 CATHETER, ABLATION: HCPCS | Performed by: INTERNAL MEDICINE

## 2018-07-05 PROCEDURE — 43270 EGD LESION ABLATION: CPT | Mod: ,,, | Performed by: INTERNAL MEDICINE

## 2018-07-05 PROCEDURE — D9220A PRA ANESTHESIA: Mod: ANES,,, | Performed by: ANESTHESIOLOGY

## 2018-07-05 PROCEDURE — 25000003 PHARM REV CODE 250: Performed by: INTERNAL MEDICINE

## 2018-07-05 RX ORDER — PROPOFOL 10 MG/ML
VIAL (ML) INTRAVENOUS CONTINUOUS PRN
Status: DISCONTINUED | OUTPATIENT
Start: 2018-07-05 | End: 2018-07-05

## 2018-07-05 RX ORDER — PROPOFOL 10 MG/ML
VIAL (ML) INTRAVENOUS
Status: DISCONTINUED | OUTPATIENT
Start: 2018-07-05 | End: 2018-07-05

## 2018-07-05 RX ORDER — SODIUM CHLORIDE 9 MG/ML
INJECTION, SOLUTION INTRAVENOUS CONTINUOUS
Status: DISCONTINUED | OUTPATIENT
Start: 2018-07-05 | End: 2018-07-05 | Stop reason: HOSPADM

## 2018-07-05 RX ORDER — LIDOCAINE HCL/PF 100 MG/5ML
SYRINGE (ML) INTRAVENOUS
Status: DISCONTINUED | OUTPATIENT
Start: 2018-07-05 | End: 2018-07-05

## 2018-07-05 RX ADMIN — LIDOCAINE HYDROCHLORIDE 50 MG: 20 INJECTION, SOLUTION INTRAVENOUS at 11:07

## 2018-07-05 RX ADMIN — PROPOFOL 20 MG: 10 INJECTION, EMULSION INTRAVENOUS at 12:07

## 2018-07-05 RX ADMIN — SODIUM CHLORIDE: 0.9 INJECTION, SOLUTION INTRAVENOUS at 10:07

## 2018-07-05 RX ADMIN — PROPOFOL 60 MG: 10 INJECTION, EMULSION INTRAVENOUS at 11:07

## 2018-07-05 RX ADMIN — PROPOFOL 150 MCG/KG/MIN: 10 INJECTION, EMULSION INTRAVENOUS at 11:07

## 2018-07-05 NOTE — PROGRESS NOTES
Pre-Visit Chart Review  For Appointment Scheduled on 7-6-18    There are no preventive care reminders to display for this patient.

## 2018-07-05 NOTE — ANESTHESIA PREPROCEDURE EVALUATION
07/05/2018  Steph Lira is a 67 y.o., female.    Anesthesia Evaluation         Review of Systems      Physical Exam  General:  Well nourished    Airway/Jaw/Neck:  Airway Findings: Mouth Opening: Normal Tongue: Normal  General Airway Assessment: Adult  Mallampati: II  Improves to I with phonation.  TM Distance: Normal, at least 6 cm      Dental:  Dental Findings: In tact   Chest/Lungs:  Chest/Lungs Findings: Clear to auscultation     Heart/Vascular:  Heart Findings: Rate: Normal  Rhythm: Regular Rhythm        Mental Status:  Mental Status Findings:  Cooperative, Alert and Oriented         Anesthesia Plan  Type of Anesthesia, risks & benefits discussed:  Anesthesia Type:  general  Patient's Preference:   Intra-op Monitoring Plan: standard ASA monitors  Intra-op Monitoring Plan Comments:   Post Op Pain Control Plan: multimodal analgesia  Post Op Pain Control Plan Comments:   Induction:   IV  Beta Blocker:  Patient is not currently on a Beta-Blocker (No further documentation required).       Informed Consent: Patient understands risks and agrees with Anesthesia plan.  Questions answered. Anesthesia consent signed with patient.  ASA Score: 2     Day of Surgery Review of History & Physical:    H&P update referred to the surgeon.         Ready For Surgery From Anesthesia Perspective.

## 2018-07-05 NOTE — TRANSFER OF CARE
"Anesthesia Transfer of Care Note    Patient: Steph Lira    Procedure(s) Performed: Procedure(s) (LRB):  ESOPHAGOGASTRODUODENOSCOPY (EGD)/cryo (N/A)    Patient location: [facility]    Anesthesia Type: general and MAC    Transport from OR: Transported from OR on 2-3 L/min O2 by NC with adequate spontaneous ventilation    Post vital signs: stable    Level of consciousness: sedated and responds to stimulation    Complications: none    Transfer of care protocol was followed      Last vitals:   Visit Vitals  BP (!) 142/70 (BP Location: Left arm, Patient Position: Lying)   Pulse 65   Temp 36.4 °C (97.5 °F) (Temporal)   Resp 16   Ht 5' 6" (1.676 m)   Wt 65.8 kg (145 lb)   SpO2 100%   Breastfeeding? No   BMI 23.40 kg/m²     "

## 2018-07-05 NOTE — H&P
History & Physical - Short Stay  Gastroenterology      SUBJECTIVE:     Procedure: EGD    Chief Complaint/Indication for Procedure: Esophageal lesion    History of Present Illness:  Patient is a 67 y.o. female presents with dysphagia and evidence of an esophageal lesion (papilloma) treated with cryotherapy. Stated dysphagia improved.    PTA Medications   Medication Sig    albuterol (PROVENTIL HFA) 90 mcg/actuation inhaler Inhale 2 puffs into the lungs every 6 (six) hours as needed for Wheezing.    atorvastatin (LIPITOR) 40 MG tablet Take 1 tablet (40 mg total) by mouth once daily.    bisacodyl (DULCOLAX) 5 mg EC tablet Take 1 tablet (5 mg total) by mouth daily as needed for Constipation.    CALCIUM CITRATE (CITRACAL) 500 mg TbEF Take 500 mg by mouth. 1 Tablet, Effervescent Oral  daily    cetirizine (ZYRTEC) 10 MG tablet TAKE 1 TABLET EVERY EVENING    diphenhydrAMINE (BENADRYL) 25 mg capsule Take 25 mg by mouth every 6 (six) hours as needed for Itching.    flecainide (TAMBOCOR) 50 MG Tab Take 2 tablets (100 mg total) by mouth every 12 (twelve) hours. (Patient taking differently: Take 50 mg by mouth once daily. )    fluticasone (FLONASE) 50 mcg/actuation nasal spray 1 spray by Each Nare route once daily.    fluticasone-salmeterol 250-50 mcg/dose (ADVAIR DISKUS) 250-50 mcg/dose diskus inhaler USE 1 INHALATION INTO THE LUNGS TWICE A DAY. RINSE MOUTH AFTER USE TO PREVENT THRUSH    ketoconazole (NIZORAL) 2 % shampoo Wash hair with medicated shampoo at least 2x/week - let sit on scalp at least 5 minutes prior to rinsing    levothyroxine (SYNTHROID) 88 MCG tablet Take 1 tablet (88 mcg total) by mouth once daily.    metoprolol succinate (TOPROL-XL) 25 MG 24 hr tablet Take 1 tablet (25 mg total) by mouth once daily.    montelukast (SINGULAIR) 10 mg tablet Take 1 tablet (10 mg total) by mouth every evening.    multivitamin-minerals-lutein (CENTRUM SILVER) Tab Take 1 tablet by mouth once daily. 1 Tablet Oral Every  day    pantoprazole (PROTONIX) 40 MG tablet Take 1 tablet (40 mg total) by mouth once daily. Take 30 minutes prior to breakfast    SPIRIVA WITH HANDIHALER 18 mcg inhalation capsule INHALE THE CONTENTS OF 1 CAPSULE DAILY    albuterol (ACCUNEB) 1.25 mg/3 mL Nebu USE 1 VIAL (3 ML) VIA NEBULIZER EVERY 6 HOURS AS NEEDED    desoximetasone (TOPICORT) 0.25 % cream Apply topically 2 (two) times daily.    diclofenac sodium (VOLTAREN) 1 % Gel Apply 2 g topically once daily.    ELIQUIS 5 mg Tab TAKE 1 TABLET TWICE A DAY    polyethylene glycol (GLYCOLAX) 17 gram/dose powder Take 17 g by mouth once daily.    RETIN-A 0.05 % cream APPLY THIN FILM TO FACE AT NIGHT AFTER MOISTURIZING    triamcinolone acetonide 0.1% (KENALOG) 0.1 % cream Apply topically 2 (two) times daily.       Review of patient's allergies indicates:   Allergen Reactions    Ciprofloxacin Itching    Doxycycline Other (See Comments)     Patient had a rxn with the sun despite wearing spf 30        Past Medical History:   Diagnosis Date    *Atrial fibrillation     and Hx PSVT    Allergy     chronic     Arthritis     fingers    Benign tumor of throat     Bronchitis March 2013    CAD (coronary artery disease) 2005    CABGx2 in 2005 and 2 MI after with 4 stents    Cancer 1996    small cell lung cancer s/p resection of 1/3 lung, 5 ribs and muscle mass then had chemo and radiation    Cataract     OD     CHF (congestive heart failure) past Hx    Chronic rhinitis     Clot past Hx    external jugular, now stented, occluded, had phlebitis; now revascularized    Colon polyp     COPD (chronic obstructive pulmonary disease)     no oxygen or nebulizers    COPD exacerbation 5/8/13    improved 5/14/13 after steroid pack,antibiotics    Former smoker     GERD (gastroesophageal reflux disease)     Heart block     Hyperlipidemia     Hypertension     pt states does not have //    MI (myocardial infarction) 2005    Posterior vitreous detachment of left eye      Thyroid disease      Past Surgical History:   Procedure Laterality Date    ADENOIDECTOMY      APPENDECTOMY      BACK SURGERY      lumbar    BREAST BIOPSY Left     benign    CARDIAC SURGERY      CATARACT EXTRACTION Left     OS    CATARACT EXTRACTION, BILATERAL      left eye only    CHOLECYSTECTOMY      COLONOSCOPY  2012    repeat in 5 years    COLONOSCOPY N/A 2017    Procedure: COLONOSCOPY;  Surgeon: Kal Elise MD;  Location: [facility] ENDO;  Service: Endoscopy;  Laterality: N/A;    CORONARY ARTERY BYPASS GRAFT      2 vessel    EYE SURGERY  Dec 2012    ptosis repair    FIRST RIB REMOVAL   with lung resection    HYSTERECTOMY      LUNG LOBECTOMY      left lung for cancer    SHOULDER SURGERY      scapular entrapment after lung surgery, needed 5 ribs removed left side    SYMPOTHATIC NERVE LEFT SIDE       with lung surgery    TONSILLECTOMY      TONSILLECTOMY, ADENOIDECTOMY, BILATERAL MYRINGOTOMY AND TUBES      UPPER GASTROINTESTINAL ENDOSCOPY  2016    Dr. Koo    VASCULAR SURGERY      stents place in jugualr vein     Family History   Problem Relation Age of Onset    Allergic rhinitis Father     Cancer Father     Hypertension Father     Allergies Father     Allergic rhinitis Son     Asthma Son     Stroke Mother     Allergies Mother     Allergic rhinitis Sister     Asthma Sister     Diverticulitis Brother     No Known Problems Daughter     No Known Problems Maternal Aunt     No Known Problems Maternal Uncle     No Known Problems Paternal Aunt     No Known Problems Paternal Uncle     Colon polyps Maternal Grandmother          of colon blockage    No Known Problems Maternal Grandfather     No Known Problems Paternal Grandmother     No Known Problems Paternal Grandfather     No Known Problems Brother     No Known Problems Sister     Angioedema Neg Hx     Eczema Neg Hx     Immunodeficiency Neg Hx     Urticaria Neg Hx     Skin cancer  Neg Hx     Amblyopia Neg Hx     Blindness Neg Hx     Cataracts Neg Hx     Diabetes Neg Hx     Glaucoma Neg Hx     Macular degeneration Neg Hx     Retinal detachment Neg Hx     Strabismus Neg Hx     Thyroid disease Neg Hx     Colon cancer Neg Hx     Melanoma Neg Hx      Social History   Substance Use Topics    Smoking status: Former Smoker     Packs/day: 2.00     Years: 20.00     Types: Cigarettes     Quit date: 1/18/1994    Smokeless tobacco: Never Used    Alcohol use No       Review of Systems:  Constitutional: no fever or chills  Respiratory: no cough or shortness of breath  Cardiovascular: no chest pain or palpitations  Gastrointestinal: no nausea or vomiting, no abdominal pain or change in bowel habits    OBJECTIVE:     Vital Signs (Most Recent)  Temp: 97.5 °F (36.4 °C) (07/05/18 1055)  Pulse: 65 (07/05/18 1055)  Resp: 16 (07/05/18 1055)  BP: (!) 142/70 (07/05/18 1055)  SpO2: 100 % (07/05/18 1055)    Physical Exam:  General: well developed, well nourished  Lungs:  clear to auscultation bilaterally and normal respiratory effort  Heart: regular rate, S1, S2 normal  Abdomen: soft, non-tender non-distented; bowel sounds normal; no masses,  no organomegaly    Laboratory  CBC: No results for input(s): WBC, RBC, HGB, HCT, PLT, MCV, MCH, MCHC in the last 168 hours.  CMP: No results for input(s): GLU, CALCIUM, ALBUMIN, PROT, NA, K, CO2, CL, BUN, CREATININE, ALKPHOS, ALT, AST, BILITOT in the last 168 hours.  Coagulation: No results for input(s): LABPROT, INR, APTT in the last 168 hours.      Diagnostic Results:      ASSESSMENT/PLAN:     Esophageal papilloma    Plan: EGD    Anesthesia Plan: MAC    ASA Grade: ASA 3 - Patient with moderate systemic disease with functional limitations     The impression and plan was discussed in detail with the patient. All questions have been answered and the patient voices understanding of our plan at this point. The risk of the procedure was discussed in detail which includes  but not limited to bleeding, infection, perforation in some cases requiring surgery with its spectrum of complications.

## 2018-07-05 NOTE — DISCHARGE INSTRUCTIONS

## 2018-07-05 NOTE — [employer] PATIENT INSTRUCTIONS
Discharge Summary/Instructions after an Endoscopic Procedure  Patient Name: Steph Raymundo  Patient MRN: [id number]  Patient YOB: 1950 Thursday, July 05, 2018  Robbie Gonzales MD  RESTRICTIONS:  During your procedure today, you received medications for sedation.  These   medications may affect your judgment, balance and coordination.  Therefore,   for 24 hours, you have the following restrictions:   - DO NOT drive a car, operate machinery, make legal/financial decisions,   sign important papers or drink alcohol.    ACTIVITY:  Today: no heavy lifting, straining or running due to procedural   sedation/anesthesia.  The following day: return to full activity including work.  DIET:  Eat and drink normally unless instructed otherwise.     TREATMENT FOR COMMON SIDE EFFECTS:  - Mild abdominal pain, nausea, belching, bloating or excessive gas:  rest,   eat lightly and use a heating pad.  - Sore Throat: treat with throat lozenges and/or gargle with warm salt   water.  - Because air was used during the procedure, expelling large amounts of air   from your rectum or belching is normal.  - If a bowel prep was taken, you may not have a bowel movement for 1-3 days.    This is normal.  SYMPTOMS TO WATCH FOR AND REPORT TO YOUR PHYSICIAN:  1. Abdominal pain or bloating, other than gas cramps.  2. Chest pain.  3. Back pain.  4. Signs of infection such as: chills or fever occurring within 24 hours   after the procedure.  5. Rectal bleeding, which would show as bright red, maroon, or black stools.   (A tablespoon of blood from the rectum is not serious, especially if   hemorrhoids are present.)  6. Vomiting.  7. Weakness or dizziness.  GO DIRECTLY TO THE NEAREST EMERGENCY ROOM IF YOU HAVE ANY OF THE FOLLOWING:      Difficulty breathing              Chills and/or fever over 101 F   Persistent vomiting and/or vomiting blood   Severe abdominal pain   Severe chest pain   Black, tarry stools   Bleeding- more than one  tablespoon   Any other symptom or condition that you feel may need urgent attention  Your doctor recommends these additional instructions:  If any biopsies were taken, your doctors clinic will contact you in 1 to 2   weeks with any results.  - Discharge patient to home (ambulatory).   - Full liquid diet for 1 day, then advance as tolerated to advance diet as   tolerated.   - Repeat upper endoscopy in 12 weeks to evaluate the response to therapy.   - Resume Eliquis (apixaban) at prior dose tomorrow.  For questions, problems or results please call your physician - Robbie Gonzales MD at Work:  [phone].  [facility], EMERGENCY ROOM PHONE NUMBER: [phone]  IF A COMPLICATION OR EMERGENCY SITUATION ARISES AND YOU ARE UNABLE TO REACH   YOUR PHYSICIAN - GO DIRECTLY TO THE EMERGENCY ROOM.  Robbie Gonzales MD  7/5/2018 12:37:37 PM  This report has been verified and signed electronically.  [employer]

## 2018-07-05 NOTE — ANESTHESIA POSTPROCEDURE EVALUATION
"Anesthesia Post Evaluation    Patient: Steph Lira    Procedure(s) Performed: Procedure(s) (LRB):  ESOPHAGOGASTRODUODENOSCOPY (EGD)/cryo (N/A)    Final Anesthesia Type: general  Patient location during evaluation: [facility]  Patient participation: Yes- Able to Participate  Level of consciousness: awake and alert  Post-procedure vital signs: reviewed and stable  Pain management: adequate  Airway patency: patent  PONV status at discharge: No PONV  Anesthetic complications: no      Cardiovascular status: blood pressure returned to baseline and hemodynamically stable  Respiratory status: unassisted, spontaneous ventilation and room air  Hydration status: euvolemic  Follow-up not needed.        Visit Vitals  /65   Pulse 75   Temp 36.7 °C (98 °F) (Temporal)   Resp 16   Ht 5' 6" (1.676 m)   Wt 65.8 kg (145 lb)   SpO2 100%   Breastfeeding? No   BMI 23.40 kg/m²       Pain/Tamera Score: Presence of Pain: denies (7/5/2018  1:15 PM)  Tamera Score: 9 (7/5/2018 12:30 PM)      "

## 2018-07-05 NOTE — DISCHARGE SUMMARY
Discharge Summary/Instructions after an Endoscopic Procedure    Patient Name: Steph Raymundo  Patient MRN: [id number]  Patient YOB: 1950 Thursday, July 05, 2018  Robbie Gonzales MD    RESTRICTIONS:  During your procedure today, you received medications for sedation.  These medications may affect your judgment, balance and coordination.  Therefore, for 24 hours, you have the following restrictions:     - DO NOT drive a car, operate machinery, make legal/financial decisions, sign important papers or drink alcohol.      ACTIVITY:  Today: no heavy lifting, straining or running due to procedural sedation/anesthesia.  The following day: return to full activity including work.    DIET:  Eat and drink normally unless instructed otherwise.     TREATMENT FOR COMMON SIDE EFFECTS:  - Mild abdominal pain, nausea, belching, bloating or excessive gas:  rest, eat lightly and use a heating pad.  - Sore Throat: treat with throat lozenges and/or gargle with warm salt water.  - Because air was used during the procedure, expelling large amounts of air from your rectum or belching is normal.  - If a bowel prep was taken, you may not have a bowel movement for 1-3 days.  This is normal.      SYMPTOMS TO WATCH FOR AND REPORT TO YOUR PHYSICIAN:  1. Abdominal pain or bloating, other than gas cramps.  2. Chest pain.  3. Back pain.  4. Signs of infection such as: chills or fever occurring within 24 hours after the procedure.  5. Rectal bleeding, which would show as bright red, maroon, or black stools. (A tablespoon of blood from the rectum is not serious, especially if hemorrhoids are present.)  6. Vomiting.  7. Weakness or dizziness.      GO DIRECTLY TO THE NEAREST EMERGENCY ROOM IF YOU HAVE ANY OF THE FOLLOWING:     Difficulty breathing              Chills and/or fever over 101 F   Persistent vomiting and/or vomiting blood   Severe abdominal pain   Severe chest pain   Black, tarry stools   Bleeding- more than one tablespoon   Any  other symptom or condition that you feel may need urgent attention    Your doctor recommends these additional instructions:  If any biopsies were taken, your doctors clinic will contact you in 1 to 2 weeks with any results.    - Discharge patient to home (ambulatory).   - Full liquid diet for 1 day, then advance as tolerated to advance diet as tolerated.   - Repeat upper endoscopy in 12 weeks to evaluate the response to therapy.   - Resume Eliquis (apixaban) at prior dose tomorrow.    For questions, problems or results please call your physician - Robbie Gonzales MD at Work:  [phone].    [facility] [place], EMERGENCY ROOM PHONE NUMBER: [phone]    IF A COMPLICATION OR EMERGENCY SITUATION ARISES AND YOU ARE UNABLE TO REACH YOUR PHYSICIAN - GO DIRECTLY TO THE EMERGENCY ROOM.

## 2018-07-06 ENCOUNTER — HOSPITAL ENCOUNTER (OUTPATIENT)
Dept: RADIOLOGY | Facility: CLINIC | Age: 68
Discharge: HOME | End: 2018-07-06
Attending: FAMILY MEDICINE
Payer: MEDICARE

## 2018-07-06 ENCOUNTER — OFFICE VISIT (OUTPATIENT)
Dept: FAMILY MEDICINE | Facility: CLINIC | Age: 68
End: 2018-07-06
Payer: MEDICARE

## 2018-07-06 VITALS
HEART RATE: 68 BPM | HEIGHT: 66 IN | DIASTOLIC BLOOD PRESSURE: 69 MMHG | BODY MASS INDEX: 22.82 KG/M2 | SYSTOLIC BLOOD PRESSURE: 130 MMHG | WEIGHT: 142 LBS | TEMPERATURE: 99 F

## 2018-07-06 DIAGNOSIS — I65.22 CAROTID STENOSIS, LEFT: ICD-10-CM

## 2018-07-06 DIAGNOSIS — I10 ESSENTIAL HYPERTENSION: Primary | ICD-10-CM

## 2018-07-06 DIAGNOSIS — K59.00 CONSTIPATION, UNSPECIFIED CONSTIPATION TYPE: ICD-10-CM

## 2018-07-06 PROCEDURE — 99999 PR PBB SHADOW E&M-EST. PATIENT-LVL IV: CPT | Mod: PBBFAC,,, | Performed by: FAMILY MEDICINE

## 2018-07-06 PROCEDURE — 93880 EXTRACRANIAL BILAT STUDY: CPT | Mod: 26,,, | Performed by: RADIOLOGY

## 2018-07-06 PROCEDURE — 93880 EXTRACRANIAL BILAT STUDY: CPT | Mod: TC,PO

## 2018-07-06 PROCEDURE — 99214 OFFICE O/P EST MOD 30 MIN: CPT | Mod: S$PBB,,, | Performed by: FAMILY MEDICINE

## 2018-07-06 PROCEDURE — 99214 OFFICE O/P EST MOD 30 MIN: CPT | Mod: PBBFAC,25,PO | Performed by: FAMILY MEDICINE

## 2018-07-06 RX ORDER — DOCUSATE CALCIUM 240 MG
240 CAPSULE ORAL DAILY
Qty: 90 CAPSULE | Refills: 3 | Status: SHIPPED | OUTPATIENT
Start: 2018-07-06 | End: 2018-10-31

## 2018-07-06 RX ORDER — CARVEDILOL 3.12 MG/1
3.12 TABLET ORAL 2 TIMES DAILY WITH MEALS
Qty: 60 TABLET | Refills: 11 | Status: SHIPPED | OUTPATIENT
Start: 2018-07-06 | End: 2018-07-09 | Stop reason: SINTOL

## 2018-07-06 NOTE — PROGRESS NOTES
Subjective:       Patient ID: Steph Lira is a 67 y.o. female.    Chief Complaint: Follow-up    HPI  Review of Systems   Constitutional: Negative for fatigue and unexpected weight change.   Respiratory: Negative for chest tightness and shortness of breath.    Cardiovascular: Negative for chest pain, palpitations and leg swelling.   Gastrointestinal: Negative for abdominal pain.   Musculoskeletal: Negative for arthralgias.   Neurological: Negative for dizziness, syncope, light-headedness and headaches.       Patient Active Problem List   Diagnosis    GERD (gastroesophageal reflux disease)    Chronic rhinitis    Hypothyroid    Aneurysm of heart (wall)    Benign neoplasm of skin of upper limb, including shoulder    Intermediate coronary syndrome    Phlebitis and thrombophlebitis of superficial veins of upper extremities    Simple chronic conjunctivitis    Supraventricular premature beats    Vascular disorder of skin    Chronic external jugular vein thrombosis    Long term current use of anticoagulant therapy    Magdiel's syndrome - Left Eye    Ptosis    Post-operative state    Chest pain at rest    Anxiety    CAD (coronary artery disease)    S/P CABG x 2, 2005    HTN (hypertension)    Hypercholesteremia    PAF (paroxysmal atrial fibrillation), onset 2002    Intercostal neuralgia    Pain    Radius and ulna distal fracture    History of lung cancer    Blurred vision, bilateral    Panlobular emphysema    Carotid artery disease    Atrial fibrillation with RVR    COPD (chronic obstructive pulmonary disease)    Uncontrolled atrial flutter with rvr    Anticoagulated on Coumadin    Allergic rhinitis    Hypoalbuminemia    History of smoking 10-25 pack years, 15 pack-years, quit 1994    Persistent cough for 3 weeks or longer, onset 12/2015    S/P CABG (coronary artery bypass graft)    Supraventricular bigeminy    Chest pain, unspecified    Allergic drug rash    Dysphagia    History of  colon polyps    Coronary artery disease involving autologous artery coronary bypass graft    Wrist arthritis    Esophageal mass    Gastritis    Esophageal lesion    Lesion of esophagus    Fatigue    Preoperative clearance    Grade II hemorrhoids    Cardiomyopathy     Patient is here for a chronic conditions follow up.    No visits with results within 1 Month(s) from this visit.   Latest known visit with results is:   Hospital Outpatient Visit on 05/30/2018   Component Date Value Ref Range Status    HR at rest 05/30/2018 62.0   Final    BloodpressdiaPeak 05/30/2018 78   Final    BloodpressdiaRest 05/30/2018 77   Final    BloodpresssysPeak 05/30/2018 159   Final    BloodpresssysRest 05/30/2018 141   Final    Exercise duration (sec) 05/30/2018 25   Final    Exercise duration (min) 05/30/2018 1  min Final    Estimated workload 05/30/2018 1.0  METS Final       C/o extreme fatigue, poor exercise tolerance since being put on b blocker  Objective:      Physical Exam   Constitutional: She is oriented to person, place, and time. She appears well-developed and well-nourished.   Cardiovascular: Normal rate and regular rhythm.    Pulses:       Carotid pulses are 1+ on the right side, and 2+ on the left side.  Pulmonary/Chest: Effort normal and breath sounds normal.   Musculoskeletal: She exhibits no edema.   Neurological: She is alert and oriented to person, place, and time.   Skin: Skin is warm and dry.   Psychiatric: She has a normal mood and affect.   Nursing note and vitals reviewed.      Assessment:       1. Essential hypertension    2. Constipation, unspecified constipation type    3. Carotid stenosis, left        Plan:       1. Essential hypertension  Side effects of toprol. Change to coreg for trial    2. Constipation, unspecified constipation type  Patient was counseled that these lifestyle changes can help prevent constipation:  · Diet. Eat a high-fiber diet, with fresh fruit and vegetables, and  reduce dairy intake, meats, and processed foods.  An over the counter fiber supplement is recommended such as Fiberchoice chewables or Metamucil.  · Fluids. It's important to get enough fluids each day. Drink plenty of water when you eat more fiber. If you are on diet that limits the amount of fluid you can have, talk about this with your health care provider.  · Regular exercise. Check with your health care provider first.  I also advised use of over the counter stool softeners like docusate as needed for persistent constipation.  OTC probiotics may also be of benefit like Align to help regulation.  If acutely constipated the patient was advised to use otc fleets enema(s) and/or magnesium citrate to relieve symptoms.      - docusate calcium (SURFAK) 240 mg capsule; Take 1 capsule (240 mg total) by mouth once daily.  Dispense: 90 capsule; Refill: 3    3. Carotid stenosis, left  Screen and treat as indicated:    - US Carotid Bilateral; Future    Reeval as scheduled

## 2018-07-07 ENCOUNTER — PATIENT MESSAGE (OUTPATIENT)
Dept: FAMILY MEDICINE | Facility: CLINIC | Age: 68
End: 2018-07-07

## 2018-07-09 ENCOUNTER — PATIENT MESSAGE (OUTPATIENT)
Dept: FAMILY MEDICINE | Facility: CLINIC | Age: 68
End: 2018-07-09

## 2018-07-18 RX ORDER — FLUTICASONE PROPIONATE AND SALMETEROL 50; 250 UG/1; UG/1
POWDER RESPIRATORY (INHALATION)
Qty: 3 EACH | Refills: 3 | Status: SHIPPED | OUTPATIENT
Start: 2018-07-18 | End: 2020-01-01

## 2018-07-27 ENCOUNTER — OFFICE VISIT (OUTPATIENT)
Dept: FAMILY MEDICINE | Facility: CLINIC | Age: 68
End: 2018-07-27
Payer: MEDICARE

## 2018-07-27 VITALS
DIASTOLIC BLOOD PRESSURE: 82 MMHG | HEART RATE: 82 BPM | SYSTOLIC BLOOD PRESSURE: 136 MMHG | TEMPERATURE: 98 F | WEIGHT: 140.44 LBS | HEIGHT: 66 IN | BODY MASS INDEX: 22.57 KG/M2

## 2018-07-27 DIAGNOSIS — K59.00 CONSTIPATION, UNSPECIFIED CONSTIPATION TYPE: ICD-10-CM

## 2018-07-27 DIAGNOSIS — J01.00 ACUTE NON-RECURRENT MAXILLARY SINUSITIS: Primary | ICD-10-CM

## 2018-07-27 DIAGNOSIS — Z87.19 S/P HEMORRHOIDECTOMY: ICD-10-CM

## 2018-07-27 DIAGNOSIS — I10 ESSENTIAL HYPERTENSION: ICD-10-CM

## 2018-07-27 DIAGNOSIS — Z98.890 S/P HEMORRHOIDECTOMY: ICD-10-CM

## 2018-07-27 PROCEDURE — 99214 OFFICE O/P EST MOD 30 MIN: CPT | Mod: S$PBB,,, | Performed by: PHYSICIAN ASSISTANT

## 2018-07-27 PROCEDURE — 99999 PR PBB SHADOW E&M-EST. PATIENT-LVL V: CPT | Mod: PBBFAC,,, | Performed by: PHYSICIAN ASSISTANT

## 2018-07-27 PROCEDURE — 99215 OFFICE O/P EST HI 40 MIN: CPT | Mod: PBBFAC,PO | Performed by: PHYSICIAN ASSISTANT

## 2018-07-27 RX ORDER — POLYETHYLENE GLYCOL 3350 17 G/17G
17 POWDER, FOR SOLUTION ORAL DAILY
Qty: 1530 G | Refills: 2 | Status: SHIPPED | OUTPATIENT
Start: 2018-07-27 | End: 2019-01-01

## 2018-07-27 RX ORDER — AMOXICILLIN AND CLAVULANATE POTASSIUM 400; 57 MG/5ML; MG/5ML
POWDER, FOR SUSPENSION ORAL
Qty: 100 ML | Refills: 0 | Status: SHIPPED | OUTPATIENT
Start: 2018-07-27 | End: 2018-09-19

## 2018-07-27 NOTE — PATIENT INSTRUCTIONS
Established High Blood Pressure    High blood pressure (hypertension) is a chronic disease. Often, healthcare providers dont know what causes it. But it can be caused by certain health conditions and medicines.  If you have high blood pressure, you may not have any symptoms. If you do have symptoms, they may include headache, dizziness, changes in your vision, chest pain, and shortness of breath. But even without symptoms, high blood pressure thats not treated raises your risk for heart attack and stroke. High blood pressure is a serious health risk and shouldnt be ignored.  A blood pressure reading is made up of two numbers: a higher number over a lower number. The top number is the systolic pressure. The bottom number is the diastolic pressure. A normal blood pressure is a systolic pressure of  less than 120 over a diastolic pressure of less than 80. You will see your blood pressure readings written together. For example, a person with a systolic pressure of 188 and a diastolic pressure of 78 will have 118/78 written in the medical record.  High blood pressure is when either the top number is 140 or higher, or the bottom number is 90 or higher. This must be the result when taking your blood pressure a number of times. The blood pressures between normal and high are called prehypertension.  Home care  If you have high blood pressure, you should do what is listed below to lower your blood pressure. If you are taking medicines for high blood pressure, these methods may reduce or end your need for medicines in the future.  · Begin a weight-loss program if you are overweight.  · Cut back on how much salt you get in your diet. Heres how to do this:  ¨ Dont eat foods that have a lot of salt. These include olives, pickles, smoked meats, and salted potato chips.  ¨ Dont add salt to your food at the table.  ¨ Use only small amounts of salt when cooking.  · Start an exercise program. Talk with your healthcare  provider about the type of exercise program that would be best for you. It doesn't have to be hard. Even brisk walking for 20 minutes 3 times a week is a good form of exercise.  · Dont take medicines that stimulate the heart. This includes many over-the-counter cold and sinus decongestant pills and sprays, as well as diet pills. Check the warnings about hypertension on the label. Before buying any over-the-counter medicines or supplements, always ask the pharmacist about the product's potential interaction with your high blood pressure and your high blood pressure medicines.  · Stimulants such as amphetamine or cocaine could be deadly for someone with high blood pressure. Never take these.  · Limit how much caffeine you get in your diet. Switch to caffeine-free products.  · Stop smoking. If you are a long-time smoker, this can be hard. Talk to your healthcare provider about medicines and nicotine replacement options to help you. Also, enroll in a stop-smoking program to make it more likely that you will quit for good.  · Learn how to handle stress. This is an important part of any program to lower blood pressure. Learn about relaxation methods like meditation, yoga, or biofeedback.  · If your provider prescribed medicines, take them exactly as directed. Missing doses may cause your blood pressure get out of control.  · If you miss a dose or doses, check with your healthcare provider or pharmacist about what to do.  · Consider buying an automatic blood pressure machine. Ask your provider for a recommendation. You can get one of these at most pharmacies.     The American Heart Association recommends the following guidelines for home blood pressure monitoring:  · Don't smoke or drink coffee for 30 minutes before taking your blood pressure.  · Go to the bathroom before the test.  · Relax for 5 minutes before taking the measurement.  · Sit with your back supported (don't sit on a couch or soft chair); keep your feet on  the floor uncrossed. Place your arm on a solid flat surface (like a table) with the upper part of the arm at heart level. Place the middle of the cuff directly above the eye of the elbow. Check the monitor's instruction manual for an illustration.  · Take multiple readings. When you measure, take 2 to 3 readings one minute apart and record all of the results.  · Take your blood pressure at the same time every day, or as your healthcare provider recommends.  · Record the date, time, and blood pressure reading.  · Take the record with you to your next medical appointment. If your blood pressure monitor has a built-in memory, simply take the monitor with you to your next appointment.  · Call your provider if you have several high readings. Don't be frightened by a single high blood pressure reading, but if you get several high readings, check in with your healthcare provider.  · Note: When blood pressure reaches a systolic (top number) of 180 or higher OR diastolic (bottom number) of 110 or higher, seek emergency medical treatment.  Follow-up care  You will need to see your healthcare provider regularly. This is to check your blood pressure and to make changes to your medicines. Make a follow-up appointment as directed. Bring the record of your home blood pressure readings to the appointment.  When to seek medical advice  Call your healthcare provider right away if any of these occur:  · Blood pressure reaches a systolic (upper number) of 180 or higher OR a diastolic (bottom number) of 110 or higher  · Chest pain or shortness of breath  · Severe headache  · Throbbing or rushing sound in the ears  · Nosebleed  · Sudden severe pain in your belly (abdomen)  · Extreme drowsiness, confusion, or fainting  · Dizziness or spinning sensation (vertigo)  · Weakness of an arm or leg or one side of the face  · You have problems speaking or seeing   Date Last Reviewed: 12/1/2016  © 7038-1027 [employer]. [address] [place] [address]  [place] [address]. All rights reserved. This information is not intended as a substitute for professional medical care. Always follow your healthcare professional's instructions.

## 2018-07-27 NOTE — PROGRESS NOTES
Subjective:       Patient ID: Steph Lira is a 67 y.o. female.    Chief Complaint: Follow-up    Mrs. Lira is a 67 year old female who presents to clinic for 2 week follow up on hypertension. Toprol was changed to coreg at last visit due to significant fatigue. She states coreg caused worsening symptoms and was thus discontinued. She denies tachycardia or palpitations. BP is well controlled. Cardiology f/u is scheduled for next month with Dr. Wei. Last stress test 5/18 was negative for ischemia. She is on flecainide.     She also complains of 3 day history of nasal congestion, rhinorrhea, head fullness, headache, post-nasal drip, and thick nasal discharge. She denies fever or chills. She admits to occasional cough, but no sob. She recently had a procedure on her esophagus and is recovering well with some difficulty swallowing.     She is concerned that constipation has persisted and she is having difficulty passing stool since her hemorrhoidectomy in March. She is requesting f/u with Dr. Mandujano.      Review of Systems   Constitutional: Negative for chills, fatigue and fever.   HENT: Positive for congestion, postnasal drip, rhinorrhea and sinus pressure. Negative for ear discharge, ear pain, nosebleeds, sinus pain, sneezing, sore throat, trouble swallowing and voice change.    Eyes: Negative.    Respiratory: Positive for cough. Negative for chest tightness, shortness of breath and wheezing.    Cardiovascular: Negative for chest pain, palpitations and leg swelling.   Gastrointestinal: Negative for abdominal pain, constipation, diarrhea, nausea and vomiting.        Dysphagia   Musculoskeletal: Negative for arthralgias and myalgias.   Allergic/Immunologic: Negative for immunocompromised state.   Neurological: Positive for headaches. Negative for dizziness, syncope, weakness and light-headedness.   Hematological: Negative for adenopathy.       Objective:      Vitals:    07/27/18 0916   BP: 136/82   Pulse:     Temp:      Physical Exam   Constitutional: She is oriented to person, place, and time. She appears well-developed.   Thin framed female.   HENT:   Head: Normocephalic and atraumatic.   Right Ear: Hearing, tympanic membrane, external ear and ear canal normal.   Left Ear: Hearing, tympanic membrane, external ear and ear canal normal.   Nose: Mucosal edema and rhinorrhea present. Right sinus exhibits no maxillary sinus tenderness and no frontal sinus tenderness. Left sinus exhibits no maxillary sinus tenderness and no frontal sinus tenderness.   Mouth/Throat: Oropharynx is clear and moist and mucous membranes are normal. No oropharyngeal exudate, posterior oropharyngeal edema or posterior oropharyngeal erythema.   Eyes: Conjunctivae, EOM and lids are normal. Pupils are equal, round, and reactive to light.   Cardiovascular: Normal rate, regular rhythm, normal heart sounds and intact distal pulses.    Pulmonary/Chest: Effort normal and breath sounds normal.   Lymphadenopathy:     She has no cervical adenopathy.   Neurological: She is alert and oriented to person, place, and time.   Skin: Skin is warm.       Assessment:       1. Acute non-recurrent maxillary sinusitis    2. Constipation, unspecified constipation type    3. S/P hemorrhoidectomy    4. Essential hypertension        Plan:       Acute non-recurrent maxillary sinusitis  -     Likely viral sinusitis at this time; however, recommend wait and watch of symptoms at this time and begin abx if sx persist for >7-10 days. amoxicillin-clavulanate (AUGMENTIN) 400-57 mg/5 mL SusR; 10 ml PO twice daily 10 days  Dispense: 100 mL; Refill: 0.    - Take antibiotics with food.  Increase fluid intake.  Call the clinic if symptoms worsen, new symptoms develop or if you are not any better after completion of your antibiotics.      Constipation, unspecified constipation type  -     polyethylene glycol (GLYCOLAX) 17 gram/dose powder; Take 17 g by mouth once daily.  Dispense: 1530  g; Refill: 2  -     Ambulatory referral to General Surgery    S/P hemorrhoidectomy  -     Ambulatory referral to General Surgery    Essential hypertension         - Vitals stable at this time no on BB. Continue current medication and follow up with Cardiology as scheduled      Patient readiness: acceptance and barriers:none    During the course of the visit the patient was educated and counseled about the following:     Hypertension:   Medication: no change.    Goals: Hypertension: Reduce Blood Pressure    Did patient meet goals/outcomes: No    The following self management tools provided: declined    Patient Instructions (the written plan) was given to the patient/family.     Time spent with patient: 15 minutes

## 2018-08-01 ENCOUNTER — CLINICAL SUPPORT (OUTPATIENT)
Dept: CARDIOLOGY | Facility: CLINIC | Age: 68
End: 2018-08-01
Attending: INTERNAL MEDICINE
Payer: MEDICARE

## 2018-08-01 VITALS — WEIGHT: 140.44 LBS | HEIGHT: 66 IN | BODY MASS INDEX: 22.57 KG/M2

## 2018-08-01 DIAGNOSIS — I25.5 ISCHEMIC CARDIOMYOPATHY: ICD-10-CM

## 2018-08-01 PROCEDURE — 99999 PR PBB SHADOW E&M-EST. PATIENT-LVL I: CPT | Mod: PBBFAC,,,

## 2018-08-01 PROCEDURE — 93306 TTE W/DOPPLER COMPLETE: CPT | Mod: PBBFAC,PO | Performed by: INTERNAL MEDICINE

## 2018-08-01 PROCEDURE — 99211 OFF/OP EST MAY X REQ PHY/QHP: CPT | Mod: PBBFAC,PO,25

## 2018-08-02 LAB
ASCENDING AORTA: 2.86 CM
AV MEAN GRADIENT: 1.36 MMHG
AV PEAK GRADIENT: 2.19 MMHG
AV VALVE AREA: 2.38 CM2
BSA FOR ECHO PROCEDURE: 1.72 M2
CV ECHO LV RWT: 0.32 CM
DOP CALC AO PEAK VEL: 0.74 M/S
DOP CALC AO VTI: 15.93 CM
DOP CALC LVOT AREA: 3.2 CM2
DOP CALC LVOT DIAMETER: 2.02 CM
DOP CALC LVOT STROKE VOLUME: 37.92 CM3
DOP CALCLVOT PEAK VEL VTI: 11.84 CM
E WAVE DECELERATION TIME: 120.27 MSEC
E/A RATIO: 1.05
E/E' RATIO: 11.09
ECHO LV POSTERIOR WALL: 0.72 CM (ref 0.6–1.1)
FRACTIONAL SHORTENING: 12 % (ref 28–44)
INTERVENTRICULAR SEPTUM: 0.59 CM (ref 0.6–1.1)
IVRT: 0.16 MSEC
LA MAJOR: 4.76 CM
LA MINOR: 4.76 CM
LA WIDTH: 3.23 CM
LEFT ATRIUM SIZE: 3.02 CM
LEFT ATRIUM VOLUME INDEX: 22.9 ML/M2
LEFT ATRIUM VOLUME: 39.47 CM3
LEFT INTERNAL DIMENSION IN SYSTOLE: 3.62 CM (ref 2.1–4)
LEFT VENTRICLE MASS INDEX: 43.8 G/M2
LEFT VENTRICULAR INTERNAL DIMENSION IN DIASTOLE: 4.11 CM (ref 3.5–6)
LEFT VENTRICULAR MASS: 75.31 G
LV LATERAL E/E' RATIO: 8.71
LV SEPTAL E/E' RATIO: 15.25
MV PEAK A VEL: 0.58 M/S
MV PEAK E VEL: 0.61 M/S
MV STENOSIS PRESSURE HALF TIME: 34.88 MS
MV VALVE AREA P 1/2 METHOD: 6.31 CM2
PISA TR MAX VEL: 1.48 M/S
PULM VEIN S/D RATIO: 0.62
PV PEAK D VEL: 0.5 M/S
PV PEAK S VEL: 0.31 M/S
RA MAJOR: 4.18 CM
RA PRESSURE: 3 MMHG
RA WIDTH: 2.3 CM
SINUS: 2.85 CM
STJ: 2.96 CM
TDI LATERAL: 0.07
TDI SEPTAL: 0.04
TDI: 0.06
TR MAX PG: 8.76 MMHG
TRICUSPID ANNULAR PLANE SYSTOLIC EXCURSION: 0.01 CM
TV REST PULMONARY ARTERY PRESSURE: 11.76 MMHG

## 2018-08-05 ENCOUNTER — TELEPHONE (OUTPATIENT)
Dept: GASTROENTEROLOGY | Facility: CLINIC | Age: 68
End: 2018-08-05

## 2018-08-05 DIAGNOSIS — Z85.01 HISTORY OF ESOPHAGEAL CANCER: Primary | ICD-10-CM

## 2018-08-15 ENCOUNTER — TELEPHONE (OUTPATIENT)
Dept: GASTROENTEROLOGY | Facility: CLINIC | Age: 68
End: 2018-08-15

## 2018-08-15 RX ORDER — FLUCONAZOLE 200 MG/1
200 TABLET ORAL DAILY
Qty: 14 TABLET | Refills: 0 | Status: SHIPPED | OUTPATIENT
Start: 2018-08-15 | End: 2018-08-29

## 2018-08-15 NOTE — TELEPHONE ENCOUNTER
Spoke with patient. EGD scheduled for 10/4 at 11a. Reviewed prep instructions. Ms Jinth verbalized understanding.       She wants you to send in Rx for Diflucan

## 2018-08-15 NOTE — TELEPHONE ENCOUNTER
Message   Received: Today   Message Contents   MD Adriane Hudson MA   Caller: Unspecified (Today,  5:42 PM)             Done.   Robbie Gonzales MD

## 2018-08-22 ENCOUNTER — OFFICE VISIT (OUTPATIENT)
Dept: SURGERY | Facility: CLINIC | Age: 68
End: 2018-08-22
Payer: MEDICARE

## 2018-08-22 VITALS — BODY MASS INDEX: 22.38 KG/M2 | WEIGHT: 138.69 LBS

## 2018-08-22 DIAGNOSIS — K59.00 CONSTIPATION, UNSPECIFIED CONSTIPATION TYPE: Primary | ICD-10-CM

## 2018-08-22 PROCEDURE — 99213 OFFICE O/P EST LOW 20 MIN: CPT | Mod: S$PBB,,, | Performed by: SURGERY

## 2018-08-22 PROCEDURE — 99213 OFFICE O/P EST LOW 20 MIN: CPT | Mod: PBBFAC,PO | Performed by: SURGERY

## 2018-08-22 PROCEDURE — 99999 PR PBB SHADOW E&M-EST. PATIENT-LVL III: CPT | Mod: PBBFAC,,, | Performed by: SURGERY

## 2018-08-22 NOTE — LETTER
August 31, 2018      Licha Anglin PA-C  [address]  [place] [address]           [facility] - General Surgery  [address]  [place] [address]  Phone: [phone]          Patient: Steph Lira   MR Number: [id number]   YOB: 1950   Date of Visit: 8/22/2018       Dear Licha Anglin:    Thank you for referring Steph Lira to me for evaluation. Attached you will find relevant portions of my assessment and plan of care.    If you have questions, please do not hesitate to call me. I look forward to following Steph Lira along with you.    Sincerely,    Klaus Mandujano MD    Enclosure  CC:  No Recipients    If you would like to receive this communication electronically, please contact [email or web address] or [phone] to request more information on [employer] access.    For providers and/or their staff who would like to refer a patient to [facility], please contact us through our one-stop-shop provider referral line, [facility], at [phone].    If you feel you have received this communication in error or would no longer like to receive these types of communications, please e-mail [email or web address]

## 2018-08-24 ENCOUNTER — OFFICE VISIT (OUTPATIENT)
Dept: CARDIOLOGY | Facility: CLINIC | Age: 68
End: 2018-08-24
Payer: MEDICARE

## 2018-08-24 VITALS
BODY MASS INDEX: 22.18 KG/M2 | SYSTOLIC BLOOD PRESSURE: 140 MMHG | HEART RATE: 88 BPM | WEIGHT: 138 LBS | OXYGEN SATURATION: 96 % | HEIGHT: 66 IN | DIASTOLIC BLOOD PRESSURE: 72 MMHG

## 2018-08-24 DIAGNOSIS — I48.0 PAF (PAROXYSMAL ATRIAL FIBRILLATION): ICD-10-CM

## 2018-08-24 DIAGNOSIS — I42.9 CARDIOMYOPATHY, UNSPECIFIED TYPE: ICD-10-CM

## 2018-08-24 DIAGNOSIS — I25.810 CORONARY ARTERY DISEASE INVOLVING AUTOLOGOUS ARTERY CORONARY BYPASS GRAFT WITHOUT ANGINA PECTORIS: ICD-10-CM

## 2018-08-24 DIAGNOSIS — I48.0 PAF (PAROXYSMAL ATRIAL FIBRILLATION): Primary | ICD-10-CM

## 2018-08-24 DIAGNOSIS — Z95.1 S/P CABG X 2: Primary | ICD-10-CM

## 2018-08-24 PROCEDURE — 99213 OFFICE O/P EST LOW 20 MIN: CPT | Mod: S$PBB,,, | Performed by: INTERNAL MEDICINE

## 2018-08-24 PROCEDURE — 93010 ELECTROCARDIOGRAM REPORT: CPT | Mod: S$PBB,,, | Performed by: INTERNAL MEDICINE

## 2018-08-24 PROCEDURE — 99999 PR PBB SHADOW E&M-EST. PATIENT-LVL III: CPT | Mod: PBBFAC,,, | Performed by: INTERNAL MEDICINE

## 2018-08-24 PROCEDURE — 93005 ELECTROCARDIOGRAM TRACING: CPT | Mod: PBBFAC,PO | Performed by: INTERNAL MEDICINE

## 2018-08-24 PROCEDURE — 99213 OFFICE O/P EST LOW 20 MIN: CPT | Mod: PBBFAC,PO,25 | Performed by: INTERNAL MEDICINE

## 2018-08-24 NOTE — PROGRESS NOTES
[facility]    CC:  Follow-up visit  Chief Complaint   Patient presents with    Follow-up     5mos       Patient ID: Steph Lira is a 68 y.o. female with a past medical history of paroxysmal atrial fibrillation, coronary artery bypass grafting, heart failure with moderately reduced ejection fraction,  HPI  Patient is feeling better today.  She did not tolerate her beta-blocker.  The metoprolol was discontinued and then she was tried on Coreg.  She did not tolerate the Coreg either.  In the past we had to take her off the lisinopril as well since she did not tolerate that medication either.  She is having trouble swallowing food.  She is under a GI physician and has had multiple endoscopies for a tumor.      Past Medical History:   Diagnosis Date    *Atrial fibrillation     and Hx PSVT    Allergic drug rash 12/28/2016    Allergy     chronic     Arthritis     fingers    Benign tumor of throat     Bronchitis March 2013    CAD (coronary artery disease) 2005    CABGx2 in 2005 and 2 MI after with 4 stents    Cancer 1996    small cell lung cancer s/p resection of 1/3 lung, 5 ribs and muscle mass then had chemo and radiation    Cataract     OD     CHF (congestive heart failure) past Hx    Chronic rhinitis     Clot past Hx    external jugular, now stented, occluded, had phlebitis; now revascularized    Colon polyp     COPD (chronic obstructive pulmonary disease)     no oxygen or nebulizers    COPD exacerbation 5/8/13    improved 5/14/13 after steroid pack,antibiotics    Former smoker     GERD (gastroesophageal reflux disease)     Heart block     Hyperlipidemia     Hypertension     pt states does not have //    MI (myocardial infarction) 2005    Posterior vitreous detachment of left eye     Thyroid disease      Past Surgical History:   Procedure Laterality Date    ADENOIDECTOMY      APPENDECTOMY      BACK SURGERY      lumbar    BREAST BIOPSY Left     benign    CARDIAC SURGERY       CATARACT EXTRACTION Left     OS    CATARACT EXTRACTION, BILATERAL      left eye only    CHOLECYSTECTOMY      COLONOSCOPY  2012    repeat in 5 years    CORONARY ARTERY BYPASS GRAFT      2 vessel    EYE SURGERY  Dec 2012    ptosis repair    FIRST RIB REMOVAL   with lung resection    HYSTERECTOMY      LUNG LOBECTOMY      left lung for cancer    SHOULDER SURGERY      scapular entrapment after lung surgery, needed 5 ribs removed left side    SYMPOTHATIC NERVE LEFT SIDE       with lung surgery    TONSILLECTOMY      TONSILLECTOMY, ADENOIDECTOMY, BILATERAL MYRINGOTOMY AND TUBES      UPPER GASTROINTESTINAL ENDOSCOPY  2016    Dr. Koo    VASCULAR SURGERY      stents place in jugualr vein     Social History     Socioeconomic History    Marital status:      Spouse name: Not on file    Number of children: Not on file    Years of education: Not on file    Highest education level: Not on file   Social Needs    Financial resource strain: Not on file    Food insecurity - worry: Not on file    Food insecurity - inability: Not on file    Transportation needs - medical: Not on file    Transportation needs - non-medical: Not on file   Occupational History    Occupation: retired   Tobacco Use    Smoking status: Former Smoker     Packs/day: 2.00     Years: 20.00     Pack years: 40.00     Types: Cigarettes     Last attempt to quit: 1994     Years since quittin.6    Smokeless tobacco: Never Used   Substance and Sexual Activity    Alcohol use: No    Drug use: No    Sexual activity: Yes     Partners: Male   Other Topics Concern    Are you pregnant or think you may be? No    Breast-feeding No   Social History Narrative    Not on file     Family History   Problem Relation Age of Onset    Allergic rhinitis Father     Cancer Father     Hypertension Father     Allergies Father     Allergic rhinitis Son     Asthma Son     Stroke Mother     Allergies  Mother     Allergic rhinitis Sister     Asthma Sister     Diverticulitis Brother     No Known Problems Daughter     No Known Problems Maternal Aunt     No Known Problems Maternal Uncle     No Known Problems Paternal Aunt     No Known Problems Paternal Uncle     Colon polyps Maternal Grandmother          of colon blockage    No Known Problems Maternal Grandfather     No Known Problems Paternal Grandmother     No Known Problems Paternal Grandfather     No Known Problems Brother     No Known Problems Sister     Angioedema Neg Hx     Eczema Neg Hx     Immunodeficiency Neg Hx     Urticaria Neg Hx     Skin cancer Neg Hx     Amblyopia Neg Hx     Blindness Neg Hx     Cataracts Neg Hx     Diabetes Neg Hx     Glaucoma Neg Hx     Macular degeneration Neg Hx     Retinal detachment Neg Hx     Strabismus Neg Hx     Thyroid disease Neg Hx     Colon cancer Neg Hx     Melanoma Neg Hx        Review of patient's allergies indicates:   Allergen Reactions    Ciprofloxacin Itching    Coreg [carvedilol]      Low energy    Doxycycline Other (See Comments)     Patient had a rxn with the sun despite wearing spf 30    Metoprolol      Low energy       Medication List with Changes/Refills   Current Medications    ADVAIR DISKUS 250-50 MCG/DOSE DISKUS INHALER    USE 1 INHALATION TWICE A DAY (RINSE MOUTH AFTER USE TO PREVENT THRUSH)    ALBUTEROL (ACCUNEB) 1.25 MG/3 ML NEBU    USE 1 VIAL (3 ML) VIA NEBULIZER EVERY 6 HOURS AS NEEDED    ALBUTEROL (PROVENTIL HFA) 90 MCG/ACTUATION INHALER    Inhale 2 puffs into the lungs every 6 (six) hours as needed for Wheezing.    AMOXICILLIN-CLAVULANATE (AUGMENTIN) 400-57 MG/5 ML SUSR    10 ml PO twice daily 10 days    ATORVASTATIN (LIPITOR) 40 MG TABLET    Take 1 tablet (40 mg total) by mouth once daily.    BISACODYL (DULCOLAX) 5 MG EC TABLET    Take 1 tablet (5 mg total) by mouth daily as needed for Constipation.    CALCIUM CITRATE (CITRACAL) 500 MG TBEF    Take 500 mg by  mouth. 1 Tablet, Effervescent Oral  daily    CETIRIZINE (ZYRTEC) 10 MG TABLET    TAKE 1 TABLET EVERY EVENING    DESOXIMETASONE (TOPICORT) 0.25 % CREAM    Apply topically 2 (two) times daily.    DICLOFENAC SODIUM (VOLTAREN) 1 % GEL    Apply 2 g topically once daily.    DIPHENHYDRAMINE (BENADRYL) 25 MG CAPSULE    Take 25 mg by mouth every 6 (six) hours as needed for Itching.    DOCUSATE CALCIUM (SURFAK) 240 MG CAPSULE    Take 1 capsule (240 mg total) by mouth once daily.    ELIQUIS 5 MG TAB    TAKE 1 TABLET TWICE A DAY    FLECAINIDE (TAMBOCOR) 50 MG TAB    Take 2 tablets (100 mg total) by mouth every 12 (twelve) hours.    FLUCONAZOLE (DIFLUCAN) 200 MG TAB    Take 1 tablet (200 mg total) by mouth once daily. for 14 days    FLUTICASONE (FLONASE) 50 MCG/ACTUATION NASAL SPRAY    1 spray by Each Nare route once daily.    KETOCONAZOLE (NIZORAL) 2 % SHAMPOO    Wash hair with medicated shampoo at least 2x/week - let sit on scalp at least 5 minutes prior to rinsing    LEVOTHYROXINE (SYNTHROID) 88 MCG TABLET    Take 1 tablet (88 mcg total) by mouth once daily.    MONTELUKAST (SINGULAIR) 10 MG TABLET    Take 1 tablet (10 mg total) by mouth every evening.    MULTIVITAMIN-MINERALS-LUTEIN (CENTRUM SILVER) TAB    Take 1 tablet by mouth once daily. 1 Tablet Oral Every day    PANTOPRAZOLE (PROTONIX) 40 MG TABLET    Take 1 tablet (40 mg total) by mouth once daily. Take 30 minutes prior to breakfast    POLYETHYLENE GLYCOL (GLYCOLAX) 17 GRAM/DOSE POWDER    Take 17 g by mouth once daily.    RETIN-A 0.05 % CREAM    APPLY THIN FILM TO FACE AT NIGHT AFTER MOISTURIZING    SPIRIVA WITH HANDIHALER 18 MCG INHALATION CAPSULE    INHALE THE CONTENTS OF 1 CAPSULE DAILY    TRIAMCINOLONE ACETONIDE 0.1% (KENALOG) 0.1 % CREAM    Apply topically 2 (two) times daily.       Review of Systems  Constitution: Denies chills, fever, and sweats.  HENT: Denies headaches or blurry vision.  Cardiovascular: Denies chest pain or irregular heart beat.  Respiratory:  "Denies cough or shortness of breath.  Gastrointestinal: Denies abdominal pain, nausea, or vomiting.  Musculoskeletal: Denies muscle cramps.  Neurological: Denies dizziness or focal weakness.  Psychiatric/Behavioral: Normal mental status.  Hematologic/Lymphatic: Denies bleeding problem or easy bruising/bleeding.  Skin: Denies rash or suspicious lesions    Physical Examination  BP (!) 140/72 (BP Location: Left arm, Patient Position: Sitting)   Pulse 88   Ht 5' 6" (1.676 m)   Wt 62.6 kg (138 lb 0.1 oz)   SpO2 96%   BMI 22.28 kg/m²     Constitutional: No acute distress, conversant  HEENT: Sclera anicteric, Pupils equal, round and reactive to light, extraocular motions intact, Oropharynx clear  Neck: No JVD, no carotid bruits  Cardiovascular: regular rate and rhythm, no murmur, rubs or gallops, normal S1/S2  Pulmonary: Clear to auscultation bilaterally  Abdominal: Abdomen soft, nontender, nondistended, positive bowel sounds  Extremities: No lower extremity edema,   Pulses:  Carotid pulses are 2+ on the right side, and 2+ on the left side.  Radial pulses are 2+ on the right side, and 2+ on the left side.      Skin: No ecchymosis, erythema, or ulcers  Psych: Alert and oriented x 3, appropriate affect  Neuro: CNII-XII intact, no focal deficits    Labs:  Most Recent Data  CBC:   Lab Results   Component Value Date    WBC 6.90 03/14/2018    HGB 12.4 03/14/2018    HCT 37.0 03/14/2018     03/14/2018    MCV 91 03/14/2018    RDW 12.6 03/14/2018     BMP:   Lab Results   Component Value Date     (L) 03/14/2018    K 3.9 03/14/2018     03/14/2018    CO2 27 03/14/2018    BUN 6 (L) 03/14/2018    CREATININE 0.7 03/14/2018    GLU 86 03/14/2018    CALCIUM 9.4 03/14/2018    MG 2.0 09/30/2016     LFTS;   Lab Results   Component Value Date    PROT 6.6 03/14/2018    ALBUMIN 3.5 03/14/2018    BILITOT 0.6 03/14/2018    AST 20 03/14/2018    ALKPHOS 70 03/14/2018    ALT 15 03/14/2018     COAGS:   Lab Results   Component " Value Date    INR 2.7 07/11/2017     FLP:   Lab Results   Component Value Date    CHOL 160 01/18/2018    HDL 57 01/18/2018    LDLCALC 79.2 01/18/2018    TRIG 119 01/18/2018    CHOLHDL 35.6 01/18/2018     CARDIAC:   Lab Results   Component Value Date    TROPONINI 0.008 04/26/2017    BNP 91 02/28/2018       Imaging:    EKG:  Normal sinus rhythm.    Echo:Conclusion     · Mild regurgitation is present in the aortic valve..  · Septal wall has abnormal motion consistent with post-operative status.  · Mitral valve shows mild regurgitation.  · Left ventricle ejection fraction is mildly decreased at 40-45%  · Grade I (mild) left ventricular diastolic dysfunction consistent with impaired relaxation.  · LA pressure is normal.  · RV systolic function is normal.  · Left atrium is moderately dilated.  · Right atrium is moderately dilated.  · Normal central venous pressure (3 mm Hg).  · Tricuspid valve shows mild regurgitation.         Stress Testing:  Conclusion     · Perfusion scan is negative for ischemia or infarct  · EF-57%          I have personally reviewed these images and echo data    Assessment/Plan:  Steph was seen today for follow-up.    Diagnoses and all orders for this visit:    S/P CABG x 2, 2005    PAF (paroxysmal atrial fibrillation)  -     EKG 12-lead    Coronary artery disease involving autologous artery coronary bypass graft without angina pectoris    Cardiomyopathy, unspecified type         She continues to be on anticoagulation with Eliquis for stroke prophylaxis.  Unfortunately she has not tolerated any beta-blockers.  I would refer her to Dr.Abi Ambriz for for further evaluation and management of her atrial fibrillation.  She currently remains on flecainide 100 mg twice a day.  We tried to reduce the dose of flecainide to 50 mg but she went back into atrial fibrillation and had to be electrically cardioverted.      Henry Wei MD,MRCP,RPVI,FACC,FSCAI.  Interventional Cardiology   Phone [phone]

## 2018-08-28 ENCOUNTER — OUTSIDE PLACE OF SERVICE (OUTPATIENT)
Dept: CARDIOLOGY | Facility: CLINIC | Age: 68
End: 2018-08-28
Payer: MEDICARE

## 2018-08-28 PROCEDURE — 93306 TTE W/DOPPLER COMPLETE: CPT | Mod: 26,,, | Performed by: INTERNAL MEDICINE

## 2018-08-29 ENCOUNTER — OUTSIDE PLACE OF SERVICE (OUTPATIENT)
Dept: CARDIOLOGY | Facility: CLINIC | Age: 68
End: 2018-08-29
Payer: MEDICARE

## 2018-08-29 PROCEDURE — 99214 OFFICE O/P EST MOD 30 MIN: CPT | Mod: ,,, | Performed by: INTERNAL MEDICINE

## 2018-08-30 ENCOUNTER — TELEPHONE (OUTPATIENT)
Dept: GASTROENTEROLOGY | Facility: CLINIC | Age: 68
End: 2018-08-30

## 2018-08-30 ENCOUNTER — PATIENT MESSAGE (OUTPATIENT)
Dept: ADMINISTRATIVE | Facility: OTHER | Age: 68
End: 2018-08-30

## 2018-08-30 ENCOUNTER — TELEPHONE (OUTPATIENT)
Dept: CARDIOLOGY | Facility: CLINIC | Age: 68
End: 2018-08-30

## 2018-08-30 DIAGNOSIS — I48.0 PAF (PAROXYSMAL ATRIAL FIBRILLATION): Primary | ICD-10-CM

## 2018-08-30 NOTE — TELEPHONE ENCOUNTER
----- Message from Camilla Padilla sent at 8/30/2018 11:30 AM CDT -----  Contact: Patient  Type: Needs Medical Advice    Who Called:  Patient  Symptoms (please be specific):  na  How long has patient had these symptoms:  lucia  Pharmacy name and phone #:  lucia  Best Call Back Number:   Additional Information: Calling to request another referral for a ELECTROPHYSIOLOGY. The Doctor who she was referred to can't see her until October and she said that's too far away. Please advise.

## 2018-08-30 NOTE — TELEPHONE ENCOUNTER
Called and spoke twih Ms. Choi, She stated that she cannot wait til October to see Dr. Lawson. I asked if she would like to see Dr. Covarrubias in [place]. She stated that would be fine. I offered her an appt on 9/19/2018 @ 10a. She accepted. No further issues noted.

## 2018-08-31 NOTE — PROGRESS NOTES
Subjective:       Patient ID: Steph Lira is a 68 y.o. female.    Chief Complaint: Establish Care (problems with constipation)      HPI 68-year-old female known to me from previous hemorrhoidectomy earlier this year.  She complains of some constipation.  She says her stools are small.  She has not had any nausea or vomiting.  She does not have distinct anal pain.  She has not had any bleeding.  She has no more problems with hemorrhoids.    Past Medical History:   Diagnosis Date    *Atrial fibrillation     and Hx PSVT    Allergic drug rash 12/28/2016    Allergy     chronic     Arthritis     fingers    Benign tumor of throat     Bronchitis March 2013    CAD (coronary artery disease) 2005    CABGx2 in 2005 and 2 MI after with 4 stents    Cancer 1996    small cell lung cancer s/p resection of 1/3 lung, 5 ribs and muscle mass then had chemo and radiation    Cataract     OD     CHF (congestive heart failure) past Hx    Chronic rhinitis     Clot past Hx    external jugular, now stented, occluded, had phlebitis; now revascularized    Colon polyp     COPD (chronic obstructive pulmonary disease)     no oxygen or nebulizers    COPD exacerbation 5/8/13    improved 5/14/13 after steroid pack,antibiotics    Former smoker     GERD (gastroesophageal reflux disease)     Heart block     Hyperlipidemia     Hypertension     pt states does not have //    MI (myocardial infarction) 2005    Posterior vitreous detachment of left eye     Thyroid disease      Past Surgical History:   Procedure Laterality Date    ADENOIDECTOMY      APPENDECTOMY      BACK SURGERY      lumbar    BREAST BIOPSY Left     benign    CARDIAC SURGERY      CATARACT EXTRACTION Left     OS    CATARACT EXTRACTION, BILATERAL      left eye only    CHOLECYSTECTOMY      COLONOSCOPY  03/2012    repeat in 5 years    CORONARY ARTERY BYPASS GRAFT  2005    2 vessel    EYE SURGERY  Dec 2012    ptosis repair    FIRST RIB REMOVAL  1996 with lung  resection    HYSTERECTOMY      LUNG LOBECTOMY  1996    left lung for cancer    SHOULDER SURGERY  2010,2011    scapular entrapment after lung surgery, needed 5 ribs removed left side    SYMPOTHATIC NERVE LEFT SIDE      1996 with lung surgery    TONSILLECTOMY      TONSILLECTOMY, ADENOIDECTOMY, BILATERAL MYRINGOTOMY AND TUBES      UPPER GASTROINTESTINAL ENDOSCOPY  05/2016    Dr. Koo    VASCULAR SURGERY      stents place in jugualr vein         Current Outpatient Medications:     ADVAIR DISKUS 250-50 mcg/dose diskus inhaler, USE 1 INHALATION TWICE A DAY (RINSE MOUTH AFTER USE TO PREVENT THRUSH), Disp: 3 each, Rfl: 3    albuterol (ACCUNEB) 1.25 mg/3 mL Nebu, USE 1 VIAL (3 ML) VIA NEBULIZER EVERY 6 HOURS AS NEEDED, Disp: 90 mL, Rfl: 2    albuterol (PROVENTIL HFA) 90 mcg/actuation inhaler, Inhale 2 puffs into the lungs every 6 (six) hours as needed for Wheezing., Disp: 3 Inhaler, Rfl: 4    amoxicillin-clavulanate (AUGMENTIN) 400-57 mg/5 mL SusR, 10 ml PO twice daily 10 days, Disp: 100 mL, Rfl: 0    atorvastatin (LIPITOR) 40 MG tablet, Take 1 tablet (40 mg total) by mouth once daily., Disp: 90 tablet, Rfl: 3    bisacodyl (DULCOLAX) 5 mg EC tablet, Take 1 tablet (5 mg total) by mouth daily as needed for Constipation., Disp: 30 tablet, Rfl: 11    CALCIUM CITRATE (CITRACAL) 500 mg TbEF, Take 500 mg by mouth. 1 Tablet, Effervescent Oral  daily, Disp: , Rfl:     cetirizine (ZYRTEC) 10 MG tablet, TAKE 1 TABLET EVERY EVENING, Disp: 90 tablet, Rfl: 2    desoximetasone (TOPICORT) 0.25 % cream, Apply topically 2 (two) times daily., Disp: 180 g, Rfl: 3    diclofenac sodium (VOLTAREN) 1 % Gel, Apply 2 g topically once daily., Disp: 100 g, Rfl: prn    diphenhydrAMINE (BENADRYL) 25 mg capsule, Take 25 mg by mouth every 6 (six) hours as needed for Itching., Disp: , Rfl:     docusate calcium (SURFAK) 240 mg capsule, Take 1 capsule (240 mg total) by mouth once daily., Disp: 90 capsule, Rfl: 3    montelukast  (SINGULAIR) 10 mg tablet, Take 1 tablet (10 mg total) by mouth every evening., Disp: 90 tablet, Rfl: 3    multivitamin-minerals-lutein (CENTRUM SILVER) Tab, Take 1 tablet by mouth once daily. 1 Tablet Oral Every day, Disp: , Rfl:     pantoprazole (PROTONIX) 40 MG tablet, Take 1 tablet (40 mg total) by mouth once daily. Take 30 minutes prior to breakfast, Disp: 90 tablet, Rfl: 3    polyethylene glycol (GLYCOLAX) 17 gram/dose powder, Take 17 g by mouth once daily., Disp: 1530 g, Rfl: 2    RETIN-A 0.05 % cream, APPLY THIN FILM TO FACE AT NIGHT AFTER MOISTURIZING, Disp: 45 g, Rfl: 3    SPIRIVA WITH HANDIHALER 18 mcg inhalation capsule, INHALE THE CONTENTS OF 1 CAPSULE DAILY, Disp: 90 capsule, Rfl: 3    ELIQUIS 5 mg Tab, TAKE 1 TABLET TWICE A DAY, Disp: 180 tablet, Rfl: 3    flecainide (TAMBOCOR) 50 MG Tab, Take 2 tablets (100 mg total) by mouth every 12 (twelve) hours. (Patient taking differently: Take 50 mg by mouth once daily. ), Disp: 120 tablet, Rfl: 11    fluticasone (FLONASE) 50 mcg/actuation nasal spray, 1 spray by Each Nare route once daily., Disp: 3 Bottle, Rfl: 3    ketoconazole (NIZORAL) 2 % shampoo, Wash hair with medicated shampoo at least 2x/week - let sit on scalp at least 5 minutes prior to rinsing, Disp: 120 mL, Rfl: 5    levothyroxine (SYNTHROID) 88 MCG tablet, Take 1 tablet (88 mcg total) by mouth once daily., Disp: 90 tablet, Rfl: 3    triamcinolone acetonide 0.1% (KENALOG) 0.1 % cream, Apply topically 2 (two) times daily., Disp: 1 Tube, Rfl: 11  No current facility-administered medications for this visit.     Facility-Administered Medications Ordered in Other Visits:     lidocaine  20 mg/mL (2%) 20 mg/mL (2 %) injection, , , ,     Review of patient's allergies indicates:   Allergen Reactions    Ciprofloxacin Itching    Coreg [carvedilol]      Low energy    Doxycycline Other (See Comments)     Patient had a rxn with the sun despite wearing spf 30    Metoprolol      Low energy        Family History   Problem Relation Age of Onset    Allergic rhinitis Father     Cancer Father     Hypertension Father     Allergies Father     Allergic rhinitis Son     Asthma Son     Stroke Mother     Allergies Mother     Allergic rhinitis Sister     Asthma Sister     Diverticulitis Brother     No Known Problems Daughter     No Known Problems Maternal Aunt     No Known Problems Maternal Uncle     No Known Problems Paternal Aunt     No Known Problems Paternal Uncle     Colon polyps Maternal Grandmother          of colon blockage    No Known Problems Maternal Grandfather     No Known Problems Paternal Grandmother     No Known Problems Paternal Grandfather     No Known Problems Brother     No Known Problems Sister     Angioedema Neg Hx     Eczema Neg Hx     Immunodeficiency Neg Hx     Urticaria Neg Hx     Skin cancer Neg Hx     Amblyopia Neg Hx     Blindness Neg Hx     Cataracts Neg Hx     Diabetes Neg Hx     Glaucoma Neg Hx     Macular degeneration Neg Hx     Retinal detachment Neg Hx     Strabismus Neg Hx     Thyroid disease Neg Hx     Colon cancer Neg Hx     Melanoma Neg Hx      Social History     Socioeconomic History    Marital status:      Spouse name: Not on file    Number of children: Not on file    Years of education: Not on file    Highest education level: Not on file   Social Needs    Financial resource strain: Not on file    Food insecurity - worry: Not on file    Food insecurity - inability: Not on file    Transportation needs - medical: Not on file    Transportation needs - non-medical: Not on file   Occupational History    Occupation: retired   Tobacco Use    Smoking status: Former Smoker     Packs/day: 2.00     Years: 20.00     Pack years: 40.00     Types: Cigarettes     Last attempt to quit: 1994     Years since quittin.6    Smokeless tobacco: Never Used   Substance and Sexual Activity    Alcohol use: No    Drug use: No     Sexual activity: Yes     Partners: Male   Other Topics Concern    Are you pregnant or think you may be? No    Breast-feeding No   Social History Narrative    Not on file       Review of Systems   Constitutional: Negative for activity change, chills, fever and unexpected weight change.   HENT: Negative for congestion, sore throat, trouble swallowing and voice change.    Eyes: Negative for redness and visual disturbance.   Respiratory: Negative for cough, shortness of breath and wheezing.    Cardiovascular: Negative for chest pain and palpitations.   Gastrointestinal: Negative for abdominal pain, blood in stool, nausea and vomiting.   Endocrine: Negative.    Genitourinary: Negative for dysuria, frequency and hematuria.   Musculoskeletal: Negative for arthralgias, back pain and neck pain.   Skin: Negative for rash and wound.   Allergic/Immunologic: Negative.    Neurological: Negative for dizziness, weakness and headaches.   Hematological: Negative for adenopathy.   Psychiatric/Behavioral: Negative for agitation and dysphoric mood. The patient is not nervous/anxious.      Objective:     Physical Exam   Constitutional: She is oriented to person, place, and time. She appears well-developed and well-nourished. No distress.   HENT:   Head: Normocephalic and atraumatic.   Eyes: Conjunctivae and EOM are normal. Pupils are equal, round, and reactive to light.   Neck: Normal range of motion.   Cardiovascular: Normal rate and regular rhythm.   No murmur heard.  Pulmonary/Chest: Effort normal and breath sounds normal. She has no wheezes. She has no rales.   Abdominal: Soft. Bowel sounds are normal. She exhibits no distension and no mass. There is no tenderness. No hernia.   Genitourinary:   Genitourinary Comments: There is no residual hemorrhoid tissue. Rectal tone is good.  There is no fissure.  The anus is patent.  There is no significant stricture.  There is no blood or stool on anal exam.   Musculoskeletal: Normal range of  motion. She exhibits no edema.   Neurological: She is alert and oriented to person, place, and time. No cranial nerve deficit.   Skin: Skin is warm and dry. No rash noted. No erythema.   Psychiatric: She has a normal mood and affect. Her behavior is normal.     Assessment:     Encounter Diagnosis   Name Primary?    Constipation, unspecified constipation type Yes       Plan:      1.  Continue stool softeners and fiber as she is doing.  2.  The problem persists could consider referral to GI or possibly colorectal surgery. She will follow up with me as needed.

## 2018-09-06 RX ORDER — PANTOPRAZOLE SODIUM 40 MG/1
TABLET, DELAYED RELEASE ORAL
Qty: 90 TABLET | Refills: 3 | Status: ON HOLD | OUTPATIENT
Start: 2018-09-06 | End: 2018-10-03 | Stop reason: SDUPTHER

## 2018-09-07 ENCOUNTER — PATIENT OUTREACH (OUTPATIENT)
Dept: OTHER | Facility: OTHER | Age: 68
End: 2018-09-07

## 2018-09-07 NOTE — LETTER
Zena Davalos, PharmD  [address] [place] [address]     Dear Steph Lira,    Welcome to the [facility]!           My name is Zena Davalos PharmD and I am your dedicated Digital Medicine clinician.  As an expert in medication management, I will help ensure that the medications you are taking continue to provide you with the intended benefits.        I am Kim Cisse and I will be your health  for the duration of the program.  My  job is to help you identify lifestyle changes to improve your blood pressure control.  We will talk about nutrition, exercise, and other ways that you may be able to adjust your current habits to better your health. Together, we will work to improve your overall health and encourage you to meet your goals for a healthier lifestyle.    What we expect from YOU:    You will need to take blood pressure readings multiple times a week and no less than one reading per week.   It is important that you take your measurements at different times during the day, when possible.     What you should expect from your Digital Medicine Care Team:   We will provide you with education about high blood pressure, including lifestyle changes that could help you to control your blood pressure.   We will review your weekly readings and provide you with monthly blood pressure progress reports after you have been in the program for more than 30 days.   We will send monthly progress reports on your blood pressure control to your physician so they can follow along with your progress as well.    You will be able to reach me by phone at [phone] or through your [facility] account by clicking my name under Care Team on the right side of the home screen.    I look forward to working with you to achieve your blood pressure goals!  Sincerely,    Zena Davalos PharmD  Your personal clinician    Please visit [email or web address]  to learn more about high blood pressure and what you can do lower your blood pressure.                                                                                       Steph Lira  [address] [facility] [address]

## 2018-09-07 NOTE — PROGRESS NOTES
"Last 5 Patient Entered Readings                                      Current 30 Day Average: 122/80     Recent Readings 9/6/2018 9/5/2018 9/4/2018    SBP (mmHg) 123 145 97    DBP (mmHg) 85 86 68    Pulse 91 66 78        Digital Medicine: Health  Introduction    Introduced Ms. Steph Lira to Digital Medicine. Discussed health  role and recommended lifestyle modifications.    Lifestyle Assessment:  Current Dietary Habits(i.e. low sodium, food labels, dining out):   Eats soft foods for periods when she is having throat discomfort. Eats oatmeal watered down, omelettes with vegetables. Cannot eat breads, but today ate sausage patties, eggs and hash browns. Feels that starchy foods like breads, peas and beans stick. Typically has trouble eating solid meats- sticking more to ground or shredded meat. Can eat berries and bananas. Admits that her downfall is oreos and milk- enjoys sweets. Calls herself a meat and potatoes person, but denies "junk food".     Patient ate meatloaf last night and states that it "got stuck".     Reports that she does not eat salt or heavy seasonings due to HTN and gerd.     Exercise:     Alcohol/Tobacco:    Medication Adherence: has been compliant with the medicaiton regimen Patient states that at a blood test recently she was told to drink less water due to sodium levels. Drinks 3 16 oz bottles per day. Also enjoys sweet tea but tries to limit. Used to take metoprolol and carvedilol and felt extremely low energy (listed as allergies)    Other goals:     Patient has a throat tumor, and has frequent EGD's. Every time she has these, she has trouble eating due to scarring. Discusses her limited diet.    Patient reports that she didn't feel well on 9/4- felt very tired, and mentally "foggy". Would not have felt comfortable driving. Patient had just gotten up- woke up feeling bad. [place] like her BP was very elevated later that day- felt tightness in her chest and a headache. Encouraged " patient to check BP in the future if she feels this way, and not to worry about sending in more than 1 reading per day. Feels weak sometimes when she sits down to pick something up off of the floor. Denies any symptoms at present.     Reviewed BP measuring technique. Will adjust to kitchen table from sofa.     Reviewed AHA/AACE recommendations:  Limit sodium intake to <2000mg/day  Recommended CHO intake, 45-65% of daily caloric intake  Perform 150 minutes of physical activity per week    Reviewed the importance of self-monitoring, medication adherence, and that the health  can be used as a resource for lifestyle modifications to help reduce or maintain a healthy lifestyle.  Reviewed that the Digital Medicine team is not available for emergencies and instructed the patient to call 911 or [facility] ([phone] or [phone]) if one arises.

## 2018-09-10 ENCOUNTER — TELEPHONE (OUTPATIENT)
Dept: ENDOSCOPY | Facility: HOSPITAL | Age: 68
End: 2018-09-10

## 2018-09-10 NOTE — TELEPHONE ENCOUNTER
Henry Wei MD   to Me      9/10/18 4:55 PM    Sure . Ok by me         9/10/18 4:10 PM   Note      Dr Henry Wei, may we again hold Eliquis for 2 days prior to another EGD scheduled 10/3/18?  Thank you.

## 2018-09-10 NOTE — TELEPHONE ENCOUNTER
Dr Henry Wei, may we again hold Eliquis for 2 days prior to another EGD scheduled 10/3/18?  Thank you.

## 2018-09-11 ENCOUNTER — TELEPHONE (OUTPATIENT)
Dept: ENDOSCOPY | Facility: HOSPITAL | Age: 68
End: 2018-09-11

## 2018-09-11 NOTE — TELEPHONE ENCOUNTER
Spoke with patient about EGD scheduled 10/3/18 at 1130.  Instructions mailed.  She will hold Eliquis for 2 days prior to procedure with permission from Dr Henry Wei.  She states she has Rx for Diflucan from Dr Robbie Gonzales and will begin taking it daily for 14 days prior to procedure.

## 2018-09-18 DIAGNOSIS — I25.3 ANEURYSM OF HEART WALL: Primary | ICD-10-CM

## 2018-09-18 NOTE — PROGRESS NOTES
Subjective:     HPI    Cardiologist: Henry Wei MD    I had the pleasure of seeing Steph Lira in consultation at your request for the evaluation of atrial fibrillation. 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 She has been on Flecainide for many years, which effectively suppressed her arrhythmias. Soraay had until recently been on a beta blocker as well, but beta blockade caused significant fatigue and was stopped. Recently there was an attempt to down-titrate Flecainide to 50 mg bid, but AF recurred approximately 1 year ago, requiring electrical cardioversion for sinus rhythm restoration. She is on Eliquis for stroke prophylaxis. She presents to me today to discuss AF management options.    Ms. Lira is overall happy with her current AF management. Episodes occur several times per year, manifesting as palpitations, lasting several days and generally requiring a hospital admission and IV medications or electrical cardioversion to resolve. She denies exacerbating or alleviating factors.     Recent cardiac studies include an echo performed in 8/2018 which showed an EF of 40-45%, moderate LAE, and mild AI. A pharmacologic NST done in 5/2018 showed no fixed or reversible perfusion defects.    I reviewed all ECGs in the EMR at today's visit. An ECG from 1/24/2018 shows AT at 116 bpm. An ECG dated 5/27/2016 shows AF at 114 bpm. Finally, an ECG dated 5/18/2016 shows AFL at 146 bpm. All other ECGs dating back to 2002 show sinus rhythm.    My interpretation of today's ECG is NSR at 67 bpm.    Review of Systems   Constitution: Negative for decreased appetite, malaise/fatigue, weight gain and weight loss.    HENT: Negative for sore throat.    Eyes: Negative for blurred vision.   Cardiovascular: Positive for palpitations. Negative for chest pain, dyspnea on exertion, irregular heartbeat, leg swelling, near-syncope, orthopnea, paroxysmal nocturnal dyspnea and syncope.   Respiratory: Negative for shortness of breath.    Skin: Negative for rash.   Musculoskeletal: Negative for arthritis.   Gastrointestinal: Positive for dysphagia. Negative for abdominal pain.   Neurological: Negative for focal weakness.   Psychiatric/Behavioral: Negative for altered mental status.        Objective:    Physical Exam   Constitutional: She is oriented to person, place, and time. She appears well-developed and well-nourished. No distress.   HENT:   Head: Normocephalic and atraumatic.   Mouth/Throat: Oropharynx is clear and moist.   Eyes: Conjunctivae are normal. Pupils are equal, round, and reactive to light. No scleral icterus.   Neck: Normal range of motion. Neck supple. No JVD present. No thyromegaly present.   Cardiovascular: Normal rate, regular rhythm, normal heart sounds and intact distal pulses. Exam reveals no gallop and no friction rub.   No murmur heard.  Pulmonary/Chest: Effort normal and breath sounds normal. No respiratory distress.   Abdominal: Soft. Bowel sounds are normal. She exhibits no distension.   Musculoskeletal: She exhibits no edema.   Neurological: She is alert and oriented to person, place, and time.   Skin: Skin is warm and dry.   Psychiatric: She has a normal mood and affect. Her behavior is normal.         Assessment:       1. PAF (paroxysmal atrial fibrillation), onset 2002    2. Coronary artery disease involving native coronary artery of native heart without angina pectoris    3. S/P CABG x 2, 2005    4. Essential hypertension    5. Hypercholesteremia    6. Dilated cardiomyopathy         Plan:       In summary, Steph Lira is a 68 year old female with a history of symptomatic PAF. Her QVC1VC6-EIAp Score is 3  (age, female, CAD) which corresponds to a yearly risk of stroke without anticoagulation treatment estimated at 3.2%. At her request, I have switched her from Eliquis to Xarelto, as she attributes some recent joint pains to Eliquis. We discussed in detail options including holding the course and continuing Flecainide, vs switching to a different antiarrhythmic, vs PVI. For now she would like to hold the course. The plan is to obtain a 4 week event monitor, and see me back in 3 months.    Thank you for allowing me to participate in the care of this patient. Please do not hesitate to call me with any questions or concerns.

## 2018-09-19 ENCOUNTER — INITIAL CONSULT (OUTPATIENT)
Dept: CARDIOLOGY | Facility: CLINIC | Age: 68
End: 2018-09-19
Payer: MEDICARE

## 2018-09-19 VITALS
HEART RATE: 67 BPM | SYSTOLIC BLOOD PRESSURE: 119 MMHG | BODY MASS INDEX: 22.22 KG/M2 | DIASTOLIC BLOOD PRESSURE: 68 MMHG | OXYGEN SATURATION: 95 % | HEIGHT: 66 IN | WEIGHT: 138.25 LBS

## 2018-09-19 DIAGNOSIS — I25.3 ANEURYSM OF HEART WALL: ICD-10-CM

## 2018-09-19 DIAGNOSIS — Z85.118 HISTORY OF LUNG CANCER: ICD-10-CM

## 2018-09-19 DIAGNOSIS — I42.0 DILATED CARDIOMYOPATHY: ICD-10-CM

## 2018-09-19 DIAGNOSIS — E78.00 HYPERCHOLESTEREMIA: ICD-10-CM

## 2018-09-19 DIAGNOSIS — Z95.1 S/P CABG X 2: ICD-10-CM

## 2018-09-19 DIAGNOSIS — I10 ESSENTIAL HYPERTENSION: ICD-10-CM

## 2018-09-19 DIAGNOSIS — I25.10 CORONARY ARTERY DISEASE INVOLVING NATIVE CORONARY ARTERY OF NATIVE HEART WITHOUT ANGINA PECTORIS: ICD-10-CM

## 2018-09-19 DIAGNOSIS — I48.0 PAF (PAROXYSMAL ATRIAL FIBRILLATION): Primary | ICD-10-CM

## 2018-09-19 PROCEDURE — 93010 ELECTROCARDIOGRAM REPORT: CPT | Mod: S$PBB,,, | Performed by: INTERNAL MEDICINE

## 2018-09-19 PROCEDURE — 99999 PR PBB SHADOW E&M-EST. PATIENT-LVL IV: CPT | Mod: PBBFAC,,, | Performed by: INTERNAL MEDICINE

## 2018-09-19 PROCEDURE — 99214 OFFICE O/P EST MOD 30 MIN: CPT | Mod: PBBFAC,PO,25 | Performed by: INTERNAL MEDICINE

## 2018-09-19 PROCEDURE — 99205 OFFICE O/P NEW HI 60 MIN: CPT | Mod: S$PBB,,, | Performed by: INTERNAL MEDICINE

## 2018-09-19 PROCEDURE — 93005 ELECTROCARDIOGRAM TRACING: CPT | Mod: PBBFAC,PO | Performed by: INTERNAL MEDICINE

## 2018-09-19 NOTE — LETTER
September 19, 2018      Henry Wei MD  [address] [facility]  [address]  [place] [address]           [place] [facility]  [address], [address]  [facility] [address]  Phone: [phone]          Patient: Steph Lira   MR Number: [id number]   YOB: 1950   Date of Visit: 9/19/2018       Dear Dr. Henry Wei:    Thank you for referring Steph Lira to me for evaluation. Attached you will find relevant portions of my assessment and plan of care.    If you have questions, please do not hesitate to call me. I look forward to following Steph Lira along with you.    Sincerely,    Santana Covarrubias MD    Enclosure  CC:  No Recipients    If you would like to receive this communication electronically, please contact [email or web address] or [phone] to request more information on [employer] access.    For providers and/or their staff who would like to refer a patient to [facility], please contact us through our one-stop-shop provider referral line, [facility], at [phone].    If you feel you have received this communication in error or would no longer like to receive these types of communications, please e-mail [email or web address]

## 2018-09-20 ENCOUNTER — PATIENT MESSAGE (OUTPATIENT)
Dept: FAMILY MEDICINE | Facility: CLINIC | Age: 68
End: 2018-09-20

## 2018-09-20 DIAGNOSIS — K59.00 CONSTIPATION, UNSPECIFIED CONSTIPATION TYPE: Primary | ICD-10-CM

## 2018-09-21 ENCOUNTER — PATIENT MESSAGE (OUTPATIENT)
Dept: SURGERY | Facility: CLINIC | Age: 68
End: 2018-09-21

## 2018-09-25 ENCOUNTER — PATIENT OUTREACH (OUTPATIENT)
Dept: OTHER | Facility: OTHER | Age: 68
End: 2018-09-25

## 2018-09-25 NOTE — PROGRESS NOTES
Last 5 Patient Entered Readings                                      Current 30 Day Average: 125/78     Recent Readings 9/22/2018 9/21/2018 9/20/2018 9/18/2018 9/17/2018    SBP (mmHg) 99 151 120 112 142    DBP (mmHg) 71 97 85 42 83    Pulse 87 89 80 96 82        Digital Medicine: Health  Follow Up    Lifestyle Modifications:    1.Dietary Modifications (Sodium intake <2,000mg/day, food labels, dining out):    Drinks 3-4 bottles (23 oz) per day. Feels thirsty all the time.     2.Physical Activity:     3.Medication Therapy: Patient has been compliant with the medication regimen. Wants her pharmacist to know that she recently stopped taking eliquis and switched to xarelto. States that it doesn't work as well, but patient was in pain and this significant affected her quality of life.     4.Patient has the following medication side effects/concerns:   (Frequency/Alleviating factors/Precipitating factors, etc.)     Follow up with Ms. Steph Lira completed. No further questions or concerns. Will continue to follow up to achieve health goals.    Patient reports that she felt lethargic/low energy on 9/22 and feels this way again today. Asked her to take a BP reading today, and to try a small salty snack with plenty of water to see if this helps her feel better. Patient agrees to try this, and states that she is already drinking a lot of water due to heavy thirst. States that she has been tested for diabetes, and that it was normal.     Informed patient that either I or her pharmD would check back in after a reading this afternoon

## 2018-09-25 NOTE — PROGRESS NOTES
Last 5 Patient Entered Readings                                      Current 30 Day Average: 124/78     Recent Readings 9/25/2018 9/23/2018 9/22/2018 9/21/2018 9/20/2018    SBP (mmHg) 119 106 99 151 120    DBP (mmHg) 79 72 71 97 85    Pulse 78 84 87 89 80        Patient took a BP reading this morning. Reports that she got up and walked around and didn't rest before taking a reading, which she knows makes BP higher. Reports that she feels slightly better after walking around. Is currently eating potato chips as a salty snack. Informed patient that while her pharmD is aware of what has been going on, there are limited options since she is already not taking any BP medications.     Patient has the number for [facility] on [facility] if her tired symptoms continue or worsen. Knows to call me as well if she has any questions. Will continue to keep an eye on her BP.

## 2018-10-01 ENCOUNTER — HOSPITAL ENCOUNTER (OUTPATIENT)
Dept: CARDIOLOGY | Facility: HOSPITAL | Age: 68
Discharge: HOME | End: 2018-10-01
Attending: INTERNAL MEDICINE
Payer: MEDICARE

## 2018-10-01 ENCOUNTER — TELEPHONE (OUTPATIENT)
Dept: CARDIOLOGY | Facility: CLINIC | Age: 68
End: 2018-10-01

## 2018-10-01 DIAGNOSIS — I48.0 PAF (PAROXYSMAL ATRIAL FIBRILLATION): ICD-10-CM

## 2018-10-01 PROCEDURE — 93271 ECG/MONITORING AND ANALYSIS: CPT

## 2018-10-01 PROCEDURE — 93272 ECG/REVIEW INTERPRET ONLY: CPT | Mod: ,,, | Performed by: INTERNAL MEDICINE

## 2018-10-01 NOTE — TELEPHONE ENCOUNTER
Called and spoke with Ms. Steph. I advised her that her monitor was being mailed out today. She verbalized understanding. No further issues noted.

## 2018-10-01 NOTE — TELEPHONE ENCOUNTER
----- Message from Oscar Araujo sent at 10/1/2018  4:24 PM CDT -----  Type: Needs Medical Advice    Who Called:  Patient  Best Call Back Number: [phone]  Additional Information: Patient has not received anything regarding her heart monitor - please call tyro advise.

## 2018-10-03 ENCOUNTER — HOSPITAL ENCOUNTER (OUTPATIENT)
Facility: HOSPITAL | Age: 68
Discharge: HOME | End: 2018-10-03
Attending: INTERNAL MEDICINE | Admitting: INTERNAL MEDICINE
Payer: MEDICARE

## 2018-10-03 ENCOUNTER — TELEPHONE (OUTPATIENT)
Dept: ENDOSCOPY | Facility: HOSPITAL | Age: 68
End: 2018-10-03

## 2018-10-03 ENCOUNTER — ANESTHESIA EVENT (OUTPATIENT)
Dept: ENDOSCOPY | Facility: HOSPITAL | Age: 68
End: 2018-10-03
Payer: MEDICARE

## 2018-10-03 ENCOUNTER — ANESTHESIA (OUTPATIENT)
Dept: ENDOSCOPY | Facility: HOSPITAL | Age: 68
End: 2018-10-03
Payer: MEDICARE

## 2018-10-03 ENCOUNTER — PATIENT OUTREACH (OUTPATIENT)
Dept: OTHER | Facility: OTHER | Age: 68
End: 2018-10-03

## 2018-10-03 VITALS
HEART RATE: 71 BPM | TEMPERATURE: 97 F | DIASTOLIC BLOOD PRESSURE: 89 MMHG | WEIGHT: 140 LBS | SYSTOLIC BLOOD PRESSURE: 141 MMHG | RESPIRATION RATE: 18 BRPM | OXYGEN SATURATION: 99 % | BODY MASS INDEX: 22.5 KG/M2 | HEIGHT: 66 IN

## 2018-10-03 DIAGNOSIS — C15.9 MALIGNANT NEOPLASM OF ESOPHAGUS, UNSPECIFIED LOCATION: Primary | ICD-10-CM

## 2018-10-03 DIAGNOSIS — K22.9 ESOPHAGEAL LESION: Primary | ICD-10-CM

## 2018-10-03 PROCEDURE — 37000009 HC ANESTHESIA EA ADD 15 MINS: Performed by: INTERNAL MEDICINE

## 2018-10-03 PROCEDURE — 88312 SPECIAL STAINS GROUP 1: CPT | Mod: 26,,, | Performed by: PATHOLOGY

## 2018-10-03 PROCEDURE — 27201012 HC FORCEPS, HOT/COLD, DISP: Performed by: INTERNAL MEDICINE

## 2018-10-03 PROCEDURE — 43239 EGD BIOPSY SINGLE/MULTIPLE: CPT | Performed by: INTERNAL MEDICINE

## 2018-10-03 PROCEDURE — 88305 TISSUE EXAM BY PATHOLOGIST: CPT | Mod: 26,,, | Performed by: PATHOLOGY

## 2018-10-03 PROCEDURE — 88342 IMHCHEM/IMCYTCHM 1ST ANTB: CPT | Performed by: PATHOLOGY

## 2018-10-03 PROCEDURE — 43239 EGD BIOPSY SINGLE/MULTIPLE: CPT | Mod: ,,, | Performed by: INTERNAL MEDICINE

## 2018-10-03 PROCEDURE — 63600175 PHARM REV CODE 636 W HCPCS: Performed by: NURSE ANESTHETIST, CERTIFIED REGISTERED

## 2018-10-03 PROCEDURE — 88342 IMHCHEM/IMCYTCHM 1ST ANTB: CPT | Mod: 26,,, | Performed by: PATHOLOGY

## 2018-10-03 PROCEDURE — 88305 TISSUE EXAM BY PATHOLOGIST: CPT | Performed by: PATHOLOGY

## 2018-10-03 PROCEDURE — D9220A PRA ANESTHESIA: Mod: ANES,,, | Performed by: ANESTHESIOLOGY

## 2018-10-03 PROCEDURE — 37000008 HC ANESTHESIA 1ST 15 MINUTES: Performed by: INTERNAL MEDICINE

## 2018-10-03 PROCEDURE — 88341 IMHCHEM/IMCYTCHM EA ADD ANTB: CPT | Mod: 26,,, | Performed by: PATHOLOGY

## 2018-10-03 PROCEDURE — D9220A PRA ANESTHESIA: Mod: CRNA,,, | Performed by: NURSE ANESTHETIST, CERTIFIED REGISTERED

## 2018-10-03 PROCEDURE — 25000003 PHARM REV CODE 250: Performed by: INTERNAL MEDICINE

## 2018-10-03 RX ORDER — SODIUM CHLORIDE 9 MG/ML
INJECTION, SOLUTION INTRAVENOUS CONTINUOUS
Status: DISCONTINUED | OUTPATIENT
Start: 2018-10-03 | End: 2018-10-03 | Stop reason: HOSPADM

## 2018-10-03 RX ORDER — ONDANSETRON 2 MG/ML
4 INJECTION INTRAMUSCULAR; INTRAVENOUS DAILY PRN
Status: DISCONTINUED | OUTPATIENT
Start: 2018-10-03 | End: 2018-10-03 | Stop reason: HOSPADM

## 2018-10-03 RX ORDER — SUCRALFATE 1 G/1
TABLET ORAL
Qty: 60 TABLET | Refills: 0 | Status: SHIPPED | OUTPATIENT
Start: 2018-10-03 | End: 2018-10-03 | Stop reason: SDUPTHER

## 2018-10-03 RX ORDER — SODIUM CHLORIDE 0.9 % (FLUSH) 0.9 %
3 SYRINGE (ML) INJECTION
Status: DISCONTINUED | OUTPATIENT
Start: 2018-10-03 | End: 2018-10-03 | Stop reason: HOSPADM

## 2018-10-03 RX ORDER — PANTOPRAZOLE SODIUM 40 MG/1
TABLET, DELAYED RELEASE ORAL
Qty: 90 TABLET | Refills: 3 | Status: SHIPPED | OUTPATIENT
Start: 2018-10-03 | End: 2019-01-01 | Stop reason: SDUPTHER

## 2018-10-03 RX ORDER — PROPOFOL 10 MG/ML
VIAL (ML) INTRAVENOUS
Status: DISCONTINUED | OUTPATIENT
Start: 2018-10-03 | End: 2018-10-03

## 2018-10-03 RX ORDER — FENTANYL CITRATE 50 UG/ML
25 INJECTION, SOLUTION INTRAMUSCULAR; INTRAVENOUS EVERY 5 MIN PRN
Status: DISCONTINUED | OUTPATIENT
Start: 2018-10-03 | End: 2018-10-03 | Stop reason: HOSPADM

## 2018-10-03 RX ORDER — LIDOCAINE HCL/PF 100 MG/5ML
SYRINGE (ML) INTRAVENOUS
Status: DISCONTINUED | OUTPATIENT
Start: 2018-10-03 | End: 2018-10-03

## 2018-10-03 RX ORDER — PROPOFOL 10 MG/ML
VIAL (ML) INTRAVENOUS CONTINUOUS PRN
Status: DISCONTINUED | OUTPATIENT
Start: 2018-10-03 | End: 2018-10-03

## 2018-10-03 RX ORDER — SUCRALFATE 1 G/1
TABLET ORAL
Qty: 180 TABLET | Refills: 0 | Status: SHIPPED | OUTPATIENT
Start: 2018-10-03 | End: 2018-10-10

## 2018-10-03 RX ADMIN — LIDOCAINE HYDROCHLORIDE 50 MG: 20 INJECTION, SOLUTION INTRAVENOUS at 12:10

## 2018-10-03 RX ADMIN — PROPOFOL 20 MG: 10 INJECTION, EMULSION INTRAVENOUS at 12:10

## 2018-10-03 RX ADMIN — SODIUM CHLORIDE: 0.9 INJECTION, SOLUTION INTRAVENOUS at 11:10

## 2018-10-03 RX ADMIN — PROPOFOL 50 MG: 10 INJECTION, EMULSION INTRAVENOUS at 12:10

## 2018-10-03 RX ADMIN — PROPOFOL 150 MCG/KG/MIN: 10 INJECTION, EMULSION INTRAVENOUS at 12:10

## 2018-10-03 NOTE — TRANSFER OF CARE
"Anesthesia Transfer of Care Note    Patient: Steph Lira    Procedure(s) Performed: Procedure(s) (LRB):  EGD (ESOPHAGOGASTRODUODENOSCOPY)/poss cryo (N/A)    Patient location: [facility]    Anesthesia Type: general and MAC    Transport from OR: Transported from OR on room air with adequate spontaneous ventilation    Post pain: adequate analgesia    Post assessment: no apparent anesthetic complications and tolerated procedure well    Post vital signs: stable    Level of consciousness: awake, oriented and alert    Nausea/Vomiting: no nausea/vomiting    Complications: none    Transfer of care protocol was followed      Last vitals:   Visit Vitals  BP (!) 142/81   Pulse 69   Temp 36.2 °C (97.2 °F) (Temporal)   Resp 16   Ht 5' 6" (1.676 m)   Wt 63.5 kg (140 lb)   SpO2 99%   Breastfeeding? No   BMI 22.60 kg/m²     "

## 2018-10-03 NOTE — ANESTHESIA POSTPROCEDURE EVALUATION
"Anesthesia Post Evaluation    Patient: Steph Lira    Procedure(s) Performed: Procedure(s) (LRB):  EGD (ESOPHAGOGASTRODUODENOSCOPY)/poss cryo (N/A)    Final Anesthesia Type: general  Patient location during evaluation: PACU  Patient participation: Yes- Able to Participate  Level of consciousness: awake and alert  Post-procedure vital signs: reviewed and stable  Pain management: adequate  Airway patency: patent  PONV status at discharge: No PONV  Anesthetic complications: no      Cardiovascular status: hemodynamically stable and blood pressure returned to baseline  Respiratory status: unassisted and spontaneous ventilation  Hydration status: euvolemic  Follow-up not needed.        Visit Vitals  BP (!) 141/89   Pulse 71   Temp 36.2 °C (97.2 °F) (Temporal)   Resp 18   Ht 5' 6" (1.676 m)   Wt 63.5 kg (140 lb)   SpO2 99%   Breastfeeding? No   BMI 22.60 kg/m²       Pain/Tamera Score: Pain Assessment Performed: Yes (10/3/2018  1:29 PM)  Presence of Pain: denies (10/3/2018  1:29 PM)  Tamera Score: 10 (10/3/2018  1:29 PM)        "

## 2018-10-03 NOTE — DISCHARGE SUMMARY
Discharge Summary/Instructions after an Endoscopic Procedure    Patient Name: Steph Raymundo  Patient MRN: [id number]  Patient YOB: 1950 Wednesday, October 03, 2018  Robbie Gonzales MD    RESTRICTIONS:  During your procedure today, you received medications for sedation.  These medications may affect your judgment, balance and coordination.  Therefore, for 24 hours, you have the following restrictions:     - DO NOT drive a car, operate machinery, make legal/financial decisions, sign important papers or drink alcohol.      ACTIVITY:  Today: no heavy lifting, straining or running due to procedural sedation/anesthesia.  The following day: return to full activity including work.    DIET:  Eat and drink normally unless instructed otherwise.     TREATMENT FOR COMMON SIDE EFFECTS:  - Mild abdominal pain, nausea, belching, bloating or excessive gas:  rest, eat lightly and use a heating pad.  - Sore Throat: treat with throat lozenges and/or gargle with warm salt water.  - Because air was used during the procedure, expelling large amounts of air from your rectum or belching is normal.  - If a bowel prep was taken, you may not have a bowel movement for 1-3 days.  This is normal.      SYMPTOMS TO WATCH FOR AND REPORT TO YOUR PHYSICIAN:  1. Abdominal pain or bloating, other than gas cramps.  2. Chest pain.  3. Back pain.  4. Signs of infection such as: chills or fever occurring within 24 hours after the procedure.  5. Rectal bleeding, which would show as bright red, maroon, or black stools. (A tablespoon of blood from the rectum is not serious, especially if hemorrhoids are present.)  6. Vomiting.  7. Weakness or dizziness.      GO DIRECTLY TO THE NEAREST EMERGENCY ROOM IF YOU HAVE ANY OF THE FOLLOWING:     Difficulty breathing              Chills and/or fever over 101 F   Persistent vomiting and/or vomiting blood   Severe abdominal pain   Severe chest pain   Black, tarry stools   Bleeding- more than one tablespoon   Any  other symptom or condition that you feel may need urgent attention    Your doctor recommends these additional instructions:  If any biopsies were taken, your doctors clinic will contact you in 1 to 2 weeks with any results.    - Discharge patient to home (ambulatory).   - Await pathology results.   - Use Protonix (pantoprazole) 40 mg PO BID.   - Use sucralfate suspension 1 gram PO BID.    For questions, problems or results please call your physician - Robbie Gonzales MD at Work:  [phone].    [facility], EMERGENCY ROOM PHONE NUMBER: [phone]    IF A COMPLICATION OR EMERGENCY SITUATION ARISES AND YOU ARE UNABLE TO REACH YOUR PHYSICIAN - GO DIRECTLY TO THE EMERGENCY ROOM.

## 2018-10-03 NOTE — ANESTHESIA PREPROCEDURE EVALUATION
10/03/2018  Steph Lira is a 68 y.o., female.    Anesthesia Evaluation         Review of Systems  Anesthesia Hx:  No problems with previous Anesthesia   Social:  Former Smoker    Hematology/Oncology:         -- Cancer in past history:   Cardiovascular:   Exercise tolerance: good Hypertension, well controlled Past MI CAD asymptomatic CABG/stent Dysrhythmias atrial fibrillation  Denies Angina.         hyperlipidemia     ECG has been reviewed. Echo Ef 40-45%  On Xarelto   Pulmonary:   COPD, mild Asthma asymptomatic Denies Shortness of breath.    Hepatic/GI:   GERD    Musculoskeletal:   Arthritis     Endocrine:   Hypothyroidism        Physical Exam  General:  Well nourished    Airway/Jaw/Neck:  Airway Findings: Mouth Opening: Normal Tongue: Normal  General Airway Assessment: Adult  Mallampati: II  TM Distance: Normal, at least 6 cm  Jaw/Neck Findings:         Dental:  Dental Findings: In tact   Chest/Lungs:  Chest/Lungs Findings: Clear to auscultation, Normal Respiratory Rate     Heart/Vascular:  Heart Findings: Rate: Normal  Rhythm: Regular Rhythm  Sounds: Normal        Mental Status:  Mental Status Findings:  Cooperative, Alert and Oriented         Anesthesia Plan  Type of Anesthesia, risks & benefits discussed:  Anesthesia Type:  general  Patient's Preference:   Intra-op Monitoring Plan: standard ASA monitors  Intra-op Monitoring Plan Comments:   Post Op Pain Control Plan:   Post Op Pain Control Plan Comments:   Induction:   IV  Beta Blocker:         Informed Consent: Patient understands risks and agrees with Anesthesia plan.  Questions answered. Anesthesia consent signed with patient.  ASA Score: 3     Day of Surgery Review of History & Physical:            Ready For Surgery From Anesthesia Perspective.

## 2018-10-03 NOTE — TELEPHONE ENCOUNTER
----- Message from Robbie Gonzales MD sent at 10/3/2018 12:21 PM CDT -----  Patient need EGD with cryotherapy in 1 month.  Thanks  R

## 2018-10-03 NOTE — H&P
History & Physical - Short Stay  Gastroenterology      SUBJECTIVE:     Procedure: EGD    Chief Complaint/Indication for Procedure: Esophageal lesion    History of Present Illness:  Patient is a 68 y.o. female presents with esophageal squamous dysplasia and esophageal stenosis S/P ablation therapy here for follow up.     PTA Medications   Medication Sig    ADVAIR DISKUS 250-50 mcg/dose diskus inhaler USE 1 INHALATION TWICE A DAY (RINSE MOUTH AFTER USE TO PREVENT THRUSH)    atorvastatin (LIPITOR) 40 MG tablet Take 1 tablet (40 mg total) by mouth once daily.    bisacodyl (DULCOLAX) 5 mg EC tablet Take 1 tablet (5 mg total) by mouth daily as needed for Constipation.    CALCIUM CITRATE (CITRACAL) 500 mg TbEF Take 500 mg by mouth. 1 Tablet, Effervescent Oral  daily    desoximetasone (TOPICORT) 0.25 % cream Apply topically 2 (two) times daily.    diclofenac sodium (VOLTAREN) 1 % Gel Apply 2 g topically once daily.    docusate calcium (SURFAK) 240 mg capsule Take 1 capsule (240 mg total) by mouth once daily.    flecainide (TAMBOCOR) 50 MG Tab Take 2 tablets (100 mg total) by mouth every 12 (twelve) hours. (Patient taking differently: Take 50 mg by mouth once daily. )    fluticasone (FLONASE) 50 mcg/actuation nasal spray 1 spray by Each Nare route once daily.    ketoconazole (NIZORAL) 2 % shampoo Wash hair with medicated shampoo at least 2x/week - let sit on scalp at least 5 minutes prior to rinsing    levothyroxine (SYNTHROID) 88 MCG tablet Take 1 tablet (88 mcg total) by mouth once daily.    montelukast (SINGULAIR) 10 mg tablet Take 1 tablet (10 mg total) by mouth every evening.    multivitamin-minerals-lutein (CENTRUM SILVER) Tab Take 1 tablet by mouth once daily. 1 Tablet Oral Every day    pantoprazole (PROTONIX) 40 MG tablet TAKE 1 TABLET DAILY 30 MINUTES PRIOR TO BREAKFAST    polyethylene glycol (GLYCOLAX) 17 gram/dose powder Take 17 g by mouth once daily.    RETIN-A 0.05 % cream APPLY THIN FILM TO FACE  AT NIGHT AFTER MOISTURIZING    rivaroxaban (XARELTO) 20 mg Tab Take 1 tablet (20 mg total) by mouth once daily.    SPIRIVA WITH HANDIHALER 18 mcg inhalation capsule INHALE THE CONTENTS OF 1 CAPSULE DAILY    albuterol (ACCUNEB) 1.25 mg/3 mL Nebu USE 1 VIAL (3 ML) VIA NEBULIZER EVERY 6 HOURS AS NEEDED    albuterol (PROVENTIL HFA) 90 mcg/actuation inhaler Inhale 2 puffs into the lungs every 6 (six) hours as needed for Wheezing.    cetirizine (ZYRTEC) 10 MG tablet TAKE 1 TABLET EVERY EVENING    diphenhydrAMINE (BENADRYL) 25 mg capsule Take 25 mg by mouth every 6 (six) hours as needed for Itching.    rivaroxaban (XARELTO) 20 mg Tab Take 1 tablet (20 mg total) by mouth once daily.    triamcinolone acetonide 0.1% (KENALOG) 0.1 % cream Apply topically 2 (two) times daily.       Review of patient's allergies indicates:   Allergen Reactions    Ciprofloxacin Itching    Coreg [carvedilol]      Low energy    Doxycycline Other (See Comments)     Patient had a rxn with the sun despite wearing spf 30    Metoprolol      Low energy        Past Medical History:   Diagnosis Date    *Atrial fibrillation     and Hx PSVT    Allergic drug rash 12/28/2016    Allergy     chronic     Arthritis     fingers    Benign tumor of throat     Bronchitis March 2013    CAD (coronary artery disease) 2005    CABGx2 in 2005 and 2 MI after with 4 stents    Cancer 1996    small cell lung cancer s/p resection of 1/3 lung, 5 ribs and muscle mass then had chemo and radiation    Cataract     OD     CHF (congestive heart failure) past Hx    Chronic rhinitis     Clot past Hx    external jugular, now stented, occluded, had phlebitis; now revascularized    Colon polyp     COPD (chronic obstructive pulmonary disease)     no oxygen or nebulizers    COPD exacerbation 5/8/13    improved 5/14/13 after steroid pack,antibiotics    Former smoker     GERD (gastroesophageal reflux disease)     Heart block     Hyperlipidemia     Hypertension      pt states does not have //    MI (myocardial infarction) 2005    Posterior vitreous detachment of left eye     Thyroid disease      Past Surgical History:   Procedure Laterality Date    ADENOIDECTOMY      APPENDECTOMY      BACK SURGERY      lumbar    BREAST BIOPSY Left     benign    CARDIAC SURGERY      CATARACT EXTRACTION Left     OS    CATARACT EXTRACTION, BILATERAL      left eye only    CHOLECYSTECTOMY      COLONOSCOPY  03/2012    repeat in 5 years    COLONOSCOPY N/A 6/19/2017    Procedure: COLONOSCOPY;  Surgeon: Kal Elise MD;  Location: [facility];  Service: Endoscopy;  Laterality: N/A;    COLONOSCOPY N/A 6/19/2017    Performed by Kal [facility]ise MD at [facility]    COLONOSCOPY N/A 3/28/2012    Performed by Garrison Koo MD at [facility]    CORONARY ARTERY BYPASS GRAFT  2005    2 vessel    ESOPHAGOGASTRODUODENOSCOPY N/A 7/5/2018    Procedure: ESOPHAGOGASTRODUODENOSCOPY (EGD)/cryo;  Surgeon: Robbie Gonzales MD;  Location: [facility] (2ND FLR);  Service: Endoscopy;  Laterality: N/A;  Eliholliis/Dr Henry Wei/OK to hold med 2 days prior to egd/see telephone encounter dated 6/12/18/pt stated she needs to take Diflucan 1 tab po daily 12 days prior to egd, message sent to Sarah/she will check with Dr Gonzales/santo    ESOPHAGOGASTRODUODENOSCOPY (EGD) N/A 11/29/2017    Performed by Daniel Gillette MD at [facility] (2ND FLR)    ESOPHAGOGASTRODUODENOSCOPY (EGD) N/A 10/3/2017    Performed by Hue Wright [facility] at [facility] ENDO    ESOPHAGOGASTRODUODENOSCOPY (EGD) N/A 6/14/2017    Performed by Kal [facility]ise [facility] at [facility] ENDO    ESOPHAGOGASTRODUODENOSCOPY (EGD) N/A 10/12/2016    Performed by Kal [facility]ise MD at [facility] ENDO    ESOPHAGOGASTRODUODENOSCOPY (EGD) N/A 5/10/2016    Performed by Garrison Koo MD at [facility] ENDO    ESOPHAGOGASTRODUODENOSCOPY (EGD) W/CRYOTHERAPY N/A 12/22/2017    [facility]    ESOPHAGOGASTRODUODENOSCOPY (EGD) with cryo N/A  3/5/2018    [facility]    ESOPHAGOGASTRODUODENOSCOPY (EGD)/cryo N/A 7/5/2018    Performed by Robbie Gonzales MD at [facility] ENDO (2ND FLR)    ESOPHAGOGASTRODUODENOSCOPY (EGD)/cryo N/A 4/16/2018    Performed by Robbie Gonzales MD at [facility] ENDO (2ND FLR)    ESOPHAGOGASTRODUODENOSCOPY (EGD)/poss Cryo N/A 12/14/2017    Performed by Robbie Gonzales MD at [facility] ENDO (2ND FLR)    ESOPHAGOGASTRODUODENOSCOPY (EGD)/Poss EMR N/A 10/31/2017    Performed by Robbie Gonzales MD at [facility] ENDO (2ND FLR)    EYE SURGERY  Dec 2012    ptosis repair    FIRST RIB REMOVAL  1996 with lung resection    HEMORRHOIDECTOMY N/A 3/16/2018    [facility]    HYSTERECTOMY      LUNG LOBECTOMY  1996    left lung for cancer    phaco/pciol Right 11/5/2014    [facility]    REPAIR, BLEPHAROPTOSIS Left 12/6/2012    Performed by Eze Glynn MD at [facility] OR 1ST FLR    SHOULDER SURGERY  2010,2011    scapular entrapment after lung surgery, needed 5 ribs removed left side    SYMPOTHATIC NERVE LEFT SIDE      1996 with lung surgery    TONSILLECTOMY      TONSILLECTOMY, ADENOIDECTOMY, BILATERAL MYRINGOTOMY AND TUBES      ULTRASOUND-ENDOSCOPIC-UPPER N/A 10/31/2017    Performed by Robbie Gonzales MD at [facility] ENDO (2ND FLR)    UPPER GASTROINTESTINAL ENDOSCOPY  05/2016    Dr. Koo    VASCULAR SURGERY      stents place in jugualr vein     Family History   Problem Relation Age of Onset    Allergic rhinitis Father     Cancer Father     Hypertension Father     Allergies Father     Allergic rhinitis Son     Asthma Son     Stroke Mother     Allergies Mother     Allergic rhinitis Sister     Asthma Sister     Diverticulitis Brother     No Known Problems Daughter     No Known Problems Maternal Aunt     No Known Problems Maternal Uncle     No Known Problems Paternal Aunt     No Known Problems Paternal Uncle     Colon  polyps Maternal Grandmother          of colon blockage    No Known Problems Maternal Grandfather     No Known Problems Paternal Grandmother     No Known Problems Paternal Grandfather     No Known Problems Brother     No Known Problems Sister     Angioedema Neg Hx     Eczema Neg Hx     Immunodeficiency Neg Hx     Urticaria Neg Hx     Skin cancer Neg Hx     Amblyopia Neg Hx     Blindness Neg Hx     Cataracts Neg Hx     Diabetes Neg Hx     Glaucoma Neg Hx     Macular degeneration Neg Hx     Retinal detachment Neg Hx     Strabismus Neg Hx     Thyroid disease Neg Hx     Colon cancer Neg Hx     Melanoma Neg Hx      Social History     Tobacco Use    Smoking status: Former Smoker     Packs/day: 2.00     Years: 20.00     Pack years: 40.00     Types: Cigarettes     Last attempt to quit: 1994     Years since quittin.7    Smokeless tobacco: Never Used   Substance Use Topics    Alcohol use: No    Drug use: No       Review of Systems:  Constitutional: no fever or chills  Respiratory: no cough or shortness of breath  Cardiovascular: no chest pain or palpitations  Gastrointestinal: no nausea or vomiting, no abdominal pain or change in bowel habits    OBJECTIVE:     Vital Signs (Most Recent)  Temp: 97.9 °F (36.6 °C) (10/03/18 1127)    Physical Exam:  General: well developed, well nourished  Lungs:  normal respiratory effort  Heart: regular rate, S1, S2 normal  Abdomen: soft, non-tender non-distented; bowel sounds normal; no masses,  no organomegaly    Laboratory  CBC: No results for input(s): WBC, RBC, HGB, HCT, PLT, MCV, MCH, MCHC in the last 168 hours.  CMP: No results for input(s): GLU, CALCIUM, ALBUMIN, PROT, NA, K, CO2, CL, BUN, CREATININE, ALKPHOS, ALT, AST, BILITOT in the last 168 hours.  Coagulation: No results for input(s): LABPROT, INR, APTT in the last 168 hours.      Diagnostic Results:      ASSESSMENT/PLAN:     Esophageal squamous dysplasia    Plan: EGD    Anesthesia Plan:  MAC    ASA Grade: ASA 3 - Patient with moderate systemic disease with functional limitations     The impression and plan was discussed in detail with the patient. All questions have been answered and the patient voices understanding of our plan at this point. The risk of the procedure was discussed in detail which includes but not limited to bleeding, infection, perforation in some cases requiring surgery with its spectrum of complications.

## 2018-10-03 NOTE — [employer] PATIENT INSTRUCTIONS
Discharge Summary/Instructions after an Endoscopic Procedure  Patient Name: Steph Raymundo  Patient MRN: [id number]  Patient YOB: 1950 Wednesday, October 03, 2018  Robbie Gonzales MD  RESTRICTIONS:  During your procedure today, you received medications for sedation.  These   medications may affect your judgment, balance and coordination.  Therefore,   for 24 hours, you have the following restrictions:   - DO NOT drive a car, operate machinery, make legal/financial decisions,   sign important papers or drink alcohol.    ACTIVITY:  Today: no heavy lifting, straining or running due to procedural   sedation/anesthesia.  The following day: return to full activity including work.  DIET:  Eat and drink normally unless instructed otherwise.     TREATMENT FOR COMMON SIDE EFFECTS:  - Mild abdominal pain, nausea, belching, bloating or excessive gas:  rest,   eat lightly and use a heating pad.  - Sore Throat: treat with throat lozenges and/or gargle with warm salt   water.  - Because air was used during the procedure, expelling large amounts of air   from your rectum or belching is normal.  - If a bowel prep was taken, you may not have a bowel movement for 1-3 days.    This is normal.  SYMPTOMS TO WATCH FOR AND REPORT TO YOUR PHYSICIAN:  1. Abdominal pain or bloating, other than gas cramps.  2. Chest pain.  3. Back pain.  4. Signs of infection such as: chills or fever occurring within 24 hours   after the procedure.  5. Rectal bleeding, which would show as bright red, maroon, or black stools.   (A tablespoon of blood from the rectum is not serious, especially if   hemorrhoids are present.)  6. Vomiting.  7. Weakness or dizziness.  GO DIRECTLY TO THE NEAREST EMERGENCY ROOM IF YOU HAVE ANY OF THE FOLLOWING:      Difficulty breathing              Chills and/or fever over 101 F   Persistent vomiting and/or vomiting blood   Severe abdominal pain   Severe chest pain   Black, tarry stools   Bleeding- more than one  tablespoon   Any other symptom or condition that you feel may need urgent attention  Your doctor recommends these additional instructions:  If any biopsies were taken, your doctors clinic will contact you in 1 to 2   weeks with any results.  - Discharge patient to home (ambulatory).   - Await pathology results.   - Use Protonix (pantoprazole) 40 mg PO BID.   - Use sucralfate suspension 1 gram PO BID.  For questions, problems or results please call your physician - Robbie Gonzales MD at Work:  [phone].  [facility], EMERGENCY ROOM PHONE NUMBER: [phone]  IF A COMPLICATION OR EMERGENCY SITUATION ARISES AND YOU ARE UNABLE TO REACH   YOUR PHYSICIAN - GO DIRECTLY TO THE EMERGENCY ROOM.  Robbie Gonzales MD  10/3/2018 12:16:47 PM  This report has been verified and signed electronically.  [employer]

## 2018-10-03 NOTE — PROGRESS NOTES
Last 5 Patient Entered Readings                                      Current 30 Day Average: 123/77     Recent Readings 10/10/2018 10/9/2018 10/2/2018 10/1/2018 9/29/2018    SBP (mmHg) 106 119 123 136 117    DBP (mmHg) 67 75 86 79 69    Pulse 85 90 103 94 100        Called patient to introduce him/her to the Hypertension Digital Medicine Program.     Ms. Lira's BP average is at goal. She is not currently taking any antihypertensive medications. She tried metoprolol and carvedilol in the past but both caused extreme fatigue. She has A. Fib and is wearing an event monitor to determine when/how often she is in A. Fib. She has follow up with her PCP and cardiology in the next 2 months.     Reviewed patient's medications and verified allergies on file.     Explained that we expect her to obtain several blood pressures/week at random times of day. Also asked that the BP be taken at least 1 hour after taking BP medications.     Explained that our goal is to get her BP to consistently below 130/80mmHg.     Patient and I agreed that the patient will take her BP daily to every other day at varying times of the day.     Emailed patient link to [facility] HT[facility] webpage as well as my direct phone number in case she has any questions.

## 2018-10-10 ENCOUNTER — TELEPHONE (OUTPATIENT)
Dept: ENDOSCOPY | Facility: HOSPITAL | Age: 68
End: 2018-10-10

## 2018-10-10 ENCOUNTER — PATIENT MESSAGE (OUTPATIENT)
Dept: GASTROENTEROLOGY | Facility: CLINIC | Age: 68
End: 2018-10-10

## 2018-10-10 RX ORDER — SUCRALFATE 1 G/10ML
1 SUSPENSION ORAL 2 TIMES DAILY
Qty: 600 ML | Refills: 0 | Status: SHIPPED | OUTPATIENT
Start: 2018-10-10 | End: 2018-11-09

## 2018-10-10 NOTE — TELEPHONE ENCOUNTER
Says she is having problems with constipation. She is not able to have BMs unless she takes laxative. She says the stool is very small in caliber when she passes it. It has been going on for 2 to 3 years.

## 2018-10-10 NOTE — TELEPHONE ENCOUNTER
----- Message from Robbie Gonzales MD sent at 10/9/2018  6:13 PM CDT -----  Please let the patient know the biopsies showed inflammation. No dysplasia or cancer. Need EGD with possible cryotherapy in 1 month from the last EGD.

## 2018-10-11 ENCOUNTER — PATIENT MESSAGE (OUTPATIENT)
Dept: ENDOSCOPY | Facility: HOSPITAL | Age: 68
End: 2018-10-11

## 2018-10-12 ENCOUNTER — PATIENT MESSAGE (OUTPATIENT)
Dept: CARDIOLOGY | Facility: CLINIC | Age: 68
End: 2018-10-12

## 2018-10-17 ENCOUNTER — TELEPHONE (OUTPATIENT)
Dept: ENDOSCOPY | Facility: HOSPITAL | Age: 68
End: 2018-10-17

## 2018-10-17 NOTE — TELEPHONE ENCOUNTER
Instructions mailed for EGD scheduled 11/1/18 at 1100.  She will hold Xarelto for 2 days prior to procedure as before with permission from Dr Henry Wei.

## 2018-10-18 ENCOUNTER — PATIENT MESSAGE (OUTPATIENT)
Dept: CARDIOLOGY | Facility: CLINIC | Age: 68
End: 2018-10-18

## 2018-10-24 ENCOUNTER — PATIENT OUTREACH (OUTPATIENT)
Dept: ADMINISTRATIVE | Facility: HOSPITAL | Age: 68
End: 2018-10-24

## 2018-10-24 DIAGNOSIS — Z78.0 ASYMPTOMATIC MENOPAUSAL STATE: Primary | ICD-10-CM

## 2018-10-25 ENCOUNTER — PATIENT OUTREACH (OUTPATIENT)
Dept: OTHER | Facility: OTHER | Age: 68
End: 2018-10-25

## 2018-10-25 ENCOUNTER — HOSPITAL ENCOUNTER (OUTPATIENT)
Dept: RADIOLOGY | Facility: HOSPITAL | Age: 68
Discharge: HOME | End: 2018-10-25
Attending: FAMILY MEDICINE
Payer: MEDICARE

## 2018-10-25 DIAGNOSIS — Z78.0 ASYMPTOMATIC MENOPAUSAL STATE: ICD-10-CM

## 2018-10-25 PROCEDURE — 77080 DXA BONE DENSITY AXIAL: CPT | Mod: TC

## 2018-10-25 PROCEDURE — 77080 DXA BONE DENSITY AXIAL: CPT | Mod: 26,,, | Performed by: RADIOLOGY

## 2018-10-25 NOTE — PROGRESS NOTES
Last 5 Patient Entered Readings                                      Current 30 Day Average: 126/82     Recent Readings 10/24/2018 10/23/2018 10/21/2018 10/19/2018 10/18/2018    SBP (mmHg) 115 117 122 118 143    DBP (mmHg) 78 84 93 82 86    Pulse 113 116 137 126 110        Patient states that she is at the vet with her cat and requests that I call back another time

## 2018-10-29 NOTE — PROGRESS NOTES
Last 5 Patient Entered Readings                                      Current 30 Day Average: 126/81     Recent Readings 10/26/2018 10/24/2018 10/23/2018 10/21/2018 10/19/2018    SBP (mmHg) 118 115 117 122 118    DBP (mmHg) 72 78 84 93 82    Pulse 110 113 116 137 126        Digital Medicine: Health  Follow Up    Lifestyle Modifications:    1.Dietary Modifications (Sodium intake <2,000mg/day, food labels, dining out):     2.Physical Activity:    Patient has been doing fast walking for cardio- tries to do every week. Has a [place] hilton who likes the walks as well. Trying to stay busy.     3.Medication Therapy: Patient has been compliant with the medication regimen.    4.Patient has the following medication side effects/concerns:   (Frequency/Alleviating factors/Precipitating factors, etc.)     Follow up with Ms. Steph Lira completed. No further questions or concerns. Will continue to follow up to achieve health goals.    Asked patient about increased BP on 10/17. Discusses that she could not identify any causes. Asked patient to think about diet, stress, activity, etc if this happens in the future.

## 2018-10-31 ENCOUNTER — ANESTHESIA EVENT (OUTPATIENT)
Dept: ENDOSCOPY | Facility: HOSPITAL | Age: 68
End: 2018-10-31
Payer: MEDICARE

## 2018-10-31 ENCOUNTER — OFFICE VISIT (OUTPATIENT)
Dept: FAMILY MEDICINE | Facility: CLINIC | Age: 68
End: 2018-10-31
Payer: MEDICARE

## 2018-10-31 VITALS
WEIGHT: 139.56 LBS | BODY MASS INDEX: 22.43 KG/M2 | HEIGHT: 66 IN | SYSTOLIC BLOOD PRESSURE: 133 MMHG | TEMPERATURE: 98 F | HEART RATE: 113 BPM | DIASTOLIC BLOOD PRESSURE: 84 MMHG

## 2018-10-31 DIAGNOSIS — J39.2 THROAT IRRITATION: ICD-10-CM

## 2018-10-31 PROCEDURE — 99999 PR PBB SHADOW E&M-EST. PATIENT-LVL IV: CPT | Mod: PBBFAC,,, | Performed by: FAMILY MEDICINE

## 2018-10-31 PROCEDURE — 99213 OFFICE O/P EST LOW 20 MIN: CPT | Mod: S$PBB,,, | Performed by: FAMILY MEDICINE

## 2018-10-31 PROCEDURE — 99214 OFFICE O/P EST MOD 30 MIN: CPT | Mod: PBBFAC,PO | Performed by: FAMILY MEDICINE

## 2018-10-31 NOTE — PROGRESS NOTES
Subjective:       Patient ID: Steph Lira is a 68 y.o. female.    Chief Complaint: Sore Throat; Cough; and Nasal Congestion    Pt here today b/c she had some throat irritation and has egd bucky for tomm. She was just concerned for possible throat etiology.      Review of Systems   Respiratory: Negative for shortness of breath.    Cardiovascular: Negative for chest pain.   Gastrointestinal: Negative for abdominal pain.       Objective:      Physical Exam   Constitutional: She appears well-developed and well-nourished. No distress.   HENT:   Head: Normocephalic and atraumatic.   Nose: Nose normal.   Mouth/Throat: Oropharynx is clear and moist.   Pulmonary/Chest: Effort normal. No respiratory distress.   Skin: Skin is warm and dry. No rash noted. She is not diaphoretic. No erythema.   Nursing note and vitals reviewed.      Assessment:       1. Throat irritation        Plan:     Problem List Items Addressed This Visit        ENT    Throat irritation    Current Assessment & Plan     Cont with plan to be seen tomm for EGD. OPB and no redness to throat.

## 2018-11-01 ENCOUNTER — ANESTHESIA (OUTPATIENT)
Dept: ENDOSCOPY | Facility: HOSPITAL | Age: 68
End: 2018-11-01
Payer: MEDICARE

## 2018-11-01 ENCOUNTER — HOSPITAL ENCOUNTER (OUTPATIENT)
Facility: HOSPITAL | Age: 68
Discharge: HOME | End: 2018-11-01
Attending: INTERNAL MEDICINE | Admitting: INTERNAL MEDICINE
Payer: MEDICARE

## 2018-11-01 VITALS
DIASTOLIC BLOOD PRESSURE: 59 MMHG | HEART RATE: 90 BPM | RESPIRATION RATE: 16 BRPM | SYSTOLIC BLOOD PRESSURE: 117 MMHG | BODY MASS INDEX: 22.5 KG/M2 | OXYGEN SATURATION: 99 % | TEMPERATURE: 98 F | WEIGHT: 140 LBS | HEIGHT: 66 IN

## 2018-11-01 DIAGNOSIS — K22.9 ESOPHAGEAL LESION: Primary | ICD-10-CM

## 2018-11-01 PROCEDURE — 25000003 PHARM REV CODE 250: Performed by: INTERNAL MEDICINE

## 2018-11-01 PROCEDURE — 27201679 HC KIT, CRYOTHERAPY SPRAY: Performed by: INTERNAL MEDICINE

## 2018-11-01 PROCEDURE — 37000009 HC ANESTHESIA EA ADD 15 MINS: Performed by: INTERNAL MEDICINE

## 2018-11-01 PROCEDURE — 43270 EGD LESION ABLATION: CPT | Mod: ,,, | Performed by: INTERNAL MEDICINE

## 2018-11-01 PROCEDURE — 43270 EGD LESION ABLATION: CPT | Performed by: INTERNAL MEDICINE

## 2018-11-01 PROCEDURE — D9220A PRA ANESTHESIA: Mod: CRNA,,, | Performed by: NURSE ANESTHETIST, CERTIFIED REGISTERED

## 2018-11-01 PROCEDURE — 63600175 PHARM REV CODE 636 W HCPCS: Performed by: NURSE ANESTHETIST, CERTIFIED REGISTERED

## 2018-11-01 PROCEDURE — 25000003 PHARM REV CODE 250: Performed by: NURSE ANESTHETIST, CERTIFIED REGISTERED

## 2018-11-01 PROCEDURE — 37000008 HC ANESTHESIA 1ST 15 MINUTES: Performed by: INTERNAL MEDICINE

## 2018-11-01 PROCEDURE — D9220A PRA ANESTHESIA: Mod: ANES,,, | Performed by: ANESTHESIOLOGY

## 2018-11-01 RX ORDER — FLUCONAZOLE 10 MG/ML
100 POWDER, FOR SUSPENSION ORAL DAILY
Qty: 210 ML | Refills: 0 | Status: SHIPPED | OUTPATIENT
Start: 2018-11-01 | End: 2018-11-20

## 2018-11-01 RX ORDER — FENTANYL CITRATE 50 UG/ML
INJECTION, SOLUTION INTRAMUSCULAR; INTRAVENOUS
Status: DISCONTINUED | OUTPATIENT
Start: 2018-11-01 | End: 2018-11-01

## 2018-11-01 RX ORDER — LIDOCAINE HCL/PF 100 MG/5ML
SYRINGE (ML) INTRAVENOUS
Status: DISCONTINUED | OUTPATIENT
Start: 2018-11-01 | End: 2018-11-01

## 2018-11-01 RX ORDER — SODIUM CHLORIDE 0.9 % (FLUSH) 0.9 %
3 SYRINGE (ML) INJECTION
Status: DISCONTINUED | OUTPATIENT
Start: 2018-11-01 | End: 2018-11-01 | Stop reason: HOSPADM

## 2018-11-01 RX ORDER — SODIUM CHLORIDE 9 MG/ML
INJECTION, SOLUTION INTRAVENOUS CONTINUOUS
Status: DISCONTINUED | OUTPATIENT
Start: 2018-11-01 | End: 2018-11-01 | Stop reason: HOSPADM

## 2018-11-01 RX ORDER — PHENYLEPHRINE HYDROCHLORIDE 10 MG/ML
INJECTION INTRAVENOUS
Status: DISCONTINUED | OUTPATIENT
Start: 2018-11-01 | End: 2018-11-01

## 2018-11-01 RX ORDER — PROPOFOL 10 MG/ML
VIAL (ML) INTRAVENOUS CONTINUOUS PRN
Status: DISCONTINUED | OUTPATIENT
Start: 2018-11-01 | End: 2018-11-01

## 2018-11-01 RX ORDER — GLYCOPYRROLATE 0.2 MG/ML
INJECTION INTRAMUSCULAR; INTRAVENOUS
Status: DISCONTINUED | OUTPATIENT
Start: 2018-11-01 | End: 2018-11-01

## 2018-11-01 RX ORDER — LABETALOL HYDROCHLORIDE 5 MG/ML
INJECTION, SOLUTION INTRAVENOUS
Status: DISCONTINUED | OUTPATIENT
Start: 2018-11-01 | End: 2018-11-01

## 2018-11-01 RX ORDER — MIDAZOLAM HYDROCHLORIDE 1 MG/ML
INJECTION, SOLUTION INTRAMUSCULAR; INTRAVENOUS
Status: DISCONTINUED | OUTPATIENT
Start: 2018-11-01 | End: 2018-11-01

## 2018-11-01 RX ORDER — PROPOFOL 10 MG/ML
VIAL (ML) INTRAVENOUS
Status: DISCONTINUED | OUTPATIENT
Start: 2018-11-01 | End: 2018-11-01

## 2018-11-01 RX ADMIN — FENTANYL CITRATE 25 MCG: 50 INJECTION, SOLUTION INTRAMUSCULAR; INTRAVENOUS at 10:11

## 2018-11-01 RX ADMIN — SODIUM CHLORIDE: 0.9 INJECTION, SOLUTION INTRAVENOUS at 09:11

## 2018-11-01 RX ADMIN — PHENYLEPHRINE HYDROCHLORIDE 100 MCG: 10 INJECTION INTRAVENOUS at 10:11

## 2018-11-01 RX ADMIN — MIDAZOLAM HYDROCHLORIDE 1 MG: 1 INJECTION, SOLUTION INTRAMUSCULAR; INTRAVENOUS at 10:11

## 2018-11-01 RX ADMIN — PROPOFOL 50 MG: 10 INJECTION, EMULSION INTRAVENOUS at 10:11

## 2018-11-01 RX ADMIN — PROPOFOL 200 MCG/KG/MIN: 10 INJECTION, EMULSION INTRAVENOUS at 10:11

## 2018-11-01 RX ADMIN — GLYCOPYRROLATE 0.1 MG: 0.2 INJECTION, SOLUTION INTRAMUSCULAR; INTRAVENOUS at 10:11

## 2018-11-01 RX ADMIN — LABETALOL HYDROCHLORIDE 5 MG: 5 INJECTION, SOLUTION INTRAVENOUS at 10:11

## 2018-11-01 RX ADMIN — LIDOCAINE HYDROCHLORIDE 50 MG: 20 INJECTION, SOLUTION INTRAVENOUS at 10:11

## 2018-11-01 RX ADMIN — PHENYLEPHRINE HYDROCHLORIDE 200 MCG: 10 INJECTION INTRAVENOUS at 10:11

## 2018-11-01 NOTE — [employer]OVATION PATIENT INSTRUCTIONS
Discharge Summary/Instructions after an Endoscopic Procedure  Patient Name: Steph Raymundo  Patient MRN: [id number]  Patient YOB: 1950 Thursday, November 01, 2018  Robbie Gonzales MD  RESTRICTIONS:  During your procedure today, you received medications for sedation.  These   medications may affect your judgment, balance and coordination.  Therefore,   for 24 hours, you have the following restrictions:   - DO NOT drive a car, operate machinery, make legal/financial decisions,   sign important papers or drink alcohol.    ACTIVITY:  Today: no heavy lifting, straining or running due to procedural   sedation/anesthesia.  The following day: return to full activity including work.  DIET:  Eat and drink normally unless instructed otherwise.     TREATMENT FOR COMMON SIDE EFFECTS:  - Mild abdominal pain, nausea, belching, bloating or excessive gas:  rest,   eat lightly and use a heating pad.  - Sore Throat: treat with throat lozenges and/or gargle with warm salt   water.  - Because air was used during the procedure, expelling large amounts of air   from your rectum or belching is normal.  - If a bowel prep was taken, you may not have a bowel movement for 1-3 days.    This is normal.  SYMPTOMS TO WATCH FOR AND REPORT TO YOUR PHYSICIAN:  1. Abdominal pain or bloating, other than gas cramps.  2. Chest pain.  3. Back pain.  4. Signs of infection such as: chills or fever occurring within 24 hours   after the procedure.  5. Rectal bleeding, which would show as bright red, maroon, or black stools.   (A tablespoon of blood from the rectum is not serious, especially if   hemorrhoids are present.)  6. Vomiting.  7. Weakness or dizziness.  GO DIRECTLY TO THE NEAREST EMERGENCY ROOM IF YOU HAVE ANY OF THE FOLLOWING:      Difficulty breathing              Chills and/or fever over 101 F   Persistent vomiting and/or vomiting blood   Severe abdominal pain   Severe chest pain   Black, tarry stools   Bleeding- more than one  tablespoon   Any other symptom or condition that you feel may need urgent attention  Your doctor recommends these additional instructions:  If any biopsies were taken, your doctors clinic will contact you in 1 to 2   weeks with any results.  - Discharge patient to home (ambulatory).   - Repeat upper endoscopy in 10 weeks for retreatment.   - Diflucan for 21 days.  - Resume previous diet.  For questions, problems or results please call your physician - Robbie Gonzales MD at Work:  [phone].  [facility], EMERGENCY ROOM PHONE NUMBER: [phone]  IF A COMPLICATION OR EMERGENCY SITUATION ARISES AND YOU ARE UNABLE TO REACH   YOUR PHYSICIAN - GO DIRECTLY TO THE EMERGENCY ROOM.  Robbie Gonzales MD  11/1/2018 10:46:13 AM  This report has been verified and signed electronically.  [employer]ATION

## 2018-11-01 NOTE — DISCHARGE SUMMARY
Discharge Summary/Instructions after an Endoscopic Procedure    Patient Name: Steph Raymundo  Patient MRN: [id number]  Patient YOB: 1950 Thursday, November 01, 2018  Robbie Gonzales MD    RESTRICTIONS:  During your procedure today, you received medications for sedation.  These medications may affect your judgment, balance and coordination.  Therefore, for 24 hours, you have the following restrictions:     - DO NOT drive a car, operate machinery, make legal/financial decisions, sign important papers or drink alcohol.      ACTIVITY:  Today: no heavy lifting, straining or running due to procedural sedation/anesthesia.  The following day: return to full activity including work.    DIET:  Eat and drink normally unless instructed otherwise.     TREATMENT FOR COMMON SIDE EFFECTS:  - Mild abdominal pain, nausea, belching, bloating or excessive gas:  rest, eat lightly and use a heating pad.  - Sore Throat: treat with throat lozenges and/or gargle with warm salt water.  - Because air was used during the procedure, expelling large amounts of air from your rectum or belching is normal.  - If a bowel prep was taken, you may not have a bowel movement for 1-3 days.  This is normal.      SYMPTOMS TO WATCH FOR AND REPORT TO YOUR PHYSICIAN:  1. Abdominal pain or bloating, other than gas cramps.  2. Chest pain.  3. Back pain.  4. Signs of infection such as: chills or fever occurring within 24 hours after the procedure.  5. Rectal bleeding, which would show as bright red, maroon, or black stools. (A tablespoon of blood from the rectum is not serious, especially if hemorrhoids are present.)  6. Vomiting.  7. Weakness or dizziness.      GO DIRECTLY TO THE NEAREST EMERGENCY ROOM IF YOU HAVE ANY OF THE FOLLOWING:     Difficulty breathing              Chills and/or fever over 101 F   Persistent vomiting and/or vomiting blood   Severe abdominal pain   Severe chest pain   Black, tarry stools   Bleeding- more than one tablespoon   Any  other symptom or condition that you feel may need urgent attention    Your doctor recommends these additional instructions:  If any biopsies were taken, your doctors clinic will contact you in 1 to 2 weeks with any results.    - Discharge patient to home (ambulatory).   - Repeat upper endoscopy in 10 weeks for retreatment.   - Diflucan for 21 days.  - Resume previous diet.    For questions, problems or results please call your physician - Robbie Gonzales MD at Work:  [phone].    [facility], EMERGENCY ROOM PHONE NUMBER: [phone]    IF A COMPLICATION OR EMERGENCY SITUATION ARISES AND YOU ARE UNABLE TO REACH YOUR PHYSICIAN - GO DIRECTLY TO THE EMERGENCY ROOM.

## 2018-11-01 NOTE — H&P
History & Physical - Short Stay  Gastroenterology      SUBJECTIVE:     Procedure: EGD    Chief Complaint/Indication for Procedure: Esophageal squamous dysplasia and esophageal stenosis.    History of Present Illness:  Patient is a 68 y.o. female presents with history of esophageal stenosis and squamous dysplasia treated with cryotherapy here for follow up.    PTA Medications   Medication Sig    ADVAIR DISKUS 250-50 mcg/dose diskus inhaler USE 1 INHALATION TWICE A DAY (RINSE MOUTH AFTER USE TO PREVENT THRUSH)    albuterol (ACCUNEB) 1.25 mg/3 mL Nebu USE 1 VIAL (3 ML) VIA NEBULIZER EVERY 6 HOURS AS NEEDED    albuterol (PROVENTIL HFA) 90 mcg/actuation inhaler Inhale 2 puffs into the lungs every 6 (six) hours as needed for Wheezing.    atorvastatin (LIPITOR) 40 MG tablet Take 1 tablet (40 mg total) by mouth once daily.    bisacodyl (DULCOLAX) 5 mg EC tablet Take 1 tablet (5 mg total) by mouth daily as needed for Constipation.    CALCIUM CITRATE (CITRACAL) 500 mg TbEF Take 500 mg by mouth. 1 Tablet, Effervescent Oral  daily    cetirizine (ZYRTEC) 10 MG tablet TAKE 1 TABLET EVERY EVENING    desoximetasone (TOPICORT) 0.25 % cream Apply topically 2 (two) times daily.    diclofenac sodium (VOLTAREN) 1 % Gel Apply 2 g topically once daily.    diphenhydrAMINE (BENADRYL) 25 mg capsule Take 25 mg by mouth every 6 (six) hours as needed for Itching.    flecainide (TAMBOCOR) 50 MG Tab Take 2 tablets (100 mg total) by mouth every 12 (twelve) hours. (Patient taking differently: Take 50 mg by mouth once daily. )    FLUAD [id number], 65 YR UP,,PF, 45 mcg (15 mcg x 3)/0.5 mL Syrg ADMINISTER 0.5ML IN THE MUSCLE AS DIRECTED    fluticasone (FLONASE) 50 mcg/actuation nasal spray 1 spray by Each Nare route once daily.    ketoconazole (NIZORAL) 2 % shampoo Wash hair with medicated shampoo at least 2x/week - let sit on scalp at least 5 minutes prior to rinsing    levothyroxine (SYNTHROID) 88 MCG tablet Take 1 tablet (88 mcg  total) by mouth once daily.    montelukast (SINGULAIR) 10 mg tablet Take 1 tablet (10 mg total) by mouth every evening.    multivitamin-minerals-lutein (CENTRUM SILVER) Tab Take 1 tablet by mouth once daily. 1 Tablet Oral Every day    pantoprazole (PROTONIX) 40 MG tablet TAKE 1 TABLET DAILY 30 MINUTES PRIOR TO BREAKFAST    polyethylene glycol (GLYCOLAX) 17 gram/dose powder Take 17 g by mouth once daily.    RETIN-A 0.05 % cream APPLY THIN FILM TO FACE AT NIGHT AFTER MOISTURIZING    rivaroxaban (XARELTO) 20 mg Tab Take 1 tablet (20 mg total) by mouth once daily.    SPIRIVA WITH HANDIHALER 18 mcg inhalation capsule INHALE THE CONTENTS OF 1 CAPSULE DAILY    sucralfate (CARAFATE) 100 mg/mL suspension Take 10 mLs (1 g total) by mouth 2 (two) times daily.    triamcinolone acetonide 0.1% (KENALOG) 0.1 % cream Apply topically 2 (two) times daily.       Review of patient's allergies indicates:   Allergen Reactions    Ciprofloxacin Itching    Coreg [carvedilol]      Low energy    Doxycycline Other (See Comments)     Patient had a rxn with the sun despite wearing spf 30    Metoprolol      Low energy        Past Medical History:   Diagnosis Date    *Atrial fibrillation     and Hx PSVT    Allergic drug rash 12/28/2016    Allergy     chronic     Arthritis     fingers    Benign tumor of throat     Bronchitis March 2013    CAD (coronary artery disease) 2005    CABGx2 in 2005 and 2 MI after with 4 stents    Cancer 1996    small cell lung cancer s/p resection of 1/3 lung, 5 ribs and muscle mass then had chemo and radiation    Cataract     OD     CHF (congestive heart failure) past Hx    Chronic rhinitis     Clot past Hx    external jugular, now stented, occluded, had phlebitis; now revascularized    Colon polyp     COPD (chronic obstructive pulmonary disease)     no oxygen or nebulizers    COPD exacerbation 5/8/13    improved 5/14/13 after steroid pack,antibiotics    Former smoker     GERD  (gastroesophageal reflux disease)     Heart block     Hyperlipidemia     Hypertension     pt states does not have //    MI (myocardial infarction) 2005    Posterior vitreous detachment of left eye     Thyroid disease      Past Surgical History:   Procedure Laterality Date    ADENOIDECTOMY      APPENDECTOMY      BACK SURGERY      lumbar    BREAST BIOPSY Left     benign    CARDIAC SURGERY      CATARACT EXTRACTION Left     OS    CATARACT EXTRACTION, BILATERAL      left eye only    CHOLECYSTECTOMY      COLONOSCOPY  03/2012    repeat in 5 years    COLONOSCOPY N/A 6/19/2017    Procedure: COLONOSCOPY;  Surgeon: Kal Elise MD;  Location: [facility];  Service: Endoscopy;  Laterality: N/A;    COLONOSCOPY N/A 6/19/2017    Performed by Kal Elise MD at [facility]    COLONOSCOPY N/A 3/28/2012    Performed by Garrison Koo MD at [facility]    CORONARY ARTERY BYPASS GRAFT  2005    2 vessel    EGD (ESOPHAGOGASTRODUODENOSCOPY)/poss cryo N/A 10/3/2018    Performed by Robbie Gonzales MD at [facility] ([address] [place] [address])    ESOPHAGOGASTRODUODENOSCOPY N/A 7/5/2018    Procedure: ESOPHAGOGASTRODUODENOSCOPY (EGD)/cryo;  Surgeon: Robbie Gonzales MD;  [facility]  Service: Endoscopy;  Laterality: N/A;  Eliquis/Dr Henry Wei/OK to hold med 2 days prior to egd/see telephone encounter dated 6/12/18/pt stated she needs to take Diflucan 1 tab po daily 12 days prior to egd, message sent to Sarah/she will check with Dr Gonzales/santo    ESOPHAGOGASTRODUODENOSCOPY N/A 10/3/2018    Procedure: EGD (ESOPHAGOGASTRODUODENOSCOPY)/poss cryo;  Surgeon: Robbie Gonzales MD;  [facility]  Service: Endoscopy;  Laterality: N/A;  2 day hold Dr Henry Pavon  Diflucan 1 tab po daily starting 14 days prior to egd, Rx'd per Dr Gonzales    ESOPHAGOGASTRODUODENOSCOPY (EGD) N/A 11/29/2017    Performed by Daniel Gillette MD at [facility]    ESOPHAGOGASTRODUODENOSCOPY (EGD) N/A  10/3/2017    Performed by Hue Wright MD at [facility] ENDO    ESOPHAGOGASTRODUODENOSCOPY (EGD) N/A 6/14/2017    Performed by Kal Elise MD at [facility] ENDO    ESOPHAGOGASTRODUODENOSCOPY (EGD) N/A 10/12/2016    Performed by Kal Elise MD at [facility] ENDO    ESOPHAGOGASTRODUODENOSCOPY (EGD) N/A 5/10/2016    Performed by Garrison Koo MD at [facility] ENDO    ESOPHAGOGASTRODUODENOSCOPY (EGD) W/CRYOTHERAPY N/A 12/22/2017    [facility] at [facility] END[facility]    ESOPHAGOGASTRODUODENOSCOPY (EGD) with cryo N/A 3/5/2018    Performed by [facility] at [facility] END[facility]    ESOPHAGOGASTRODUODENOSCOPY (EGD)/cryo N/A 7/5/2018    Performed by Robbie Gonzales MD at [facility] ENDO (2ND FLR)    ESOPHAGOGASTRODUODENOSCOPY (EGD)/cryo N/A 4/16/2018    Performed by Robbie Gonzales MD at [facility] ENDO (2ND FLR)    ESOPHAGOGASTRODUODENOSCOPY (EGD)/poss Cryo N/A 12/14/2017    Performed by Robbie Gonzales MD at [facility] ENDO (2ND FLR)    ESOPHAGOGASTRODUODENOSCOPY (EGD)/Poss EMR N/A 10/31/2017    Performed by [facility] Gonzales [facility] at [facility] (2ND FLR)    EYE SURGERY  Dec 2012    ptosis repair    FIRST RIB REMOVAL  1996 with lung resection    HEMORRHOIDECTOMY N/A 3/16/2018    Performed by Klaus Mandujano MD at [facility] OR    HYSTERECTOMY      LUNG LOBECTOMY  1996    left lung for cancer    phaco/pciol Right 11/5/2014    [facility] Guerline Lawson [facility]    REPAIR, BLEPHAROPTOSIS Left 12/6/2012    Performed by Eze Glynn MD at [facility] OR 1ST FLR    SHOULDER SURGERY  2010,2011    scapular entrapment after lung surgery, needed 5 ribs removed left side    SYMPOTHATIC NERVE LEFT SIDE      1996 with lung surgery    TONSILLECTOMY      TONSILLECTOMY, ADENOIDECTOMY, BILATERAL MYRINGOTOMY AND TUBES      ULTRASOUND-ENDOSCOPIC-UPPER N/A 10/31/2017    Performed by [facility]bie Gonzales [facility] at [facility] (2ND FLR)    UPPER GASTROINTESTINAL ENDOSCOPY  05/2016    Dr. Koo    VASCULAR SURGERY       stents place in jugualr vein     Family History   Problem Relation Age of Onset    Allergic rhinitis Father     Cancer Father     Hypertension Father     Allergies Father     Allergic rhinitis Son     Asthma Son     Stroke Mother     Allergies Mother     Allergic rhinitis Sister     Asthma Sister     Diverticulitis Brother     No Known Problems Daughter     No Known Problems Maternal Aunt     No Known Problems Maternal Uncle     No Known Problems Paternal Aunt     No Known Problems Paternal Uncle     Colon polyps Maternal Grandmother          of colon blockage    No Known Problems Maternal Grandfather     No Known Problems Paternal Grandmother     No Known Problems Paternal Grandfather     No Known Problems Brother     No Known Problems Sister     Angioedema Neg Hx     Eczema Neg Hx     Immunodeficiency Neg Hx     Urticaria Neg Hx     Skin cancer Neg Hx     Amblyopia Neg Hx     Blindness Neg Hx     Cataracts Neg Hx     Diabetes Neg Hx     Glaucoma Neg Hx     Macular degeneration Neg Hx     Retinal detachment Neg Hx     Strabismus Neg Hx     Thyroid disease Neg Hx     Colon cancer Neg Hx     Melanoma Neg Hx      Social History     Tobacco Use    Smoking status: Former Smoker     Packs/day: 2.00     Years: 20.00     Pack years: 40.00     Types: Cigarettes     Last attempt to quit: 1994     Years since quittin.8    Smokeless tobacco: Never Used   Substance Use Topics    Alcohol use: No    Drug use: No       Review of Systems:  Constitutional: no fever or chills  Respiratory: positive for cough  Cardiovascular: no chest pain or palpitations  Gastrointestinal: negative for abdominal pain    OBJECTIVE:     Vital Signs (Most Recent)       Physical Exam:  General: well developed, well nourished  Lungs:  normal respiratory effort  Heart: regular rate, S1, S2 normal  Abdomen: soft, non-tender non-distented; bowel sounds normal; no masses,  no  organomegaly    Laboratory  CBC: No results for input(s): WBC, RBC, HGB, HCT, PLT, MCV, MCH, MCHC in the last 168 hours.  CMP: No results for input(s): GLU, CALCIUM, ALBUMIN, PROT, NA, K, CO2, CL, BUN, CREATININE, ALKPHOS, ALT, AST, BILITOT in the last 168 hours.  Coagulation: No results for input(s): LABPROT, INR, APTT in the last 168 hours.      Diagnostic Results:      ASSESSMENT/PLAN:     Esophageal stenosis  Squamous dysplasia    Plan: EGD and possible cryotherapy    Anesthesia Plan: MAC    ASA Grade: ASA 3 - Patient with moderate systemic disease with functional limitations     The impression and plan was discussed in detail with the patient. All questions have been answered and the patient voices understanding of our plan at this point. The risk of the procedure was discussed in detail which includes but not limited to bleeding, infection, perforation in some cases requiring surgery with its spectrum of complications.

## 2018-11-01 NOTE — DISCHARGE INSTRUCTIONS

## 2018-11-01 NOTE — TRANSFER OF CARE
"Anesthesia Transfer of Care Note    Patient: Steph Lira    Procedure(s) Performed: Procedure(s) (LRB):  EGD (ESOPHAGOGASTRODUODENOSCOPY)/cryo (N/A)    Patient location: [facility]    Anesthesia Type: general    Transport from OR: Transported from OR on 2-3 L/min O2 by NC with adequate spontaneous ventilation    Post pain: adequate analgesia    Post assessment: tolerated procedure well and no apparent anesthetic complications    Post vital signs: stable    Level of consciousness: sedated    Nausea/Vomiting: no nausea/vomiting    Complications: none    Transfer of care protocol was followed      Last vitals:   Visit Vitals  BP (!) 95/55 (BP Location: Left arm, Patient Position: Lying)   Pulse 95   Temp 36.5 °C (97.7 °F) (Skin)   Resp 16   Ht 5' 6" (1.676 m)   Wt 63.5 kg (140 lb)   SpO2 100%   Breastfeeding? No   BMI 22.60 kg/m²     "

## 2018-11-01 NOTE — PLAN OF CARE
Patient and  given discharge and follow up instructions. No questions or concerns expressed at this time. Patient denies pain. Patient denies nausea and is tolerating PO liquids.

## 2018-11-01 NOTE — ANESTHESIA PREPROCEDURE EVALUATION
11/01/2018  Steph Lira is a 68 y.o., female.    Anesthesia Evaluation    I have reviewed the Patient Summary Reports.    I have reviewed the Nursing Notes.   I have reviewed the Medications.     Review of Systems  Anesthesia Hx:  No problems with previous Anesthesia  History of prior surgery of interest to airway management or planning: Denies Family Hx of Anesthesia complications.   Denies Personal Hx of Anesthesia complications.   Social:  Former Smoker    Hematology/Oncology:  Hematology Normal       -- Cancer in past history:    EENT/Dental:EENT/Dental Normal   Cardiovascular:   Exercise tolerance: good Hypertension, well controlled Past MI CAD asymptomatic CABG/stent Dysrhythmias atrial fibrillation  Denies Angina.         hyperlipidemia     ECG has been reviewed. Echo Ef 40-45%  On Xarelto   Pulmonary:   COPD, mild Asthma asymptomatic Denies Shortness of breath.    Renal/:  Renal/ Normal     Hepatic/GI:   GERD    Musculoskeletal:  Musculoskeletal Normal Arthritis      Neurological:  Neurology Normal    Endocrine:   Hypothyroidism    Dermatological:  Skin Normal    Psych:  Psychiatric Normal           Physical Exam  General:  Well nourished    Airway/Jaw/Neck:  Airway Findings: Mouth Opening: Normal Tongue: Normal  General Airway Assessment: Adult  Mallampati: II  TM Distance: Normal, at least 6 cm  Jaw/Neck Findings:         Dental:  Dental Findings: In tact   Chest/Lungs:  Chest/Lungs Findings: Clear to auscultation, Normal Respiratory Rate     Heart/Vascular:  Heart Findings: Rate: Normal  Rhythm: Irregularly Irregular  Sounds: Normal        Mental Status:  Mental Status Findings:  Cooperative, Alert and Oriented         Anesthesia Plan  Type of Anesthesia, risks & benefits discussed:  Anesthesia Type:  general  Patient's Preference:   Intra-op Monitoring Plan: standard ASA monitors  Intra-op  Monitoring Plan Comments:   Post Op Pain Control Plan:   Post Op Pain Control Plan Comments:   Induction:   IV  Beta Blocker:  Patient is not currently on a Beta-Blocker (No further documentation required).       Informed Consent: Patient understands risks and agrees with Anesthesia plan.  Questions answered. Anesthesia consent signed with patient.  ASA Score: 3     Day of Surgery Review of History & Physical:    H&P update referred to the provider.         Ready For Surgery From Anesthesia Perspective.

## 2018-11-01 NOTE — ANESTHESIA POSTPROCEDURE EVALUATION
"Anesthesia Post Evaluation    Patient: Steph Lira    Procedure(s) Performed: Procedure(s) (LRB):  EGD (ESOPHAGOGASTRODUODENOSCOPY)/cryo (N/A)    Final Anesthesia Type: general  Patient location during evaluation: PACU  Patient participation: Yes- Able to Participate  Level of consciousness: awake and alert and oriented  Post-procedure vital signs: reviewed and stable  Pain management: adequate  Airway patency: patent  PONV status at discharge: No PONV  Anesthetic complications: no      Cardiovascular status: blood pressure returned to baseline  Respiratory status: unassisted, room air and spontaneous ventilation  Hydration status: euvolemic  Follow-up not needed.        Visit Vitals  BP 99/62 (BP Location: Left arm, Patient Position: Lying)   Pulse 96   Temp 36.5 °C (97.7 °F) (Skin)   Resp 16   Ht 5' 6" (1.676 m)   Wt 63.5 kg (140 lb)   SpO2 100%   Breastfeeding? No   BMI 22.60 kg/m²       Pain/Tamera Score: Pain Assessment Performed: Yes (11/1/2018 10:37 AM)  Presence of Pain: non-verbal indicators absent (11/1/2018 10:37 AM)  Tamera Score: 7 (11/1/2018 10:37 AM)        "

## 2018-11-02 ENCOUNTER — PATIENT OUTREACH (OUTPATIENT)
Dept: OTHER | Facility: OTHER | Age: 68
End: 2018-11-02

## 2018-11-02 NOTE — PROGRESS NOTES
Last 5 Patient Entered Readings                                      Current 30 Day Average: 126/82     Recent Readings 10/31/2018 10/30/2018 10/26/2018 10/24/2018 10/23/2018    SBP (mmHg) 131 122 118 115 117    DBP (mmHg) 82 86 72 78 84    Pulse 106 114 110 113 116        Patient calls to let me know her BP was 140/100 this morning (not syncing, likely due to not opening AF83 rosanne). Patient states that she couldn't breathe, and went to the ER. States that she is feeling better now, and has just been admitted to the hospital. Feels fatigue right now.     Patient states that she was calling to let me know what has been going on since I instructed her to call if there were any changes.     Will notify pharmD and call back in 2 weeks.

## 2018-11-04 ENCOUNTER — PATIENT MESSAGE (OUTPATIENT)
Dept: CARDIOLOGY | Facility: CLINIC | Age: 68
End: 2018-11-04

## 2018-11-06 ENCOUNTER — OUTSIDE PLACE OF SERVICE (OUTPATIENT)
Dept: CARDIOLOGY | Facility: CLINIC | Age: 68
End: 2018-11-06
Payer: MEDICARE

## 2018-11-06 ENCOUNTER — TELEPHONE (OUTPATIENT)
Dept: ELECTROPHYSIOLOGY | Facility: CLINIC | Age: 68
End: 2018-11-06

## 2018-11-06 PROCEDURE — 99223 1ST HOSP IP/OBS HIGH 75: CPT | Mod: ,,, | Performed by: INTERNAL MEDICINE

## 2018-11-06 NOTE — TELEPHONE ENCOUNTER
Returned call to Pt. She was readmitted for afib yesterday and is scheduled fro a cardioversion on tomm.      ----- Message from Naz Motley MA sent at 11/5/2018  3:47 PM CST -----      ----- Message -----  From: Dalila Boswell  Sent: 11/5/2018   3:34 PM  To: [facility] Staff    Type: Calling back with update    Who Called:  Patient  Best Call Back Number: [phone]  Additional Information: Patient stated she was admitted into [facility] on Friday morning with Afib, released on Sunday and back in again this morning.

## 2018-11-07 ENCOUNTER — OUTSIDE PLACE OF SERVICE (OUTPATIENT)
Dept: CARDIOLOGY | Facility: CLINIC | Age: 68
End: 2018-11-07

## 2018-11-07 ENCOUNTER — OUTSIDE PLACE OF SERVICE (OUTPATIENT)
Dept: CARDIOLOGY | Facility: CLINIC | Age: 68
End: 2018-11-07
Payer: MEDICARE

## 2018-11-07 ENCOUNTER — PATIENT MESSAGE (OUTPATIENT)
Dept: FAMILY MEDICINE | Facility: CLINIC | Age: 68
End: 2018-11-07

## 2018-11-07 PROCEDURE — 99499 UNLISTED E&M SERVICE: CPT | Mod: ,,, | Performed by: INTERNAL MEDICINE

## 2018-11-07 PROCEDURE — 92960 CARDIOVERSION ELECTRIC EXT: CPT | Mod: ,,, | Performed by: INTERNAL MEDICINE

## 2018-11-08 ENCOUNTER — OUTSIDE PLACE OF SERVICE (OUTPATIENT)
Dept: CARDIOLOGY | Facility: CLINIC | Age: 68
End: 2018-11-08
Payer: MEDICARE

## 2018-11-08 PROCEDURE — 99232 SBSQ HOSP IP/OBS MODERATE 35: CPT | Mod: ,,, | Performed by: INTERNAL MEDICINE

## 2018-11-09 ENCOUNTER — TELEPHONE (OUTPATIENT)
Dept: FAMILY MEDICINE | Facility: CLINIC | Age: 68
End: 2018-11-09

## 2018-11-09 NOTE — TELEPHONE ENCOUNTER
----- Message from James Giles sent at 11/9/2018  8:34 AM CST -----  Contact: pt  Type:  Sooner Apoointment Request    Caller is requesting a sooner appointment.  Caller declined first available appointment listed below.  Caller will not accept being placed on the waitlist and is requesting a message be sent to doctor.    Name of Caller:  pt  When is the first available appointment?  Next year  Symptoms:  hosp f/u / (pt had another reason for rosanne but refused to disclose)  Best Call Back Number:    Additional Information:  Pt needs rosanne in 2 weeks

## 2018-11-12 ENCOUNTER — PATIENT MESSAGE (OUTPATIENT)
Dept: CARDIOLOGY | Facility: CLINIC | Age: 68
End: 2018-11-12

## 2018-11-12 ENCOUNTER — TELEPHONE (OUTPATIENT)
Dept: ENDOSCOPY | Facility: HOSPITAL | Age: 68
End: 2018-11-12

## 2018-11-12 ENCOUNTER — TELEPHONE (OUTPATIENT)
Dept: ELECTROPHYSIOLOGY | Facility: CLINIC | Age: 68
End: 2018-11-12

## 2018-11-12 DIAGNOSIS — C15.9 MALIGNANT NEOPLASM OF ESOPHAGUS, UNSPECIFIED LOCATION: Primary | ICD-10-CM

## 2018-11-12 NOTE — TELEPHONE ENCOUNTER
----- Message from John Hurtado sent at 11/12/2018  9:58 AM CST -----  Type:  Sooner Apoointment Request    Caller is requesting a sooner appointment.  Caller declined first available appointment listed below.  Caller will not accept being placed on the waitlist and is requesting a message be sent to doctor.    Name of Caller:  Patient  When is the first available appointment?  02/01  Symptoms:  Post hospital Follow Up  Best Call Back Number: [phone]  Additional Information:  Please call to advise

## 2018-11-12 NOTE — TELEPHONE ENCOUNTER
Please ask her the dose she was released. I am fairly sure it was 60 mg bid but please check with her.

## 2018-11-12 NOTE — TELEPHONE ENCOUNTER
Called pt & schedule 'urgent' f/u apt w/ Marci on 11/19/18.  Pt recently hospitalized & believes she's back in afib.

## 2018-11-15 ENCOUNTER — PATIENT OUTREACH (OUTPATIENT)
Dept: OTHER | Facility: OTHER | Age: 68
End: 2018-11-15

## 2018-11-15 NOTE — PROGRESS NOTES
"Last 5 Patient Entered Readings                                      Current 30 Day Average: 125/79     Recent Readings 11/13/2018 11/11/2018 11/5/2018 11/4/2018 10/31/2018    SBP (mmHg) 101 128 121 101 131    DBP (mmHg) 71 47 71 64 82    Pulse 108 77 53 94 106        Patient was released from hospital on Thursday- [facility] Might be doing an ablation. Patient states that she can feel a flutter sometimes, can't tell when Afib.     Patient has been feeling winded when she walks around, and then will sit back down. Sometimes is sleeping and wakes up feeling like her heart is "quivering". Patient states that she gets cranky when she is sick. Is frustrated, and looks forward to returning to her regular routine and feeling better.    Denies any resources at this time. Discussed that BP has been good, although heart rate has been varied.     "

## 2018-11-19 ENCOUNTER — PATIENT MESSAGE (OUTPATIENT)
Dept: ENDOSCOPY | Facility: HOSPITAL | Age: 68
End: 2018-11-19

## 2018-11-19 ENCOUNTER — OFFICE VISIT (OUTPATIENT)
Dept: ELECTROPHYSIOLOGY | Facility: CLINIC | Age: 68
End: 2018-11-19
Payer: MEDICARE

## 2018-11-19 ENCOUNTER — HOSPITAL ENCOUNTER (OUTPATIENT)
Dept: CARDIOLOGY | Facility: CLINIC | Age: 68
Discharge: HOME | End: 2018-11-19
Payer: MEDICARE

## 2018-11-19 VITALS
HEART RATE: 71 BPM | DIASTOLIC BLOOD PRESSURE: 74 MMHG | SYSTOLIC BLOOD PRESSURE: 114 MMHG | WEIGHT: 135.81 LBS | HEIGHT: 66 IN | BODY MASS INDEX: 21.83 KG/M2

## 2018-11-19 DIAGNOSIS — E03.9 ACQUIRED HYPOTHYROIDISM: ICD-10-CM

## 2018-11-19 DIAGNOSIS — E78.00 HYPERCHOLESTEREMIA: ICD-10-CM

## 2018-11-19 DIAGNOSIS — I10 ESSENTIAL HYPERTENSION: ICD-10-CM

## 2018-11-19 DIAGNOSIS — I48.0 PAF (PAROXYSMAL ATRIAL FIBRILLATION): Primary | ICD-10-CM

## 2018-11-19 DIAGNOSIS — I25.3 ANEURYSM OF HEART WALL: ICD-10-CM

## 2018-11-19 DIAGNOSIS — Z95.1 S/P CABG (CORONARY ARTERY BYPASS GRAFT): ICD-10-CM

## 2018-11-19 DIAGNOSIS — Z95.1 S/P CABG X 2: ICD-10-CM

## 2018-11-19 PROCEDURE — 99999 PR PBB SHADOW E&M-EST. PATIENT-LVL IV: CPT | Mod: PBBFAC,,, | Performed by: INTERNAL MEDICINE

## 2018-11-19 PROCEDURE — 93005 ELECTROCARDIOGRAM TRACING: CPT | Mod: PBBFAC | Performed by: INTERNAL MEDICINE

## 2018-11-19 PROCEDURE — 99215 OFFICE O/P EST HI 40 MIN: CPT | Mod: S$PBB,,, | Performed by: INTERNAL MEDICINE

## 2018-11-19 PROCEDURE — 93010 ELECTROCARDIOGRAM REPORT: CPT | Mod: S$PBB,,, | Performed by: INTERNAL MEDICINE

## 2018-11-19 PROCEDURE — 99214 OFFICE O/P EST MOD 30 MIN: CPT | Mod: PBBFAC,25 | Performed by: INTERNAL MEDICINE

## 2018-11-19 RX ORDER — DILTIAZEM HYDROCHLORIDE 60 MG/1
60 TABLET, FILM COATED ORAL 2 TIMES DAILY
Qty: 90 TABLET | Refills: 3 | Status: SHIPPED | OUTPATIENT
Start: 2018-11-19 | End: 2018-11-20 | Stop reason: SDUPTHER

## 2018-11-19 RX ORDER — DILTIAZEM HYDROCHLORIDE 60 MG/1
60 TABLET, FILM COATED ORAL 2 TIMES DAILY
Refills: 0 | COMMUNITY
Start: 2018-11-04 | End: 2018-11-19 | Stop reason: SDUPTHER

## 2018-11-19 NOTE — PROGRESS NOTES
Subjective:     [facility]    Cardiologist: Henry Wei MD    I had the pleasure of seeing Steph Lira in follow-up for her history of atrial fibrillation. 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 She has been on Flecainide for many years, which effectively suppressed her arrhythmias. Soraya had until recently been on a beta blocker as well, but beta blockade caused significant fatigue and was stopped. Recently there was an attempt to down-titrate Flecainide to 50 mg bid, but AF recurred approximately 1 year ago, requiring electrical cardioversion for sinus rhythm restoration. She is on Eliquis for stroke prophylaxis.     Episodes occur several times per year, manifesting as palpitations, lasting several days and generally requiring a hospital admission and IV medications or electrical cardioversion to resolve. She denies exacerbating or alleviating factors.     Recent cardiac studies include an echo performed in 8/2018 which showed an EF of 40-45%, moderate LAE, and mild AI. A pharmacologic NST done in 5/2018 showed no fixed or reversible perfusion defects.    I reviewed all ECGs in the EMR at her initial visit. An ECG from 1/24/2018 shows AT at 116 bpm. An ECG dated 5/27/2016 shows AF at 114 bpm. Finally, an ECG dated 5/18/2016 shows AFL at 146 bpm. All other ECGs dating back to 2002 show sinus rhythm.    At her initial visit we discussed options including switching antiarrhythmics vs PVI, but she wanted to hold the course. In 11/2018 Ms. Lira was admitted to [facility] with symptomatic AF, ultimately requiring DCCV to restore sinus rhythm.    My interpretation of today's ECG is NSR at 71 bpm.    Review of  Systems   Constitution: Negative for decreased appetite, malaise/fatigue, weight gain and weight loss.   HENT: Negative for sore throat.    Eyes: Negative for blurred vision.   Cardiovascular: Positive for palpitations. Negative for chest pain, dyspnea on exertion, irregular heartbeat, leg swelling, near-syncope, orthopnea, paroxysmal nocturnal dyspnea and syncope.   Respiratory: Negative for shortness of breath.    Skin: Negative for rash.   Musculoskeletal: Negative for arthritis.   Gastrointestinal: Positive for dysphagia. Negative for abdominal pain.   Neurological: Negative for focal weakness.   Psychiatric/Behavioral: Negative for altered mental status.        Objective:    Physical Exam   Constitutional: She is oriented to person, place, and time. She appears well-developed and well-nourished. No distress.   HENT:   Head: Normocephalic and atraumatic.   Mouth/Throat: Oropharynx is clear and moist.   Eyes: Conjunctivae are normal. Pupils are equal, round, and reactive to light. No scleral icterus.   Neck: Normal range of motion. Neck supple. No JVD present. No thyromegaly present.   Cardiovascular: Normal rate, regular rhythm, normal heart sounds and intact distal pulses. Exam reveals no gallop and no friction rub.   No murmur heard.  Pulmonary/Chest: Effort normal and breath sounds normal. No respiratory distress.   Abdominal: Soft. Bowel sounds are normal. She exhibits no distension.   Musculoskeletal: She exhibits no edema.   Neurological: She is alert and oriented to person, place, and time.   Skin: Skin is warm and dry.   Psychiatric: She has a normal mood and affect. Her behavior is normal.         Assessment:       1. PAF (paroxysmal atrial fibrillation), onset 2002    2. Hypercholesteremia    3. Essential hypertension    4. S/P CABG x 2, 2005    5. S/P CABG (coronary artery bypass graft)    6. Acquired hypothyroidism         Plan:       In summary, Steph Lira is a 68 year old female with a history  of symptomatic PAF. Her [id number] Score is 3 (age, female, CAD) which corresponds to a yearly risk of stroke without anticoagulation treatment estimated at 3.2%. At her request, I have switched her from Eliquis to Xarelto, as she attributes some recent joint pains to Eliquis. We again discussed in detail options including switching to a different antiarrhythmic, vs PVI. We specifically discussed the risks, benefits, indications, and alternatives to AF ablation. Risks discussed include bleeding, hematoma, vascular damage, cardiac tamponade, stroke, PV stenosis, AE fistula, phrenic nerve damage, and death.  After considering his options she has decided to proceed.     Hold Xarelto evening prior to procedure. Cardiac CT to define LA anatomy. Hold Flecainide 3 days prior to ablation. JUAN ANTONIO day prior--cancel if in NSR.    Thank you for allowing me to participate in the care of this patient. Please do not hesitate to call me with any questions or concerns.

## 2018-11-19 NOTE — H&P (VIEW-ONLY)
Subjective:     [facility]    Cardiologist: Henry Wei MD    I had the pleasure of seeing Steph Lira in follow-up for her history of atrial fibrillation. 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 She has been on Flecainide for many years, which effectively suppressed her arrhythmias. Soraya had until recently been on a beta blocker as well, but beta blockade caused significant fatigue and was stopped. Recently there was an attempt to down-titrate Flecainide to 50 mg bid, but AF recurred approximately 1 year ago, requiring electrical cardioversion for sinus rhythm restoration. She is on Eliquis for stroke prophylaxis.     Episodes occur several times per year, manifesting as palpitations, lasting several days and generally requiring a hospital admission and IV medications or electrical cardioversion to resolve. She denies exacerbating or alleviating factors.     Recent cardiac studies include an echo performed in 8/2018 which showed an EF of 40-45%, moderate LAE, and mild AI. A pharmacologic NST done in 5/2018 showed no fixed or reversible perfusion defects.    I reviewed all ECGs in the EMR at her initial visit. An ECG from 1/24/2018 shows AT at 116 bpm. An ECG dated 5/27/2016 shows AF at 114 bpm. Finally, an ECG dated 5/18/2016 shows AFL at 146 bpm. All other ECGs dating back to 2002 show sinus rhythm.    At her initial visit we discussed options including switching antiarrhythmics vs PVI, but she wanted to hold the course. In 11/2018 Ms. Lira was admitted to [facility] with symptomatic AF, ultimately requiring DCCV to restore sinus rhythm.    My interpretation of today's ECG is NSR at 71 bpm.    Review of  Systems   Constitution: Negative for decreased appetite, malaise/fatigue, weight gain and weight loss.   HENT: Negative for sore throat.    Eyes: Negative for blurred vision.   Cardiovascular: Positive for palpitations. Negative for chest pain, dyspnea on exertion, irregular heartbeat, leg swelling, near-syncope, orthopnea, paroxysmal nocturnal dyspnea and syncope.   Respiratory: Negative for shortness of breath.    Skin: Negative for rash.   Musculoskeletal: Negative for arthritis.   Gastrointestinal: Positive for dysphagia. Negative for abdominal pain.   Neurological: Negative for focal weakness.   Psychiatric/Behavioral: Negative for altered mental status.        Objective:    Physical Exam   Constitutional: She is oriented to person, place, and time. She appears well-developed and well-nourished. No distress.   HENT:   Head: Normocephalic and atraumatic.   Mouth/Throat: Oropharynx is clear and moist.   Eyes: Conjunctivae are normal. Pupils are equal, round, and reactive to light. No scleral icterus.   Neck: Normal range of motion. Neck supple. No JVD present. No thyromegaly present.   Cardiovascular: Normal rate, regular rhythm, normal heart sounds and intact distal pulses. Exam reveals no gallop and no friction rub.   No murmur heard.  Pulmonary/Chest: Effort normal and breath sounds normal. No respiratory distress.   Abdominal: Soft. Bowel sounds are normal. She exhibits no distension.   Musculoskeletal: She exhibits no edema.   Neurological: She is alert and oriented to person, place, and time.   Skin: Skin is warm and dry.   Psychiatric: She has a normal mood and affect. Her behavior is normal.         Assessment:       1. PAF (paroxysmal atrial fibrillation), onset 2002    2. Hypercholesteremia    3. Essential hypertension    4. S/P CABG x 2, 2005    5. S/P CABG (coronary artery bypass graft)    6. Acquired hypothyroidism         Plan:       In summary, Steph Lira is a 68 year old female with a history  of symptomatic PAF. Her [id number] Score is 3 (age, female, CAD) which corresponds to a yearly risk of stroke without anticoagulation treatment estimated at 3.2%. At her request, I have switched her from Eliquis to Xarelto, as she attributes some recent joint pains to Eliquis. We again discussed in detail options including switching to a different antiarrhythmic, vs PVI. We specifically discussed the risks, benefits, indications, and alternatives to AF ablation. Risks discussed include bleeding, hematoma, vascular damage, cardiac tamponade, stroke, PV stenosis, AE fistula, phrenic nerve damage, and death.  After considering his options she has decided to proceed.     Hold Xarelto evening prior to procedure. Cardiac CT to define LA anatomy. Hold Flecainide 3 days prior to ablation. JUAN ANTONIO day prior--cancel if in NSR.    Thank you for allowing me to participate in the care of this patient. Please do not hesitate to call me with any questions or concerns.

## 2018-11-20 ENCOUNTER — TELEPHONE (OUTPATIENT)
Dept: ELECTROPHYSIOLOGY | Facility: CLINIC | Age: 68
End: 2018-11-20

## 2018-11-20 ENCOUNTER — OFFICE VISIT (OUTPATIENT)
Dept: FAMILY MEDICINE | Facility: CLINIC | Age: 68
End: 2018-11-20
Payer: MEDICARE

## 2018-11-20 ENCOUNTER — TELEPHONE (OUTPATIENT)
Dept: FAMILY MEDICINE | Facility: CLINIC | Age: 68
End: 2018-11-20

## 2018-11-20 VITALS
HEIGHT: 66 IN | DIASTOLIC BLOOD PRESSURE: 81 MMHG | SYSTOLIC BLOOD PRESSURE: 120 MMHG | WEIGHT: 134.5 LBS | TEMPERATURE: 98 F | RESPIRATION RATE: 12 BRPM | HEART RATE: 111 BPM | BODY MASS INDEX: 21.62 KG/M2 | OXYGEN SATURATION: 98 %

## 2018-11-20 DIAGNOSIS — K62.4 ANAL STRICTURE: Primary | ICD-10-CM

## 2018-11-20 DIAGNOSIS — Z79.01 LONG TERM CURRENT USE OF ANTICOAGULANT THERAPY: ICD-10-CM

## 2018-11-20 DIAGNOSIS — I48.91 ATRIAL FIBRILLATION WITH RVR: ICD-10-CM

## 2018-11-20 DIAGNOSIS — I10 ESSENTIAL HYPERTENSION: ICD-10-CM

## 2018-11-20 DIAGNOSIS — K22.9 ESOPHAGEAL LESION: ICD-10-CM

## 2018-11-20 PROCEDURE — 99999 PR PBB SHADOW E&M-EST. PATIENT-LVL IV: CPT | Mod: PBBFAC,,, | Performed by: FAMILY MEDICINE

## 2018-11-20 PROCEDURE — 99214 OFFICE O/P EST MOD 30 MIN: CPT | Mod: S$PBB,,, | Performed by: FAMILY MEDICINE

## 2018-11-20 PROCEDURE — 99214 OFFICE O/P EST MOD 30 MIN: CPT | Mod: PBBFAC,PO | Performed by: FAMILY MEDICINE

## 2018-11-20 RX ORDER — LEVOTHYROXINE SODIUM 88 UG/1
88 TABLET ORAL DAILY
Qty: 90 TABLET | Refills: 3 | Status: SHIPPED | OUTPATIENT
Start: 2018-11-20 | End: 2019-01-01 | Stop reason: SDUPTHER

## 2018-11-20 RX ORDER — MONTELUKAST SODIUM 10 MG/1
10 TABLET ORAL NIGHTLY
Qty: 90 TABLET | Refills: 3 | Status: SHIPPED | OUTPATIENT
Start: 2018-11-20 | End: 2019-01-01 | Stop reason: SDUPTHER

## 2018-11-20 RX ORDER — DILTIAZEM HYDROCHLORIDE 60 MG/1
60 TABLET, FILM COATED ORAL 2 TIMES DAILY
Qty: 90 TABLET | Refills: 3 | Status: ON HOLD | OUTPATIENT
Start: 2018-11-20 | End: 2018-12-11 | Stop reason: HOSPADM

## 2018-11-20 NOTE — TELEPHONE ENCOUNTER
Returned call to Pt. She would like to be put on the list to move up if anyone cancels. Advised I would call if anyone cancels.          ----- Message from Naz Motley MA sent at 11/20/2018  1:33 PM CST -----  Contact: patient      ----- Message -----  From: Aparna Sin  Sent: 11/20/2018   1:25 PM  To: Marci Dillon Staff    The Pt is calling to see if she can come in sooner for her ablation, and she wants to know if the 27th would be better than the 26th. Please call her back @ [phone]. Thanks, Aparna

## 2018-11-20 NOTE — PROGRESS NOTES
Subjective:       Patient ID: Steph Lira is a 68 y.o. female.    Chief Complaint: Transitional Care (F/U [facility])    Patient Active Problem List   Diagnosis    GERD (gastroesophageal reflux disease)    Chronic rhinitis    Hypothyroid    Aneurysm of heart (wall)    Benign neoplasm of skin of upper limb, including shoulder    Intermediate coronary syndrome    Phlebitis and thrombophlebitis of superficial veins of upper extremities    Simple chronic conjunctivitis    Supraventricular premature beats    Vascular disorder of skin    Chronic external jugular vein thrombosis    Long term current use of anticoagulant therapy    Magdiel's syndrome - Left Eye    Ptosis    Chest pain at rest    Anxiety    CAD (coronary artery disease)    S/P CABG x 2, 2005    HTN (hypertension)    Hypercholesteremia    PAF (paroxysmal atrial fibrillation), onset 2002    Intercostal neuralgia    Radius and ulna distal fracture    History of lung cancer    Blurred vision, bilateral    Panlobular emphysema    Carotid artery disease    Atrial fibrillation with RVR    COPD (chronic obstructive pulmonary disease)    Uncontrolled atrial flutter with rvr    Allergic rhinitis    Hypoalbuminemia    History of smoking 10-25 pack years, 15 pack-years, quit 1994    Persistent cough for 3 weeks or longer, onset 12/2015    S/P CABG (coronary artery bypass graft)    Supraventricular bigeminy    Dysphagia    History of colon polyps    Coronary artery disease involving autologous artery coronary bypass graft    Wrist arthritis    Esophageal mass    Gastritis    Esophageal lesion    Fatigue    Grade II hemorrhoids    Cardiomyopathy    Throat irritation   Per last OV Dr. Marci Jeffery 11/19/18:history of atrial fibrillation. 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 She has been on Flecainide for many years, which effectively suppressed her arrhythmias. Soraya had until recently been on a beta blocker as well, but beta blockade caused significant fatigue and was stopped. Recently there was an attempt to down-titrate Flecainide to 50 mg bid, but AF recurred approximately 1 year ago, requiring electrical cardioversion for sinus rhythm restoration. She is on Eliquis for stroke prophylaxis.      Episodes occur several times per year, manifesting as palpitations, lasting several days and generally requiring a hospital admission and IV medications or electrical cardioversion to resolve. She denies exacerbating or alleviating factors.      Recent cardiac studies include an echo performed in 8/2018 which showed an EF of 40-45%, moderate LAE, and mild AI. A pharmacologic NST done in 5/2018 showed no fixed or reversible perfusion defects.     I reviewed all ECGs in the EMR at her initial visit. An ECG from 1/24/2018 shows AT at 116 bpm. An ECG dated 5/27/2016 shows AF at 114 bpm. Finally, an ECG dated 5/18/2016 shows AFL at 146 bpm. All other ECGs dating back to 2002 show sinus rhythm.     At her initial visit we discussed options including switching antiarrhythmics vs PVI, but she wanted to hold the course. In 11/2018 Ms. Lira was admitted to [facility] with symptomatic AF, ultimately requiring DCCV to restore sinus rhythm.     My interpretation of today's ECG is NSR at 71 bpm.    He switched her from eliquis to xarelto due to joint pain. Is planning ablation    Main complaint today is constipation and having to take otc laxative or mag citrate to have BM and comes out in pencil thin soft BMs.    HPI  Review of Systems   Constitutional: Negative for fatigue and unexpected weight change.   Respiratory: Negative for chest tightness and  shortness of breath.    Cardiovascular: Negative for chest pain, palpitations and leg swelling.   Gastrointestinal: Negative for abdominal pain.   Musculoskeletal: Negative for arthralgias.   Neurological: Negative for dizziness, syncope, light-headedness and headaches.       Objective:      Physical Exam   Constitutional: She is oriented to person, place, and time. She appears well-developed and well-nourished.   Cardiovascular: Normal rate, regular rhythm and normal heart sounds.   Pulmonary/Chest: Effort normal and breath sounds normal.   Musculoskeletal: She exhibits no edema.   Neurological: She is alert and oriented to person, place, and time.   Skin: Skin is warm and dry.   Psychiatric: She has a normal mood and affect.   Nursing note and vitals reviewed.      Assessment:       1. Anal stricture    2. Atrial fibrillation with RVR    3. Essential hypertension    4. Long term current use of anticoagulant therapy    5. Esophageal lesion        Plan:       1. Anal stricture  Refer for eval and treat  - Ambulatory referral to Colorectal Surgery    2. Atrial fibrillation with RVR  Cont plan for EP ablation asap.  Go to ED if chestontc pain, increased son, edema or other sx of decompensation    3. Essential hypertension  Controlled on current medications.  Continue current medications.      4. Long term current use of anticoagulant therapy  Cont xarelto    5. Esophageal lesion  Cont gi treatment    Time spent with patient: 20 minutes    Patient with be reevaluated in 6 months or sooner prn    Greater than 50% of this visit was spent counseling as described in above documentation:Yes

## 2018-11-26 ENCOUNTER — TELEPHONE (OUTPATIENT)
Dept: FAMILY MEDICINE | Facility: CLINIC | Age: 68
End: 2018-11-26

## 2018-11-29 DIAGNOSIS — I48.91 ATRIAL FIBRILLATION WITH RVR: Primary | ICD-10-CM

## 2018-12-03 ENCOUNTER — TELEPHONE (OUTPATIENT)
Dept: CARDIOLOGY | Facility: CLINIC | Age: 68
End: 2018-12-03

## 2018-12-03 ENCOUNTER — OFFICE VISIT (OUTPATIENT)
Dept: SURGERY | Facility: CLINIC | Age: 68
End: 2018-12-03
Payer: MEDICARE

## 2018-12-03 VITALS
HEART RATE: 93 BPM | WEIGHT: 137.38 LBS | BODY MASS INDEX: 22.08 KG/M2 | DIASTOLIC BLOOD PRESSURE: 74 MMHG | HEIGHT: 66 IN | SYSTOLIC BLOOD PRESSURE: 120 MMHG

## 2018-12-03 DIAGNOSIS — K59.02 CONSTIPATION DUE TO OUTLET DYSFUNCTION: Primary | ICD-10-CM

## 2018-12-03 PROCEDURE — 99214 OFFICE O/P EST MOD 30 MIN: CPT | Mod: PBBFAC | Performed by: NURSE PRACTITIONER

## 2018-12-03 PROCEDURE — 99214 OFFICE O/P EST MOD 30 MIN: CPT | Mod: S$PBB,,, | Performed by: NURSE PRACTITIONER

## 2018-12-03 PROCEDURE — 99999 PR PBB SHADOW E&M-EST. PATIENT-LVL IV: CPT | Mod: PBBFAC,,, | Performed by: NURSE PRACTITIONER

## 2018-12-03 NOTE — PROGRESS NOTES
Subjective:       Patient ID: Steph Lira is a 68 y.o. female.    Chief Complaint: Hemorrhoids    HPI   68 F who presents to clinic for thin stools and difficulty having a BM. She has struggled with constipation for years, but it has worsened since her recent hemorrhoidectomy (March 2018). Currently she takes daily fiber and uses miralax every 3 or 4 days. With these regimen she will have a bowel movement every 4 days.  Denies any rectal pain, abdominal pain, n/v, or blood in stool. She does not have to apply vaginal pressure to have a BM. She had two vaginal births, forceps were used each time.  She is on xerolto (CABG, '05  Afib)     Hemorrhoidectomy Grade IV hemorrhoids 03/2018, Dr Naranjo     Colonoscopy 2017  The perianal exam findings include non-thrombosed external        hemorrhoids.       Three semi-sessile polyps were found in the ascending colon. The        polyps were 3 to 4 mm in size. These polyps were removed with a cold        biopsy forceps. Resection and retrieval were complete. Verification        of patient identification for the specimen was done. Estimated blood        loss was minimal.       A few medium-mouthed diverticula were found in the sigmoid colon and        descending colon.       External and internal hemorrhoids were found during retroflexion.        The hemorrhoids were medium-sized.       The exam was otherwise without abnormality on direct and        retroflexion views.          Review of Systems   Constitutional: Negative for fatigue, fever and unexpected weight change.   Respiratory: Negative for shortness of breath.    Cardiovascular: Negative for chest pain.   Gastrointestinal: Negative for abdominal distention, abdominal pain, anal bleeding, blood in stool, constipation, diarrhea, nausea, rectal pain and vomiting.       Objective:      Physical Exam   Constitutional: She is oriented to person, place, and time. She appears well-developed and well-nourished. No distress.    Eyes: Conjunctivae and EOM are normal.   Pulmonary/Chest: Effort normal. No respiratory distress.   Abdominal: Soft. She exhibits no distension. There is no tenderness.   Genitourinary:   Genitourinary Comments: Normal perianal skin. eversion of anus revealed no abnormality or fissure, ZAIN revealed no masses, blood or stool in vault, normal sphincter tone, anoscopy revealed normal internal hemorrhoids with no bleeding or stigmata of same.    Normal tone, good squeeze. No evidence of stricture   Musculoskeletal: Normal range of motion.   Neurological: She is alert and oriented to person, place, and time.   Skin: Skin is warm and dry.   Psychiatric: She has a normal mood and affect. Her behavior is normal.       Assessment:       1. Constipation due to outlet dysfunction        Plan:       Increase miralax to daily  Continue daily fiber  GGE  Return to clinic 4 weeks

## 2018-12-03 NOTE — TELEPHONE ENCOUNTER
----- Message from Venecia Galvan sent at 12/3/2018  4:33 PM CST -----  Contact: pt  Type: Needs Medical Advice    Who Called:  Patient  Best Call Back Number:[phone] (home)   Additional Information: Would like a call back since Dr Wei is leaving wanted to know if she still needs to keep her appt in February. Thanks

## 2018-12-03 NOTE — TELEPHONE ENCOUNTER
Called and spoke with Ms. Steph, I advised her that we have a NP that will see Dr. Wei's patient if she is okay with that, or we have Dr. Quigley and the [facility] group. She stated she does not want to see the [facility] group, and she would feel more comfortable seeing Dr. Quigley as she is complicated right now. I advised that was fine, and offered her an appt on the 2/22/19 @ 2:30p. She accepted. No further issues noted.

## 2018-12-03 NOTE — LETTER
December 3, 2018      Maida Mon MD  [address]  [facility] [address]           [facility]  [address]  [facility] [address]  Phone: [phone]          Patient: Steph Lira   MR Number: [id number]   YOB: 1950   Date of Visit: 12/3/2018       Dear Dr. Maida Mon:    Thank you for referring Steph Lira to me for evaluation. Attached you will find relevant portions of my assessment and plan of care.    If you have questions, please do not hesitate to call me. I look forward to following Steph Lira along with you.    Sincerely,    Zulma Lowry, NP    Enclosure  CC:  No Recipients    If you would like to receive this communication electronically, please contact [email or web address] or [phone] to request more information on [employer] Link access.    For providers and/or their staff who would like to refer a patient to [facility], please contact us through our one-stop-shop provider referral line, [facility] , at [phone].    If you feel you have received this communication in error or would no longer like to receive these types of communications, please e-mail [email or web address]

## 2018-12-04 DIAGNOSIS — J30.9 CHRONIC ALLERGIC RHINITIS: ICD-10-CM

## 2018-12-04 RX ORDER — CETIRIZINE HYDROCHLORIDE 10 MG/1
TABLET ORAL
Qty: 90 TABLET | Refills: 2 | Status: SHIPPED | OUTPATIENT
Start: 2018-12-04 | End: 2020-01-01

## 2018-12-07 ENCOUNTER — TELEPHONE (OUTPATIENT)
Dept: RADIOLOGY | Facility: HOSPITAL | Age: 68
End: 2018-12-07

## 2018-12-07 ENCOUNTER — PATIENT MESSAGE (OUTPATIENT)
Dept: ELECTROPHYSIOLOGY | Facility: CLINIC | Age: 68
End: 2018-12-07

## 2018-12-07 ENCOUNTER — HOSPITAL ENCOUNTER (OUTPATIENT)
Dept: RADIOLOGY | Facility: HOSPITAL | Age: 68
Discharge: HOME | End: 2018-12-07
Attending: INTERNAL MEDICINE
Payer: MEDICARE

## 2018-12-07 DIAGNOSIS — I48.91 ATRIAL FIBRILLATION WITH RVR: Primary | ICD-10-CM

## 2018-12-07 DIAGNOSIS — I48.91 ATRIAL FIBRILLATION WITH RVR: ICD-10-CM

## 2018-12-07 DIAGNOSIS — I48.0 PAROXYSMAL ATRIAL FIBRILLATION: Primary | ICD-10-CM

## 2018-12-07 PROCEDURE — 71275 CT ANGIOGRAPHY CHEST: CPT | Mod: TC

## 2018-12-07 PROCEDURE — 71275 CT ANGIOGRAPHY CHEST: CPT | Mod: 26,,, | Performed by: RADIOLOGY

## 2018-12-07 PROCEDURE — 25500020 PHARM REV CODE 255: Performed by: INTERNAL MEDICINE

## 2018-12-07 RX ADMIN — IOHEXOL 75 ML: 350 INJECTION, SOLUTION INTRAVENOUS at 05:12

## 2018-12-07 NOTE — PROGRESS NOTES
ABLATION EDUCATION CHECKLIST    PRE-PROCEDURE TESTIN/7/18 @ 6 PM - LAB WORK  Pre-Procedure labs have been ordered for you at:  [facility]   Be sure to arrive at your scheduled time.     18 @ 6:45 PM -- CT SCAN of chest   [facility] (across the street from hospital)  [address], [place]    · Please fast 4 hours prior to procedure.  · Arrive 30 min prior to procedure.      DAY OF PROCEDURE:    18 @ 9 AM - CARDIAC ABLATION  Report to Cardiology Waiting Room on 3rd floor of the Hospital    · Do not eat or drink anything after: 12 mn on the night before your procedure  · Please do not wear makeup (especially mascara) when arriving for your procedure    Medications:   · HOLD FLECAINIDE FOR 3 DAYS PRIOR TO PROCEDURE. LAST DOSE 18  · HOLD XARELTO 1 DAY PRIOR TO PROCEDURE. LAST DOSE 18  · You may take ALL other morning medications with a sip of water      Directions to the Cardiology Waiting Room  If you park in the Parking Garage:  Take elevators to the 2nd floor  Walk up ramp and turn right by Gold Elevators  Take elevator to the 3rd floor  Upon exiting the elevator, turn away from the clinic areas  Walk long tavarez around to front of hospital to area with windows overlooking [facility]  Check in at Reception Desk  OR  If family is dropping you off:  Have them drop you off at the front of the Hospital  (Near the ER, where all the flags are hung).  Take the E elevators to the 3rd floor.  Check in at the Reception Desk in the waiting room.        · You will be spending the night after your procedure.  · You will need someone to drive you home the day after your procedure.  · Your pain during your procedure will be managed by the anesthesia team.     Any need to reschedule or cancel procedures, or any questions regarding your procedures should be addressed directly with the Arrhythmia Department Nurses at the following phone  number: [phone]

## 2018-12-08 LAB
CREAT SERPL-MCNC: 0.7 MG/DL (ref 0.5–1.4)
SAMPLE: NORMAL

## 2018-12-10 ENCOUNTER — TELEPHONE (OUTPATIENT)
Dept: ELECTROPHYSIOLOGY | Facility: CLINIC | Age: 68
End: 2018-12-10

## 2018-12-10 ENCOUNTER — ANESTHESIA EVENT (OUTPATIENT)
Dept: MEDSURG UNIT | Facility: HOSPITAL | Age: 68
End: 2018-12-10
Payer: MEDICARE

## 2018-12-10 ENCOUNTER — HOSPITAL ENCOUNTER (OUTPATIENT)
Dept: RADIOLOGY | Facility: HOSPITAL | Age: 68
Discharge: HOME | End: 2018-12-10
Attending: NURSE PRACTITIONER
Payer: MEDICARE

## 2018-12-10 DIAGNOSIS — K59.02 CONSTIPATION DUE TO OUTLET DYSFUNCTION: ICD-10-CM

## 2018-12-10 PROCEDURE — 74270 X-RAY XM COLON 1CNTRST STD: CPT | Mod: 26,,, | Performed by: RADIOLOGY

## 2018-12-10 PROCEDURE — 74270 X-RAY XM COLON 1CNTRST STD: CPT | Mod: TC

## 2018-12-10 NOTE — TELEPHONE ENCOUNTER
Called pt to review pre op instructions for PVI tomorrow AM. Pt reminded to hold XARELTO this evening, take other usual meds with small sip of water in AM, fast after midnight and arrive for 9 AM. Pt verbalized understanding and denies any questions/concerns at this time.

## 2018-12-11 ENCOUNTER — ANESTHESIA (OUTPATIENT)
Dept: MEDSURG UNIT | Facility: HOSPITAL | Age: 68
End: 2018-12-11
Payer: MEDICARE

## 2018-12-11 ENCOUNTER — HOSPITAL ENCOUNTER (OUTPATIENT)
Facility: HOSPITAL | Age: 68
Discharge: HOME | End: 2018-12-11
Attending: INTERNAL MEDICINE | Admitting: INTERNAL MEDICINE
Payer: MEDICARE

## 2018-12-11 VITALS
OXYGEN SATURATION: 99 % | SYSTOLIC BLOOD PRESSURE: 132 MMHG | TEMPERATURE: 96 F | HEART RATE: 145 BPM | WEIGHT: 136 LBS | RESPIRATION RATE: 20 BRPM | HEIGHT: 66 IN | DIASTOLIC BLOOD PRESSURE: 86 MMHG | BODY MASS INDEX: 21.86 KG/M2

## 2018-12-11 DIAGNOSIS — I48.91 AF (ATRIAL FIBRILLATION): ICD-10-CM

## 2018-12-11 DIAGNOSIS — I48.0 PAROXYSMAL ATRIAL FIBRILLATION: Primary | ICD-10-CM

## 2018-12-11 LAB
ABO + RH BLD: NORMAL
BLD GP AB SCN CELLS X3 SERPL QL: NORMAL

## 2018-12-11 PROCEDURE — 25000003 PHARM REV CODE 250: Performed by: NURSE ANESTHETIST, CERTIFIED REGISTERED

## 2018-12-11 PROCEDURE — 93005 ELECTROCARDIOGRAM TRACING: CPT

## 2018-12-11 PROCEDURE — C1894 INTRO/SHEATH, NON-LASER: HCPCS | Performed by: INTERNAL MEDICINE

## 2018-12-11 PROCEDURE — 27201423 OPTIME MED/SURG SUP & DEVICES STERILE SUPPLY: Performed by: INTERNAL MEDICINE

## 2018-12-11 PROCEDURE — 27201037 HC PRESSURE MONITORING SET UP

## 2018-12-11 PROCEDURE — 71000033 HC RECOVERY, INTIAL HOUR: Performed by: INTERNAL MEDICINE

## 2018-12-11 PROCEDURE — D9220A PRA ANESTHESIA: Mod: ANES,,, | Performed by: ANESTHESIOLOGY

## 2018-12-11 PROCEDURE — 93010 ELECTROCARDIOGRAM REPORT: CPT | Mod: ,,, | Performed by: INTERNAL MEDICINE

## 2018-12-11 PROCEDURE — 37000009 HC ANESTHESIA EA ADD 15 MINS: Performed by: INTERNAL MEDICINE

## 2018-12-11 PROCEDURE — 36000707: Performed by: INTERNAL MEDICINE

## 2018-12-11 PROCEDURE — 63600175 PHARM REV CODE 636 W HCPCS: Performed by: ANESTHESIOLOGY

## 2018-12-11 PROCEDURE — 37000008 HC ANESTHESIA 1ST 15 MINUTES: Performed by: INTERNAL MEDICINE

## 2018-12-11 PROCEDURE — 36000706: Performed by: INTERNAL MEDICINE

## 2018-12-11 PROCEDURE — 25000003 PHARM REV CODE 250: Performed by: NURSE PRACTITIONER

## 2018-12-11 PROCEDURE — 63600175 PHARM REV CODE 636 W HCPCS: Performed by: NURSE ANESTHETIST, CERTIFIED REGISTERED

## 2018-12-11 PROCEDURE — 93010 EKG 12-LEAD: ICD-10-PCS | Mod: ,,, | Performed by: INTERNAL MEDICINE

## 2018-12-11 PROCEDURE — 86850 RBC ANTIBODY SCREEN: CPT

## 2018-12-11 PROCEDURE — D9220A PRA ANESTHESIA: Mod: CRNA,,, | Performed by: NURSE ANESTHETIST, CERTIFIED REGISTERED

## 2018-12-11 RX ORDER — MIDAZOLAM HYDROCHLORIDE 1 MG/ML
INJECTION, SOLUTION INTRAMUSCULAR; INTRAVENOUS
Status: DISCONTINUED | OUTPATIENT
Start: 2018-12-11 | End: 2018-12-11 | Stop reason: HOSPADM

## 2018-12-11 RX ORDER — SODIUM CHLORIDE 0.9 % (FLUSH) 0.9 %
5 SYRINGE (ML) INJECTION
Status: ACTIVE | OUTPATIENT
Start: 2018-12-11

## 2018-12-11 RX ORDER — VASOPRESSIN 20 [USP'U]/ML
INJECTION, SOLUTION INTRAMUSCULAR; SUBCUTANEOUS
Status: DISCONTINUED | OUTPATIENT
Start: 2018-12-11 | End: 2018-12-11 | Stop reason: HOSPADM

## 2018-12-11 RX ORDER — SODIUM CHLORIDE 0.9 % (FLUSH) 0.9 %
3 SYRINGE (ML) INJECTION
Status: DISCONTINUED | OUTPATIENT
Start: 2018-12-11 | End: 2018-12-11 | Stop reason: HOSPADM

## 2018-12-11 RX ORDER — PHENYLEPHRINE HYDROCHLORIDE 10 MG/ML
INJECTION INTRAVENOUS
Status: DISCONTINUED | OUTPATIENT
Start: 2018-12-11 | End: 2018-12-11 | Stop reason: HOSPADM

## 2018-12-11 RX ORDER — SODIUM CHLORIDE 9 MG/ML
INJECTION, SOLUTION INTRAVENOUS CONTINUOUS
Status: ACTIVE | OUTPATIENT
Start: 2018-12-11

## 2018-12-11 RX ORDER — SODIUM CHLORIDE 9 MG/ML
INJECTION, SOLUTION INTRAVENOUS CONTINUOUS
Status: DISCONTINUED | OUTPATIENT
Start: 2018-12-11 | End: 2018-12-11 | Stop reason: HOSPADM

## 2018-12-11 RX ORDER — PROPOFOL 10 MG/ML
VIAL (ML) INTRAVENOUS
Status: DISCONTINUED | OUTPATIENT
Start: 2018-12-11 | End: 2018-12-11 | Stop reason: HOSPADM

## 2018-12-11 RX ORDER — LIDOCAINE HCL/PF 100 MG/5ML
SYRINGE (ML) INTRAVENOUS
Status: DISCONTINUED | OUTPATIENT
Start: 2018-12-11 | End: 2018-12-11 | Stop reason: HOSPADM

## 2018-12-11 RX ORDER — SUCCINYLCHOLINE CHLORIDE 20 MG/ML
INJECTION INTRAMUSCULAR; INTRAVENOUS
Status: DISCONTINUED | OUTPATIENT
Start: 2018-12-11 | End: 2018-12-11 | Stop reason: HOSPADM

## 2018-12-11 RX ORDER — FENTANYL CITRATE 50 UG/ML
25 INJECTION, SOLUTION INTRAMUSCULAR; INTRAVENOUS EVERY 5 MIN PRN
Status: DISCONTINUED | OUTPATIENT
Start: 2018-12-11 | End: 2018-12-11 | Stop reason: HOSPADM

## 2018-12-11 RX ORDER — DILTIAZEM HYDROCHLORIDE 120 MG/1
120 CAPSULE, COATED, EXTENDED RELEASE ORAL 2 TIMES DAILY
Qty: 60 CAPSULE | Refills: 3 | Status: SHIPPED | OUTPATIENT
Start: 2018-12-11 | End: 2019-03-01 | Stop reason: SDUPTHER

## 2018-12-11 RX ORDER — WARFARIN SODIUM 5 MG/1
5 TABLET ORAL DAILY
Qty: 30 TABLET | Refills: 0 | Status: SHIPPED | OUTPATIENT
Start: 2018-12-11 | End: 2019-02-05 | Stop reason: SDUPTHER

## 2018-12-11 RX ORDER — ROCURONIUM BROMIDE 10 MG/ML
INJECTION, SOLUTION INTRAVENOUS
Status: DISCONTINUED | OUTPATIENT
Start: 2018-12-11 | End: 2018-12-11 | Stop reason: HOSPADM

## 2018-12-11 RX ORDER — ONDANSETRON 2 MG/ML
4 INJECTION INTRAMUSCULAR; INTRAVENOUS DAILY PRN
Status: DISCONTINUED | OUTPATIENT
Start: 2018-12-11 | End: 2018-12-11 | Stop reason: HOSPADM

## 2018-12-11 RX ADMIN — PHENYLEPHRINE HYDROCHLORIDE 0.5 MCG/KG/MIN: 10 INJECTION INTRAVENOUS at 07:12

## 2018-12-11 RX ADMIN — PROPOFOL 150 MG: 10 INJECTION, EMULSION INTRAVENOUS at 07:12

## 2018-12-11 RX ADMIN — SUCCINYLCHOLINE CHLORIDE 100 MG: 20 INJECTION, SOLUTION INTRAMUSCULAR; INTRAVENOUS at 07:12

## 2018-12-11 RX ADMIN — SODIUM CHLORIDE, SODIUM GLUCONATE, SODIUM ACETATE, POTASSIUM CHLORIDE, MAGNESIUM CHLORIDE, SODIUM PHOSPHATE, DIBASIC, AND POTASSIUM PHOSPHATE: .53; .5; .37; .037; .03; .012; .00082 INJECTION, SOLUTION INTRAVENOUS at 07:12

## 2018-12-11 RX ADMIN — VASOPRESSIN 2 UNITS: 20 INJECTION INTRAVENOUS at 07:12

## 2018-12-11 RX ADMIN — SODIUM CHLORIDE: 0.9 INJECTION, SOLUTION INTRAVENOUS at 07:12

## 2018-12-11 RX ADMIN — ONDANSETRON HYDROCHLORIDE 4 MG: 2 INJECTION, SOLUTION INTRAMUSCULAR; INTRAVENOUS at 09:12

## 2018-12-11 RX ADMIN — LIDOCAINE HYDROCHLORIDE 50 MG: 20 INJECTION, SOLUTION INTRAVENOUS at 07:12

## 2018-12-11 RX ADMIN — PHENYLEPHRINE HYDROCHLORIDE 150 MCG: 10 INJECTION INTRAVENOUS at 07:12

## 2018-12-11 RX ADMIN — PHENYLEPHRINE HYDROCHLORIDE 100 MCG: 10 INJECTION INTRAVENOUS at 07:12

## 2018-12-11 RX ADMIN — MIDAZOLAM 2 MG: 1 INJECTION INTRAMUSCULAR; INTRAVENOUS at 07:12

## 2018-12-11 RX ADMIN — ROCURONIUM BROMIDE 5 MG: 10 INJECTION, SOLUTION INTRAVENOUS at 07:12

## 2018-12-11 NOTE — PLAN OF CARE
Received report from DARIO Berger. Procedure cancelled. Family member at bedside. Nurse call bell within reach. Will continue to monitor per post procedure protocol.

## 2018-12-11 NOTE — INTERVAL H&P NOTE
Ms. Lira is a 68 year old female with a PMHx of AF, AFL, AT, HTN,HLD,CAD, CABG x 2 (2005), hypothyroidism, DCCV. She presents today for PVI RFA with Dr. Covarrubias. She denies any chest pain, dizziness, light headedness, weakness, syncope, or near syncopal episodes. She does state she has SOB with her palpitations, OLIVARES, and fatigue. She denies any bleeding, infections, rashes, or surgeries in the past 30 days. She is currently taking diltiazem (last dose 12/7/18), flecainide (last dose 12/7/18), xarelto (last dose 12/9/18).    CTA chest 12/8/18 reviewed by Dr. Covarrubias.   I have personally reviewed the patient's EKG today, which shows AFL with 2:1 AV conduction with a ventricular rate of 155 bpm. QTc is 504.    The patient has been examined and the H&P has been reviewed:    I concur with the findings and no changes have occurred since H&P was written.    Review of Systems   Constitution:  Negative for fevers/chills, weakness and malaise. Positive for fatigue.  HENT: Negative.  Negative for ear pain and tinnitus.    Eyes: Negative for blurred vision.   Cardiovascular: Negative for chest pain, dyspnea on exertion, leg swelling, near-syncope, and syncope. Positive for palpitations and light headedness.  Respiratory: Negative. Negative for shortness of breath (Positive for SOB when palpitations noted and OLIVARES).    Endocrine: Negative.  Negative for polyuria.   Hematologic/Lymphatic: Positive for bruise/bleed easily. Negative for significant bleeding.  Skin: Negative.  Negative for rash.   Musculoskeletal: Negative.  Negative for joint pain and muscle weakness.   Gastrointestinal: Negative.  Negative for abdominal pain hematemesis, hematochezia, and change in bowel habit.   Genitourinary: Negative for frequency or hematuria.   Neurological: Negative.  Negative for dizziness.   Psychiatric/Behavioral: Negative.  Negative for depression. The patient is not nervous/anxious.       Physical Exam   Constitutional: She is oriented to  person, place, and time. She appears well-developed and well-nourished.   HENT:   Head: Normocephalic and atraumatic.   Eyes: Conjunctivae, EOM and lids are normal. No scleral icterus.   Neck: Normal range of motion. No JVD present. No tracheal deviation present. No thyromegaly present.   Cardiovascular: Normal rate and intact distal pulses. An irregularly irregular rhythm present. No extrasystoles are present. PMI is not displaced. Exam reveals no gallop and no friction rub. No murmur noted.   Pulses:       Radial pulses are 2+ on the right side, and 2+ on the left side.        Pedal pulses are 1+ on the right side, and 1+ on the left side.   Pulmonary/Chest: Effort normal and breath sounds normal. No accessory muscle usage. No tachypnea. No respiratory distress. He has no wheezes. He has no rales.   Abdominal: Soft. Bowel sounds are normal. He exhibits no distension. There is no hepatosplenomegaly. There is no tenderness.   Musculoskeletal: Normal range of motion. He exhibits no edema. Coordination normal.  Neurological: She is alert and oriented to person, place, and time. She has normal reflexes. She exhibits normal muscle tone.   Skin: Skin is warm and dry. No rash noted.   Psychiatric: She has a normal mood and affect. Her behavior is normal.   Nursing note and vitals reviewed.     Labs: Labs from 12/7/18 reviewed. Dr. Covarrubias notified.     Significant Studies: EKG this AM reveals AFL with 2:1 AV conduction at 155 BPM. EKG reviewed by Dr. Covarrubias.     Active Hospital Problems    Diagnosis  POA    Paroxysmal atrial fibrillation [I48.0]  Yes      Resolved Hospital Problems   No resolved problems to display.     Plan:  - Planned for PVI RFA.  - Patient reports full compliance with xarelto, has been taking medications as prescribed  for > 1 month, denies missing any doses. Last dose was (12/9/18).   - Anesthesia for sedation. Anesthesia/Surgery risks, benefits and alternative options discussed and understood by  patient/family.    Prior to procedure, extensive discussion with patient regarding risks and benefits of  ablation, and patient  would like to proceed. Risks of procedure include but are not limited to the risk of infection, bleeding, stroke, paralysis,  MI, death, embolism, perforation requiring emergency draining or surgery, AV block, possibility for need for pacemaker implantation. Dr. Covarrubias at bedside to answer all questions.  The patient and  voice understanding and all questions have been answered. No further questions/concerns voiced at this time.     DENILSON Lockwood-C  Cardiology Electrophysiology  NP   [facility]    Attending: Santana Covarrubias MD

## 2018-12-11 NOTE — SUBJECTIVE & OBJECTIVE
Past Medical History:   Diagnosis Date    *Atrial fibrillation     and Hx PSVT    Allergic drug rash 12/28/2016    Allergy     chronic     Arthritis     fingers    Benign tumor of throat     Bronchitis March 2013    CAD (coronary artery disease) 2005    CABGx2 in 2005 and 2 MI after with 4 stents    Cancer 1996    small cell lung cancer s/p resection of 1/3 lung, 5 ribs and muscle mass then had chemo and radiation    Cataract     OD     CHF (congestive heart failure) past Hx    Chronic rhinitis     Clot past Hx    external jugular, now stented, occluded, had phlebitis; now revascularized    Colon polyp     COPD (chronic obstructive pulmonary disease)     no oxygen or nebulizers    COPD exacerbation 5/8/13    improved 5/14/13 after steroid pack,antibiotics    Former smoker     GERD (gastroesophageal reflux disease)     Heart block     Hyperlipidemia     Hypertension     pt states does not have //    MI (myocardial infarction) 2005    Posterior vitreous detachment of left eye     Thyroid disease        Past Surgical History:   Procedure Laterality Date    ADENOIDECTOMY      APPENDECTOMY      BACK SURGERY      lumbar    BREAST BIOPSY Left     benign    CARDIAC SURGERY      CATARACT EXTRACTION Left     OS    CATARACT EXTRACTION, BILATERAL      left eye only    CHOLECYSTECTOMY      COLONOSCOPY  03/2012    repeat in 5 years    COLONOSCOPY N/A 6/19/2017    Procedure: COLONOSCOPY;  Surgeon: Kal Elise MD;  Location: [facility];  Service: Endoscopy;  Laterality: N/A;    COLONOSCOPY N/A 6/19/2017    Performed by Kal Elise MD at [facility]    COLONOSCOPY N/A 3/28/2012    Performed by Garrison Koo MD at [facility]    CORONARY ARTERY BYPASS GRAFT  2005    2 vessel    EGD (ESOPHAGOGASTRODUODENOSCOPY)/cryo N/A 11/1/2018    Performed by Robbie Gonzales MD at [facility] (2ND FLR)    EGD (ESOPHAGOGASTRODUODENOSCOPY)/poss cryo N/A 10/3/2018    Performed by Robbie Gonzales MD  at [facility] (2ND FLR)    ESOPHAGOGASTRODUODENOSCOPY N/A 7/5/2018    Procedure: ESOPHAGOGASTRODUODENOSCOPY (EGD)/cryo;  Surgeon: Robbie Gonzales MD;  Location: [facility] (2ND FLR);  Service: Endoscopy;  Laterality: N/A;  Elihomero/Dr Henry Wei/OK to hold med 2 days prior to egd/see telephone encounter dated 6/12/18/pt stated she needs to take Diflucan 1 tab po daily 12 days prior to egd, message sent to Sarah/she will check with Dr Gonzales/santo    ESOPHAGOGASTRODUODENOSCOPY N/A 10/3/2018    Procedure: EGD (ESOPHAGOGASTRODUODENOSCOPY)/poss cryo;  Surgeon: Robbie Gonzales MD;  Location: [facility] (2ND FLR);  Service: Endoscopy;  Laterality: N/A;  2 day hold Dr Henry Pavon  Diflucan 1 tab po daily starting 14 days prior to egd, Rx'd per Dr Gonzales    ESOPHAGOGASTRODUODENOSCOPY N/A 11/1/2018    Procedure: EGD (ESOPHAGOGASTRODUODENOSCOPY)/cryo;  Surgeon: Robbie Gonzales MD;  Location: [facility] (2ND FLR);  Service: Endoscopy;  Laterality: N/A;  2 day hold Dr Henry Pavon  Diflucan 1 tab po daily starting 14 days prior to egd, Rx'd per Dr Gonzales as before?    ESOPHAGOGASTRODUODENOSCOPY (EGD) N/A 11/29/2017    Performed by Daniel Gillette MD at [facility] (2ND FLR)    ESOPHAGOGASTRODUODENOSCOPY (EGD) N/A 10/3/2017    Performed by Hue Wright MD at [facility] ENDO    ESOPHAGOGASTRODUODENOSCOPY (EGD) N/A 6/14/2017    Performed by Kal Elise MD at [facility] ENDO    ESOPHAGOGASTRODUODENOSCOPY (EGD) N/A 10/12/2016    Performed by Kla Elise MD at [facility] ENDO    ESOPHAGOGASTRODUODENOSCOPY (EGD) N/A 5/10/2016    Performed by Garrison Koo MD at [facility] ENDO    ESOPHAGOGASTRODUODENOSCOPY (EGD) W/CRYOTHERAPY N/A 12/22/2017    Performed by [facility] Gonzales MD at [facility] ENDO    ESOPHAGOGASTRODUODENOSCOPY (EGD) with cryo N/A 3/5/2018    Performed by Robbie Gonzales MD at [facility] ENDO    ESOPHAGOGASTRODUODENOSCOPY (EGD)/cryo N/A 7/5/2018    Performed by Robbie Gonzales MD at [facility] ENDO  (2ND FLR)    ESOPHAGOGASTRODUODENOSCOPY (EGD)/cryo N/A 4/16/2018    Performed by [facility] Gonzales MD at [facility] ENDO (2ND FLR)    ESOPHAGOGASTRODUODENOSCOPY (EGD)/poss Cryo N/A 12/14/2017    Performed by Robbie Gonzales MD at [facility] ENDO (2ND FLR)    ESOPHAGOGASTRODUODENOSCOPY (EGD)/Poss EMR N/A 10/31/2017    Performed by Robbie Gonzales MD at [facility] ENDO (2ND FLR)    EYE SURGERY  Dec 2012    ptosis repair    FIRST RIB REMOVAL  1996 with lung resection    HEMORRHOIDECTOMY N/A 3/16/2018    [facility] at [facility] OR    HYSTERECTOMY      LUNG LOBECTOMY  1996    left lung for cancer    phaco/pciol Right 11/5/2014    [facility] Gu[facility]line Lawson [facility]    REPAIR, BLEPHAROPTOSIS Left 12/6/2012    Performed by Eze Glynn MD at [facility] OR 1ST FLR    SHOULDER SURGERY  2010,2011    scapular entrapment after lung surgery, needed 5 ribs removed left side    SYMPOTHATIC NERVE LEFT SIDE      1996 with lung surgery    TONSILLECTOMY      TONSILLECTOMY, ADENOIDECTOMY, BILATERAL MYRINGOTOMY AND TUBES      ULTRASOUND-ENDOSCOPIC-UPPER N/A 10/31/2017    Performed by Robbie Gonzales MD at [facility] ENDO (2ND FLR)    UPPER GASTROINTESTINAL ENDOSCOPY  05/2016    Dr. Koo    VASCULAR SURGERY      stents place in jugualr vein       Review of patient's allergies indicates:   Allergen Reactions    Ciprofloxacin Itching    Coreg [carvedilol]      Low energy    Doxycycline Other (See Comments) and Hives     Patient had a rxn with the sun despite wearing spf 30    Metoprolol      Low energy       No current facility-administered medications on file prior to encounter.      Current Outpatient Medications on File Prior to Encounter   Medication Sig    atorvastatin (LIPITOR) 40 MG tablet Take 1 tablet (40 mg total) by mouth once daily.    bisacodyl (DULCOLAX) 5 mg EC tablet Take 1 tablet (5 mg total) by mouth daily as needed for Constipation.    cetirizine (ZYRTEC) 10 MG tablet  TAKE 1 TABLET EVERY EVENING    diphenhydrAMINE (BENADRYL) 25 mg capsule Take 25 mg by mouth every 6 (six) hours as needed for Itching.    levothyroxine (SYNTHROID) 88 MCG tablet Take 1 tablet (88 mcg total) by mouth once daily.    montelukast (SINGULAIR) 10 mg tablet Take 1 tablet (10 mg total) by mouth every evening.    pantoprazole (PROTONIX) 40 MG tablet TAKE 1 TABLET DAILY 30 MINUTES PRIOR TO BREAKFAST    polyethylene glycol (GLYCOLAX) 17 gram/dose powder Take 17 g by mouth once daily.    ADVAIR DISKUS 250-50 mcg/dose diskus inhaler USE 1 INHALATION TWICE A DAY (RINSE MOUTH AFTER USE TO PREVENT THRUSH)    albuterol (ACCUNEB) 1.25 mg/3 mL Nebu USE 1 VIAL (3 ML) VIA NEBULIZER EVERY 6 HOURS AS NEEDED    albuterol (PROVENTIL HFA) 90 mcg/actuation inhaler Inhale 2 puffs into the lungs every 6 (six) hours as needed for Wheezing.    desoximetasone (TOPICORT) 0.25 % cream Apply topically 2 (two) times daily. (Patient taking differently: Apply topically 2 (two) times daily as needed. )    diclofenac sodium (VOLTAREN) 1 % Gel Apply 2 g topically once daily.    diltiaZEM (CARDIZEM) 60 MG tablet Take 1 tablet (60 mg total) by mouth 2 (two) times daily.    flecainide (TAMBOCOR) 50 MG Tab Take 2 tablets (100 mg total) by mouth every 12 (twelve) hours. (Patient taking differently: Take 50 mg by mouth 2 (two) times daily. )    FLUAD 5680-9460, 65 YR UP,,PF, 45 mcg (15 mcg x 3)/0.5 mL Syrg ADMINISTER 0.5ML IN THE MUSCLE AS DIRECTED    fluticasone (FLONASE) 50 mcg/actuation nasal spray 1 spray by Each Nare route once daily.    ketoconazole (NIZORAL) 2 % shampoo Wash hair with medicated shampoo at least 2x/week - let sit on scalp at least 5 minutes prior to rinsing    multivitamin-minerals-lutein (CENTRUM SILVER) Tab Take 1 tablet by mouth once daily. 1 Tablet Oral Every day    RETIN-A 0.05 % cream APPLY THIN FILM TO FACE AT NIGHT AFTER MOISTURIZING    rivaroxaban (XARELTO) 20 mg Tab Take 1 tablet (20 mg total)  by mouth once daily.    SPIRIVA WITH HANDIHALER 18 mcg inhalation capsule INHALE THE CONTENTS OF 1 CAPSULE DAILY    triamcinolone acetonide 0.1% (KENALOG) 0.1 % cream Apply topically 2 (two) times daily.     Family History     Problem Relation (Age of Onset)    Allergic rhinitis Father, Son, Sister    Allergies Father, Mother    Asthma Son, Sister    Cancer Father    Colon polyps Maternal Grandmother    Diverticulitis Brother    Hypertension Father    No Known Problems Daughter, Maternal Aunt, Maternal Uncle, Paternal Aunt, Paternal Uncle, Maternal Grandfather, Paternal Grandmother, Paternal Grandfather, Brother, Sister    Stroke Mother        Tobacco Use    Smoking status: Former Smoker     Packs/day: 2.00     Years: 20.00     Pack years: 40.00     Types: Cigarettes     Last attempt to quit: 1994     Years since quittin.9    Smokeless tobacco: Never Used   Substance and Sexual Activity    Alcohol use: No    Drug use: No    Sexual activity: Yes     Partners: Male     Review of Systems   All other systems reviewed and are negative.    Objective:     Vital Signs (Most Recent):  Temp: 97.4 °F (36.3 °C) (18)  Pulse: (!) 111 (18)  Resp: 18 (18)  BP: 109/79 (18)  SpO2: 97 % (18) Vital Signs (24h Range):  Temp:  [97.4 °F (36.3 °C)] 97.4 °F (36.3 °C)  Pulse:  [111] 111  Resp:  [18] 18  SpO2:  [97 %] 97 %  BP: (109-115)/(78-79) 109/79     Weight: 61.7 kg (136 lb)  Body mass index is 21.95 kg/m².    SpO2: 97 %  O2 Device (Oxygen Therapy): room air    No intake or output data in the 24 hours ending 18 0711    Lines/Drains/Airways     Peripheral Intravenous Line                 Peripheral IV - Single Lumen 18 0624 Right Hand less than 1 day         Peripheral IV - Single Lumen 18 0632 Left Antecubital less than 1 day                Physical Exam   Constitutional: She is oriented to person, place, and time. She appears well-developed and  well-nourished.   HENT:   Head: Normocephalic and atraumatic.   Eyes: No scleral icterus.   Cardiovascular: Regular rhythm.   Tachycardic    Pulmonary/Chest: Effort normal and breath sounds normal.   Musculoskeletal: She exhibits no edema.   Neurological: She is alert and oriented to person, place, and time.   Skin: Skin is warm.   Psychiatric: She has a normal mood and affect.       Significant Labs: All pertinent lab results from the last 24 hours have been reviewed.    Significant Imaging: Echocardiogram:   2D echo with color flow doppler:   Results for orders placed or performed in visit on 01/04/18   2D Echo w/ Color Flow Doppler   Result Value Ref Range    QEF 40 (A) 55 - 65    Mitral Valve Regurgitation MILD     Diastolic Dysfunction Yes (A)     Aortic Valve Regurgitation MILD     Est. PA Systolic Pressure 16.16

## 2018-12-11 NOTE — ASSESSMENT & PLAN NOTE
1. JUAN ANTONIO for evaluation of LA/JOEL thrombus     -The risks, benefits & alternatives of the procedure were explained to the patient.    -The risks of transesophageal echo include but are not limited to:  Dental trauma, esophageal trauma/perforation, bleeding, laryngospasm/brochospasm, aspiration, sore throat/hoarseness, & dislodgement of the endotracheal tube/nasogastric tube (where applicable).    -The risks of moderate sedation include hypotension, respiratory depression, arrhythmias, bronchospasm, & death.    -Informed consent was obtained & the patient is agreeable to proceed with the procedure.    I will discuss with the attending physician. Attending addendum is to follow.     Further recommendations per attending addendum

## 2018-12-11 NOTE — PLAN OF CARE
Problem: Device-Related Complication Risk (Cardiac Rhythm Management Device)  Goal: Effective Device Function  Outcome: Ongoing (interventions implemented as appropriate)  Plan of care and safety prec initiated. Will continue to monitor.

## 2018-12-11 NOTE — ANESTHESIA POSTPROCEDURE EVALUATION
"Anesthesia Post Evaluation    Patient: Steph Lira    Procedure(s) Performed: Procedure(s) (LRB):  ABLATION (N/A)  ECHOCARDIOGRAM,TRANSESOPHAGEAL (N/A)    Final Anesthesia Type: general  Patient location during evaluation: PACU  Patient participation: Yes- Able to Participate  Level of consciousness: awake and alert  Post-procedure vital signs: reviewed and stable  Pain management: adequate  Airway patency: patent  PONV status at discharge: No PONV  Anesthetic complications: no      Cardiovascular status: blood pressure returned to baseline  Respiratory status: unassisted  Hydration status: euvolemic  Follow-up not needed.        Visit Vitals  /86   Pulse (!) 145   Temp 35.8 °C (96.4 °F) (Oral)   Resp 20   Ht 5' 6" (1.676 m)   Wt 61.7 kg (136 lb)   SpO2 99%   Breastfeeding? No   BMI 21.95 kg/m²       Pain/Tamera Score: No Data Recorded      "

## 2018-12-11 NOTE — TRANSFER OF CARE
"Anesthesia Transfer of Care Note    Patient: Steph Lira    Procedure(s) Performed: Procedure(s) (LRB):  ABLATION (N/A)  ECHOCARDIOGRAM,TRANSESOPHAGEAL (N/A)    Patient location: PACU    Anesthesia Type: general    Transport from OR: Transported from OR on 6-10 L/min O2 by face mask with adequate spontaneous ventilation    Post pain: adequate analgesia    Post assessment: no apparent anesthetic complications and tolerated procedure well    Post vital signs: stable    Level of consciousness: alert, oriented and awake    Nausea/Vomiting: no nausea/vomiting    Complications: none    Transfer of care protocol was followed      Last vitals:   Visit Vitals  /79 (BP Location: Right arm, Patient Position: Lying)   Pulse (!) 111   Temp 36.3 °C (97.4 °F) (Oral)   Resp 18   Ht 5' 6" (1.676 m)   Wt 61.7 kg (136 lb)   SpO2 97%   Breastfeeding? No   BMI 21.95 kg/m²     "

## 2018-12-11 NOTE — HPI
TRANSESOPHAGEAL ECHOCARDIOGRAPHY   PRE-PROCEDURE NOTE    12/11/2018    HPI:     Steph Liar is a 68 y.o. woman with PMHx of AF here for PVI with JUAN ANTONIO prior. Has hx of dysphagia, 2/2 esophageal papilloma and stenosis.     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      11/1/2018 EGD  One benign-appearing, intrinsic moderate stenosis was found 20 cm        from the incisors. This stenosis measured 1 cm (in length). The        stenosis was traversed.       Localized moderate mucosal changes characterized by nodularity were        found in the upper third of the esophagus at 20 cm. The decision was        made to ablate the abnormal mucosa in the esophagus with spray        cryotherapy.    Dysphagia or odynophagia:  Yes  Liver Disease, esophageal disease, or known varices:  No  Upper GI Bleeding: No  Snoring:  Yes  Sleep Apnea:  No  Prior neck surgery or radiation:  No  History of anesthetic difficulties:  No  Family history of anesthetic difficulties:  No  Last oral intake:  12 hours ago  Able to move neck in all directions:  Yes      8/2018 TTE EF 40-45 biatrial enlargement.

## 2018-12-11 NOTE — ANESTHESIA PREPROCEDURE EVALUATION
12/10/2018  Steph Lira is a 68 y.o., female with atrial fibrillation here for JUAN ANTONIO and possible ablation.    Pre-operative evaluation for Procedure(s) (LRB):  ABLATION (N/A)  ECHOCARDIOGRAM,TRANSESOPHAGEAL (N/A)        Patient Active Problem List   Diagnosis    GERD (gastroesophageal reflux disease)    Chronic rhinitis    Hypothyroid    Aneurysm of heart (wall)    Benign neoplasm of skin of upper limb, including shoulder    Intermediate coronary syndrome    Phlebitis and thrombophlebitis of superficial veins of upper extremities    Simple chronic conjunctivitis    Supraventricular premature beats    Vascular disorder of skin    Chronic external jugular vein thrombosis    Long term current use of anticoagulant therapy    Magdiel's syndrome - Left Eye    Ptosis    Chest pain at rest    Anxiety    CAD (coronary artery disease)    S/P CABG x 2, 2005    HTN (hypertension)    Hypercholesteremia    PAF (paroxysmal atrial fibrillation), onset 2002    Intercostal neuralgia    Radius and ulna distal fracture    History of lung cancer    Blurred vision, bilateral    Panlobular emphysema    Carotid artery disease    Atrial fibrillation with RVR    COPD (chronic obstructive pulmonary disease)    Uncontrolled atrial flutter with rvr    Allergic rhinitis    Hypoalbuminemia    History of smoking 10-25 pack years, 15 pack-years, quit 1994    Persistent cough for 3 weeks or longer, onset 12/2015    S/P CABG (coronary artery bypass graft)    Supraventricular bigeminy    Dysphagia    History of colon polyps    Coronary artery disease involving autologous artery coronary bypass graft    Wrist arthritis    Esophageal mass    Gastritis    Esophageal lesion    Fatigue    Grade II hemorrhoids    Cardiomyopathy    Throat irritation       Review of patient's allergies indicates:    Allergen Reactions    Ciprofloxacin Itching    Coreg [carvedilol]      Low energy    Doxycycline Other (See Comments) and Hives     Patient had a rxn with the sun despite wearing spf 30    Metoprolol      Low energy       No current facility-administered medications on file prior to encounter.      Current Outpatient Medications on File Prior to Encounter   Medication Sig Dispense Refill    ADVAIR DISKUS 250-50 mcg/dose diskus inhaler USE 1 INHALATION TWICE A DAY (RINSE MOUTH AFTER USE TO PREVENT THRUSH) 3 each 3    albuterol (ACCUNEB) 1.25 mg/3 mL Nebu USE 1 VIAL (3 ML) VIA NEBULIZER EVERY 6 HOURS AS NEEDED 90 mL 2    albuterol (PROVENTIL HFA) 90 mcg/actuation inhaler Inhale 2 puffs into the lungs every 6 (six) hours as needed for Wheezing. 3 Inhaler 4    atorvastatin (LIPITOR) 40 MG tablet Take 1 tablet (40 mg total) by mouth once daily. 90 tablet 3    bisacodyl (DULCOLAX) 5 mg EC tablet Take 1 tablet (5 mg total) by mouth daily as needed for Constipation. 30 tablet 11    cetirizine (ZYRTEC) 10 MG tablet TAKE 1 TABLET EVERY EVENING 90 tablet 2    desoximetasone (TOPICORT) 0.25 % cream Apply topically 2 (two) times daily. (Patient taking differently: Apply topically 2 (two) times daily as needed. ) 180 g 3    diclofenac sodium (VOLTAREN) 1 % Gel Apply 2 g topically once daily. 100 g prn    diltiaZEM (CARDIZEM) 60 MG tablet Take 1 tablet (60 mg total) by mouth 2 (two) times daily. 90 tablet 3    diphenhydrAMINE (BENADRYL) 25 mg capsule Take 25 mg by mouth every 6 (six) hours as needed for Itching.      flecainide (TAMBOCOR) 50 MG Tab Take 2 tablets (100 mg total) by mouth every 12 (twelve) hours. (Patient taking differently: Take 50 mg by mouth 2 (two) times daily. ) 120 tablet 11    FLUAD [id number], 65 YR UP,,PF, 45 mcg (15 mcg x 3)/0.5 mL Syrg ADMINISTER 0.5ML IN THE MUSCLE AS DIRECTED  0    fluticasone (FLONASE) 50 mcg/actuation nasal spray 1 spray by Each Nare route once daily. 3 Bottle 3     ketoconazole (NIZORAL) 2 % shampoo Wash hair with medicated shampoo at least 2x/week - let sit on scalp at least 5 minutes prior to rinsing 120 mL 5    levothyroxine (SYNTHROID) 88 MCG tablet Take 1 tablet (88 mcg total) by mouth once daily. 90 tablet 3    montelukast (SINGULAIR) 10 mg tablet Take 1 tablet (10 mg total) by mouth every evening. 90 tablet 3    multivitamin-minerals-lutein (CENTRUM SILVER) Tab Take 1 tablet by mouth once daily. 1 Tablet Oral Every day      pantoprazole (PROTONIX) 40 MG tablet TAKE 1 TABLET DAILY 30 MINUTES PRIOR TO BREAKFAST 90 tablet 3    polyethylene glycol (GLYCOLAX) 17 gram/dose powder Take 17 g by mouth once daily. 1530 g 2    RETIN-A 0.05 % cream APPLY THIN FILM TO FACE AT NIGHT AFTER MOISTURIZING 45 g 3    rivaroxaban (XARELTO) 20 mg Tab Take 1 tablet (20 mg total) by mouth once daily. 30 tablet 0    SPIRIVA WITH HANDIHALER 18 mcg inhalation capsule INHALE THE CONTENTS OF 1 CAPSULE DAILY 90 capsule 3    triamcinolone acetonide 0.1% (KENALOG) 0.1 % cream Apply topically 2 (two) times daily. 1 Tube 11       Past Surgical History:   Procedure Laterality Date    ADENOIDECTOMY      APPENDECTOMY      BACK SURGERY      lumbar    BREAST BIOPSY Left     benign    CARDIAC SURGERY      CATARACT EXTRACTION Left     OS    CATARACT EXTRACTION, BILATERAL      left eye only    CHOLECYSTECTOMY      COLONOSCOPY  03/2012    repeat in 5 years    COLONOSCOPY N/A 6/19/2017    Procedure: COLONOSCOPY;  Surgeon: Kal Elise MD;  Location: [facility];  Service: Endoscopy;  Laterality: N/A;    COLONOSCOPY N/A 6/19/2017    Performed by Kal [facility]ise [facility] at [facility]    COLONOSCOPY N/A 3/28/2012    Performed by Garrison Koo MD at [facility]    CORONARY ARTERY BYPASS GRAFT  2005    2 vessel    EGD (ESOPHAGOGASTRODUODENOSCOPY)/cryo N/A 11/1/2018    Performed by Robbie Gonzales MD at [facility] (2ND FLR)    EGD (ESOPHAGOGASTRODUODENOSCOPY)/poss cryo N/A 10/3/2018     Performed by Robbie Gonzales MD at [facility] (2ND FLR)    ESOPHAGOGASTRODUODENOSCOPY N/A 7/5/2018    Procedure: ESOPHAGOGASTRODUODENOSCOPY (EGD)/cryo;  Surgeon: Robbie Gonzales MD;  Location: [facility] (2ND FLR);  Service: Endoscopy;  Laterality: N/A;  Eliquis/Dr Henry Wei/OK to hold med 2 days prior to egd/see telephone encounter dated 6/12/18/pt stated she needs to take Diflucan 1 tab po daily 12 days prior to egd, message sent to Sarah/she will check with Dr Gonzales/santo    ESOPHAGOGASTRODUODENOSCOPY N/A 10/3/2018    Procedure: EGD (ESOPHAGOGASTRODUODENOSCOPY)/poss cryo;  Surgeon: Robbie Gonzales MD;  Location: [facility] (2ND FLR);  Service: Endoscopy;  Laterality: N/A;  2 day hold Dr Henry Pavon  Diflucan 1 tab po daily starting 14 days prior to egd, Rx'd per Dr Gonzales    ESOPHAGOGASTRODUODENOSCOPY N/A 11/1/2018    Procedure: EGD (ESOPHAGOGASTRODUODENOSCOPY)/cryo;  Surgeon: Robbie Gonzales MD;  Location: [facility] (2ND FLR);  Service: Endoscopy;  Laterality: N/A;  2 day hold Dr Henry Pavon  Diflucan 1 tab po daily starting 14 days prior to egd, Rx'd per Dr Gonzales as before?    ESOPHAGOGASTRODUODENOSCOPY (EGD) N/A 11/29/2017    Performed by Daniel Gillette MD at [facility] (2ND FLR)    ESOPHAGOGASTRODUODENOSCOPY (EGD) N/A 10/3/2017    Performed by Hue Wright MD at [facility] ENDO    ESOPHAGOGASTRODUODENOSCOPY (EGD) N/A 6/14/2017    Performed by Kal Elise MD at [facility] ENDO    ESOPHAGOGASTRODUODENOSCOPY (EGD) N/A 10/12/2016    Performed by Kal Elise MD at [facility] ENDO    ESOPHAGOGASTRODUODENOSCOPY (EGD) N/A 5/10/2016    Performed by Garrison Koo MD at [facility] ENDO    ESOPHAGOGASTRODUODENOSCOPY (EGD) W/CRYOTHERAPY N/A 12/22/2017    Per[facility] by [facility] at [facility] ENDO    ESOPHAGOGASTRODUODENOSCOPY (EGD) with cryo N/A 3/5/2018    Performed by [facility] at [facility] ENDO    ESOPHAGOGASTRODUODENOSCOPY (EGD)/cryo N/A 7/5/2018    Performed by  [facility] ENDO (2ND FLR)    ESOPHAGOGASTRODUODENOSCOPY (EGD)/cryo N/A 4/16/2018    Performed by [facility] Gonzales [facility] at [facility] ENDO (2ND FLR)    ESOPHAGOGASTRODUODENOSCOPY (EGD)/poss Cryo N/A 12/14/2017    Performed by Robbie Gonzales MD at [facility] ENDO (2ND FLR)    ESOPHAGOGASTRODUODENOSCOPY (EGD)/Poss EMR N/A 10/31/2017    Performed by Robbie Gonzales [facility] at [facility] ENDO (2ND FLR)    EYE SURGERY  Dec 2012    ptosis repair    FIRST RIB REMOVAL  1996 with lung resection    HEMORRHOIDECTOMY N/A 3/16/2018    [facility]    HYSTERECTOMY      LUNG LOBECTOMY  1996    left lung for cancer    phaco/pciol Right 11/5/2014    [facility] Guerline Lawson [facility]    REPAIR, BLEPHAROPTOSIS Left 12/6/2012    [facility] Eze Glynn [facility] at [facility] OR 1ST FLR    SHOULDER SURGERY  2010,2011    scapular entrapment after lung surgery, needed 5 ribs removed left side    SYMPOTHATIC NERVE LEFT SIDE      1996 with lung surgery    TONSILLECTOMY      TONSILLECTOMY, ADENOIDECTOMY, BILATERAL MYRINGOTOMY AND TUBES      ULTRASOUND-ENDOSCOPIC-UPPER N/A 10/31/2017    Performed by [facility]bie [facility] at [facility] ENDO (2ND FLR)    UPPER GASTROINTESTINAL ENDOSCOPY  05/2016    Dr. Koo    VASCULAR SURGERY      stents place in jugualr vein       Social History     Socioeconomic History    Marital status:      Spouse name: Not on file    Number of children: Not on file    Years of education: Not on file    Highest education level: Not on file   Social Needs    Financial resource strain: Not on file    Food insecurity - worry: Not on file    Food insecurity - inability: Not on file    Transportation needs - medical: Not on file    Transportation needs - non-medical: Not on file   Occupational History    Occupation: retired   Tobacco Use    Smoking status: Former Smoker     Packs/day: 2.00     Years: 20.00     Pack years: 40.00     Types:  Cigarettes     Last attempt to quit: 1994     Years since quittin.9    Smokeless tobacco: Never Used   Substance and Sexual Activity    Alcohol use: No    Drug use: No    Sexual activity: Yes     Partners: Male   Other Topics Concern    Are you pregnant or think you may be? No    Breast-feeding No   Social History Narrative    Not on file         CBC: No results for input(s): WBC, RBC, HGB, HCT, PLT, MCV, MCH, MCHC in the last 72 hours.    CMP: No results for input(s): NA, K, CL, CO2, BUN, CREATININE, GLU, MG, PHOS, CALCIUM, ALBUMIN, PROT, ALKPHOS, ALT, AST, BILITOT in the last 72 hours.    INR  No results for input(s): PT, INR, PROTIME, APTT in the last 72 hours.          EKG:Vent. Rate : 071 BPM     Atrial Rate : 071 BPM     P-R Int : 184 ms          QRS Dur : 110 ms      QT Int : 456 ms       P-R-T Axes : 055 090 099 degrees     QTc Int : 495 ms    Normal sinus rhythm  Possible Left atrial enlargement  Rightward axis  Prolonged QT  Abnormal ECG  When compared with ECG of 19-SEP-2018 08:48,  No significant change was found  Confirmed by YULI FINLEY MD ([id number]) on 2018 12:50:32 PM      2D Echo:  Results for orders placed or performed in visit on 18   2D Echo w/ Color Flow Doppler   Result Value Ref Range    QEF 40 (A) 55 - 65    Mitral Valve Regurgitation MILD     Diastolic Dysfunction Yes (A)     Aortic Valve Regurgitation MILD     Est. PA Systolic Pressure 16.16          Anesthesia Evaluation    I have reviewed the Patient Summary Reports.    I have reviewed the Nursing Notes.   I have reviewed the Medications.     Review of Systems  Anesthesia Hx:  No problems with previous Anesthesia    Hematology/Oncology:  Hematology Normal       -- Cancer in past history:  radiation    EENT/Dental:EENT/Dental Normal   Cardiovascular:   Hypertension Past MI CAD asymptomatic CABG/stent Dysrhythmias   Denies Angina. CHF    Pulmonary:   COPD Asthma    Renal/:  Renal/ Normal     Hepatic/GI:    GERD    Musculoskeletal:  Musculoskeletal Normal    Neurological:   Neuromuscular Disease,    Endocrine:   Hypothyroidism    Dermatological:  Skin Normal    Psych:  Psychiatric Normal           Physical Exam  General:  Well nourished    Airway/Jaw/Neck:  Airway Findings: Mouth Opening: Normal Tongue: Normal  General Airway Assessment: Adult  Mallampati: II  Jaw/Neck Findings:  Neck ROM: Normal ROM     Eyes/Ears/Nose:  Eyes/Ears/Nose Findings:    Dental:  Dental Findings: In tact   Chest/Lungs:  Chest/Lungs Findings: Clear to auscultation, Normal Respiratory Rate, Decreased Breath Sounds Bilateral     Heart/Vascular:  Heart Findings: Rate: Tachycardia  Sounds: Normal  Heart Murmur  Vascular Findings:        Mental Status:  Mental Status Findings:  Cooperative, Alert and Oriented         Anesthesia Plan  Type of Anesthesia, risks & benefits discussed:  Anesthesia Type:  general  Patient's Preference: General  Intra-op Monitoring Plan: standard ASA monitors and arterial line  Intra-op Monitoring Plan Comments:   Post Op Pain Control Plan: multimodal analgesia and IV/PO Opioids PRN  Post Op Pain Control Plan Comments:   Induction:   IV  Beta Blocker:  Patient is not currently on a Beta-Blocker (No further documentation required).       Informed Consent: Patient understands risks and agrees with Anesthesia plan.  Questions answered. Anesthesia consent signed with patient.  ASA Score: 3     Day of Surgery Review of History & Physical:    H&P update referred to the surgeon.     Anesthesia Plan Notes: Discussed plan for general endotracheal anesthesia, pt understands and agrees with plan        Ready For Surgery From Anesthesia Perspective.

## 2018-12-11 NOTE — H&P
[facility]  Cardiology  History and Physical     Patient Name: Steph Lira  MRN: [id number]  Admission Date: 12/11/2018  Code Status: Prior   Attending Provider: Santana Covarrubias MD   Primary Care Physician: Maida Mon MD  Principal Problem:<principal problem not specified>    Patient information was obtained from patient and ER records.     Subjective:     Chief Complaint:  AF     HPI:          TRANSESOPHAGEAL ECHOCARDIOGRAPHY   PRE-PROCEDURE NOTE    12/11/2018    HPI:     Steph Lira is a 68 y.o. woman with PMHx of AF here for PVI with JUAN ANTONIO prior. Has hx of dysphagia, 2/2 esophageal papilloma and stenosis.     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      11/1/2018 EGD  One benign-appearing, intrinsic moderate stenosis was found 20 cm        from the incisors. This stenosis measured 1 cm (in length). The        stenosis was traversed.       Localized moderate mucosal changes characterized by nodularity were        found in the upper third of the esophagus at 20 cm. The decision was        made to ablate the abnormal mucosa in the esophagus with spray        cryotherapy.    Dysphagia or odynophagia:  Yes  Liver Disease, esophageal disease, or known varices:  No  Upper GI Bleeding: No  Snoring:  Yes  Sleep Apnea:  No  Prior neck surgery or radiation:  No  History of anesthetic difficulties:  No  Family history of anesthetic difficulties:  No  Last oral intake:  12 hours ago  Able to move neck in all directions:  Yes      8/2018 TTE EF 40-45 biatrial enlargement.     Past Medical History:   Diagnosis Date    *Atrial fibrillation     and Hx PSVT    Allergic drug rash 12/28/2016    Allergy     chronic     Arthritis      fingers    Benign tumor of throat     Bronchitis March 2013    CAD (coronary artery disease) 2005    CABGx2 in 2005 and 2 MI after with 4 stents    Cancer 1996    small cell lung cancer s/p resection of 1/3 lung, 5 ribs and muscle mass then had chemo and radiation    Cataract     OD     CHF (congestive heart failure) past Hx    Chronic rhinitis     Clot past Hx    external jugular, now stented, occluded, had phlebitis; now revascularized    Colon polyp     COPD (chronic obstructive pulmonary disease)     no oxygen or nebulizers    COPD exacerbation 5/8/13    improved 5/14/13 after steroid pack,antibiotics    Former smoker     GERD (gastroesophageal reflux disease)     Heart block     Hyperlipidemia     Hypertension     pt states does not have //    MI (myocardial infarction) 2005    Posterior vitreous detachment of left eye     Thyroid disease        Past Surgical History:   Procedure Laterality Date    ADENOIDECTOMY      APPENDECTOMY      BACK SURGERY      lumbar    BREAST BIOPSY Left     benign    CARDIAC SURGERY      CATARACT EXTRACTION Left     OS    CATARACT EXTRACTION, BILATERAL      left eye only    CHOLECYSTECTOMY      COLONOSCOPY  03/2012    repeat in 5 years    COLONOSCOPY N/A 6/19/2017    Procedure: COLONOSCOPY;  Surgeon: Kal Elise MD;  Location: [facility];  Service: Endoscopy;  Laterality: N/A;    COLONOSCOPY N/A 6/19/2017    [facility]O    COLONOSCOPY N/A 3/28/2012    Performed by Garrison Koo MD at [facility]    CORONARY ARTERY BYPASS GRAFT  2005    2 vessel    EGD (ESOPHAGOGASTRODUODENOSCOPY)/cryo N/A 11/1/2018    Performed by [facility] at [facility] ([facility]ND F[facility])    EGD (ESOPHAGOGASTRODUODENOSCOPY)/poss cryo N/A 10/3/2018    [facility]formed [facility] ENDO (2ND FLR)    ESOPHAGOGASTRODUODENOSCOPY N/A 7/5/2018    Procedure: ESOPHAGOGASTRODUODENOSCOPY (EGD)/cryo;  Surgeon: Robbie Gonzales MD;   [facility]  Service: Endoscopy;  Laterality: N/A;  Eliquis/Dr Henry Wei/OK to hold med 2 days prior to egd/see telephone encounter dated 6/12/18/pt stated she needs to take Diflucan 1 tab po daily 12 days prior to egd, message sent to Sarah/she will check with Dr Gonzales/santo    ESOPHAGOGASTRODUODENOSCOPY N/A 10/3/2018    Procedure: EGD (ESOPHAGOGASTRODUODENOSCOPY)/poss cryo;  Surgeon: Robbie Gonzales MD;  Location: [facility] (2ND FLR);  Service: Endoscopy;  Laterality: N/A;  2 day hold Dr Henry Pavon  Diflucan 1 tab po daily starting 14 days prior to egd, Rx'd per Dr Gonzales    ESOPHAGOGASTRODUODENOSCOPY N/A 11/1/2018    Procedure: EGD (ESOPHAGOGASTRODUODENOSCOPY)/cryo;  Surgeon: Robbie Gonzales MD;  [facility]  Service: Endoscopy;  Laterality: N/A;  2 day hold Dr Henry Pavon  Diflucan 1 tab po daily starting 14 days prior to egd, Rx'd per Dr Gonzales as before?    ESOPHAGOGASTRODUODENOSCOPY (EGD) N/A 11/29/2017    Performed by Daniel Gillette [facility] at [facility]    ESOPHAGOGASTRODUODENOSCOPY (EGD) N/A 10/3/2017    Per[facility] by Hue Wright [facility] at [facility] ENDO    ESOPHAGOGASTRODUODENOSCOPY (EGD) N/A 6/14/2017    Performed by Kal [facility]ise MD at [facility] ENDO    ESOPHAGOGASTRODUODENOSCOPY (EGD) N/A 10/12/2016    Performed by Kal Elise MD at [facility] ENDO    ESOPHAGOGASTRODUODENOSCOPY (EGD) N/A 5/10/2016    Performed by Garrison Koo MD at [facility] ENDO    ESOPHAGOGASTRODUODENOSCOPY (EGD) W/CRYOTHERAPY N/A 12/22/2017    Performed by [facility] at [facility] ENDO    ESOPHAGOGASTRODUODENOSCOPY (EGD) with cryo N/A 3/5/2018    Performed by Robbie Gonzales MD at [facility] ENDO    ESOPHAGOGASTRODUODENOSCOPY (EGD)/cryo N/A 7/5/2018    Performed by Robbie Gonzales MD at [facility] (2ND FLR)    ESOPHAGOGASTRODUODENOSCOPY (EGD)/cryo N/A 4/16/2018    Performed by Robbie Gonzales MD at [facility] (2ND FLR)     ESOPHAGOGASTRODUODENOSCOPY (EGD)/poss Cryo N/A 12/14/2017    [facility] at [facility] ENDO (2ND FLR)    ESOPHAGOGASTRODUODENOSCOPY (EGD)/Poss EMR N/A 10/31/2017    Performed by Robbie Gonzales MD at [facility] ENDO (2ND FLR)    EYE SURGERY  Dec 2012    ptosis repair    FIRST RIB REMOVAL  1996 with lung resection    HEMORRHOIDECTOMY N/A 3/16/2018    [facility]ano [facility] at [facility] OR    HYSTERECTOMY      LUNG LOBECTOMY  1996    left lung for cancer    phaco/pciol Right 11/5/2014    [facility] Guerline Lawson [facility]    REPAIR, BLEPHAROPTOSIS Left 12/6/2012    Performed by Eze Glynn MD at [facility] OR 1ST FLR    SHOULDER SURGERY  2010,2011    scapular entrapment after lung surgery, needed 5 ribs removed left side    SYMPOTHATIC NERVE LEFT SIDE      1996 with lung surgery    TONSILLECTOMY      TONSILLECTOMY, ADENOIDECTOMY, BILATERAL MYRINGOTOMY AND TUBES      ULTRASOUND-ENDOSCOPIC-UPPER N/A 10/31/2017    Performed by Robbie Gonzales MD at [facility] ENDO (2ND FLR)    UPPER GASTROINTESTINAL ENDOSCOPY  05/2016    Dr. Koo    VASCULAR SURGERY      stents place in jugualr vein       Review of patient's allergies indicates:   Allergen Reactions    Ciprofloxacin Itching    Coreg [carvedilol]      Low energy    Doxycycline Other (See Comments) and Hives     Patient had a rxn with the sun despite wearing spf 30    Metoprolol      Low energy       No current facility-administered medications on file prior to encounter.      Current Outpatient Medications on File Prior to Encounter   Medication Sig    atorvastatin (LIPITOR) 40 MG tablet Take 1 tablet (40 mg total) by mouth once daily.    bisacodyl (DULCOLAX) 5 mg EC tablet Take 1 tablet (5 mg total) by mouth daily as needed for Constipation.    cetirizine (ZYRTEC) 10 MG tablet TAKE 1 TABLET EVERY EVENING    diphenhydrAMINE (BENADRYL) 25 mg capsule Take 25 mg by mouth every 6 (six) hours as needed for  Itching.    levothyroxine (SYNTHROID) 88 MCG tablet Take 1 tablet (88 mcg total) by mouth once daily.    montelukast (SINGULAIR) 10 mg tablet Take 1 tablet (10 mg total) by mouth every evening.    pantoprazole (PROTONIX) 40 MG tablet TAKE 1 TABLET DAILY 30 MINUTES PRIOR TO BREAKFAST    polyethylene glycol (GLYCOLAX) 17 gram/dose powder Take 17 g by mouth once daily.    ADVAIR DISKUS 250-50 mcg/dose diskus inhaler USE 1 INHALATION TWICE A DAY (RINSE MOUTH AFTER USE TO PREVENT THRUSH)    albuterol (ACCUNEB) 1.25 mg/3 mL Nebu USE 1 VIAL (3 ML) VIA NEBULIZER EVERY 6 HOURS AS NEEDED    albuterol (PROVENTIL HFA) 90 mcg/actuation inhaler Inhale 2 puffs into the lungs every 6 (six) hours as needed for Wheezing.    desoximetasone (TOPICORT) 0.25 % cream Apply topically 2 (two) times daily. (Patient taking differently: Apply topically 2 (two) times daily as needed. )    diclofenac sodium (VOLTAREN) 1 % Gel Apply 2 g topically once daily.    diltiaZEM (CARDIZEM) 60 MG tablet Take 1 tablet (60 mg total) by mouth 2 (two) times daily.    flecainide (TAMBOCOR) 50 MG Tab Take 2 tablets (100 mg total) by mouth every 12 (twelve) hours. (Patient taking differently: Take 50 mg by mouth 2 (two) times daily. )    FLUAD [id number], 65 YR UP,,PF, 45 mcg (15 mcg x 3)/0.5 mL Syrg ADMINISTER 0.5ML IN THE MUSCLE AS DIRECTED    fluticasone (FLONASE) 50 mcg/actuation nasal spray 1 spray by Each Nare route once daily.    ketoconazole (NIZORAL) 2 % shampoo Wash hair with medicated shampoo at least 2x/week - let sit on scalp at least 5 minutes prior to rinsing    multivitamin-minerals-lutein (CENTRUM SILVER) Tab Take 1 tablet by mouth once daily. 1 Tablet Oral Every day    RETIN-A 0.05 % cream APPLY THIN FILM TO FACE AT NIGHT AFTER MOISTURIZING    rivaroxaban (XARELTO) 20 mg Tab Take 1 tablet (20 mg total) by mouth once daily.    SPIRIVA WITH HANDIHALER 18 mcg inhalation capsule INHALE THE CONTENTS OF 1 CAPSULE DAILY     triamcinolone acetonide 0.1% (KENALOG) 0.1 % cream Apply topically 2 (two) times daily.     Family History     Problem Relation (Age of Onset)    Allergic rhinitis Father, Son, Sister    Allergies Father, Mother    Asthma Son, Sister    Cancer Father    Colon polyps Maternal Grandmother    Diverticulitis Brother    Hypertension Father    No Known Problems Daughter, Maternal Aunt, Maternal Uncle, Paternal Aunt, Paternal Uncle, Maternal Grandfather, Paternal Grandmother, Paternal Grandfather, Brother, Sister    Stroke Mother        Tobacco Use    Smoking status: Former Smoker     Packs/day: 2.00     Years: 20.00     Pack years: 40.00     Types: Cigarettes     Last attempt to quit: 1994     Years since quittin.9    Smokeless tobacco: Never Used   Substance and Sexual Activity    Alcohol use: No    Drug use: No    Sexual activity: Yes     Partners: Male     Review of Systems   All other systems reviewed and are negative.    Objective:     Vital Signs (Most Recent):  Temp: 97.4 °F (36.3 °C) (18)  Pulse: (!) 111 (18)  Resp: 18 (18)  BP: 109/79 (18)  SpO2: 97 % (18) Vital Signs (24h Range):  Temp:  [97.4 °F (36.3 °C)] 97.4 °F (36.3 °C)  Pulse:  [111] 111  Resp:  [18] 18  SpO2:  [97 %] 97 %  BP: (109-115)/(78-79) 109/79     Weight: 61.7 kg (136 lb)  Body mass index is 21.95 kg/m².    SpO2: 97 %  O2 Device (Oxygen Therapy): room air    No intake or output data in the 24 hours ending 18 0711    Lines/Drains/Airways     Peripheral Intravenous Line                 Peripheral IV - Single Lumen 18 0624 Right Hand less than 1 day         Peripheral IV - Single Lumen 18 0632 Left Antecubital less than 1 day                Physical Exam   Constitutional: She is oriented to person, place, and time. She appears well-developed and well-nourished.   HENT:   Head: Normocephalic and atraumatic.   Eyes: No scleral icterus.   Cardiovascular: Regular  rhythm.   Tachycardic    Pulmonary/Chest: Effort normal and breath sounds normal.   Musculoskeletal: She exhibits no edema.   Neurological: She is alert and oriented to person, place, and time.   Skin: Skin is warm.   Psychiatric: She has a normal mood and affect.       Significant Labs: All pertinent lab results from the last 24 hours have been reviewed.    Significant Imaging: Echocardiogram:   2D echo with color flow doppler:   Results for orders placed or performed in visit on 01/04/18   2D Echo w/ Color Flow Doppler   Result Value Ref Range    QEF 40 (A) 55 - 65    Mitral Valve Regurgitation MILD     Diastolic Dysfunction Yes (A)     Aortic Valve Regurgitation MILD     Est. PA Systolic Pressure 16.16      Assessment and Plan:     Paroxysmal atrial fibrillation      1. JUAN ANTONIO for evaluation of LA/JOEL thrombus     -The risks, benefits & alternatives of the procedure were explained to the patient.    -The risks of transesophageal echo include but are not limited to:  Dental trauma, esophageal trauma/perforation, bleeding, laryngospasm/brochospasm, aspiration, sore throat/hoarseness, & dislodgement of the endotracheal tube/nasogastric tube (where applicable).    -The risks of moderate sedation include hypotension, respiratory depression, arrhythmias, bronchospasm, & death.    -Informed consent was obtained & the patient is agreeable to proceed with the procedure.    I will discuss with the attending physician. Attending addendum is to follow.     Further recommendations per attending addendum           VTE Risk Mitigation (From admission, onward)    None          Mario Saleh MD  Cardiology   [facility]

## 2018-12-12 ENCOUNTER — PATIENT MESSAGE (OUTPATIENT)
Dept: SURGERY | Facility: CLINIC | Age: 68
End: 2018-12-12

## 2018-12-12 ENCOUNTER — PATIENT OUTREACH (OUTPATIENT)
Dept: OTHER | Facility: OTHER | Age: 68
End: 2018-12-12

## 2018-12-12 PROBLEM — Z79.01 LONG TERM (CURRENT) USE OF ANTICOAGULANTS: Status: ACTIVE | Noted: 2018-12-12

## 2018-12-12 NOTE — DISCHARGE SUMMARY
[facility]  Cardiac Electrophysiology  Discharge Summary      Patient Name: Steph Lira  MRN: [id number]  Admission Date: 12/11/2018  Hospital Length of Stay: 0 days  Discharge Date and Time: 12/11/2018 11:06 AM  Attending Physician: Santana Covarrubias MD  Discharging Provider: Ni Jaimes NP  Primary Care Physician: Maida Mon MD    HPI: Ms. Lira is a 68 year old female with a PMHx of AF, AFL, AT, HTN,HLD,CAD, CABG x 2 (2005), hypothyroidism, DCCV. She presents today for PVI RFA with Dr. Covarrubias. She denies any chest pain, dizziness, light headedness, weakness, syncope, or near syncopal episodes. She does state she has SOB with her palpitations, OLIVARES, and fatigue. She denies any bleeding, infections, rashes, or surgeries in the past 30 days. She is currently taking diltiazem (last dose 12/7/18), flecainide (last dose 12/7/18), xarelto (last dose 12/9/18).     CTA chest 12/8/18 reviewed by Dr. Covarrubias.   I have personally reviewed the patient's EKG today, which shows AFL with 2:1 AV conduction with a ventricular rate of 155 bpm. QTc is 504.     The patient has been examined and the H&P has been reviewed:     I concur with the findings and no changes have occurred since H&P was written.     Procedure(s) (LRB):  ABLATION (N/A)  ECHOCARDIOGRAM,TRANSESOPHAGEAL (N/A)     Indwelling Lines/Drains at time of discharge:  Lines/Drains/Airways          None        Hospital Course: The patient presented today for a JUAN ANTONIO/PVI RFA with Dr. Covarrubias. JUAN ANTONIO canceled as patient has a history of esophageal strictures, consequently PVI RFA canceled per Dr. Covarrubias. Patient tolerated anesthesia without any difficulties. Patient AAOx 3 and feeling well. Plans and discharge instructions discussed with Dr. Covarrubias. Patient disappointed procedure has to be postponed, but prepared to move forward with plans discussed with Dr. Covarrubias. Patient to discontinue her current dose of diltiazem. Start Diltiazem  mg (1 tablet) two  times daily. Start Coumadin 5 mg (1 tablet) once daily, start tonight (12/11/18). Continue Xarelto 20 mg daily for 3 more days, then discontinue (last dose should be on 12/13/18). Continue all other home medications. Follow up with coumadin clinic today. Weekly INRs for the next 4 weeks. Reviewed patient educational instruction print-outs on warfarin with patient. Patient has been on coumadin in the past she states she is aware of the cautions and restrictions. Instructed patient on anticoagulation protocol. I personally contacted the [facility] coumadin clinic via phone and staff message. They stated they would be contacting the patient by the end of today. I will be following up. Reschedule ablation for 4 weeks. Per Dr. Covarrubias's instruction, a copy of the patient's chest CTA was handed to the patient. Instructed patient that it was recommended she have a follow up chest scan in 3 months. Patient is followed by Dr. Flanagan with pulmonology. Instructed patient to follow up with Dr. Flanagan within the next month. Message sent to Dr. Flanagan's staff. Instructed patient she may be drowsy for the remainder of the day because she had sedation. No driving for 24-48 hours. Instructed the patient to call the clinic with any questions or concerns. Patient and  verbalized understanding of all plans and instructions and were very appreciative.     Physical Exam   Constitutional: She is oriented to person, place, and time. She appears well-developed and well-nourished.   HENT:   Head: Normocephalic and atraumatic.   Eyes: Conjunctivae, EOM and lids are normal. No scleral icterus.   Neck: Normal range of motion. No JVD present. No tracheal deviation present. No thyromegaly present.   Cardiovascular: Normal rate and intact distal pulses. An irregularly irregular rhythm present. No extrasystoles are present. PMI is not displaced. Exam reveals no gallop and no friction rub. No murmur noted.   Pulses:       Radial pulses are 2+ on the right  side, and 2+ on the left side.        Pedal pulses are 1+ on the right side, and 1+ on the left side.   Pulmonary/Chest: Effort normal and breath sounds normal. No accessory muscle usage. No tachypnea. No respiratory distress. He has no wheezes. He has no rales.   Abdominal: Soft. Bowel sounds are normal. He exhibits no distension. There is no hepatosplenomegaly. There is no tenderness.   Musculoskeletal: Normal range of motion. He exhibits no edema. Coordination normal.  Neurological: She is alert and oriented to person, place, and time. She has normal reflexes. She exhibits normal muscle tone.   Skin: Skin is warm and dry. No rash noted.   Psychiatric: She has a normal mood and affect. Her behavior is normal.   Nursing note and vitals reviewed.    Consults:   Anesthesia.  Significant Diagnostic Studies: Labs from 12/7/18 reviewed.  Lab Results   Component Value Date    INR 1.1 12/07/2018    INR 2.7 07/11/2017    INR 2.2 07/03/2017     Cardiac Graphics: Echocardiogram: 2D echo with color flow doppler:   Results for orders placed or performed in visit on 01/04/18   2D Echo w/ Color Flow Doppler   Result Value Ref Range    QEF 40 (A) 55 - 65    Mitral Valve Regurgitation MILD     Diastolic Dysfunction Yes (A)     Aortic Valve Regurgitation MILD     Est. PA Systolic Pressure 16.16     and Transthoracic echo (TTE) complete (Cupid Only):   Results for orders placed or performed in visit on 08/01/18   Transthoracic echo (TTE) complete   Result Value Ref Range    BSA 1.72 m2    TDI SEPTAL 0.04     LV LATERAL E/E' RATIO 8.71     LV SEPTAL E/E' RATIO 15.25     LA WIDTH 3.23 cm    TDI LATERAL 0.07     LVIDD 4.11 3.5 - 6.0 cm    IVS 0.59 (A) 0.6 - 1.1 cm    PW 0.72 0.6 - 1.1 cm    LVIDS 3.62 2.1 - 4.0 cm    FS 12 28 - 44 %    LA volume 39.47 cm3    Sinus 2.85 cm    STJ 2.96 cm    Ascending aorta 2.86 cm    LV mass 75.31 g    LA size 3.02 cm    Left Ventricle Relative Wall Thickness 0.32 cm    AV mean gradient 1.36 mmHg    AV  valve area 2.38 cm2    MV valve area p 1/2 method 6.31 cm2    E/A ratio 1.05     Mean e' 0.06     E wave decelartion time 120.27 msec    IVRT 0.16 msec    Pulm vein S/D ratio 0.62     LVOT diameter 2.02 cm    LVOT area 3.20 cm2    LVOT peak VTI 11.84 cm    Ao peak elliott 0.74 m/s    Ao VTI 15.93 cm    LVOT stroke volume 37.92 cm3    AV peak gradient 2.19 mmHg    E/E' ratio 11.09     MV Peak E Elliott 0.61 m/s    TR Max Elliott 1.48 m/s    MV stenosis pressure 1/2 time 34.88 ms    MV Peak A Elliott 0.58 m/s    PV Peak S Elliott 0.31 m/s    PV Peak D Elliott 0.50 m/s    LA Volume Index 22.9 mL/m2    LV Mass Index 43.8 g/m2    RA Major Axis 4.18 cm    Left Atrium Minor Axis 4.76 cm    Left Atrium Major Axis 4.76 cm    Triscuspid Valve Regurgitation Peak Gradient 8.76 mmHg    RA Width 2.30 cm    TAPSE 0.01 cm    Right Atrial Pressure (from IVC) 3.0 mmHg    TV rest pulmonary artery pressure 11.76 mmHg     Final Active Diagnoses:    Diagnosis Date Noted POA    PRINCIPAL PROBLEM:  Paroxysmal atrial fibrillation [I48.0] 12/11/2018 Yes      Problems Resolved During this Admission:     No new Assessment & Plan notes have been filed under this hospital service since the last note was generated.  Service: Arrhythmia    Discharged Condition: stable    Disposition: Home or Self Care    Follow Up:    Patient Instructions:      Ambulatory consult to Anticoagulation Monitoring   Referral Priority: Routine Referral Type: Consultation   Referral Reason: Specialty Services Required   Requested Specialty: Cardiology   Number of Visits Requested: 1     Diet Cardiac     No driving until:   Order Comments: No driving or operating heavy machinery for 24-48 hours after your procedure, you received sedation.     Other restrictions (specify):   Order Comments: Medications:  -Take your home medications as listed on your medication list after you are discharged.  -Discontinue your current dose of diltiazem.  -If you have problems or side effects caused by your  medications, call the clinic.    New Medications:  1. Diltiazem 120 mg (1 tablet) two times daily.  2. Coumadin 5 mg (1 tablet) once daily. START TONIGHT (12/11/18).  -Continue your Xarelto 20 mg daily for 3 more days, THEN DISCONTINUE (last dose should be on 12/13/18).    Diet  -You may resume oral intake after you are discharged, as long you have no swallowing difficulties.    Side effects:  -You may be drowsy for the remainder of the day because you had sedation.    Because you have received sedation for this procedure:  -Limit activity for the remainder of the day.  -Do not drive or operate any equipment for the remainder of the day.  -Do not smoke for at least 6 hours and until you are fully awake and alert.  -Do not drink alcoholic beverage for 24 hours.  -Defer important decision making until the following day.    Go to the Emergency Department if you develop:  -Bleeding  -Weakness or numbness  -Visual, gait or speech disturbance  -New chest pain, palpitations, shortness of breath, rapid heart beat, or fainting  -Fever    Follow up:  -Follow up with coumadin clinic today. Weekly INRs for the next 4 weeks.  -Reschedule ablation for 4 weeks.  -Reviewed patient educational instruction print outs on warfarin with patient.     Notify your health care provider if you experience any of the following:  temperature >100.4     Notify your health care provider if you experience any of the following:  persistent nausea and vomiting or diarrhea     Notify your health care provider if you experience any of the following:  severe uncontrolled pain     Notify your health care provider if you experience any of the following:  redness, tenderness, or signs of infection (pain, swelling, redness, odor or green/yellow discharge around incision site)     Notify your health care provider if you experience any of the following:  difficulty breathing or increased cough     Notify your health care provider if you experience any of the  following:  worsening rash     Notify your health care provider if you experience any of the following:  severe persistent headache     Notify your health care provider if you experience any of the following:  persistent dizziness, light-headedness, or visual disturbances     Notify your health care provider if you experience any of the following:  increased confusion or weakness     Notify your health care provider if you experience any of the following:   Order Comments: For any concerning symptoms.     Medications:  Reconciled Home Medications:      Medication List      START taking these medications    diltiaZEM 120 MG Cp24  Commonly known as:  CARDIZEM CD  Take 1 capsule (120 mg total) by mouth 2 (two) times daily.  Replaces:  diltiaZEM 60 MG tablet     warfarin 5 MG tablet  Commonly known as:  COUMADIN  Take 1 tablet (5 mg total) by mouth Daily.        CHANGE how you take these medications    desoximetasone 0.25 % cream  Commonly known as:  TOPICORT  Apply topically 2 (two) times daily.  What changed:    · when to take this  · reasons to take this     flecainide 50 MG Tab  Commonly known as:  TAMBOCOR  Take 2 tablets (100 mg total) by mouth every 12 (twelve) hours.  What changed:    · how much to take  · when to take this        CONTINUE taking these medications    ADVAIR DISKUS 250-50 mcg/dose diskus inhaler  Generic drug:  fluticasone-salmeterol 250-50 mcg/dose  USE 1 INHALATION TWICE A DAY (RINSE MOUTH AFTER USE TO PREVENT THRUSH)     * albuterol 90 mcg/actuation inhaler  Commonly known as:  PROVENTIL HFA  Inhale 2 puffs into the lungs every 6 (six) hours as needed for Wheezing.     * albuterol 1.25 mg/3 mL Nebu  Commonly known as:  ACCUNEB  USE 1 VIAL (3 ML) VIA NEBULIZER EVERY 6 HOURS AS NEEDED     atorvastatin 40 MG tablet  Commonly known as:  LIPITOR  Take 1 tablet (40 mg total) by mouth once daily.     bisacodyl 5 mg EC tablet  Commonly known as:  DULCOLAX  Take 1 tablet (5 mg total) by mouth daily as  needed for Constipation.     CENTRUM SILVER Tab  Generic drug:  multivitamin-minerals-lutein  Take 1 tablet by mouth once daily. 1 Tablet Oral Every day     cetirizine 10 MG tablet  Commonly known as:  ZYRTEC  TAKE 1 TABLET EVERY EVENING     diclofenac sodium 1 % Gel  Commonly known as:  VOLTAREN  Apply 2 g topically once daily.     diphenhydrAMINE 25 mg capsule  Commonly known as:  BENADRYL  Take 25 mg by mouth every 6 (six) hours as needed for Itching.     FLUAD [id number] (65 YR UP)(PF) 45 mcg (15 mcg x 3)/0.5 mL Syrg  Generic drug:  flu vac 2018 [id number](PF)  ADMINISTER 0.5ML IN THE MUSCLE AS DIRECTED     fluticasone 50 mcg/actuation nasal spray  Commonly known as:  FLONASE  1 spray by Each Nare route once daily.     ketoconazole 2 % shampoo  Commonly known as:  NIZORAL  Wash hair with medicated shampoo at least 2x/week - let sit on scalp at least 5 minutes prior to rinsing     levothyroxine 88 MCG tablet  Commonly known as:  SYNTHROID  Take 1 tablet (88 mcg total) by mouth once daily.     montelukast 10 mg tablet  Commonly known as:  SINGULAIR  Take 1 tablet (10 mg total) by mouth every evening.     pantoprazole 40 MG tablet  Commonly known as:  PROTONIX  TAKE 1 TABLET DAILY 30 MINUTES PRIOR TO BREAKFAST     polyethylene glycol 17 gram/dose powder  Commonly known as:  GLYCOLAX  Take 17 g by mouth once daily.     RETIN-A 0.05 % cream  Generic drug:  tretinoin  APPLY THIN FILM TO FACE AT NIGHT AFTER MOISTURIZING     rivaroxaban 20 mg Tab  Commonly known as:  XARELTO  Take 1 tablet (20 mg total) by mouth once daily.     SPIRIVA WITH HANDIHALER 18 mcg inhalation capsule  Generic drug:  tiotropium  INHALE THE CONTENTS OF 1 CAPSULE DAILY     triamcinolone acetonide 0.1% 0.1 % cream  Commonly known as:  KENALOG  Apply topically 2 (two) times daily.         * This list has 2 medication(s) that are the same as other medications prescribed for you. Read the directions carefully, and ask your doctor or other care  provider to review them with you.            STOP taking these medications    diltiaZEM 60 MG tablet  Commonly known as:  CARDIZEM  Replaced by:  diltiaZEM 120 MG Cp24          Plan:  -Procedure postponed, reschedule in 4 weeks.  -Discontinue current dose of diltiazem. Start Diltiazem  mg two times daily. -Start Coumadin 5 mg once daily, start tonight (12/11/18). Continue Xarelto 20 mg daily for 3 more days, then discontinue (last dose should be on 12/13/18).  -Follow up with coumadin clinic today. Weekly INRs for the next 4 weeks.   -A copy of the patient's chest CTA was handed to the patient. Instructed patient that it was recommended she have a follow up chest scan in 3 months. Patient is followed by Dr. Flanagan with pulmonology. Instructed patient to follow up with Dr. Flanagan within the next month. Message sent to Dr. Flanagan's staff.     Time spent on the discharge of patient: 28 minutes    Ni Jaimes NP  Cardiac Electrophysiology  [facility]    Attending: Santana Covarrubias MD

## 2018-12-12 NOTE — PROGRESS NOTES
Last 5 Patient Entered Readings                                      Current 30 Day Average: 109/72     Recent Readings 12/9/2018 12/6/2018 12/4/2018 12/3/2018 12/2/2018    SBP (mmHg) 99 108 112 117 122    DBP (mmHg) 69 70 71 71 78    Pulse 104 105 85 86 88        Patient reports that she would like to drop out of the program at this time. Feels that it is too much given her other conditions (mentions throat conditiion, afib, resting heart rate of 155). I informed her that she can call at any time and re-enroll when she is ready.    Dropping from program 12/12

## 2018-12-13 ENCOUNTER — PATIENT MESSAGE (OUTPATIENT)
Dept: FAMILY MEDICINE | Facility: CLINIC | Age: 68
End: 2018-12-13

## 2018-12-13 ENCOUNTER — TELEPHONE (OUTPATIENT)
Dept: PULMONOLOGY | Facility: CLINIC | Age: 68
End: 2018-12-13

## 2018-12-13 ENCOUNTER — PATIENT MESSAGE (OUTPATIENT)
Dept: PULMONOLOGY | Facility: CLINIC | Age: 68
End: 2018-12-13

## 2018-12-13 NOTE — TELEPHONE ENCOUNTER
----- Message from Celina Flanagan MD sent at 12/12/2018  3:44 PM CST -----  Regarding: RE: Follow up appointment after results of chest CTA  Please put Mrs. Lira on for a three month follow up CT.  Please call to let her know that I reviewed it and am not concerned but will certainly monitor it.  Please let her know that she should call in the beginning of January to schedule for March  Celina    ----- Message -----  From: Shannan Walton MA  Sent: 12/12/2018   8:21 AM  To: Celina Flanagan MD  Subject: FW: Follow up appointment after results of c#        ----- Message -----  From: Ni Jaimes NP  Sent: 12/11/2018  10:09 PM  To: Panch[facility] PENA Staff  Subject: Follow up appointment after results of chest#    Hello!    Ms. Lira had a chest CTA prior to a scheduled procedure with Dr. Covarrubias. It looked like new findings were noted in her left lung and it was recommended she have a repeat scan in 3 months. I gave a copy of the report to the patient and would like her to follow up with you all. Would it be possible to get her a follow up appointment?    Thank you,  Ni Jaimes NP

## 2018-12-13 NOTE — TELEPHONE ENCOUNTER
I spoke to the pt to let her know we will put her on for a three month follow up with CT prior and that Dr Flanagan reviewed CT and am not concerned but will certainly monitor it. Also, I let her know that she should call in the beginning of January to schedule for March per Dr Flanagan. Pt verbalized understanding.

## 2018-12-14 ENCOUNTER — LAB VISIT (OUTPATIENT)
Dept: LAB | Facility: HOSPITAL | Age: 68
End: 2018-12-14
Attending: INTERNAL MEDICINE
Payer: MEDICARE

## 2018-12-14 DIAGNOSIS — I48.0 PAROXYSMAL ATRIAL FIBRILLATION: ICD-10-CM

## 2018-12-14 DIAGNOSIS — Z79.01 LONG TERM (CURRENT) USE OF ANTICOAGULANTS: ICD-10-CM

## 2018-12-14 LAB
INR PPP: 2.6
PROTHROMBIN TIME: 26.3 SEC

## 2018-12-14 PROCEDURE — 36415 COLL VENOUS BLD VENIPUNCTURE: CPT

## 2018-12-14 PROCEDURE — 85610 PROTHROMBIN TIME: CPT

## 2018-12-17 ENCOUNTER — TELEPHONE (OUTPATIENT)
Dept: ENDOSCOPY | Facility: HOSPITAL | Age: 68
End: 2018-12-17

## 2018-12-17 ENCOUNTER — TELEPHONE (OUTPATIENT)
Dept: ELECTROPHYSIOLOGY | Facility: CLINIC | Age: 68
End: 2018-12-17

## 2018-12-17 ENCOUNTER — OFFICE VISIT (OUTPATIENT)
Dept: FAMILY MEDICINE | Facility: CLINIC | Age: 68
End: 2018-12-17
Payer: MEDICARE

## 2018-12-17 VITALS
BODY MASS INDEX: 21.86 KG/M2 | HEART RATE: 115 BPM | DIASTOLIC BLOOD PRESSURE: 80 MMHG | SYSTOLIC BLOOD PRESSURE: 116 MMHG | HEIGHT: 66 IN | WEIGHT: 136 LBS | TEMPERATURE: 98 F

## 2018-12-17 DIAGNOSIS — Z79.01 ANTICOAGULANT LONG-TERM USE: ICD-10-CM

## 2018-12-17 DIAGNOSIS — Z79.01 ANTICOAGULATED ON COUMADIN: Primary | ICD-10-CM

## 2018-12-17 DIAGNOSIS — K12.1 MOUTH ULCER: Primary | ICD-10-CM

## 2018-12-17 DIAGNOSIS — K22.9 ESOPHAGEAL LESION: ICD-10-CM

## 2018-12-17 DIAGNOSIS — I48.91 ATRIAL FIBRILLATION WITH RVR: ICD-10-CM

## 2018-12-17 DIAGNOSIS — J39.2 THROAT IRRITATION: ICD-10-CM

## 2018-12-17 DIAGNOSIS — E03.9 ACQUIRED HYPOTHYROIDISM: ICD-10-CM

## 2018-12-17 PROBLEM — K22.89 ESOPHAGEAL MASS: Status: RESOLVED | Noted: 2017-10-03 | Resolved: 2018-12-17

## 2018-12-17 PROCEDURE — 99999 PR PBB SHADOW E&M-EST. PATIENT-LVL IV: CPT | Mod: PBBFAC,,, | Performed by: NURSE PRACTITIONER

## 2018-12-17 PROCEDURE — 99213 OFFICE O/P EST LOW 20 MIN: CPT | Mod: S$PBB,,, | Performed by: NURSE PRACTITIONER

## 2018-12-17 PROCEDURE — 99214 OFFICE O/P EST MOD 30 MIN: CPT | Mod: PBBFAC,PO | Performed by: NURSE PRACTITIONER

## 2018-12-17 RX ORDER — FLUCONAZOLE 200 MG/1
200 TABLET ORAL DAILY
Qty: 14 TABLET | Refills: 0 | Status: SHIPPED | OUTPATIENT
Start: 2018-12-17 | End: 2018-12-31

## 2018-12-17 NOTE — TELEPHONE ENCOUNTER
Returned call to Pt, no answer. Left message.        ----- Message from Naz Motley MA sent at 12/17/2018 12:51 PM CST -----  Contact: self      ----- Message -----  From: Lynette Alicia  Sent: 12/17/2018  11:42 AM  To: Marci [facility] Staff    Pt stated that she needs to speak with the Dr regarding her procedures that are coming up.    Pt can be reached at [phone]

## 2018-12-17 NOTE — TELEPHONE ENCOUNTER
[facility], patient scheduled for f/u EGD to treat esophageal cancer on 1/9/19 at 1100.  It appears her only antithrombotic therapy is now Coumadin.  Per GI Endo protocol we request permission to hold Coumadin for 5 days prior to procedure.  Please respond and advise patient.  If bridging with Lovenox is required then please Rx and instruct patient.  GI Endo protocol for last dose Lovenox is 1/2 total daily dose given 24hrs prior to procedure.  Thank you.

## 2018-12-17 NOTE — PROGRESS NOTES
Subjective:       Patient ID: Steph Lira is a 68 y.o. female.    Chief Complaint: Mouth Lesions    Chief Complaint  Chief Complaint   Patient presents with    Mouth Lesions       HPI  Steph Lira is a 68 y.o. female with medical diagnoses as listed in the medical history and problem list that presents with c/o sores to mouth and dry mouth since yesterday. Patient reports that she noticed a plister to upper soft palate while eating potato chips with dip. Lesion has since burst and now has some pain and dry feeling with swallowing. Patient has tried some ACT rinse with some relief. Symptoms are constant. Patient does have an esophageal lesion that is precancerous that is being followed by gastroenterology Dr. Gonzales with cryo treatments about every 2 months or so.  Patient has Afib on coumadin with a history of CAD with MI and CABG x 2, and CHF.  She also has hyperlipidemia, hypothyroidism, COPD, and GERD. She has a history of lung cancer treated with resection, chemo and radiation in 1996.  Blood pressure today is 116/80. BMI is 21.95 and patient has gained 2 pounds since last visit 11/20/18.   Established patient with last clinic appointment 11/20/2018.        PAST MEDICAL HISTORY:  Past Medical History:   Diagnosis Date    *Atrial fibrillation     and Hx PSVT    Allergic drug rash 12/28/2016    Allergy     chronic     Arthritis     fingers    Benign tumor of throat     Bronchitis March 2013    CAD (coronary artery disease) 2005    CABGx2 in 2005 and 2 MI after with 4 stents    Cancer 1996    small cell lung cancer s/p resection of 1/3 lung, 5 ribs and muscle mass then had chemo and radiation    Cataract     OD     CHF (congestive heart failure) past Hx    Chronic rhinitis     Clot past Hx    external jugular, now stented, occluded, had phlebitis; now revascularized    Colon polyp     COPD (chronic obstructive pulmonary disease)     no oxygen or nebulizers    COPD exacerbation 5/8/13     improved 5/14/13 after steroid pack,antibiotics    Former smoker     GERD (gastroesophageal reflux disease)     Heart block     Hyperlipidemia     Hypertension     pt states does not have //    MI (myocardial infarction) 2005    Posterior vitreous detachment of left eye     Thyroid disease        PAST SURGICAL HISTORY:  Past Surgical History:   Procedure Laterality Date    ABLATION N/A 12/11/2018    Procedure: ABLATION;  Surgeon: Santana Covarrubias MD;  Location: [facility];  Service: Cardiology;  Laterality: N/A;  AF, JUAN ANTONIO, PVI, RFA, CARTO, GEN, GP, 3 PREP    ABLATION N/A 12/11/2018    Performed by Santana Covarrubias MD at [facility]    ADENOIDECTOMY      APPENDECTOMY      BACK SURGERY      lumbar    BREAST BIOPSY Left     benign    CARDIAC SURGERY      CATARACT EXTRACTION Left     OS    CATARACT EXTRACTION, BILATERAL      left eye only    CHOLECYSTECTOMY      COLONOSCOPY  03/2012    repeat in 5 years    COLONOSCOPY N/A 6/19/2017    Procedure: COLONOSCOPY;  Surgeon: Kal Elise MD;  Location: [facility];  Service: Endoscopy;  Laterality: N/A;    COLONOSCOPY N/A 6/19/2017    Performed by Kal Elise MD at [facility]    COLONOSCOPY N/A 3/28/2012    Performed by Garrison Koo MD at [facility]    CORONARY ARTERY BYPASS GRAFT  2005    2 vessel    ECHOCARDIOGRAM,TRANSESOPHAGEAL N/A 12/11/2018    Performed by [facility] at [facility]    EGD (ESOPHAGOGASTRODUODENOSCOPY)/cryo N/A 11/1/2018    Performed by Robbie Gonzales MD at [facility] (2ND F[facility])    EGD (ESOPHAGOGASTRODUODENOSCOPY)/poss cryo N/A 10/3/2018    Performed by Robbie Gonzales MD at [facility] (2ND FLR)    ESOPHAGOGASTRODUODENOSCOPY N/A 7/5/2018    Procedure: ESOPHAGOGASTRODUODENOSCOPY (EGD)/cryo;  Surgeon: Robbie Gonzales MD;  Location: [facility] (2ND FLR);  Service: Endoscopy;  Laterality: N/A;  Eliquis/Dr Henry Wei/OK to hold med 2 days prior to egd/see telephone encounter dated 6/12/18/pt stated  she needs to take Diflucan 1 tab po daily 12 days prior to egd, message sent to Sarah/she will check with Dr Gonzales/santo    ESOPHAGOGASTRODUODENOSCOPY N/A 10/3/2018    Procedure: EGD (ESOPHAGOGASTRODUODENOSCOPY)/poss cryo;  Surgeon: Robbie Gonzales MD;  Location: [facility] ([facility]);  Service: Endoscopy;  Laterality: N/A;  2 day hold Dr Henry Pavon  Diflucan 1 tab po daily starting 14 days prior to egd, Rx'd per Dr Gonzales    ESOPHAGOGASTRODUODENOSCOPY N/A 11/1/2018    Procedure: EGD (ESOPHAGOGASTRODUODENOSCOPY)/cryo;  Surgeon: Robbie Gonzales MD;  [facility] ([facility]  Service: Endoscopy;  Laterality: N/A;  2 day hold Dr Henry Pavon  Diflucan 1 tab po daily starting 14 days prior to egd, Rx'd per Dr Gonzales as before?    ESOPHAGOGASTRODUODENOSCOPY (EGD) N/A 11/29/2017    Performed by Daniel Gillette MD at [facility] ([facility]    ESOPHAGOGASTRODUODENOSCOPY (EGD) N/A 10/3/2017    Performed by Hue Wright MD at [facility] ENDO    ESOPHAGOGASTRODUODENOSCOPY (EGD) N/A 6/14/2017    Performed by Kal Elise MD at [facility] ENDO    ESOPHAGOGASTRODUODENOSCOPY (EGD) N/A 10/12/2016    Performed by Kal Elise MD at [facility] ENDO    ESOPHAGOGASTRODUODENOSCOPY (EGD) N/A 5/10/2016    Performed by Garrison Koo MD at [facility] ENDO    ESOPHAGOGASTRODUODENOSCOPY (EGD) W/CRYOTHERAPY N/A 12/22/2017    Per[facility] by [facility] at [facility] END[facility]    ESOPHAGOGASTRODUODENOSCOPY (EGD) with cryo N/A 3/5/2018    Performed by Robbie Gonzales MD at [facility] END[facility]    ESOPHAGOGASTRODUODENOSCOPY (EGD)/cryo N/A 7/5/2018    Performed by Robbie Gonzales MD at [facility] ENDO (2ND FLR)    ESOPHAGOGASTRODUODENOSCOPY (EGD)/cryo N/A 4/16/2018    Performed by Robbie Gonzales MD at [facility] ENDO (2ND FLR)    ESOPHAGOGASTRODUODENOSCOPY (EGD)/poss Cryo N/A 12/14/2017    Performed by Robbie Gonzales MD at [facility] ENDO (2ND FLR)    ESOPHAGOGASTRODUODENOSCOPY (EGD)/Poss EMR N/A 10/31/2017    Performed by Robbie  KATIA Gonzales MD at [facility] ENDO (2ND FLR)    EYE SURGERY  Dec 2012    ptosis repair    FIRST RIB REMOVAL   with lung resection    HEMORRHOIDECTOMY N/A 3/16/2018    Performed by Klaus Mandujano MD at [facility] OR    HYSTERECTOMY      LUNG LOBECTOMY  1996    left lung for cancer    phaco/pciol Right 2014    Performed by Guerline Lawson [facility] at [facility] OR    REPAIR, BLEPHAROPTOSIS Left 2012    Performed by Eze Glynn MD at [facility] OR 1ST FLR    SHOULDER SURGERY  ,    scapular entrapment after lung surgery, needed 5 ribs removed left side    SYMPOTHATIC NERVE LEFT SIDE       with lung surgery    TONSILLECTOMY      TONSILLECTOMY, ADENOIDECTOMY, BILATERAL MYRINGOTOMY AND TUBES      ULTRASOUND-ENDOSCOPIC-UPPER N/A 10/31/2017    Performed by Robbie Gonzales MD at [facility] ENDO (2ND FLR)    UPPER GASTROINTESTINAL ENDOSCOPY  2016    Dr. Koo    VASCULAR SURGERY      stents place in jugualr vein       SOCIAL HISTORY:  Social History     Socioeconomic History    Marital status:      Spouse name: Not on file    Number of children: Not on file    Years of education: Not on file    Highest education level: Not on file   Social Needs    Financial resource strain: Not on file    Food insecurity - worry: Not on file    Food insecurity - inability: Not on file    Transportation needs - medical: Not on file    Transportation needs - non-medical: Not on file   Occupational History    Occupation: retired   Tobacco Use    Smoking status: Former Smoker     Packs/day: 2.00     Years: 20.00     Pack years: 40.00     Types: Cigarettes     Last attempt to quit: 1994     Years since quittin.9    Smokeless tobacco: Never Used   Substance and Sexual Activity    Alcohol use: No    Drug use: No    Sexual activity: Yes     Partners: Male   Other Topics Concern    Are you pregnant or think you may be? No    Breast-feeding No   Social History Narrative    Not on file        FAMILY HISTORY:  Family History   Problem Relation Age of Onset    Allergic rhinitis Father     Cancer Father     Hypertension Father     Allergies Father     Allergic rhinitis Son     Asthma Son     Stroke Mother     Allergies Mother     Allergic rhinitis Sister     Asthma Sister     Diverticulitis Brother     No Known Problems Daughter     No Known Problems Maternal Aunt     No Known Problems Maternal Uncle     No Known Problems Paternal Aunt     No Known Problems Paternal Uncle     Colon polyps Maternal Grandmother          of colon blockage    No Known Problems Maternal Grandfather     No Known Problems Paternal Grandmother     No Known Problems Paternal Grandfather     No Known Problems Brother     No Known Problems Sister     Angioedema Neg Hx     Eczema Neg Hx     Immunodeficiency Neg Hx     Urticaria Neg Hx     Skin cancer Neg Hx     Amblyopia Neg Hx     Blindness Neg Hx     Cataracts Neg Hx     Diabetes Neg Hx     Glaucoma Neg Hx     Macular degeneration Neg Hx     Retinal detachment Neg Hx     Strabismus Neg Hx     Thyroid disease Neg Hx     Colon cancer Neg Hx     Melanoma Neg Hx        ALLERGIES AND MEDICATIONS: updated and reviewed.  Review of patient's allergies indicates:   Allergen Reactions    Ciprofloxacin Itching    Coreg [carvedilol]      Low energy    Doxycycline Other (See Comments) and Hives     Patient had a rxn with the sun despite wearing spf 30    Metoprolol      Low energy     Current Outpatient Medications   Medication Sig Dispense Refill    ADVAIR DISKUS 250-50 mcg/dose diskus inhaler USE 1 INHALATION TWICE A DAY (RINSE MOUTH AFTER USE TO PREVENT THRUSH) 3 each 3    albuterol (ACCUNEB) 1.25 mg/3 mL Nebu USE 1 VIAL (3 ML) VIA NEBULIZER EVERY 6 HOURS AS NEEDED 90 mL 2    albuterol (PROVENTIL HFA) 90 mcg/actuation inhaler Inhale 2 puffs into the lungs every 6 (six) hours as needed for Wheezing. 3 Inhaler 4    atorvastatin (LIPITOR) 40  MG tablet Take 1 tablet (40 mg total) by mouth once daily. 90 tablet 3    bisacodyl (DULCOLAX) 5 mg EC tablet Take 1 tablet (5 mg total) by mouth daily as needed for Constipation. 30 tablet 11    cetirizine (ZYRTEC) 10 MG tablet TAKE 1 TABLET EVERY EVENING 90 tablet 2    desoximetasone (TOPICORT) 0.25 % cream Apply topically 2 (two) times daily. (Patient taking differently: Apply topically 2 (two) times daily as needed. ) 180 g 3    diltiaZEM (CARDIZEM CD) 120 MG Cp24 Take 1 capsule (120 mg total) by mouth 2 (two) times daily. 60 capsule 3    diphenhydrAMINE (BENADRYL) 25 mg capsule Take 25 mg by mouth every 6 (six) hours as needed for Itching.      flecainide (TAMBOCOR) 50 MG Tab Take 2 tablets (100 mg total) by mouth every 12 (twelve) hours. (Patient taking differently: Take 50 mg by mouth 2 (two) times daily. ) 120 tablet 11    fluticasone (FLONASE) 50 mcg/actuation nasal spray 1 spray by Each Nare route once daily. 3 Bottle 3    ketoconazole (NIZORAL) 2 % shampoo Wash hair with medicated shampoo at least 2x/week - let sit on scalp at least 5 minutes prior to rinsing 120 mL 5    levothyroxine (SYNTHROID) 88 MCG tablet Take 1 tablet (88 mcg total) by mouth once daily. 90 tablet 3    montelukast (SINGULAIR) 10 mg tablet Take 1 tablet (10 mg total) by mouth every evening. 90 tablet 3    multivitamin-minerals-lutein (CENTRUM SILVER) Tab Take 1 tablet by mouth once daily. 1 Tablet Oral Every day      pantoprazole (PROTONIX) 40 MG tablet TAKE 1 TABLET DAILY 30 MINUTES PRIOR TO BREAKFAST 90 tablet 3    polyethylene glycol (GLYCOLAX) 17 gram/dose powder Take 17 g by mouth once daily. 1530 g 2    RETIN-A 0.05 % cream APPLY THIN FILM TO FACE AT NIGHT AFTER MOISTURIZING 45 g 3    SPIRIVA WITH HANDIHALER 18 mcg inhalation capsule INHALE THE CONTENTS OF 1 CAPSULE DAILY 90 capsule 3    triamcinolone acetonide 0.1% (KENALOG) 0.1 % cream Apply topically 2 (two) times daily. 1 Tube 11    warfarin (COUMADIN) 5 MG  tablet Take 1 tablet (5 mg total) by mouth Daily. 30 tablet 0    (Magic mouthwash) 1:1:1 Benadryl 12.5mg/5ml liq, aluminum & magnesium hydroxide-simehticone (Maalox), lidocaine viscous 2% Swish and spit 10 mLs every 4 (four) hours as needed. for mouth sores 240 mL 0     No current facility-administered medications for this visit.      Facility-Administered Medications Ordered in Other Visits   Medication Dose Route Frequency Provider Last Rate Last Dose    0.9%  NaCl infusion   Intravenous Continuous Ni Jaimes NP   Stopped at 12/11/18 0850    sodium chloride 0.9% flush 5 mL  5 mL Intravenous PRN Ni Jaimes NP           I have reviewed the patient's medical history in detail and updated the computerized patient record.    Review of Systems   Constitutional: Positive for fatigue. Negative for activity change, appetite change, chills and fever.        Chronically fatigued with Afib.    HENT: Positive for mouth sores and sore throat. Negative for congestion, postnasal drip, rhinorrhea, sinus pressure and sinus pain.         Sore to roof of mouth, burst blister, dry throat   Eyes: Negative for visual disturbance.   Respiratory: Positive for cough. Negative for shortness of breath and wheezing.         Slight dry cough   Cardiovascular: Positive for palpitations. Negative for chest pain.        Feels racing heart at times   Gastrointestinal: Positive for constipation. Negative for abdominal pain, diarrhea, nausea and vomiting.        Does have some constipation, treats with OTC laxatives   Genitourinary: Negative for difficulty urinating and dysuria.   Musculoskeletal: Negative for arthralgias and myalgias.   Skin: Negative for rash and wound.   Neurological: Negative for dizziness, numbness and headaches.   Psychiatric/Behavioral: Positive for sleep disturbance. Negative for dysphoric mood. The patient is not nervous/anxious.         Sleep disturbed by mouth sore, pain to throat.         "  Objective:      Vitals:    12/17/18 0759   BP: 116/80   BP Location: Right arm   Patient Position: Sitting   BP Method: Medium (Automatic)   Pulse: (!) 115   Temp: 98 °F (36.7 °C)   TempSrc: Oral   Weight: 61.7 kg (136 lb)   Height: 5' 6" (1.676 m)     Physical Exam   Constitutional: She is oriented to person, place, and time. Vital signs are normal. She appears well-developed and well-nourished. No distress.   Blood pressure 116/80   HENT:   Head: Normocephalic and atraumatic.   Right Ear: Tympanic membrane, external ear and ear canal normal.   Left Ear: Tympanic membrane, external ear and ear canal normal.   Nose: Nose normal. No mucosal edema.   Mouth/Throat: Oropharynx is clear and moist and mucous membranes are normal. Oral lesions present.       Has what appears to be a burst blister to rook of mouth on the right side with minimal erythema noted, throat is clear   Eyes: Conjunctivae and lids are normal. Pupils are equal, round, and reactive to light. Right eye exhibits no discharge. Left eye exhibits no discharge.   Neck: Normal range of motion. Neck supple. Normal carotid pulses present. Carotid bruit is not present.   Cardiovascular: Intact distal pulses and normal pulses. An irregularly irregular rhythm present.   No murmur heard.  Pulses:       Carotid pulses are 2+ on the right side, and 2+ on the left side.       Radial pulses are 2+ on the right side, and 2+ on the left side.   Pulmonary/Chest: Effort normal and breath sounds normal. No respiratory distress. She has no decreased breath sounds. She has no wheezes. She has no rhonchi. She has no rales.   Musculoskeletal: Normal range of motion.   Lymphadenopathy:     She has no cervical adenopathy.        Right: No supraclavicular adenopathy present.        Left: No supraclavicular adenopathy present.   Neurological: She is alert and oriented to person, place, and time. She has normal strength.   Skin: Skin is warm, dry and intact. She is not " diaphoretic. No pallor.   Psychiatric: She has a normal mood and affect. Her speech is normal and behavior is normal. Judgment and thought content normal. Cognition and memory are normal.   Nursing note and vitals reviewed.        Assessment:       1. Mouth ulcer    2. Esophageal lesion    3. Throat irritation    4. Atrial fibrillation with RVR    5. Anticoagulant long-term use    6. Acquired hypothyroidism    7. BMI 21.0-21.9, adult          Plan:       Steph was seen today for mouth lesions.    Diagnoses and all orders for this visit:    Mouth ulcer  -     (Magic mouthwash) 1:1:1 Benadryl 12.5mg/5ml liq, aluminum & magnesium hydroxide-simehticone (Maalox), lidocaine viscous 2%; Swish and spit 10 mLs every 4 (four) hours as needed. for mouth sores    Esophageal lesion  -     (Magic mouthwash) 1:1:1 Benadryl 12.5mg/5ml liq, aluminum & magnesium hydroxide-simehticone (Maalox), lidocaine viscous 2%; Swish and spit 10 mLs every 4 (four) hours as needed. for mouth sores  - Continue follow up with Dr. Gonzales gastroenterology for treatment    Throat irritation   (Magic mouthwash) 1:1:1 Benadryl 12.5mg/5ml liq, aluminum & magnesium hydroxide-simehticone (Maalox), lidocaine viscous 2%; Swish and spit 10 mLs every 4 (four) hours as needed. for mouth sores    Atrial fibrillation with RVR   Irregularly irregular rhythm auscultated, rate 115   Continue follow up with cardiology, planning for possible ablation    Anticoagulant long-term use   Continue warfarin as prescribed with PT/INR per anticoagulation clinic     Lab Results   Component Value Date    INR 2.6 (H) 12/14/2018    INR 1.1 12/07/2018    INR 2.7 07/11/2017     Acquired hypothyroidism     Lab Results   Component Value Date    TSH 0.621 01/18/2018      Continue current dose of levothyroxine.   BMI 21.0-21.9, adult   BMI today is 21.95 and patient has gained 2 pounds since last visit   Maintain healthy weight with heathy diet and exercise/active lifestyle    Follow-up  if symptoms worsen or fail to improve.

## 2018-12-17 NOTE — TELEPHONE ENCOUNTER
Natalee Covarrubias, PharmD     12/17/18 1:04 PM   Note      Patient ok to hold warfarin 4 days prior with no lovenox. We will advise patient of instructions.             12/17/18 10:45 AM   Note      [facility], patient scheduled for f/u EGD to treat esophageal cancer on 1/9/19 at 1100.  It appears her only antithrombotic therapy is now Coumadin.  Per GI Endo protocol we request permission to hold Coumadin for 5 days prior to procedure.  Please respond and advise patient.  If bridging with Lovenox is required then please Rx and instruct patient.  GI Endo protocol for last dose Lovenox is 1/2 total daily dose given 24hrs prior to procedure.  Thank you.

## 2018-12-17 NOTE — TELEPHONE ENCOUNTER
Sarah Mcdonough, do you know if Dr Robbie Gonzales intends to Rx Diflucan for 14 days prior to EGD scheduled 1/9/18 as he did before?  Thank you.

## 2018-12-17 NOTE — TELEPHONE ENCOUNTER
Patient ok to hold warfarin 4 days prior with no lovenox. We will advise patient of instructions.

## 2018-12-17 NOTE — TELEPHONE ENCOUNTER
Instructions mailed for EGD scheduled 1/9/19 at 1100.  She will hold Coumadin as directed by [facility].  PT/INR scheduled before procedure (plan less than 5 day hold Coumadin per [facility]).

## 2018-12-19 ENCOUNTER — PATIENT MESSAGE (OUTPATIENT)
Dept: ENDOSCOPY | Facility: HOSPITAL | Age: 68
End: 2018-12-19

## 2018-12-19 ENCOUNTER — LAB VISIT (OUTPATIENT)
Dept: LAB | Facility: HOSPITAL | Age: 68
End: 2018-12-19
Attending: INTERNAL MEDICINE
Payer: MEDICARE

## 2018-12-19 DIAGNOSIS — I48.0 PAROXYSMAL ATRIAL FIBRILLATION: ICD-10-CM

## 2018-12-19 DIAGNOSIS — Z79.01 LONG TERM (CURRENT) USE OF ANTICOAGULANTS: ICD-10-CM

## 2018-12-19 LAB
INR PPP: 1.8
PROTHROMBIN TIME: 18.7 SEC

## 2018-12-19 PROCEDURE — 36415 COLL VENOUS BLD VENIPUNCTURE: CPT

## 2018-12-19 PROCEDURE — 85610 PROTHROMBIN TIME: CPT

## 2018-12-20 ENCOUNTER — PATIENT MESSAGE (OUTPATIENT)
Dept: FAMILY MEDICINE | Facility: CLINIC | Age: 68
End: 2018-12-20

## 2018-12-24 ENCOUNTER — LAB VISIT (OUTPATIENT)
Dept: LAB | Facility: HOSPITAL | Age: 68
End: 2018-12-24
Attending: INTERNAL MEDICINE
Payer: MEDICARE

## 2018-12-24 DIAGNOSIS — I48.91 ATRIAL FIBRILLATION WITH RVR: ICD-10-CM

## 2018-12-24 LAB
INR PPP: 1.9
PROTHROMBIN TIME: 19.3 SEC

## 2018-12-24 PROCEDURE — 85610 PROTHROMBIN TIME: CPT

## 2018-12-24 PROCEDURE — 36415 COLL VENOUS BLD VENIPUNCTURE: CPT

## 2018-12-27 ENCOUNTER — TELEPHONE (OUTPATIENT)
Dept: ENDOSCOPY | Facility: HOSPITAL | Age: 68
End: 2018-12-27

## 2018-12-27 NOTE — TELEPHONE ENCOUNTER
Robbie Gonzales MD   You 4 hours ago (11:11 AM)      She need the ablation first.   Thanks   Robbie Benson message

## 2018-12-27 NOTE — TELEPHONE ENCOUNTER
----- Message from Yudi Maya sent at 12/27/2018  4:08 PM CST -----  Contact: Self- [phone]  Christian- pt states she is returning a missed call from Sarah- please contact pt at [phone]

## 2018-12-28 ENCOUNTER — LAB VISIT (OUTPATIENT)
Dept: LAB | Facility: HOSPITAL | Age: 68
End: 2018-12-28
Attending: INTERNAL MEDICINE
Payer: MEDICARE

## 2018-12-28 DIAGNOSIS — Z79.01 LONG TERM (CURRENT) USE OF ANTICOAGULANTS: ICD-10-CM

## 2018-12-28 DIAGNOSIS — I48.0 PAROXYSMAL ATRIAL FIBRILLATION: ICD-10-CM

## 2018-12-28 LAB
INR PPP: 1.7
PROTHROMBIN TIME: 17 SEC

## 2018-12-28 PROCEDURE — 85610 PROTHROMBIN TIME: CPT

## 2018-12-28 PROCEDURE — 36415 COLL VENOUS BLD VENIPUNCTURE: CPT

## 2019-01-01 ENCOUNTER — OFFICE VISIT (OUTPATIENT)
Dept: DERMATOLOGY | Facility: CLINIC | Age: 69
End: 2019-01-01
Payer: MEDICARE

## 2019-01-01 ENCOUNTER — TELEPHONE (OUTPATIENT)
Dept: ENDOSCOPY | Facility: HOSPITAL | Age: 69
End: 2019-01-01

## 2019-01-01 ENCOUNTER — ANESTHESIA (OUTPATIENT)
Dept: ENDOSCOPY | Facility: HOSPITAL | Age: 69
End: 2019-01-01
Payer: MEDICARE

## 2019-01-01 ENCOUNTER — PATIENT MESSAGE (OUTPATIENT)
Dept: CARDIOLOGY | Facility: CLINIC | Age: 69
End: 2019-01-01

## 2019-01-01 ENCOUNTER — LAB VISIT (OUTPATIENT)
Dept: LAB | Facility: HOSPITAL | Age: 69
End: 2019-01-01
Attending: INTERNAL MEDICINE
Payer: MEDICARE

## 2019-01-01 ENCOUNTER — TELEPHONE (OUTPATIENT)
Dept: ELECTROPHYSIOLOGY | Facility: CLINIC | Age: 69
End: 2019-01-01

## 2019-01-01 ENCOUNTER — CLINICAL SUPPORT (OUTPATIENT)
Dept: REHABILITATION | Facility: HOSPITAL | Age: 69
End: 2019-01-01
Payer: MEDICARE

## 2019-01-01 ENCOUNTER — PATIENT MESSAGE (OUTPATIENT)
Dept: ELECTROPHYSIOLOGY | Facility: CLINIC | Age: 69
End: 2019-01-01

## 2019-01-01 ENCOUNTER — NURSE TRIAGE (OUTPATIENT)
Dept: ADMINISTRATIVE | Facility: CLINIC | Age: 69
End: 2019-01-01

## 2019-01-01 ENCOUNTER — PATIENT MESSAGE (OUTPATIENT)
Dept: ENDOSCOPY | Facility: HOSPITAL | Age: 69
End: 2019-01-01

## 2019-01-01 ENCOUNTER — PATIENT MESSAGE (OUTPATIENT)
Dept: FAMILY MEDICINE | Facility: CLINIC | Age: 69
End: 2019-01-01

## 2019-01-01 ENCOUNTER — INITIAL CONSULT (OUTPATIENT)
Dept: SPINE | Facility: CLINIC | Age: 69
End: 2019-01-01
Payer: MEDICARE

## 2019-01-01 ENCOUNTER — OFFICE VISIT (OUTPATIENT)
Dept: FAMILY MEDICINE | Facility: CLINIC | Age: 69
End: 2019-01-01
Payer: MEDICARE

## 2019-01-01 ENCOUNTER — HOSPITAL ENCOUNTER (OUTPATIENT)
Dept: RADIOLOGY | Facility: CLINIC | Age: 69
Discharge: HOME | End: 2019-12-06
Attending: FAMILY MEDICINE
Payer: MEDICARE

## 2019-01-01 ENCOUNTER — LAB VISIT (OUTPATIENT)
Dept: LAB | Facility: HOSPITAL | Age: 69
End: 2019-01-01
Attending: FAMILY MEDICINE
Payer: MEDICARE

## 2019-01-01 ENCOUNTER — TELEPHONE (OUTPATIENT)
Dept: FAMILY MEDICINE | Facility: CLINIC | Age: 69
End: 2019-01-01

## 2019-01-01 ENCOUNTER — ANESTHESIA (OUTPATIENT)
Dept: MEDSURG UNIT | Facility: HOSPITAL | Age: 69
End: 2019-01-01
Payer: MEDICARE

## 2019-01-01 ENCOUNTER — HOSPITAL ENCOUNTER (OUTPATIENT)
Facility: HOSPITAL | Age: 69
Discharge: HOME | End: 2019-09-27
Attending: INTERNAL MEDICINE | Admitting: INTERNAL MEDICINE
Payer: MEDICARE

## 2019-01-01 ENCOUNTER — OFFICE VISIT (OUTPATIENT)
Dept: PHYSICAL MEDICINE AND REHAB | Facility: CLINIC | Age: 69
End: 2019-01-01
Payer: MEDICARE

## 2019-01-01 ENCOUNTER — LAB VISIT (OUTPATIENT)
Dept: LAB | Facility: HOSPITAL | Age: 69
End: 2019-01-01
Attending: DERMATOLOGY
Payer: MEDICARE

## 2019-01-01 ENCOUNTER — INITIAL CONSULT (OUTPATIENT)
Dept: PHYSICAL MEDICINE AND REHAB | Facility: CLINIC | Age: 69
End: 2019-01-01
Payer: MEDICARE

## 2019-01-01 ENCOUNTER — PATIENT OUTREACH (OUTPATIENT)
Dept: ADMINISTRATIVE | Facility: HOSPITAL | Age: 69
End: 2019-01-01

## 2019-01-01 ENCOUNTER — PATIENT MESSAGE (OUTPATIENT)
Dept: DERMATOLOGY | Facility: CLINIC | Age: 69
End: 2019-01-01

## 2019-01-01 ENCOUNTER — HOSPITAL ENCOUNTER (OUTPATIENT)
Facility: HOSPITAL | Age: 69
Discharge: HOME | End: 2019-05-09
Attending: INTERNAL MEDICINE | Admitting: INTERNAL MEDICINE
Payer: MEDICARE

## 2019-01-01 ENCOUNTER — ANESTHESIA EVENT (OUTPATIENT)
Dept: ENDOSCOPY | Facility: HOSPITAL | Age: 69
End: 2019-01-01
Payer: MEDICARE

## 2019-01-01 ENCOUNTER — PATIENT MESSAGE (OUTPATIENT)
Dept: PHYSICAL MEDICINE AND REHAB | Facility: CLINIC | Age: 69
End: 2019-01-01

## 2019-01-01 ENCOUNTER — HOSPITAL ENCOUNTER (OUTPATIENT)
Facility: HOSPITAL | Age: 69
Discharge: HOME | End: 2019-09-11
Attending: INTERNAL MEDICINE | Admitting: INTERNAL MEDICINE
Payer: MEDICARE

## 2019-01-01 ENCOUNTER — ANESTHESIA EVENT (OUTPATIENT)
Dept: MEDSURG UNIT | Facility: HOSPITAL | Age: 69
End: 2019-01-01
Payer: MEDICARE

## 2019-01-01 ENCOUNTER — PATIENT MESSAGE (OUTPATIENT)
Dept: GASTROENTEROLOGY | Facility: CLINIC | Age: 69
End: 2019-01-01

## 2019-01-01 ENCOUNTER — HOSPITAL ENCOUNTER (OUTPATIENT)
Dept: RADIOLOGY | Facility: CLINIC | Age: 69
Discharge: HOME | End: 2019-12-20
Attending: PHYSICAL MEDICINE & REHABILITATION
Payer: MEDICARE

## 2019-01-01 ENCOUNTER — OFFICE VISIT (OUTPATIENT)
Dept: CARDIOLOGY | Facility: CLINIC | Age: 69
End: 2019-01-01
Payer: MEDICARE

## 2019-01-01 ENCOUNTER — TELEPHONE (OUTPATIENT)
Dept: CARDIOLOGY | Facility: CLINIC | Age: 69
End: 2019-01-01

## 2019-01-01 VITALS
DIASTOLIC BLOOD PRESSURE: 77 MMHG | DIASTOLIC BLOOD PRESSURE: 85 MMHG | HEART RATE: 82 BPM | SYSTOLIC BLOOD PRESSURE: 136 MMHG | HEIGHT: 66 IN | BODY MASS INDEX: 21.08 KG/M2 | HEART RATE: 90 BPM | SYSTOLIC BLOOD PRESSURE: 124 MMHG | WEIGHT: 131.19 LBS | BODY MASS INDEX: 21.05 KG/M2 | WEIGHT: 131 LBS | HEIGHT: 66 IN

## 2019-01-01 VITALS
BODY MASS INDEX: 22.66 KG/M2 | HEIGHT: 66 IN | DIASTOLIC BLOOD PRESSURE: 69 MMHG | HEART RATE: 74 BPM | WEIGHT: 141 LBS | SYSTOLIC BLOOD PRESSURE: 127 MMHG

## 2019-01-01 VITALS
OXYGEN SATURATION: 98 % | HEART RATE: 80 BPM | BODY MASS INDEX: 21.72 KG/M2 | HEIGHT: 66 IN | DIASTOLIC BLOOD PRESSURE: 71 MMHG | RESPIRATION RATE: 12 BRPM | TEMPERATURE: 98 F | SYSTOLIC BLOOD PRESSURE: 130 MMHG | WEIGHT: 135.13 LBS

## 2019-01-01 VITALS
RESPIRATION RATE: 19 BRPM | OXYGEN SATURATION: 98 % | BODY MASS INDEX: 21.47 KG/M2 | DIASTOLIC BLOOD PRESSURE: 68 MMHG | HEART RATE: 92 BPM | SYSTOLIC BLOOD PRESSURE: 132 MMHG | TEMPERATURE: 98 F | WEIGHT: 133.63 LBS | HEIGHT: 66 IN

## 2019-01-01 VITALS — WEIGHT: 132 LBS | BODY MASS INDEX: 21.31 KG/M2

## 2019-01-01 VITALS — BODY MASS INDEX: 22.66 KG/M2 | HEIGHT: 66 IN | WEIGHT: 141 LBS

## 2019-01-01 VITALS
DIASTOLIC BLOOD PRESSURE: 78 MMHG | BODY MASS INDEX: 21.08 KG/M2 | HEART RATE: 79 BPM | WEIGHT: 131.19 LBS | SYSTOLIC BLOOD PRESSURE: 120 MMHG | WEIGHT: 131 LBS | HEIGHT: 66 IN | OXYGEN SATURATION: 97 % | HEIGHT: 66 IN | TEMPERATURE: 98 F | RESPIRATION RATE: 12 BRPM | BODY MASS INDEX: 21.05 KG/M2 | DIASTOLIC BLOOD PRESSURE: 76 MMHG | SYSTOLIC BLOOD PRESSURE: 125 MMHG | HEART RATE: 90 BPM

## 2019-01-01 VITALS
SYSTOLIC BLOOD PRESSURE: 126 MMHG | HEART RATE: 86 BPM | TEMPERATURE: 98 F | OXYGEN SATURATION: 97 % | HEIGHT: 66 IN | BODY MASS INDEX: 21.25 KG/M2 | WEIGHT: 132.25 LBS | DIASTOLIC BLOOD PRESSURE: 70 MMHG

## 2019-01-01 VITALS
HEART RATE: 79 BPM | DIASTOLIC BLOOD PRESSURE: 57 MMHG | TEMPERATURE: 98 F | BODY MASS INDEX: 22.5 KG/M2 | SYSTOLIC BLOOD PRESSURE: 115 MMHG | RESPIRATION RATE: 20 BRPM | WEIGHT: 140 LBS | HEIGHT: 66 IN | OXYGEN SATURATION: 98 %

## 2019-01-01 VITALS
HEART RATE: 101 BPM | SYSTOLIC BLOOD PRESSURE: 110 MMHG | DIASTOLIC BLOOD PRESSURE: 82 MMHG | BODY MASS INDEX: 21.51 KG/M2 | OXYGEN SATURATION: 97 % | WEIGHT: 133.81 LBS | HEIGHT: 66 IN

## 2019-01-01 VITALS
BODY MASS INDEX: 21.86 KG/M2 | TEMPERATURE: 98 F | DIASTOLIC BLOOD PRESSURE: 85 MMHG | SYSTOLIC BLOOD PRESSURE: 139 MMHG | HEIGHT: 66 IN | OXYGEN SATURATION: 100 % | WEIGHT: 136 LBS | RESPIRATION RATE: 16 BRPM | HEART RATE: 92 BPM

## 2019-01-01 VITALS
WEIGHT: 134.69 LBS | HEIGHT: 66 IN | BODY MASS INDEX: 21.53 KG/M2 | DIASTOLIC BLOOD PRESSURE: 78 MMHG | TEMPERATURE: 99 F | SYSTOLIC BLOOD PRESSURE: 132 MMHG | HEIGHT: 66 IN | BODY MASS INDEX: 21.64 KG/M2 | WEIGHT: 134 LBS | OXYGEN SATURATION: 98 % | HEART RATE: 94 BPM

## 2019-01-01 VITALS
BODY MASS INDEX: 21.76 KG/M2 | SYSTOLIC BLOOD PRESSURE: 126 MMHG | WEIGHT: 135.38 LBS | HEIGHT: 66 IN | DIASTOLIC BLOOD PRESSURE: 63 MMHG

## 2019-01-01 VITALS
SYSTOLIC BLOOD PRESSURE: 130 MMHG | BODY MASS INDEX: 21.29 KG/M2 | HEIGHT: 66 IN | TEMPERATURE: 98 F | HEART RATE: 88 BPM | DIASTOLIC BLOOD PRESSURE: 72 MMHG | WEIGHT: 132.5 LBS

## 2019-01-01 VITALS
OXYGEN SATURATION: 99 % | TEMPERATURE: 98 F | SYSTOLIC BLOOD PRESSURE: 140 MMHG | RESPIRATION RATE: 18 BRPM | HEART RATE: 78 BPM | DIASTOLIC BLOOD PRESSURE: 80 MMHG

## 2019-01-01 DIAGNOSIS — E55.9 VITAMIN D DEFICIENCY: ICD-10-CM

## 2019-01-01 DIAGNOSIS — I48.0 PAF (PAROXYSMAL ATRIAL FIBRILLATION): ICD-10-CM

## 2019-01-01 DIAGNOSIS — R21 RASH: Primary | ICD-10-CM

## 2019-01-01 DIAGNOSIS — I48.0 PAROXYSMAL ATRIAL FIBRILLATION: ICD-10-CM

## 2019-01-01 DIAGNOSIS — I48.0 PAF (PAROXYSMAL ATRIAL FIBRILLATION): Primary | ICD-10-CM

## 2019-01-01 DIAGNOSIS — Z79.01 LONG TERM (CURRENT) USE OF ANTICOAGULANTS: ICD-10-CM

## 2019-01-01 DIAGNOSIS — I25.3 ANEURYSM OF HEART WALL: ICD-10-CM

## 2019-01-01 DIAGNOSIS — E03.9 ACQUIRED HYPOTHYROIDISM: ICD-10-CM

## 2019-01-01 DIAGNOSIS — I48.91 ATRIAL FIBRILLATION, UNSPECIFIED TYPE: Primary | ICD-10-CM

## 2019-01-01 DIAGNOSIS — K22.9 ESOPHAGEAL LESION: ICD-10-CM

## 2019-01-01 DIAGNOSIS — I48.92 UNCONTROLLED ATRIAL FLUTTER: ICD-10-CM

## 2019-01-01 DIAGNOSIS — I48.91 ATRIAL FIBRILLATION: ICD-10-CM

## 2019-01-01 DIAGNOSIS — M47.812 CERVICAL SPONDYLOSIS: ICD-10-CM

## 2019-01-01 DIAGNOSIS — R00.2 PALPITATIONS: Primary | ICD-10-CM

## 2019-01-01 DIAGNOSIS — K22.9 ESOPHAGEAL LESION: Primary | ICD-10-CM

## 2019-01-01 DIAGNOSIS — L25.9 CONTACT DERMATITIS, UNSPECIFIED CONTACT DERMATITIS TYPE, UNSPECIFIED TRIGGER: ICD-10-CM

## 2019-01-01 DIAGNOSIS — K21.00 GASTROESOPHAGEAL REFLUX DISEASE WITH ESOPHAGITIS: ICD-10-CM

## 2019-01-01 DIAGNOSIS — I51.7 CARDIOMEGALY: ICD-10-CM

## 2019-01-01 DIAGNOSIS — Z95.1 S/P CABG X 2: ICD-10-CM

## 2019-01-01 DIAGNOSIS — I10 ESSENTIAL HYPERTENSION: ICD-10-CM

## 2019-01-01 DIAGNOSIS — Z79.01 ANTICOAGULANT LONG-TERM USE: ICD-10-CM

## 2019-01-01 DIAGNOSIS — Z98.890 S/P ABLATION OF ATRIAL FIBRILLATION: ICD-10-CM

## 2019-01-01 DIAGNOSIS — L65.9 HAIR LOSS: ICD-10-CM

## 2019-01-01 DIAGNOSIS — J44.9 CHRONIC OBSTRUCTIVE PULMONARY DISEASE, UNSPECIFIED COPD TYPE: ICD-10-CM

## 2019-01-01 DIAGNOSIS — B34.9 VIRAL ILLNESS: Primary | ICD-10-CM

## 2019-01-01 DIAGNOSIS — E78.5 HYPERLIPIDEMIA, UNSPECIFIED HYPERLIPIDEMIA TYPE: ICD-10-CM

## 2019-01-01 DIAGNOSIS — Z95.1 S/P CABG (CORONARY ARTERY BYPASS GRAFT): ICD-10-CM

## 2019-01-01 DIAGNOSIS — M54.40 ACUTE RIGHT-SIDED LOW BACK PAIN WITH SCIATICA, SCIATICA LATERALITY UNSPECIFIED: ICD-10-CM

## 2019-01-01 DIAGNOSIS — M25.551 PAIN OF RIGHT HIP JOINT: ICD-10-CM

## 2019-01-01 DIAGNOSIS — Z91.09 SENSITIVITY TO SUNLIGHT: ICD-10-CM

## 2019-01-01 DIAGNOSIS — L21.9 SEBORRHEIC DERMATITIS OF SCALP: Primary | ICD-10-CM

## 2019-01-01 DIAGNOSIS — I10 HYPERTENSION, UNSPECIFIED TYPE: Primary | ICD-10-CM

## 2019-01-01 DIAGNOSIS — Z86.79 S/P ABLATION OF ATRIAL FIBRILLATION: ICD-10-CM

## 2019-01-01 DIAGNOSIS — M25.551 RIGHT HIP PAIN: Primary | ICD-10-CM

## 2019-01-01 DIAGNOSIS — M50.30 DDD (DEGENERATIVE DISC DISEASE), CERVICAL: ICD-10-CM

## 2019-01-01 DIAGNOSIS — K22.719 BARRETT'S ESOPHAGUS WITH DYSPLASIA: Primary | ICD-10-CM

## 2019-01-01 DIAGNOSIS — J43.1 PANLOBULAR EMPHYSEMA: ICD-10-CM

## 2019-01-01 DIAGNOSIS — R73.9 HYPERGLYCEMIA: ICD-10-CM

## 2019-01-01 DIAGNOSIS — F43.9 STRESS: ICD-10-CM

## 2019-01-01 DIAGNOSIS — L21.9 SEBORRHEIC DERMATITIS: ICD-10-CM

## 2019-01-01 DIAGNOSIS — M54.41 ACUTE RIGHT-SIDED LOW BACK PAIN WITH RIGHT-SIDED SCIATICA: ICD-10-CM

## 2019-01-01 DIAGNOSIS — M25.551 PAIN OF RIGHT HIP JOINT: Primary | ICD-10-CM

## 2019-01-01 DIAGNOSIS — E78.00 HYPERCHOLESTEREMIA: ICD-10-CM

## 2019-01-01 DIAGNOSIS — J43.2 CENTRILOBULAR EMPHYSEMA: ICD-10-CM

## 2019-01-01 DIAGNOSIS — E60 ZINC DEFICIENCY: ICD-10-CM

## 2019-01-01 DIAGNOSIS — M53.3 SACROILIAC DYSFUNCTION: Primary | ICD-10-CM

## 2019-01-01 DIAGNOSIS — I89.0 LYMPHEDEMA: Primary | ICD-10-CM

## 2019-01-01 DIAGNOSIS — R21 RASH: ICD-10-CM

## 2019-01-01 DIAGNOSIS — I48.92 ATRIAL FLUTTER: ICD-10-CM

## 2019-01-01 DIAGNOSIS — Z79.01 ANTICOAGULATED ON COUMADIN: Primary | ICD-10-CM

## 2019-01-01 DIAGNOSIS — I48.91 ATRIAL FIBRILLATION WITH RVR: ICD-10-CM

## 2019-01-01 DIAGNOSIS — M54.41 ACUTE RIGHT-SIDED LOW BACK PAIN WITH RIGHT-SIDED SCIATICA: Primary | ICD-10-CM

## 2019-01-01 DIAGNOSIS — M25.551 RIGHT HIP PAIN: ICD-10-CM

## 2019-01-01 DIAGNOSIS — I10 ESSENTIAL HYPERTENSION: Primary | ICD-10-CM

## 2019-01-01 DIAGNOSIS — M15.9 PRIMARY OSTEOARTHRITIS INVOLVING MULTIPLE JOINTS: Primary | ICD-10-CM

## 2019-01-01 DIAGNOSIS — B35.4 TINEA CORPORIS: Primary | ICD-10-CM

## 2019-01-01 DIAGNOSIS — M54.2 CERVICALGIA: Primary | ICD-10-CM

## 2019-01-01 DIAGNOSIS — L21.9 SEBORRHEIC DERMATITIS: Primary | ICD-10-CM

## 2019-01-01 DIAGNOSIS — R29.898 DECREASED ROM OF NECK: ICD-10-CM

## 2019-01-01 DIAGNOSIS — M79.2 NERVE PAIN: ICD-10-CM

## 2019-01-01 LAB
1,25(OH)2D3 SERPL-MCNC: 45 PG/ML (ref 20–79)
ABO + RH BLD: NORMAL
ALBUMIN SERPL BCP-MCNC: 3.8 G/DL (ref 3.5–5.2)
ALP SERPL-CCNC: 80 U/L (ref 55–135)
ALT SERPL W/O P-5'-P-CCNC: 10 U/L (ref 10–44)
ANA SER QL IF: NORMAL
ANION GAP SERPL CALC-SCNC: 7 MMOL/L (ref 8–16)
ANION GAP SERPL CALC-SCNC: 8 MMOL/L (ref 8–16)
APTT BLDCRRT: 41.5 SEC (ref 21–32)
AST SERPL-CCNC: 18 U/L (ref 10–40)
BASOPHILS # BLD AUTO: 0 K/UL (ref 0–0.2)
BASOPHILS # BLD AUTO: 0.09 K/UL (ref 0–0.2)
BASOPHILS # BLD AUTO: 0.1 K/UL (ref 0–0.2)
BASOPHILS NFR BLD: 0.7 % (ref 0–1.9)
BASOPHILS NFR BLD: 0.7 % (ref 0–1.9)
BASOPHILS NFR BLD: 1.3 % (ref 0–1.9)
BILIRUB SERPL-MCNC: 0.6 MG/DL (ref 0.1–1)
BLD GP AB SCN CELLS X3 SERPL QL: NORMAL
BUN SERPL-MCNC: 10 MG/DL (ref 8–23)
BUN SERPL-MCNC: 10 MG/DL (ref 8–23)
BUN SERPL-MCNC: 15 MG/DL (ref 8–23)
BUN SERPL-MCNC: 18 MG/DL (ref 8–23)
CALCIUM SERPL-MCNC: 9.3 MG/DL (ref 8.7–10.5)
CALCIUM SERPL-MCNC: 9.3 MG/DL (ref 8.7–10.5)
CALCIUM SERPL-MCNC: 9.7 MG/DL (ref 8.7–10.5)
CALCIUM SERPL-MCNC: 9.8 MG/DL (ref 8.7–10.5)
CHLORIDE SERPL-SCNC: 101 MMOL/L (ref 95–110)
CHLORIDE SERPL-SCNC: 103 MMOL/L (ref 95–110)
CHLORIDE SERPL-SCNC: 104 MMOL/L (ref 95–110)
CHLORIDE SERPL-SCNC: 104 MMOL/L (ref 95–110)
CO2 SERPL-SCNC: 27 MMOL/L (ref 23–29)
CO2 SERPL-SCNC: 27 MMOL/L (ref 23–29)
CO2 SERPL-SCNC: 28 MMOL/L (ref 23–29)
CO2 SERPL-SCNC: 29 MMOL/L (ref 23–29)
CREAT SERPL-MCNC: 0.7 MG/DL (ref 0.5–1.4)
CREAT SERPL-MCNC: 0.7 MG/DL (ref 0.5–1.4)
CREAT SERPL-MCNC: 0.8 MG/DL (ref 0.5–1.4)
CREAT SERPL-MCNC: 1 MG/DL (ref 0.5–1.4)
DIFFERENTIAL METHOD: ABNORMAL
EOSINOPHIL # BLD AUTO: 0.1 K/UL (ref 0–0.5)
EOSINOPHIL # BLD AUTO: 0.3 K/UL (ref 0–0.5)
EOSINOPHIL # BLD AUTO: 0.3 K/UL (ref 0–0.5)
EOSINOPHIL NFR BLD: 1.3 % (ref 0–8)
EOSINOPHIL NFR BLD: 3.6 % (ref 0–8)
EOSINOPHIL NFR BLD: 3.7 % (ref 0–8)
ERYTHROCYTE [DISTWIDTH] IN BLOOD BY AUTOMATED COUNT: 13.7 % (ref 11.5–14.5)
ERYTHROCYTE [DISTWIDTH] IN BLOOD BY AUTOMATED COUNT: 14.2 % (ref 11.5–14.5)
ERYTHROCYTE [DISTWIDTH] IN BLOOD BY AUTOMATED COUNT: 14.4 % (ref 11.5–14.5)
EST. GFR  (AFRICAN AMERICAN): >60 ML/MIN/1.73 M^2
EST. GFR  (NON AFRICAN AMERICAN): 58 ML/MIN/1.73 M^2
EST. GFR  (NON AFRICAN AMERICAN): >60 ML/MIN/1.73 M^2
ESTIMATED AVG GLUCOSE: 111 MG/DL (ref 68–131)
GLUCOSE SERPL-MCNC: 79 MG/DL (ref 70–110)
GLUCOSE SERPL-MCNC: 81 MG/DL (ref 70–110)
GLUCOSE SERPL-MCNC: 90 MG/DL (ref 70–110)
GLUCOSE SERPL-MCNC: 99 MG/DL (ref 70–110)
HBA1C MFR BLD HPLC: 5.5 % (ref 4–5.6)
HCT VFR BLD AUTO: 39.5 % (ref 37–48.5)
HCT VFR BLD AUTO: 39.9 % (ref 37–48.5)
HCT VFR BLD AUTO: 41.3 % (ref 37–48.5)
HGB BLD-MCNC: 12.7 G/DL (ref 12–16)
HGB BLD-MCNC: 12.9 G/DL (ref 12–16)
HGB BLD-MCNC: 13.6 G/DL (ref 12–16)
IMM GRANULOCYTES # BLD AUTO: 0.01 K/UL (ref 0–0.04)
IMM GRANULOCYTES NFR BLD AUTO: 0.1 % (ref 0–0.5)
INR PPP: 1.1 (ref 0.8–1.2)
INR PPP: 1.3 (ref 0.8–1.2)
INR PPP: 1.3 (ref 0.8–1.2)
INR PPP: 2.3 (ref 0.8–1.2)
INR PPP: 2.3 (ref 0.8–1.2)
INR PPP: 2.6 (ref 0.8–1.2)
INR PPP: 2.6 (ref 0.8–1.2)
INR PPP: 2.7 (ref 0.8–1.2)
INR PPP: 2.7 (ref 0.8–1.2)
INR PPP: 2.8 (ref 0.8–1.2)
INR PPP: 2.8 (ref 0.8–1.2)
INR PPP: 2.9 (ref 0.8–1.2)
INR PPP: 3 (ref 0.8–1.2)
INR PPP: 3.4 (ref 0.8–1.2)
INR PPP: 3.6 (ref 0.8–1.2)
INR PPP: 3.8 (ref 0.8–1.2)
INR PPP: 3.9 (ref 0.8–1.2)
INR PPP: 4.2 (ref 0.8–1.2)
INR PPP: 4.4 (ref 0.8–1.2)
INR PPP: 4.4 (ref 0.8–1.2)
INR PPP: 4.9 (ref 0.8–1.2)
INR PPP: 5.5 (ref 0.8–1.2)
INR PPP: 5.7 (ref 0.8–1.2)
LYMPHOCYTES # BLD AUTO: 1.7 K/UL (ref 1–4.8)
LYMPHOCYTES # BLD AUTO: 1.9 K/UL (ref 1–4.8)
LYMPHOCYTES # BLD AUTO: 2.4 K/UL (ref 1–4.8)
LYMPHOCYTES NFR BLD: 24.8 % (ref 18–48)
LYMPHOCYTES NFR BLD: 26.9 % (ref 18–48)
LYMPHOCYTES NFR BLD: 30.9 % (ref 18–48)
MAGNESIUM SERPL-MCNC: 2.3 MG/DL (ref 1.6–2.6)
MCH RBC QN AUTO: 29.4 PG (ref 27–31)
MCH RBC QN AUTO: 29.5 PG (ref 27–31)
MCH RBC QN AUTO: 30.4 PG (ref 27–31)
MCHC RBC AUTO-ENTMCNC: 31.8 G/DL (ref 32–36)
MCHC RBC AUTO-ENTMCNC: 32.5 G/DL (ref 32–36)
MCHC RBC AUTO-ENTMCNC: 32.9 G/DL (ref 32–36)
MCV RBC AUTO: 90 FL (ref 82–98)
MCV RBC AUTO: 91 FL (ref 82–98)
MCV RBC AUTO: 96 FL (ref 82–98)
MONOCYTES # BLD AUTO: 0.5 K/UL (ref 0.3–1)
MONOCYTES # BLD AUTO: 0.5 K/UL (ref 0.3–1)
MONOCYTES # BLD AUTO: 0.6 K/UL (ref 0.3–1)
MONOCYTES NFR BLD: 7 % (ref 4–15)
MONOCYTES NFR BLD: 7.3 % (ref 4–15)
MONOCYTES NFR BLD: 7.4 % (ref 4–15)
NEUTROPHILS # BLD AUTO: 4.4 K/UL (ref 1.8–7.7)
NEUTROPHILS # BLD AUTO: 4.5 K/UL (ref 1.8–7.7)
NEUTROPHILS # BLD AUTO: 4.5 K/UL (ref 1.8–7.7)
NEUTROPHILS NFR BLD: 57.5 % (ref 38–73)
NEUTROPHILS NFR BLD: 63.1 % (ref 38–73)
NEUTROPHILS NFR BLD: 63.7 % (ref 38–73)
NRBC BLD-RTO: 0 /100 WBC
PLATELET # BLD AUTO: 247 K/UL (ref 150–350)
PLATELET # BLD AUTO: 262 K/UL (ref 150–350)
PLATELET # BLD AUTO: 306 K/UL (ref 150–350)
PMV BLD AUTO: 7.2 FL (ref 9.2–12.9)
PMV BLD AUTO: 7.8 FL (ref 9.2–12.9)
PMV BLD AUTO: 9.7 FL (ref 9.2–12.9)
POC ACTIVATED CLOTTING TIME K: 340 SEC (ref 74–137)
POC ACTIVATED CLOTTING TIME K: 367 SEC (ref 74–137)
POC ACTIVATED CLOTTING TIME K: 373 SEC (ref 74–137)
POC ACTIVATED CLOTTING TIME K: 406 SEC (ref 74–137)
POC ACTIVATED CLOTTING TIME K: 406 SEC (ref 74–137)
POC ACTIVATED CLOTTING TIME K: 411 SEC (ref 74–137)
POC ACTIVATED CLOTTING TIME K: 417 SEC (ref 74–137)
POC ACTIVATED CLOTTING TIME K: 461 SEC (ref 74–137)
POTASSIUM SERPL-SCNC: 4 MMOL/L (ref 3.5–5.1)
POTASSIUM SERPL-SCNC: 4.1 MMOL/L (ref 3.5–5.1)
POTASSIUM SERPL-SCNC: 4.1 MMOL/L (ref 3.5–5.1)
POTASSIUM SERPL-SCNC: 4.5 MMOL/L (ref 3.5–5.1)
PROT SERPL-MCNC: 7 G/DL (ref 6–8.4)
PROTHROMBIN TIME: 11.7 SEC (ref 9–12.5)
PROTHROMBIN TIME: 12.9 SEC (ref 9–12.5)
PROTHROMBIN TIME: 13.7 SEC (ref 9–12.5)
PROTHROMBIN TIME: 23.8 SEC (ref 9–12.5)
PROTHROMBIN TIME: 23.8 SEC (ref 9–12.5)
PROTHROMBIN TIME: 26.6 SEC (ref 9–12.5)
PROTHROMBIN TIME: 26.8 SEC (ref 9–12.5)
PROTHROMBIN TIME: 27.4 SEC (ref 9–12.5)
PROTHROMBIN TIME: 27.6 SEC (ref 9–12.5)
PROTHROMBIN TIME: 28.3 SEC (ref 9–12.5)
PROTHROMBIN TIME: 28.6 SEC (ref 9–12.5)
PROTHROMBIN TIME: 29.7 SEC (ref 9–12.5)
PROTHROMBIN TIME: 30.7 SEC (ref 9–12.5)
PROTHROMBIN TIME: 34.8 SEC (ref 9–12.5)
PROTHROMBIN TIME: 36.2 SEC (ref 9–12.5)
PROTHROMBIN TIME: 38.6 SEC (ref 9–12.5)
PROTHROMBIN TIME: 39.6 SEC (ref 9–12.5)
PROTHROMBIN TIME: 42.8 SEC (ref 9–12.5)
PROTHROMBIN TIME: 42.9 SEC (ref 9–12.5)
PROTHROMBIN TIME: 44.8 SEC (ref 9–12.5)
PROTHROMBIN TIME: 49.9 SEC (ref 9–12.5)
PROTHROMBIN TIME: 55.2 SEC (ref 9–12.5)
PROTHROMBIN TIME: 58.1 SEC (ref 9–12.5)
RBC # BLD AUTO: 4.18 M/UL (ref 4–5.4)
RBC # BLD AUTO: 4.37 M/UL (ref 4–5.4)
RBC # BLD AUTO: 4.61 M/UL (ref 4–5.4)
SAMPLE: ABNORMAL
SODIUM SERPL-SCNC: 136 MMOL/L (ref 136–145)
SODIUM SERPL-SCNC: 138 MMOL/L (ref 136–145)
SODIUM SERPL-SCNC: 139 MMOL/L (ref 136–145)
SODIUM SERPL-SCNC: 139 MMOL/L (ref 136–145)
T4 FREE SERPL-MCNC: 1.33 NG/DL (ref 0.71–1.51)
TSH SERPL DL<=0.005 MIU/L-ACNC: 0.66 UIU/ML (ref 0.4–4)
TSH SERPL DL<=0.005 MIU/L-ACNC: 0.7 UIU/ML (ref 0.4–4)
WBC # BLD AUTO: 7 K/UL (ref 3.9–12.7)
WBC # BLD AUTO: 7.03 K/UL (ref 3.9–12.7)
WBC # BLD AUTO: 7.9 K/UL (ref 3.9–12.7)
ZINC SERPL-MCNC: 105 UG/DL (ref 60–130)

## 2019-01-01 PROCEDURE — 99214 PR OFFICE/OUTPT VISIT, EST, LEVL IV, 30-39 MIN: ICD-10-PCS | Mod: 25,S$PBB,, | Performed by: PHYSICAL MEDICINE & REHABILITATION

## 2019-01-01 PROCEDURE — 85025 COMPLETE CBC W/AUTO DIFF WBC: CPT

## 2019-01-01 PROCEDURE — 99999 PR PBB SHADOW E&M-EST. PATIENT-LVL IV: CPT | Mod: PBBFAC,,, | Performed by: PHYSICAL MEDICINE & REHABILITATION

## 2019-01-01 PROCEDURE — 76942 ECHO GUIDE FOR BIOPSY: CPT | Mod: 26,S$PBB,, | Performed by: PHYSICAL MEDICINE & REHABILITATION

## 2019-01-01 PROCEDURE — 85610 PROTHROMBIN TIME: CPT

## 2019-01-01 PROCEDURE — 99214 OFFICE O/P EST MOD 30 MIN: CPT | Mod: S$PBB,,, | Performed by: PHYSICAL MEDICINE & REHABILITATION

## 2019-01-01 PROCEDURE — D9220A PRA ANESTHESIA: ICD-10-PCS | Mod: ANES,,, | Performed by: ANESTHESIOLOGY

## 2019-01-01 PROCEDURE — 36415 COLL VENOUS BLD VENIPUNCTURE: CPT

## 2019-01-01 PROCEDURE — 1159F MED LIST DOCD IN RCRD: CPT | Mod: ,,, | Performed by: NURSE PRACTITIONER

## 2019-01-01 PROCEDURE — 99214 OFFICE O/P EST MOD 30 MIN: CPT | Mod: 25,S$PBB,, | Performed by: PHYSICAL MEDICINE & REHABILITATION

## 2019-01-01 PROCEDURE — 1125F AMNT PAIN NOTED PAIN PRSNT: CPT | Mod: ,,, | Performed by: PHYSICAL MEDICINE & REHABILITATION

## 2019-01-01 PROCEDURE — 99213 OFFICE O/P EST LOW 20 MIN: CPT | Mod: S$PBB,,, | Performed by: NURSE PRACTITIONER

## 2019-01-01 PROCEDURE — 99213 PR OFFICE/OUTPT VISIT, EST, LEVL III, 20-29 MIN: ICD-10-PCS | Mod: S$PBB,,, | Performed by: NURSE PRACTITIONER

## 2019-01-01 PROCEDURE — 86038 ANTINUCLEAR ANTIBODIES: CPT

## 2019-01-01 PROCEDURE — 1159F PR MEDICATION LIST DOCUMENTED IN MEDICAL RECORD: ICD-10-PCS | Mod: ,,, | Performed by: FAMILY MEDICINE

## 2019-01-01 PROCEDURE — 83735 ASSAY OF MAGNESIUM: CPT

## 2019-01-01 PROCEDURE — 37000008 HC ANESTHESIA 1ST 15 MINUTES: Performed by: INTERNAL MEDICINE

## 2019-01-01 PROCEDURE — 99999 PR PBB SHADOW E&M-EST. PATIENT-LVL IV: ICD-10-PCS | Mod: PBBFAC,,, | Performed by: PHYSICAL MEDICINE & REHABILITATION

## 2019-01-01 PROCEDURE — 93657 TX L/R ATRIAL FIB ADDL: CPT | Mod: ,,, | Performed by: INTERNAL MEDICINE

## 2019-01-01 PROCEDURE — A4216 STERILE WATER/SALINE, 10 ML: HCPCS | Performed by: INTERNAL MEDICINE

## 2019-01-01 PROCEDURE — 93656 PR ELECTROPHYS EVAL, COMPREHEN, W/ABLATION OF ATRIAL FIBR: ICD-10-PCS | Mod: ,,, | Performed by: INTERNAL MEDICINE

## 2019-01-01 PROCEDURE — 99214 OFFICE O/P EST MOD 30 MIN: CPT | Mod: PBBFAC,PN | Performed by: PHYSICAL MEDICINE & REHABILITATION

## 2019-01-01 PROCEDURE — 99215 OFFICE O/P EST HI 40 MIN: CPT | Mod: PBBFAC,PO | Performed by: PHYSICIAN ASSISTANT

## 2019-01-01 PROCEDURE — 37000009 HC ANESTHESIA EA ADD 15 MINS: Performed by: INTERNAL MEDICINE

## 2019-01-01 PROCEDURE — 99999 PR PBB SHADOW E&M-EST. PATIENT-LVL IV: CPT | Mod: PBBFAC,,, | Performed by: FAMILY MEDICINE

## 2019-01-01 PROCEDURE — 1159F MED LIST DOCD IN RCRD: CPT | Mod: S$GLB,,, | Performed by: PHYSICAL MEDICINE & REHABILITATION

## 2019-01-01 PROCEDURE — 64450 PR NERVE BLOCK INJ, ANES/STEROID, OTHER PERIPHERAL: ICD-10-PCS | Mod: S$PBB,RT,, | Performed by: PHYSICAL MEDICINE & REHABILITATION

## 2019-01-01 PROCEDURE — 93005 ELECTROCARDIOGRAM TRACING: CPT | Mod: PBBFAC,PO | Performed by: INTERNAL MEDICINE

## 2019-01-01 PROCEDURE — 64450 NJX AA&/STRD OTHER PN/BRANCH: CPT | Mod: S$PBB,RT,, | Performed by: PHYSICAL MEDICINE & REHABILITATION

## 2019-01-01 PROCEDURE — 99214 OFFICE O/P EST MOD 30 MIN: CPT | Mod: S$PBB,,, | Performed by: INTERNAL MEDICINE

## 2019-01-01 PROCEDURE — 99214 PR OFFICE/OUTPT VISIT, EST, LEVL IV, 30-39 MIN: ICD-10-PCS | Mod: S$GLB,,, | Performed by: INTERNAL MEDICINE

## 2019-01-01 PROCEDURE — 93656 COMPRE EP EVAL ABLTJ ATR FIB: CPT | Performed by: INTERNAL MEDICINE

## 2019-01-01 PROCEDURE — 1125F PR PAIN SEVERITY QUANTIFIED, PAIN PRESENT: ICD-10-PCS | Mod: ,,, | Performed by: NURSE PRACTITIONER

## 2019-01-01 PROCEDURE — 43270 EGD LESION ABLATION: CPT | Performed by: INTERNAL MEDICINE

## 2019-01-01 PROCEDURE — 84443 ASSAY THYROID STIM HORMONE: CPT

## 2019-01-01 PROCEDURE — 1125F AMNT PAIN NOTED PAIN PRSNT: CPT | Mod: ,,, | Performed by: NURSE PRACTITIONER

## 2019-01-01 PROCEDURE — 99999 PR PBB SHADOW E&M-EST. PATIENT-LVL III: CPT | Mod: PBBFAC,,, | Performed by: FAMILY MEDICINE

## 2019-01-01 PROCEDURE — 27201037 HC PRESSURE MONITORING SET UP

## 2019-01-01 PROCEDURE — 1159F PR MEDICATION LIST DOCUMENTED IN MEDICAL RECORD: ICD-10-PCS | Mod: ,,, | Performed by: PHYSICAL MEDICINE & REHABILITATION

## 2019-01-01 PROCEDURE — 25000003 PHARM REV CODE 250: Performed by: NURSE PRACTITIONER

## 2019-01-01 PROCEDURE — 72100 X-RAY EXAM L-S SPINE 2/3 VWS: CPT | Mod: 26,,, | Performed by: RADIOLOGY

## 2019-01-01 PROCEDURE — 99214 OFFICE O/P EST MOD 30 MIN: CPT | Mod: S$PBB,,, | Performed by: FAMILY MEDICINE

## 2019-01-01 PROCEDURE — 96372 THER/PROPH/DIAG INJ SC/IM: CPT | Mod: PBBFAC,PO

## 2019-01-01 PROCEDURE — 99999 PR PBB SHADOW E&M-EST. PATIENT-LVL III: CPT | Mod: PBBFAC,,, | Performed by: DERMATOLOGY

## 2019-01-01 PROCEDURE — C1894 INTRO/SHEATH, NON-LASER: HCPCS | Performed by: INTERNAL MEDICINE

## 2019-01-01 PROCEDURE — 73502 X-RAY EXAM HIP UNI 2-3 VIEWS: CPT | Mod: TC,FY,PO,RT

## 2019-01-01 PROCEDURE — 76942 PR U/S GUIDANCE FOR NEEDLE GUIDANCE: ICD-10-PCS | Mod: 26,S$PBB,, | Performed by: PHYSICAL MEDICINE & REHABILITATION

## 2019-01-01 PROCEDURE — 1159F MED LIST DOCD IN RCRD: CPT | Mod: ,,, | Performed by: FAMILY MEDICINE

## 2019-01-01 PROCEDURE — 99214 PR OFFICE/OUTPT VISIT, EST, LEVL IV, 30-39 MIN: ICD-10-PCS | Mod: S$PBB,,, | Performed by: INTERNAL MEDICINE

## 2019-01-01 PROCEDURE — 84439 ASSAY OF FREE THYROXINE: CPT

## 2019-01-01 PROCEDURE — 99214 PR OFFICE/OUTPT VISIT, EST, LEVL IV, 30-39 MIN: ICD-10-PCS | Mod: S$PBB,25,, | Performed by: PHYSICAL MEDICINE & REHABILITATION

## 2019-01-01 PROCEDURE — 99214 OFFICE O/P EST MOD 30 MIN: CPT | Mod: PBBFAC,PO | Performed by: FAMILY MEDICINE

## 2019-01-01 PROCEDURE — 99213 OFFICE O/P EST LOW 20 MIN: CPT | Mod: PBBFAC,PO | Performed by: DERMATOLOGY

## 2019-01-01 PROCEDURE — 99213 PR OFFICE/OUTPT VISIT, EST, LEVL III, 20-29 MIN: ICD-10-PCS | Mod: S$PBB,,, | Performed by: FAMILY MEDICINE

## 2019-01-01 PROCEDURE — 93010 ELECTROCARDIOGRAM REPORT: CPT | Mod: 76,,, | Performed by: INTERNAL MEDICINE

## 2019-01-01 PROCEDURE — 99213 OFFICE O/P EST LOW 20 MIN: CPT | Mod: S$PBB,,, | Performed by: DERMATOLOGY

## 2019-01-01 PROCEDURE — 1125F PR PAIN SEVERITY QUANTIFIED, PAIN PRESENT: ICD-10-PCS | Mod: ,,, | Performed by: FAMILY MEDICINE

## 2019-01-01 PROCEDURE — 99213 OFFICE O/P EST LOW 20 MIN: CPT | Mod: PBBFAC,PO | Performed by: INTERNAL MEDICINE

## 2019-01-01 PROCEDURE — 25000003 PHARM REV CODE 250: Performed by: INTERNAL MEDICINE

## 2019-01-01 PROCEDURE — 80048 BASIC METABOLIC PNL TOTAL CA: CPT

## 2019-01-01 PROCEDURE — 93010 EKG 12-LEAD: ICD-10-PCS | Mod: S$PBB,,, | Performed by: INTERNAL MEDICINE

## 2019-01-01 PROCEDURE — 93005 ELECTROCARDIOGRAM TRACING: CPT

## 2019-01-01 PROCEDURE — 1159F MED LIST DOCD IN RCRD: CPT | Mod: ,,, | Performed by: PHYSICAL MEDICINE & REHABILITATION

## 2019-01-01 PROCEDURE — 99999 PR PBB SHADOW E&M-EST. PATIENT-LVL II: ICD-10-PCS | Mod: PBBFAC,,, | Performed by: DERMATOLOGY

## 2019-01-01 PROCEDURE — 99204 PR OFFICE/OUTPT VISIT, NEW, LEVL IV, 45-59 MIN: ICD-10-PCS | Mod: S$GLB,,, | Performed by: PHYSICAL MEDICINE & REHABILITATION

## 2019-01-01 PROCEDURE — 1125F PR PAIN SEVERITY QUANTIFIED, PAIN PRESENT: ICD-10-PCS | Mod: ,,, | Performed by: PHYSICAL MEDICINE & REHABILITATION

## 2019-01-01 PROCEDURE — 96372 THER/PROPH/DIAG INJ SC/IM: CPT | Mod: PBBFAC,PN

## 2019-01-01 PROCEDURE — 99204 OFFICE O/P NEW MOD 45 MIN: CPT | Mod: S$GLB,,, | Performed by: PHYSICAL MEDICINE & REHABILITATION

## 2019-01-01 PROCEDURE — 73502 XR HIP 2 VIEW RIGHT: ICD-10-PCS | Mod: 26,RT,S$GLB, | Performed by: RADIOLOGY

## 2019-01-01 PROCEDURE — 93657 PR TX L/R ATRIAL FIB ADDL: ICD-10-PCS | Mod: ,,, | Performed by: INTERNAL MEDICINE

## 2019-01-01 PROCEDURE — 99213 PR OFFICE/OUTPT VISIT, EST, LEVL III, 20-29 MIN: ICD-10-PCS | Mod: S$PBB,,, | Performed by: DERMATOLOGY

## 2019-01-01 PROCEDURE — C1886 CATHETER, ABLATION: HCPCS | Performed by: INTERNAL MEDICINE

## 2019-01-01 PROCEDURE — 84630 ASSAY OF ZINC: CPT

## 2019-01-01 PROCEDURE — 85730 THROMBOPLASTIN TIME PARTIAL: CPT

## 2019-01-01 PROCEDURE — 99999 PR PBB SHADOW E&M-EST. PATIENT-LVL III: CPT | Mod: PBBFAC,,, | Performed by: INTERNAL MEDICINE

## 2019-01-01 PROCEDURE — 99999 PR PBB SHADOW E&M-EST. PATIENT-LVL IV: CPT | Mod: PBBFAC,,, | Performed by: NURSE PRACTITIONER

## 2019-01-01 PROCEDURE — 99214 PR OFFICE/OUTPT VISIT, EST, LEVL IV, 30-39 MIN: ICD-10-PCS | Mod: S$PBB,,, | Performed by: PHYSICAL MEDICINE & REHABILITATION

## 2019-01-01 PROCEDURE — 93657 TX L/R ATRIAL FIB ADDL: CPT | Performed by: INTERNAL MEDICINE

## 2019-01-01 PROCEDURE — 99999 PR PBB SHADOW E&M-EST. PATIENT-LVL IV: ICD-10-PCS | Mod: PBBFAC,,, | Performed by: FAMILY MEDICINE

## 2019-01-01 PROCEDURE — 1125F AMNT PAIN NOTED PAIN PRSNT: CPT | Mod: S$GLB,,, | Performed by: PHYSICAL MEDICINE & REHABILITATION

## 2019-01-01 PROCEDURE — 99999 PR PBB SHADOW E&M-EST. PATIENT-LVL III: ICD-10-PCS | Mod: PBBFAC,,, | Performed by: DERMATOLOGY

## 2019-01-01 PROCEDURE — 93613 INTRACARDIAC EPHYS 3D MAPG: CPT | Mod: ,,, | Performed by: INTERNAL MEDICINE

## 2019-01-01 PROCEDURE — 93662 INTRACARDIAC ECG (ICE): CPT | Performed by: INTERNAL MEDICINE

## 2019-01-01 PROCEDURE — 83036 HEMOGLOBIN GLYCOSYLATED A1C: CPT

## 2019-01-01 PROCEDURE — 99214 OFFICE O/P EST MOD 30 MIN: CPT | Mod: S$PBB,25,, | Performed by: PHYSICAL MEDICINE & REHABILITATION

## 2019-01-01 PROCEDURE — 72100 X-RAY EXAM L-S SPINE 2/3 VWS: CPT | Mod: TC,FY,PO

## 2019-01-01 PROCEDURE — 99999 PR PBB SHADOW E&M-EST. PATIENT-LVL III: ICD-10-PCS | Mod: PBBFAC,,, | Performed by: FAMILY MEDICINE

## 2019-01-01 PROCEDURE — 36620 INSERTION CATHETER ARTERY: CPT | Mod: 59,,, | Performed by: ANESTHESIOLOGY

## 2019-01-01 PROCEDURE — 73502 X-RAY EXAM HIP UNI 2-3 VIEWS: CPT | Mod: 26,RT,S$GLB, | Performed by: RADIOLOGY

## 2019-01-01 PROCEDURE — 43270 PR EGD, FLEX, W/ABLATION, TUMOR/POLYP/LESION(S), W/ DILATION: ICD-10-PCS | Mod: ,,, | Performed by: INTERNAL MEDICINE

## 2019-01-01 PROCEDURE — 98925 OSTEOPATH MANJ 1-2 REGIONS: CPT | Mod: S$PBB,,, | Performed by: PHYSICAL MEDICINE & REHABILITATION

## 2019-01-01 PROCEDURE — 76942 ECHO GUIDE FOR BIOPSY: CPT | Mod: PBBFAC,PN | Performed by: PHYSICAL MEDICINE & REHABILITATION

## 2019-01-01 PROCEDURE — 80053 COMPREHEN METABOLIC PANEL: CPT

## 2019-01-01 PROCEDURE — 99213 PR OFFICE/OUTPT VISIT, EST, LEVL III, 20-29 MIN: ICD-10-PCS | Mod: S$PBB,,, | Performed by: PHYSICIAN ASSISTANT

## 2019-01-01 PROCEDURE — C1753 CATH, INTRAVAS ULTRASOUND: HCPCS | Performed by: INTERNAL MEDICINE

## 2019-01-01 PROCEDURE — 93613 INTRACARDIAC EPHYS 3D MAPG: CPT | Performed by: INTERNAL MEDICINE

## 2019-01-01 PROCEDURE — 25000003 PHARM REV CODE 250: Performed by: NURSE ANESTHETIST, CERTIFIED REGISTERED

## 2019-01-01 PROCEDURE — 93662 INTRACARDIAC ECG (ICE): CPT | Mod: 26,,, | Performed by: INTERNAL MEDICINE

## 2019-01-01 PROCEDURE — C1731 CATH, EP, 20 OR MORE ELEC: HCPCS | Performed by: INTERNAL MEDICINE

## 2019-01-01 PROCEDURE — C1730 CATH, EP, 19 OR FEW ELECT: HCPCS | Performed by: INTERNAL MEDICINE

## 2019-01-01 PROCEDURE — 98925 OSTEOPATH MANJ 1-2 REGIONS: CPT | Mod: PBBFAC,PN | Performed by: PHYSICAL MEDICINE & REHABILITATION

## 2019-01-01 PROCEDURE — 93010 ELECTROCARDIOGRAM REPORT: CPT | Mod: ,,, | Performed by: INTERNAL MEDICINE

## 2019-01-01 PROCEDURE — 36620 PR INSERT CATH,ART,PERCUT,SHORTTERM: ICD-10-PCS | Mod: 59,,, | Performed by: ANESTHESIOLOGY

## 2019-01-01 PROCEDURE — D9220A PRA ANESTHESIA: ICD-10-PCS | Mod: CRNA,,, | Performed by: NURSE ANESTHETIST, CERTIFIED REGISTERED

## 2019-01-01 PROCEDURE — 99212 OFFICE O/P EST SF 10 MIN: CPT | Mod: PBBFAC,PO | Performed by: DERMATOLOGY

## 2019-01-01 PROCEDURE — 63600175 PHARM REV CODE 636 W HCPCS: Performed by: NURSE ANESTHETIST, CERTIFIED REGISTERED

## 2019-01-01 PROCEDURE — 99214 OFFICE O/P EST MOD 30 MIN: CPT | Mod: S$GLB,,, | Performed by: INTERNAL MEDICINE

## 2019-01-01 PROCEDURE — 99213 OFFICE O/P EST LOW 20 MIN: CPT | Mod: PBBFAC,PO | Performed by: FAMILY MEDICINE

## 2019-01-01 PROCEDURE — 93656 COMPRE EP EVAL ABLTJ ATR FIB: CPT | Mod: ,,, | Performed by: INTERNAL MEDICINE

## 2019-01-01 PROCEDURE — 72100 XR LUMBAR SPINE AP AND LATERAL: ICD-10-PCS | Mod: 26,,, | Performed by: RADIOLOGY

## 2019-01-01 PROCEDURE — 93010 EKG 12-LEAD: ICD-10-PCS | Mod: ,,, | Performed by: INTERNAL MEDICINE

## 2019-01-01 PROCEDURE — 36415 COLL VENOUS BLD VENIPUNCTURE: CPT | Mod: PO

## 2019-01-01 PROCEDURE — D9220A PRA ANESTHESIA: Mod: ANES,,, | Performed by: ANESTHESIOLOGY

## 2019-01-01 PROCEDURE — 99999 PR PBB SHADOW E&M-EST. PATIENT-LVL V: ICD-10-PCS | Mod: PBBFAC,,, | Performed by: PHYSICIAN ASSISTANT

## 2019-01-01 PROCEDURE — 82652 VIT D 1 25-DIHYDROXY: CPT

## 2019-01-01 PROCEDURE — 99999 PR PBB SHADOW E&M-EST. PATIENT-LVL III: ICD-10-PCS | Mod: PBBFAC,,, | Performed by: INTERNAL MEDICINE

## 2019-01-01 PROCEDURE — 64450 NJX AA&/STRD OTHER PN/BRANCH: CPT | Mod: PBBFAC,PN | Performed by: PHYSICAL MEDICINE & REHABILITATION

## 2019-01-01 PROCEDURE — 97161 PT EVAL LOW COMPLEX 20 MIN: CPT | Mod: PN

## 2019-01-01 PROCEDURE — 99213 OFFICE O/P EST LOW 20 MIN: CPT | Mod: S$PBB,,, | Performed by: PHYSICIAN ASSISTANT

## 2019-01-01 PROCEDURE — 1125F PR PAIN SEVERITY QUANTIFIED, PAIN PRESENT: ICD-10-PCS | Mod: S$GLB,,, | Performed by: PHYSICAL MEDICINE & REHABILITATION

## 2019-01-01 PROCEDURE — 99214 PR OFFICE/OUTPT VISIT, EST, LEVL IV, 30-39 MIN: ICD-10-PCS | Mod: S$PBB,,, | Performed by: FAMILY MEDICINE

## 2019-01-01 PROCEDURE — 43270 EGD LESION ABLATION: CPT | Mod: ,,, | Performed by: INTERNAL MEDICINE

## 2019-01-01 PROCEDURE — 1125F AMNT PAIN NOTED PAIN PRSNT: CPT | Mod: ,,, | Performed by: FAMILY MEDICINE

## 2019-01-01 PROCEDURE — 93613 PR INTRACARD ELECTROPHYS 3-DIMENS MAPPING: ICD-10-PCS | Mod: ,,, | Performed by: INTERNAL MEDICINE

## 2019-01-01 PROCEDURE — C1732 CATH, EP, DIAG/ABL, 3D/VECT: HCPCS | Performed by: INTERNAL MEDICINE

## 2019-01-01 PROCEDURE — 99999 PR PBB SHADOW E&M-EST. PATIENT-LVL II: CPT | Mod: PBBFAC,,, | Performed by: DERMATOLOGY

## 2019-01-01 PROCEDURE — 93010 ELECTROCARDIOGRAM REPORT: CPT | Mod: S$PBB,,, | Performed by: INTERNAL MEDICINE

## 2019-01-01 PROCEDURE — 98925 PR OSTEOPATHIC MANIP,1-2 BODY REGN: ICD-10-PCS | Mod: S$PBB,,, | Performed by: PHYSICAL MEDICINE & REHABILITATION

## 2019-01-01 PROCEDURE — 99999 PR PBB SHADOW E&M-EST. PATIENT-LVL IV: ICD-10-PCS | Mod: PBBFAC,,, | Performed by: NURSE PRACTITIONER

## 2019-01-01 PROCEDURE — 1159F PR MEDICATION LIST DOCUMENTED IN MEDICAL RECORD: ICD-10-PCS | Mod: ,,, | Performed by: NURSE PRACTITIONER

## 2019-01-01 PROCEDURE — 99214 OFFICE O/P EST MOD 30 MIN: CPT | Mod: PBBFAC,25,PO | Performed by: FAMILY MEDICINE

## 2019-01-01 PROCEDURE — D9220A PRA ANESTHESIA: Mod: CRNA,,, | Performed by: NURSE ANESTHETIST, CERTIFIED REGISTERED

## 2019-01-01 PROCEDURE — 86901 BLOOD TYPING SEROLOGIC RH(D): CPT

## 2019-01-01 PROCEDURE — 99213 OFFICE O/P EST LOW 20 MIN: CPT | Mod: S$PBB,,, | Performed by: FAMILY MEDICINE

## 2019-01-01 PROCEDURE — C1766 INTRO/SHEATH,STRBLE,NON-PEEL: HCPCS | Performed by: INTERNAL MEDICINE

## 2019-01-01 PROCEDURE — 99999 PR PBB SHADOW E&M-EST. PATIENT-LVL V: CPT | Mod: PBBFAC,,, | Performed by: PHYSICIAN ASSISTANT

## 2019-01-01 PROCEDURE — 93662 PR INTRACARD ECHO, THER/DX INTERVENT: ICD-10-PCS | Mod: 26,,, | Performed by: INTERNAL MEDICINE

## 2019-01-01 PROCEDURE — 99214 OFFICE O/P EST MOD 30 MIN: CPT | Mod: PBBFAC,PO | Performed by: NURSE PRACTITIONER

## 2019-01-01 PROCEDURE — 27201423 OPTIME MED/SURG SUP & DEVICES STERILE SUPPLY: Performed by: INTERNAL MEDICINE

## 2019-01-01 PROCEDURE — 1159F PR MEDICATION LIST DOCUMENTED IN MEDICAL RECORD: ICD-10-PCS | Mod: S$GLB,,, | Performed by: PHYSICAL MEDICINE & REHABILITATION

## 2019-01-01 RX ORDER — FENTANYL CITRATE 50 UG/ML
INJECTION, SOLUTION INTRAMUSCULAR; INTRAVENOUS
Status: DISCONTINUED | OUTPATIENT
Start: 2019-01-01 | End: 2019-01-01

## 2019-01-01 RX ORDER — BISACODYL 5 MG
5 TABLET, DELAYED RELEASE (ENTERIC COATED) ORAL DAILY PRN
Status: DISCONTINUED | OUTPATIENT
Start: 2019-01-01 | End: 2019-01-01 | Stop reason: HOSPADM

## 2019-01-01 RX ORDER — DILTIAZEM HYDROCHLORIDE 120 MG/1
120 CAPSULE, COATED, EXTENDED RELEASE ORAL 2 TIMES DAILY
Status: DISCONTINUED | OUTPATIENT
Start: 2019-01-01 | End: 2019-01-01 | Stop reason: HOSPADM

## 2019-01-01 RX ORDER — LEVOTHYROXINE SODIUM 88 UG/1
88 TABLET ORAL DAILY
Status: DISCONTINUED | OUTPATIENT
Start: 2019-01-01 | End: 2019-01-01 | Stop reason: HOSPADM

## 2019-01-01 RX ORDER — FLUTICASONE PROPIONATE 50 MCG
1 SPRAY, SUSPENSION (ML) NASAL DAILY
Status: DISCONTINUED | OUTPATIENT
Start: 2019-01-01 | End: 2019-01-01 | Stop reason: HOSPADM

## 2019-01-01 RX ORDER — PROPOFOL 10 MG/ML
VIAL (ML) INTRAVENOUS
Status: DISCONTINUED | OUTPATIENT
Start: 2019-01-01 | End: 2019-01-01

## 2019-01-01 RX ORDER — PROTAMINE SULFATE 10 MG/ML
INJECTION, SOLUTION INTRAVENOUS
Status: DISCONTINUED | OUTPATIENT
Start: 2019-01-01 | End: 2019-01-01

## 2019-01-01 RX ORDER — TIOTROPIUM BROMIDE 18 UG/1
CAPSULE ORAL; RESPIRATORY (INHALATION)
Qty: 90 CAPSULE | Refills: 3 | Status: SHIPPED | OUTPATIENT
Start: 2019-01-01 | End: 2019-01-01 | Stop reason: SDUPTHER

## 2019-01-01 RX ORDER — KETOCONAZOLE 20 MG/G
CREAM TOPICAL DAILY
Qty: 60 G | Refills: 0 | Status: SHIPPED | OUTPATIENT
Start: 2019-01-01

## 2019-01-01 RX ORDER — SODIUM CHLORIDE 9 MG/ML
INJECTION, SOLUTION INTRAVENOUS CONTINUOUS
Status: ACTIVE | OUTPATIENT
Start: 2019-01-01

## 2019-01-01 RX ORDER — FUROSEMIDE 10 MG/ML
INJECTION INTRAMUSCULAR; INTRAVENOUS
Status: DISCONTINUED | OUTPATIENT
Start: 2019-01-01 | End: 2019-01-01

## 2019-01-01 RX ORDER — HEPARIN SODIUM 1000 [USP'U]/ML
INJECTION, SOLUTION INTRAVENOUS; SUBCUTANEOUS
Status: DISCONTINUED | OUTPATIENT
Start: 2019-01-01 | End: 2019-01-01

## 2019-01-01 RX ORDER — MEPERIDINE HYDROCHLORIDE 50 MG/ML
12.5 INJECTION INTRAMUSCULAR; INTRAVENOUS; SUBCUTANEOUS ONCE AS NEEDED
Status: DISCONTINUED | OUTPATIENT
Start: 2019-01-01 | End: 2019-01-01 | Stop reason: HOSPADM

## 2019-01-01 RX ORDER — PHENYLEPHRINE HYDROCHLORIDE 10 MG/ML
INJECTION INTRAVENOUS
Status: DISCONTINUED | OUTPATIENT
Start: 2019-01-01 | End: 2019-01-01

## 2019-01-01 RX ORDER — SODIUM CHLORIDE 9 MG/ML
INJECTION, SOLUTION INTRAVENOUS CONTINUOUS
Status: DISCONTINUED | OUTPATIENT
Start: 2019-01-01 | End: 2019-01-01 | Stop reason: HOSPADM

## 2019-01-01 RX ORDER — PREGABALIN 75 MG/1
75 CAPSULE ORAL NIGHTLY
Qty: 30 CAPSULE | Refills: 0 | Status: SHIPPED | OUTPATIENT
Start: 2019-01-01 | End: 2019-01-01

## 2019-01-01 RX ORDER — LIDOCAINE HYDROCHLORIDE 20 MG/ML
INJECTION, SOLUTION INFILTRATION; PERINEURAL
Status: DISCONTINUED | OUTPATIENT
Start: 2019-01-01 | End: 2019-01-01 | Stop reason: HOSPADM

## 2019-01-01 RX ORDER — PROPOFOL 10 MG/ML
VIAL (ML) INTRAVENOUS CONTINUOUS PRN
Status: DISCONTINUED | OUTPATIENT
Start: 2019-01-01 | End: 2019-01-01

## 2019-01-01 RX ORDER — DILTIAZEM HYDROCHLORIDE 120 MG/1
120 CAPSULE, COATED, EXTENDED RELEASE ORAL 2 TIMES DAILY
Qty: 180 CAPSULE | Refills: 3 | Status: SHIPPED | OUTPATIENT
Start: 2019-01-01 | End: 2020-01-01

## 2019-01-01 RX ORDER — MONTELUKAST SODIUM 10 MG/1
10 TABLET ORAL NIGHTLY
Status: DISCONTINUED | OUTPATIENT
Start: 2019-01-01 | End: 2019-01-01 | Stop reason: HOSPADM

## 2019-01-01 RX ORDER — GABAPENTIN 300 MG/1
CAPSULE ORAL
Qty: 90 CAPSULE | Refills: 2 | Status: SHIPPED | OUTPATIENT
Start: 2019-01-01 | End: 2020-01-01

## 2019-01-01 RX ORDER — METHYLPREDNISOLONE 4 MG/1
TABLET ORAL
Qty: 1 PACKAGE | Refills: 0 | Status: SHIPPED | OUTPATIENT
Start: 2019-01-01 | End: 2019-01-01

## 2019-01-01 RX ORDER — FENTANYL CITRATE 50 UG/ML
25 INJECTION, SOLUTION INTRAMUSCULAR; INTRAVENOUS EVERY 5 MIN PRN
Status: DISCONTINUED | OUTPATIENT
Start: 2019-01-01 | End: 2019-01-01 | Stop reason: HOSPADM

## 2019-01-01 RX ORDER — CETIRIZINE HYDROCHLORIDE 10 MG/1
10 TABLET ORAL NIGHTLY
Status: DISCONTINUED | OUTPATIENT
Start: 2019-01-01 | End: 2019-01-01 | Stop reason: HOSPADM

## 2019-01-01 RX ORDER — LEVOTHYROXINE SODIUM 88 UG/1
TABLET ORAL
Qty: 90 TABLET | Refills: 4 | Status: SHIPPED | OUTPATIENT
Start: 2019-01-01 | End: 2019-01-01 | Stop reason: SDUPTHER

## 2019-01-01 RX ORDER — METHYLPREDNISOLONE ACETATE 40 MG/ML
40 INJECTION, SUSPENSION INTRA-ARTICULAR; INTRALESIONAL; INTRAMUSCULAR; SOFT TISSUE
Status: COMPLETED | OUTPATIENT
Start: 2019-01-01 | End: 2019-01-01

## 2019-01-01 RX ORDER — LIDOCAINE HCL/PF 100 MG/5ML
SYRINGE (ML) INTRAVENOUS
Status: DISCONTINUED | OUTPATIENT
Start: 2019-01-01 | End: 2019-01-01

## 2019-01-01 RX ORDER — TRAMADOL HYDROCHLORIDE 50 MG/1
TABLET ORAL
Qty: 30 TABLET | Refills: 0 | Status: SHIPPED | OUTPATIENT
Start: 2019-01-01 | End: 2019-01-01 | Stop reason: SDUPTHER

## 2019-01-01 RX ORDER — SODIUM CHLORIDE 0.9 % (FLUSH) 0.9 %
10 SYRINGE (ML) INJECTION
Status: DISCONTINUED | OUTPATIENT
Start: 2019-01-01 | End: 2019-01-01 | Stop reason: HOSPADM

## 2019-01-01 RX ORDER — PREDNISONE 5 MG/1
5 TABLET ORAL DAILY
Qty: 5 TABLET | Refills: 0 | Status: SHIPPED | OUTPATIENT
Start: 2019-01-01 | End: 2020-01-01

## 2019-01-01 RX ORDER — SODIUM CHLORIDE 0.9 % (FLUSH) 0.9 %
3 SYRINGE (ML) INJECTION
Status: DISCONTINUED | OUTPATIENT
Start: 2019-01-01 | End: 2019-01-01 | Stop reason: HOSPADM

## 2019-01-01 RX ORDER — GLYCOPYRROLATE 0.2 MG/ML
INJECTION INTRAMUSCULAR; INTRAVENOUS
Status: DISCONTINUED | OUTPATIENT
Start: 2019-01-01 | End: 2019-01-01

## 2019-01-01 RX ORDER — FLECAINIDE ACETATE 50 MG/1
100 TABLET ORAL 2 TIMES DAILY
Status: DISCONTINUED | OUTPATIENT
Start: 2019-01-01 | End: 2019-01-01 | Stop reason: HOSPADM

## 2019-01-01 RX ORDER — ATORVASTATIN CALCIUM 40 MG/1
40 TABLET, FILM COATED ORAL DAILY
Qty: 90 TABLET | Refills: 3 | Status: SHIPPED | OUTPATIENT
Start: 2019-01-01 | End: 2020-01-01

## 2019-01-01 RX ORDER — DIPHENHYDRAMINE HCL 25 MG
25 CAPSULE ORAL EVERY 6 HOURS PRN
Status: DISCONTINUED | OUTPATIENT
Start: 2019-01-01 | End: 2019-01-01 | Stop reason: HOSPADM

## 2019-01-01 RX ORDER — TIOTROPIUM BROMIDE 18 UG/1
CAPSULE ORAL; RESPIRATORY (INHALATION)
Qty: 90 CAPSULE | Refills: 3 | Status: SHIPPED | OUTPATIENT
Start: 2019-01-01

## 2019-01-01 RX ORDER — TRAMADOL HYDROCHLORIDE 50 MG/1
50 TABLET ORAL EVERY 12 HOURS PRN
Qty: 60 TABLET | Refills: 0 | Status: SHIPPED | OUTPATIENT
Start: 2019-01-01 | End: 2020-01-01

## 2019-01-01 RX ORDER — FUROSEMIDE 20 MG/1
20 TABLET ORAL DAILY PRN
Qty: 10 TABLET | Refills: 0 | Status: SHIPPED | OUTPATIENT
Start: 2019-01-01 | End: 2020-01-01

## 2019-01-01 RX ORDER — ALBUTEROL SULFATE 90 UG/1
2 AEROSOL, METERED RESPIRATORY (INHALATION) EVERY 6 HOURS PRN
Status: DISCONTINUED | OUTPATIENT
Start: 2019-01-01 | End: 2019-01-01 | Stop reason: HOSPADM

## 2019-01-01 RX ORDER — ROCURONIUM BROMIDE 10 MG/ML
INJECTION, SOLUTION INTRAVENOUS
Status: DISCONTINUED | OUTPATIENT
Start: 2019-01-01 | End: 2019-01-01

## 2019-01-01 RX ORDER — ONDANSETRON 2 MG/ML
INJECTION INTRAMUSCULAR; INTRAVENOUS
Status: DISCONTINUED | OUTPATIENT
Start: 2019-01-01 | End: 2019-01-01

## 2019-01-01 RX ORDER — SODIUM CHLORIDE 0.9 % (FLUSH) 0.9 %
3 SYRINGE (ML) INJECTION EVERY 8 HOURS
Status: DISCONTINUED | OUTPATIENT
Start: 2019-01-01 | End: 2019-01-01 | Stop reason: HOSPADM

## 2019-01-01 RX ORDER — HYDROMORPHONE HYDROCHLORIDE 1 MG/ML
0.2 INJECTION, SOLUTION INTRAMUSCULAR; INTRAVENOUS; SUBCUTANEOUS EVERY 5 MIN PRN
Status: DISCONTINUED | OUTPATIENT
Start: 2019-01-01 | End: 2019-01-01 | Stop reason: HOSPADM

## 2019-01-01 RX ORDER — BETAMETHASONE SODIUM PHOSPHATE AND BETAMETHASONE ACETATE 3; 3 MG/ML; MG/ML
9 INJECTION, SUSPENSION INTRA-ARTICULAR; INTRALESIONAL; INTRAMUSCULAR; SOFT TISSUE
Status: COMPLETED | OUTPATIENT
Start: 2019-01-01 | End: 2019-01-01

## 2019-01-01 RX ORDER — WARFARIN SODIUM 5 MG/1
5 TABLET ORAL DAILY
COMMUNITY
End: 2019-01-01 | Stop reason: SDUPTHER

## 2019-01-01 RX ORDER — MONTELUKAST SODIUM 10 MG/1
TABLET ORAL
Qty: 90 TABLET | Refills: 4 | Status: SHIPPED | OUTPATIENT
Start: 2019-01-01

## 2019-01-01 RX ORDER — LIDOCAINE HYDROCHLORIDE 10 MG/ML
10 INJECTION INFILTRATION; PERINEURAL ONCE
Status: COMPLETED | OUTPATIENT
Start: 2019-01-01 | End: 2019-01-01

## 2019-01-01 RX ORDER — LEVOTHYROXINE SODIUM 88 UG/1
TABLET ORAL
Qty: 90 TABLET | Refills: 4 | Status: SHIPPED | OUTPATIENT
Start: 2019-01-01

## 2019-01-01 RX ORDER — DEXAMETHASONE SODIUM PHOSPHATE 4 MG/ML
INJECTION, SOLUTION INTRA-ARTICULAR; INTRALESIONAL; INTRAMUSCULAR; INTRAVENOUS; SOFT TISSUE
Status: DISCONTINUED | OUTPATIENT
Start: 2019-01-01 | End: 2019-01-01

## 2019-01-01 RX ORDER — ONDANSETRON 2 MG/ML
4 INJECTION INTRAMUSCULAR; INTRAVENOUS ONCE AS NEEDED
Status: DISCONTINUED | OUTPATIENT
Start: 2019-01-01 | End: 2019-01-01 | Stop reason: HOSPADM

## 2019-01-01 RX ORDER — WARFARIN SODIUM 5 MG/1
5 TABLET ORAL
Qty: 15 TABLET | Refills: 0 | Status: SHIPPED | OUTPATIENT
Start: 2019-01-01 | End: 2019-01-01 | Stop reason: SDUPTHER

## 2019-01-01 RX ORDER — DIPHENHYDRAMINE HYDROCHLORIDE 50 MG/ML
25 INJECTION INTRAMUSCULAR; INTRAVENOUS EVERY 6 HOURS PRN
Status: DISCONTINUED | OUTPATIENT
Start: 2019-01-01 | End: 2019-01-01 | Stop reason: HOSPADM

## 2019-01-01 RX ORDER — ATORVASTATIN CALCIUM 40 MG/1
40 TABLET, FILM COATED ORAL DAILY
COMMUNITY
End: 2019-01-01 | Stop reason: SDUPTHER

## 2019-01-01 RX ORDER — SUCCINYLCHOLINE CHLORIDE 20 MG/ML
INJECTION INTRAMUSCULAR; INTRAVENOUS
Status: DISCONTINUED | OUTPATIENT
Start: 2019-01-01 | End: 2019-01-01

## 2019-01-01 RX ORDER — ATORVASTATIN CALCIUM 20 MG/1
40 TABLET, FILM COATED ORAL DAILY
Status: DISCONTINUED | OUTPATIENT
Start: 2019-01-01 | End: 2019-01-01 | Stop reason: HOSPADM

## 2019-01-01 RX ORDER — IBUPROFEN 400 MG/1
400 TABLET ORAL EVERY 6 HOURS PRN
Qty: 20 TABLET | Refills: 0 | Status: SHIPPED | OUTPATIENT
Start: 2019-01-01 | End: 2019-01-01

## 2019-01-01 RX ORDER — HEPARIN SODIUM 10000 [USP'U]/100ML
INJECTION, SOLUTION INTRAVENOUS CONTINUOUS PRN
Status: DISCONTINUED | OUTPATIENT
Start: 2019-01-01 | End: 2019-01-01

## 2019-01-01 RX ORDER — PANTOPRAZOLE SODIUM 40 MG/1
TABLET, DELAYED RELEASE ORAL
Qty: 90 TABLET | Refills: 3 | Status: SHIPPED | OUTPATIENT
Start: 2019-01-01

## 2019-01-01 RX ADMIN — FENTANYL CITRATE 50 MCG: 50 INJECTION, SOLUTION INTRAMUSCULAR; INTRAVENOUS at 09:09

## 2019-01-01 RX ADMIN — SODIUM CHLORIDE: 0.9 INJECTION, SOLUTION INTRAVENOUS at 12:05

## 2019-01-01 RX ADMIN — ONDANSETRON 4 MG: 2 INJECTION INTRAMUSCULAR; INTRAVENOUS at 03:05

## 2019-01-01 RX ADMIN — FLECAINIDE ACETATE 100 MG: 50 TABLET ORAL at 10:05

## 2019-01-01 RX ADMIN — LIDOCAINE HYDROCHLORIDE 10 ML: 10 INJECTION, SOLUTION INFILTRATION; PERINEURAL at 11:12

## 2019-01-01 RX ADMIN — PROPOFOL 100 MCG/KG/MIN: 10 INJECTION, EMULSION INTRAVENOUS at 09:09

## 2019-01-01 RX ADMIN — MONTELUKAST SODIUM 10 MG: 10 TABLET, FILM COATED ORAL at 10:05

## 2019-01-01 RX ADMIN — LEVOTHYROXINE SODIUM 88 MCG: 88 TABLET ORAL at 06:05

## 2019-01-01 RX ADMIN — Medication 3 ML: at 10:05

## 2019-01-01 RX ADMIN — DILTIAZEM HYDROCHLORIDE 120 MG: 120 CAPSULE, COATED, EXTENDED RELEASE ORAL at 08:05

## 2019-01-01 RX ADMIN — DEXAMETHASONE SODIUM PHOSPHATE 8 MG: 4 INJECTION, SOLUTION INTRAMUSCULAR; INTRAVENOUS at 12:05

## 2019-01-01 RX ADMIN — ROCURONIUM BROMIDE 5 MG: 10 INJECTION, SOLUTION INTRAVENOUS at 12:05

## 2019-01-01 RX ADMIN — SODIUM CHLORIDE 0.5 MCG/KG/MIN: 9 INJECTION, SOLUTION INTRAVENOUS at 12:05

## 2019-01-01 RX ADMIN — PROTAMINE SULFATE 70 MG: 10 INJECTION, SOLUTION INTRAVENOUS at 03:05

## 2019-01-01 RX ADMIN — LIDOCAINE HYDROCHLORIDE 100 MG: 20 INJECTION, SOLUTION INTRAVENOUS at 12:05

## 2019-01-01 RX ADMIN — GLYCOPYRROLATE 0.2 MG: 0.2 INJECTION, SOLUTION INTRAMUSCULAR; INTRAVENOUS at 09:09

## 2019-01-01 RX ADMIN — HEPARIN SODIUM AND DEXTROSE 4000 UNITS/HR: 10000; 5 INJECTION INTRAVENOUS at 01:05

## 2019-01-01 RX ADMIN — HEPARIN SODIUM 7000 UNITS: 1000 INJECTION INTRAVENOUS; SUBCUTANEOUS at 01:05

## 2019-01-01 RX ADMIN — CETIRIZINE HYDROCHLORIDE 10 MG: 10 TABLET, FILM COATED ORAL at 10:05

## 2019-01-01 RX ADMIN — SUCCINYLCHOLINE CHLORIDE 100 MG: 20 INJECTION, SOLUTION INTRAMUSCULAR; INTRAVENOUS at 12:05

## 2019-01-01 RX ADMIN — DILTIAZEM HYDROCHLORIDE 120 MG: 120 CAPSULE, COATED, EXTENDED RELEASE ORAL at 10:05

## 2019-01-01 RX ADMIN — LIDOCAINE HYDROCHLORIDE 100 MG: 20 INJECTION, SOLUTION INTRAVENOUS at 09:09

## 2019-01-01 RX ADMIN — PROPOFOL 50 MG: 10 INJECTION, EMULSION INTRAVENOUS at 09:09

## 2019-01-01 RX ADMIN — Medication 3 ML: at 06:05

## 2019-01-01 RX ADMIN — SODIUM CHLORIDE, SODIUM GLUCONATE, SODIUM ACETATE, POTASSIUM CHLORIDE, MAGNESIUM CHLORIDE, SODIUM PHOSPHATE, DIBASIC, AND POTASSIUM PHOSPHATE: .53; .5; .37; .037; .03; .012; .00082 INJECTION, SOLUTION INTRAVENOUS at 12:05

## 2019-01-01 RX ADMIN — PHENYLEPHRINE HYDROCHLORIDE 100 MCG: 10 INJECTION INTRAVENOUS at 12:05

## 2019-01-01 RX ADMIN — FENTANYL CITRATE 100 MCG: 50 INJECTION, SOLUTION INTRAMUSCULAR; INTRAVENOUS at 12:05

## 2019-01-01 RX ADMIN — PROTAMINE SULFATE 10 MG: 10 INJECTION, SOLUTION INTRAVENOUS at 03:05

## 2019-01-01 RX ADMIN — FLECAINIDE ACETATE 100 MG: 50 TABLET ORAL at 08:05

## 2019-01-01 RX ADMIN — ATORVASTATIN CALCIUM 40 MG: 20 TABLET, FILM COATED ORAL at 08:05

## 2019-01-01 RX ADMIN — BETAMETHASONE ACETATE AND BETAMETHASONE SODIUM PHOSPHATE 9 MG: 3; 3 INJECTION, SUSPENSION INTRA-ARTICULAR; INTRALESIONAL; INTRAMUSCULAR; SOFT TISSUE at 09:12

## 2019-01-01 RX ADMIN — METHYLPREDNISOLONE ACETATE 40 MG: 40 INJECTION, SUSPENSION INTRA-ARTICULAR; INTRALESIONAL; INTRAMUSCULAR; SOFT TISSUE at 11:12

## 2019-01-01 RX ADMIN — PROPOFOL 100 MG: 10 INJECTION, EMULSION INTRAVENOUS at 12:05

## 2019-01-01 RX ADMIN — FUROSEMIDE 20 MG: 10 INJECTION, SOLUTION INTRAMUSCULAR; INTRAVENOUS at 04:05

## 2019-01-02 ENCOUNTER — LAB VISIT (OUTPATIENT)
Dept: LAB | Facility: HOSPITAL | Age: 69
End: 2019-01-02
Attending: INTERNAL MEDICINE
Payer: MEDICARE

## 2019-01-02 DIAGNOSIS — Z79.01 LONG TERM (CURRENT) USE OF ANTICOAGULANTS: ICD-10-CM

## 2019-01-02 DIAGNOSIS — I48.0 PAROXYSMAL ATRIAL FIBRILLATION: ICD-10-CM

## 2019-01-02 LAB
INR PPP: 2
PROTHROMBIN TIME: 20 SEC

## 2019-01-02 PROCEDURE — 85610 PROTHROMBIN TIME: CPT

## 2019-01-02 PROCEDURE — 36415 COLL VENOUS BLD VENIPUNCTURE: CPT

## 2019-01-07 ENCOUNTER — OFFICE VISIT (OUTPATIENT)
Dept: FAMILY MEDICINE | Facility: CLINIC | Age: 69
End: 2019-01-07
Payer: MEDICARE

## 2019-01-07 ENCOUNTER — DOCUMENTATION ONLY (OUTPATIENT)
Dept: FAMILY MEDICINE | Facility: CLINIC | Age: 69
End: 2019-01-07

## 2019-01-07 VITALS
WEIGHT: 138.69 LBS | DIASTOLIC BLOOD PRESSURE: 76 MMHG | HEART RATE: 82 BPM | SYSTOLIC BLOOD PRESSURE: 100 MMHG | TEMPERATURE: 98 F | HEIGHT: 66 IN | BODY MASS INDEX: 22.29 KG/M2

## 2019-01-07 DIAGNOSIS — B37.0 THRUSH: Primary | ICD-10-CM

## 2019-01-07 PROCEDURE — 99999 PR PBB SHADOW E&M-EST. PATIENT-LVL IV: CPT | Mod: PBBFAC,,, | Performed by: PHYSICIAN ASSISTANT

## 2019-01-07 PROCEDURE — 99214 OFFICE O/P EST MOD 30 MIN: CPT | Mod: PBBFAC,PO | Performed by: PHYSICIAN ASSISTANT

## 2019-01-07 PROCEDURE — 99213 OFFICE O/P EST LOW 20 MIN: CPT | Mod: S$PBB,,, | Performed by: PHYSICIAN ASSISTANT

## 2019-01-07 PROCEDURE — 99213 PR OFFICE/OUTPT VISIT, EST, LEVL III, 20-29 MIN: ICD-10-PCS | Mod: S$PBB,,, | Performed by: PHYSICIAN ASSISTANT

## 2019-01-07 PROCEDURE — 99999 PR PBB SHADOW E&M-EST. PATIENT-LVL IV: ICD-10-PCS | Mod: PBBFAC,,, | Performed by: PHYSICIAN ASSISTANT

## 2019-01-07 RX ORDER — NYSTATIN 100000 [USP'U]/ML
6 SUSPENSION ORAL 4 TIMES DAILY
Qty: 168 ML | Refills: 0 | Status: SHIPPED | OUTPATIENT
Start: 2019-01-07 | End: 2019-01-07

## 2019-01-07 RX ORDER — CLOTRIMAZOLE 10 MG/1
10 LOZENGE ORAL; TOPICAL
Qty: 50 TABLET | Refills: 0 | Status: SHIPPED | OUTPATIENT
Start: 2019-01-07 | End: 2019-01-17

## 2019-01-07 NOTE — PROGRESS NOTES
Subjective:       Patient ID: Steph Lira is a 68 y.o. female.    Chief Complaint: Thrush (from inhalor )    Patient with COPD presents for evaluation of mouth soreness and film on gums and tongue.  Symptoms began over 2 weeks ago.  She has tried OTC mouth wash without relief.  She is rinsing her mouth out after using her inhalers regularly.  Patients patient medical/surgical, social and family histories have been reviewed         Review of Systems   Constitutional: Negative for appetite change, chills, diaphoresis, fatigue and fever.   HENT: Positive for mouth sores and sore throat. Negative for congestion, dental problem, postnasal drip, rhinorrhea, trouble swallowing and voice change.        Objective:      Physical Exam   Constitutional: She appears well-developed and well-nourished. She is cooperative. No distress.   HENT:   Head: Normocephalic and atraumatic.   Few scattered petechia on roof of mouth   Right upper buccal space between last molar and buccal mucosa with white patch easily scrapped off to reveal beefy red patch   Tongue thin white coating       Lymphadenopathy:        Head (right side): No submental, no submandibular, no tonsillar, no preauricular and no posterior auricular adenopathy present.        Head (left side): No submental, no submandibular, no tonsillar, no preauricular and no posterior auricular adenopathy present.     She has no cervical adenopathy.   Neurological: She is alert.   Vitals reviewed.      Assessment:       1. Thrush        Plan:       Steph was seen today for thrush.    Diagnoses and all orders for this visit:    Thrush  -     nystatin (MYCOSTATIN) 100,000 unit/mL suspension; Take 6 mLs (600,000 Units total) by mouth 4 (four) times daily. for 7 days

## 2019-01-07 NOTE — PROGRESS NOTES
Pre-Visit Chart Review  For Appointment Scheduled on 01/07/2019    There are no preventive care reminders to display for this patient.

## 2019-01-08 ENCOUNTER — PATIENT MESSAGE (OUTPATIENT)
Dept: ELECTROPHYSIOLOGY | Facility: CLINIC | Age: 69
End: 2019-01-08

## 2019-01-10 ENCOUNTER — LAB VISIT (OUTPATIENT)
Dept: LAB | Facility: HOSPITAL | Age: 69
End: 2019-01-10
Attending: INTERNAL MEDICINE
Payer: MEDICARE

## 2019-01-10 DIAGNOSIS — I48.0 PAROXYSMAL ATRIAL FIBRILLATION: ICD-10-CM

## 2019-01-10 DIAGNOSIS — Z79.01 LONG TERM (CURRENT) USE OF ANTICOAGULANTS: ICD-10-CM

## 2019-01-10 LAB
INR PPP: 2.1
PROTHROMBIN TIME: 21.5 SEC

## 2019-01-10 PROCEDURE — 85610 PROTHROMBIN TIME: CPT

## 2019-01-10 PROCEDURE — 36415 COLL VENOUS BLD VENIPUNCTURE: CPT

## 2019-01-17 ENCOUNTER — LAB VISIT (OUTPATIENT)
Dept: LAB | Facility: HOSPITAL | Age: 69
End: 2019-01-17
Attending: INTERNAL MEDICINE
Payer: MEDICARE

## 2019-01-17 DIAGNOSIS — I48.0 PAROXYSMAL ATRIAL FIBRILLATION: ICD-10-CM

## 2019-01-17 DIAGNOSIS — Z79.01 LONG TERM (CURRENT) USE OF ANTICOAGULANTS: ICD-10-CM

## 2019-01-17 LAB
INR PPP: 1.4
PROTHROMBIN TIME: 14.3 SEC

## 2019-01-17 PROCEDURE — 36415 COLL VENOUS BLD VENIPUNCTURE: CPT

## 2019-01-17 PROCEDURE — 85610 PROTHROMBIN TIME: CPT

## 2019-01-18 ENCOUNTER — PATIENT MESSAGE (OUTPATIENT)
Dept: ADMINISTRATIVE | Facility: OTHER | Age: 69
End: 2019-01-18

## 2019-01-21 RX ORDER — FLECAINIDE ACETATE 50 MG/1
TABLET ORAL
Qty: 360 TABLET | Refills: 3 | Status: SHIPPED | OUTPATIENT
Start: 2019-01-21 | End: 2019-01-01

## 2019-01-24 ENCOUNTER — LAB VISIT (OUTPATIENT)
Dept: LAB | Facility: HOSPITAL | Age: 69
End: 2019-01-24
Attending: INTERNAL MEDICINE
Payer: MEDICARE

## 2019-01-24 DIAGNOSIS — Z79.01 LONG TERM (CURRENT) USE OF ANTICOAGULANTS: ICD-10-CM

## 2019-01-24 DIAGNOSIS — I48.0 PAROXYSMAL ATRIAL FIBRILLATION: ICD-10-CM

## 2019-01-24 PROBLEM — Z51.81 ANTICOAGULATION MANAGEMENT ENCOUNTER: Status: ACTIVE | Noted: 2019-01-24

## 2019-01-24 LAB
INR PPP: 1.6
PROTHROMBIN TIME: 16.7 SEC

## 2019-01-24 PROCEDURE — 36415 COLL VENOUS BLD VENIPUNCTURE: CPT

## 2019-01-24 PROCEDURE — 85610 PROTHROMBIN TIME: CPT

## 2019-01-30 ENCOUNTER — PATIENT MESSAGE (OUTPATIENT)
Dept: ENDOSCOPY | Facility: HOSPITAL | Age: 69
End: 2019-01-30

## 2019-01-31 ENCOUNTER — LAB VISIT (OUTPATIENT)
Dept: LAB | Facility: HOSPITAL | Age: 69
End: 2019-01-31
Attending: INTERNAL MEDICINE
Payer: MEDICARE

## 2019-01-31 DIAGNOSIS — Z79.01 LONG TERM (CURRENT) USE OF ANTICOAGULANTS: ICD-10-CM

## 2019-01-31 DIAGNOSIS — I48.0 PAROXYSMAL ATRIAL FIBRILLATION: ICD-10-CM

## 2019-01-31 LAB
INR PPP: 1.2
PROTHROMBIN TIME: 12 SEC

## 2019-01-31 PROCEDURE — 36415 COLL VENOUS BLD VENIPUNCTURE: CPT

## 2019-01-31 PROCEDURE — 85610 PROTHROMBIN TIME: CPT

## 2019-02-05 ENCOUNTER — LAB VISIT (OUTPATIENT)
Dept: LAB | Facility: HOSPITAL | Age: 69
End: 2019-02-05
Attending: INTERNAL MEDICINE
Payer: MEDICARE

## 2019-02-05 DIAGNOSIS — Z79.01 ANTICOAGULATED ON COUMADIN: ICD-10-CM

## 2019-02-05 LAB
INR PPP: 1.1
PROTHROMBIN TIME: 10.9 SEC

## 2019-02-05 PROCEDURE — 36415 COLL VENOUS BLD VENIPUNCTURE: CPT

## 2019-02-05 PROCEDURE — 85610 PROTHROMBIN TIME: CPT

## 2019-02-06 ENCOUNTER — OFFICE VISIT (OUTPATIENT)
Dept: FAMILY MEDICINE | Facility: CLINIC | Age: 69
End: 2019-02-06
Payer: MEDICARE

## 2019-02-06 VITALS
OXYGEN SATURATION: 93 % | WEIGHT: 139.75 LBS | HEIGHT: 66 IN | DIASTOLIC BLOOD PRESSURE: 78 MMHG | SYSTOLIC BLOOD PRESSURE: 120 MMHG | HEART RATE: 94 BPM | BODY MASS INDEX: 22.46 KG/M2

## 2019-02-06 DIAGNOSIS — S46.911A MUSCLE STRAIN OF RIGHT SHOULDER, INITIAL ENCOUNTER: Primary | ICD-10-CM

## 2019-02-06 DIAGNOSIS — I48.0 PAROXYSMAL ATRIAL FIBRILLATION: ICD-10-CM

## 2019-02-06 DIAGNOSIS — I10 ESSENTIAL HYPERTENSION: ICD-10-CM

## 2019-02-06 DIAGNOSIS — Z79.01 ANTICOAGULANT LONG-TERM USE: ICD-10-CM

## 2019-02-06 PROCEDURE — 99212 PR OFFICE/OUTPT VISIT, EST, LEVL II, 10-19 MIN: ICD-10-PCS | Mod: S$PBB,,, | Performed by: NURSE PRACTITIONER

## 2019-02-06 PROCEDURE — 99999 PR PBB SHADOW E&M-EST. PATIENT-LVL III: CPT | Mod: PBBFAC,,, | Performed by: NURSE PRACTITIONER

## 2019-02-06 PROCEDURE — 99212 OFFICE O/P EST SF 10 MIN: CPT | Mod: S$PBB,,, | Performed by: NURSE PRACTITIONER

## 2019-02-06 PROCEDURE — 99213 OFFICE O/P EST LOW 20 MIN: CPT | Mod: PBBFAC,PO | Performed by: NURSE PRACTITIONER

## 2019-02-06 PROCEDURE — 99999 PR PBB SHADOW E&M-EST. PATIENT-LVL III: ICD-10-PCS | Mod: PBBFAC,,, | Performed by: NURSE PRACTITIONER

## 2019-02-06 NOTE — PROGRESS NOTES
"Subjective:       Patient ID: Steph Lira is a 68 y.o. female.    Chief Complaint: Shoulder Pain (right)    Ms. Lira presents today with acute onset right shoulder pain that began about a week ago. She felt something "pop". Pain when she reaches back. Pain 2/10 today. Using tylenol and heat for pain. Currently on coumadin for anti-coagulation. Goes in and out of a-fib and a- flutter. In the process of working towards reaching a therapeutic INR so she can undergo an ablation.      Patient Active Problem List   Diagnosis    GERD (gastroesophageal reflux disease)    Chronic rhinitis    Hypothyroid    Aneurysm of heart (wall)    Benign neoplasm of skin of upper limb, including shoulder    Intermediate coronary syndrome    Phlebitis and thrombophlebitis of superficial veins of upper extremities    Simple chronic conjunctivitis    Supraventricular premature beats    Vascular disorder of skin    Chronic external jugular vein thrombosis    Anticoagulant long-term use    Magdiel's syndrome - Left Eye    Ptosis    Anxiety    CAD (coronary artery disease)    S/P CABG x 2, 2005    HTN (hypertension)    Hypercholesteremia    PAF (paroxysmal atrial fibrillation), onset 2002    Intercostal neuralgia    History of lung cancer    Blurred vision, bilateral    Panlobular emphysema    Carotid artery disease    Atrial fibrillation with RVR    COPD (chronic obstructive pulmonary disease)    Uncontrolled atrial flutter with rvr    Allergic rhinitis    Hypoalbuminemia    History of smoking 10-25 pack years, 15 pack-years, quit 1994    S/P CABG (coronary artery bypass graft)    Supraventricular bigeminy    Dysphagia    History of colon polyps    Coronary artery disease involving autologous artery coronary bypass graft    Wrist arthritis    Gastritis    Esophageal lesion    Fatigue    Grade II hemorrhoids    Cardiomyopathy    Throat irritation    Paroxysmal atrial fibrillation    BMI " 21.0-21.9, adult    Anticoagulation management encounter     Review of Systems   Constitutional: Negative for chills, fatigue and fever.   Respiratory: Negative for chest tightness, shortness of breath and wheezing.    Cardiovascular: Positive for palpitations. Negative for chest pain.   Musculoskeletal:        Right shoulder pain    Neurological: Negative for dizziness, weakness and light-headedness.       Objective:      Physical Exam   Constitutional: She is oriented to person, place, and time. She appears well-developed and well-nourished.   Cardiovascular: A regularly irregular rhythm present. Tachycardia present.   Pulmonary/Chest: Effort normal and breath sounds normal. No respiratory distress. She has no wheezes.   Musculoskeletal:        Right shoulder: She exhibits decreased range of motion, tenderness and pain. She exhibits no swelling, no deformity and no spasm.   Neurological: She is alert and oriented to person, place, and time.   Skin: Skin is warm and dry.   Psychiatric: She has a normal mood and affect.   Vitals reviewed.      Assessment:       1. Muscle strain of right shoulder, initial encounter    2. Essential hypertension    3. Anticoagulant long-term use    4. Paroxysmal atrial fibrillation        Plan:       Steph was seen today for shoulder pain.    Diagnoses and all orders for this visit:    Muscle strain of right shoulder, initial encounter  Conservative management  May use acetaminophen, ice and heat for pain management; avoid NSAIDs since on anti-coagulants   Limit use of the shoulder  Provided handout with AVS   F/U 2 weeks    Essential hypertension  Controlled on current regimen    Anticoagulant long-term use  Continue under care of cardiology    Paroxysmal atrial fibrillation  Continue under care of cardiology  If become symptomatic, see emergency treatment     F/U 2 weeks

## 2019-02-06 NOTE — PATIENT INSTRUCTIONS
Muscle Strain in the Extremities  A muscle strain is a stretching and tearing of muscle fibers. This causes pain, especially when you move that muscle. There may also be some swelling and bruising.  Home care  · Keep the hurt area raised to reduce pain and swelling. This is especially important during the first 48 hours.  · Apply an ice pack over the injured area for 15 to 20 minutes every 3 to 6 hours. You should do this for the first 24 to 48 hours. You can make an ice pack by filling a plastic bag that seals at the top with ice cubes and then wrapping it with a thin towel. Be careful not to injure your skin with the ice treatments. Ice should never be applied directly to skin. Continue the use of ice packs for relief of pain and swelling as needed. After 48 hours, apply heat (warm shower or warm bath) for 15 to 20 minutes several times a day, or alternate ice and heat.  · You may use over-the-counter pain medicine to control pain, unless another medicine was prescribed. If you have chronic liver or kidney disease or ever had a stomach ulcer or GI bleeding, talk with your healthcare provider before using these medicines.  · For leg strains: If crutches have been recommended, dont put full weight on the hurt leg until you can do so without pain. You can return to sports when you are able to hop and run on the injured leg without pain.  Follow-up care  Follow up with your healthcare provider, or as advised.  When to seek medical advice  Call your healthcare provider right away if any of these occur:  · The toes of the injured leg become swollen, cold, blue, numb, or tingly  · Pain or swelling increases  Date Last Reviewed: 11/19/2015  © 5332-5134 [employer]. [address] [place] [address], [place] [address]. All rights reserved. This information is not intended as a substitute for professional medical care. Always follow your healthcare professional's instructions.

## 2019-02-08 ENCOUNTER — LAB VISIT (OUTPATIENT)
Dept: LAB | Facility: HOSPITAL | Age: 69
End: 2019-02-08
Attending: INTERNAL MEDICINE
Payer: MEDICARE

## 2019-02-08 DIAGNOSIS — Z79.01 LONG TERM (CURRENT) USE OF ANTICOAGULANTS: ICD-10-CM

## 2019-02-08 DIAGNOSIS — I48.0 PAROXYSMAL ATRIAL FIBRILLATION: ICD-10-CM

## 2019-02-08 LAB
INR PPP: 3.7
PROTHROMBIN TIME: 37.5 SEC

## 2019-02-08 PROCEDURE — 85610 PROTHROMBIN TIME: CPT

## 2019-02-08 PROCEDURE — 36415 COLL VENOUS BLD VENIPUNCTURE: CPT

## 2019-02-14 ENCOUNTER — LAB VISIT (OUTPATIENT)
Dept: LAB | Facility: HOSPITAL | Age: 69
End: 2019-02-14
Attending: INTERNAL MEDICINE
Payer: MEDICARE

## 2019-02-14 ENCOUNTER — ANTI-COAG VISIT (OUTPATIENT)
Dept: CARDIOLOGY | Facility: CLINIC | Age: 69
End: 2019-02-14

## 2019-02-14 DIAGNOSIS — Z79.01 LONG TERM (CURRENT) USE OF ANTICOAGULANTS: ICD-10-CM

## 2019-02-14 DIAGNOSIS — Z51.81 ANTICOAGULATION MANAGEMENT ENCOUNTER: ICD-10-CM

## 2019-02-14 DIAGNOSIS — I48.0 PAROXYSMAL ATRIAL FIBRILLATION: ICD-10-CM

## 2019-02-14 DIAGNOSIS — Z79.01 ANTICOAGULATION MANAGEMENT ENCOUNTER: ICD-10-CM

## 2019-02-14 LAB
INR PPP: 5.3
INR PPP: 5.3
PROTHROMBIN TIME: 53.7 SEC

## 2019-02-14 PROCEDURE — 36415 COLL VENOUS BLD VENIPUNCTURE: CPT

## 2019-02-14 PROCEDURE — 85610 PROTHROMBIN TIME: CPT

## 2019-02-18 ENCOUNTER — OFFICE VISIT (OUTPATIENT)
Dept: DERMATOLOGY | Facility: CLINIC | Age: 69
End: 2019-02-18
Payer: MEDICARE

## 2019-02-18 VITALS — WEIGHT: 139 LBS | BODY MASS INDEX: 22.34 KG/M2 | HEIGHT: 66 IN

## 2019-02-18 DIAGNOSIS — B07.9 VIRAL WARTS, UNSPECIFIED TYPE: Primary | ICD-10-CM

## 2019-02-18 DIAGNOSIS — I10 HYPERTENSION, UNSPECIFIED TYPE: Primary | ICD-10-CM

## 2019-02-18 DIAGNOSIS — L70.0 COMEDONE: ICD-10-CM

## 2019-02-18 DIAGNOSIS — K31.83 ACHLORHYDRIA: ICD-10-CM

## 2019-02-18 DIAGNOSIS — L65.9 ALOPECIA: ICD-10-CM

## 2019-02-18 DIAGNOSIS — L21.9 SEBORRHEIC DERMATITIS: ICD-10-CM

## 2019-02-18 DIAGNOSIS — E55.9 VITAMIN D DEFICIENCY: ICD-10-CM

## 2019-02-18 PROCEDURE — 17110 DESTRUCTION B9 LES UP TO 14: CPT | Mod: PBBFAC,PO | Performed by: DERMATOLOGY

## 2019-02-18 PROCEDURE — 99999 PR PBB SHADOW E&M-EST. PATIENT-LVL III: ICD-10-PCS | Mod: PBBFAC,,, | Performed by: DERMATOLOGY

## 2019-02-18 PROCEDURE — 99214 OFFICE O/P EST MOD 30 MIN: CPT | Mod: 25,S$PBB,, | Performed by: DERMATOLOGY

## 2019-02-18 PROCEDURE — 99999 PR PBB SHADOW E&M-EST. PATIENT-LVL III: CPT | Mod: PBBFAC,,, | Performed by: DERMATOLOGY

## 2019-02-18 PROCEDURE — 17110 DESTRUCTION B9 LES UP TO 14: CPT | Mod: S$PBB,,, | Performed by: DERMATOLOGY

## 2019-02-18 PROCEDURE — 17110 PR DESTRUCTION BENIGN LESIONS UP TO 14: ICD-10-PCS | Mod: S$PBB,,, | Performed by: DERMATOLOGY

## 2019-02-18 PROCEDURE — 99213 OFFICE O/P EST LOW 20 MIN: CPT | Mod: PBBFAC,PO,25 | Performed by: DERMATOLOGY

## 2019-02-18 PROCEDURE — 99214 PR OFFICE/OUTPT VISIT, EST, LEVL IV, 30-39 MIN: ICD-10-PCS | Mod: 25,S$PBB,, | Performed by: DERMATOLOGY

## 2019-02-18 NOTE — PATIENT INSTRUCTIONS

## 2019-02-18 NOTE — PROGRESS NOTES
Subjective:       Patient ID:  Steph Lira is a 68 y.o. female who presents for   Chief Complaint   Patient presents with    Warts     L index finger    Spot     nose     Pt also co hairloss.  Taking viviscal?  Washes scalp 2 x week with dial soap.  Notices shedding which may have improved a bit with viviscal.        New patient, last o/v with Dr Zavaleta 6/6/2018.  No hx of skin cancer.  Hx of precancer that was removed off the nose.    Here today for a wart on the L index finger, has been there for over 1 year, has been sprayed by Dr Zavaleta twice, has not gone away.   Spot on the nose, noticed after 6/6/2018 when saw Dr Zavaleta, scaly, uses retina for acne and cerave.    Review of Systems   Constitutional: Negative for fever, chills and fatigue.   HENT: Negative for congestion, sore throat and mouth sores.    Eyes: Negative for itching and eye watering.   Gastrointestinal: Negative for nausea, vomiting and diarrhea.   Musculoskeletal: Negative for joint swelling and arthralgias.   Skin: Positive for daily sunscreen use (cerave with spf), activity-related sunscreen use and wears hat. Negative for itching and rash.   Hematologic/Lymphatic: Does not bruise/bleed easily.        Objective:    Physical Exam   Constitutional: She appears well-developed and well-nourished.   Eyes: No conjunctival no injection.   Neurological: She is alert and oriented to person, place, and time.   Psychiatric: She has a normal mood and affect.   Skin:   Areas Examined (abnormalities noted in diagram):   Scalp / Hair Palpated and Inspected  Head / Face Inspection Performed  Neck Inspection Performed  Nails and Digits Inspection Performed                  Diagram Legend     Erythematous scaling macule/papule c/w actinic keratosis       Vascular papule c/w angioma      Pigmented verrucoid papule/plaque c/w seborrheic keratosis      Yellow umbilicated papule c/w sebaceous hyperplasia      Irregularly shaped tan macule c/w lentigo     1-2 mm  smooth white papules consistent with Milia      Movable subcutaneous cyst with punctum c/w epidermal inclusion cyst      Subcutaneous movable cyst c/w pilar cyst      Firm pink to brown papule c/w dermatofibroma      Pedunculated fleshy papule(s) c/w skin tag(s)      Evenly pigmented macule c/w junctional nevus     Mildly variegated pigmented, slightly irregular-bordered macule c/w mildly atypical nevus      Flesh colored to evenly pigmented papule c/w intradermal nevus       Pink pearly papule/plaque c/w basal cell carcinoma      Erythematous hyperkeratotic cursted plaque c/w SCC      Surgical scar with no sign of skin cancer recurrence      Open and closed comedones      Inflammatory papules and pustules      Verrucoid papule consistent consistent with wart     Erythematous eczematous patches and plaques     Dystrophic onycholytic nail with subungual debris c/w onychomycosis     Umbilicated papule    Erythematous-base heme-crusted tan verrucoid plaque consistent with inflamed seborrheic keratosis     Erythematous Silvery Scaling Plaque c/w Psoriasis     See annotation      Assessment / Plan:        Viral warts, unspecified type  Warts are caused by the human papillomavirus (HPV). There are over 150 types of HPV. This virus can spread between people. But you can be exposed to the virus and not get warts. Warts tend to form where skin is damaged or broken. But they can also appear elsewhere. Left untreated, warts can grow in number. They can also spread to other parts of the body.    Different treatment options were reviewed.  For small warts or recurrences, over the counter acid treatments can be used until irritation occurs or mild scabbing.    20-40% salicylic acid can be used with occlusion for significant warts daily with careful paring and debulking with breaks as needed for discomfort or sensitivity.  This can be done on a protracted basis for clearance of warts over time.    Here for electrodesiccation and  curettage of Superficial wart on the left finger. bx done on never:      Electrodessication and Curettage Procedure note:    Verbal consent obtained. Lesional tissue marked and prepped with alcohol. Lesion anesthetized with 1% lidocaine with epinephrine. Curettage and Desiccation x 3 cycles to base. Aluminum chloride for hemostasis. Lesion size after primary curettage: 0.3  cm    Area bandaged and wound care explained.        Jonnie  Discussed with patient the etiology and pathogenesis of the disease or skin lesion(s) and possible treatments and aggravators.    Pt to start using her retin a on nose.  Can do home extractions.    Alopecia  -     Calcitriol; Future; Expected date: 02/18/2019  -     ZINC; Future; Expected date: 02/18/2019  -     VITAMIN B12; Future; Expected date: 02/18/2019  -     T4, FREE; Future; Expected date: 02/18/2019  -     T3, FREE; Future; Expected date: 02/18/2019  -     FERRITIN; Future; Expected date: 02/18/2019  Reviewed with patient to eat protein daily, get labs done, wash with recommended shampoo or soap for the scalp, avoid hair coloring, and potentially consider topical 5% minoxidil.  Discussed with patient the etiology and pathogenesis of the disease or skin lesion(s) and possible treatments and aggravators.    Discussed with patient the need for laboratory work up for further evaluation.  Discussed that such investigation may not be helpful.    Seborrheic dermatitis  Discussed with patient the etiology and pathogenesis of the disease or skin lesion(s) and possible treatments and aggravators.    Instructed patient to use plain Head and Shoulders shampoo regularly for soaks lasting at least 3 minutes or more to the scalp.  Regular shampoo and conditioner afterward is fine.  For suspected allergy cases, rinse away from the face and body discussed.    Vitamin D deficiency?  -     Calcitriol; Future; Expected date: 02/18/2019    Achlorhydria?  -     VITAMIN B12; Future; Expected date:  02/18/2019             Follow-up if symptoms worsen or fail to improve, for Post labs.

## 2019-02-19 ENCOUNTER — LAB VISIT (OUTPATIENT)
Dept: LAB | Facility: HOSPITAL | Age: 69
End: 2019-02-19
Attending: INTERNAL MEDICINE
Payer: MEDICARE

## 2019-02-19 DIAGNOSIS — Z79.01 LONG TERM (CURRENT) USE OF ANTICOAGULANTS: ICD-10-CM

## 2019-02-19 DIAGNOSIS — I48.0 PAROXYSMAL ATRIAL FIBRILLATION: ICD-10-CM

## 2019-02-19 LAB
INR PPP: 2.9
PROTHROMBIN TIME: 30 SEC

## 2019-02-19 PROCEDURE — 85610 PROTHROMBIN TIME: CPT

## 2019-02-20 ENCOUNTER — OFFICE VISIT (OUTPATIENT)
Dept: FAMILY MEDICINE | Facility: CLINIC | Age: 69
End: 2019-02-20
Payer: MEDICARE

## 2019-02-20 ENCOUNTER — PATIENT MESSAGE (OUTPATIENT)
Dept: ADMINISTRATIVE | Facility: OTHER | Age: 69
End: 2019-02-20

## 2019-02-20 VITALS
HEIGHT: 66 IN | DIASTOLIC BLOOD PRESSURE: 80 MMHG | HEART RATE: 92 BPM | SYSTOLIC BLOOD PRESSURE: 127 MMHG | BODY MASS INDEX: 22.6 KG/M2 | WEIGHT: 140.63 LBS | TEMPERATURE: 98 F | OXYGEN SATURATION: 97 %

## 2019-02-20 DIAGNOSIS — I48.0 PAROXYSMAL ATRIAL FIBRILLATION: ICD-10-CM

## 2019-02-20 DIAGNOSIS — I10 ESSENTIAL HYPERTENSION: ICD-10-CM

## 2019-02-20 DIAGNOSIS — S46.911A MUSCLE STRAIN OF RIGHT SHOULDER, INITIAL ENCOUNTER: Primary | ICD-10-CM

## 2019-02-20 PROCEDURE — 99214 OFFICE O/P EST MOD 30 MIN: CPT | Mod: PBBFAC,PO | Performed by: NURSE PRACTITIONER

## 2019-02-20 PROCEDURE — 99999 PR PBB SHADOW E&M-EST. PATIENT-LVL IV: ICD-10-PCS | Mod: PBBFAC,,, | Performed by: NURSE PRACTITIONER

## 2019-02-20 PROCEDURE — 99999 PR PBB SHADOW E&M-EST. PATIENT-LVL IV: CPT | Mod: PBBFAC,,, | Performed by: NURSE PRACTITIONER

## 2019-02-20 PROCEDURE — 99212 OFFICE O/P EST SF 10 MIN: CPT | Mod: S$PBB,,, | Performed by: NURSE PRACTITIONER

## 2019-02-20 PROCEDURE — 99212 PR OFFICE/OUTPT VISIT, EST, LEVL II, 10-19 MIN: ICD-10-PCS | Mod: S$PBB,,, | Performed by: NURSE PRACTITIONER

## 2019-02-20 NOTE — PROGRESS NOTES
Subjective:       Patient ID: Steph Lira is a 68 y.o. female.    Chief Complaint: Follow-up (Right Shoulder 2 weeks)    Ms. Lira presents for a 2 week follow up for right shoulder pain. Still having pain. 4-5/10 today. Np pain if she does not use her arm, but she is not able to avoid this.  has Parkinson's, so she does the majority of house chores. Pain was exacerbated by reaching back to unbuckle her seatbelt. She also noticed excruciating pain when she was talking on the phone and bent her neck to the right to rest the phone on her shoulder. Tylenol does not provide much relief. Had INR checked yesterday, was 2.9. Is unsure of the next appointment. She plans to call and check with cardiology.       Patient Active Problem List   Diagnosis    GERD (gastroesophageal reflux disease)    Chronic rhinitis    Hypothyroid    Aneurysm of heart (wall)    Benign neoplasm of skin of upper limb, including shoulder    Intermediate coronary syndrome    Phlebitis and thrombophlebitis of superficial veins of upper extremities    Simple chronic conjunctivitis    Supraventricular premature beats    Vascular disorder of skin    Chronic external jugular vein thrombosis    Anticoagulant long-term use    Magdiel's syndrome - Left Eye    Ptosis    Anxiety    CAD (coronary artery disease)    S/P CABG x 2, 2005    HTN (hypertension)    Hypercholesteremia    PAF (paroxysmal atrial fibrillation), onset 2002    Intercostal neuralgia    History of lung cancer    Blurred vision, bilateral    Panlobular emphysema    Carotid artery disease    Atrial fibrillation with RVR    COPD (chronic obstructive pulmonary disease)    Uncontrolled atrial flutter with rvr    Allergic rhinitis    Hypoalbuminemia    History of smoking 10-25 pack years, 15 pack-years, quit 1994    S/P CABG (coronary artery bypass graft)    Supraventricular bigeminy    Dysphagia    History of colon polyps    Coronary artery disease  involving autologous artery coronary bypass graft    Wrist arthritis    Gastritis    Esophageal lesion    Fatigue    Grade II hemorrhoids    Cardiomyopathy    Throat irritation    Paroxysmal atrial fibrillation    BMI 21.0-21.9, adult    Anticoagulation management encounter     Review of Systems   Constitutional: Negative for chills, fatigue and fever.   Respiratory: Negative for chest tightness, shortness of breath and wheezing.    Cardiovascular: Positive for palpitations. Negative for chest pain.   Musculoskeletal: Positive for arthralgias and neck pain.        Right shoulder pain    Neurological: Negative for dizziness, weakness and light-headedness.       Objective:      Physical Exam   Constitutional: She is oriented to person, place, and time. She appears well-developed and well-nourished.   Cardiovascular: A regularly irregular rhythm present. Tachycardia present.   Pulmonary/Chest: Effort normal and breath sounds normal. No respiratory distress. She has no wheezes.   Musculoskeletal:        Right shoulder: She exhibits decreased range of motion, tenderness and pain. She exhibits no swelling, no deformity and no spasm.        Cervical back: She exhibits pain.   Neurological: She is alert and oriented to person, place, and time.   Skin: Skin is warm and dry.   Psychiatric: She has a normal mood and affect.   Vitals reviewed.      Assessment:       1. Muscle strain of right shoulder, initial encounter    2. Essential hypertension    3. Paroxysmal atrial fibrillation        Plan:       Steph was seen today for follow-up.    Diagnoses and all orders for this visit:    Muscle strain of right shoulder, initial encounter  -     Ambulatory referral to Physical Medicine Rehab  May continue tylenol PRN, avoid NSAIDs since on anti-coagulation  Alternate ice and heat    Essential hypertension  -     Lipid panel; Future  Controlled on current regimen    Paroxysmal atrial fibrillation  Continue with cardiology  recommendations    F/U PRN

## 2019-02-22 ENCOUNTER — OFFICE VISIT (OUTPATIENT)
Dept: CARDIOLOGY | Facility: CLINIC | Age: 69
End: 2019-02-22
Payer: MEDICARE

## 2019-02-22 VITALS
HEART RATE: 105 BPM | WEIGHT: 139.31 LBS | HEIGHT: 66 IN | DIASTOLIC BLOOD PRESSURE: 70 MMHG | BODY MASS INDEX: 22.39 KG/M2 | SYSTOLIC BLOOD PRESSURE: 118 MMHG | OXYGEN SATURATION: 97 %

## 2019-02-22 DIAGNOSIS — R13.14 PHARYNGOESOPHAGEAL DYSPHAGIA: ICD-10-CM

## 2019-02-22 DIAGNOSIS — I48.0 PAF (PAROXYSMAL ATRIAL FIBRILLATION): ICD-10-CM

## 2019-02-22 DIAGNOSIS — Z95.1 S/P CABG X 2: Primary | ICD-10-CM

## 2019-02-22 DIAGNOSIS — E78.00 HYPERCHOLESTEREMIA: ICD-10-CM

## 2019-02-22 DIAGNOSIS — Z98.890 HISTORY OF BILIARY STENT INSERTION: ICD-10-CM

## 2019-02-22 DIAGNOSIS — Z79.01 ANTICOAGULANT LONG-TERM USE: ICD-10-CM

## 2019-02-22 DIAGNOSIS — I10 HYPERTENSION, UNSPECIFIED TYPE: ICD-10-CM

## 2019-02-22 PROCEDURE — 93005 ELECTROCARDIOGRAM TRACING: CPT | Mod: PBBFAC,PO | Performed by: INTERNAL MEDICINE

## 2019-02-22 PROCEDURE — 99999 PR PBB SHADOW E&M-EST. PATIENT-LVL III: ICD-10-PCS | Mod: PBBFAC,,, | Performed by: INTERNAL MEDICINE

## 2019-02-22 PROCEDURE — 93010 ELECTROCARDIOGRAM REPORT: CPT | Mod: S$PBB,,, | Performed by: INTERNAL MEDICINE

## 2019-02-22 PROCEDURE — 99215 OFFICE O/P EST HI 40 MIN: CPT | Mod: S$PBB,,, | Performed by: INTERNAL MEDICINE

## 2019-02-22 PROCEDURE — 99215 PR OFFICE/OUTPT VISIT, EST, LEVL V, 40-54 MIN: ICD-10-PCS | Mod: S$PBB,,, | Performed by: INTERNAL MEDICINE

## 2019-02-22 PROCEDURE — 99213 OFFICE O/P EST LOW 20 MIN: CPT | Mod: PBBFAC,PO | Performed by: INTERNAL MEDICINE

## 2019-02-22 PROCEDURE — 93010 EKG 12-LEAD: ICD-10-PCS | Mod: S$PBB,,, | Performed by: INTERNAL MEDICINE

## 2019-02-22 PROCEDURE — 99999 PR PBB SHADOW E&M-EST. PATIENT-LVL III: CPT | Mod: PBBFAC,,, | Performed by: INTERNAL MEDICINE

## 2019-02-22 NOTE — PROGRESS NOTES
"Subjective:    Patient ID:  Steph Lira is a 68 y.o. female who presents for evaluation of Follow-up (6 mos)  For PAF awaiting ablation on Flecainide, CAD post MI x2, 2V CABG 2005, left jugular vein and left innominate stent 6/2005  PCP: Dr. Mon  EP: Dr. Covarrubias, last seen 11/2018  Prior cardiologist: Dr. 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    Patient is a new patient to me.    HPI  Dr. Covarrubias noted - "In summary, Steph Lira is a 68 year old female with a history of symptomatic PAF. Her [id number] Score is 3 (age, female, CAD) which corresponds to a yearly risk of stroke without anticoagulation treatment estimated at 3.2%. At her request, I have switched her from Eliquis to Xarelto, as she attributes some recent joint pains to Eliquis. We again discussed in detail options including switching to a different antiarrhythmic, vs PVI. We specifically discussed the risks, benefits, indications, and alternatives to AF ablation. Risks discussed include bleeding, hematoma, vascular damage, cardiac tamponade, stroke, PV stenosis, AE fistula, phrenic nerve damage, and death.  After considering his options she has decided to proceed.      Hold Xarelto evening prior to procedure. Cardiac CT to define LA anatomy. Hold Flecainide 3 days prior to ablation. JUAN ANTONIO day prior--cancel if in NSR.    In 12/2018 attempted for ablation but not able to proceed with JUAN ANTONIO due to persistent dysphagia post 12 EGD. Now on warfarin for past 3 weeks.    Dr. Wei noted: "68 y.o. female with a past medical history of paroxysmal atrial fibrillation, coronary artery bypass grafting, heart failure with moderately reduced ejection fraction,    Patient is feeling better today.  She did not tolerate her beta-blocker.  The metoprolol was discontinued and then she was tried on Coreg.  She did not tolerate the Coreg either.  In the past we had to take her off the lisinopril as well since she did " "not tolerate that medication either.  She is having trouble swallowing food.  She is under a GI physician and has had multiple endoscopies for a tumor."    · Lexiscan 5/2018 - Perfusion scan is negative for ischemia or infarct, EF-57%    Told to be inactive until ablation. Occasional flutter with SOB, no CP nor dizziness. ECG in AF rate 98, right axis. LDL in 1/2018 was 79.2, repeat pending.    Review of Systems   Constitution: Positive for weight gain. Negative for diaphoresis, fever, weakness, malaise/fatigue and night sweats.   HENT: Positive for tinnitus. Negative for nosebleeds.    Eyes: Negative for visual disturbance.   Cardiovascular: Positive for dyspnea on exertion and irregular heartbeat. Negative for claudication, cyanosis, leg swelling, near-syncope, orthopnea, palpitations and paroxysmal nocturnal dyspnea. Chest pain: 8-10 lbs due to inability to do exercise with PAF.   Respiratory: Negative for cough, shortness of breath, sleep disturbances due to breathing, snoring and wheezing.         [place] score 2   Endocrine: Positive for cold intolerance. Negative for polydipsia and polyuria.   Hematologic/Lymphatic: Does not bruise/bleed easily.   Skin: Negative for color change, flushing, nail changes, poor wound healing and suspicious lesions.   Musculoskeletal: Positive for arthritis and joint pain. Negative for falls, gout, joint swelling, muscle cramps, muscle weakness and myalgias.   Gastrointestinal: Positive for constipation and heartburn. Negative for hematemesis, hematochezia, melena and nausea.   Neurological: Negative for disturbances in coordination, excessive daytime sleepiness, dizziness, focal weakness, headaches, light-headedness, loss of balance, numbness and vertigo.   Psychiatric/Behavioral: Negative for depression and substance abuse. The patient does not have insomnia and is not nervous/anxious.    Allergic/Immunologic: Positive for environmental allergies.        Objective:    Physical " "Exam   Constitutional: She is oriented to person, place, and time. She appears well-developed and well-nourished.   HENT:   Head: Normocephalic.   Eyes: Conjunctivae and EOM are normal. Pupils are equal, round, and reactive to light.   Neck: Normal range of motion. Neck supple. No JVD present. No thyromegaly present.   Cardiovascular: Normal rate, normal heart sounds and intact distal pulses. An irregularly irregular rhythm present. Exam reveals no gallop and no friction rub.   No murmur heard.  Pulses:       Carotid pulses are 2+ on the right side, and 2+ on the left side.       Radial pulses are 2+ on the right side, and 2+ on the left side.        Femoral pulses are 2+ on the right side, and 2+ on the left side.       Popliteal pulses are 2+ on the right side, and 2+ on the left side.        Dorsalis pedis pulses are 2+ on the right side, and 2+ on the left side.        Posterior tibial pulses are 2+ on the right side, and 2+ on the left side.   Varicose veins in the left upper chest and left arm.   Pulmonary/Chest: Effort normal and breath sounds normal. She has no rales. She exhibits no tenderness.   Abdominal: Soft. Bowel sounds are normal. There is no tenderness.   Waist 30.25"   Musculoskeletal: Normal range of motion. She exhibits no edema.   Lymphadenopathy:     She has no cervical adenopathy.   Neurological: She is alert and oriented to person, place, and time.   Skin: Skin is warm and dry. No rash noted.         Assessment:       1. S/P CABG x 2, 2005    2. Hypertension, unspecified type    3. Pharyngoesophageal dysphagia    4. Anticoagulant long-term use    5. Hypercholesteremia    6. PAF (paroxysmal atrial fibrillation), onset 2002    7. History of biliary stent insertion, 6/2005, left jugular vein and left innominate vein         Plan:       Steph was seen today for follow-up.    Diagnoses and all orders for this visit:    S/P CABG x 2, 2005    Hypertension, unspecified type  -     EKG " 12-lead    Pharyngoesophageal dysphagia    Anticoagulant long-term use    Hypercholesteremia    PAF (paroxysmal atrial fibrillation), onset 2002    History of biliary stent insertion, 6/2005, left jugular vein and left innominate vein    - All medical issues reviewed, continue current Rx.  - CV status stable, continue current Rx, all medications reviewed, patient acknowledge good understanding.  - Instruction for Mediterranean diet and heart healthy exercise given.  - Check home blood pressure, 2 days weekly, do 2 readings within 5 minutes in AM and PM, keep log for review.  - Highly recommend 30 minutes of exercise daily, can have Sunday off, with 2-3 sessions of muscle strengthening weekly. A  would be very helpful.  - Recommend at least annual cardiovascular evaluation in view of her significant risk factors.        Patient Active Problem List   Diagnosis    GERD (gastroesophageal reflux disease)    Chronic rhinitis    Hypothyroid    Aneurysm of heart (wall)    Benign neoplasm of skin of upper limb, including shoulder    Intermediate coronary syndrome    Phlebitis and thrombophlebitis of superficial veins of upper extremities    Simple chronic conjunctivitis    Supraventricular premature beats    Vascular disorder of skin    Chronic external jugular vein thrombosis    Anticoagulant long-term use    Magdiel's syndrome - Left Eye    Ptosis    Anxiety    CAD (coronary artery disease)    S/P CABG x 2, 2005    HTN (hypertension)    Hypercholesteremia    PAF (paroxysmal atrial fibrillation), onset 2002    Intercostal neuralgia    History of lung cancer    Blurred vision, bilateral    Panlobular emphysema    Carotid artery disease    COPD (chronic obstructive pulmonary disease)    Uncontrolled atrial flutter with rvr    Allergic rhinitis    Hypoalbuminemia    History of smoking 10-25 pack years, 15 pack-years, quit 1994    S/P CABG (coronary artery bypass graft)     Supraventricular bigeminy    Dysphagia    History of colon polyps    Coronary artery disease involving autologous artery coronary bypass graft    Wrist arthritis    Gastritis    Esophageal lesion    Fatigue    Grade II hemorrhoids    Cardiomyopathy    Throat irritation    Paroxysmal atrial fibrillation    BMI 21.0-21.9, adult    Anticoagulation management encounter    History of biliary stent insertion, 6/2005, left jugular vein and left innominate vein     Total face-to-face time with the patient was 35 minutes and greater than 50% was spent in counseling and coordination of care. The above assessment and plan have been discussed at length. Prior physician's note reviewed. Labs and procedure over the last 6 months reviewed. Problem List updated. Asked to bring in all active medications / pills bottles with next visit.

## 2019-02-23 ENCOUNTER — LAB VISIT (OUTPATIENT)
Dept: LAB | Facility: HOSPITAL | Age: 69
End: 2019-02-23
Attending: FAMILY MEDICINE
Payer: MEDICARE

## 2019-02-23 DIAGNOSIS — I10 ESSENTIAL HYPERTENSION: ICD-10-CM

## 2019-02-23 LAB
CHOLEST SERPL-MCNC: 154 MG/DL
CHOLEST/HDLC SERPL: 2.9 {RATIO}
HDLC SERPL-MCNC: 53 MG/DL
HDLC SERPL: 34.4 %
LDLC SERPL CALC-MCNC: 83 MG/DL
NONHDLC SERPL-MCNC: 101 MG/DL
TRIGL SERPL-MCNC: 90 MG/DL

## 2019-02-23 PROCEDURE — 36415 COLL VENOUS BLD VENIPUNCTURE: CPT | Mod: PO

## 2019-02-23 PROCEDURE — 80061 LIPID PANEL: CPT

## 2019-02-25 DIAGNOSIS — L73.8 SEBACEOUS HYPERPLASIA: ICD-10-CM

## 2019-02-25 RX ORDER — TRETINOIN 0.5 MG/G
CREAM TOPICAL
Qty: 45 G | Refills: 3 | Status: SHIPPED | OUTPATIENT
Start: 2019-02-25

## 2019-02-26 ENCOUNTER — PATIENT MESSAGE (OUTPATIENT)
Dept: ELECTROPHYSIOLOGY | Facility: CLINIC | Age: 69
End: 2019-02-26

## 2019-02-26 ENCOUNTER — LAB VISIT (OUTPATIENT)
Dept: LAB | Facility: HOSPITAL | Age: 69
End: 2019-02-26
Attending: INTERNAL MEDICINE
Payer: MEDICARE

## 2019-02-26 DIAGNOSIS — Z79.01 LONG TERM (CURRENT) USE OF ANTICOAGULANTS: ICD-10-CM

## 2019-02-26 DIAGNOSIS — I48.0 PAROXYSMAL ATRIAL FIBRILLATION: ICD-10-CM

## 2019-02-26 LAB
INR PPP: 5.2
PROTHROMBIN TIME: 52.3 SEC

## 2019-02-26 PROCEDURE — 36415 COLL VENOUS BLD VENIPUNCTURE: CPT

## 2019-02-26 PROCEDURE — 85610 PROTHROMBIN TIME: CPT

## 2019-03-01 RX ORDER — DILTIAZEM HYDROCHLORIDE 120 MG/1
120 CAPSULE, COATED, EXTENDED RELEASE ORAL 2 TIMES DAILY
Qty: 60 CAPSULE | Refills: 3 | Status: SHIPPED | OUTPATIENT
Start: 2019-03-01 | End: 2019-03-06 | Stop reason: SDUPTHER

## 2019-03-06 ENCOUNTER — LAB VISIT (OUTPATIENT)
Dept: LAB | Facility: HOSPITAL | Age: 69
End: 2019-03-06
Attending: INTERNAL MEDICINE
Payer: MEDICARE

## 2019-03-06 DIAGNOSIS — I48.0 PAROXYSMAL ATRIAL FIBRILLATION: ICD-10-CM

## 2019-03-06 DIAGNOSIS — Z79.01 LONG TERM (CURRENT) USE OF ANTICOAGULANTS: ICD-10-CM

## 2019-03-06 LAB
INR PPP: 1.8
PROTHROMBIN TIME: 18.6 SEC

## 2019-03-06 PROCEDURE — 36415 COLL VENOUS BLD VENIPUNCTURE: CPT

## 2019-03-06 PROCEDURE — 85610 PROTHROMBIN TIME: CPT

## 2019-03-06 RX ORDER — DILTIAZEM HYDROCHLORIDE 120 MG/1
120 CAPSULE, COATED, EXTENDED RELEASE ORAL 2 TIMES DAILY
Qty: 60 CAPSULE | Refills: 3 | Status: SHIPPED | OUTPATIENT
Start: 2019-03-06 | End: 2019-01-01 | Stop reason: SDUPTHER

## 2019-03-11 ENCOUNTER — HOSPITAL ENCOUNTER (EMERGENCY)
Facility: HOSPITAL | Age: 69
Discharge: HOME | End: 2019-03-11
Attending: EMERGENCY MEDICINE
Payer: MEDICARE

## 2019-03-11 VITALS
DIASTOLIC BLOOD PRESSURE: 78 MMHG | SYSTOLIC BLOOD PRESSURE: 131 MMHG | OXYGEN SATURATION: 96 % | HEIGHT: 66 IN | TEMPERATURE: 98 F | RESPIRATION RATE: 20 BRPM | HEART RATE: 103 BPM | BODY MASS INDEX: 22.34 KG/M2 | WEIGHT: 139 LBS

## 2019-03-11 DIAGNOSIS — S00.81XA ABRASION OF CHIN, INITIAL ENCOUNTER: ICD-10-CM

## 2019-03-11 DIAGNOSIS — S01.21XA NASAL LACERATION, INITIAL ENCOUNTER: ICD-10-CM
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
## 2019-03-11 PROCEDURE — 90715 TDAP VACCINE 7 YRS/> IM: CPT | Performed by: PHYSICIAN ASSISTANT

## 2019-03-11 PROCEDURE — 63600175 PHARM REV CODE 636 W HCPCS: Performed by: PHYSICIAN ASSISTANT

## 2019-03-11 PROCEDURE — 12051 INTMD RPR FACE/MM 2.5 CM/<: CPT

## 2019-03-11 PROCEDURE — 25000003 PHARM REV CODE 250: Performed by: PHYSICIAN ASSISTANT

## 2019-03-11 PROCEDURE — 90471 IMMUNIZATION ADMIN: CPT | Performed by: PHYSICIAN ASSISTANT

## 2019-03-11 PROCEDURE — 12011 RPR F/E/E/N/L/M 2.5 CM/<: CPT

## 2019-03-11 PROCEDURE — 99284 EMERGENCY DEPT VISIT MOD MDM: CPT | Mod: 25

## 2019-03-11 RX ORDER — HYDROCODONE BITARTRATE AND ACETAMINOPHEN 5; 325 MG/1; MG/1
1 TABLET ORAL 4 TIMES DAILY PRN
Qty: 12 TABLET | Refills: 0 | Status: SHIPPED | OUTPATIENT
Start: 2019-03-11 | End: 2019-01-01

## 2019-03-11 RX ORDER — AMOXICILLIN AND CLAVULANATE POTASSIUM 875; 125 MG/1; MG/1
1 TABLET, FILM COATED ORAL 2 TIMES DAILY
Qty: 14 TABLET | Refills: 0 | Status: SHIPPED | OUTPATIENT
Start: 2019-03-11 | End: 2019-03-19

## 2019-03-11 RX ORDER — MUPIROCIN 20 MG/G
OINTMENT TOPICAL 3 TIMES DAILY
Qty: 30 G | Refills: 0 | Status: SHIPPED | OUTPATIENT
Start: 2019-03-11 | End: 2019-03-21

## 2019-03-11 RX ORDER — LIDOCAINE HYDROCHLORIDE 10 MG/ML
10 INJECTION INFILTRATION; PERINEURAL
Status: COMPLETED | OUTPATIENT
Start: 2019-03-11 | End: 2019-03-11

## 2019-03-11 RX ORDER — MUPIROCIN 20 MG/G
1 OINTMENT TOPICAL
Status: COMPLETED | OUTPATIENT
Start: 2019-03-11 | End: 2019-03-11

## 2019-03-11 RX ORDER — AMOXICILLIN AND CLAVULANATE POTASSIUM 875; 125 MG/1; MG/1
1 TABLET, FILM COATED ORAL
Status: COMPLETED | OUTPATIENT
Start: 2019-03-11 | End: 2019-03-11

## 2019-03-11 RX ORDER — ACETAMINOPHEN 500 MG
1000 TABLET ORAL
Status: COMPLETED | OUTPATIENT
Start: 2019-03-11 | End: 2019-03-11

## 2019-03-11 RX ADMIN — ACETAMINOPHEN 1000 MG: 500 TABLET ORAL at 02:03

## 2019-03-11 RX ADMIN — MUPIROCIN 22 G: 20 OINTMENT TOPICAL at 04:03

## 2019-03-11 RX ADMIN — AMOXICILLIN AND CLAVULANATE POTASSIUM 1 TABLET: 875; 125 TABLET, FILM COATED ORAL at 04:03

## 2019-03-11 RX ADMIN — LIDOCAINE HYDROCHLORIDE 10 ML: 10 INJECTION, SOLUTION INFILTRATION; PERINEURAL at 02:03

## 2019-03-11 RX ADMIN — CLOSTRIDIUM TETANI TOXOID ANTIGEN (FORMALDEHYDE INACTIVATED), CORYNEBACTERIUM DIPHTHERIAE TOXOID ANTIGEN (FORMALDEHYDE INACTIVATED), BORDETELLA PERTUSSIS TOXOID ANTIGEN (GLUTARALDEHYDE INACTIVATED), BORDETELLA PERTUSSIS FILAMENTOUS HEMAGGLUTININ ANTIGEN (FORMALDEHYDE INACTIVATED), BORDETELLA PERTUSSIS PERTACTIN ANTIGEN, AND BORDETELLA PERTUSSIS FIMBRIAE 2/3 ANTIGEN 0.5 ML: 5; 2; 2.5; 5; 3; 5 INJECTION, SUSPENSION INTRAMUSCULAR at 02:03

## 2019-03-11 NOTE — ED PROVIDER NOTES
Encounter Date: 3/11/2019    SCRIBE #1 NOTE: Anne LEVY, kathy scribing for, and in the presence of, Iris Arizmendi PA-C.       History     Chief Complaint   Patient presents with    Animal Bite     bite at [place] / animal control on scene        Time seen by provider: 1:52 PM on 03/11/2019    Steph Lira is a 68 y.o. female with PMHx of HLD, thyroid disease, A-fib, CAD, CHF, and COPD who presents to the ED accompanied by her spouse with complaints of a facial laceration secondary to a dog bite that occurred immediately PTA. Laceration is located to the bridge of the nose. She also has a wound underneath the right side of her lip. Patient also complains of a headache. Patient endorses dog was at [facility] dscout and is UTD on vaccinations. Dog was controlled was at the scene. Patient is currently on Coumadin. Last tetanus shot was more than 5 years ago. Patient denies other pain or facial swelling. She has no other medical concerns or complaints at this moment. She denies onset of any other new symptoms currently. SHx includes cholecystectomy, hysterectomy, CABG, and cardiac surgery. Ciprofloxacin, Coreg, Doxycycline, and Metoprolol allergies noted.       The history is provided by the patient.     Review of patient's allergies indicates:   Allergen Reactions    Ciprofloxacin Itching    Coreg [carvedilol]      Low energy    Doxycycline Other (See Comments) and Hives     Patient had a rxn with the sun despite wearing spf 30    Metoprolol      Low energy     Past Medical History:   Diagnosis Date    *Atrial fibrillation     and Hx PSVT    Allergic drug rash 12/28/2016    Allergy     chronic     Arthritis     fingers    Benign tumor of throat     Bronchitis March 2013    CAD (coronary artery disease) 2005    CABGx2 in 2005 and 2 MI after with 4 stents    Cancer 1996    small cell lung cancer s/p resection of 1/3 lung, 5 ribs and muscle mass then had chemo and radiation     Cataract     OD     CHF (congestive heart failure) past Hx    Chronic rhinitis     Clot past Hx    external jugular, now stented, occluded, had phlebitis; now revascularized    Colon polyp     COPD (chronic obstructive pulmonary disease)     no oxygen or nebulizers    COPD exacerbation 5/8/13    improved 5/14/13 after steroid pack,antibiotics    Former smoker     GERD (gastroesophageal reflux disease)     Heart block     Hyperlipidemia     Hypertension     pt states does not have //    MI (myocardial infarction) 2005    Posterior vitreous detachment of left eye     Thyroid disease      Past Surgical History:   Procedure Laterality Date    ABLATION N/A 12/11/2018    Performed by Santana [facility]ias [facility] at [facility] EP LAB    ADENOIDECTOMY      APPENDECTOMY      BACK SURGERY      lumbar    BREAST BIOPSY Left     benign    CARDIAC SURGERY      CATARACT EXTRACTION Left     OS    CATARACT EXTRACTION, BILATERAL      left eye only    CHOLECYSTECTOMY      COLONOSCOPY  03/2012    repeat in 5 years    COLONOSCOPY N/A 6/19/2017    [facility]formed by [facility] at [facility]    COLONOSCOPY N/A 3/28/2012    Performed by Garrison Koo [facility] at [facility]    CORONARY ARTERY BYPASS GRAFT  2005    2 vessel    ECHOCARDIOGRAM,TRANSESOPHAGEAL N/A 12/11/2018    Performed by [facility] Diagnostic Provider at [facility] EP LAB    EGD (ESOPHAGOGASTRODUODENOSCOPY)/cryo N/A 11/1/2018    Performed by Robbie Gonzales MD at [facility] ENDO (2ND FLR)    EGD (ESOPHAGOGASTRODUODENOSCOPY)/poss cryo N/A 10/3/2018    Performed by Robbie Gonzales MD at [facility] ENDO (2ND FLR)    ESOPHAGOGASTRODUODENOSCOPY (EGD) N/A 11/29/2017    Performed by Daniel Gillette MD at [facility] ENDO (2ND FLR)    ESOPHAGOGASTRODUODENOSCOPY (EGD) N/A 10/3/2017    Performed by Hue Wright MD at [facility] ENDO    ESOPHAGOGASTRODUODENOSCOPY (EGD) N/A 6/14/2017    Performed by Kal Elise MD at [facility] ENDO    ESOPHAGOGASTRODUODENOSCOPY (EGD) N/A 10/12/2016     Performed by Kal Elise MD at [facility] ENDO    ESOPHAGOGASTRODUODENOSCOPY (EGD) N/A 5/10/2016    Performed by Garrison Koo MD at [facility] ENDO    ESOPHAGOGASTRODUODENOSCOPY (EGD) W/CRYOTHERAPY N/A 12/22/2017    Performed by Robbie Gonzales [facility] at [facility] ENDO    ESOPHAGOGASTRODUODENOSCOPY (EGD) with cryo N/A 3/5/2018    Performed by Rob[facility] Gonzales [facility] at [facility] ENDO    ESOPHAGOGASTRODUODENOSCOPY (EGD)/cryo N/A 7/5/2018    Performed by Robbie Gonzales MD at [facility] ENDO (2ND FLR)    ESOPHAGOGASTRODUODENOSCOPY (EGD)/cryo N/A 4/16/2018    Performed by Robbie Gonzales MD at [facility] ENDO (2ND FLR)    ESOPHAGOGASTRODUODENOSCOPY (EGD)/poss Cryo N/A 12/14/2017    Performed by Robbie Gonzales MD at [facility] ENDO (2ND FLR)    ESOPHAGOGASTRODUODENOSCOPY (EGD)/Poss EMR N/A 10/31/2017    Performed by [facility] Gonzales [facility] at [facility] (2ND FLR)    EYE SURGERY  Dec 2012    ptosis repair    FIRST RIB REMOVAL  1996 with lung resection    HEMORRHOIDECTOMY N/A 3/16/2018    Performed by Klaus Mandujano MD at [facility] OR    HYSTERECTOMY      LUNG LOBECTOMY  1996    left lung for cancer    phaco/pciol Right 11/5/2014    Performed by Guerline Lawson [facility] at [facility] OR    REMOVAL OF PLANTAR WART USING LASER      REPAIR, BLEPHAROPTOSIS Left 12/6/2012    Performed by [facility]ze Glynn [facility] at [facility] OR 1ST FLR    SHOULDER SURGERY  2010,2011    scapular entrapment after lung surgery, needed 5 ribs removed left side    SYMPOTHATIC NERVE LEFT SIDE      1996 with lung surgery    TONSILLECTOMY      TONSILLECTOMY, ADENOIDECTOMY, BILATERAL MYRINGOTOMY AND TUBES      ULTRASOUND-ENDOSCOPIC-UPPER N/A 10/31/2017    Performed by Robbie Gonzales MD at [facility] (2ND FLR)    UPPER GASTROINTESTINAL ENDOSCOPY  05/2016    Dr. Koo    VASCULAR SURGERY      stents place in jugualr vein     Family History   Problem Relation Age of Onset    Allergic rhinitis Father     Cancer Father     Hypertension Father     Allergies  Father     Allergic rhinitis Son     Asthma Son     Stroke Mother     Allergies Mother     Allergic rhinitis Sister     Asthma Sister     Diverticulitis Brother     No Known Problems Daughter     No Known Problems Maternal Aunt     No Known Problems Maternal Uncle     No Known Problems Paternal Aunt     No Known Problems Paternal Uncle     Colon polyps Maternal Grandmother          of colon blockage    No Known Problems Maternal Grandfather     No Known Problems Paternal Grandmother     No Known Problems Paternal Grandfather     No Known Problems Brother     No Known Problems Sister     Angioedema Neg Hx     Eczema Neg Hx     Immunodeficiency Neg Hx     Urticaria Neg Hx     Skin cancer Neg Hx     Amblyopia Neg Hx     Blindness Neg Hx     Cataracts Neg Hx     Diabetes Neg Hx     Glaucoma Neg Hx     Macular degeneration Neg Hx     Retinal detachment Neg Hx     Strabismus Neg Hx     Thyroid disease Neg Hx     Colon cancer Neg Hx     Melanoma Neg Hx      Social History     Tobacco Use    Smoking status: Former Smoker     Packs/day: 2.00     Years: 20.00     Pack years: 40.00     Types: Cigarettes     Last attempt to quit: 1994     Years since quittin.1    Smokeless tobacco: Never Used   Substance Use Topics    Alcohol use: No    Drug use: No     Review of Systems   Constitutional: Negative for fever.   HENT: Negative for nosebleeds.    Respiratory: Negative for shortness of breath.    Cardiovascular: Negative for chest pain.   Gastrointestinal: Negative for nausea.   Musculoskeletal: Negative for myalgias.   Skin: Positive for wound (nose).   Neurological: Positive for headaches. Negative for weakness and numbness.   Hematological: Bruises/bleeds easily (Coumadin).   Psychiatric/Behavioral: The patient is not nervous/anxious.        Physical Exam     Initial Vitals [19 1258]   BP Pulse Resp Temp SpO2   131/78 103 20 98.4 °F (36.9 °C) 96 %      MAP       --          Physical Exam    Nursing note and vitals reviewed.  Constitutional: She appears well-developed and well-nourished. She is not diaphoretic. No distress.   HENT:   Head: Normocephalic.   Right Ear: External ear normal.   Left Ear: External ear normal.   Nose: Nose lacerations present.       Mouth/Throat: Uvula is midline, oropharynx is clear and moist and mucous membranes are normal.   2 cm z-shaped laceration to the nose with extension to lateral mucosal surface of left nare. Small puncture wound noted to the right lower lip without extension to the mucosal surface.    Eyes: Conjunctivae are normal.   Neck: Normal range of motion. Neck supple.   Cardiovascular: Normal rate, regular rhythm, normal heart sounds and intact distal pulses.   Pulmonary/Chest: Breath sounds normal.   Musculoskeletal: Normal range of motion. She exhibits no edema or tenderness.   Neurological: She is alert and oriented to person, place, and time. She has normal strength. No sensory deficit.   Skin: Skin is warm and dry. No rash and no abscess noted. No erythema.         ED Course   Lac Repair  Date/Time: 3/11/2019 3:40 PM  Performed by: Iris Arizmendi PA-C  Authorized by: Jaquan Saravia III, MD   Consent Done: Yes  Consent: Verbal consent obtained.  Risks and benefits: risks, benefits and alternatives were discussed  Consent given by: patient  Patient identity confirmed:  and name  Body area: head/neck  Location details: nose  Laceration length: 2 cm  Foreign bodies: no foreign bodies  Tendon involvement: none  Nerve involvement: none  Vascular damage: no  Anesthesia: local infiltration    Anesthesia:  Local Anesthetic: lidocaine 1% without epinephrine  Anesthetic total: 4 mL  Patient sedated: no  Preparation: Patient was prepped and draped in the usual sterile fashion.  Irrigation solution: saline  Irrigation method: syringe  Amount of cleaning: extensive  Debridement: none  Degree of undermining: none  Skin closure: 6-0  nylon  Number of sutures: 12  Technique: simple  Approximation: close  Approximation difficulty: simple  Dressing: antibiotic ointment  Patient tolerance: Patient tolerated the procedure well with no immediate complications        Labs Reviewed - No data to display       Imaging Results          CT Maxillofacial Without Contrast (Final result)  Result time 03/11/19 14:57:30    Final result by Klaus Neri MD (03/11/19 14:57:30)                 Impression:      Puncture wound overlying the right mandibular body and soft tissue swelling of the nose.  No radiopaque foreign body or evidence for maxillofacial fracture.      Electronically signed by: Klaus Neri MD  Date:    03/11/2019  Time:    14:57             Narrative:    EXAMINATION:  CT MAXILLOFACIAL WITHOUT CONTRAST    CLINICAL HISTORY:  Maxface trauma, penetrating;dog bite to nose;    TECHNIQUE:  Low dose axial images, sagittal and coronal reformations were obtained through the face.  Contrast was not administered.    COMPARISON:  None    FINDINGS:  There are a few foci of soft tissue gas within the pre mandibular soft tissues anterior to the right mandibular body, to the right of midline, compatible with history of puncture wound.  No radiopaque foreign body.  There is some surrounding soft tissue stranding without fluid collection.  There is also mild soft tissue swelling along the nose, without visible puncture wound.    No fracture identified.  There is moderate motion artifact limiting evaluation of the mandibular body.  The paranasal sinuses and mastoid air cells are clear.  A stent is present within the left internal jugular vein.                                 Medical Decision Making:   History:   Old Medical Records: I decided to obtain old medical records.  Differential Diagnosis:   Fracture  Contusion  Laceration     Clinical Tests:   Radiological Study: Reviewed and Ordered       APC / Resident Notes:   CT maxillofacial shows no  evidence for fracture or retained foreign body.  Wound is well cleansed and she tolerated the laceration repair well.  Mucosal/intranasal surface of left nare is treated with Bactroban.  She is covered with Augmentin and discharged home to follow-up with PCP and ENT for re-evaluation and suture removal.  She voices understanding and is agreeable to the plan.  She is given specific return precautions.          Scribe Attestation:   Scribe #1: I performed the above scribed service and the documentation accurately describes the services I performed. I attest to the accuracy of the note.      I, Iris Arizmendi PA-C, personally performed the services described in this documentation. All medical record entries made by the scribe were at my direction and in my presence.  I have reviewed the chart and agree that the record reflects my personal performance and is accurate and complete. Iris Arizmendi PA-C.  6:37 PM 03/11/2019               Clinical Impression:       ICD-10-CM ICD-9-CM   1. Dog bite, initial encounter W54.0XXA 879.8     E906.0   2. Nasal laceration, initial encounter S01.21XA 873.20   3. Abrasion of chin, initial encounter S00.81XA 910.0         Disposition:   Disposition: Discharged  Condition: Stable                        Iris Arizmendi PA-C  03/11/19 183

## 2019-03-12 ENCOUNTER — LAB VISIT (OUTPATIENT)
Dept: LAB | Facility: HOSPITAL | Age: 69
End: 2019-03-12
Attending: INTERNAL MEDICINE
Payer: MEDICARE

## 2019-03-12 DIAGNOSIS — Z79.01 LONG TERM (CURRENT) USE OF ANTICOAGULANTS: ICD-10-CM

## 2019-03-12 DIAGNOSIS — I48.0 PAROXYSMAL ATRIAL FIBRILLATION: ICD-10-CM

## 2019-03-12 LAB
INR PPP: 3.6
PROTHROMBIN TIME: 36.8 SEC

## 2019-03-12 PROCEDURE — 85610 PROTHROMBIN TIME: CPT

## 2019-03-12 PROCEDURE — 36415 COLL VENOUS BLD VENIPUNCTURE: CPT

## 2019-03-19 ENCOUNTER — PATIENT MESSAGE (OUTPATIENT)
Dept: MEDSURG UNIT | Facility: HOSPITAL | Age: 69
End: 2019-03-19

## 2019-03-19 ENCOUNTER — LAB VISIT (OUTPATIENT)
Dept: LAB | Facility: HOSPITAL | Age: 69
End: 2019-03-19
Attending: INTERNAL MEDICINE
Payer: MEDICARE

## 2019-03-19 ENCOUNTER — PATIENT MESSAGE (OUTPATIENT)
Dept: ELECTROPHYSIOLOGY | Facility: CLINIC | Age: 69
End: 2019-03-19

## 2019-03-19 ENCOUNTER — PATIENT MESSAGE (OUTPATIENT)
Dept: FAMILY MEDICINE | Facility: CLINIC | Age: 69
End: 2019-03-19

## 2019-03-19 ENCOUNTER — OFFICE VISIT (OUTPATIENT)
Dept: FAMILY MEDICINE | Facility: CLINIC | Age: 69
End: 2019-03-19
Payer: MEDICARE

## 2019-03-19 VITALS
BODY MASS INDEX: 22.68 KG/M2 | HEIGHT: 66 IN | WEIGHT: 141.13 LBS | OXYGEN SATURATION: 98 % | HEART RATE: 95 BPM | SYSTOLIC BLOOD PRESSURE: 127 MMHG | DIASTOLIC BLOOD PRESSURE: 84 MMHG | TEMPERATURE: 99 F

## 2019-03-19 DIAGNOSIS — I10 ESSENTIAL HYPERTENSION: ICD-10-CM

## 2019-03-19 DIAGNOSIS — Z79.01 LONG TERM (CURRENT) USE OF ANTICOAGULANTS: ICD-10-CM

## 2019-03-19 DIAGNOSIS — I48.0 PAROXYSMAL ATRIAL FIBRILLATION: ICD-10-CM
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
## 2019-03-19 DIAGNOSIS — Z48.02 VISIT FOR SUTURE REMOVAL: Primary | ICD-10-CM

## 2019-03-19 DIAGNOSIS — S01.25XD DOG BITE OF NOSE, SUBSEQUENT ENCOUNTER: ICD-10-CM

## 2019-03-19 LAB
INR PPP: 4.6
PROTHROMBIN TIME: 46.2 SEC

## 2019-03-19 PROCEDURE — 99999 PR PBB SHADOW E&M-EST. PATIENT-LVL IV: CPT | Mod: PBBFAC,,, | Performed by: NURSE PRACTITIONER

## 2019-03-19 PROCEDURE — 99214 OFFICE O/P EST MOD 30 MIN: CPT | Mod: PBBFAC,PO | Performed by: NURSE PRACTITIONER

## 2019-03-19 PROCEDURE — 99213 PR OFFICE/OUTPT VISIT, EST, LEVL III, 20-29 MIN: ICD-10-PCS | Mod: S$PBB,,, | Performed by: NURSE PRACTITIONER

## 2019-03-19 PROCEDURE — 99999 PR PBB SHADOW E&M-EST. PATIENT-LVL IV: ICD-10-PCS | Mod: PBBFAC,,, | Performed by: NURSE PRACTITIONER

## 2019-03-19 PROCEDURE — 36415 COLL VENOUS BLD VENIPUNCTURE: CPT

## 2019-03-19 PROCEDURE — 85610 PROTHROMBIN TIME: CPT

## 2019-03-19 PROCEDURE — 99213 OFFICE O/P EST LOW 20 MIN: CPT | Mod: S$PBB,,, | Performed by: NURSE PRACTITIONER

## 2019-03-19 NOTE — PATIENT INSTRUCTIONS
Continue mupirocin ointment   Wash with water, pat dry with clean towel  Notify clinic if signs of infection, significant bleeding or area becomes oopen

## 2019-03-19 NOTE — PROGRESS NOTES
Subjective:       Patient ID: Steph Lira is a 68 y.o. female.    Chief Complaint: Suture / Staple Removal    Ms. Lira presents today for suture removal. She was seen on 3/11 at the ED for a dog bite. Dog was up to date on vaccines. Had tetanus vaccine and maxillofacial CT at ED. Was discharged with Augmentin and mupirocin ointment. Took antibiotic as prescribed, still using mupirocin ointment. She denies signs of infection. Taking coumadin- did have one area to the left of her nose with one suture that bled. Also saw ENT after sneezing two blood clots. Has not occurred since. Had INR checked this AM.       Patient Active Problem List   Diagnosis    GERD (gastroesophageal reflux disease)    Chronic rhinitis    Hypothyroid    Aneurysm of heart (wall)    Benign neoplasm of skin of upper limb, including shoulder    Intermediate coronary syndrome    Phlebitis and thrombophlebitis of superficial veins of upper extremities    Simple chronic conjunctivitis    Supraventricular premature beats    Vascular disorder of skin    Chronic external jugular vein thrombosis    Anticoagulant long-term use    Magdiel's syndrome - Left Eye    Ptosis    Anxiety    CAD (coronary artery disease)    S/P CABG x 2, 2005    HTN (hypertension)    Hypercholesteremia    PAF (paroxysmal atrial fibrillation), onset 2002    Intercostal neuralgia    History of lung cancer    Blurred vision, bilateral    Panlobular emphysema    Carotid artery disease    COPD (chronic obstructive pulmonary disease)    Uncontrolled atrial flutter with rvr    Allergic rhinitis    Hypoalbuminemia    History of smoking 10-25 pack years, 15 pack-years, quit 1994    S/P CABG (coronary artery bypass graft)    Supraventricular bigeminy    Dysphagia    History of colon polyps    Coronary artery disease involving autologous artery coronary bypass graft    Wrist arthritis    Gastritis    Esophageal lesion    Fatigue    Grade II  hemorrhoids    Cardiomyopathy    Throat irritation    Paroxysmal atrial fibrillation    BMI 21.0-21.9, adult    Anticoagulation management encounter    History of biliary stent insertion, 6/2005, left jugular vein and left innominate vein     Review of Systems   Constitutional: Negative for activity change and unexpected weight change.   HENT: Negative for hearing loss, rhinorrhea and trouble swallowing.    Eyes: Negative for discharge and visual disturbance.   Respiratory: Negative for chest tightness and wheezing.    Cardiovascular: Negative for chest pain and palpitations.   Gastrointestinal: Negative for blood in stool, constipation, diarrhea and vomiting.   Endocrine: Negative for polydipsia and polyuria.   Genitourinary: Negative for difficulty urinating, dysuria, hematuria and menstrual problem.   Musculoskeletal: Negative for arthralgias, joint swelling and neck pain.   Skin: Positive for wound.   Neurological: Negative for weakness and headaches.   Psychiatric/Behavioral: Negative for confusion and dysphoric mood.       Objective:      Physical Exam   Constitutional: She is oriented to person, place, and time. She appears well-developed and well-nourished.   Cardiovascular: A regularly irregular rhythm present.   Neurological: She is alert and oriented to person, place, and time.   Skin: Skin is warm and dry.   Sutured laceration to nose. No edema, erythema or bruising. Well approximated. See photo.   Contusion and bruising to right chin    Psychiatric: She has a normal mood and affect.   Nursing note and vitals reviewed.      Assessment:       1. Visit for suture removal    2. Dog bite of nose, subsequent encounter    3. Essential hypertension    4. Paroxysmal atrial fibrillation        Plan:       Steph was seen today for suture / staple removal.    Diagnoses and all orders for this visit:    Visit for suture removal  twelve sutures removed without difficulty. No bleeding during removal. Wound well  approximated, free of edema or erythema    Dog bite of nose, subsequent encounter  May clean area with water, pat dry with clean cloth  Continue mupirocin ointment until healed  Notify clinic if signs of infection develop- redness, swelling, drainage, fever- or significant bleeding     Essential hypertension  Controlled on current drug regimen    Paroxysmal atrial fibrillation  Continue under the care of cardiology   Anticoagulated on coumadin     F/U PRN

## 2019-03-19 NOTE — PROGRESS NOTES
ABLATION EDUCATION CHECKLIST      PRE-PROCEDURE TESTING:      3/26/19 @ 9 AM - lab work added to lab already scheduled  Pre-Procedure labs have been ordered for you at:  [facility]   · Be sure to arrive at your scheduled time.   · YOU DO NOT HAVE TO FAST FOR THIS LAB WORK!    3/30/19- LAST DOSE OF FLECAINIDE    DAY OF PROCEDURE:    4/03/19 @ 5:45AM - ABLATION  Report to Cardiology Waiting Room on 3rd floor of the Hospital    · Do not eat or drink anything after: 12 mn on the night before your procedure  · Please do not wear makeup (especially mascara) when arriving for your procedure    Medications:   · HOLD FLECAINIDE FOR 3 DAYS PRIOR TO PROCEDURE. LAST DOSE 3/30/19.  · You may take all other morning medications with a sip of water      Directions to the Cardiology Waiting Room  If you park in the Parking Garage:  Take elevators to the 2nd floor  Walk up ramp and turn right by Gold Elevators  Take elevator to the 3rd floor  Upon exiting the elevator, turn away from the clinic areas  Walk long tavarez around to front of hospital to area with windows overlooking [facility]  Check in at Reception Desk  OR  If family is dropping you off:  Have them drop you off at the front of the Hospital  (Near the ER, where all the flags are hung).  Take the E elevators to the 3rd floor.  Check in at the Reception Desk in the waiting room.        · You will be spending the night after your procedure.  · You will need someone to drive you home the day after your procedure.  · Your pain during your procedure will be managed by the anesthesia team.     Any need to reschedule or cancel procedures, or any questions regarding your procedures should be addressed directly with the Arrhythmia Department Nurses at the following phone number: [phone]

## 2019-03-20 ENCOUNTER — INITIAL CONSULT (OUTPATIENT)
Dept: PHYSICAL MEDICINE AND REHAB | Facility: CLINIC | Age: 69
End: 2019-03-20
Payer: MEDICARE

## 2019-03-20 VITALS
SYSTOLIC BLOOD PRESSURE: 128 MMHG | HEIGHT: 66 IN | WEIGHT: 141 LBS | BODY MASS INDEX: 22.66 KG/M2 | HEART RATE: 96 BPM | DIASTOLIC BLOOD PRESSURE: 84 MMHG

## 2019-03-20 DIAGNOSIS — S43.421A SPRAIN OF RIGHT ROTATOR CUFF CAPSULE, INITIAL ENCOUNTER: ICD-10-CM

## 2019-03-20 PROCEDURE — 20611 DRAIN/INJ JOINT/BURSA W/US: CPT | Mod: PBBFAC,PN | Performed by: PHYSICAL MEDICINE & REHABILITATION

## 2019-03-20 PROCEDURE — 20611 DRAIN/INJ JOINT/BURSA W/US: CPT | Mod: S$PBB,RT,, | Performed by: PHYSICAL MEDICINE & REHABILITATION

## 2019-03-20 PROCEDURE — 96372 THER/PROPH/DIAG INJ SC/IM: CPT | Mod: PBBFAC,PN,59

## 2019-03-20 PROCEDURE — 99999 PR PBB SHADOW E&M-EST. PATIENT-LVL V: ICD-10-PCS | Mod: PBBFAC,,, | Performed by: PHYSICAL MEDICINE & REHABILITATION

## 2019-03-20 PROCEDURE — 20611 PR DRAIN/ASP/INJECT MAJOR JOINT/BURSA W/US GUIDANCE: ICD-10-PCS | Mod: S$PBB,RT,, | Performed by: PHYSICAL MEDICINE & REHABILITATION

## 2019-03-20 PROCEDURE — 99203 PR OFFICE/OUTPT VISIT, NEW, LEVL III, 30-44 MIN: ICD-10-PCS | Mod: 25,S$PBB,, | Performed by: PHYSICAL MEDICINE & REHABILITATION

## 2019-03-20 PROCEDURE — 99215 OFFICE O/P EST HI 40 MIN: CPT | Mod: PBBFAC,PN,25 | Performed by: PHYSICAL MEDICINE & REHABILITATION

## 2019-03-20 PROCEDURE — 99999 PR PBB SHADOW E&M-EST. PATIENT-LVL V: CPT | Mod: PBBFAC,,, | Performed by: PHYSICAL MEDICINE & REHABILITATION

## 2019-03-20 PROCEDURE — 76882 US LMTD JT/FCL EVL NVASC XTR: CPT | Mod: 26,59,S$PBB, | Performed by: PHYSICAL MEDICINE & REHABILITATION

## 2019-03-20 PROCEDURE — 76882 PR  US,EXTREMITY,NONVASCULAR,REAL-TIME IMAGE,LIMITED: ICD-10-PCS | Mod: 26,59,S$PBB, | Performed by: PHYSICAL MEDICINE & REHABILITATION

## 2019-03-20 PROCEDURE — 76882 US LMTD JT/FCL EVL NVASC XTR: CPT | Mod: PBBFAC,PN,59 | Performed by: PHYSICAL MEDICINE & REHABILITATION

## 2019-03-20 PROCEDURE — 99203 OFFICE O/P NEW LOW 30 MIN: CPT | Mod: 25,S$PBB,, | Performed by: PHYSICAL MEDICINE & REHABILITATION

## 2019-03-20 RX ORDER — DICLOFENAC SODIUM 10 MG/G
4 GEL TOPICAL 4 TIMES DAILY
Qty: 2 TUBE | Refills: 6 | Status: SHIPPED | OUTPATIENT
Start: 2019-03-20 | End: 2019-01-01

## 2019-03-20 RX ORDER — LIDOCAINE HYDROCHLORIDE 10 MG/ML
1 INJECTION INFILTRATION; PERINEURAL
Status: COMPLETED | OUTPATIENT
Start: 2019-03-20 | End: 2019-03-20

## 2019-03-20 RX ORDER — METHYLPREDNISOLONE ACETATE 40 MG/ML
40 INJECTION, SUSPENSION INTRA-ARTICULAR; INTRALESIONAL; INTRAMUSCULAR; SOFT TISSUE ONCE
Status: COMPLETED | OUTPATIENT
Start: 2019-03-20 | End: 2019-03-20

## 2019-03-20 RX ADMIN — LIDOCAINE HYDROCHLORIDE 1 ML: 10 INJECTION INFILTRATION; PERINEURAL at 11:03

## 2019-03-20 RX ADMIN — METHYLPREDNISOLONE ACETATE 40 MG: 40 INJECTION, SUSPENSION INTRA-ARTICULAR; INTRALESIONAL; INTRAMUSCULAR; SOFT TISSUE at 11:03

## 2019-03-20 NOTE — LETTER
March 20, 2019      Nasreen Deal, NP  [address] [facility]  [place] [address]           [place] [facility]  [address] [place] [address] [address]  [place] [address]  Phone: [phone]  Fax: [phone]          Patient: Steph Lira   MR Number: [id number]   YOB: 1950   Date of Visit: 3/20/2019       Dear Nasreen Deal:    Thank you for referring Steph Lira to me for evaluation. Attached you will find relevant portions of my assessment and plan of care.    If you have questions, please do not hesitate to call me. I look forward to following Steph Lira along with you.    Sincerely,    Gregoria Dixon,     Enclosure  CC:  No Recipients    If you would like to receive this communication electronically, please contact [email or web address] or [phone] to request more information on [employer] access.    For providers and/or their staff who would like to refer a patient to [facility], please contact us through our one-stop-shop provider referral line, [facility], at [phone].    If you feel you have received this communication in error or would no longer like to receive these types of communications, please e-mail [email or web address]

## 2019-03-20 NOTE — PROGRESS NOTES
HPI:  Patient is a 68 y.o. year old female w. Right shoulder pain. It started about 6 wks ago. It is worsening. She first felt it while turning in bed. She has difficulty w. Certain movements like unbuckling her seat belt, pulling her covers in bed, and even writing. She denies any recent trauma. She denies weakness or dropping objects.    Imaging  None    Labs  egfr cr lfts nl      Past Medical History:   Diagnosis Date    *Atrial fibrillation     and Hx PSVT    Allergic drug rash 12/28/2016    Allergy     chronic     Arthritis     fingers    Benign tumor of throat     Bronchitis March 2013    CAD (coronary artery disease) 2005    CABGx2 in 2005 and 2 MI after with 4 stents    Cancer 1996    small cell lung cancer s/p resection of 1/3 lung, 5 ribs and muscle mass then had chemo and radiation    Cataract     OD     CHF (congestive heart failure) past Hx    Chronic rhinitis     Clot past Hx    external jugular, now stented, occluded, had phlebitis; now revascularized    Colon polyp     COPD (chronic obstructive pulmonary disease)     no oxygen or nebulizers    COPD exacerbation 5/8/13    improved 5/14/13 after steroid pack,antibiotics    Former smoker     GERD (gastroesophageal reflux disease)     Heart block     Hyperlipidemia     Hypertension     pt states does not have //    MI (myocardial infarction) 2005    Posterior vitreous detachment of left eye     Thyroid disease      Past Surgical History:   Procedure Laterality Date    ABLATION N/A 12/11/2018    Performed by [facility]ias [facility] at [facility] EP LAB    ADENOIDECTOMY      APPENDECTOMY      BACK SURGERY      lumbar    BREAST BIOPSY Left     benign    CARDIAC SURGERY      CATARACT EXTRACTION Left     OS    CATARACT EXTRACTION, BILATERAL      left eye only    CHOLECYSTECTOMY      COLONOSCOPY  03/2012    repeat in 5 years    COLONOSCOPY N/A 6/19/2017    [facility] by [facility]O    COLONOSCOPY N/A 3/28/2012     Performed by Garrison Koo MD at [facility] ENDO    CORONARY ARTERY BYPASS GRAFT  2005    2 vessel    ECHOCARDIOGRAM,TRANSESOPHAGEAL N/A 12/11/2018    [facility] by [facility] at [facility] EP LAB    EGD (ESOPHAGOGASTRODUODENOSCOPY)/cryo N/A 11/1/2018    Performed by Robbie Gonzales MD at [facility] ENDO (2ND FLR)    EGD (ESOPHAGOGASTRODUODENOSCOPY)/poss cryo N/A 10/3/2018    Performed by Robbie Gonzales MD at [facility] ENDO (2ND FLR)    ESOPHAGOGASTRODUODENOSCOPY (EGD) N/A 11/29/2017    Performed by Daniel Gillette MD at [facility] ENDO (2ND FLR)    ESOPHAGOGASTRODUODENOSCOPY (EGD) N/A 10/3/2017    Performed by Hue Wright MD at [facility] ENDO    ESOPHAGOGASTRODUODENOSCOPY (EGD) N/A 6/14/2017    Performed by Kal Elise MD at [facility] ENDO    ESOPHAGOGASTRODUODENOSCOPY (EGD) N/A 10/12/2016    Performed by Kal Elise MD at [facility] ENDO    ESOPHAGOGASTRODUODENOSCOPY (EGD) N/A 5/10/2016    Performed by Garrison Koo MD at [facility] ENDO    ESOPHAGOGASTRODUODENOSCOPY (EGD) W/CRYOTHERAPY N/A 12/22/2017    Per[facility] by [facility] at [facility] ENDO    ESOPHAGOGASTRODUODENOSCOPY (EGD) with cryo N/A 3/5/2018    Performed by [facility]bie Gonzales MD at [facility] ENDO    ESOPHAGOGASTRODUODENOSCOPY (EGD)/cryo N/A 7/5/2018    Performed by Robbie Gonzales MD at [facility] ENDO (2ND FLR)    ESOPHAGOGASTRODUODENOSCOPY (EGD)/cryo N/A 4/16/2018    Performed by Robbie Gonzales MD at [facility] ENDO (2ND FLR)    ESOPHAGOGASTRODUODENOSCOPY (EGD)/poss Cryo N/A 12/14/2017    Performed by Robbie Gonzales MD at [facility] ENDO (2ND FLR)    ESOPHAGOGASTRODUODENOSCOPY (EGD)/Poss EMR N/A 10/31/2017    Performed by [facility] Gonzales [facility] at [facility] (2ND FLR)    EYE SURGERY  Dec 2012    ptosis repair    FIRST RIB REMOVAL  1996 with lung resection    HEMORRHOIDECTOMY N/A 3/16/2018    Performed by Klaus Mandujano MD at [facility] OR    HYSTERECTOMY      LUNG LOBECTOMY  1996    left lung for cancer    phaco/pciol Right 11/5/2014     [facility] Lawson [facility] at [facility] OR    REMOVAL OF PLANTAR WART USING LASER      REPAIR, BLEPHAROPTOSIS Left 2012    Performed by Eze Glynn MD at [facility] OR 1ST FLR    SHOULDER SURGERY  ,    scapular entrapment after lung surgery, needed 5 ribs removed left side    SYMPOTHATIC NERVE LEFT SIDE       with lung surgery    TONSILLECTOMY      TONSILLECTOMY, ADENOIDECTOMY, BILATERAL MYRINGOTOMY AND TUBES      ULTRASOUND-ENDOSCOPIC-UPPER N/A 10/31/2017    Performed by Robbie Gonzales MD at [facility] ENDO (2ND FLR)    UPPER GASTROINTESTINAL ENDOSCOPY  2016    Dr. Koo    VASCULAR SURGERY      stents place in jugualr vein     Family History   Problem Relation Age of Onset    Allergic rhinitis Father     Cancer Father     Hypertension Father     Allergies Father     Allergic rhinitis Son     Asthma Son     Stroke Mother     Allergies Mother     Allergic rhinitis Sister     Asthma Sister     Diverticulitis Brother     No Known Problems Daughter     No Known Problems Maternal Aunt     No Known Problems Maternal Uncle     No Known Problems Paternal Aunt     No Known Problems Paternal Uncle     Colon polyps Maternal Grandmother          of colon blockage    No Known Problems Maternal Grandfather     No Known Problems Paternal Grandmother     No Known Problems Paternal Grandfather     No Known Problems Brother     No Known Problems Sister     Angioedema Neg Hx     Eczema Neg Hx     Immunodeficiency Neg Hx     Urticaria Neg Hx     Skin cancer Neg Hx     Amblyopia Neg Hx     Blindness Neg Hx     Cataracts Neg Hx     Diabetes Neg Hx     Glaucoma Neg Hx     Macular degeneration Neg Hx     Retinal detachment Neg Hx     Strabismus Neg Hx     Thyroid disease Neg Hx     Colon cancer Neg Hx     Melanoma Neg Hx      Social History     Socioeconomic History    Marital status:      Spouse name: Not on file    Number of children: Not on file     Years of education: Not on file    Highest education level: Not on file   Social Needs    Financial resource strain: Not on file    Food insecurity - worry: Not on file    Food insecurity - inability: Not on file    Transportation needs - medical: Not on file    Transportation needs - non-medical: Not on file   Occupational History    Occupation: retired   Tobacco Use    Smoking status: Former Smoker     Packs/day: 2.00     Years: 20.00     Pack years: 40.00     Types: Cigarettes     Last attempt to quit: 1994     Years since quittin.1    Smokeless tobacco: Never Used   Substance and Sexual Activity    Alcohol use: No    Drug use: No    Sexual activity: Yes     Partners: Male   Other Topics Concern    Are you pregnant or think you may be? No    Breast-feeding No   Social History Narrative    Not on file       Review of patient's allergies indicates:   Allergen Reactions    Ciprofloxacin Itching    Coreg [carvedilol]      Low energy    Doxycycline Other (See Comments) and Hives     Patient had a rxn with the sun despite wearing spf 30    Metoprolol      Low energy       Current Outpatient Medications:     ADVAIR DISKUS 250-50 mcg/dose diskus inhaler, USE 1 INHALATION TWICE A DAY (RINSE MOUTH AFTER USE TO PREVENT THRUSH), Disp: 3 each, Rfl: 3    albuterol (ACCUNEB) 1.25 mg/3 mL Nebu, USE 1 VIAL (3 ML) VIA NEBULIZER EVERY 6 HOURS AS NEEDED, Disp: 90 mL, Rfl: 2    albuterol (PROVENTIL HFA) 90 mcg/actuation inhaler, Inhale 2 puffs into the lungs every 6 (six) hours as needed for Wheezing., Disp: 3 Inhaler, Rfl: 4    atorvastatin (LIPITOR) 40 MG tablet, Take 1 tablet (40 mg total) by mouth once daily., Disp: 90 tablet, Rfl: 3    bisacodyl (DULCOLAX) 5 mg EC tablet, Take 1 tablet (5 mg total) by mouth daily as needed for Constipation., Disp: 30 tablet, Rfl: 11    cetirizine (ZYRTEC) 10 MG tablet, TAKE 1 TABLET EVERY EVENING, Disp: 90 tablet, Rfl: 2    desoximetasone (TOPICORT) 0.25 %  cream, Apply topically 2 (two) times daily. (Patient taking differently: Apply topically 2 (two) times daily as needed. ), Disp: 180 g, Rfl: 3    diltiaZEM (CARDIZEM CD) 120 MG Cp24, Take 1 capsule (120 mg total) by mouth 2 (two) times daily., Disp: 60 capsule, Rfl: 3    diphenhydrAMINE (BENADRYL) 25 mg capsule, Take 25 mg by mouth every 6 (six) hours as needed for Itching., Disp: , Rfl:     flecainide (TAMBOCOR) 50 MG Tab, TAKE 2 TABLETS TWICE A DAY, Disp: 360 tablet, Rfl: 3    fluticasone (FLONASE) 50 mcg/actuation nasal spray, 1 spray by Each Nare route once daily., Disp: 3 Bottle, Rfl: 3    HYDROcodone-acetaminophen (NORCO) 5-325 mg per tablet, Take 1 tablet by mouth 4 (four) times daily as needed., Disp: 12 tablet, Rfl: 0    ketoconazole (NIZORAL) 2 % shampoo, Wash hair with medicated shampoo at least 2x/week - let sit on scalp at least 5 minutes prior to rinsing, Disp: 120 mL, Rfl: 5    levothyroxine (SYNTHROID) 88 MCG tablet, Take 1 tablet (88 mcg total) by mouth once daily., Disp: 90 tablet, Rfl: 3    montelukast (SINGULAIR) 10 mg tablet, Take 1 tablet (10 mg total) by mouth every evening., Disp: 90 tablet, Rfl: 3    multivitamin-minerals-lutein (CENTRUM SILVER) Tab, Take 1 tablet by mouth once daily. 1 Tablet Oral Every day, Disp: , Rfl:     mupirocin (BACTROBAN) 2 % ointment, Apply topically 3 (three) times daily. for 10 days, Disp: 30 g, Rfl: 0    pantoprazole (PROTONIX) 40 MG tablet, TAKE 1 TABLET DAILY 30 MINUTES PRIOR TO BREAKFAST, Disp: 90 tablet, Rfl: 3    polyethylene glycol (GLYCOLAX) 17 gram/dose powder, Take 17 g by mouth once daily., Disp: 1530 g, Rfl: 2    SPIRIVA WITH HANDIHALER 18 mcg inhalation capsule, INHALE THE CONTENTS OF 1 CAPSULE DAILY, Disp: 90 capsule, Rfl: 3    tretinoin (RETIN-A) 0.05 % cream, APPLY THIN FILM TO FACE AT NIGHT AFTER MOISTURIZING, Disp: 45 g, Rfl: 3    warfarin (COUMADIN) 5 MG tablet, Take 1-1.5 tablets (5-7.5 mg total) by mouth Daily., Disp: 60  "tablet, Rfl: 3    triamcinolone acetonide 0.1% (KENALOG) 0.1 % cream, Apply topically 2 (two) times daily., Disp: 1 Tube, Rfl: 11  No current facility-administered medications for this visit.     Facility-Administered Medications Ordered in Other Visits:     0.9%  NaCl infusion, , Intravenous, Continuous, Ni Jaimes NP, Stopped at 12/11/18 0850    sodium chloride 0.9% flush 5 mL, 5 mL, Intravenous, PRN, Ni Jaimes NP      Review of Systems:    No nausea, vomiting, fevers, chills , contipation, diarrhea or sweats,no weight change, no chest pain, no sob, no change of bowel or bladder habits,no coordination issues        Physical Exam:      Vitals:    03/20/19 1022   BP: 128/84   Pulse: 96     alert and oriented ×4 follows commands answers all questions appropriately,affect wnl  Manual muscle test 5 out of 5 except for right shoulder abduction and extension sensation to light touch grossly intact  +impingement 90deg in abduction   -hawkin's -neers  +tenderness anterior right shoulder  Nl gait  No C[facility]C/E  +[facility]    [facility] EXAM  Real-time [facility] ultrasound of right shoulder indicated a hypoechoic lesion in the RIGHT supraspinatus tendon which may represent diseased tissue in the "birds beak" view. She had excruciate tenderness  to sono palpation. There were calcific bodies noted. There was an effusion around the biceps indicating a  rotator cuff tear. Color Doppler  did not show neovascularization or hyperemia within the tendon tissue. Biceps tendon remained in its groove.          Assessment:  rtc tendinopathy   Probable Labral tear  afib on coumadin awaiting ablation  Plan:  voltaren gel as adjunct  Cortisone injection today  If above does not help will consider mri of shoulder, and percutaneous tenotomy  Shoulder xray today    Procedure note: Risk and benefit of US guided right subacromial bursa  injection given to pt. 25 g 1.5" utilized. Injection performed after sterile prep w. " chlorohexedine , verbal consent explained, no complications. Depomedrol 40mg 1ml and lidocaine 1ml were used, US guidance was used since it has been shown to have greater efficiency w. Accurate needle placement  Ultrasound interpretation was performed prior to the procedure to identify the target and any adjacent neurovascular structures.  Subsequently, interpretation was performed during real- time needle guidance confirming placement. Post- intervention interpretation was also performed confirming appropriate injectate flow and hemostasis.  Images indicating needle placement have been saved in [employer]

## 2019-03-20 NOTE — PROGRESS NOTES
HPI:  Patient is a 68 y.o. year old female      Past Medical History:   Diagnosis Date    *Atrial fibrillation     and Hx PSVT    Allergic drug rash 12/28/2016    Allergy     chronic     Arthritis     fingers    Benign tumor of throat     Bronchitis March 2013    CAD (coronary artery disease) 2005    CABGx2 in 2005 and 2 MI after with 4 stents    Cancer 1996    small cell lung cancer s/p resection of 1/3 lung, 5 ribs and muscle mass then had chemo and radiation    Cataract     OD     CHF (congestive heart failure) past Hx    Chronic rhinitis     Clot past Hx    external jugular, now stented, occluded, had phlebitis; now revascularized    Colon polyp     COPD (chronic obstructive pulmonary disease)     no oxygen or nebulizers    COPD exacerbation 5/8/13    improved 5/14/13 after steroid pack,antibiotics    Former smoker     GERD (gastroesophageal reflux disease)     Heart block     Hyperlipidemia     Hypertension     pt states does not have //    MI (myocardial infarction) 2005    Posterior vitreous detachment of left eye     Thyroid disease      Past Surgical History:   Procedure Laterality Date    ABLATION N/A 12/11/2018    Performed by Santana Covarrubias MD at [facility] EP LAB    ADENOIDECTOMY      APPENDECTOMY      BACK SURGERY      lumbar    BREAST BIOPSY Left     benign    CARDIAC SURGERY      CATARACT EXTRACTION Left     OS    CATARACT EXTRACTION, BILATERAL      left eye only    CHOLECYSTECTOMY      COLONOSCOPY  03/2012    repeat in 5 years    COLONOSCOPY N/A 6/19/2017    Performed by Kal Elise MD at [facility] ENDO    COLONOSCOPY N/A 3/28/2012    Performed by Garrison Koo MD at [facility] ENDO    CORONARY ARTERY BYPASS GRAFT  2005    2 vessel    ECHOCARDIOGRAM,TRANSESOPHAGEAL N/A 12/11/2018    Performed by [facility] Diagnostic Provider at [facility] EP LAB    EGD (ESOPHAGOGASTRODUODENOSCOPY)/cryo N/A 11/1/2018    Performed by Robbie Gonzales MD at [facility] ENDO (2ND FLR)    EGD  (ESOPHAGOGASTRODUODENOSCOPY)/poss cryo N/A 10/3/2018    Performed by Robbie Gonzales [facility] at [facility] ENDO (2ND FLR)    ESOPHAGOGASTRODUODENOSCOPY (EGD) N/A 11/29/2017    Performed by Daniel Gillette MD at [facility] ENDO (2ND FLR)    ESOPHAGOGASTRODUODENOSCOPY (EGD) N/A 10/3/2017    Performed by Hue Wright MD at [facility] ENDO    ESOPHAGOGASTRODUODENOSCOPY (EGD) N/A 6/14/2017    Performed by Kal Elise MD at [facility] ENDO    ESOPHAGOGASTRODUODENOSCOPY (EGD) N/A 10/12/2016    Performed by Kal Elise MD at [facility] ENDO    ESOPHAGOGASTRODUODENOSCOPY (EGD) N/A 5/10/2016    Performed by Garrison Koo MD at [facility] ENDO    ESOPHAGOGASTRODUODENOSCOPY (EGD) W/CRYOTHERAPY N/A 12/22/2017    Performed by Robbie Gonzales MD at [facility] ENDO    ESOPHAGOGASTRODUODENOSCOPY (EGD) with cryo N/A 3/5/2018    Performed by Robbie Gonzales MD at [facility] ENDO    ESOPHAGOGASTRODUODENOSCOPY (EGD)/cryo N/A 7/5/2018    Performed by Robbie Gonzales MD at [facility] ENDO (2ND FLR)    ESOPHAGOGASTRODUODENOSCOPY (EGD)/cryo N/A 4/16/2018    Performed by Robbie Gonzales MD at [facility] ENDO (2ND FLR)    ESOPHAGOGASTRODUODENOSCOPY (EGD)/poss Cryo N/A 12/14/2017    Performed by Robbie Gonzales MD at [facility] ENDO (2ND FLR)    ESOPHAGOGASTRODUODENOSCOPY (EGD)/Poss EMR N/A 10/31/2017    Performed by Robbie Gonzales MD at [facility] ENDO (2ND FLR)    EYE SURGERY  Dec 2012    ptosis repair    FIRST RIB REMOVAL  1996 with lung resection    HEMORRHOIDECTOMY N/A 3/16/2018    Performed by Klaus Mandujano MD at [facility] OR    HYSTERECTOMY      LUNG LOBECTOMY  1996    left lung for cancer    phaco/pciol Right 11/5/2014    Performed by Guerline Lawson MD at [facility] OR    REMOVAL OF PLANTAR WART USING LASER      REPAIR, BLEPHAROPTOSIS Left 12/6/2012    Performed by Eze Glynn MD at [facility] OR 1ST FLR    SHOULDER SURGERY  2010,2011    scapular entrapment after lung surgery, needed 5 ribs removed left side    SYMPOTHATIC NERVE LEFT SIDE        with lung surgery    TONSILLECTOMY      TONSILLECTOMY, ADENOIDECTOMY, BILATERAL MYRINGOTOMY AND TUBES      ULTRASOUND-ENDOSCOPIC-UPPER N/A 10/31/2017    Performed by Robbie Gonzales MD at [facility] ([address] [place] [address])    UPPER GASTROINTESTINAL ENDOSCOPY  2016    Dr. Koo    VASCULAR SURGERY      stents place in jugualr vein     Family History   Problem Relation Age of Onset    Allergic rhinitis Father     Cancer Father     Hypertension Father     Allergies Father     Allergic rhinitis Son     Asthma Son     Stroke Mother     Allergies Mother     Allergic rhinitis Sister     Asthma Sister     Diverticulitis Brother     No Known Problems Daughter     No Known Problems Maternal Aunt     No Known Problems Maternal Uncle     No Known Problems Paternal Aunt     No Known Problems Paternal Uncle     Colon polyps Maternal Grandmother          of colon blockage    No Known Problems Maternal Grandfather     No Known Problems Paternal Grandmother     No Known Problems Paternal Grandfather     No Known Problems Brother     No Known Problems Sister     Angioedema Neg Hx     Eczema Neg Hx     Immunodeficiency Neg Hx     Urticaria Neg Hx     Skin cancer Neg Hx     Amblyopia Neg Hx     Blindness Neg Hx     Cataracts Neg Hx     Diabetes Neg Hx     Glaucoma Neg Hx     Macular degeneration Neg Hx     Retinal detachment Neg Hx     Strabismus Neg Hx     Thyroid disease Neg Hx     Colon cancer Neg Hx     Melanoma Neg Hx      Social History     Socioeconomic History    Marital status:      Spouse name: Not on file    Number of children: Not on file    Years of education: Not on file    Highest education level: Not on file   Social Needs    Financial resource strain: Not on file    Food insecurity - worry: Not on file    Food insecurity - inability: Not on file    Transportation needs - medical: Not on file    Transportation needs - non-medical: Not on file   Occupational  History    Occupation: retired   Tobacco Use    Smoking status: Former Smoker     Packs/day: 2.00     Years: 20.00     Pack years: 40.00     Types: Cigarettes     Last attempt to quit: 1994     Years since quittin.1    Smokeless tobacco: Never Used   Substance and Sexual Activity    Alcohol use: No    Drug use: No    Sexual activity: Yes     Partners: Male   Other Topics Concern    Are you pregnant or think you may be? No    Breast-feeding No   Social History Narrative    Not on file       Review of patient's allergies indicates:   Allergen Reactions    Ciprofloxacin Itching    Coreg [carvedilol]      Low energy    Doxycycline Other (See Comments) and Hives     Patient had a rxn with the sun despite wearing spf 30    Metoprolol      Low energy       Current Outpatient Medications:     ADVAIR DISKUS 250-50 mcg/dose diskus inhaler, USE 1 INHALATION TWICE A DAY (RINSE MOUTH AFTER USE TO PREVENT THRUSH), Disp: 3 each, Rfl: 3    albuterol (ACCUNEB) 1.25 mg/3 mL Nebu, USE 1 VIAL (3 ML) VIA NEBULIZER EVERY 6 HOURS AS NEEDED, Disp: 90 mL, Rfl: 2    albuterol (PROVENTIL HFA) 90 mcg/actuation inhaler, Inhale 2 puffs into the lungs every 6 (six) hours as needed for Wheezing., Disp: 3 Inhaler, Rfl: 4    atorvastatin (LIPITOR) 40 MG tablet, Take 1 tablet (40 mg total) by mouth once daily., Disp: 90 tablet, Rfl: 3    bisacodyl (DULCOLAX) 5 mg EC tablet, Take 1 tablet (5 mg total) by mouth daily as needed for Constipation., Disp: 30 tablet, Rfl: 11    cetirizine (ZYRTEC) 10 MG tablet, TAKE 1 TABLET EVERY EVENING, Disp: 90 tablet, Rfl: 2    desoximetasone (TOPICORT) 0.25 % cream, Apply topically 2 (two) times daily. (Patient taking differently: Apply topically 2 (two) times daily as needed. ), Disp: 180 g, Rfl: 3    diltiaZEM (CARDIZEM CD) 120 MG Cp24, Take 1 capsule (120 mg total) by mouth 2 (two) times daily., Disp: 60 capsule, Rfl: 3    diphenhydrAMINE (BENADRYL) 25 mg capsule, Take 25 mg by mouth  every 6 (six) hours as needed for Itching., Disp: , Rfl:     flecainide (TAMBOCOR) 50 MG Tab, TAKE 2 TABLETS TWICE A DAY, Disp: 360 tablet, Rfl: 3    fluticasone (FLONASE) 50 mcg/actuation nasal spray, 1 spray by Each Nare route once daily., Disp: 3 Bottle, Rfl: 3    HYDROcodone-acetaminophen (NORCO) 5-325 mg per tablet, Take 1 tablet by mouth 4 (four) times daily as needed., Disp: 12 tablet, Rfl: 0    ketoconazole (NIZORAL) 2 % shampoo, Wash hair with medicated shampoo at least 2x/week - let sit on scalp at least 5 minutes prior to rinsing, Disp: 120 mL, Rfl: 5    levothyroxine (SYNTHROID) 88 MCG tablet, Take 1 tablet (88 mcg total) by mouth once daily., Disp: 90 tablet, Rfl: 3    montelukast (SINGULAIR) 10 mg tablet, Take 1 tablet (10 mg total) by mouth every evening., Disp: 90 tablet, Rfl: 3    multivitamin-minerals-lutein (CENTRUM SILVER) Tab, Take 1 tablet by mouth once daily. 1 Tablet Oral Every day, Disp: , Rfl:     mupirocin (BACTROBAN) 2 % ointment, Apply topically 3 (three) times daily. for 10 days, Disp: 30 g, Rfl: 0    pantoprazole (PROTONIX) 40 MG tablet, TAKE 1 TABLET DAILY 30 MINUTES PRIOR TO BREAKFAST, Disp: 90 tablet, Rfl: 3    polyethylene glycol (GLYCOLAX) 17 gram/dose powder, Take 17 g by mouth once daily., Disp: 1530 g, Rfl: 2    SPIRIVA WITH HANDIHALER 18 mcg inhalation capsule, INHALE THE CONTENTS OF 1 CAPSULE DAILY, Disp: 90 capsule, Rfl: 3    tretinoin (RETIN-A) 0.05 % cream, APPLY THIN FILM TO FACE AT NIGHT AFTER MOISTURIZING, Disp: 45 g, Rfl: 3    warfarin (COUMADIN) 5 MG tablet, Take 1-1.5 tablets (5-7.5 mg total) by mouth Daily., Disp: 60 tablet, Rfl: 3    triamcinolone acetonide 0.1% (KENALOG) 0.1 % cream, Apply topically 2 (two) times daily., Disp: 1 Tube, Rfl: 11  No current facility-administered medications for this visit.     Facility-Administered Medications Ordered in Other Visits:     0.9%  NaCl infusion, , Intravenous, Continuous, Ni Jaimes NP, Stopped  at 12/11/18 0850    sodium chloride 0.9% flush 5 mL, 5 mL, Intravenous, PRN, Ni Jaimes, NP      Review of Systems:        Physical Exam:      Vitals:    03/20/19 1022   BP: 128/84   Pulse: 96         Assessment:      Plan:

## 2019-03-21 ENCOUNTER — OFFICE VISIT (OUTPATIENT)
Dept: DERMATOLOGY | Facility: CLINIC | Age: 69
End: 2019-03-21
Payer: MEDICARE

## 2019-03-21 ENCOUNTER — HOSPITAL ENCOUNTER (OUTPATIENT)
Dept: RADIOLOGY | Facility: CLINIC | Age: 69
Discharge: HOME | End: 2019-03-21
Attending: PHYSICAL MEDICINE & REHABILITATION
Payer: MEDICARE

## 2019-03-21 VITALS — HEIGHT: 66 IN | BODY MASS INDEX: 22.66 KG/M2 | WEIGHT: 141 LBS

## 2019-03-21 DIAGNOSIS — S43.421A SPRAIN OF RIGHT ROTATOR CUFF CAPSULE, INITIAL ENCOUNTER: ICD-10-CM

## 2019-03-21 DIAGNOSIS — L65.9 HAIR LOSS: ICD-10-CM

## 2019-03-21 DIAGNOSIS — J30.2 SEASONAL ALLERGIES: ICD-10-CM

## 2019-03-21 DIAGNOSIS — L21.9 SEBORRHEIC DERMATITIS: Primary | ICD-10-CM

## 2019-03-21 PROCEDURE — 73030 XR SHOULDER TRAUMA 3 VIEW RIGHT: ICD-10-PCS | Mod: 26,RT,S$GLB, | Performed by: RADIOLOGY

## 2019-03-21 PROCEDURE — 73030 X-RAY EXAM OF SHOULDER: CPT | Mod: 26,RT,S$GLB, | Performed by: RADIOLOGY

## 2019-03-21 PROCEDURE — 99999 PR PBB SHADOW E&M-EST. PATIENT-LVL III: CPT | Mod: PBBFAC,,, | Performed by: DERMATOLOGY

## 2019-03-21 PROCEDURE — 99999 PR PBB SHADOW E&M-EST. PATIENT-LVL III: ICD-10-PCS | Mod: PBBFAC,,, | Performed by: DERMATOLOGY

## 2019-03-21 PROCEDURE — 73030 X-RAY EXAM OF SHOULDER: CPT | Mod: TC,FY,PO,RT

## 2019-03-21 PROCEDURE — 99213 OFFICE O/P EST LOW 20 MIN: CPT | Mod: PBBFAC,PO | Performed by: DERMATOLOGY

## 2019-03-21 PROCEDURE — 99213 OFFICE O/P EST LOW 20 MIN: CPT | Mod: S$PBB,,, | Performed by: DERMATOLOGY

## 2019-03-21 PROCEDURE — 99213 PR OFFICE/OUTPT VISIT, EST, LEVL III, 20-29 MIN: ICD-10-PCS | Mod: S$PBB,,, | Performed by: DERMATOLOGY

## 2019-03-21 NOTE — PROGRESS NOTES
Subjective:       Patient ID:  Steph Lira is a 68 y.o. female who presents for   Chief Complaint   Patient presents with    Follow-up     HPI    Last o/v 2/18/2019  Viral warts, unspecified type  Warts are caused by the human papillomavirus (HPV). There are over 150 types of HPV. This virus can spread between people. But you can be exposed to the virus and not get warts. Warts tend to form where skin is damaged or broken. But they can also appear elsewhere. Left untreated, warts can grow in number. They can also spread to other parts of the body.     Different treatment options were reviewed.  For small warts or recurrences, over the counter acid treatments can be used until irritation occurs or mild scabbing.     20-40% salicylic acid can be used with occlusion for significant warts daily with careful paring and debulking with breaks as needed for discomfort or sensitivity.  This can be done on a protracted basis for clearance of warts over time.     Here for electrodesiccation and curettage of Superficial wart on the left finger. bx done on never:        Electrodessication and Curettage Procedure note:     Verbal consent obtained. Lesional tissue marked and prepped with alcohol. Lesion anesthetized with 1% lidocaine with epinephrine. Curettage and Desiccation x 3 cycles to base. Aluminum chloride for hemostasis. Lesion size after primary curettage: 0.3  cm     Area bandaged and wound care explained.           Jonnie  Discussed with patient the etiology and pathogenesis of the disease or skin lesion(s) and possible treatments and aggravators.    Pt to start using her retin a on nose.  Can do home extractions.     Alopecia  -     Calcitriol; Future; Expected date: 02/18/2019  -     ZINC; Future; Expected date: 02/18/2019  -     VITAMIN B12; Future; Expected date: 02/18/2019  -     T4, FREE; Future; Expected date: 02/18/2019  -     T3, FREE; Future; Expected date: 02/18/2019  -     FERRITIN; Future; Expected  date: 02/18/2019  Reviewed with patient to eat protein daily, get labs done, wash with recommended shampoo or soap for the scalp, avoid hair coloring, and potentially consider topical 5% minoxidil.  Discussed with patient the etiology and pathogenesis of the disease or skin lesion(s) and possible treatments and aggravators.    Discussed with patient the need for laboratory work up for further evaluation.  Discussed that such investigation may not be helpful.     Seborrheic dermatitis  Discussed with patient the etiology and pathogenesis of the disease or skin lesion(s) and possible treatments and aggravators.    Instructed patient to use plain Head and Shoulders shampoo regularly for soaks lasting at least 3 minutes or more to the scalp.  Regular shampoo and conditioner afterward is fine.  For suspected allergy cases, rinse away from the face and body discussed.     Vitamin D deficiency?  -     Calcitriol; Future; Expected date: 02/18/2019     Achlorhydria?  -     VITAMIN B12; Future; Expected date: 02/18/2019     Here today for a follow up on hair loss, has started taking zinc daily. Head and shoulders every few days. Has  not been seeing much change.    Review of Systems   Constitutional: Negative for fever, chills and fatigue.   HENT: Negative for congestion, sore throat and mouth sores.    Eyes: Negative for itching and eye watering.   Gastrointestinal: Negative for nausea, vomiting and diarrhea.   Musculoskeletal: Negative for joint swelling and arthralgias.   Skin: Positive for daily sunscreen use (cerave with spf), activity-related sunscreen use and wears hat. Negative for itching and rash.   Hematologic/Lymphatic: Does not bruise/bleed easily.        Objective:    Physical Exam   Constitutional: She appears well-developed and well-nourished. No distress.   Eyes: No conjunctival no injection.   Neurological: She is alert and oriented to person, place, and time. She is not disoriented.   Psychiatric: She has a  normal mood and affect.   Skin:   Areas Examined (abnormalities noted in diagram):   Scalp / Hair Palpated and Inspected  Head / Face Inspection Performed  Neck Inspection Performed  Nails and Digits Inspection Performed                  Diagram Legend     Erythematous scaling macule/papule c/w actinic keratosis       Vascular papule c/w angioma      Pigmented verrucoid papule/plaque c/w seborrheic keratosis      Yellow umbilicated papule c/w sebaceous hyperplasia      Irregularly shaped tan macule c/w lentigo     1-2 mm smooth white papules consistent with Milia      Movable subcutaneous cyst with punctum c/w epidermal inclusion cyst      Subcutaneous movable cyst c/w pilar cyst      Firm pink to brown papule c/w dermatofibroma      Pedunculated fleshy papule(s) c/w skin tag(s)      Evenly pigmented macule c/w junctional nevus     Mildly variegated pigmented, slightly irregular-bordered macule c/w mildly atypical nevus      Flesh colored to evenly pigmented papule c/w intradermal nevus       Pink pearly papule/plaque c/w basal cell carcinoma      Erythematous hyperkeratotic cursted plaque c/w SCC      Surgical scar with no sign of skin cancer recurrence      Open and closed comedones      Inflammatory papules and pustules      Verrucoid papule consistent consistent with wart     Erythematous eczematous patches and plaques     Dystrophic onycholytic nail with subungual debris c/w onychomycosis     Umbilicated papule    Erythematous-base heme-crusted tan verrucoid plaque consistent with inflamed seborrheic keratosis     Erythematous Silvery Scaling Plaque c/w Psoriasis     See annotation      Assessment / Plan:        Seborrheic dermatitis  Pt doesn't like h and s.  Patient to start using sulfur bar soap for the face 1-2 x day.  For scalp usage lather from the soap to be applied to the roots of the scalp and allow to sit for 3-5 minutes regularly.  Watch for rosacea of the scalp.    Hair loss  No change per  pt.  Still same shedding.  Pt confident about pro intake.  Consider minox later.  Recheck zinc level later.  Has been on for about 4 weeks.    Seasonal allergies  Stinging nettle tea or supplements discussed.           Follow-up in about 4 weeks (around 4/18/2019).

## 2019-04-01 NOTE — TELEPHONE ENCOUNTER
Called Pt to let her know that her procedure would be postponed until INR was back in range and she had 4 consecutive INR's in range. Pt voiced understanding.        ----- Message from Marika Childers, PharmD sent at 4/1/2019  4:00 PM CDT -----  Ms Choi's INR dropped this week to baseline. 1.1.  INR's are very labile in this pt.  I am boosting for 2 days to bring level back up, she is to have RFA on 4/3, will this still take place or will pt need to r/s?  thanks

## 2019-04-23 NOTE — PROGRESS NOTES
Subjective:       Patient ID:  Steph Lira is a 68 y.o. female who presents for   Chief Complaint   Patient presents with    Hair Loss    Seborrheic Dermatitis     HPI  Last o/v 3/21/2019  Seborrheic dermatitis  Pt doesn't like h and s.  Patient to start using sulfur bar soap for the face 1-2 x day.  For scalp usage lather from the soap to be applied to the roots of the scalp and allow to sit for 3-5 minutes regularly.  Watch for rosacea of the scalp.     Hair loss  No change per pt.  Still same shedding.  Pt confident about pro intake.  Consider minox later.  Recheck zinc level later.  Has been on for about 4 weeks.     Seasonal allergies  Stinging nettle tea or supplements discussed.    Here today for a follow up on seb derm and hair loss.  Doing sulfur wash every 3 days then moisturizing with Cerave.  Hair loss follow up, still on vitamins. Doing better    Review of Systems   Constitutional: Negative for fever, chills and fatigue.   HENT: Negative for congestion, sore throat and mouth sores.    Eyes: Negative for itching and eye watering.   Gastrointestinal: Negative for nausea, vomiting and diarrhea.   Musculoskeletal: Negative for joint swelling and arthralgias.   Skin: Positive for daily sunscreen use (cerave with spf), activity-related sunscreen use and wears hat. Negative for itching and rash.   Hematologic/Lymphatic: Does not bruise/bleed easily.        Objective:    Physical Exam   Constitutional: She appears well-developed and well-nourished. No distress.   Eyes: No conjunctival no injection.   Neurological: She is alert and oriented to person, place, and time. She is not disoriented.   Psychiatric: She has a normal mood and affect.   Skin:   Areas Examined (abnormalities noted in diagram):   Scalp / Hair Palpated and Inspected  Head / Face Inspection Performed  Neck Inspection Performed  Nails and Digits Inspection Performed         Diagram Legend     Erythematous scaling macule/papule c/w actinic  keratosis       Vascular papule c/w angioma      Pigmented verrucoid papule/plaque c/w seborrheic keratosis      Yellow umbilicated papule c/w sebaceous hyperplasia      Irregularly shaped tan macule c/w lentigo     1-2 mm smooth white papules consistent with Milia      Movable subcutaneous cyst with punctum c/w epidermal inclusion cyst      Subcutaneous movable cyst c/w pilar cyst      Firm pink to brown papule c/w dermatofibroma      Pedunculated fleshy papule(s) c/w skin tag(s)      Evenly pigmented macule c/w junctional nevus     Mildly variegated pigmented, slightly irregular-bordered macule c/w mildly atypical nevus      Flesh colored to evenly pigmented papule c/w intradermal nevus       Pink pearly papule/plaque c/w basal cell carcinoma      Erythematous hyperkeratotic cursted plaque c/w SCC      Surgical scar with no sign of skin cancer recurrence      Open and closed comedones      Inflammatory papules and pustules      Verrucoid papule consistent consistent with wart     Erythematous eczematous patches and plaques     Dystrophic onycholytic nail with subungual debris c/w onychomycosis     Umbilicated papule    Erythematous-base heme-crusted tan verrucoid plaque consistent with inflamed seborrheic keratosis     Erythematous Silvery Scaling Plaque c/w Psoriasis     See annotation      Assessment / Plan:        Seborrheic dermatitis  Better!  Cpm.  Patient to start using sulfur bar soap for the face 1-2 x day.  For scalp usage lather from the soap to be applied to the roots of the scalp and allow to sit for 3-5 minutes regularly.    Hair loss  -     Zinc; Future; Expected date: 04/23/2019  -     Calcitriol; Future; Expected date: 04/23/2019  Better per pt.  Cpm.    Vitamin D deficiency   -     Zinc; Future; Expected date: 04/23/2019  -     Calcitriol; Future; Expected date: 04/23/2019  Discussed with patient the need for laboratory work up for further evaluation.  Discussed that such investigation may not be  helpful.    Zinc deficiency  -     Zinc; Future; Expected date: 04/23/2019  Discussed with patient the need for laboratory work up for further evaluation.  Discussed that such investigation may not be helpful.    Dry skin  Discussed with patient to use organic coconut oil or pure shea butter at least daily for moisturization for the body and organic jojoba oil at least daily for the face.  Gets dry with sulfur soap.           Follow up in about 4 months (around 8/23/2019).

## 2019-04-26 NOTE — PROGRESS NOTES
ABLATION EDUCATION CHECKLIST    PRE-PROCEDURE TESTIN/16/19 @ 9 AM  Pre-Procedure labs have been ordered for you at:  [facility]   · Be sure to arrive at your scheduled time.   · YOU DO NOT HAVE TO FAST FOR THIS LAB WORK!    19 - Last dose of FLECAINIDE. (Hold 3 days prior to procedure).      DAY OF PROCEDURE:    19 @ 5:45 AM - ablation  Report to Cardiology Waiting Room on 3rd floor of the Hospital    · Do not eat or drink anything after: 12 mn on the night before your procedure  · Please do not wear makeup (especially mascara) when arriving for your procedure    Medications:   · HOLD FLECAINIDE 5 DAYS PRIOR TO PROCEDURE. LAST DOSE 19  · You may take all  other morning medications with a sip of water      Directions to the Cardiology Waiting Room  If you park in the Parking Garage:  Take elevators to the 2nd floor  Walk up ramp and turn right by [employer]  Take elevator to the 3rd floor  Upon exiting the elevator, turn away from the clinic areas  Walk long tavarez around to front of hospital to area with windows overlooking [facility]  Check in at Reception Desk  OR  If family is dropping you off:  Have them drop you off at the front of the Hospital  (Near the ER, where all the flags are hung).  Take the E elevators to the 3rd floor.  Check in at the Reception Desk in the waiting room.        · You will be spending the night after your procedure.  · You will need someone to drive you home the day after your procedure.  · Your pain during your procedure will be managed by the anesthesia team.     Any need to reschedule or cancel procedures, or any questions regarding your procedures should be addressed directly with the Arrhythmia Department Nurses at the following phone number: [phone]

## 2019-05-02 NOTE — PROGRESS NOTES
HPI:  Patient is a 68 y.o. year old female s/p shoulder cortisone injection. She states her pain is mostly resolved. She would like to hold off on any other procedures until it recurs.  She is complaining left upper arm and shoulder swelling. She was diagnosed w. Lung cancer that had metastisized to rib cage and scapula in the 1990's. She had extensive surgery which including rib, scapula resection and lymph node resection. She is bothered by the swelling. It is off and on . It involves the proximal portion of the arm and shoulder.    Imaging  X-Ray Shoulder Trauma 3 view Right   Order: [id number]   Status:  Final result   Visible to patient:  Yes (Patient Portal) Next appt:  05/06/2019 at 09:20 AM in Lab (LAB, [facility]) Dx:  Sprain of right rotator cuff capsule,...   Details     Reading Physician Reading Date Result Priority   Klaus Neri MD 3/21/2019       Narrative     EXAMINATION:  XR SHOULDER TRAUMA 3 VIEW RIGHT    CLINICAL HISTORY:  Sprain of right rotator cuff capsule, initial encounter    TECHNIQUE:  Three or four views of the right shoulder were performed.    COMPARISON:  None    FINDINGS:  There is no fracture or dislocation.  There is mild AC joint arthrosis.  Prior sternotomy noted.  Multiple vascular stents overlie the mediastinum and left neck.      Impression       Mild right AC joint arthrosis.                   Past Medical History:   Diagnosis Date    *Atrial fibrillation     and Hx PSVT    Allergic drug rash 12/28/2016    Allergy     chronic     Arthritis     fingers    Benign tumor of throat     Bronchitis March 2013    CAD (coronary artery disease) 2005    CABGx2 in 2005 and 2 MI after with 4 stents    Cancer 1996    small cell lung cancer s/p resection of 1/3 lung, 5 ribs and muscle mass then had chemo and radiation    Cataract     OD     CHF (congestive heart failure) past Hx    Chronic rhinitis     Clot past Hx    external jugular, now stented, occluded, had phlebitis;  now revascularized    Colon polyp     COPD (chronic obstructive pulmonary disease)     no oxygen or nebulizers    COPD exacerbation 5/8/13    improved 5/14/13 after steroid pack,antibiotics    Former smoker     GERD (gastroesophageal reflux disease)     Heart block     Hyperlipidemia     Hypertension     pt states does not have //    MI (myocardial infarction) 2005    Posterior vitreous detachment of left eye     Thyroid disease      Past Surgical History:   Procedure Laterality Date    ABLATION N/A 12/11/2018    Performed by Santana Covarrubias MD at [facility] EP LAB    ADENOIDECTOMY      APPENDECTOMY      BACK SURGERY      lumbar    BREAST BIOPSY Left     benign    CARDIAC SURGERY      CATARACT EXTRACTION Left     OS    CATARACT EXTRACTION, BILATERAL      left eye only    CHOLECYSTECTOMY      COLONOSCOPY  03/2012    repeat in 5 years    COLONOSCOPY N/A 6/19/2017    [facility]    COLONOSCOPY N/A 3/28/2012    Performed by Garrison [facility]oo [facility] at [facility]    CORONARY ARTERY BYPASS GRAFT  2005    2 vessel    ECHOCARDIOGRAM,TRANSESOPHAGEAL N/A 12/11/2018    Performed by [facility] Provider at [facility] EP LAB    EGD (ESOPHAGOGASTRODUODENOSCOPY)/cryo N/A 11/1/2018    Performed by Robbie Gonzales MD at [facility] ENDO (2ND FLR)    EGD (ESOPHAGOGASTRODUODENOSCOPY)/poss cryo N/A 10/3/2018    Performed by Robbie Gonzales MD at [facility] ENDO (2ND FLR)    ESOPHAGOGASTRODUODENOSCOPY (EGD) N/A 11/29/2017    Performed by Daniel Gillette MD at [facility] ENDO (2ND FLR)    ESOPHAGOGASTRODUODENOSCOPY (EGD) N/A 10/3/2017    Performed by Hue Wright MD at [facility] ENDO    ESOPHAGOGASTRODUODENOSCOPY (EGD) N/A 6/14/2017    Performed by Kal Elise MD at [facility] ENDO    ESOPHAGOGASTRODUODENOSCOPY (EGD) N/A 10/12/2016    Performed by Kal Elise MD at [facility] ENDO    ESOPHAGOGASTRODUODENOSCOPY (EGD) N/A 5/10/2016    Performed by Garrison Koo MD at [facility] ENDO     ESOPHAGOGASTRODUODENOSCOPY (EGD) W/CRYOTHERAPY N/A 12/22/2017    Performed by [facility] at [facility] ENDO    ESOPHAGOGASTRODUODENOSCOPY (EGD) with cryo N/A 3/5/2018    Performed by Robbie Gonzales MD at [facility] ENDO    ESOPHAGOGASTRODUODENOSCOPY (EGD)/cryo N/A 7/5/2018    Performed by Robbie Gonzales MD at [facility] ENDO (2ND FLR)    ESOPHAGOGASTRODUODENOSCOPY (EGD)/cryo N/A 4/16/2018    Performed by Robbie Gonzales MD at [facility] ENDO (2ND FLR)    ESOPHAGOGASTRODUODENOSCOPY (EGD)/poss Cryo N/A 12/14/2017    Performed by Robbie Gonzales MD at [facility] ENDO (2ND FLR)    ESOPHAGOGASTRODUODENOSCOPY (EGD)/Poss EMR N/A 10/31/2017    Performed by Robbie Gonzales MD at [facility] (2ND FLR)    EYE SURGERY  Dec 2012    ptosis repair    FIRST RIB REMOVAL  1996 with lung resection    HEMORRHOIDECTOMY N/A 3/16/2018    Per[facility] by [facility]ano [facility] at [facility] OR    HYSTERECTOMY      LUNG LOBECTOMY  1996    left lung for cancer    phaco/pciol Right 11/5/2014    Performed by Guerline Lawson MD [facility] OR    REMOVAL OF PLANTAR WART USING LASER      REPAIR, BLEPHAROPTOSIS Left 12/6/2012    Performed by Eze Glynn MD at [facility] OR 1ST FLR    SHOULDER SURGERY  2010,2011    scapular entrapment after lung surgery, needed 5 ribs removed left side    SYMPOTHATIC NERVE LEFT SIDE      1996 with lung surgery    TONSILLECTOMY      TONSILLECTOMY, ADENOIDECTOMY, BILATERAL MYRINGOTOMY AND TUBES      ULTRASOUND-ENDOSCOPIC-UPPER N/A 10/31/2017    Performed by Robbie Gonzales MD at [facility] (2ND FLR)    UPPER GASTROINTESTINAL ENDOSCOPY  05/2016    Dr. Koo    VASCULAR SURGERY      stents place in jugualr vein     Family History   Problem Relation Age of Onset    Allergic rhinitis Father     Cancer Father     Hypertension Father     Allergies Father     Allergic rhinitis Son     Asthma Son     Stroke Mother     Allergies Mother     Allergic rhinitis Sister     Asthma Sister      Diverticulitis Brother     No Known Problems Daughter     No Known Problems Maternal Aunt     No Known Problems Maternal Uncle     No Known Problems Paternal Aunt     No Known Problems Paternal Uncle     Colon polyps Maternal Grandmother          of colon blockage    No Known Problems Maternal Grandfather     No Known Problems Paternal Grandmother     No Known Problems Paternal Grandfather     No Known Problems Brother     No Known Problems Sister     Angioedema Neg Hx     Eczema Neg Hx     Immunodeficiency Neg Hx     Urticaria Neg Hx     Skin cancer Neg Hx     Amblyopia Neg Hx     Blindness Neg Hx     Cataracts Neg Hx     Diabetes Neg Hx     Glaucoma Neg Hx     Macular degeneration Neg Hx     Retinal detachment Neg Hx     Strabismus Neg Hx     Thyroid disease Neg Hx     Colon cancer Neg Hx     Melanoma Neg Hx      Social History     Socioeconomic History    Marital status:      Spouse name: Not on file    Number of children: Not on file    Years of education: Not on file    Highest education level: Not on file   Occupational History    Occupation: retired   Social Needs    Financial resource strain: Not on file    Food insecurity:     Worry: Not on file     Inability: Not on file    Transportation needs:     Medical: Not on file     Non-medical: Not on file   Tobacco Use    Smoking status: Former Smoker     Packs/day: 2.00     Years: 20.00     Pack years: 40.00     Types: Cigarettes     Last attempt to quit: 1994     Years since quittin.3    Smokeless tobacco: Never Used   Substance and Sexual Activity    Alcohol use: No    Drug use: No    Sexual activity: Yes     Partners: Male   Lifestyle    Physical activity:     Days per week: Not on file     Minutes per session: Not on file    Stress: Not on file   Relationships    Social connections:     Talks on phone: Not on file     Gets together: Not on file     Attends Jain service: Not on file      Active member of club or organization: Not on file     Attends meetings of clubs or organizations: Not on file     Relationship status: Not on file   Other Topics Concern    Are you pregnant or think you may be? No    Breast-feeding No   Social History Narrative    Not on file       Review of patient's allergies indicates:   Allergen Reactions    Ciprofloxacin Itching    Coreg [carvedilol]      Low energy    Doxycycline Other (See Comments) and Hives     Patient had a rxn with the sun despite wearing spf 30    Metoprolol      Low energy       Current Outpatient Medications:     ADVAIR DISKUS 250-50 mcg/dose diskus inhaler, USE 1 INHALATION TWICE A DAY (RINSE MOUTH AFTER USE TO PREVENT THRUSH), Disp: 3 each, Rfl: 3    albuterol (ACCUNEB) 1.25 mg/3 mL Nebu, USE 1 VIAL (3 ML) VIA NEBULIZER EVERY 6 HOURS AS NEEDED, Disp: 90 mL, Rfl: 2    albuterol (PROVENTIL HFA) 90 mcg/actuation inhaler, Inhale 2 puffs into the lungs every 6 (six) hours as needed for Wheezing., Disp: 3 Inhaler, Rfl: 4    atorvastatin (LIPITOR) 40 MG tablet, Take 1 tablet (40 mg total) by mouth once daily., Disp: 90 tablet, Rfl: 3    bisacodyl (DULCOLAX) 5 mg EC tablet, Take 1 tablet (5 mg total) by mouth daily as needed for Constipation., Disp: 30 tablet, Rfl: 11    cetirizine (ZYRTEC) 10 MG tablet, TAKE 1 TABLET EVERY EVENING, Disp: 90 tablet, Rfl: 2    desoximetasone (TOPICORT) 0.25 % cream, Apply topically 2 (two) times daily. (Patient taking differently: Apply topically 2 (two) times daily as needed. ), Disp: 180 g, Rfl: 3    diltiaZEM (CARDIZEM CD) 120 MG Cp24, Take 1 capsule (120 mg total) by mouth 2 (two) times daily., Disp: 60 capsule, Rfl: 3    diphenhydrAMINE (BENADRYL) 25 mg capsule, Take 25 mg by mouth every 6 (six) hours as needed for Itching., Disp: , Rfl:     flecainide (TAMBOCOR) 50 MG Tab, TAKE 2 TABLETS TWICE A DAY, Disp: 360 tablet, Rfl: 3    fluticasone (FLONASE) 50 mcg/actuation nasal spray, 1 spray by Each Nare  route once daily., Disp: 3 Bottle, Rfl: 3    levothyroxine (SYNTHROID) 88 MCG tablet, Take 1 tablet (88 mcg total) by mouth once daily., Disp: 90 tablet, Rfl: 3    montelukast (SINGULAIR) 10 mg tablet, Take 1 tablet (10 mg total) by mouth every evening., Disp: 90 tablet, Rfl: 3    multivitamin-minerals-lutein (CENTRUM SILVER) Tab, Take 1 tablet by mouth once daily. 1 Tablet Oral Every day, Disp: , Rfl:     pantoprazole (PROTONIX) 40 MG tablet, TAKE 1 TABLET DAILY 30 MINUTES PRIOR TO BREAKFAST, Disp: 90 tablet, Rfl: 3    polyethylene glycol (GLYCOLAX) 17 gram/dose powder, Take 17 g by mouth once daily., Disp: 1530 g, Rfl: 2    SPIRIVA WITH HANDIHALER 18 mcg inhalation capsule, INHALE THE CONTENTS OF 1 CAPSULE DAILY, Disp: 90 capsule, Rfl: 3    tretinoin (RETIN-A) 0.05 % cream, APPLY THIN FILM TO FACE AT NIGHT AFTER MOISTURIZING, Disp: 45 g, Rfl: 3    warfarin (COUMADIN) 5 MG tablet, Take 1-1.5 tablets (5-7.5 mg total) by mouth Daily., Disp: 60 tablet, Rfl: 3  No current facility-administered medications for this visit.     Facility-Administered Medications Ordered in Other Visits:     0.9%  NaCl infusion, , Intravenous, Continuous, Ni Jaimes NP, Stopped at 12/11/18 0850    sodium chloride 0.9% flush 5 mL, 5 mL, Intravenous, PRN, Ni Jaimes NP      Review of Systems  No nausea, vomiting, fevers, Chills , contipation, diarrhea or sweats    Physical Exam:      Vitals:    05/02/19 0932   BP: 127/69   Pulse: 74     alert and oriented ×4 follows commands answers all questions appropriately,affect wnl  Manual muscle test 5 out of 5 except for left shoulder abduction and fwd flexion sensation to light touch grossly intact  Dec rom left shoulder all directions full rom of right shoulder  Deformity of left scapula  + varicosity veins of proximal left arm  +swelling of upper extremity  No  tenderness left arm or scapula  Nl gait  No C/C/E      Assessment:  rtc tendinopathy  On coumadin d/t afib  awaiting ablation  Lymphedema s/p lymph node resection in 1990's s/p lung cancer w. mets    Plan:  Shoulder pain improved w. Cortisone. She would like to hold off on any other procedures for now.  Will issue lymphedema sleeve for left arm  rtc prn

## 2019-05-08 NOTE — ANESTHESIA PROCEDURE NOTES
Arterial    Diagnosis: afib    Patient location during procedure: done in OR  Procedure start time: 5/8/2019 12:30 PM  Timeout: 5/8/2019 12:30 PM  Procedure end time: 5/8/2019 12:32 PM  Staffing  Anesthesiologist: Bimal Espitia MD  Performed: anesthesiologist   Anesthesiologist was present at the time of the procedure.  Preanesthetic Checklist  Completed: patient identified, site marked, surgical consent, pre-op evaluation, timeout performed, IV checked, risks and benefits discussed, monitors and equipment checked and anesthesia consent givenArterial  Skin Prep: chlorhexidine gluconate  Local Infiltration: none  Orientation: right  Location: radial  Catheter Size: 20 G  Catheter placement by Anatomical landmarks. Heme positive aspiration all ports.Insertion Attempts: 1  Assessment  Dressing: secured with tape and tegaderm

## 2019-05-08 NOTE — NURSING TRANSFER
Nursing Transfer Note      5/8/2019     Transfer To: [id number]    Transfer via stretcher    Transfer with cardiac monitoring    Transported by PCT    Medicines sent: none    Chart send with patient: yes    Notified: Federico

## 2019-05-08 NOTE — HPI
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 She is on warfarin.    TTE 8/2018  · Mild regurgitation is present in the aortic valve..  · Septal wall has abnormal motion consistent with post-operative status.  · Mitral valve shows mild regurgitation.  · Left ventricle ejection fraction is mildly decreased at 40-45%  · Grade I (mild) left ventricular diastolic dysfunction consistent with impaired relaxation.  · LA pressure is normal.  · RV systolic function is normal.  · Left atrium is moderately dilated.  · Right atrium is moderately dilated.  · Normal central venous pressure (3 mm Hg).  · Tricuspid valve shows mild regurgitation.

## 2019-05-08 NOTE — H&P
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 She is on warfarin.    TTE 8/2018  · Mild regurgitation is present in the aortic valve..  · Septal wall has abnormal motion consistent with post-operative status.  · Mitral valve shows mild regurgitation.  · Left ventricle ejection fraction is mildly decreased at 40-45%  · Grade I (mild) left ventricular diastolic dysfunction consistent with impaired relaxation.  · LA pressure is normal.  · RV systolic function is normal.  · Left atrium is moderately dilated.  · Right atrium is moderately dilated.  · Normal central venous pressure (3 mm Hg).  · Tricuspid valve shows mild regurgitation.      Past Medical History:   Diagnosis Date    *Atrial fibrillation     and Hx PSVT    Allergic drug rash 12/28/2016    Allergy     chronic     Arthritis     fingers    Benign tumor of throat     Bronchitis March 2013    CAD (coronary artery disease) 2005    CABGx2 in 2005 and 2 MI after with 4 stents    Cancer 1996    small cell lung cancer s/p resection of 1/3 lung, 5 ribs and muscle mass then had chemo and radiation    Cataract     OD     CHF (congestive heart failure) past Hx    Chronic rhinitis     Clot past Hx    external jugular, now stented, occluded, had phlebitis; now revascularized    Colon polyp     COPD (chronic obstructive pulmonary disease)     no oxygen or nebulizers    COPD exacerbation 5/8/13    improved 5/14/13 after steroid pack,antibiotics    Former smoker     GERD (gastroesophageal reflux disease)     Heart block      Hyperlipidemia     Hypertension     pt states does not have //    MI (myocardial infarction) 2005    Posterior vitreous detachment of left eye     Thyroid disease        Past Surgical History:   Procedure Laterality Date    ABLATION N/A 12/11/2018    Performed by Santana Covarrubias MD at [facility] EP LAB    ADENOIDECTOMY      APPENDECTOMY      BACK SURGERY      lumbar    BREAST BIOPSY Left     benign    CARDIAC SURGERY      CATARACT EXTRACTION Left     OS    CATARACT EXTRACTION, BILATERAL      left eye only    CHOLECYSTECTOMY      COLONOSCOPY  03/2012    repeat in 5 years    COLONOSCOPY N/A 6/19/2017    Performed by Kal Elise MD at [facility]    COLONOSCOPY N/A 3/28/2012    Performed by Garrison Koo MD at [facility]    CORONARY ARTERY BYPASS GRAFT  2005    2 vessel    ECHOCARDIOGRAM,TRANSESOPHAGEAL N/A 12/11/2018    Performed by [facility] at [facility] EP LAB    EGD (ESOPHAGOGASTRODUODENOSCOPY)/cryo N/A 11/1/2018    Performed by Robbie Gonzales MD at [facility] ENDO (2ND FLR)    EGD (ESOPHAGOGASTRODUODENOSCOPY)/poss cryo N/A 10/3/2018    Performed by Robbie Gonzales MD at [facility] ENDO (2ND FLR)    ESOPHAGOGASTRODUODENOSCOPY (EGD) N/A 11/29/2017    Performed by Daniel Gillette MD at [facility] ENDO (2ND FLR)    ESOPHAGOGASTRODUODENOSCOPY (EGD) N/A 10/3/2017    Performed by Hue Wright MD at [facility] ENDO    ESOPHAGOGASTRODUODENOSCOPY (EGD) N/A 6/14/2017    Performed by Kal Elise MD at [facility] ENDO    ESOPHAGOGASTRODUODENOSCOPY (EGD) N/A 10/12/2016    Performed by Kal Elise MD at [facility] ENDO    ESOPHAGOGASTRODUODENOSCOPY (EGD) N/A 5/10/2016    Performed by Garrison Koo MD at [facility] ENDO    ESOPHAGOGASTRODUODENOSCOPY (EGD) W/CRYOTHERAPY N/A 12/22/2017    Performed by Robbie Gonzales MD at [facility] ENDO    ESOPHAGOGASTRODUODENOSCOPY (EGD) with cryo N/A 3/5/2018    Performed by Robbie Gonzales MD at [facility] ENDO    ESOPHAGOGASTRODUODENOSCOPY (EGD)/cryo N/A 7/5/2018     Performed by Robbie Gonzales MD at [facility] ENDO (2ND FLR)    ESOPHAGOGASTRODUODENOSCOPY (EGD)/cryo N/A 4/16/2018    Performed by Robbie Gonzales MD at [facility] ENDO (2ND FLR)    ESOPHAGOGASTRODUODENOSCOPY (EGD)/poss Cryo N/A 12/14/2017    Performed by Robbie Gonzales MD at [facility] ENDO (2ND FLR)    ESOPHAGOGASTRODUODENOSCOPY (EGD)/Poss EMR N/A 10/31/2017    Performed by Robbie Gonzales MD at [facility] ENDO (2ND FLR)    EYE SURGERY  Dec 2012    ptosis repair    FIRST RIB REMOVAL  1996 with lung resection    HEMORRHOIDECTOMY N/A 3/16/2018    Per[facility] by [facility] at [facility] OR    HYSTERECTOMY      LUNG LOBECTOMY  1996    left lung for cancer    phaco/pciol Right 11/5/2014    [facility]    REMOVAL OF PLANTAR WART USING LASER      REPAIR, BLEPHAROPTOSIS Left 12/6/2012    Performed by Eze Glynn MD at [facility] OR 1ST FLR    SHOULDER SURGERY  2010,2011    scapular entrapment after lung surgery, needed 5 ribs removed left side    SYMPOTHATIC NERVE LEFT SIDE      1996 with lung surgery    TONSILLECTOMY      TONSILLECTOMY, ADENOIDECTOMY, BILATERAL MYRINGOTOMY AND TUBES      ULTRASOUND-ENDOSCOPIC-UPPER N/A 10/31/2017    Performed by Robbie Gonzales MD at [facility] ENDO (2ND FLR)    UPPER GASTROINTESTINAL ENDOSCOPY  05/2016    Dr. Koo    VASCULAR SURGERY      stents place in jugualr vein       Review of patient's allergies indicates:   Allergen Reactions    Ciprofloxacin Itching    Coreg [carvedilol]      Low energy    Doxycycline Other (See Comments) and Hives     Patient had a rxn with the sun despite wearing spf 30    Metoprolol      Low energy       Current Facility-Administered Medications on File Prior to Encounter   Medication    0.9%  NaCl infusion    sodium chloride 0.9% flush 5 mL     Current Outpatient Medications on File Prior to Encounter   Medication Sig    ADVAIR DISKUS 250-50 mcg/dose diskus inhaler USE 1 INHALATION TWICE A DAY  (RINSE MOUTH AFTER USE TO PREVENT THRUSH)    albuterol (PROVENTIL HFA) 90 mcg/actuation inhaler Inhale 2 puffs into the lungs every 6 (six) hours as needed for Wheezing.    atorvastatin (LIPITOR) 40 MG tablet Take 1 tablet (40 mg total) by mouth once daily.    cetirizine (ZYRTEC) 10 MG tablet TAKE 1 TABLET EVERY EVENING    flecainide (TAMBOCOR) 50 MG Tab TAKE 2 TABLETS TWICE A DAY    levothyroxine (SYNTHROID) 88 MCG tablet Take 1 tablet (88 mcg total) by mouth once daily.    montelukast (SINGULAIR) 10 mg tablet Take 1 tablet (10 mg total) by mouth every evening.    multivitamin-minerals-lutein (CENTRUM SILVER) Tab Take 1 tablet by mouth once daily. 1 Tablet Oral Every day    SPIRIVA WITH HANDIHALER 18 mcg inhalation capsule INHALE THE CONTENTS OF 1 CAPSULE DAILY    albuterol (ACCUNEB) 1.25 mg/3 mL Nebu USE 1 VIAL (3 ML) VIA NEBULIZER EVERY 6 HOURS AS NEEDED    bisacodyl (DULCOLAX) 5 mg EC tablet Take 1 tablet (5 mg total) by mouth daily as needed for Constipation.    desoximetasone (TOPICORT) 0.25 % cream Apply topically 2 (two) times daily. (Patient taking differently: Apply topically 2 (two) times daily as needed. )    diphenhydrAMINE (BENADRYL) 25 mg capsule Take 25 mg by mouth every 6 (six) hours as needed for Itching.    fluticasone (FLONASE) 50 mcg/actuation nasal spray 1 spray by Each Nare route once daily.    polyethylene glycol (GLYCOLAX) 17 gram/dose powder Take 17 g by mouth once daily.    tretinoin (RETIN-A) 0.05 % cream APPLY THIN FILM TO FACE AT NIGHT AFTER MOISTURIZING    warfarin (COUMADIN) 5 MG tablet Take 1-1.5 tablets (5-7.5 mg total) by mouth Daily.     Family History     Problem Relation (Age of Onset)    Allergic rhinitis Father, Son, Sister    Allergies Father, Mother    Asthma Son, Sister    Cancer Father    Colon polyps Maternal Grandmother    Diverticulitis Brother    Hypertension Father    No Known Problems Daughter, Maternal Aunt, Maternal Uncle, Paternal Aunt, Paternal  Uncle, Maternal Grandfather, Paternal Grandmother, Paternal Grandfather, Brother, Sister    Stroke Mother        Tobacco Use    Smoking status: Former Smoker     Packs/day: 2.00     Years: 20.00     Pack years: 40.00     Types: Cigarettes     Last attempt to quit: 1994     Years since quittin.3    Smokeless tobacco: Never Used   Substance and Sexual Activity    Alcohol use: No    Drug use: No    Sexual activity: Yes     Partners: Male     Review of Systems   Constitution: Negative for chills and fever.   HENT: Negative for ear discharge.    Eyes: Negative for pain and visual disturbance.   Cardiovascular: Negative for chest pain, dyspnea on exertion, irregular heartbeat, leg swelling, orthopnea, palpitations, paroxysmal nocturnal dyspnea and syncope.   Respiratory: Negative for hemoptysis, shortness of breath and wheezing.    Skin: Negative for rash and suspicious lesions.   Musculoskeletal: Negative for joint pain and muscle weakness.   Gastrointestinal: Negative for abdominal pain, diarrhea, nausea and vomiting.   Genitourinary: Negative for dysuria and frequency.   Neurological: Negative for focal weakness, headaches and tremors.     Objective:     Vital Signs (Most Recent):  Temp: 96.8 °F (36 °C) (19 1015)  Pulse: 79 (19 1015)  Resp: 20 (19 1015)  BP: 133/68 (19 1017)  SpO2: 97 % (19 1015) Vital Signs (24h Range):  Temp:  [96.8 °F (36 °C)] 96.8 °F (36 °C)  Pulse:  [79] 79  Resp:  [20] 20  SpO2:  [97 %] 97 %  BP: (133-143)/(68-74) 133/68       Weight: 63.5 kg (140 lb)  Body mass index is 22.6 kg/m².    SpO2: 97 %  O2 Device (Oxygen Therapy): room air    Physical Exam   Constitutional: She is oriented to person, place, and time. She appears well-developed and well-nourished.   HENT:   Head: Normocephalic and atraumatic.   Eyes: EOM are normal.   Cardiovascular: Normal rate and regular rhythm. Exam reveals no gallop and no friction rub.   Pulmonary/Chest: Effort normal  and breath sounds normal. No stridor. She has no wheezes. She has no rales.   Abdominal: Soft. Bowel sounds are normal. There is no rebound and no guarding.   Musculoskeletal: She exhibits no edema.   Neurological: She is alert and oriented to person, place, and time. No cranial nerve deficit.   Skin: Skin is warm and dry.   Psychiatric: She has a normal mood and affect. Her behavior is normal.       Significant Labs:   BMP:   Recent Labs   Lab 05/07/19  1355   GLU 99      K 4.1      CO2 27   BUN 18   CREATININE 1.0   CALCIUM 9.7   , CMP:   Recent Labs   Lab 05/07/19  1355      K 4.1      CO2 27   GLU 99   BUN 18   CREATININE 1.0   CALCIUM 9.7   ANIONGAP 8   ESTGFRAFRICA >60   EGFRNONAA 58*   , CBC:   Recent Labs   Lab 05/07/19  1355   WBC 7.00   HGB 12.9   HCT 39.5      , INR:   Recent Labs   Lab 05/07/19  1355 05/08/19  0954   INR 4.4* 4.4*   , Lipid Panel No results for input(s): CHOL, HDL, LDLCALC, TRIG, CHOLHDL in the last 48 hours. and Troponin No results for input(s): TROPONINI in the last 48 hours.      Assessment and Plan:     Paroxysmal atrial fibrillation  - PVI ablation  - consents signed        Ron Almaraz MD  Cardiac Electrophysiology  [facility]

## 2019-05-08 NOTE — DISCHARGE SUMMARY
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 She is on warfarin.    TTE 8/2018  · Mild regurgitation is present in the aortic valve..  · Septal wall has abnormal motion consistent with post-operative status.  · Mitral valve shows mild regurgitation.  · Left ventricle ejection fraction is mildly decreased at 40-45%  · Grade I (mild) left ventricular diastolic dysfunction consistent with impaired relaxation.  · LA pressure is normal.  · RV systolic function is normal.  · Left atrium is moderately dilated.  · Right atrium is moderately dilated.  · Normal central venous pressure (3 mm Hg).  · Tricuspid valve shows mild regurgitation.      Procedure(s) (LRB):  ABLATION, ARRHYTHMOGENIC FOCUS, FOR ATRIAL FIBRILLATION (N/A)  Cardioversion     Indwelling Lines/Drains at time of discharge:  Lines/Drains/Airways     Arterial Line                 Arterial Line 05/08/19 1230 Right Radial less than 1 day              Review of Systems   Constitutional: Negative.    HENT: Negative.    Eyes: Negative.    Respiratory: Negative.    Cardiovascular: Negative for chest pain, palpitations, orthopnea, leg swelling and PND.   Gastrointestinal: Negative.    Genitourinary: Negative.    Musculoskeletal: Negative.    Skin: Negative.    Neurological: Negative.    Endo/Heme/Allergies: Negative.    Psychiatric/Behavioral: Negative.      Physical Exam   Constitutional: She is oriented to person, place, and time and  well-developed, well-nourished, and in no distress.   HENT:   Head: Normocephalic and atraumatic.   Eyes: Right eye exhibits no discharge. Left eye exhibits no discharge.   Cardiovascular: Normal rate and regular rhythm.   Pulmonary/Chest: Effort normal. No stridor.   Abdominal: Soft.   Neurological: She is alert and oriented to person, place, and time.   Skin: Skin is warm and dry.     Hospital Course:  Successful PVI ablation.     Consults: None    Significant Diagnostic Studies: None    Pending Diagnostic Studies:     Procedure Component Value Units Date/Time    EKG 12-LEAD [[id number]]     Order Status:  Sent Lab Status:  No result     EKG 12-lead [[id number]]     Order Status:  Sent Lab Status:  No result           Final Active Diagnoses:    Diagnosis Date Noted POA    Paroxysmal atrial fibrillation [I48.0] 12/11/2018 Yes      Problems Resolved During this Admission:         Discharged Condition: good    Disposition: Home    Follow Up:  Follow-up Information     Santana Covarrubias MD In 2 months.    Specialties:  Electrophysiology, Cardiovascular Disease, Cardiology  Contact information:  [address] [place] [address] [id number] [phone]                 Patient Instructions:      Diet Cardiac     Notify your health care provider if you experience any of the following:  temperature >100.4     Notify your health care provider if you experience any of the following:  persistent nausea and vomiting or diarrhea     Notify your health care provider if you experience any of the following:  severe uncontrolled pain     Notify your health care provider if you experience any of the following:  redness, tenderness, or signs of infection (pain, swelling, redness, odor or green/yellow discharge around incision site)     Notify your health care provider if you experience any of the following:  difficulty breathing or increased cough     Notify your health care provider if you experience any of the following:  severe  persistent headache     Notify your health care provider if you experience any of the following:  worsening rash     Notify your health care provider if you experience any of the following:  persistent dizziness, light-headedness, or visual disturbances     Notify your health care provider if you experience any of the following:  increased confusion or weakness     Notify your health care provider if you experience any of the following:     Activity as tolerated     Medications:  Reconciled Home Medications:      Medication List      START taking these medications    furosemide 20 MG tablet  Commonly known as:  LASIX  Take 1 tablet (20 mg total) by mouth daily as needed.     ibuprofen 400 MG tablet  Commonly known as:  ADVIL,MOTRIN  Take 1 tablet (400 mg total) by mouth every 6 (six) hours as needed for Other.        CHANGE how you take these medications    desoximetasone 0.25 % cream  Commonly known as:  TOPICORT  Apply topically 2 (two) times daily.  What changed:    · when to take this  · reasons to take this        CONTINUE taking these medications    ADVAIR DISKUS 250-50 mcg/dose diskus inhaler  Generic drug:  fluticasone-salmeterol 250-50 mcg/dose  USE 1 INHALATION TWICE A DAY (RINSE MOUTH AFTER USE TO PREVENT THRUSH)     * albuterol 90 mcg/actuation inhaler  Commonly known as:  PROVENTIL HFA  Inhale 2 puffs into the lungs every 6 (six) hours as needed for Wheezing.     * albuterol 1.25 mg/3 mL Nebu  Commonly known as:  ACCUNEB  USE 1 VIAL (3 ML) VIA NEBULIZER EVERY 6 HOURS AS NEEDED     atorvastatin 40 MG tablet  Commonly known as:  LIPITOR  Take 1 tablet (40 mg total) by mouth once daily.     bisacodyl 5 mg EC tablet  Commonly known as:  DULCOLAX  Take 1 tablet (5 mg total) by mouth daily as needed for Constipation.     CENTRUM SILVER Tab  Generic drug:  multivitamin-minerals-lutein  Take 1 tablet by mouth once daily. 1 Tablet Oral Every day     cetirizine 10 MG tablet  Commonly known as:  ZYRTEC  TAKE 1  TABLET EVERY EVENING     diltiaZEM 120 MG Cp24  Commonly known as:  CARDIZEM CD  Take 1 capsule (120 mg total) by mouth 2 (two) times daily.     diphenhydrAMINE 25 mg capsule  Commonly known as:  BENADRYL  Take 25 mg by mouth every 6 (six) hours as needed for Itching.     flecainide 50 MG Tab  Commonly known as:  TAMBOCOR  TAKE 2 TABLETS TWICE A DAY     fluticasone propionate 50 mcg/actuation nasal spray  Commonly known as:  FLONASE  1 spray by Each Nare route once daily.     levothyroxine 88 MCG tablet  Commonly known as:  SYNTHROID  Take 1 tablet (88 mcg total) by mouth once daily.     montelukast 10 mg tablet  Commonly known as:  SINGULAIR  Take 1 tablet (10 mg total) by mouth every evening.     pantoprazole 40 MG tablet  Commonly known as:  PROTONIX  TAKE 1 TABLET DAILY 30 MINUTES PRIOR TO BREAKFAST     polyethylene glycol 17 gram/dose powder  Commonly known as:  GLYCOLAX  Take 17 g by mouth once daily.     SPIRIVA WITH HANDIHALER 18 mcg inhalation capsule  Generic drug:  tiotropium  INHALE THE CONTENTS OF 1 CAPSULE DAILY     tretinoin 0.05 % cream  Commonly known as:  RETIN-A  APPLY THIN FILM TO FACE AT NIGHT AFTER MOISTURIZING     warfarin 5 MG tablet  Commonly known as:  COUMADIN  Take 1-1.5 tablets (5-7.5 mg total) by mouth Daily.         * This list has 2 medication(s) that are the same as other medications prescribed for you. Read the directions carefully, and ask your doctor or other care provider to review them with you.              Ron Almaraz MD  Cardiac Electrophysiology  [facility]

## 2019-05-08 NOTE — TRANSFER OF CARE
"Anesthesia Transfer of Care Note    Patient: Steph Lira    Procedure(s) Performed: Procedure(s) (LRB):  ABLATION, ARRHYTHMOGENIC FOCUS, FOR ATRIAL FIBRILLATION (N/A)  Cardioversion    Patient location: PACU    Anesthesia Type: general    Transport from OR: Transported from OR on 6-10 L/min O2 by face mask with adequate spontaneous ventilation    Post pain: adequate analgesia    Post assessment: no apparent anesthetic complications and tolerated procedure well    Post vital signs: stable    Level of consciousness: lethargic and responds to stimulation    Nausea/Vomiting: no nausea/vomiting    Complications: none    Transfer of care protocol was followed      Last vitals:   Visit Vitals  /68 (BP Location: Left arm, Patient Position: Lying)   Pulse 79   Temp 36 °C (96.8 °F) (Oral)   Resp 20   Ht 5' 6" (1.676 m)   Wt 63.5 kg (140 lb)   SpO2 97%   Breastfeeding? No   BMI 22.60 kg/m²     "

## 2019-05-08 NOTE — ANESTHESIA PREPROCEDURE EVALUATION
05/08/2019  Pre-operative evaluation for Procedure(s) (LRB):  ABLATION, ARRHYTHMOGENIC FOCUS, FOR ATRIAL FIBRILLATION (N/A)    Steph Lira is a 68 y.o. female     Patient Active Problem List   Diagnosis    GERD (gastroesophageal reflux disease)    Chronic rhinitis    Hypothyroid    Aneurysm of heart (wall)    Benign neoplasm of skin of upper limb, including shoulder    Intermediate coronary syndrome    Phlebitis and thrombophlebitis of superficial veins of upper extremities    Simple chronic conjunctivitis    Supraventricular premature beats    Vascular disorder of skin    Chronic external jugular vein thrombosis    Anticoagulant long-term use    Magdiel's syndrome - Left Eye    Ptosis    Anxiety    CAD (coronary artery disease)    S/P CABG x 2, 2005    HTN (hypertension)    Hypercholesteremia    PAF (paroxysmal atrial fibrillation), onset 2002    Intercostal neuralgia    History of lung cancer    Blurred vision, bilateral    Panlobular emphysema    Carotid artery disease    COPD (chronic obstructive pulmonary disease)    Uncontrolled atrial flutter with rvr    Allergic rhinitis    Hypoalbuminemia    History of smoking 10-25 pack years, 15 pack-years, quit 1994    S/P CABG (coronary artery bypass graft)    Supraventricular bigeminy    Dysphagia    History of colon polyps    Coronary artery disease involving autologous artery coronary bypass graft    Wrist arthritis    Gastritis    Esophageal lesion    Fatigue    Grade II hemorrhoids    Cardiomyopathy    Throat irritation    Paroxysmal atrial fibrillation    BMI 21.0-21.9, adult    Anticoagulation management encounter    History of biliary stent insertion, 6/2005, left jugular vein and left innominate vein       Review of patient's allergies indicates:   Allergen Reactions    Ciprofloxacin Itching    Coreg  [carvedilol]      Low energy    Doxycycline Other (See Comments) and Hives     Patient had a rxn with the sun despite wearing spf 30    Metoprolol      Low energy       Current Facility-Administered Medications on File Prior to Encounter   Medication Dose Route Frequency Provider Last Rate Last Dose    0.9%  NaCl infusion   Intravenous Continuous Ni Jaimes NP   Stopped at 12/11/18 0850    sodium chloride 0.9% flush 5 mL  5 mL Intravenous PRN Ni Jaimes NP         Current Outpatient Medications on File Prior to Encounter   Medication Sig Dispense Refill    ADVAIR DISKUS 250-50 mcg/dose diskus inhaler USE 1 INHALATION TWICE A DAY (RINSE MOUTH AFTER USE TO PREVENT THRUSH) 3 each 3    albuterol (PROVENTIL HFA) 90 mcg/actuation inhaler Inhale 2 puffs into the lungs every 6 (six) hours as needed for Wheezing. 3 Inhaler 4    atorvastatin (LIPITOR) 40 MG tablet Take 1 tablet (40 mg total) by mouth once daily. 90 tablet 3    cetirizine (ZYRTEC) 10 MG tablet TAKE 1 TABLET EVERY EVENING 90 tablet 2    flecainide (TAMBOCOR) 50 MG Tab TAKE 2 TABLETS TWICE A  tablet 3    levothyroxine (SYNTHROID) 88 MCG tablet Take 1 tablet (88 mcg total) by mouth once daily. 90 tablet 3    montelukast (SINGULAIR) 10 mg tablet Take 1 tablet (10 mg total) by mouth every evening. 90 tablet 3    multivitamin-minerals-lutein (CENTRUM SILVER) Tab Take 1 tablet by mouth once daily. 1 Tablet Oral Every day      SPIRIVA WITH HANDIHALER 18 mcg inhalation capsule INHALE THE CONTENTS OF 1 CAPSULE DAILY 90 capsule 3    albuterol (ACCUNEB) 1.25 mg/3 mL Nebu USE 1 VIAL (3 ML) VIA NEBULIZER EVERY 6 HOURS AS NEEDED 90 mL 2    bisacodyl (DULCOLAX) 5 mg EC tablet Take 1 tablet (5 mg total) by mouth daily as needed for Constipation. 30 tablet 11    desoximetasone (TOPICORT) 0.25 % cream Apply topically 2 (two) times daily. (Patient taking differently: Apply topically 2 (two) times daily as needed. ) 180 g 3     diphenhydrAMINE (BENADRYL) 25 mg capsule Take 25 mg by mouth every 6 (six) hours as needed for Itching.      fluticasone (FLONASE) 50 mcg/actuation nasal spray 1 spray by Each Nare route once daily. 3 Bottle 3    polyethylene glycol (GLYCOLAX) 17 gram/dose powder Take 17 g by mouth once daily. 1530 g 2    tretinoin (RETIN-A) 0.05 % cream APPLY THIN FILM TO FACE AT NIGHT AFTER MOISTURIZING 45 g 3    warfarin (COUMADIN) 5 MG tablet Take 1-1.5 tablets (5-7.5 mg total) by mouth Daily. 60 tablet 3       Past Surgical History:   Procedure Laterality Date    ABLATION N/A 12/11/2018    Performed by Santana Covarrubias MD at [facility] EP LAB    ADENOIDECTOMY      APPENDECTOMY      BACK SURGERY      lumbar    BREAST BIOPSY Left     benign    CARDIAC SURGERY      CATARACT EXTRACTION Left     OS    CATARACT EXTRACTION, BILATERAL      left eye only    CHOLECYSTECTOMY      COLONOSCOPY  03/2012    repeat in 5 years    COLONOSCOPY N/A 6/19/2017    Performed by Kal [facility]ise [facility] at [facility]    COLONOSCOPY N/A 3/28/2012    Performed by Garrison Koo MD at [facility]    CORONARY ARTERY BYPASS GRAFT  2005    2 vessel    ECHOCARDIOGRAM,TRANSESOPHAGEAL N/A 12/11/2018    Performed by [facility] at [facility] EP LAB    EGD (ESOPHAGOGASTRODUODENOSCOPY)/cryo N/A 11/1/2018    Performed by Robbie Gonzales MD at [facility] ENDO (2ND FLR)    EGD (ESOPHAGOGASTRODUODENOSCOPY)/poss cryo N/A 10/3/2018    Performed by Robbie Gonzales MD at [facility] ENDO (2ND FLR)    ESOPHAGOGASTRODUODENOSCOPY (EGD) N/A 11/29/2017    Performed by Daniel Gillette MD at [facility] ENDO (2ND FLR)    ESOPHAGOGASTRODUODENOSCOPY (EGD) N/A 10/3/2017    Performed by Hue Wright MD at [facility] ENDO    ESOPHAGOGASTRODUODENOSCOPY (EGD) N/A 6/14/2017    Performed by Kal Elise MD at [facility] ENDO    ESOPHAGOGASTRODUODENOSCOPY (EGD) N/A 10/12/2016    Performed by Kal [facility]ise MD at [facility] ENDO    ESOPHAGOGASTRODUODENOSCOPY (EGD) N/A 5/10/2016     Performed by [facility] MD at [facility] ENDO    ESOPHAGOGASTRODUODENOSCOPY (EGD) W/CRYOTHERAPY N/A 12/22/2017    Performed by [facility] at [facility] ENDO    ESOPHAGOGASTRODUODENOSCOPY (EGD) with cryo N/A 3/5/2018    Performed by Robbie Gonzales MD at [facility] ENDO    ESOPHAGOGASTRODUODENOSCOPY (EGD)/cryo N/A 7/5/2018    Performed by Robbie Gonzales MD at [facility] ENDO (2ND FLR)    ESOPHAGOGASTRODUODENOSCOPY (EGD)/cryo N/A 4/16/2018    Performed by Robbie Gonzales MD at [facility] ENDO (2ND FLR)    ESOPHAGOGASTRODUODENOSCOPY (EGD)/poss Cryo N/A 12/14/2017    Performed by Robbie Gonzales MD at [facility] ENDO (2ND FLR)    ESOPHAGOGASTRODUODENOSCOPY (EGD)/Poss EMR N/A 10/31/2017    [facility] at [facility] (2ND FLR)    EYE SURGERY  Dec 2012    ptosis repair    FIRST RIB REMOVAL  1996 with lung resection    HEMORRHOIDECTOMY N/A 3/16/2018    Performed by Klaus Mandujano MD at [facility] OR    HYSTERECTOMY      LUNG LOBECTOMY  1996    left lung for cancer    phaco/pciol Right 11/5/2014    [facility] Guerline Lawson [facility] at [facility] OR    REMOVAL OF PLANTAR WART USING LASER      REPAIR, BLEPHAROPTOSIS Left 12/6/2012    Performed by Eze Glynn MD at [facility] OR 1ST FLR    SHOULDER SURGERY  2010,2011    scapular entrapment after lung surgery, needed 5 ribs removed left side    SYMPOTHATIC NERVE LEFT SIDE      1996 with lung surgery    TONSILLECTOMY      TONSILLECTOMY, ADENOIDECTOMY, BILATERAL MYRINGOTOMY AND TUBES      ULTRASOUND-ENDOSCOPIC-UPPER N/A 10/31/2017    Performed by Robbie Gonzales MD at [facility] (2ND FLR)    UPPER GASTROINTESTINAL ENDOSCOPY  05/2016    Dr. Koo    VASCULAR SURGERY      stents place in jugualr vein       Social History     Socioeconomic History    Marital status:      Spouse name: Not on file    Number of children: Not on file    Years of education: Not on file    Highest education level: Not on file   Occupational History     Occupation: retired   Social Needs    Financial resource strain: Not on file    Food insecurity:     Worry: Not on file     Inability: Not on file    Transportation needs:     Medical: Not on file     Non-medical: Not on file   Tobacco Use    Smoking status: Former Smoker     Packs/day: 2.00     Years: 20.00     Pack years: 40.00     Types: Cigarettes     Last attempt to quit: 1994     Years since quittin.3    Smokeless tobacco: Never Used   Substance and Sexual Activity    Alcohol use: No    Drug use: No    Sexual activity: Yes     Partners: Male   Lifestyle    Physical activity:     Days per week: Not on file     Minutes per session: Not on file    Stress: Not on file   Relationships    Social connections:     Talks on phone: Not on file     Gets together: Not on file     Attends L[employer] day service: Not on file     Active member of club or organization: Not on file     Attends meetings of clubs or organizations: Not on file     Relationship status: Not on file   Other Topics Concern    Are you pregnant or think you may be? No    Breast-feeding No   Social History Narrative    Not on file         CBC:   Recent Labs     19  1355   WBC 7.00   RBC 4.37   HGB 12.9   HCT 39.5      MCV 91   MCH 29.5   MCHC 32.5       CMP:   Recent Labs     19  1355      K 4.1      CO2 27   BUN 18   CREATININE 1.0   GLU 99   CALCIUM 9.7       INR  Recent Labs     19  0745 19  1355   INR 5.7* 4.4*           2D Echo:  Results for orders placed or performed in visit on 18   2D Echo w/ Color Flow Doppler   Result Value Ref Range    QEF 40 (A) 55 - 65    Mitral Valve Regurgitation MILD     Diastolic Dysfunction Yes (A)     Aortic Valve Regurgitation MILD     Est. PA Systolic Pressure 16.16          Anesthesia Evaluation    I have reviewed the Patient Summary Reports.    I have reviewed the Nursing Notes.   I have reviewed the Medications.     Review of  Systems  Anesthesia Hx:  No problems with previous Anesthesia    Cardiovascular:   Hypertension CAD  Dysrhythmias atrial fibrillation CHF    Pulmonary:   COPD Asthma    Hepatic/GI:   GERD    Endocrine:   Hypothyroidism        Physical Exam  General:  Well nourished    Airway/Jaw/Neck:  Airway Findings: Mouth Opening: Normal Tongue: Normal  General Airway Assessment: Adult  Mallampati: II  TM Distance: Normal, at least 6 cm       Chest/Lungs:  Chest/Lungs Findings: Clear to auscultation, Normal Respiratory Rate     Heart/Vascular:  Heart Findings: Rate: Normal  Rhythm: Regular Rhythm             Anesthesia Plan  Type of Anesthesia, risks & benefits discussed:  Anesthesia Type:  general  Patient's Preference:   Intra-op Monitoring Plan: standard ASA monitors and arterial line  Intra-op Monitoring Plan Comments:   Post Op Pain Control Plan: multimodal analgesia and IV/PO Opioids PRN  Post Op Pain Control Plan Comments:   Induction:   IV  Beta Blocker:         Informed Consent: Patient understands risks and agrees with Anesthesia plan.  Questions answered. Anesthesia consent signed with patient.  ASA Score: 3     Day of Surgery Review of History & Physical:            Ready For Surgery From Anesthesia Perspective.

## 2019-05-08 NOTE — SUBJECTIVE & OBJECTIVE
Past Medical History:   Diagnosis Date    *Atrial fibrillation     and Hx PSVT    Allergic drug rash 12/28/2016    Allergy     chronic     Arthritis     fingers    Benign tumor of throat     Bronchitis March 2013    CAD (coronary artery disease) 2005    CABGx2 in 2005 and 2 MI after with 4 stents    Cancer 1996    small cell lung cancer s/p resection of 1/3 lung, 5 ribs and muscle mass then had chemo and radiation    Cataract     OD     CHF (congestive heart failure) past Hx    Chronic rhinitis     Clot past Hx    external jugular, now stented, occluded, had phlebitis; now revascularized    Colon polyp     COPD (chronic obstructive pulmonary disease)     no oxygen or nebulizers    COPD exacerbation 5/8/13    improved 5/14/13 after steroid pack,antibiotics    Former smoker     GERD (gastroesophageal reflux disease)     Heart block     Hyperlipidemia     Hypertension     pt states does not have //    MI (myocardial infarction) 2005    Posterior vitreous detachment of left eye     Thyroid disease        Past Surgical History:   Procedure Laterality Date    ABLATION N/A 12/11/2018    Performed by [facility]ias [facility] at [facility] EP LAB    ADENOIDECTOMY      APPENDECTOMY      BACK SURGERY      lumbar    BREAST BIOPSY Left     benign    CARDIAC SURGERY      CATARACT EXTRACTION Left     OS    CATARACT EXTRACTION, BILATERAL      left eye only    CHOLECYSTECTOMY      COLONOSCOPY  03/2012    repeat in 5 years    COLONOSCOPY N/A 6/19/2017    Performed by Kal Elise MD at [facility] ENDO    COLONOSCOPY N/A 3/28/2012    Performed by Garrison Koo MD at [facility] ENDO    CORONARY ARTERY BYPASS GRAFT  2005    2 vessel    ECHOCARDIOGRAM,TRANSESOPHAGEAL N/A 12/11/2018    Performed by [facility] at [facility] EP LAB    EGD (ESOPHAGOGASTRODUODENOSCOPY)/cryo N/A 11/1/2018    Performed by Robbie Gonzales MD at [facility] ENDO (2ND FLR)    EGD (ESOPHAGOGASTRODUODENOSCOPY)/poss cryo N/A 10/3/2018     Performed by Robbie Gonzales MD at [facility] ENDO (2ND FLR)    ESOPHAGOGASTRODUODENOSCOPY (EGD) N/A 11/29/2017    Performed by Daniel Gillette MD at [facility] ENDO (2ND FLR)    ESOPHAGOGASTRODUODENOSCOPY (EGD) N/A 10/3/2017    Performed by Hue Wright MD at [facility] ENDO    ESOPHAGOGASTRODUODENOSCOPY (EGD) N/A 6/14/2017    Performed by Kal Elise MD at [facility] ENDO    ESOPHAGOGASTRODUODENOSCOPY (EGD) N/A 10/12/2016    Performed by Kal Elise MD at [facility] ENDO    ESOPHAGOGASTRODUODENOSCOPY (EGD) N/A 5/10/2016    Performed by Garrison Koo MD at [facility] ENDO    ESOPHAGOGASTRODUODENOSCOPY (EGD) W/CRYOTHERAPY N/A 12/22/2017    Performed by [facility]bie Gonzales MD at [facility] ENDO    ESOPHAGOGASTRODUODENOSCOPY (EGD) with cryo N/A 3/5/2018    Performed by Robbie Gonzales MD at [facility] ENDO    ESOPHAGOGASTRODUODENOSCOPY (EGD)/cryo N/A 7/5/2018    Performed by Robbie Gonzales MD at [facility] ENDO (2ND FLR)    ESOPHAGOGASTRODUODENOSCOPY (EGD)/cryo N/A 4/16/2018    Performed by Robbie Gonzales MD at [facility] ENDO (2ND FLR)    ESOPHAGOGASTRODUODENOSCOPY (EGD)/poss Cryo N/A 12/14/2017    Performed by Robbie Gonzales MD at [facility] ENDO (2ND FLR)    ESOPHAGOGASTRODUODENOSCOPY (EGD)/Poss EMR N/A 10/31/2017    Performed by Robbie Gonzales MD at [facility] ENDO (2ND FLR)    EYE SURGERY  Dec 2012    ptosis repair    FIRST RIB REMOVAL  1996 with lung resection    HEMORRHOIDECTOMY N/A 3/16/2018    Performed by Klaus Mandujano MD at [facility] OR    HYSTERECTOMY      LUNG LOBECTOMY  1996    left lung for cancer    phaco/pciol Right 11/5/2014    [facility] by [facility] at [facility] OR    REMOVAL OF PLANTAR WART USING LASER      REPAIR, BLEPHAROPTOSIS Left 12/6/2012    Performed by Eze Glynn MD at [facility] OR 1ST FLR    SHOULDER SURGERY  2010,2011    scapular entrapment after lung surgery, needed 5 ribs removed left side    SYMPOTHATIC NERVE LEFT SIDE      1996 with lung surgery    TONSILLECTOMY       TONSILLECTOMY, ADENOIDECTOMY, BILATERAL MYRINGOTOMY AND TUBES      ULTRASOUND-ENDOSCOPIC-UPPER N/A 10/31/2017    Performed by Robbie Gonzales MD at [facility] ([address] [place] [address])    UPPER GASTROINTESTINAL ENDOSCOPY  05/2016    Dr. Koo    VASCULAR SURGERY      stents place in jugualr vein       Review of patient's allergies indicates:   Allergen Reactions    Ciprofloxacin Itching    Coreg [carvedilol]      Low energy    Doxycycline Other (See Comments) and Hives     Patient had a rxn with the sun despite wearing spf 30    Metoprolol      Low energy       Current Facility-Administered Medications on File Prior to Encounter   Medication    0.9%  NaCl infusion    sodium chloride 0.9% flush 5 mL     Current Outpatient Medications on File Prior to Encounter   Medication Sig    ADVAIR DISKUS 250-50 mcg/dose diskus inhaler USE 1 INHALATION TWICE A DAY (RINSE MOUTH AFTER USE TO PREVENT THRUSH)    albuterol (PROVENTIL HFA) 90 mcg/actuation inhaler Inhale 2 puffs into the lungs every 6 (six) hours as needed for Wheezing.    atorvastatin (LIPITOR) 40 MG tablet Take 1 tablet (40 mg total) by mouth once daily.    cetirizine (ZYRTEC) 10 MG tablet TAKE 1 TABLET EVERY EVENING    flecainide (TAMBOCOR) 50 MG Tab TAKE 2 TABLETS TWICE A DAY    levothyroxine (SYNTHROID) 88 MCG tablet Take 1 tablet (88 mcg total) by mouth once daily.    montelukast (SINGULAIR) 10 mg tablet Take 1 tablet (10 mg total) by mouth every evening.    multivitamin-minerals-lutein (CENTRUM SILVER) Tab Take 1 tablet by mouth once daily. 1 Tablet Oral Every day    SPIRIVA WITH HANDIHALER 18 mcg inhalation capsule INHALE THE CONTENTS OF 1 CAPSULE DAILY    albuterol (ACCUNEB) 1.25 mg/3 mL Nebu USE 1 VIAL (3 ML) VIA NEBULIZER EVERY 6 HOURS AS NEEDED    bisacodyl (DULCOLAX) 5 mg EC tablet Take 1 tablet (5 mg total) by mouth daily as needed for Constipation.    desoximetasone (TOPICORT) 0.25 % cream Apply topically 2 (two) times daily. (Patient taking  differently: Apply topically 2 (two) times daily as needed. )    diphenhydrAMINE (BENADRYL) 25 mg capsule Take 25 mg by mouth every 6 (six) hours as needed for Itching.    fluticasone (FLONASE) 50 mcg/actuation nasal spray 1 spray by Each Nare route once daily.    polyethylene glycol (GLYCOLAX) 17 gram/dose powder Take 17 g by mouth once daily.    tretinoin (RETIN-A) 0.05 % cream APPLY THIN FILM TO FACE AT NIGHT AFTER MOISTURIZING    warfarin (COUMADIN) 5 MG tablet Take 1-1.5 tablets (5-7.5 mg total) by mouth Daily.     Family History     Problem Relation (Age of Onset)    Allergic rhinitis Father, Son, Sister    Allergies Father, Mother    Asthma Son, Sister    Cancer Father    Colon polyps Maternal Grandmother    Diverticulitis Brother    Hypertension Father    No Known Problems Daughter, Maternal Aunt, Maternal Uncle, Paternal Aunt, Paternal Uncle, Maternal Grandfather, Paternal Grandmother, Paternal Grandfather, Brother, Sister    Stroke Mother        Tobacco Use    Smoking status: Former Smoker     Packs/day: 2.00     Years: 20.00     Pack years: 40.00     Types: Cigarettes     Last attempt to quit: 1994     Years since quittin.3    Smokeless tobacco: Never Used   Substance and Sexual Activity    Alcohol use: No    Drug use: No    Sexual activity: Yes     Partners: Male     Review of Systems   Constitution: Negative for chills and fever.   HENT: Negative for ear discharge.    Eyes: Negative for pain and visual disturbance.   Cardiovascular: Negative for chest pain, dyspnea on exertion, irregular heartbeat, leg swelling, orthopnea, palpitations, paroxysmal nocturnal dyspnea and syncope.   Respiratory: Negative for hemoptysis, shortness of breath and wheezing.    Skin: Negative for rash and suspicious lesions.   Musculoskeletal: Negative for joint pain and muscle weakness.   Gastrointestinal: Negative for abdominal pain, diarrhea, nausea and vomiting.   Genitourinary: Negative for dysuria and  frequency.   Neurological: Negative for focal weakness, headaches and tremors.     Objective:     Vital Signs (Most Recent):  Temp: 96.8 °F (36 °C) (05/08/19 1015)  Pulse: 79 (05/08/19 1015)  Resp: 20 (05/08/19 1015)  BP: 133/68 (05/08/19 1017)  SpO2: 97 % (05/08/19 1015) Vital Signs (24h Range):  Temp:  [96.8 °F (36 °C)] 96.8 °F (36 °C)  Pulse:  [79] 79  Resp:  [20] 20  SpO2:  [97 %] 97 %  BP: (133-143)/(68-74) 133/68       Weight: 63.5 kg (140 lb)  Body mass index is 22.6 kg/m².    SpO2: 97 %  O2 Device (Oxygen Therapy): room air    Physical Exam   Constitutional: She is oriented to person, place, and time. She appears well-developed and well-nourished.   HENT:   Head: Normocephalic and atraumatic.   Eyes: EOM are normal.   Cardiovascular: Normal rate and regular rhythm. Exam reveals no gallop and no friction rub.   Pulmonary/Chest: Effort normal and breath sounds normal. No stridor. She has no wheezes. She has no rales.   Abdominal: Soft. Bowel sounds are normal. There is no rebound and no guarding.   Musculoskeletal: She exhibits no edema.   Neurological: She is alert and oriented to person, place, and time. No cranial nerve deficit.   Skin: Skin is warm and dry.   Psychiatric: She has a normal mood and affect. Her behavior is normal.       Significant Labs:   BMP:   Recent Labs   Lab 05/07/19  1355   GLU 99      K 4.1      CO2 27   BUN 18   CREATININE 1.0   CALCIUM 9.7   , CMP:   Recent Labs   Lab 05/07/19  1355      K 4.1      CO2 27   GLU 99   BUN 18   CREATININE 1.0   CALCIUM 9.7   ANIONGAP 8   ESTGFRAFRICA >60   EGFRNONAA 58*   , CBC:   Recent Labs   Lab 05/07/19  1355   WBC 7.00   HGB 12.9   HCT 39.5      , INR:   Recent Labs   Lab 05/07/19  1355 05/08/19  0954   INR 4.4* 4.4*   , Lipid Panel No results for input(s): CHOL, HDL, LDLCALC, TRIG, CHOLHDL in the last 48 hours. and Troponin No results for input(s): TROPONINI in the last 48 hours.

## 2019-05-09 NOTE — NURSING
Bilat groin sutures removed @ 1945 as ordered, pt tolerated well, no active bleeding and no hematoma noted, gauze and tegaderm applied bilat. Continued bedrest as ordered, HOB @ 30 degrees.

## 2019-05-09 NOTE — PLAN OF CARE
Problem: Adult Inpatient Plan of Care  Goal: Plan of Care Review  Outcome: Ongoing (interventions implemented as appropriate)   Patient s/p RFA, AAOx3. VSS, resp even and unlabored. Bilateral groin sutures CDI. No active bleeding. No hematoma noted. Post procedure protocol reviewed with patient. Understanding verbalized.  Nurse call bell within reach. Will continue to monitor per post procedure protocol.

## 2019-05-09 NOTE — NURSING
Discharge instruction given and reviewed with patient.  Pt states understanding.  Tele and IV x 2 d/c'ed.  Bilateral groin sites remain CDI, no bleeding or hematoma noted.  Instructed patient to call with any questions or concerns.

## 2019-05-09 NOTE — ANESTHESIA POSTPROCEDURE EVALUATION
Anesthesia Post Evaluation    Patient: Steph Lira    Procedure(s) Performed: Procedure(s) (LRB):  ABLATION, ARRHYTHMOGENIC FOCUS, FOR ATRIAL FIBRILLATION (N/A)  Cardioversion    Final Anesthesia Type: general  Patient location during evaluation: PACU  Patient participation: Yes- Able to Participate  Level of consciousness: awake and alert  Post-procedure vital signs: reviewed and stable  Pain management: adequate  Airway patency: patent  PONV status at discharge: No PONV  Anesthetic complications: no      Cardiovascular status: blood pressure returned to baseline  Respiratory status: unassisted  Hydration status: euvolemic  Follow-up not needed.          Vitals Value Taken Time   /57 5/9/2019  8:00 AM   Temp 36.4 °C (97.6 °F) 5/9/2019  8:00 AM   Pulse 79 5/9/2019  8:00 AM   Resp 20 5/9/2019  8:00 AM   SpO2 98 % 5/9/2019  8:00 AM         Event Time     Out of Recovery 18:10:39          Pain/Tamera Score: Tamera Score: 10 (5/8/2019  4:15 PM)

## 2019-05-09 NOTE — PLAN OF CARE
Problem: Adult Inpatient Plan of Care  Goal: Plan of Care Review  Outcome: Ongoing (interventions implemented as appropriate)  Report received from DARIO Pompa. Plan of care discussed with patient. [facility]. + pulses. No complaints from patient. Plan for dc home this am. Call light in reach. Will monitor.

## 2019-05-09 NOTE — PLAN OF CARE
Problem: Adult Inpatient Plan of Care  Goal: Plan of Care Review  Outcome: Ongoing (interventions implemented as appropriate)  Pt AAO x 3, NAD, s/p PVI on 5/8/19 per shanika Ziegler groin site dsgs CDI, no active bleeding and no hematoma noted. Pt ambulates and voids without any difficulty. Pt denies pain so far this shift. Call light within pt reach, pt instructed to call staff for any needed assistance, safety maintained. Pt denies needs/concerns at this time, will continue to monitor.

## 2019-05-10 NOTE — TELEPHONE ENCOUNTER
Returned call to pt. Pt states she has been feeling dizzy and fatigued. Advised that the fatigue is from the ablation and is very common. Usually goes away after a  Few days. As for as the dizziness. What is your B/P?  106/64 which she states is low for her.  Pt also questioned her coumadin.  She has not had coumadin since Monday night.  Tuesday and Wednesday missed due to INR of 4.4. She spoke with coumadin clinic and they are resuming but she needs her goal range.    Discussed with Dr Covarrubias. He wants pt back on coumadin per coumadin clinic at a goal of 2.0-3.0. INR on Monday. She needs to be on coumadin for 6 weeks before switching to Xarelto. For dizziness DR Covarrubias recommended she take Cardizem once daily until B/P stabilizes.    Called pt to advise. Pt voiced understanding. Message sent to Natalee Covarrubias in [facility].      ----- Message from Dsaha Hargrove sent at 5/10/2019  1:20 PM CDT -----  Contact: pt   Pt would like a call in ref to her recent Ablation she have some questions she would like to ask.    Thanks

## 2019-05-13 NOTE — TELEPHONE ENCOUNTER
Patient called to report the following:     -hx of ablation on Wednesday   -bp 101/60  --108 and in afib per [facility]   -dizziness upon standing   -advised to report to ED     Reason for Disposition   Dizziness, lightheadedness, or weakness    Protocols used: HEART RATE AND HEARTBEAT [id number]

## 2019-06-17 NOTE — PATIENT INSTRUCTIONS
Established High Blood Pressure    High blood pressure (hypertension) is a chronic disease. Often, healthcare providers dont know what causes it. But it can be caused by certain health conditions and medicines.  If you have high blood pressure, you may not have any symptoms. If you do have symptoms, they may include headache, dizziness, changes in your vision, chest pain, and shortness of breath. But even without symptoms, high blood pressure thats not treated raises your risk for heart attack and stroke. High blood pressure is a serious health risk and shouldnt be ignored.  A blood pressure reading is made up of two numbers: a higher number over a lower number. The top number is the systolic pressure. The bottom number is the diastolic pressure. A normal blood pressure is a systolic pressure of  less than 120 over a diastolic pressure of less than 80. You will see your blood pressure readings written together. For example, a person with a systolic pressure of 188 and a diastolic pressure of 78 will have 118/78 written in the medical record.  High blood pressure is when either the top number is 140 or higher, or the bottom number is 90 or higher. This must be the result when taking your blood pressure a number of times. The blood pressures between normal and high are called prehypertension.  Home care  If you have high blood pressure, you should do what is listed below to lower your blood pressure. If you are taking medicines for high blood pressure, these methods may reduce or end your need for medicines in the future.  · Begin a weight-loss program if you are overweight.  · Cut back on how much salt you get in your diet. Heres how to do this:  ¨ Dont eat foods that have a lot of salt. These include olives, pickles, smoked meats, and salted potato chips.  ¨ Dont add salt to your food at the table.  ¨ Use only small amounts of salt when cooking.  · Start an exercise program. Talk with your healthcare  provider about the type of exercise program that would be best for you. It doesn't have to be hard. Even brisk walking for 20 minutes 3 times a week is a good form of exercise.  · Dont take medicines that stimulate the heart. This includes many over-the-counter cold and sinus decongestant pills and sprays, as well as diet pills. Check the warnings about hypertension on the label. Before buying any over-the-counter medicines or supplements, always ask the pharmacist about the product's potential interaction with your high blood pressure and your high blood pressure medicines.  · Stimulants such as amphetamine or cocaine could be deadly for someone with high blood pressure. Never take these.  · Limit how much caffeine you get in your diet. Switch to caffeine-free products.  · Stop smoking. If you are a long-time smoker, this can be hard. Talk to your healthcare provider about medicines and nicotine replacement options to help you. Also, enroll in a stop-smoking program to make it more likely that you will quit for good.  · Learn how to handle stress. This is an important part of any program to lower blood pressure. Learn about relaxation methods like meditation, yoga, or biofeedback.  · If your provider prescribed medicines, take them exactly as directed. Missing doses may cause your blood pressure get out of control.  · If you miss a dose or doses, check with your healthcare provider or pharmacist about what to do.  · Consider buying an automatic blood pressure machine. Ask your provider for a recommendation. You can get one of these at most pharmacies.     The American Heart Association recommends the following guidelines for home blood pressure monitoring:  · Don't smoke or drink coffee for 30 minutes before taking your blood pressure.  · Go to the bathroom before the test.  · Relax for 5 minutes before taking the measurement.  · Sit with your back supported (don't sit on a couch or soft chair); keep your feet on  the floor uncrossed. Place your arm on a solid flat surface (like a table) with the upper part of the arm at heart level. Place the middle of the cuff directly above the eye of the elbow. Check the monitor's instruction manual for an illustration.  · Take multiple readings. When you measure, take 2 to 3 readings one minute apart and record all of the results.  · Take your blood pressure at the same time every day, or as your healthcare provider recommends.  · Record the date, time, and blood pressure reading.  · Take the record with you to your next medical appointment. If your blood pressure monitor has a built-in memory, simply take the monitor with you to your next appointment.  · Call your provider if you have several high readings. Don't be frightened by a single high blood pressure reading, but if you get several high readings, check in with your healthcare provider.  · Note: When blood pressure reaches a systolic (top number) of 180 or higher OR diastolic (bottom number) of 110 or higher, seek emergency medical treatment.  Follow-up care  You will need to see your healthcare provider regularly. This is to check your blood pressure and to make changes to your medicines. Make a follow-up appointment as directed. Bring the record of your home blood pressure readings to the appointment.  When to seek medical advice  Call your healthcare provider right away if any of these occur:  · Blood pressure reaches a systolic (upper number) of 180 or higher OR a diastolic (bottom number) of 110 or higher  · Chest pain or shortness of breath  · Severe headache  · Throbbing or rushing sound in the ears  · Nosebleed  · Sudden severe pain in your belly (abdomen)  · Extreme drowsiness, confusion, or fainting  · Dizziness or spinning sensation (vertigo)  · Weakness of an arm or leg or one side of the face  · You have problems speaking or seeing   Date Last Reviewed: 12/1/2016  © 8180-2975 [email or web address]. [address] [place] [address]  [place] [address]. All rights reserved. This information is not intended as a substitute for professional medical care. Always follow your healthcare professional's instructions.

## 2019-06-17 NOTE — PROGRESS NOTES
Subjective:       Patient ID: Steph Lira is a 68 y.o. female.    Chief Complaint: Follow-up (6mth f/u hypertension)    HPI  Review of Systems   Constitutional: Negative for fatigue and unexpected weight change.   Respiratory: Negative for chest tightness and shortness of breath.    Cardiovascular: Negative for chest pain, palpitations and leg swelling.   Gastrointestinal: Negative for abdominal pain.   Musculoskeletal: Negative for arthralgias.   Neurological: Negative for dizziness, syncope, light-headedness and headaches.       Patient Active Problem List   Diagnosis    GERD (gastroesophageal reflux disease)    Chronic rhinitis    Hypothyroid    Aneurysm of heart (wall)    Benign neoplasm of skin of upper limb, including shoulder    Intermediate coronary syndrome    Phlebitis and thrombophlebitis of superficial veins of upper extremities    Simple chronic conjunctivitis    Supraventricular premature beats    Vascular disorder of skin    Chronic external jugular vein thrombosis    Anticoagulant long-term use    Magdiel's syndrome - Left Eye    Ptosis    Anxiety    CAD (coronary artery disease)    S/P CABG x 2, 2005    HTN (hypertension)    Hypercholesteremia    PAF (paroxysmal atrial fibrillation), onset 2002    Intercostal neuralgia    History of lung cancer    Blurred vision, bilateral    Panlobular emphysema    Carotid artery disease    COPD (chronic obstructive pulmonary disease)    Uncontrolled atrial flutter with rvr    Allergic rhinitis    Hypoalbuminemia    History of smoking 10-25 pack years, 15 pack-years, quit 1994    S/P CABG (coronary artery bypass graft)    Supraventricular bigeminy    Dysphagia    History of colon polyps    Coronary artery disease involving autologous artery coronary bypass graft    Wrist arthritis    Gastritis    Esophageal lesion    Fatigue    Grade II hemorrhoids    Cardiomyopathy    Throat irritation    Paroxysmal atrial fibrillation     BMI 21.0-21.9, adult    Anticoagulation management encounter    History of biliary stent insertion, 6/2005, left jugular vein and left innominate vein     Patient is here for a chronic conditions follow up.    Since last visit she had ablation but has had a flutter and cardiovert.  On coumadin and has coag checks through coag check.  Plan to switch to xarelto once stable    Food gets stuck .  Needs another egd with possible dilation after heart stabilized .  Not ready to make appt    Has had dog bite from [employer] dog-had been immunized. 12 stitches to nose-healed now  Objective:      Physical Exam   Constitutional: She is oriented to person, place, and time. She appears well-developed and well-nourished.   Cardiovascular: Normal rate, regular rhythm and normal heart sounds.   Pulmonary/Chest: Effort normal and breath sounds normal.   Musculoskeletal: She exhibits no edema.   Neurological: She is alert and oriented to person, place, and time.   Skin: Skin is warm and dry.   Psychiatric: She has a normal mood and affect.   Nursing note and vitals reviewed.      Assessment:       1. Essential hypertension    2. Hypercholesteremia    3. PAF (paroxysmal atrial fibrillation), onset 2002    4. Anticoagulant long-term use    5. Acquired hypothyroidism    6. Esophageal lesion    7. Gastroesophageal reflux disease with esophagitis    8. Centrilobular emphysema    9. Hyperglycemia        Plan:       1. Essential hypertension  Controlled on current medications.  Continue current medications.      2. Hypercholesteremia  Controlled on current medications.  Continue current medications.      3. PAF (paroxysmal atrial fibrillation), onset 2002  Cont working with Card EP team and current meds    4. Anticoagulant long-term use  Cont coumadin and mgmt through coag clinic    5. Acquired hypothyroidism  Controlled on current medications.  Continue current medications.    - CBC auto differential; Future  - Comprehensive  metabolic panel; Future  - TSH; Future  - T4, free; Future    6. Esophageal lesion  H/o stricture.  Having mild sx. Will  Make GI appt if worsens    7. Gastroesophageal reflux disease with esophagitis  Cont current meds. Controlled on current medications.  Continue current medications.  Counseled patient on prevention of reflux with changes in diet and behavior.  I recommended avoidance of greasy and spicy foods, caffeine and eating within 3 hours of bedtime.  I counseled the patient to avoid eating large meals and instead eating more frequent small meals.  I also recommended weight loss and elevation of the head of the bed by 6 inches.  If symptoms persist after these changes medication may be needed to control GERD.        8. Centrilobular emphysema  Cont current pulm regimen    9. Hyperglycemia  Controlled with diet  - Hemoglobin A1c; Future        Time spent with patient: 20 minutes    Patient with be reevaluated in 6 months or sooner prn    Greater than 50% of this visit was spent counseling as described in above documentation:Yes

## 2019-07-05 NOTE — PROGRESS NOTES
Subjective:     [facility]    Cardiologist: Sonny Le MD    I had the pleasure of seeing Steph Lira in follow-up for her history of atrial fibrillation. 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 She has been on Flecainide for many years, which effectively suppressed her arrhythmias. Soraya had until recently been on a beta blocker as well, but beta blockade caused significant fatigue and was stopped. Recently there was an attempt to down-titrate Flecainide to 50 mg bid, but AF recurred approximately 1 year ago, requiring electrical cardioversion for sinus rhythm restoration. She is on Eliquis for stroke prophylaxis.     Episodes occur several times per year, manifesting as palpitations, lasting several days and generally requiring a hospital admission and IV medications or electrical cardioversion to resolve. She denies exacerbating or alleviating factors.     Recent cardiac studies include an echo performed in 8/2018 which showed an EF of 40-45%, moderate LAE, and mild AI. A pharmacologic NST done in 5/2018 showed no fixed or reversible perfusion defects.    I reviewed all ECGs in the EMR at her initial visit. An ECG from 1/24/2018 shows AT at 116 bpm. An ECG dated 5/27/2016 shows AF at 114 bpm. Finally, an ECG dated 5/18/2016 shows AFL at 146 bpm. All other ECGs dating back to 2002 show sinus rhythm.    At her initial visit we discussed options including switching antiarrhythmics vs PVI, but she wanted to hold the course. In 11/2018 Ms. Lira was admitted to [facility] with symptomatic AF, ultimately requiring DCCV to restore sinus rhythm.    On 5/8/2019, a PVI and LA posterior wall isolation was performed.  She was discharged on low-dose Flecainide. Ms. Lira had recurrent AF (1 week and 3 weeks post-procedure) requiring DCCV. She has had no recurrent arrhythmias since that time.    My interpretation of today's ECG is NSR at 79 bpm.    Review of Systems   Constitution: Negative for decreased appetite, malaise/fatigue, weight gain and weight loss.   HENT: Negative for sore throat.    Eyes: Negative for blurred vision.   Cardiovascular: Negative for chest pain, dyspnea on exertion, irregular heartbeat, leg swelling, near-syncope, orthopnea, palpitations, paroxysmal nocturnal dyspnea and syncope.   Respiratory: Negative for shortness of breath.    Skin: Negative for rash.   Musculoskeletal: Negative for arthritis.   Gastrointestinal: Negative for abdominal pain and dysphagia.   Neurological: Negative for focal weakness.   Psychiatric/Behavioral: Negative for altered mental status.        Objective:    Physical Exam   Constitutional: She is oriented to person, place, and time. She appears well-developed and well-nourished. No distress.   HENT:   Head: Normocephalic and atraumatic.   Mouth/Throat: Oropharynx is clear and moist.   Eyes: Pupils are equal, round, and reactive to light. Conjunctivae are normal. No scleral icterus.   Neck: Normal range of motion. Neck supple. No JVD present. No thyromegaly present.   Cardiovascular: Normal rate, regular rhythm, normal heart sounds and intact distal pulses. Exam reveals no gallop and no friction rub.   No murmur heard.  Pulmonary/Chest: Effort normal and breath sounds normal. No respiratory distress. She has no rales.   Abdominal: Soft. Bowel sounds are normal. She exhibits no distension.   Musculoskeletal: She exhibits no edema.   Neurological: She is alert and oriented to person, place, and time.   Skin: Skin is warm and dry.   Psychiatric: She has a normal mood and affect. Her behavior is normal.   Vitals reviewed.        Assessment:       1. PAF (paroxysmal atrial  fibrillation), onset 2002    2. Aneurysm of heart wall    3. S/P CABG (coronary artery bypass graft)    4. Uncontrolled atrial flutter with rvr    5. Essential hypertension    6. Hypercholesteremia    7. S/P CABG x 2, 2005    8. Anticoagulant long-term use         Plan:       In summary, Steph Lira is a 68 year old female with a history of symptomatic PAF. Her [id number] Score is 3 (age, female, CAD) which corresponds to a yearly risk of stroke without anticoagulation treatment estimated at 3.2%. She should remain on coumadin for now. The plan is to stop Flecainide in early August, and to see me again in 3 months.    Thank you for allowing me to participate in the care of this patient. Please do not hesitate to call me with any questions or concerns.

## 2019-07-26 NOTE — TELEPHONE ENCOUNTER
I spoke with patient in regards to schedule EGD. She is out of town until 8/8. She asked to be called back after 8/9

## 2019-08-14 NOTE — PROGRESS NOTES
Subjective:       Patient ID: Steph Lira is a 68 y.o. female.    Chief Complaint: Generalized Body Aches    HPI   Pt aches all over x 1 wk  More in the upper body than lower  Pt has a lot of fatigue  Review of Systems   Constitutional: Positive for activity change, chills and fatigue. Negative for appetite change, diaphoresis, fever and unexpected weight change.   HENT: Negative.    Eyes: Negative.    Respiratory: Negative.  Negative for cough and shortness of breath.    Cardiovascular: Negative.  Negative for chest pain and leg swelling.   Gastrointestinal: Negative.    Endocrine: Negative.    Genitourinary: Negative.    Musculoskeletal: Positive for myalgias.   Skin: Negative.  Negative for rash.       Objective:      Physical Exam   Constitutional: She appears well-developed and well-nourished. No distress.   HENT:   Head: Normocephalic and atraumatic.   Right Ear: External ear normal.   Left Ear: External ear normal.   Nose: Nose normal.   Mouth/Throat: Oropharynx is clear and moist. No oropharyngeal exudate.   Sinuses non tender  Mucus clear   Eyes: Conjunctivae are normal. No scleral icterus.   Neck: Normal range of motion. Neck supple. No tracheal deviation present. No thyromegaly present.   Cardiovascular: Normal rate, regular rhythm, normal heart sounds and intact distal pulses. Exam reveals no gallop and no friction rub.   No murmur heard.  Pulmonary/Chest: Effort normal and breath sounds normal. No stridor. No respiratory distress. She has no wheezes. She has no rales.   Abdominal: Soft. Bowel sounds are normal. She exhibits no distension and no mass. There is no tenderness. There is no rebound and no guarding.   No organomegaly   Musculoskeletal: She exhibits no edema.   Lymphadenopathy:     She has no cervical adenopathy.   Skin: Skin is warm and dry. No rash noted.   Vitals reviewed.      Assessment:       1. Viral illness        Plan:       Steph was seen today for generalized body  aches.    Diagnoses and all orders for this visit:    Viral illness    discussed otc's  Hydrate well  F/u in 1 wk if sx don't resolve

## 2019-08-21 NOTE — PROGRESS NOTES
Seborrheic dermatitis  Better!  Cpm.  Patient to start using sulfur bar soap for the face 1-2 x day.  For scalp usage lather from the soap to be applied to the roots of the scalp and allow to sit for 3-5 minutes regularly.     Hair loss  -     Zinc; Future; Expected date: 04/23/2019  -     Calcitriol; Future; Expected date: 04/23/2019  Better per pt.  Cpm.     Vitamin D deficiency   -     Zinc; Future; Expected date: 04/23/2019  -     Calcitriol; Future; Expected date: 04/23/2019  Discussed with patient the need for laboratory work up for further evaluation.  Discussed that such investigation may not be helpful.     Zinc deficiency  -     Zinc; Future; Expected date: 04/23/2019  Discussed with patient the need for laboratory work up for further evaluation.  Discussed that such investigation may not be helpful.     Dry skin  Discussed with patient to use organic coconut oil or pure shea butter at least daily for moisturization for the body and organic jojoba oil at least daily for the face.  Gets dry with sulfur soap.    Returns today for follow up.  Using sulfur bar to scalp every three days and taking an unknown amount of zinc orally every day. Complains of itching to scalp daily. Complains of bumps and itching to both upper arms. Using Cerave lotion. Using Retin a nightly.  Reports face dry and itchy at times.          Subjective:       Patient ID:  Steph Lira is a 68 y.o. female who presents for   Chief Complaint   Patient presents with    Hair Loss     f/u     HPI    Review of Systems   Constitutional: Negative for fever and chills.   HENT: Negative for sore throat and mouth sores.    Eyes: Negative for itching and eye watering.   Gastrointestinal: Negative for abdominal pain.   Musculoskeletal: Negative for joint swelling and arthralgias.   Skin: Positive for itching and wears hat. Negative for daily sunscreen use and activity-related sunscreen use.   Neurological: Negative for numbness.    Hematologic/Lymphatic: Does not bruise/bleed easily.        Objective:    Physical Exam   Constitutional: She appears well-developed and well-nourished.   Eyes: No conjunctival no injection.   Neurological: She is alert and oriented to person, place, and time.   Psychiatric: She has a normal mood and affect.   Skin:   Areas Examined (abnormalities noted in diagram):   Scalp / Hair Palpated and Inspected  Head / Face Inspection Performed  Neck Inspection Performed  Chest / Axilla Inspection Performed  RUE Inspected  LUE Inspection Performed                   Diagram Legend     Erythematous scaling macule/papule c/w actinic keratosis       Vascular papule c/w angioma      Pigmented verrucoid papule/plaque c/w seborrheic keratosis      Yellow umbilicated papule c/w sebaceous hyperplasia      Irregularly shaped tan macule c/w lentigo     1-2 mm smooth white papules consistent with Milia      Movable subcutaneous cyst with punctum c/w epidermal inclusion cyst      Subcutaneous movable cyst c/w pilar cyst      Firm pink to brown papule c/w dermatofibroma      Pedunculated fleshy papule(s) c/w skin tag(s)      Evenly pigmented macule c/w junctional nevus     Mildly variegated pigmented, slightly irregular-bordered macule c/w mildly atypical nevus      Flesh colored to evenly pigmented papule c/w intradermal nevus       Pink pearly papule/plaque c/w basal cell carcinoma      Erythematous hyperkeratotic cursted plaque c/w SCC      Surgical scar with no sign of skin cancer recurrence      Open and closed comedones      Inflammatory papules and pustules      Verrucoid papule consistent consistent with wart     Erythematous eczematous patches and plaques     Dystrophic onycholytic nail with subungual debris c/w onychomycosis     Umbilicated papule    Erythematous-base heme-crusted tan verrucoid plaque consistent with inflamed seborrheic keratosis     Erythematous Silvery Scaling Plaque c/w Psoriasis     See  annotation      Assessment / Plan:        Seborrheic dermatitis of scalp  Condition is stable.  We will continue present management.  Patient to start using sulfur bar soap for the face 1-2 x day.  For scalp usage lather from the soap to be applied to the roots of the scalp and allow to sit for 3-5 minutes regularly.  Cont sulfur soap.    Vitamin D deficiency  Discussed with patient the need for laboratory work up for further evaluation.  Discussed that such investigation may not be helpful.    Hair loss  Condition is stable.  We will continue present management.    Zinc deficiency  Discussed with patient the need for laboratory work up for further evaluation.  Discussed that such investigation may not be helpful.    Contact dermatitis, unspecified contact dermatitis type, unspecified trigger  Improving from outdoor exposure to possible flowers during trip.  Recommended the usage of over the counter hydrocortisone as needed for itching.  No hot water bathing reviewed.             Follow up if symptoms worsen or fail to improve.

## 2019-08-21 NOTE — PATIENT INSTRUCTIONS
"Dr. Daniel Garcia MD    Dry skin Management    Do not use hot water. Hot water removes your natural skin oils more quickly.  Warm water is best for bathing.  Temperature should be no more  than 2 degrees higher than body temp.    Use a gentle cleanser.  Soaps can strip oils from the skin.  Stop using deodorant, antibacterial, or perfumed soaps. Try a glycerin soap with the fewest ingredients and no fragrance.  Wash only the "stinky" areas and use plain water for rest of the body.    Use hand  as often as possible instead of washing your hands. Wear rubber gloves when cleaning.    Moisturize right after showers or baths. To lock in moisture from a bath or shower, apply moisturizer while the skin is still damp.  Coconut oil, Olive oil, and Jojoba oils that are plain and organic are best.  Moisturize at minimum of 2-3 times a day.  Remember that medicated creams are just that--medication.  They are not intended to replace moisturizing but to be used in adjunct.    Try to avoid any products with fragrance. Retin a can cause dry and itchy skin.  May want to reduce use to every other night.  Use sulfur soap to face and ears as well as scalp.    Have labs drawn(ordered in April) on a morning when you have not taken your zinc.  We will contact you with results.          "

## 2019-08-26 NOTE — TELEPHONE ENCOUNTER
Spoke with patient about instructions for EGD scheduled 9/11/19 at 0900.  Instructions mailed.  She will hold Coumadin as directed by [facility].  PT/INR scheduled before procedure (usually holds Coumadin >5 days).  As before, she will take Diflucan one tablet daily for 14 days before procedure as directed by Dr Robbie Gonzales.

## 2019-09-05 NOTE — TELEPHONE ENCOUNTER
----- Message from Jelly Ford sent at 9/5/2019 10:22 AM CDT -----  Contact: pt  Pls call pt at [phone].  She want to go today to get her EKG done in [place].    Thank you

## 2019-09-05 NOTE — TELEPHONE ENCOUNTER
Called Mrs. Lira back re her message below.  Asked which location does she typically go to have ekg completed; Mrs. Lira adv she can go to either Dr. Le's office or [facility]; adv she is going to lunch with her sister for 1:00, so can go around 3:00.  Adv Mrs. Lira I will call to get specifics re ekg, pretty sure she doesn't need an apt, but will call to confirm & will call her right back.  Mrs. Lira verbalized understanding.    Called Dr. Le's office to see if EKG can be completed at their office; spoke with Sivan.  Per Sivan, EKG can be completed at their office.  Adv Mrs. Lira said she can come around 3:00; Sivan mclain they will be in clinic at that time, asked if Mrs. Lira can come now to have EKG done.  Vera Finnegan I would call Mrs. Lira to see & will let her know.    Called Mrs. Lira back; asked if she can go to Dr. Le's office now to have EKG completed.  Mrs. Lira going to Dr. Le's now.  Called Sivan back to let her know; gave Sivan our fax# to send EKG to us afterwards; Sivan confirmed.

## 2019-09-05 NOTE — TELEPHONE ENCOUNTER
Mrs. Lira is going to Dr. Le's office to have EKG completed right now.  Spoke with Sivan at their office; gave Sivan our fax# to fax EKG to us afterwards.  Sivan confirmed she will fax to us once completed.

## 2019-09-25 NOTE — TELEPHONE ENCOUNTER
Spoke with patient about instructions for EGD scheduled 9/27/19 at 0900.  Confirms she has been off Coumadin since 9/18/19 and is not on any other anithrombotic med.  Per Dr Robbie Gonzales, she does not need to take Diflucan as she did before.

## 2019-09-27 NOTE — PLAN OF CARE
Dc instructions given to pt and spouse. Understanding verbalized. Pt with no c/o pain, no nausea.

## 2019-09-27 NOTE — H&P
History & Physical - Short Stay  Gastroenterology      SUBJECTIVE:     Procedure: EGD    Chief Complaint/Indication for Procedure: Esophageal lesion    History of Present Illness:  Patient is a 69 y.o. female presents with esophageal squamous dysplasia S/P ablation therapy here for follow up. Recent procedure cancel due to a high INR. Currently off of coumadin. Stated dysphagia.    PTA Medications   Medication Sig    ADVAIR DISKUS 250-50 mcg/dose diskus inhaler USE 1 INHALATION TWICE A DAY (RINSE MOUTH AFTER USE TO PREVENT THRUSH)    albuterol (ACCUNEB) 1.25 mg/3 mL Nebu USE 1 VIAL (3 ML) VIA NEBULIZER EVERY 6 HOURS AS NEEDED    albuterol (PROVENTIL HFA) 90 mcg/actuation inhaler Inhale 2 puffs into the lungs every 6 (six) hours as needed for Wheezing.    atorvastatin (LIPITOR) 40 MG tablet Take 1 tablet (40 mg total) by mouth once daily.    bisacodyl (DULCOLAX) 5 mg EC tablet Take 1 tablet (5 mg total) by mouth daily as needed for Constipation.    cetirizine (ZYRTEC) 10 MG tablet TAKE 1 TABLET EVERY EVENING    diltiaZEM (CARDIZEM CD) 120 MG Cp24 Take 1 capsule (120 mg total) by mouth 2 (two) times daily. (Patient taking differently: Take 120 mg by mouth once daily. )    diphenhydrAMINE (BENADRYL) 25 mg capsule Take 25 mg by mouth every 6 (six) hours as needed for Itching.    flecainide (TAMBOCOR) 50 MG Tab TAKE 2 TABLETS TWICE A DAY    fluticasone (FLONASE) 50 mcg/actuation nasal spray 1 spray by Each Nare route once daily.    furosemide (LASIX) 20 MG tablet Take 1 tablet (20 mg total) by mouth daily as needed.    levothyroxine (SYNTHROID) 88 MCG tablet Take 1 tablet (88 mcg total) by mouth once daily.    montelukast (SINGULAIR) 10 mg tablet Take 1 tablet (10 mg total) by mouth every evening.    multivitamin-minerals-lutein (CENTRUM SILVER) Tab Take 1 tablet by mouth once daily. 1 Tablet Oral Every day    pantoprazole (PROTONIX) 40 MG tablet TAKE 1 TABLET DAILY 30 MINUTES PRIOR TO BREAKFAST     SPIRIVA WITH HANDIHALER 18 mcg inhalation capsule INHALE THE CONTENTS OF 1 CAPSULE DAILY    tretinoin (RETIN-A) 0.05 % cream APPLY THIN FILM TO FACE AT NIGHT AFTER MOISTURIZING    ZINC GLUCONATE ORAL Take by mouth.       Review of patient's allergies indicates:   Allergen Reactions    Ciprofloxacin Itching    Coreg [carvedilol]      Low energy    Doxycycline Other (See Comments) and Hives     Patient had a rxn with the sun despite wearing spf 30    Metoprolol      Low energy        Past Medical History:   Diagnosis Date    *Atrial fibrillation     and Hx PSVT    Allergic drug rash 12/28/2016    Allergy     chronic     Arthritis     fingers    Benign tumor of throat     Bronchitis March 2013    CAD (coronary artery disease) 2005    CABGx2 in 2005 and 2 MI after with 4 stents    Cancer 1996    small cell lung cancer s/p resection of 1/3 lung, 5 ribs and muscle mass then had chemo and radiation    Cataract     OD     CHF (congestive heart failure) past Hx    Chronic rhinitis     Clot past Hx    external jugular, now stented, occluded, had phlebitis; now revascularized    Colon polyp     COPD (chronic obstructive pulmonary disease)     no oxygen or nebulizers    COPD exacerbation 5/8/13    improved 5/14/13 after steroid pack,antibiotics    Former smoker     GERD (gastroesophageal reflux disease)     Heart block     Hyperlipidemia     Hypertension     pt states does not have //    MI (myocardial infarction) 2005    Posterior vitreous detachment of left eye     Thyroid disease      Past Surgical History:   Procedure Laterality Date    ABLATION N/A 12/11/2018    Procedure: ABLATION;  Surgeon: Santana Covarrubias MD;  Location: [facility] EP [facility];  Service: Cardiology;  Laterality: N/A;  AF, JUAN ANTONIO, PVI, RFA, CARTO, GEN, GP, 3 PREP    ABLATION OF ARRHYTHMOGENIC FOCUS FOR ATRIAL FIBRILLATION N/A 5/8/2019    Procedure: ABLATION, ARRHYTHMOGENIC FOCUS, FOR ATRIAL FIBRILLATION;  Surgeon: Santana Covarrubias MD;   Location: [facility] EP [facility];  Service: Cardiology;  Laterality: N/A;  AF, PVI, RFA, CARTO, GEN, GP , 3 PREP    ADENOIDECTOMY      APPENDECTOMY      BACK SURGERY      lumbar    BREAST BIOPSY Left     benign    CARDIAC SURGERY      CARDIOVERSION  5/8/2019    Procedure: Cardioversion;  Surgeon: Santana Covarrubias MD;  Location: [facility] EP LAB;  Service: Cardiology;;    CATARACT EXTRACTION Left     OS    CATARACT EXTRACTION, BILATERAL      left eye only    CHOLECYSTECTOMY      COLONOSCOPY  03/2012    repeat in 5 years    COLONOSCOPY N/A 6/19/2017    Procedure: COLONOSCOPY;  Surgeon: Kal Elise MD;  Location: [facility];  Service: Endoscopy;  Laterality: N/A;    CORONARY ARTERY BYPASS GRAFT  2005    2 vessel    ESOPHAGOGASTRODUODENOSCOPY N/A 7/5/2018    Procedure: ESOPHAGOGASTRODUODENOSCOPY (EGD)/cryo;  Surgeon: Robbie Gonzales MD;  Location: [facility]);  Service: Endoscopy;  Laterality: N/A;  Eliquis/Dr Henry Wei/OK to hold med 2 days prior to egd/see telephone encounter dated 6/12/18/pt stated she needs to take Diflucan 1 tab po daily 12 days prior to egd, message sent to Sarah/she will check with Dr Gonzales/santo    ESOPHAGOGASTRODUODENOSCOPY N/A 10/3/2018    Procedure: EGD (ESOPHAGOGASTRODUODENOSCOPY)/poss cryo;  Surgeon: Robbie Gonzales MD;  Location: [facility] (2ND FLR);  Service: Endoscopy;  Laterality: N/A;  2 day hold Dr Henry Pavon  Diflucan 1 tab po daily starting 14 days prior to egd, Rx'd per Dr Gonzales    ESOPHAGOGASTRODUODENOSCOPY N/A 11/1/2018    Procedure: EGD (ESOPHAGOGASTRODUODENOSCOPY)/cryo;  Surgeon: Robbie Gonzales MD;  Location: [facility] (2ND FLR);  Service: Endoscopy;  Laterality: N/A;  2 day hold Dr Henry Pavon  Diflucan 1 tab po daily starting 14 days prior to egd, Rx'd per Dr Gonzales as before?    EYE SURGERY  Dec 2012    ptosis repair    FIRST RIB REMOVAL  1996 with lung resection    HYSTERECTOMY      LUNG LOBECTOMY  1996    left lung for  cancer    REMOVAL OF PLANTAR WART USING LASER      SHOULDER SURGERY  ,    scapular entrapment after lung surgery, needed 5 ribs removed left side    SYMPOTHATIC NERVE LEFT SIDE       with lung surgery    TONSILLECTOMY      TONSILLECTOMY, ADENOIDECTOMY, BILATERAL MYRINGOTOMY AND TUBES      UPPER GASTROINTESTINAL ENDOSCOPY  2016    Dr. Koo    VASCULAR SURGERY      stents place in jugualr vein     Family History   Problem Relation Age of Onset    Allergic rhinitis Father     Cancer Father     Hypertension Father     Allergies Father     Allergic rhinitis Son     Asthma Son     Stroke Mother     Allergies Mother     Allergic rhinitis Sister     Asthma Sister     Diverticulitis Brother     No Known Problems Daughter     No Known Problems Maternal Aunt     No Known Problems Maternal Uncle     No Known Problems Paternal Aunt     No Known Problems Paternal Uncle     Colon polyps Maternal Grandmother          of colon blockage    No Known Problems Maternal Grandfather     No Known Problems Paternal Grandmother     No Known Problems Paternal Grandfather     No Known Problems Brother     No Known Problems Sister     Angioedema Neg Hx     Eczema Neg Hx     Immunodeficiency Neg Hx     Urticaria Neg Hx     Skin cancer Neg Hx     Amblyopia Neg Hx     Blindness Neg Hx     Cataracts Neg Hx     Diabetes Neg Hx     Glaucoma Neg Hx     Macular degeneration Neg Hx     Retinal detachment Neg Hx     Strabismus Neg Hx     Thyroid disease Neg Hx     Colon cancer Neg Hx     Melanoma Neg Hx      Social History     Tobacco Use    Smoking status: Former Smoker     Packs/day: 2.00     Years: 20.00     Pack years: 40.00     Types: Cigarettes     Last attempt to quit: 1994     Years since quittin.7    Smokeless tobacco: Never Used   Substance Use Topics    Alcohol use: No    Drug use: No       Review of Systems:  Respiratory: no cough or shortness of  breath  Cardiovascular: no chest pain or palpitations    OBJECTIVE:     Vital Signs (Most Recent)       Physical Exam:  General: well developed, well nourished  Lungs:  normal respiratory effort  Heart: regular rate, S1, S2 normal  Abdomen: soft, non-tender non-distented; bowel sounds normal; no masses,  no organomegaly       Laboratory  CBC: No results for input(s): WBC, RBC, HGB, HCT, PLT, MCV, MCH, MCHC in the last 168 hours.  CMP: No results for input(s): GLU, CALCIUM, ALBUMIN, PROT, NA, K, CO2, CL, BUN, CREATININE, ALKPHOS, ALT, AST, BILITOT in the last 168 hours.  Coagulation: No results for input(s): LABPROT, INR, APTT in the last 168 hours.      Diagnostic Results:      ASSESSMENT/PLAN:     Esophageal lesion    Plan: EGD    Anesthesia Plan: MAC    ASA Grade: ASA 3 - Patient with moderate systemic disease with functional limitations     The impression and plan was discussed in detail with the patient. All questions have been answered and the patient voices understanding of our plan at this point. The risk of the procedure was discussed in detail which includes but not limited to bleeding, infection, perforation in some cases requiring surgery with its spectrum of complications.

## 2019-09-27 NOTE — [employer] PATIENT INSTRUCTIONS
Discharge Summary/Instructions after an Endoscopic Procedure  Patient Name: Steph Raymundo  Patient MRN: [id number]  Patient YOB: 1950 Friday, September 27, 2019  Robbie Gonzales MD  RESTRICTIONS:  During your procedure today, you received medications for sedation.  These   medications may affect your judgment, balance and coordination.  Therefore,   for 24 hours, you have the following restrictions:   - DO NOT drive a car, operate machinery, make legal/financial decisions,   sign important papers or drink alcohol.    ACTIVITY:  Today: no heavy lifting, straining or running due to procedural   sedation/anesthesia.  The following day: return to full activity including work.  DIET:  Eat and drink normally unless instructed otherwise.     TREATMENT FOR COMMON SIDE EFFECTS:  - Mild abdominal pain, nausea, belching, bloating or excessive gas:  rest,   eat lightly and use a heating pad.  - Sore Throat: treat with throat lozenges and/or gargle with warm salt   water.  - Because air was used during the procedure, expelling large amounts of air   from your rectum or belching is normal.  - If a bowel prep was taken, you may not have a bowel movement for 1-3 days.    This is normal.  SYMPTOMS TO WATCH FOR AND REPORT TO YOUR PHYSICIAN:  1. Abdominal pain or bloating, other than gas cramps.  2. Chest pain.  3. Back pain.  4. Signs of infection such as: chills or fever occurring within 24 hours   after the procedure.  5. Rectal bleeding, which would show as bright red, maroon, or black stools.   (A tablespoon of blood from the rectum is not serious, especially if   hemorrhoids are present.)  6. Vomiting.  7. Weakness or dizziness.  GO DIRECTLY TO THE NEAREST EMERGENCY ROOM IF YOU HAVE ANY OF THE FOLLOWING:      Difficulty breathing              Chills and/or fever over 101 F   Persistent vomiting and/or vomiting blood   Severe abdominal pain   Severe chest pain   Black, tarry stools   Bleeding- more than one  tablespoon   Any other symptom or condition that you feel may need urgent attention  Your doctor recommends these additional instructions:  If any biopsies were taken, your doctors clinic will contact you in 1 to 2   weeks with any results.  - Discharge patient to home (ambulatory).   - Repeat upper endoscopy in 12 weeks for retreatment.   - Use Protonix (pantoprazole) 40 mg PO BID.   - Resume Eliquis (apixaban) at prior dose tomorrow.  For questions, problems or results please call your physician - Robbie Gonzales MD at Work:  [phone].  [facility], EMERGENCY ROOM PHONE NUMBER: [phone]  IF A COMPLICATION OR EMERGENCY SITUATION ARISES AND YOU ARE UNABLE TO REACH   YOUR PHYSICIAN - GO DIRECTLY TO THE EMERGENCY ROOM.  Robbie Gonzales MD  9/27/2019 10:04:51 AM  This report has been verified and signed electronically.  [employer]

## 2019-09-27 NOTE — TRANSFER OF CARE
Anesthesia Transfer of Care Note    Patient: Steph Lira    Procedure(s) Performed: Procedure(s) (LRB):  EGD (ESOPHAGOGASTRODUODENOSCOPY) (N/A)    Patient location: [facility]    Anesthesia Type: general    Transport from OR: Transported from OR on room air with adequate spontaneous ventilation    Post pain: adequate analgesia    Post assessment: no apparent anesthetic complications    Post vital signs: stable    Level of consciousness: awake    Nausea/Vomiting: no nausea/vomiting    Complications: none    Transfer of care protocol was followed      Last vitals:   Visit Vitals  Breastfeeding? No

## 2019-09-27 NOTE — DISCHARGE SUMMARY
Discharge Summary/Instructions after an Endoscopic Procedure    Patient Name: Steph Raymundo  Patient MRN: [id number]  Patient YOB: 1950 Friday, September 27, 2019  Robbie Gonzales MD    RESTRICTIONS:  During your procedure today, you received medications for sedation.  These medications may affect your judgment, balance and coordination.  Therefore, for 24 hours, you have the following restrictions:     - DO NOT drive a car, operate machinery, make legal/financial decisions, sign important papers or drink alcohol.      ACTIVITY:  Today: no heavy lifting, straining or running due to procedural sedation/anesthesia.  The following day: return to full activity including work.    DIET:  Eat and drink normally unless instructed otherwise.     TREATMENT FOR COMMON SIDE EFFECTS:  - Mild abdominal pain, nausea, belching, bloating or excessive gas:  rest, eat lightly and use a heating pad.  - Sore Throat: treat with throat lozenges and/or gargle with warm salt water.  - Because air was used during the procedure, expelling large amounts of air from your rectum or belching is normal.  - If a bowel prep was taken, you may not have a bowel movement for 1-3 days.  This is normal.      SYMPTOMS TO WATCH FOR AND REPORT TO YOUR PHYSICIAN:  1. Abdominal pain or bloating, other than gas cramps.  2. Chest pain.  3. Back pain.  4. Signs of infection such as: chills or fever occurring within 24 hours after the procedure.  5. Rectal bleeding, which would show as bright red, maroon, or black stools. (A tablespoon of blood from the rectum is not serious, especially if hemorrhoids are present.)  6. Vomiting.  7. Weakness or dizziness.      GO DIRECTLY TO THE NEAREST EMERGENCY ROOM IF YOU HAVE ANY OF THE FOLLOWING:     Difficulty breathing              Chills and/or fever over 101 F   Persistent vomiting and/or vomiting blood   Severe abdominal pain   Severe chest pain   Black, tarry stools   Bleeding- more than one tablespoon   Any  other symptom or condition that you feel may need urgent attention    Your doctor recommends these additional instructions:  If any biopsies were taken, your doctors clinic will contact you in 1 to 2 weeks with any results.    - Discharge patient to home (ambulatory).   - Repeat upper endoscopy in 12 weeks for retreatment.   - Use Protonix (pantoprazole) 40 mg PO BID.   - Resume Eliquis (apixaban) at prior dose tomorrow.    For questions, problems or results please call your physician - Robbie Gonzales MD at Work:  [phone].    [facility], EMERGENCY ROOM PHONE NUMBER: [phone]    IF A COMPLICATION OR EMERGENCY SITUATION ARISES AND YOU ARE UNABLE TO REACH YOUR PHYSICIAN - GO DIRECTLY TO THE EMERGENCY ROOM.

## 2019-09-27 NOTE — ANESTHESIA POSTPROCEDURE EVALUATION
Anesthesia Post Evaluation    Patient: Steph Lira    Procedure(s) Performed: Procedure(s) (LRB):  EGD (ESOPHAGOGASTRODUODENOSCOPY) (N/A)    Final Anesthesia Type: general  Patient location during evaluation: GI PACU  Patient participation: Yes- Able to Participate  Level of consciousness: awake  Post-procedure vital signs: reviewed and stable  Pain management: adequate  Airway patency: patent  PONV status at discharge: No PONV  Anesthetic complications: no      Cardiovascular status: blood pressure returned to baseline  Respiratory status: unassisted  Hydration status: euvolemic  Follow-up not needed.          Vitals Value Taken Time   /80 9/27/2019 10:45 AM   Temp 36.6 °C (97.9 °F) 9/27/2019 10:45 AM   Pulse 78 9/27/2019 10:45 AM   Resp 18 9/27/2019 10:45 AM   SpO2 99 % 9/27/2019 10:45 AM         No case tracking events are documented in the log.      Pain/Tamera Score: Tamera Score: 10 (9/27/2019 10:15 AM)

## 2019-09-27 NOTE — ANESTHESIA PREPROCEDURE EVALUATION
09/27/2019  Steph Lira is a 69 y.o., female.    Anesthesia Evaluation         Review of Systems  Anesthesia Hx:  No problems with previous Anesthesia   Social:  Non-Smoker    Cardiovascular:   Exercise tolerance: good Hypertension Past MI CAD  CABG/stent Dysrhythmias  Angina CHF  Functional Capacity Can you climb two flights of stairs? ==> Yes    Pulmonary:   Asthma Denies Recent URI.  Denies Sleep Apnea.    Renal/:  Renal/ Normal     Hepatic/GI:   Denies PUD. Denies Hiatal Hernia. GERD Denies Liver Disease.  Denies Hepatitis.    Neurological:   Denies CVA. Denies Seizures.    Endocrine:   Denies Diabetes. Hypothyroidism        Physical Exam  General:  Well nourished    Airway/Jaw/Neck:  Airway Findings: Mouth Opening: Normal Tongue: Normal  General Airway Assessment: Adult  Mallampati: I  TM Distance: Normal, at least 6 cm  Jaw/Neck Findings:  Neck ROM: Normal ROM  Neck Findings:     Eyes/Ears/Nose:  EYES/EARS/NOSE FINDINGS: Normal   Dental:  Dental Findings: In tact, Upper front caps   Chest/Lungs:  Chest/Lungs Findings: Clear to auscultation     Heart/Vascular:  Heart Findings: Rate: Normal  Rhythm: Regular Rhythm  Sounds: Normal        Mental Status:  Mental Status Findings:  Alert and Oriented         Anesthesia Plan  Type of Anesthesia, risks & benefits discussed:  Anesthesia Type:  general  Patient's Preference: Proceed with anesthesia understanding that the risks are very small but could be serious or life threatening.  Intra-op Monitoring Plan: standard ASA monitors  Intra-op Monitoring Plan Comments:   Post Op Pain Control Plan:   Post Op Pain Control Plan Comments:   Induction:   IV  Beta Blocker:  Patient is not currently on a Beta-Blocker (No further documentation required).       Informed Consent: Patient understands risks and agrees with Anesthesia plan.  Questions answered. Anesthesia  consent signed with patient.  ASA Score: 3     Day of Surgery Review of History & Physical: I have interviewed and examined the patient. I have reviewed the patient's H&P dated:            Ready For Surgery From Anesthesia Perspective.

## 2019-10-01 PROBLEM — Z86.79 S/P ABLATION OF ATRIAL FIBRILLATION: Status: ACTIVE | Noted: 2019-01-01

## 2019-10-01 PROBLEM — Z98.890 S/P ABLATION OF ATRIAL FIBRILLATION: Status: ACTIVE | Noted: 2019-01-01

## 2019-10-01 NOTE — PROGRESS NOTES
Subjective:     [facility] [place]    Cardiologist: Sonny Le MD    I had the pleasure of seeing Steph Lira in follow-up for her history of atrial fibrillation. 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 She had been on Flecainide for many years, which effectively suppressed her arrhythmias. Ms. Lira had until recently been on a beta blocker as well, but beta blockade caused significant fatigue and was stopped. An attempt to down-titrate Flecainide to 50 mg bid resulted in recurrent AF, requiring electrical cardioversion for sinus rhythm restoration. She is on Eliquis for stroke prophylaxis.     AF occurs several times per year, manifesting as palpitations, lasting several days and generally requiring a hospital admission and IV medications or electrical cardioversion to resolve.    Recent cardiac studies include an echo performed in 8/2018 which showed an EF of 40-45%, moderate LAE, and mild AI. A pharmacologic NST done in 5/2018 showed no fixed or reversible perfusion defects.    I reviewed all ECGs in the EMR at her initial visit. An ECG from 1/24/2018 showed AT at 116 bpm. An ECG dated 5/27/2016 showed AF at 114 bpm. Finally, an ECG dated 5/18/2016 showed AFL at 146 bpm. All other ECGs dating back to 2002 showed sinus rhythm.    At her initial visit we discussed options including switching antiarrhythmics vs PVI, but she wanted to hold the course. In 11/2018 Ms. Lira was admitted to [facility] with symptomatic AF, ultimately requiring DCCV to restore sinus rhythm.    In 5/2019, a PVI and LA posterior wall isolation was performed. She was discharged on low-dose Flecainide. Ms. Lira had recurrent AF (1 week and  "3 weeks post-procedure) requiring DCCV. She has had no recurrent arrhythmias since that time. At her follow-up visit in 7/2019 she was maintaining sinus rhythm. I instructed her to stop Flecainide in 8/2019.     Ms. Lira underwent cryoablation of an area of esophageal dysplasia last week at [facility] [place]. She was told she was "flipping in and out of afib" in recovery. Ms. Lira thinks she may be going in and out of AF for a few minutes at a time, several times per week.    My interpretation of today's ECG is NSR at 79 bpm.    Review of Systems   Constitution: Negative for decreased appetite, malaise/fatigue, weight gain and weight loss.   HENT: Negative for sore throat.    Eyes: Negative for blurred vision.   Cardiovascular: Negative for chest pain, dyspnea on exertion, irregular heartbeat, leg swelling, near-syncope, orthopnea, palpitations, paroxysmal nocturnal dyspnea and syncope.   Respiratory: Negative for shortness of breath.    Skin: Negative for rash.   Musculoskeletal: Negative for arthritis.   Gastrointestinal: Negative for abdominal pain and dysphagia.   Neurological: Negative for focal weakness.   Psychiatric/Behavioral: Negative for altered mental status.        Objective:    Physical Exam   Constitutional: She is oriented to person, place, and time. She appears well-developed and well-nourished. No distress.   HENT:   Head: Normocephalic and atraumatic.   Mouth/Throat: Oropharynx is clear and moist.   Eyes: Pupils are equal, round, and reactive to light. Conjunctivae are normal. No scleral icterus.   Neck: Normal range of motion. Neck supple. No JVD present. No thyromegaly present.   Cardiovascular: Normal rate, regular rhythm, normal heart sounds and intact distal pulses. Exam reveals no gallop and no friction rub.   No murmur heard.  Pulmonary/Chest: Effort normal and breath sounds normal. No respiratory distress. She has no rales.   Abdominal: Soft. Bowel sounds are normal. She exhibits no distension. "   Musculoskeletal: She exhibits no edema.   Neurological: She is alert and oriented to person, place, and time.   Skin: Skin is warm and dry.   Psychiatric: She has a normal mood and affect. Her behavior is normal.   Vitals reviewed.        Assessment:       1. PAF (paroxysmal atrial fibrillation), onset 2002    2. Hypercholesteremia    3. Essential hypertension    4. S/P CABG x 2, 2005    5. S/P CABG (coronary artery bypass graft)    6. Paroxysmal atrial fibrillation    7. Chronic obstructive pulmonary disease, unspecified COPD type    8. Anticoagulant long-term use    9. Acquired hypothyroidism    10. S/P ablation of atrial fibrillation         Plan:       In summary, Steph Lira is a 69 year old female with a history of symptomatic PAF. Her [id number] Score is 3 (age, female, CAD) which corresponds to a yearly risk of stroke without anticoagulation treatment estimated at 3.2%. She should remain on coumadin for now. She is now 5 months post-PVI, and has been off Flecainide for 2 months. She believes she may be having short-lived episodes of AF several times per week. The plan is for a 2 week Zio patch and to see me again in 8 weeks.    Thank you for allowing me to participate in the care of this patient. Please do not hesitate to call me with any questions or concerns.

## 2019-10-09 NOTE — PROGRESS NOTES
"Subjective:       Patient ID: Steph Lira is a 69 y.o. female.    Chief Complaint: Rash    HPI   Patient presents for evaluation of multiple areas of facial rash that had been present for 1 week and worsening as she could not get in to see her family doctor (Dr. Mon) this week.  She tells me she has concerns that this is MRSA as her dog was diagnosed with a combined skin infection with MRSA, strep and tinea and has been treated with multiple topical products, frequent bath and oral doxycycline for nearly a month with only minimal improvement.  The areas of rash on her face are pink, generally round and started out as very small areas of dry skin that have expanded.  The largest area is on the right cheek but she has 2 faint pink areas that she believes are developing on the right lower jaw line and cheek.  She tells me these are the same areas that her dog nuzzles to go outside and she believes she has the same infection.  She has not had rash anywhere else and has no other associated symptoms.  She has not noticed any masses, increased warmth, swelling, erythema, pain fevers/chills or lymphadenopathy.    Review of Systems   Constitutional: Negative for activity change, appetite change, chills, fatigue, fever and unexpected weight change.   HENT: Negative for facial swelling, mouth sores and trouble swallowing.    Respiratory: Negative for cough, chest tightness, shortness of breath and wheezing.    Musculoskeletal: Negative for arthralgias and myalgias.   Skin: Positive for rash. Negative for wound.   Hematological: Negative for adenopathy. Does not bruise/bleed easily.         Objective:      Vitals:    10/09/19 1304   BP: 130/72   Pulse: 88   Temp: 98.2 °F (36.8 °C)   TempSrc: Oral   Weight: 60.1 kg (132 lb 7.9 oz)   Height: 5' 6" (1.676 m)   PainSc: 0-No pain     Physical Exam   Constitutional: She is oriented to person, place, and time. She appears well-developed and well-nourished. No distress.   HENT: "   Head: Normocephalic and atraumatic.   Cardiovascular: Normal rate, regular rhythm and normal heart sounds. Exam reveals no gallop and no friction rub.   No murmur heard.  Pulmonary/Chest: Effort normal and breath sounds normal. No respiratory distress. She has no wheezes. She exhibits no tenderness.   Abdominal: Soft. Bowel sounds are normal. She exhibits no distension and no mass. There is no tenderness. There is no rebound and no guarding.   Musculoskeletal: Normal range of motion. She exhibits no edema, tenderness or deformity.   Neurological: She is alert and oriented to person, place, and time.   Skin: Skin is warm and dry. Rash noted. She is not diaphoretic.   There is a rash on the right cheek measures approximately 0.9 cm in diameter it is generally round with well-defined edges that are ever so slightly raised.  There is mild amount of weight and flaky dry skin associated mainly around the edge.  There are 2 other possible areas of rash on the left lower jaw line and left cheek although skin is faintly more pink with a lacy appearance in these areas that are less than 0.5 cm in diameter.  These lesions have no areas that are raised and no associated dry skin or flaking.  None of these areas are tender to palpation and there are no masses, sinus tracts or other abnormalities with this skin clean, closed and dry.   Psychiatric: She has a normal mood and affect. Her behavior is normal. Judgment and thought content normal.   Nursing note and vitals reviewed.        Assessment:       1. Tinea corporis          Plan:   Tinea corporis  -     ketoconazole (NIZORAL) 2 % cream; Apply topically once daily.  Dispense: 60 g; Refill: 0      Follow up if symptoms worsen or fail to improve.    Steph appears to be stable and doing well today with exception of at least 1 area of rash on the face.  I reassured her that this does not appear to be a cellulitis or abscess and her rash has a classic appearance for fungal cause.   I discussed possible treatment options for any occult Pores I recommended she make sure the area is clean, dry and exposed to light.  I discussed topical antifungals including Nizoral with risks and benefits and she was very interested in trying this.  Symptoms should improve and will likely resolve over the next 7-10 days and I discussed red flag symptoms for cellulitis/abscess that if any of these occur she will need to report back for re-evaluation.  I also discussed use of head and shoulders or Selsun Blue that she can use topically throughout to try to prevent further rash with continued exposure to her dog.  I will see her back as needed in the future and she will continue to follow with her PCP at routine intervals as long as the rash improves and resolves with the above treatment as expected.

## 2019-11-13 NOTE — PROGRESS NOTES
LOV 08/21/2019    Seborrheic dermatitis of scalp  Condition is stable.  We will continue present management.  Patient to start using sulfur bar soap for the face 1-2 x day.  For scalp usage lather from the soap to be applied to the roots of the scalp and allow to sit for 3-5 minutes regularly.  Cont sulfur soap.     Vitamin D deficiency  Discussed with patient the need for laboratory work up for further evaluation.  Discussed that such investigation may not be helpful.     Hair loss  Condition is stable.  We will continue present management.     Zinc deficiency  Discussed with patient the need for laboratory work up for further evaluation.  Discussed that such investigation may not be helpful.     Contact dermatitis, unspecified contact dermatitis type, unspecified trigger  Improving from outdoor exposure to possible flowers during trip.  Recommended the usage of over the counter hydrocortisone as needed for itching.  No hot water bathing reviewed.     Returns today. Recent OV to family doctor for itchy rash to face and used ketoconazole cream until clear.  No use in last few days.  Using head and shoulders and sulfur bar to face and scalp for about 3-5 minutes. Had episode of rash and peeling to face after being in sun for a day.  Extreme stress as  has Parkinson's and has recent falling episodes and additionally just found out nephew committed suicide.  Feeling very fatigued lately.   Subjective:       Patient ID:  Steph Lira is a 69 y.o. female who presents for No chief complaint on file.    HPI    Review of Systems   Constitutional: Positive for fatigue. Negative for fever and chills.   HENT: Positive for headaches. Negative for sore throat and mouth sores.    Eyes: Negative for itching and eye watering.   Gastrointestinal: Negative for abdominal pain.   Musculoskeletal: Positive for myalgias, joint swelling and arthralgias.   Skin: Positive for itching and rash. Negative for daily sunscreen use,  activity-related sunscreen use and wears hat.   Neurological: Positive for headaches. Negative for numbness.   Hematologic/Lymphatic: Does not bruise/bleed easily.        Objective:    Physical Exam   Constitutional: She appears well-developed and well-nourished.   Eyes: No conjunctival no injection.   Neurological: She is alert and oriented to person, place, and time.   Psychiatric: She has a normal mood and affect.   Skin:   Areas Examined (abnormalities noted in diagram):   Scalp / Hair Palpated and Inspected  Head / Face Inspection Performed  Neck Inspection Performed  Chest / Axilla Inspection Performed  RUE Inspected  LUE Inspection Performed  Nails and Digits Inspection Performed                   Diagram Legend     Erythematous scaling macule/papule c/w actinic keratosis       Vascular papule c/w angioma      Pigmented verrucoid papule/plaque c/w seborrheic keratosis      Yellow umbilicated papule c/w sebaceous hyperplasia      Irregularly shaped tan macule c/w lentigo     1-2 mm smooth white papules consistent with Milia      Movable subcutaneous cyst with punctum c/w epidermal inclusion cyst      Subcutaneous movable cyst c/w pilar cyst      Firm pink to brown papule c/w dermatofibroma      Pedunculated fleshy papule(s) c/w skin tag(s)      Evenly pigmented macule c/w junctional nevus     Mildly variegated pigmented, slightly irregular-bordered macule c/w mildly atypical nevus      Flesh colored to evenly pigmented papule c/w intradermal nevus       Pink pearly papule/plaque c/w basal cell carcinoma      Erythematous hyperkeratotic cursted plaque c/w SCC      Surgical scar with no sign of skin cancer recurrence      Open and closed comedones      Inflammatory papules and pustules      Verrucoid papule consistent consistent with wart     Erythematous eczematous patches and plaques     Dystrophic onycholytic nail with subungual debris c/w onychomycosis     Umbilicated papule    Erythematous-base heme-crusted  tan verrucoid plaque consistent with inflamed seborrheic keratosis     Erythematous Silvery Scaling Plaque c/w Psoriasis     See annotation      Assessment / Plan:        Rash  -     KEATON Screen w/Reflex; Future; Expected date: 11/13/2019  Discussed with patient the etiology and pathogenesis of the disease or skin lesion(s) and possible treatments and aggravators.    Patient instructed in importance in daily sun protection. Sun avoidance and topical protection discussed.     Patient encouraged to wear hat for all outdoor exposure.     Also discussed sun protective clothing.  Discussed with patient the need for laboratory work up for further evaluation.  Discussed that such investigation may not be helpful.    Sensitivity to sunlight  -     KEATON Screen w/Reflex; Future; Expected date: 11/13/2019  Discussed with patient the etiology and pathogenesis of the disease or skin lesion(s) and possible treatments and aggravators.    Discussed with patient the need for laboratory work up for further evaluation.  Discussed that such investigation may not be helpful.  Consider medication as culprit?    Seborrheic dermatitis  Discussed with patient the etiology and pathogenesis of the disease or skin lesion(s) and possible treatments and aggravators.    We will recheck the scalp at our follow up appointment.    Contact dermatitis, unspecified contact dermatitis type, unspecified trigger  Watch for this also.    Vitamin D deficiency  On vit d.    Zinc deficiency  On zinc.    Stress  Consider social work?           Follow up if symptoms worsen or fail to improve, for Post labs.

## 2019-11-25 NOTE — PROGRESS NOTES
"Subjective:       Patient ID: Steph Lira is a 69 y.o. female.    Chief Complaint: Hip Pain (right )    HPI   Patient presents with c/o right hip pain.  This began on Thursday.  The pain is intermittent.  The pain feels like an "electric shock" from right hip through part of her right buttock.  She states that she has had a similar problem in the past and was seen by a chiropractor which helped.  She has been applying heat packs and taking tylenol, this provides some relief.  The pain is worse with some movements.    Vitals:    11/25/19 0837   BP: 132/68   Pulse: 92   Resp: 19   Temp: 98.2 °F (36.8 °C)     Review of Systems   Constitutional: Negative for chills, fatigue, fever and unexpected weight change.   HENT: Negative for congestion, hearing loss, nosebleeds, postnasal drip, sinus pressure, sinus pain and sore throat.    Eyes: Negative for pain, discharge, redness and visual disturbance.   Respiratory: Negative for cough, chest tightness and shortness of breath.    Cardiovascular: Negative for chest pain and palpitations.   Gastrointestinal: Negative for abdominal pain, constipation, diarrhea, nausea and vomiting.   Endocrine: Negative for cold intolerance and heat intolerance.   Musculoskeletal: Positive for arthralgias. Negative for back pain.   Neurological: Negative for dizziness, syncope, light-headedness and headaches.   Psychiatric/Behavioral: Negative for agitation and dysphoric mood. The patient is not nervous/anxious.        Objective:      Physical Exam   Constitutional: She is oriented to person, place, and time. She appears well-developed and well-nourished.   HENT:   Head: Normocephalic and atraumatic.   Nose: Nose normal.   Eyes: Pupils are equal, round, and reactive to light. Conjunctivae and lids are normal.   Neck: Full passive range of motion without pain.   Cardiovascular: Normal rate.   Pulmonary/Chest: Effort normal.   Musculoskeletal:        Right hip: She exhibits normal range of " motion, normal strength and no tenderness.   Negative straight leg raise , patient is not TTP   Neurological: She is alert and oriented to person, place, and time.   Skin: Skin is warm, dry and intact.   Psychiatric: She has a normal mood and affect. Her speech is normal and behavior is normal. Cognition and memory are normal.       Assessment & Plan:       Right hip pain  -     methylPREDNISolone (MEDROL DOSEPACK) 4 mg tablet; use as directed  Dispense: 1 Package; Refill: 0        -     Exercises and stretches as directed        -     Apply warm, moist head to the area        -     If no improvement with the above, notify provider  Nerve pain  -     methylPREDNISolone (MEDROL DOSEPACK) 4 mg tablet; use as directed  Dispense: 1 Package; Refill: 0        -    As above  Essential hypertension  -Stable, continue current medication regimen      Patient readiness: acceptance and barriers:none    During the course of the visit the patient was educated and counseled about the following:     Hypertension:   Medication: no change.    Goals: Hypertension: Reduce Blood Pressure    Did patient meet goals/outcomes: Yes    The following self management tools provided: declined    Patient Instructions (the written plan) was given to the patient/family.     Time spent with patient: 15 minutes    Barriers to medications present (no )    Adverse reactions to current medications (no)    Over the counter medications reviewed (Yes)    Follow up if symptoms worsen or fail to improve.

## 2019-12-06 NOTE — PROGRESS NOTES
Subjective:       Patient ID: Steph Lira is a 69 y.o. female.    Chief Complaint: Hip Pain (right )    Patient here to f/u on right low back/hip/leg pain.  No relief after Medrol.  Onset acutely when she was standing and bent forward twisting to her left 3 weeks ago.    Hip Pain    Incident onset: 3 weeks. The incident occurred at home. Pain location: Righ SI area and down to knee at times. The quality of the pain is described as aching and shooting. The pain is moderate. The pain has been fluctuating since onset. Pertinent negatives include no muscle weakness or numbness. The symptoms are aggravated by movement (walking - at times). Treatments tried: Medrol. The treatment provided no relief.     Review of Systems   Constitutional: Negative for fever.   Respiratory: Negative for shortness of breath.    Cardiovascular: Negative for chest pain.   Gastrointestinal: Negative for abdominal pain and nausea.   Genitourinary: Negative for pelvic pain.   Skin: Negative for rash.   Neurological: Negative for numbness.        Denies B or B dysfunction.   All other systems reviewed and are negative.      Objective:      Physical Exam   Constitutional: She appears well-developed. No distress.   Cardiovascular: Normal rate and regular rhythm.   No murmur heard.  Pulmonary/Chest: Effort normal and breath sounds normal.   Musculoskeletal:        Lumbar back: She exhibits no tenderness, no bony tenderness and no spasm.   EHL's fully intact and equal b/l.   Neurological:   Reflex Scores:       Patellar reflexes are 2+ on the right side and 2+ on the left side.      Assessment:       1. Acute right-sided low back pain with right-sided sciatica    2. Acute right-sided low back pain with sciatica, sciatica laterality unspecified        Plan:       Acute right-sided low back pain with right-sided sciatica  -     betamethasone acetate-betamethasone sodium phosphate injection 9 mg  -     Ambulatory referral to Physical Medicine  Rehab  -     X-Ray Lumbar Spine AP And Lateral; Future; Expected date: 12/06/2019    Acute right-sided low back pain with sciatica, sciatica laterality unspecified  -     betamethasone acetate-betamethasone sodium phosphate injection 9 mg  -     Ambulatory referral to Physical Medicine Rehab  -     X-Ray Lumbar Spine AP And Lateral; Future; Expected date: 12/06/2019    Other orders  -     traMADol (ULTRAM) 50 mg tablet; 1-2 tablets by mouth every 6 hours prn pain  Dispense: 30 tablet; Refill: 0      Rest - don't bend and twist and avoid heavier lifting.

## 2019-12-06 NOTE — TELEPHONE ENCOUNTER
----- Message from Dipesh Glover MD sent at 12/6/2019 10:26 AM CST -----  Notify that Xray did have significant changes - Impression      1. Mid- lower lumbar spine degenerative disc and facet disease without fracture or malalignment.    2. - There is a moderate disc bulge or protrusion at the L2-3 level.    Follow-up with Dr Arthur as planned as well as the movement precautions, heat and pain meds as needed.

## 2019-12-06 NOTE — PROGRESS NOTES
Identified patient's name and . Administered 9 mg celestone, IM. Patient tolerated well, aseptic technique maintained. Pain scale 0/10 with injection. No residual bleeding at insertion site noted.

## 2019-12-12 NOTE — TELEPHONE ENCOUNTER
Patient has been seeing Dr Arthur for issues with both hiop pain and shoulder pain. She is continuing to have issues and would like to discuss other options with Dr Mon. She would also like to discuss a different cholesterol medication since she is unable to swallow the current medication.  Appt has been scheduled.

## 2019-12-12 NOTE — TELEPHONE ENCOUNTER
----- Message from Corinne Arredondo sent at 12/12/2019  3:07 PM CST -----  Contact: Patient  Type:  Sooner Apoointment Request    Caller is requesting a sooner appointment.  Caller declined first available appointment listed below.  Caller will not accept being placed on the waitlist and is requesting a message be sent to doctor.    Name of Caller:  Steph  When is the first available appointment?  6/29  Symptoms:  Multiple Complaints-Dislocated hip/had surgery on throat and unable to swallow cholesterol medication/Pain  Best Call Back Number:    Additional Information:  Please advise-would like to be seen ASAP-thank you

## 2019-12-16 NOTE — PROGRESS NOTES
Subjective:       Patient ID: Steph Lira is a 69 y.o. female.    Chief Complaint: Discuss shoulder / hip pain    HPI  Review of Systems   Constitutional: Positive for fatigue. Negative for unexpected weight change.   Respiratory: Negative for chest tightness and shortness of breath.    Cardiovascular: Negative for chest pain, palpitations and leg swelling.   Gastrointestinal: Negative for abdominal pain.   Musculoskeletal: Positive for arthralgias and neck pain.   Neurological: Positive for numbness. Negative for dizziness, syncope, weakness, light-headedness and headaches.   Psychiatric/Behavioral: Positive for dysphoric mood and sleep disturbance.       Patient Active Problem List   Diagnosis    GERD (gastroesophageal reflux disease)    Chronic rhinitis    Hypothyroid    Aneurysm of heart (wall)    Benign neoplasm of skin of upper limb, including shoulder    Intermediate coronary syndrome    Phlebitis and thrombophlebitis of superficial veins of upper extremities    Simple chronic conjunctivitis    Supraventricular premature beats    Vascular disorder of skin    Chronic external jugular vein thrombosis    Anticoagulant long-term use    Magdiel's syndrome - Left Eye    Ptosis    Anxiety    CAD (coronary artery disease)    S/P CABG x 2, 2005    HTN (hypertension)    Hyperlipidemia    PAF (paroxysmal atrial fibrillation), onset 2002    Intercostal neuralgia    History of lung cancer    Blurred vision, bilateral    Panlobular emphysema    Carotid artery disease    COPD (chronic obstructive pulmonary disease)    Uncontrolled atrial flutter with rvr    Allergic rhinitis    Hypoalbuminemia    History of smoking 10-25 pack years, 15 pack-years, quit 1994    S/P CABG (coronary artery bypass graft)    Supraventricular bigeminy    Dysphagia    History of colon polyps    Coronary artery disease involving autologous artery coronary bypass graft    Wrist arthritis    Gastritis     Esophageal lesion    Fatigue    Grade II hemorrhoids    Cardiomyopathy    Throat irritation    Paroxysmal atrial fibrillation    BMI 21.0-21.9, adult    Anticoagulation management encounter    History of biliary stent insertion, 6/2005, left jugular vein and left innominate vein    S/P ablation of atrial fibrillation     Patient is here for a chronic conditions follow up.    HPL- unable to eat low fat diet due to tumor removed mouth and difficulty swallowing pill    Ache and stiff joints all over- hands, should left, knee and right  hip, neck as well. Tramadol taking as needed. Not sleeping well. Numbness and tingling all fingertips. Has pain in right upper hip area that is an ache but certain movements cause sharp shooting pain. Cannot take NSAIDS due to eliquis.  Affecting her quality of life and limiting activity  Objective:      Physical Exam   Constitutional: She is oriented to person, place, and time. She appears well-developed and well-nourished.   Cardiovascular: Normal rate, regular rhythm and normal heart sounds.   Pulmonary/Chest: Effort normal and breath sounds normal.   Musculoskeletal: She exhibits no edema.        Lumbar back: She exhibits tenderness and pain. She exhibits normal range of motion.        Back:    Neurological: She is alert and oriented to person, place, and time.   Skin: Skin is warm and dry.   Psychiatric: She has a normal mood and affect.   Nursing note and vitals reviewed.      Assessment:       1. Primary osteoarthritis involving multiple joints    2. Cervical spondylosis    3. Hyperlipidemia, unspecified hyperlipidemia type    4. Right hip pain        Plan:           1. Primary osteoarthritis involving multiple joints  Add  - pregabalin (LYRICA) 75 MG capsule; Take 1 capsule (75 mg total) by mouth every evening.  Dispense: 30 capsule; Refill: 0  Use as needed  - traMADol (ULTRAM) 50 mg tablet; Take 1 tablet (50 mg total) by mouth every 12 (twelve) hours as needed for  Pain. 1-2 tablets by mouth every 6 hours prn pain  Dispense: 60 tablet; Refill: 0    2. Cervical spondylosis  Refer for eval and treat  - Ambulatory referral to Physical Medicine Rehab    3. Hyperlipidemia, unspecified hyperlipidemia type  Hold statin for now. Monitor    4. Right hip pain  Suspect right sacroiliac joint.  Has appt for phys med and rehab Dr. Dixon.  May consider injection tx        Time spent with patient: 20 minutes    Patient with be reevaluated in 4 weeks or sooner prn    Greater than 50% of this visit was spent counseling as described in above documentation:Yes

## 2019-12-16 NOTE — Clinical Note
Liam Dixon.  This patient has an appt with you this Friday.  I think she has right sided SI joint pain.  Please evaluate and consider injection for this or treatment as you feel appropriate. Thank you and Loren Ward!Maida Mon

## 2019-12-17 NOTE — TELEPHONE ENCOUNTER
Received a fax from [employer] stating a PA is required for Lyrica.   PA was requested and has been denied. Not covered for diagnosis provided.  Gabapentin is listed as covered med on  formulary.   Please advise.

## 2019-12-17 NOTE — TELEPHONE ENCOUNTER
----- Message from John Hurtado sent at 12/17/2019  4:28 PM CST -----  Type: Needs Medical Advice    Who Called:  Patient    Pharmacy name and phone #:     [facility]  [phone]      Fax [phone]     Best Call Back Number: [phone]  Additional Information: Patient states that she would like a callback regarding the PA for her pregabalin (LYRICA) 75 MG capsule

## 2019-12-18 NOTE — PROGRESS NOTES
SUBJECTIVE:    Patient ID: Steph Lira is a 69 y.o. female.    Chief Complaint: Neck Pain    This is a 69-year-old woman who sees Dr. Mon for primary care. Has history of atrial fibrillation and coronary artery disease status post coronary artery bypass graft.  She is on Eliquis.  Dr. Le is her cardiologist.  History of hypertension and hyperlipidemia.  History of lung cancer in 1996 status post chemotherapy and radiation therapy.  Long history of posterior neck discomfort.  Has some numbness and tingling in all the digits of both hands for about 3 months but no radicular symptoms.  She has chronic subjective left arm weakness since resection of her lung cancer.  I reviewed x-rays of the cervical spine from 2017 which shows advanced multilevel degenerative disc disease.  Current pain level is 4/10 but goes up to 8 or 9/10 at times.  She takes tramadol which helps.  Recently prescribed gabapentin but has not started that yet.  Denies bowel or bladder dysfunction fever chills sweats or unexpected weight loss.  He has not had any specific treatment for her neck        Past Medical History:   Diagnosis Date    *Atrial fibrillation     and Hx PSVT    Allergic drug rash 12/28/2016    Allergy     chronic     Arthritis     fingers    Benign tumor of throat     Bronchitis March 2013    CAD (coronary artery disease) 2005    CABGx2 in 2005 and 2 MI after with 4 stents    Cancer 1996    small cell lung cancer s/p resection of 1/3 lung, 5 ribs and muscle mass then had chemo and radiation    Cataract     OD     CHF (congestive heart failure) past Hx    Chronic rhinitis     Clot past Hx    external jugular, now stented, occluded, had phlebitis; now revascularized    Colon polyp     COPD (chronic obstructive pulmonary disease)     no oxygen or nebulizers    COPD exacerbation 5/8/13    improved 5/14/13 after steroid pack,antibiotics    Former smoker     GERD (gastroesophageal reflux disease)     Heart  block     Hyperlipidemia     Hypertension     pt states does not have //    MI (myocardial infarction)     Posterior vitreous detachment of left eye     Thyroid disease      Social History     Socioeconomic History    Marital status:      Spouse name: Not on file    Number of children: Not on file    Years of education: Not on file    Highest education level: Not on file   Occupational History    Occupation: retired   Social Needs    Financial resource strain: Not on file    Food insecurity:     Worry: Not on file     Inability: Not on file    Transportation needs:     Medical: Not on file     Non-medical: Not on file   Tobacco Use    Smoking status: Former Smoker     Packs/day: 2.00     Years: 20.00     Pack years: 40.00     Types: Cigarettes     Last attempt to quit: 1994     Years since quittin.9    Smokeless tobacco: Never Used   Substance and Sexual Activity    Alcohol use: No    Drug use: No    Sexual activity: Yes     Partners: Male   Lifestyle    Physical activity:     Days per week: Not on file     Minutes per session: Not on file    Stress: Not on file   Relationships    Social connections:     Talks on phone: Not on file     Gets together: Not on file     Attends Restorationist service: Not on file     Active member of club or organization: Not on file     Attends meetings of clubs or organizations: Not on file     Relationship status: Not on file   Other Topics Concern    Are you pregnant or think you may be? No    Breast-feeding No   Social History Narrative    Not on file     Past Surgical History:   Procedure Laterality Date    ABLATION N/A 2018    Procedure: ABLATION;  Surgeon: Santana Covarrubias MD;  Location[facility] EP LAB;  Service: Cardiology;  Laterality: N/A;  AF, JUAN ANTONIO, PVI, RFA, CARTO, GEN, GP, 3 PREP    ABLATION OF ARRHYTHMOGENIC FOCUS FOR ATRIAL FIBRILLATION N/A 2019    Procedure: ABLATION, ARRHYTHMOGENIC FOCUS, FOR ATRIAL FIBRILLATION;  Surgeon:  Santana Covarrubias MD;  Location: [facility] EP LAB;  Service: Cardiology;  Laterality: N/A;  AF, PVI, RFA, CARTO, GEN, GP , 3 PREP    ADENOIDECTOMY      APPENDECTOMY      BACK SURGERY      lumbar    BREAST BIOPSY Left     benign    CARDIAC SURGERY      CARDIOVERSION  5/8/2019    Procedure: Cardioversion;  Surgeon: Santana Covarrubias MD;  Location: [facility] EP LAB;  Service: Cardiology;;    CATARACT EXTRACTION Left     OS    CATARACT EXTRACTION, BILATERAL      left eye only    CHOLECYSTECTOMY      COLONOSCOPY  03/2012    repeat in 5 years    COLONOSCOPY N/A 6/19/2017    Procedure: COLONOSCOPY;  Surgeon: Kal Elise MD;  Location: [facility] ENDO;  Service: Endoscopy;  Laterality: N/A;    CORONARY ARTERY BYPASS GRAFT  2005    2 vessel    ESOPHAGOGASTRODUODENOSCOPY N/A 7/5/2018    Procedure: ESOPHAGOGASTRODUODENOSCOPY (EGD)/cryo;  Surgeon: Robbie Gonzales MD;  [facility]);  Service: Endoscopy;  Laterality: N/A;  Eliquis/Dr Henry Wei/OK to hold med 2 days prior to egd/see telephone encounter dated 6/12/18/pt stated she needs to take Diflucan 1 tab po daily 12 days prior to egd, message sent to Sarah/she will check with Dr Gonzales/santo    ESOPHAGOGASTRODUODENOSCOPY N/A 10/3/2018    Procedure: EGD (ESOPHAGOGASTRODUODENOSCOPY)/poss cryo;  Surgeon: Robbie Gonzales MD;  [facility]);  Service: Endoscopy;  Laterality: N/A;  2 day hold Dr Henry Pavon  Diflucan 1 tab po daily starting 14 days prior to egd, Rx'd per Dr Gonzales    ESOPHAGOGASTRODUODENOSCOPY N/A 11/1/2018    Procedure: EGD (ESOPHAGOGASTRODUODENOSCOPY)/cryo;  Surgeon: Robbie Gonzales MD;  [facility]);  Service: Endoscopy;  Laterality: N/A;  2 day hold Dr Henry Pavon  Diflucan 1 tab po daily starting 14 days prior to egd, Rx'd per Dr Gonzales as before?    ESOPHAGOGASTRODUODENOSCOPY N/A 9/27/2019    Procedure: EGD (ESOPHAGOGASTRODUODENOSCOPY);  Surgeon: Robbie Gonzales MD;   [facility]  Service: Endoscopy;  Laterality: N/A;  Coumadin was d/c'd 19.  No other anti-thrombotic med for now.  pg  Per Dr Gonzales, she does not need to take Diflucan pre-procedure as she did before.  pg    EYE SURGERY  Dec 2012    ptosis repair    FIRST RIB REMOVAL   with lung resection    HYSTERECTOMY      LUNG LOBECTOMY      left lung for cancer    REMOVAL OF PLANTAR WART USING LASER      SHOULDER SURGERY  ,    scapular entrapment after lung surgery, needed 5 ribs removed left side    SYMPOTHATIC NERVE LEFT SIDE       with lung surgery    TONSILLECTOMY      TONSILLECTOMY, ADENOIDECTOMY, BILATERAL MYRINGOTOMY AND TUBES      UPPER GASTROINTESTINAL ENDOSCOPY  2016    Dr. Koo    VASCULAR SURGERY      stents place in jugualr vein     Family History   Problem Relation Age of Onset    Allergic rhinitis Father     Cancer Father     Hypertension Father     Allergies Father     Allergic rhinitis Son     Asthma Son     Stroke Mother     Allergies Mother     Allergic rhinitis Sister     Asthma Sister     Diverticulitis Brother     No Known Problems Daughter     No Known Problems Maternal Aunt     No Known Problems Maternal Uncle     No Known Problems Paternal Aunt     No Known Problems Paternal Uncle     Colon polyps Maternal Grandmother          of colon blockage    No Known Problems Maternal Grandfather     No Known Problems Paternal Grandmother     No Known Problems Paternal Grandfather     No Known Problems Brother     No Known Problems Sister     Angioedema Neg Hx     Eczema Neg Hx     Immunodeficiency Neg Hx     Urticaria Neg Hx     Skin cancer Neg Hx     Amblyopia Neg Hx     Blindness Neg Hx     Cataracts Neg Hx     Diabetes Neg Hx     Glaucoma Neg Hx     Macular degeneration Neg Hx     Retinal detachment Neg Hx     Strabismus Neg Hx     Thyroid disease Neg Hx     Colon cancer Neg Hx     Melanoma Neg Hx   "    Vitals:    12/18/19 1033   BP: 136/77   Pulse: 90   Weight: 59.5 kg (131 lb 2.8 oz)   Height: 5' 6" (1.676 m)       Review of Systems   Constitutional: Negative for chills, diaphoresis, fatigue, fever and unexpected weight change.   HENT: Negative for trouble swallowing.    Eyes: Negative for visual disturbance.   Respiratory: Negative for shortness of breath.    Cardiovascular: Negative for chest pain.   Gastrointestinal: Negative for abdominal pain, constipation, nausea and vomiting.   Genitourinary: Negative for difficulty urinating.   Musculoskeletal: Negative for arthralgias, back pain, gait problem, joint swelling, myalgias, neck pain and neck stiffness.   Neurological: Negative for dizziness, speech difficulty, weakness, light-headedness, numbness and headaches.          Objective:      Physical Exam   Constitutional: She is oriented to person, place, and time. She appears well-developed and well-nourished.   Neurological: She is alert and oriented to person, place, and time.   She is awake and in no acute distress  No point tenderness external lesions or palpable masses about the cervical spine  Cervical range of motion is normal and painless  Lhermitte sign is negative  Reflexes- +1-+2 reflexes at the following:   C5-Biceps   C6-Brachioradialis   C7-Triceps   L3/4-Patellar   S1-Achilles  Jhony sign negative bilaterally  Strength testing- 5/5 strength in the following muscle groups:  C5-Elbow flexion  C6-Wrist extension  C7-Elbow extension  C8-Finger flexion  T1-Finger abduction  L2-Hip flexion  L3-Knee extension  L4-Ankle dorsiflexion  L5-Great toe extension  S1-Ankle plantar flexion               Assessment:       1. Cervicalgia    2. DDD (degenerative disc disease), cervical           Plan:     she has a nonfocal examination from neurological standpoint and no historical red flags.  Has chronic neck pain on basis of advanced multilevel degenerative disc disease.  I recommend trial of outpatient " physical therapy.  If she fails that we will get an MRI of the cervical spine in preparation for cervical epidural steroid injections.  Follow-up with me in 6 weeks or as needed      Cervicalgia  -     Ambulatory Referral to Physical/Occupational Therapy    DDD (degenerative disc disease), cervical  -     Ambulatory Referral to Physical/Occupational Therapy

## 2019-12-18 NOTE — LETTER
December 18, 2019      Maida Mon MD  [address]  [place] [address]           [facility]  [address] [place] [address] [address]  [place] [address]  Phone: [phone]  Fax: [phone]          Patient: Steph Lira   MR Number: [id number]   YOB: 1950   Date of Visit: 12/18/2019       Dear Dr. Maida Mon:    Thank you for referring Steph Lira to me for evaluation. Attached you will find relevant portions of my assessment and plan of care.    If you have questions, please do not hesitate to call me. I look forward to following Steph Lira along with you.    Sincerely,    Garrison Paniagua MD    Enclosure  CC:  No Recipients    If you would like to receive this communication electronically, please contact [email or web address] or [phone] to request more information on [employer] access.    For providers and/or their staff who would like to refer a patient to [facility], please contact us through our one-stop-shop provider referral line, [facility], at [phone].    If you feel you have received this communication in error or would no longer like to receive these types of communications, please e-mail [email or web address]

## 2019-12-20 NOTE — LETTER
December 20, 2019      Dipesh Glover MD  [address]  [place] [address]           [place] [facility]  [address] [place] [address]  [address]  [place] [address]  Phone: [phone]  Fax: [phone]          Patient: Steph Lira   MR Number: [id number]   YOB: 1950   Date of Visit: 12/20/2019       Dear Dr. Dipesh Glover:    Thank you for referring Steph Lira to me for evaluation. Attached you will find relevant portions of my assessment and plan of care.    If you have questions, please do not hesitate to call me. I look forward to following Steph Lira along with you.    Sincerely,    Gregoria Dixon,     Enclosure  CC:  No Recipients    If you would like to receive this communication electronically, please contact [email or web address] or [phone] to request more information on [employer] Link access.    For providers and/or their staff who would like to refer a patient to [facility], please contact us through our one-stop-shop provider referral line, [facility], at [phone].    If you feel you have received this communication in error or would no longer like to receive these types of communications, please e-mail [email or web address]

## 2019-12-20 NOTE — PROGRESS NOTES
HPI:  Patient is a 69 y.o. year old female w. Right sided back and hip pain. She states 4 wks ago he twisted and picked something and she felt severe pain. The pain shoots to her buttocks and lateral right hip. It does not go below hip. She does not report weakness or trauma. She has not had this happen previously.  Since, I last evaluated her she has been receiving cryotherapy for removal of a benign throat tumor. She has lost a lot of weight in the process.  Labs  egfr cr lfts gluc nl      Past Medical History:   Diagnosis Date    *Atrial fibrillation     and Hx PSVT    Allergic drug rash 12/28/2016    Allergy     chronic     Arthritis     fingers    Benign tumor of throat     Bronchitis March 2013    CAD (coronary artery disease) 2005    CABGx2 in 2005 and 2 MI after with 4 stents    Cancer 1996    small cell lung cancer s/p resection of 1/3 lung, 5 ribs and muscle mass then had chemo and radiation    Cataract     OD     CHF (congestive heart failure) past Hx    Chronic rhinitis     Clot past Hx    external jugular, now stented, occluded, had phlebitis; now revascularized    Colon polyp     COPD (chronic obstructive pulmonary disease)     no oxygen or nebulizers    COPD exacerbation 5/8/13    improved 5/14/13 after steroid pack,antibiotics    Former smoker     GERD (gastroesophageal reflux disease)     Heart block     Hyperlipidemia     Hypertension     pt states does not have //    MI (myocardial infarction) 2005    Posterior vitreous detachment of left eye     Thyroid disease      Past Surgical History:   Procedure Laterality Date    ABLATION N/A 12/11/2018    Procedure: ABLATION;  Surgeon: Santana Covarrubias MD;  Location: [facility] EP LAB;  Service: Cardiology;  Laterality: N/A;  AF, JUAN ANTONIO, PVI, RFA, CARTO, GEN, GP, 3 PREP    ABLATION OF ARRHYTHMOGENIC FOCUS FOR ATRIAL FIBRILLATION N/A 5/8/2019    Procedure: ABLATION, ARRHYTHMOGENIC FOCUS, FOR ATRIAL FIBRILLATION;  Surgeon: Santana Covarrubias MD;   Location: [facility] EP LAB;  Service: Cardiology;  Laterality: N/A;  AF, PVI, RFA, CARTO, GEN, GP , 3 PREP    ADENOIDECTOMY      APPENDECTOMY      BACK SURGERY      lumbar    BREAST BIOPSY Left     benign    CARDIAC SURGERY      CARDIOVERSION  5/8/2019    Procedure: Cardioversion;  Surgeon: Santana Covarrubias MD;  Location: [facility] EP LAB;  Service: Cardiology;;    CATARACT EXTRACTION Left     OS    CATARACT EXTRACTION, BILATERAL      left eye only    CHOLECYSTECTOMY      COLONOSCOPY  03/2012    repeat in 5 years    COLONOSCOPY N/A 6/19/2017    Procedure: COLONOSCOPY;  Surgeon: Kal Elise MD;  Location: [facility];  Service: Endoscopy;  Laterality: N/A;    CORONARY ARTERY BYPASS GRAFT  2005    2 vessel    ESOPHAGOGASTRODUODENOSCOPY N/A 7/5/2018    Procedure: ESOPHAGOGASTRODUODENOSCOPY (EGD)/cryo;  Surgeon: Robbie Gonzales MD;  Location: [facility] F[facility]);  Service: Endoscopy;  Laterality: N/A;  Eliquis/Dr Henry Wei/OK to hold med 2 days prior to egd/see telephone encounter dated 6/12/18/pt stated she needs to take Diflucan 1 tab po daily 12 days prior to egd, message sent to Sarah/she will check with Dr Gonzales/santo    ESOPHAGOGASTRODUODENOSCOPY N/A 10/3/2018    Procedure: EGD (ESOPHAGOGASTRODUODENOSCOPY)/poss cryo;  Surgeon: Robbie Gonzales MD;  Location: [facility] (2ND FLR);  Service: Endoscopy;  Laterality: N/A;  2 day hold Dr Henry Pavon  Diflucan 1 tab po daily starting 14 days prior to egd, Rx'd per Dr Gonzales    ESOPHAGOGASTRODUODENOSCOPY N/A 11/1/2018    Procedure: EGD (ESOPHAGOGASTRODUODENOSCOPY)/cryo;  Surgeon: Robbie Gonzales MD;  Location: [facility] (2ND FLR);  Service: Endoscopy;  Laterality: N/A;  2 day hold Dr Henry Pavon  Diflucan 1 tab po daily starting 14 days prior to egd, Rx'd per Dr Gonzales as before?    ESOPHAGOGASTRODUODENOSCOPY N/A 9/27/2019    Procedure: EGD (ESOPHAGOGASTRODUODENOSCOPY);  Surgeon: Robbie Gonzales MD;  Location[place] [address]  [place]  Service: Endoscopy;  Laterality: N/A;  Coumadin was d/c'd 19.  No other anti-thrombotic med for now.  pg  Per Dr Gonzales, she does not need to take Diflucan pre-procedure as she did before.  pg    EYE SURGERY  Dec 2012    ptosis repair    FIRST RIB REMOVAL   with lung resection    HYSTERECTOMY      LUNG LOBECTOMY      left lung for cancer    REMOVAL OF PLANTAR WART USING LASER      SHOULDER SURGERY  ,    scapular entrapment after lung surgery, needed 5 ribs removed left side    SYMPOTHATIC NERVE LEFT SIDE       with lung surgery    TONSILLECTOMY      TONSILLECTOMY, ADENOIDECTOMY, BILATERAL MYRINGOTOMY AND TUBES      UPPER GASTROINTESTINAL ENDOSCOPY  2016    Dr. Koo    VASCULAR SURGERY      stents place in jugualr vein     Family History   Problem Relation Age of Onset    Allergic rhinitis Father     Cancer Father     Hypertension Father     Allergies Father     Allergic rhinitis Son     Asthma Son     Stroke Mother     Allergies Mother     Allergic rhinitis Sister     Asthma Sister     Diverticulitis Brother     No Known Problems Daughter     No Known Problems Maternal Aunt     No Known Problems Maternal Uncle     No Known Problems Paternal Aunt     No Known Problems Paternal Uncle     Colon polyps Maternal Grandmother          of colon blockage    No Known Problems Maternal Grandfather     No Known Problems Paternal Grandmother     No Known Problems Paternal Grandfather     No Known Problems Brother     No Known Problems Sister     Angioedema Neg Hx     Eczema Neg Hx     Immunodeficiency Neg Hx     Urticaria Neg Hx     Skin cancer Neg Hx     Amblyopia Neg Hx     Blindness Neg Hx     Cataracts Neg Hx     Diabetes Neg Hx     Glaucoma Neg Hx     Macular degeneration Neg Hx     Retinal detachment Neg Hx     Strabismus Neg Hx     Thyroid disease Neg Hx     Colon cancer Neg Hx     Melanoma Neg Hx      Social History      Socioeconomic History    Marital status:      Spouse name: Not on file    Number of children: Not on file    Years of education: Not on file    Highest education level: Not on file   Occupational History    Occupation: retired   Social Needs    Financial resource strain: Not on file    Food insecurity:     Worry: Not on file     Inability: Not on file    Transportation needs:     Medical: Not on file     Non-medical: Not on file   Tobacco Use    Smoking status: Former Smoker     Packs/day: 2.00     Years: 20.00     Pack years: 40.00     Types: Cigarettes     Last attempt to quit: 1994     Years since quittin.9    Smokeless tobacco: Never Used   Substance and Sexual Activity    Alcohol use: No    Drug use: No    Sexual activity: Yes     Partners: Male   Lifestyle    Physical activity:     Days per week: Not on file     Minutes per session: Not on file    Stress: Not on file   Relationships    Social connections:     Talks on phone: Not on file     Gets together: Not on file     Attends Bahai service: Not on file     Active member of club or organization: Not on file     Attends meetings of clubs or organizations: Not on file     Relationship status: Not on file   Other Topics Concern    Are you pregnant or think you may be? No    Breast-feeding No   Social History Narrative    Not on file       Review of patient's allergies indicates:   Allergen Reactions    Ciprofloxacin Itching    Coreg [carvedilol]      Low energy    Doxycycline Other (See Comments) and Hives     Patient had a rxn with the sun despite wearing spf 30    Metoprolol      Low energy       Current Outpatient Medications:     ADVAIR DISKUS 250-50 mcg/dose diskus inhaler, USE 1 INHALATION TWICE A DAY (RINSE MOUTH AFTER USE TO PREVENT THRUSH), Disp: 3 each, Rfl: 3    albuterol (ACCUNEB) 1.25 mg/3 mL Nebu, USE 1 VIAL (3 ML) VIA NEBULIZER EVERY 6 HOURS AS NEEDED, Disp: 90 mL, Rfl: 2    albuterol (PROVENTIL  HFA) 90 mcg/actuation inhaler, Inhale 2 puffs into the lungs every 6 (six) hours as needed for Wheezing., Disp: 3 Inhaler, Rfl: 4    apixaban (ELIQUIS) 5 mg Tab, Take 1 tablet by mouth once daily. Take half tab BID, Disp: , Rfl:     atorvastatin (LIPITOR) 40 MG tablet, Take 1 tablet (40 mg total) by mouth once daily., Disp: 90 tablet, Rfl: 3    bisacodyl (DULCOLAX) 5 mg EC tablet, Take 1 tablet (5 mg total) by mouth daily as needed for Constipation., Disp: 30 tablet, Rfl: 11    cetirizine (ZYRTEC) 10 MG tablet, TAKE 1 TABLET EVERY EVENING, Disp: 90 tablet, Rfl: 2    diltiaZEM (CARDIZEM CD) 120 MG Cp24, Take 1 capsule (120 mg total) by mouth 2 (two) times daily. (Patient taking differently: Take 120 mg by mouth once daily. ), Disp: 180 capsule, Rfl: 3    diphenhydrAMINE (BENADRYL) 25 mg capsule, Take 25 mg by mouth every 6 (six) hours as needed for Itching., Disp: , Rfl:     fluticasone (FLONASE) 50 mcg/actuation nasal spray, 1 spray by Each Nare route once daily., Disp: 3 Bottle, Rfl: 3    furosemide (LASIX) 20 MG tablet, Take 1 tablet (20 mg total) by mouth daily as needed., Disp: 10 tablet, Rfl: 0    gabapentin (NEURONTIN) 300 MG capsule, Take 1 po qhs x 7d, then 1 po bid x 7d, then 1 po tid maintenance, Disp: 90 capsule, Rfl: 2    ketoconazole (NIZORAL) 2 % cream, Apply topically once daily., Disp: 60 g, Rfl: 0    levothyroxine (SYNTHROID) 88 MCG tablet, TAKE 1 TABLET DAILY, Disp: 90 tablet, Rfl: 4    montelukast (SINGULAIR) 10 mg tablet, TAKE 1 TABLET EVERY EVENING, Disp: 90 tablet, Rfl: 4    multivitamin-minerals-lutein (CENTRUM SILVER) Tab, Take 1 tablet by mouth once daily. 1 Tablet Oral Every day, Disp: , Rfl:     pantoprazole (PROTONIX) 40 MG tablet, TAKE 1 TABLET DAILY 30 MINUTES PRIOR TO BREAKFAST, Disp: 90 tablet, Rfl: 3    SPIRIVA WITH HANDIHALER 18 mcg inhalation capsule, INHALE THE CONTENTS OF 1 CAPSULE DAILY, Disp: 90 capsule, Rfl: 3    traMADol (ULTRAM) 50 mg tablet, Take 1 tablet  (50 mg total) by mouth every 12 (twelve) hours as needed for Pain. 1-2 tablets by mouth every 6 hours prn pain, Disp: 60 tablet, Rfl: 0    tretinoin (RETIN-A) 0.05 % cream, APPLY THIN FILM TO FACE AT NIGHT AFTER MOISTURIZING, Disp: 45 g, Rfl: 3    ZINC GLUCONATE ORAL, Take by mouth., Disp: , Rfl:   No current facility-administered medications for this visit.     Facility-Administered Medications Ordered in Other Visits:     0.9%  NaCl infusion, , Intravenous, Continuous, Ni Jaimes NP, Last Rate: 0 mL/hr at 12/11/18 0850    0.9%  NaCl infusion, , Intravenous, Continuous, Ni Jaimes NP    sodium chloride 0.9% flush 5 mL, 5 mL, Intravenous, PRN, Ni Jaimes NP    Review of Systems  No nausea, vomiting, fevers, Chills , contipation, diarrhea or sweats      Physical Exam:      Vitals:    12/20/19 1015   BP: 124/85   Pulse: 82     alert and oriented ×4 follows commands answers all questions appropriately,affect wnl  Manual muscle test 5 out of 5 sensation to light touch grossly intact  +tenderness right iliac crest and pirirformis  antalgic gait  No muscular atrophy  -slR  -MORALES  Full hip ROM  No C/C/E      Assessment:  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 patient. Verbal consent was obtained. Injection was performed after sterile prep w.chrolorohexedine. Short  and long axis APPROACH UTILIZED. First, VISUALIZATION OF THE PSIS  , superior gluteal artery, fascial line between GMAX and GMED was obtained, subsequently  BLOCK + HYDRODISSECTION OF CLUNEAL NERVE PERFORMED. PT. WAS LAYING PRONE. INJECTION WAS PERFORMED WITH A  22g  spinal needle  needle  After a numbing wheal w. Lidocaine performed.   Ultrasound guidance was utilized for needle  visualization. A TOTAL OF 10ML OF LIDOCAINE 1% AND 9ML OF STERILE NORMAL SALINE  And 1ml of DEPOMEDROL 40MG WAS UTILIZED. No complications. iMMEDIATE IMPROVEMENT OF  PAIN POST PROCEDURE.    Ultrasound interpretation was performed prior to the procedure to identify the target and any adjacent neurovascular structures.  Subsequently, interpretation was performed during real- time needle guidance confirming placement. Post- intervention interpretation was also performed confirming appropriate injectate  flow and hemostasis.  Images indicating needle placement have been saved in [facility] [place].

## 2019-12-30 PROBLEM — R29.898 DECREASED ROM OF NECK: Status: ACTIVE | Noted: 2019-01-01

## 2019-12-30 NOTE — PLAN OF CARE
[facility]  Physical Therapy Initial Evaluation    Name: Steph ROMERO [facility] Number: [id number]    Therapy Diagnosis:   Encounter Diagnosis   Name Primary?    Decreased ROM of neck      Physician: Garrison Paniagua MD    Physician Orders: PT Eval and Treat   Medical Diagnosis from Referral:   M54.2 (ICD-10-CM) - Cervicalgia   M50.30 (ICD-10-CM) - DDD (degenerative disc disease), cervical     Evaluation Date: 12/30/2019  Authorization Period Expiration: 12/17/2020  Plan of Care Expiration: 2/14/2020  Visit # / Visits authorized: 1/ 1    Time In: 1410  Time Out: 1500  Total Billable Time: 50 minutes    Precautions: cardiac, h/o CA    Subjective   Date of onset: chronic neck discomfort, 3 months onset tingling/numbness all digits of both hands  History of current condition - Steph reports: chronic posterior neck discomfort/crepitus, reports h/o lung cancer s/p resection 1/4 left upper lobe and sympathetic nerve surgery in 1996, f/b chemo and radiation. Shoulder surgery in 2010 with removal of 5 ribs due to scapular entrapment from lung sx. Pt reports occasional shooting pain posterior neck, unable to identify trigger. Also reports crepitus in posterior neck with head movements. Insidious onset tingling/numbness 3 months ago in all digits of both hands. L UE weakness at baseline following lung/rib surgeries. Pt seen by Dr. Paniagua who referred her to outpatient PT for chronic neck pain in the setting of advanced degenerative disc disease.      Medical History:   Past Medical History:   Diagnosis Date    *Atrial fibrillation     and Hx PSVT    Allergic drug rash 12/28/2016    Allergy     chronic     Arthritis     fingers    Benign tumor of throat     Bronchitis March 2013    CAD (coronary artery disease) 2005    CABGx2 in 2005 and 2 MI after with 4 stents    Cancer 1996    small cell lung cancer s/p resection of 1/3 lung, 5 ribs and muscle mass then had chemo and radiation     Cataract     OD     CHF (congestive heart failure) past Hx    Chronic rhinitis     Clot past Hx    external jugular, now stented, occluded, had phlebitis; now revascularized    Colon polyp     COPD (chronic obstructive pulmonary disease)     no oxygen or nebulizers    COPD exacerbation 5/8/13    improved 5/14/13 after steroid pack,antibiotics    Former smoker     GERD (gastroesophageal reflux disease)     Heart block     Hyperlipidemia     Hypertension     pt states does not have //    MI (myocardial infarction) 2005    Posterior vitreous detachment of left eye     Thyroid disease        Surgical History:   Steph Lira  has a past surgical history that includes TONSILLECTOMY, ADENOIDECTOMY, BILATERAL yringotomy and tubes; Cholecystectomy; Hysterectomy; Cataract extraction, bilateral; First rib removal (1996 with lung resection); Lung lobectomy (1996); Coronary artery bypass graft (2005); Appendectomy; SYMPOTHATIC NERVE LEFT SIDE; Tonsillectomy; Adenoidectomy; Eye surgery (Dec 2012); Shoulder surgery (2010,2011); Back surgery; Cataract extraction (Left); Vascular surgery; Upper gastrointestinal endoscopy (05/2016); Colonoscopy (03/2012); Cardiac surgery; Colonoscopy (N/A, 6/19/2017); Breast biopsy (Left); Esophagogastroduodenoscopy (N/A, 7/5/2018); Esophagogastroduodenoscopy (N/A, 10/3/2018); Esophagogastroduodenoscopy (N/A, 11/1/2018); Ablation (N/A, 12/11/2018); Removal of plantar wart using laser; Ablation of arrhythmogenic focus for atrial fibrillation (N/A, 5/8/2019); Cardioversion (5/8/2019); and Esophagogastroduodenoscopy (N/A, 9/27/2019).    Medications:   Steph has a current medication list which includes the following prescription(s): advair diskus, albuterol, albuterol, apixaban, atorvastatin, bisacodyl, cetirizine, diltiazem, diphenhydramine, fluticasone propionate, furosemide, gabapentin, ketoconazole, levothyroxine, montelukast, multivitamin-minerals-lutein, pantoprazole, spiriva with  handihaler, tramadol, tretinoin, and zinc gluconate, and the following Facility-Administered Medications: sodium chloride 0.9%, sodium chloride 0.9%, and sodium chloride 0.9%.    Allergies:   Review of patient's allergies indicates:   Allergen Reactions    Ciprofloxacin Itching    Coreg [carvedilol]      Low energy    Doxycycline Other (See Comments) and Hives     Patient had a rxn with the sun despite wearing spf 30    Metoprolol      Low energy        Prior Therapy: yes  Prior Level of Function: Independent  Current Level of Function: Independent    Pain:    Location:  R sided posterior neck  Description: shooting, intermittentAggravating Factors: unknown  Easing Factors: unknown  Current: unable to rate    Pts goals: follow doctor's prescription for PT    Objective     Posture/Appearance: Fwd head position, rounded shoulders, unlevel shoulders with L scapular deformity  Palpation: Mild tenderness upper thoracic paraspinals, rhomboids  Sensation: tingling/numbness all fingertips of both hands      ROM/Strength:       Cervical AROM: Pain/Dysfunction with Movement:   Flexion 50*    Extension 40*    Right side bending 35*    Left side bending 30*    Right rotation 65*    Left rotation 68*      *no pain with cervical ROM    Upper Extremity Range of Motion:   Right Upper Extremity: shoulder decreased to <90* flexion, WFL elbow/wrist   Left Upper Extremity: WFL    Upper Extremity Range of Motion:   Right Upper Extremity: 2+/5 shoulder flexion, 3+/5 elbow flexion, 4/5 elbow extension, wrist 4/5, handgrip 33#   Left Upper Extremity: 4/5 shoulder flexion, 4+/5 elbow flexion/extension, 4+/5 wrist, handgrip 35#        Transfers: Mod I  Gait: independent, antalgic    Functional Outcome Measure: Neck Disability Index (NDI): 2/45        TREATMENT     Education provided:   - Role/goal PT; POC  - Initiate home exercise program as instructed  - Discussed monitoring symptoms and stopping activities which cause increased  pain    Written Home Exercises Provided: yes.  Exercises were reviewed and Steph was able to demonstrate them prior to the end of the session.  Steph demonstrated good  understanding of the education provided.     See EMR under Media for exercises provided 12/30/2019.    Assessment   Steph is a 69 y.o. female referred to outpatient Physical Therapy with a medical diagnosis of   M54.2 (ICD-10-CM) - Cervicalgia   M50.30 (ICD-10-CM) - DDD (degenerative disc disease), cervical   . Pt presents with postural abnormality, decreased ROM, and decreased mobility.    Pt prognosis is Good.   Pt will benefit from skilled outpatient Physical Therapy to address the deficits stated above and in the chart below, provide pt/family education, and to maximize pt's level of independence.     Plan of care discussed with patient: Yes  Pt's spiritual, cultural and educational needs considered and patient is agreeable to the plan of care and goals as stated below:     Anticipated Barriers for therapy: none    Medical Necessity is demonstrated by the following  History  Co-morbidities and personal factors that may impact the plan of care Co-morbidities:   history of cancer    Personal Factors:   no deficits     low   Examination  Body Structures and Functions, activity limitations and participation restrictions that may impact the plan of care Body Regions:   neck    Body Systems:    ROM  strength    Participation Restrictions:       Activity limitations:   Learning and applying knowledge  no deficits    General Tasks and Commands  no deficits    Communication  no deficits    Mobility  fine hand use (grasping/picking up)    Self care  no deficits    Domestic Life  no deficits    Interactions/Relationships  no deficits    Life Areas  no deficits    Community and Social Life  no deficits         low   Clinical Presentation stable and uncomplicated low   Decision Making/ Complexity Score: low     Goals:  Short Term Goals: 2 weeks   1) Patient will  initiate HEP  2) Patient will improve C/S rotation by 5* for driving purposes    Long Term Goals: 4 weeks   1) Patient will be independent in HEP  2) Patient will improve functional outcome measure NDI to 0% impairment    Plan   Plan of care Certification: 12/30/2019 to 2/14/2020.    Outpatient Physical Therapy 1-2 times weekly for 4 weeks to include the following interventions: Cervical/Lumbar Traction, Manual Therapy, Moist Heat/ Ice, Patient Education, Therapeutic Activites and Therapeutic Exercise.     Subha Morrow, PT

## 2019-12-31 NOTE — PROGRESS NOTES
HPI:  Patient is a 69 y.o. year old female w. Right sided back and hip pain. Since, I last evaluated her she has been receiving cryotherapy for removal of a benign throat tumor. She has lost a lot of weight in the process.She states 6 wks ago he twisted and picked something and she felt severe pain. The pain shoots to her buttocks and lateral right hip. It does not go below hip. She does not report weakness or trauma. She has not had this happen previously.    Last eval I did a therapeutic/diagnostic cluneal nerve block+hydrodissection which helped over 50%. However, she feels like her pain is returning , although it is still much better. She states this occurred after she got in her car and sat abruptly.       Labs  egfr cr lfts gluc nl    Imaging    X-Ray Hip 2 or 3 views Right   Order: [id number]   Status:  Final result   Visible to patient:  Yes (Patient Portal) Next appt:  01/02/2020 at 10:00 AM in Outpatient Rehab (Blanche Maddox PTA) Dx:  Pain of right hip joint   Details     Reading Physician Reading Date Result Priority   Dipesh Fernando MD 12/20/2019 Routine      Narrative     EXAMINATION:  XR HIP 2 VIEW RIGHT    CLINICAL HISTORY:  Pain in right hip    TECHNIQUE:  AP view of the pelvis and frog leg lateral view of the right hip were performed.    COMPARISON:  None    FINDINGS:  There is mild degenerative arthrosis of both hips with periarticular osteophyte formation.  No fracture or subluxation are identified.  The pelvis is intact.      Impression       Mild degenerative arthrosis of both hips           X-Ray Lumbar Spine AP And Lateral   Order: [id number]   Status:  Final result   Visible to patient:  Yes (Patient Portal) Next appt:  01/02/2020 at 10:00 AM in Outpatient Rehab (Blanche Maddox PTA) Dx:  Acute right-sided low back pain with ...   Details     Reading Physician Reading Date Result Priority   Barrie Boykin MD 12/6/2019 Routine      Narrative     EXAMINATION:  XR LUMBAR SPINE AP AND  LATERAL    CLINICAL HISTORY:  Low back pain, <6wks, no red flags, no prior management;Lumbago with sciatica, unspecified side    TECHNIQUE:  AP, lateral and spot images were performed of the lumbar spine.    COMPARISON:  Plain films of the lumbar spine dated 05/29/2007    FINDINGS:  The bones are mildly osteopenic.  There is no fracture or malalignment.  There is moderate to marked disc space narrowing towards the right at the L3-4 level where there is endplate sclerosis and marginal osteophyte formation.  These findings were present previously.  There is also moderate disc space narrowing at the L4-5 level with at least mild disc space narrowing at the L5-S1 level.  Facet joint arthropathy is present at the lower 2 lumbar levels.  There is a moderate disc bulge or protrusion at the L2-3 level.  There is mild atherosclerosis.  There are surgical clips from prior cholecystectomy.      Impression       1. There is mid lower lumbar spine degenerative disc and facet disease without fracture or malalignment.  There is a moderate disc bulge or protrusion at the L2-3 level.                 Past Medical History:   Diagnosis Date    *Atrial fibrillation     and Hx PSVT    Allergic drug rash 12/28/2016    Allergy     chronic     Arthritis     fingers    Benign tumor of throat     Bronchitis March 2013    CAD (coronary artery disease) 2005    CABGx2 in 2005 and 2 MI after with 4 stents    Cancer 1996    small cell lung cancer s/p resection of 1/3 lung, 5 ribs and muscle mass then had chemo and radiation    Cataract     OD     CHF (congestive heart failure) past Hx    Chronic rhinitis     Clot past Hx    external jugular, now stented, occluded, had phlebitis; now revascularized    Colon polyp     COPD (chronic obstructive pulmonary disease)     no oxygen or nebulizers    COPD exacerbation 5/8/13    improved 5/14/13 after steroid pack,antibiotics    Former smoker     GERD (gastroesophageal reflux disease)      Heart block     Hyperlipidemia     Hypertension     pt states does not have //    MI (myocardial infarction) 2005    Posterior vitreous detachment of left eye     Thyroid disease      Past Surgical History:   Procedure Laterality Date    ABLATION N/A 12/11/2018    Procedure: ABLATION;  Surgeon: Santana Covarrubias MD;  Location: [facility] EP LAB;  Service: Cardiology;  Laterality: N/A;  AF, JUAN ANTONIO, PVI, RFA, CARTO, GEN, GP, 3 PREP    ABLATION OF ARRHYTHMOGENIC FOCUS FOR ATRIAL FIBRILLATION N/A 5/8/2019    Procedure: ABLATION, ARRHYTHMOGENIC FOCUS, FOR ATRIAL FIBRILLATION;  Surgeon: Santana Covarrubias MD;  Location: [facility] EP LAB;  Service: Cardiology;  Laterality: N/A;  AF, PVI, RFA, CARTO, GEN, GP , 3 PREP    ADENOIDECTOMY      APPENDECTOMY      BACK SURGERY      lumbar    BREAST BIOPSY Left     benign    CARDIAC SURGERY      CARDIOVERSION  5/8/2019    Procedure: Cardioversion;  Surgeon: Santana Covarrubias MD;  Location: [facility] EP LAB;  Service: Cardiology;;    CATARACT EXTRACTION Left     OS    CATARACT EXTRACTION, BILATERAL      left eye only    CHOLECYSTECTOMY      COLONOSCOPY  03/2012    repeat in 5 years    COLONOSCOPY N/A 6/19/2017    Procedure: COLONOSCOPY;  Surgeon: Kal Elise MD;  Location: [facility];  Service: Endoscopy;  Laterality: N/A;    CORONARY ARTERY BYPASS GRAFT  2005    2 vessel    ESOPHAGOGASTRODUODENOSCOPY N/A 7/5/2018    Procedure: ESOPHAGOGASTRODUODENOSCOPY (EGD)/cryo;  Surgeon: Robbie Gonzales MD;  Location: [facility] (2ND FLR);  Service: Endoscopy;  Laterality: N/A;  Eliquis/Dr Henry Wei/OK to hold med 2 days prior to egd/see telephone encounter dated 6/12/18/pt stated she needs to take Diflucan 1 tab po daily 12 days prior to egd, message sent to Sarah/she will check with Dr Gonzales/santo    ESOPHAGOGASTRODUODENOSCOPY N/A 10/3/2018    Procedure: EGD (ESOPHAGOGASTRODUODENOSCOPY)/poss cryo;  Surgeon: Robbie Gonzales MD;  Location: [facility] (2ND FLR);  Service: Endoscopy;   Laterality: N/A;  2 day hold Dr Henry Pavon  Diflucan 1 tab po daily starting 14 days prior to egd, Rx'd per Dr Gonzales    ESOPHAGOGASTRODUODENOSCOPY N/A 2018    Procedure: EGD (ESOPHAGOGASTRODUODENOSCOPY)/cryo;  Surgeon: Robbie Gonzales MD;  Location: [facility] ([address] [place] [address]);  Service: Endoscopy;  Laterality: N/A;  2 day hold Dr Henry Pavon  Diflucan 1 tab po daily starting 14 days prior to egd, Rx'd per Dr Gonzales as before?    ESOPHAGOGASTRODUODENOSCOPY N/A 2019    Procedure: EGD (ESOPHAGOGASTRODUODENOSCOPY);  Surgeon: Robbie Gonzales MD;  Location: [address]);  Service: Endoscopy;  Laterality: N/A;  Coumadin was d/c'd 19.  No other anti-thrombotic med for now.  pg  Per Dr Gonzales, she does not need to take Diflucan pre-procedure as she did before.  pg    EYE SURGERY  Dec 2012    ptosis repair    FIRST RIB REMOVAL   with lung resection    HYSTERECTOMY      LUNG LOBECTOMY      left lung for cancer    REMOVAL OF PLANTAR WART USING LASER      SHOULDER SURGERY      scapular entrapment after lung surgery, needed 5 ribs removed left side    SYMPOTHATIC NERVE LEFT SIDE       with lung surgery    TONSILLECTOMY      TONSILLECTOMY, ADENOIDECTOMY, BILATERAL MYRINGOTOMY AND TUBES      UPPER GASTROINTESTINAL ENDOSCOPY  2016    Dr. Koo    VASCULAR SURGERY      stents place in jugualr vein     Family History   Problem Relation Age of Onset    Allergic rhinitis Father     Cancer Father     Hypertension Father     Allergies Father     Allergic rhinitis Son     Asthma Son     Stroke Mother     Allergies Mother     Allergic rhinitis Sister     Asthma Sister     Diverticulitis Brother     No Known Problems Daughter     No Known Problems Maternal Aunt     No Known Problems Maternal Uncle     No Known Problems Paternal Aunt     No Known Problems Paternal Uncle     Colon polyps Maternal Grandmother          of colon blockage     No Known Problems Maternal Grandfather     No Known Problems Paternal Grandmother     No Known Problems Paternal Grandfather     No Known Problems Brother     No Known Problems Sister     Angioedema Neg Hx     Eczema Neg Hx     Immunodeficiency Neg Hx     Urticaria Neg Hx     Skin cancer Neg Hx     Amblyopia Neg Hx     Blindness Neg Hx     Cataracts Neg Hx     Diabetes Neg Hx     Glaucoma Neg Hx     Macular degeneration Neg Hx     Retinal detachment Neg Hx     Strabismus Neg Hx     Thyroid disease Neg Hx     Colon cancer Neg Hx     Melanoma Neg Hx      Social History     Socioeconomic History    Marital status:      Spouse name: Not on file    Number of children: Not on file    Years of education: Not on file    Highest education level: Not on file   Occupational History    Occupation: retired   Social Needs    Financial resource strain: Not on file    Food insecurity:     Worry: Not on file     Inability: Not on file    Transportation needs:     Medical: Not on file     Non-medical: Not on file   Tobacco Use    Smoking status: Former Smoker     Packs/day: 2.00     Years: 20.00     Pack years: 40.00     Types: Cigarettes     Last attempt to quit: 1994     Years since quittin.9    Smokeless tobacco: Never Used   Substance and Sexual Activity    Alcohol use: No    Drug use: No    Sexual activity: Yes     Partners: Male   Lifestyle    Physical activity:     Days per week: Not on file     Minutes per session: Not on file    Stress: Not on file   Relationships    Social connections:     Talks on phone: Not on file     Gets together: Not on file     Attends Holiness service: Not on file     Active member of club or organization: Not on file     Attends meetings of clubs or organizations: Not on file     Relationship status: Not on file   Other Topics Concern    Are you pregnant or think you may be? No    Breast-feeding No   Social History Narrative    Not on file        Review of patient's allergies indicates:   Allergen Reactions    Ciprofloxacin Itching    Coreg [carvedilol]      Low energy    Doxycycline Other (See Comments) and Hives     Patient had a rxn with the sun despite wearing spf 30    Metoprolol      Low energy       Current Outpatient Medications:     ADVAIR DISKUS 250-50 mcg/dose diskus inhaler, USE 1 INHALATION TWICE A DAY (RINSE MOUTH AFTER USE TO PREVENT THRUSH), Disp: 3 each, Rfl: 3    albuterol (ACCUNEB) 1.25 mg/3 mL Nebu, USE 1 VIAL (3 ML) VIA NEBULIZER EVERY 6 HOURS AS NEEDED, Disp: 90 mL, Rfl: 2    albuterol (PROVENTIL HFA) 90 mcg/actuation inhaler, Inhale 2 puffs into the lungs every 6 (six) hours as needed for Wheezing., Disp: 3 Inhaler, Rfl: 4    apixaban (ELIQUIS) 5 mg Tab, Take 1 tablet by mouth once daily. Take half tab BID, Disp: , Rfl:     atorvastatin (LIPITOR) 40 MG tablet, Take 1 tablet (40 mg total) by mouth once daily., Disp: 90 tablet, Rfl: 3    bisacodyl (DULCOLAX) 5 mg EC tablet, Take 1 tablet (5 mg total) by mouth daily as needed for Constipation., Disp: 30 tablet, Rfl: 11    cetirizine (ZYRTEC) 10 MG tablet, TAKE 1 TABLET EVERY EVENING, Disp: 90 tablet, Rfl: 2    diltiaZEM (CARDIZEM CD) 120 MG Cp24, Take 1 capsule (120 mg total) by mouth 2 (two) times daily. (Patient taking differently: Take 120 mg by mouth once daily. ), Disp: 180 capsule, Rfl: 3    diphenhydrAMINE (BENADRYL) 25 mg capsule, Take 25 mg by mouth every 6 (six) hours as needed for Itching., Disp: , Rfl:     fluticasone (FLONASE) 50 mcg/actuation nasal spray, 1 spray by Each Nare route once daily., Disp: 3 Bottle, Rfl: 3    furosemide (LASIX) 20 MG tablet, Take 1 tablet (20 mg total) by mouth daily as needed., Disp: 10 tablet, Rfl: 0    gabapentin (NEURONTIN) 300 MG capsule, Take 1 po qhs x 7d, then 1 po bid x 7d, then 1 po tid maintenance, Disp: 90 capsule, Rfl: 2    ketoconazole (NIZORAL) 2 % cream, Apply topically once daily., Disp: 60 g, Rfl: 0     levothyroxine (SYNTHROID) 88 MCG tablet, TAKE 1 TABLET DAILY, Disp: 90 tablet, Rfl: 4    montelukast (SINGULAIR) 10 mg tablet, TAKE 1 TABLET EVERY EVENING, Disp: 90 tablet, Rfl: 4    multivitamin-minerals-lutein (CENTRUM SILVER) Tab, Take 1 tablet by mouth once daily. 1 Tablet Oral Every day, Disp: , Rfl:     pantoprazole (PROTONIX) 40 MG tablet, TAKE 1 TABLET DAILY 30 MINUTES PRIOR TO BREAKFAST, Disp: 90 tablet, Rfl: 3    SPIRIVA WITH HANDIHALER 18 mcg inhalation capsule, INHALE THE CONTENTS OF 1 CAPSULE DAILY, Disp: 90 capsule, Rfl: 3    traMADol (ULTRAM) 50 mg tablet, Take 1 tablet (50 mg total) by mouth every 12 (twelve) hours as needed for Pain. 1-2 tablets by mouth every 6 hours prn pain, Disp: 60 tablet, Rfl: 0    tretinoin (RETIN-A) 0.05 % cream, APPLY THIN FILM TO FACE AT NIGHT AFTER MOISTURIZING, Disp: 45 g, Rfl: 3    ZINC GLUCONATE ORAL, Take by mouth., Disp: , Rfl:     predniSONE (DELTASONE) 5 MG tablet, Take 1 tablet (5 mg total) by mouth once daily., Disp: 5 tablet, Rfl: 0  No current facility-administered medications for this visit.     Facility-Administered Medications Ordered in Other Visits:     0.9%  NaCl infusion, , Intravenous, Continuous, Ni Jaimes NP, Last Rate: 0 mL/hr at 12/11/18 0850    0.9%  NaCl infusion, , Intravenous, Continuous, Ni Jaimes NP    sodium chloride 0.9% flush 5 mL, 5 mL, Intravenous, PRN, Ni Jaimes NP    Review of Systems  No nausea, vomiting, fevers, Chills , contipation, diarrhea or sweats    Physical Exam:      Vitals:    12/31/19 0946   BP: 125/76   Pulse: 79     alert and oriented ×4 follows commands answers all questions appropriately,affect wnl  Manual muscle test 5 out of 5 sensation to light touch grossly intact  + tenderness right sijt's, greater trochanters, and QL  antalgic gait  -MORALES  Full hip ROM  babinsky down  No clonus  DTR's symmetric 2+  No C/C/E   + unleveled iliac crests  +standing flexion test  Poor psis  "mobility  +leg length discrepancy resolved after OMT         Assessment:  sijt dysfunction  d/t injury getting into car  Lumbar spondylosis w. Recurrent muscular spasm  afib on Elequis- avoid nsaids  CAD w.CHF h/o MI- "  Plan:  OMT today for pelvic alignment- pt. Has an anterior pelvic tilt on the right  Will schedule a repeat cluneal neuralgia as it gave her significant improvement  She is also to do a short course of low dose prednisone for 5 days        "

## 2020-01-01 ENCOUNTER — CLINICAL SUPPORT (OUTPATIENT)
Dept: REHABILITATION | Facility: HOSPITAL | Age: 70
End: 2020-01-01
Attending: PHYSICAL MEDICINE & REHABILITATION
Payer: MEDICARE

## 2020-01-01 ENCOUNTER — CLINICAL SUPPORT (OUTPATIENT)
Dept: CARDIOLOGY | Facility: HOSPITAL | Age: 70
DRG: 326 | End: 2020-01-01
Attending: INTERNAL MEDICINE
Payer: MEDICARE

## 2020-01-01 ENCOUNTER — HOSPITAL ENCOUNTER (OUTPATIENT)
Dept: CARDIOLOGY | Facility: CLINIC | Age: 70
Discharge: HOME | End: 2020-01-20
Payer: MEDICARE

## 2020-01-01 ENCOUNTER — TELEPHONE (OUTPATIENT)
Dept: SPINE | Facility: CLINIC | Age: 70
End: 2020-01-01

## 2020-01-01 ENCOUNTER — ANESTHESIA (OUTPATIENT)
Dept: SURGERY | Facility: HOSPITAL | Age: 70
DRG: 326 | End: 2020-01-01
Payer: MEDICARE

## 2020-01-01 ENCOUNTER — ANESTHESIA EVENT (OUTPATIENT)
Dept: ENDOSCOPY | Facility: HOSPITAL | Age: 70
End: 2020-01-01
Payer: MEDICARE

## 2020-01-01 ENCOUNTER — PATIENT MESSAGE (OUTPATIENT)
Dept: FAMILY MEDICINE | Facility: CLINIC | Age: 70
End: 2020-01-01

## 2020-01-01 ENCOUNTER — HOSPITAL ENCOUNTER (OUTPATIENT)
Dept: RADIOLOGY | Facility: CLINIC | Age: 70
Discharge: HOME | End: 2020-02-21
Attending: PHYSICAL MEDICINE & REHABILITATION
Payer: MEDICARE

## 2020-01-01 ENCOUNTER — SOCIAL WORK (OUTPATIENT)
Dept: HEMATOLOGY/ONCOLOGY | Facility: CLINIC | Age: 70
End: 2020-01-01

## 2020-01-01 ENCOUNTER — OFFICE VISIT (OUTPATIENT)
Dept: PHYSICAL MEDICINE AND REHAB | Facility: CLINIC | Age: 70
End: 2020-01-01
Payer: MEDICARE

## 2020-01-01 ENCOUNTER — ANESTHESIA (OUTPATIENT)
Dept: ENDOSCOPY | Facility: HOSPITAL | Age: 70
End: 2020-01-01
Payer: MEDICARE

## 2020-01-01 ENCOUNTER — OFFICE VISIT (OUTPATIENT)
Dept: FAMILY MEDICINE | Facility: CLINIC | Age: 70
End: 2020-01-01
Payer: MEDICARE

## 2020-01-01 ENCOUNTER — TELEPHONE (OUTPATIENT)
Dept: HEMATOLOGY/ONCOLOGY | Facility: CLINIC | Age: 70
End: 2020-01-01

## 2020-01-01 ENCOUNTER — DOCUMENTATION ONLY (OUTPATIENT)
Dept: HEMATOLOGY/ONCOLOGY | Facility: CLINIC | Age: 70
End: 2020-01-01

## 2020-01-01 ENCOUNTER — TELEPHONE (OUTPATIENT)
Dept: ENDOSCOPY | Facility: HOSPITAL | Age: 70
End: 2020-01-01

## 2020-01-01 ENCOUNTER — TELEPHONE (OUTPATIENT)
Dept: RADIATION ONCOLOGY | Facility: CLINIC | Age: 70
End: 2020-01-01

## 2020-01-01 ENCOUNTER — ANESTHESIA EVENT (OUTPATIENT)
Dept: SURGERY | Facility: HOSPITAL | Age: 70
DRG: 326 | End: 2020-01-01
Payer: MEDICARE

## 2020-01-01 ENCOUNTER — OFFICE VISIT (OUTPATIENT)
Dept: HEMATOLOGY/ONCOLOGY | Facility: CLINIC | Age: 70
End: 2020-01-01
Payer: MEDICARE

## 2020-01-01 ENCOUNTER — INFUSION (OUTPATIENT)
Dept: INFUSION THERAPY | Facility: HOSPITAL | Age: 70
DRG: 326 | End: 2020-01-01
Attending: SURGERY
Payer: MEDICARE

## 2020-01-01 ENCOUNTER — OFFICE VISIT (OUTPATIENT)
Dept: SURGERY | Facility: CLINIC | Age: 70
DRG: 326 | End: 2020-01-01
Payer: MEDICARE

## 2020-01-01 ENCOUNTER — PATIENT MESSAGE (OUTPATIENT)
Dept: GASTROENTEROLOGY | Facility: CLINIC | Age: 70
End: 2020-01-01

## 2020-01-01 ENCOUNTER — OFFICE VISIT (OUTPATIENT)
Dept: ELECTROPHYSIOLOGY | Facility: CLINIC | Age: 70
End: 2020-01-01
Payer: MEDICARE

## 2020-01-01 ENCOUNTER — DOCUMENTATION ONLY (OUTPATIENT)
Dept: RADIATION ONCOLOGY | Facility: CLINIC | Age: 70
End: 2020-01-01

## 2020-01-01 ENCOUNTER — PATIENT MESSAGE (OUTPATIENT)
Dept: PHYSICAL MEDICINE AND REHAB | Facility: CLINIC | Age: 70
End: 2020-01-01

## 2020-01-01 ENCOUNTER — HOSPITAL ENCOUNTER (OUTPATIENT)
Dept: RADIOLOGY | Facility: HOSPITAL | Age: 70
Discharge: HOME | DRG: 326 | End: 2020-03-12
Attending: SURGERY
Payer: MEDICARE

## 2020-01-01 ENCOUNTER — HOSPITAL ENCOUNTER (INPATIENT)
Facility: HOSPITAL | Age: 70
LOS: 12 days | DRG: 326 | End: 2020-03-26
Attending: EMERGENCY MEDICINE | Admitting: FAMILY MEDICINE
Payer: MEDICARE

## 2020-01-01 ENCOUNTER — TELEPHONE (OUTPATIENT)
Dept: SURGERY | Facility: CLINIC | Age: 70
End: 2020-01-01

## 2020-01-01 ENCOUNTER — OFFICE VISIT (OUTPATIENT)
Dept: RADIATION ONCOLOGY | Facility: CLINIC | Age: 70
End: 2020-01-01
Payer: MEDICARE

## 2020-01-01 ENCOUNTER — HOSPITAL ENCOUNTER (OUTPATIENT)
Facility: HOSPITAL | Age: 70
Discharge: HOME | End: 2020-01-31
Attending: INTERNAL MEDICINE | Admitting: INTERNAL MEDICINE
Payer: MEDICARE

## 2020-01-01 ENCOUNTER — CLINICAL SUPPORT (OUTPATIENT)
Dept: PHYSICAL MEDICINE AND REHAB | Facility: CLINIC | Age: 70
End: 2020-01-01
Payer: MEDICARE

## 2020-01-01 ENCOUNTER — HOSPITAL ENCOUNTER (OUTPATIENT)
Dept: RADIOLOGY | Facility: HOSPITAL | Age: 70
Discharge: HOME | End: 2020-03-09
Attending: INTERNAL MEDICINE
Payer: MEDICARE

## 2020-01-01 ENCOUNTER — PATIENT MESSAGE (OUTPATIENT)
Dept: ENDOSCOPY | Facility: HOSPITAL | Age: 70
End: 2020-01-01

## 2020-01-01 ENCOUNTER — LAB VISIT (OUTPATIENT)
Dept: LAB | Facility: HOSPITAL | Age: 70
End: 2020-01-01
Attending: INTERNAL MEDICINE
Payer: MEDICARE

## 2020-01-01 ENCOUNTER — PATIENT MESSAGE (OUTPATIENT)
Dept: HEMATOLOGY/ONCOLOGY | Facility: CLINIC | Age: 70
End: 2020-01-01

## 2020-01-01 ENCOUNTER — OFFICE VISIT (OUTPATIENT)
Dept: OPTOMETRY | Facility: CLINIC | Age: 70
End: 2020-01-01
Payer: COMMERCIAL

## 2020-01-01 VITALS
SYSTOLIC BLOOD PRESSURE: 141 MMHG | HEART RATE: 78 BPM | DIASTOLIC BLOOD PRESSURE: 80 MMHG | HEART RATE: 106 BPM | SYSTOLIC BLOOD PRESSURE: 135 MMHG | RESPIRATION RATE: 20 BRPM | TEMPERATURE: 98 F | DIASTOLIC BLOOD PRESSURE: 79 MMHG | HEIGHT: 66 IN | WEIGHT: 121 LBS | BODY MASS INDEX: 19.44 KG/M2

## 2020-01-01 VITALS
OXYGEN SATURATION: 98 % | RESPIRATION RATE: 20 BRPM | DIASTOLIC BLOOD PRESSURE: 71 MMHG | HEART RATE: 94 BPM | HEIGHT: 66 IN | WEIGHT: 126 LBS | SYSTOLIC BLOOD PRESSURE: 125 MMHG | TEMPERATURE: 98 F | BODY MASS INDEX: 20.25 KG/M2

## 2020-01-01 VITALS
BODY MASS INDEX: 19.44 KG/M2 | OXYGEN SATURATION: 98 % | RESPIRATION RATE: 16 BRPM | TEMPERATURE: 98 F | OXYGEN SATURATION: 96 % | SYSTOLIC BLOOD PRESSURE: 125 MMHG | DIASTOLIC BLOOD PRESSURE: 77 MMHG | RESPIRATION RATE: 18 BRPM | HEART RATE: 95 BPM | WEIGHT: 121 LBS | SYSTOLIC BLOOD PRESSURE: 127 MMHG | HEART RATE: 99 BPM | HEIGHT: 66 IN | BODY MASS INDEX: 18.67 KG/M2 | TEMPERATURE: 99 F | DIASTOLIC BLOOD PRESSURE: 81 MMHG | HEIGHT: 66 IN | WEIGHT: 116.19 LBS

## 2020-01-01 VITALS
WEIGHT: 129 LBS | BODY MASS INDEX: 20.65 KG/M2 | WEIGHT: 128.5 LBS | DIASTOLIC BLOOD PRESSURE: 72 MMHG | RESPIRATION RATE: 12 BRPM | HEIGHT: 66 IN | SYSTOLIC BLOOD PRESSURE: 116 MMHG | TEMPERATURE: 98 F | HEART RATE: 87 BPM | HEART RATE: 93 BPM | DIASTOLIC BLOOD PRESSURE: 70 MMHG | SYSTOLIC BLOOD PRESSURE: 136 MMHG | WEIGHT: 128.5 LBS | BODY MASS INDEX: 20.73 KG/M2 | OXYGEN SATURATION: 97 % | DIASTOLIC BLOOD PRESSURE: 79 MMHG | SYSTOLIC BLOOD PRESSURE: 120 MMHG | BODY MASS INDEX: 20.65 KG/M2 | HEIGHT: 66 IN | HEART RATE: 81 BPM | HEIGHT: 66 IN

## 2020-01-01 VITALS
RESPIRATION RATE: 18 BRPM | HEART RATE: 93 BPM | HEART RATE: 102 BPM | TEMPERATURE: 98 F | WEIGHT: 123.88 LBS | HEIGHT: 66 IN | BODY MASS INDEX: 20.25 KG/M2 | DIASTOLIC BLOOD PRESSURE: 76 MMHG | SYSTOLIC BLOOD PRESSURE: 136 MMHG | DIASTOLIC BLOOD PRESSURE: 81 MMHG | SYSTOLIC BLOOD PRESSURE: 145 MMHG | BODY MASS INDEX: 19.91 KG/M2 | OXYGEN SATURATION: 99 % | WEIGHT: 126 LBS | HEIGHT: 66 IN

## 2020-01-01 VITALS — BODY MASS INDEX: 19.29 KG/M2 | HEIGHT: 66 IN | WEIGHT: 120 LBS

## 2020-01-01 VITALS
DIASTOLIC BLOOD PRESSURE: 86 MMHG | SYSTOLIC BLOOD PRESSURE: 145 MMHG | BODY MASS INDEX: 18.64 KG/M2 | TEMPERATURE: 98 F | HEART RATE: 109 BPM | WEIGHT: 116 LBS | HEIGHT: 66 IN

## 2020-01-01 VITALS
DIASTOLIC BLOOD PRESSURE: 96 MMHG | RESPIRATION RATE: 18 BRPM | HEART RATE: 100 BPM | OXYGEN SATURATION: 99 % | SYSTOLIC BLOOD PRESSURE: 155 MMHG | TEMPERATURE: 98 F

## 2020-01-01 DIAGNOSIS — I47.19 ATRIAL TACHYCARDIA: ICD-10-CM

## 2020-01-01 DIAGNOSIS — K22.9 ESOPHAGEAL LESION: Primary | ICD-10-CM

## 2020-01-01 DIAGNOSIS — R00.9 ABNORMAL HEART RATE: ICD-10-CM

## 2020-01-01 DIAGNOSIS — C15.9 MALIGNANT NEOPLASM OF ESOPHAGUS, UNSPECIFIED LOCATION: ICD-10-CM

## 2020-01-01 DIAGNOSIS — I48.91 ATRIAL FIBRILLATION, UNSPECIFIED TYPE: ICD-10-CM

## 2020-01-01 DIAGNOSIS — I10 ESSENTIAL HYPERTENSION: ICD-10-CM

## 2020-01-01 DIAGNOSIS — H52.7 REFRACTIVE ERROR: ICD-10-CM

## 2020-01-01 DIAGNOSIS — C15.3 MALIGNANT NEOPLASM OF UPPER THIRD OF ESOPHAGUS: Primary | ICD-10-CM

## 2020-01-01 DIAGNOSIS — M47.812 CERVICAL SPONDYLOSIS: ICD-10-CM

## 2020-01-01 DIAGNOSIS — K22.2 ESOPHAGEAL OBSTRUCTION: ICD-10-CM

## 2020-01-01 DIAGNOSIS — E86.0 DEHYDRATION: Primary | ICD-10-CM

## 2020-01-01 DIAGNOSIS — Z95.1 S/P CABG X 2: ICD-10-CM

## 2020-01-01 DIAGNOSIS — E78.5 HYPERLIPIDEMIA, UNSPECIFIED HYPERLIPIDEMIA TYPE: ICD-10-CM

## 2020-01-01 DIAGNOSIS — Z01.00 EXAMINATION OF EYES AND VISION: Primary | ICD-10-CM

## 2020-01-01 DIAGNOSIS — R64 INANITION: ICD-10-CM

## 2020-01-01 DIAGNOSIS — H53.10 SUBJECTIVE VISUAL DISTURBANCE: ICD-10-CM

## 2020-01-01 DIAGNOSIS — J30.9 CHRONIC ALLERGIC RHINITIS: ICD-10-CM

## 2020-01-01 DIAGNOSIS — J39.2 THROAT IRRITATION: Primary | ICD-10-CM

## 2020-01-01 DIAGNOSIS — R11.10 INTRACTABLE VOMITING: ICD-10-CM

## 2020-01-01 DIAGNOSIS — Z98.890 HISTORY OF BILIARY STENT INSERTION: ICD-10-CM

## 2020-01-01 DIAGNOSIS — Z79.01 ANTICOAGULANT LONG-TERM USE: ICD-10-CM

## 2020-01-01 DIAGNOSIS — R65.20 SEVERE SEPSIS: ICD-10-CM

## 2020-01-01 DIAGNOSIS — R29.898 DECREASED ROM OF NECK: ICD-10-CM

## 2020-01-01 DIAGNOSIS — R13.19 OTHER DYSPHAGIA: ICD-10-CM

## 2020-01-01 DIAGNOSIS — Z98.890 S/P ABLATION OF ATRIAL FIBRILLATION: ICD-10-CM

## 2020-01-01 DIAGNOSIS — K22.9 ESOPHAGEAL LESION: ICD-10-CM

## 2020-01-01 DIAGNOSIS — M53.3 SACROILIAC DYSFUNCTION: Primary | ICD-10-CM

## 2020-01-01 DIAGNOSIS — C15.3 MALIGNANT NEOPLASM OF UPPER THIRD OF ESOPHAGUS: ICD-10-CM

## 2020-01-01 DIAGNOSIS — Z96.1 PSEUDOPHAKIA: ICD-10-CM

## 2020-01-01 DIAGNOSIS — Z86.79 S/P ABLATION OF ATRIAL FIBRILLATION: ICD-10-CM

## 2020-01-01 DIAGNOSIS — K22.89 ESOPHAGEAL MASS: ICD-10-CM

## 2020-01-01 DIAGNOSIS — I48.0 PAROXYSMAL ATRIAL FIBRILLATION: Primary | ICD-10-CM

## 2020-01-01 DIAGNOSIS — E44.1 MILD PROTEIN-CALORIE MALNUTRITION: ICD-10-CM

## 2020-01-01 DIAGNOSIS — I49.9 ARRHYTHMIA: ICD-10-CM

## 2020-01-01 DIAGNOSIS — H04.123 DRY EYE SYNDROME, BILATERAL: ICD-10-CM

## 2020-01-01 DIAGNOSIS — M25.551 PAIN OF RIGHT HIP JOINT: ICD-10-CM

## 2020-01-01 DIAGNOSIS — Z95.1 S/P CABG (CORONARY ARTERY BYPASS GRAFT): ICD-10-CM

## 2020-01-01 DIAGNOSIS — R00.2 PALPITATION: ICD-10-CM

## 2020-01-01 DIAGNOSIS — C15.9 MALIGNANT NEOPLASM OF ESOPHAGUS, UNSPECIFIED LOCATION: Primary | ICD-10-CM

## 2020-01-01 DIAGNOSIS — I48.91 ATRIAL FIBRILLATION WITH RAPID VENTRICULAR RESPONSE: ICD-10-CM

## 2020-01-01 DIAGNOSIS — I10 HYPERTENSION, UNSPECIFIED TYPE: ICD-10-CM

## 2020-01-01 DIAGNOSIS — R07.9 CHEST PAIN: ICD-10-CM

## 2020-01-01 DIAGNOSIS — M25.569 KNEE PAIN, UNSPECIFIED CHRONICITY, UNSPECIFIED LATERALITY: Primary | ICD-10-CM

## 2020-01-01 DIAGNOSIS — M25.551 PAIN OF RIGHT HIP JOINT: Primary | ICD-10-CM

## 2020-01-01 DIAGNOSIS — R13.14 PHARYNGOESOPHAGEAL DYSPHAGIA: ICD-10-CM

## 2020-01-01 DIAGNOSIS — R11.10 INTRACTABLE VOMITING, PRESENCE OF NAUSEA NOT SPECIFIED, UNSPECIFIED VOMITING TYPE: Primary | ICD-10-CM

## 2020-01-01 DIAGNOSIS — M25.551 RIGHT HIP PAIN: ICD-10-CM

## 2020-01-01 DIAGNOSIS — A41.9 SEVERE SEPSIS: ICD-10-CM

## 2020-01-01 DIAGNOSIS — M15.9 PRIMARY OSTEOARTHRITIS INVOLVING MULTIPLE JOINTS: ICD-10-CM

## 2020-01-01 LAB
ALBUMIN SERPL BCP-MCNC: 2.4 G/DL (ref 3.5–5.2)
ALBUMIN SERPL BCP-MCNC: 2.6 G/DL (ref 3.5–5.2)
ALBUMIN SERPL BCP-MCNC: 2.6 G/DL (ref 3.5–5.2)
ALBUMIN SERPL BCP-MCNC: 2.7 G/DL (ref 3.5–5.2)
ALBUMIN SERPL BCP-MCNC: 3 G/DL (ref 3.5–5.2)
ALBUMIN SERPL BCP-MCNC: 3.1 G/DL (ref 3.5–5.2)
ALBUMIN SERPL BCP-MCNC: 3.3 G/DL (ref 3.5–5.2)
ALBUMIN SERPL BCP-MCNC: 3.4 G/DL (ref 3.5–5.2)
ALBUMIN SERPL BCP-MCNC: 3.6 G/DL (ref 3.5–5.2)
ALBUMIN SERPL BCP-MCNC: 3.7 G/DL (ref 3.5–5.2)
ALBUMIN SERPL BCP-MCNC: 3.7 G/DL (ref 3.5–5.2)
ALBUMIN SERPL BCP-MCNC: 3.9 G/DL (ref 3.5–5.2)
ALBUMIN SERPL BCP-MCNC: 4.1 G/DL (ref 3.5–5.2)
ALLENS TEST: ABNORMAL
ALLENS TEST: ABNORMAL
ALP SERPL-CCNC: 121 U/L (ref 55–135)
ALP SERPL-CCNC: 149 U/L (ref 55–135)
ALP SERPL-CCNC: 172 U/L (ref 55–135)
ALP SERPL-CCNC: 186 U/L (ref 55–135)
ALP SERPL-CCNC: 198 U/L (ref 55–135)
ALP SERPL-CCNC: 47 U/L (ref 55–135)
ALP SERPL-CCNC: 48 U/L (ref 55–135)
ALP SERPL-CCNC: 50 U/L (ref 55–135)
ALP SERPL-CCNC: 50 U/L (ref 55–135)
ALP SERPL-CCNC: 53 U/L (ref 55–135)
ALP SERPL-CCNC: 54 U/L (ref 55–135)
ALP SERPL-CCNC: 55 U/L (ref 55–135)
ALP SERPL-CCNC: 64 U/L (ref 55–135)
ALP SERPL-CCNC: 71 U/L (ref 55–135)
ALP SERPL-CCNC: 72 U/L (ref 55–135)
ALT SERPL W/O P-5'-P-CCNC: 11 U/L (ref 10–44)
ALT SERPL W/O P-5'-P-CCNC: 117 U/L (ref 10–44)
ALT SERPL W/O P-5'-P-CCNC: 138 U/L (ref 10–44)
ALT SERPL W/O P-5'-P-CCNC: 14 U/L (ref 10–44)
ALT SERPL W/O P-5'-P-CCNC: 14 U/L (ref 10–44)
ALT SERPL W/O P-5'-P-CCNC: 15 U/L (ref 10–44)
ALT SERPL W/O P-5'-P-CCNC: 15 U/L (ref 10–44)
ALT SERPL W/O P-5'-P-CCNC: 169 U/L (ref 10–44)
ALT SERPL W/O P-5'-P-CCNC: 19 U/L (ref 10–44)
ALT SERPL W/O P-5'-P-CCNC: 23 U/L (ref 10–44)
ALT SERPL W/O P-5'-P-CCNC: 26 U/L (ref 10–44)
ALT SERPL W/O P-5'-P-CCNC: 30 U/L (ref 10–44)
ALT SERPL W/O P-5'-P-CCNC: 39 U/L (ref 10–44)
ALT SERPL W/O P-5'-P-CCNC: 46 U/L (ref 10–44)
ALT SERPL W/O P-5'-P-CCNC: 85 U/L (ref 10–44)
ANION GAP SERPL CALC-SCNC: 10 MMOL/L (ref 8–16)
ANION GAP SERPL CALC-SCNC: 11 MMOL/L (ref 8–16)
ANION GAP SERPL CALC-SCNC: 13 MMOL/L (ref 8–16)
ANION GAP SERPL CALC-SCNC: 13 MMOL/L (ref 8–16)
ANION GAP SERPL CALC-SCNC: 4 MMOL/L (ref 8–16)
ANION GAP SERPL CALC-SCNC: 5 MMOL/L (ref 8–16)
ANION GAP SERPL CALC-SCNC: 5 MMOL/L (ref 8–16)
ANION GAP SERPL CALC-SCNC: 6 MMOL/L (ref 8–16)
ANION GAP SERPL CALC-SCNC: 9 MMOL/L (ref 8–16)
ANION GAP SERPL CALC-SCNC: 9 MMOL/L (ref 8–16)
ANISOCYTOSIS BLD QL SMEAR: SLIGHT
AORTIC ROOT ANNULUS: 2.8 CM
AORTIC VALVE CUSP SEPERATION: 1.99 CM
AST SERPL-CCNC: 112 U/L (ref 10–40)
AST SERPL-CCNC: 15 U/L (ref 10–40)
AST SERPL-CCNC: 18 U/L (ref 10–40)
AST SERPL-CCNC: 18 U/L (ref 10–40)
AST SERPL-CCNC: 19 U/L (ref 10–40)
AST SERPL-CCNC: 22 U/L (ref 10–40)
AST SERPL-CCNC: 22 U/L (ref 10–40)
AST SERPL-CCNC: 23 U/L (ref 10–40)
AST SERPL-CCNC: 24 U/L (ref 10–40)
AST SERPL-CCNC: 29 U/L (ref 10–40)
AST SERPL-CCNC: 31 U/L (ref 10–40)
AST SERPL-CCNC: 45 U/L (ref 10–40)
AST SERPL-CCNC: 71 U/L (ref 10–40)
AST SERPL-CCNC: 83 U/L (ref 10–40)
AST SERPL-CCNC: 83 U/L (ref 10–40)
AV INDEX (PROSTH): 0.75
AV MEAN GRADIENT: 3 MMHG
AV PEAK GRADIENT: 5 MMHG
AV VALVE AREA: 2.51 CM2
AV VELOCITY RATIO: 72.76
BACTERIA #/AREA URNS HPF: NEGATIVE /HPF
BACTERIA BLD CULT: NORMAL
BACTERIA BLD CULT: NORMAL
BASOPHILS # BLD AUTO: 0.04 K/UL (ref 0–0.2)
BASOPHILS # BLD AUTO: 0.05 K/UL (ref 0–0.2)
BASOPHILS # BLD AUTO: 0.06 K/UL (ref 0–0.2)
BASOPHILS # BLD AUTO: 0.08 K/UL (ref 0–0.2)
BASOPHILS # BLD AUTO: 0.08 K/UL (ref 0–0.2)
BASOPHILS # BLD AUTO: 0.09 K/UL (ref 0–0.2)
BASOPHILS # BLD AUTO: 0.1 K/UL (ref 0–0.2)
BASOPHILS # BLD AUTO: 0.11 K/UL (ref 0–0.2)
BASOPHILS NFR BLD: 0 % (ref 0–1.9)
BASOPHILS NFR BLD: 0.2 % (ref 0–1.9)
BASOPHILS NFR BLD: 0.3 % (ref 0–1.9)
BASOPHILS NFR BLD: 0.3 % (ref 0–1.9)
BASOPHILS NFR BLD: 0.4 % (ref 0–1.9)
BASOPHILS NFR BLD: 0.5 % (ref 0–1.9)
BASOPHILS NFR BLD: 0.5 % (ref 0–1.9)
BASOPHILS NFR BLD: 0.6 % (ref 0–1.9)
BASOPHILS NFR BLD: 0.8 % (ref 0–1.9)
BASOPHILS NFR BLD: 0.8 % (ref 0–1.9)
BASOPHILS NFR BLD: 0.9 % (ref 0–1.9)
BASOPHILS NFR BLD: 1.2 % (ref 0–1.9)
BILIRUB SERPL-MCNC: 0.5 MG/DL (ref 0.1–1)
BILIRUB SERPL-MCNC: 0.6 MG/DL (ref 0.1–1)
BILIRUB SERPL-MCNC: 0.6 MG/DL (ref 0.1–1)
BILIRUB SERPL-MCNC: 0.7 MG/DL (ref 0.1–1)
BILIRUB SERPL-MCNC: 0.8 MG/DL (ref 0.1–1)
BILIRUB SERPL-MCNC: 0.9 MG/DL (ref 0.1–1)
BILIRUB SERPL-MCNC: 1 MG/DL (ref 0.1–1)
BILIRUB SERPL-MCNC: 1.3 MG/DL (ref 0.1–1)
BILIRUB SERPL-MCNC: 1.5 MG/DL (ref 0.1–1)
BILIRUB UR QL STRIP: NEGATIVE
BSA FOR ECHO PROCEDURE: 1.55 M2
BUN SERPL-MCNC: 11 MG/DL (ref 8–23)
BUN SERPL-MCNC: 13 MG/DL (ref 8–23)
BUN SERPL-MCNC: 13 MG/DL (ref 8–23)
BUN SERPL-MCNC: 16 MG/DL (ref 8–23)
BUN SERPL-MCNC: 17 MG/DL (ref 8–23)
BUN SERPL-MCNC: 19 MG/DL (ref 8–23)
BUN SERPL-MCNC: 21 MG/DL (ref 8–23)
BUN SERPL-MCNC: 21 MG/DL (ref 8–23)
BUN SERPL-MCNC: 22 MG/DL (ref 8–23)
BUN SERPL-MCNC: 25 MG/DL (ref 8–23)
BUN SERPL-MCNC: 8 MG/DL (ref 8–23)
CALCIUM SERPL-MCNC: 10.2 MG/DL (ref 8.7–10.5)
CALCIUM SERPL-MCNC: 8 MG/DL (ref 8.7–10.5)
CALCIUM SERPL-MCNC: 8 MG/DL (ref 8.7–10.5)
CALCIUM SERPL-MCNC: 8.1 MG/DL (ref 8.7–10.5)
CALCIUM SERPL-MCNC: 8.3 MG/DL (ref 8.7–10.5)
CALCIUM SERPL-MCNC: 8.4 MG/DL (ref 8.7–10.5)
CALCIUM SERPL-MCNC: 8.5 MG/DL (ref 8.7–10.5)
CALCIUM SERPL-MCNC: 8.6 MG/DL (ref 8.7–10.5)
CALCIUM SERPL-MCNC: 8.6 MG/DL (ref 8.7–10.5)
CALCIUM SERPL-MCNC: 8.7 MG/DL (ref 8.7–10.5)
CALCIUM SERPL-MCNC: 8.8 MG/DL (ref 8.7–10.5)
CALCIUM SERPL-MCNC: 8.9 MG/DL (ref 8.7–10.5)
CALCIUM SERPL-MCNC: 8.9 MG/DL (ref 8.7–10.5)
CALCIUM SERPL-MCNC: 9.1 MG/DL (ref 8.7–10.5)
CHLORIDE SERPL-SCNC: 100 MMOL/L (ref 95–110)
CHLORIDE SERPL-SCNC: 100 MMOL/L (ref 95–110)
CHLORIDE SERPL-SCNC: 101 MMOL/L (ref 95–110)
CHLORIDE SERPL-SCNC: 102 MMOL/L (ref 95–110)
CHLORIDE SERPL-SCNC: 104 MMOL/L (ref 95–110)
CHLORIDE SERPL-SCNC: 105 MMOL/L (ref 95–110)
CHLORIDE SERPL-SCNC: 108 MMOL/L (ref 95–110)
CHLORIDE SERPL-SCNC: 108 MMOL/L (ref 95–110)
CHLORIDE SERPL-SCNC: 97 MMOL/L (ref 95–110)
CHLORIDE SERPL-SCNC: 97 MMOL/L (ref 95–110)
CHLORIDE SERPL-SCNC: 99 MMOL/L (ref 95–110)
CHLORIDE SERPL-SCNC: 99 MMOL/L (ref 95–110)
CLARITY UR: CLEAR
CO2 SERPL-SCNC: 24 MMOL/L (ref 23–29)
CO2 SERPL-SCNC: 25 MMOL/L (ref 23–29)
CO2 SERPL-SCNC: 26 MMOL/L (ref 23–29)
CO2 SERPL-SCNC: 27 MMOL/L (ref 23–29)
CO2 SERPL-SCNC: 28 MMOL/L (ref 23–29)
CO2 SERPL-SCNC: 28 MMOL/L (ref 23–29)
CO2 SERPL-SCNC: 29 MMOL/L (ref 23–29)
CO2 SERPL-SCNC: 29 MMOL/L (ref 23–29)
CO2 SERPL-SCNC: 30 MMOL/L (ref 23–29)
CO2 SERPL-SCNC: 32 MMOL/L (ref 23–29)
COLOR UR: YELLOW
COMMENT: NORMAL
CREAT SERPL-MCNC: 0.3 MG/DL (ref 0.5–1.4)
CREAT SERPL-MCNC: 0.4 MG/DL (ref 0.5–1.4)
CREAT SERPL-MCNC: 0.5 MG/DL (ref 0.5–1.4)
CREAT SERPL-MCNC: 0.7 MG/DL (ref 0.5–1.4)
CREAT SERPL-MCNC: 0.8 MG/DL (ref 0.5–1.4)
CRP SERPL-MCNC: 15.62 MG/DL (ref 0–0.75)
CV ECHO LV RWT: 0.53 CM
DELSYS: ABNORMAL
DELSYS: ABNORMAL
DIFFERENTIAL METHOD: ABNORMAL
DIFFERENTIAL METHOD: NORMAL
DOP CALC AO PEAK VEL: 1.16 M/S
DOP CALC AO VTI: 19.12 CM
DOP CALC LVOT AREA: 3.3 CM2
DOP CALC LVOT DIAMETER: 2.06 CM
DOP CALC LVOT PEAK VEL: 84.4 M/S
DOP CALC LVOT STROKE VOLUME: 47.97 CM3
DOP CALCLVOT PEAK VEL VTI: 14.4 CM
E WAVE DECELERATION TIME: 256.92 MSEC
E/A RATIO: 1.18
E/E' RATIO: 7.68 M/S
ECHO LV POSTERIOR WALL: 1.07 CM (ref 0.6–1.1)
EOSINOPHIL # BLD AUTO: 0 K/UL (ref 0–0.5)
EOSINOPHIL # BLD AUTO: 0.2 K/UL (ref 0–0.5)
EOSINOPHIL # BLD AUTO: 0.3 K/UL (ref 0–0.5)
EOSINOPHIL # BLD AUTO: 0.3 K/UL (ref 0–0.5)
EOSINOPHIL # BLD AUTO: 0.4 K/UL (ref 0–0.5)
EOSINOPHIL # BLD AUTO: 0.4 K/UL (ref 0–0.5)
EOSINOPHIL # BLD AUTO: 0.5 K/UL (ref 0–0.5)
EOSINOPHIL NFR BLD: 0 % (ref 0–8)
EOSINOPHIL NFR BLD: 0 % (ref 0–8)
EOSINOPHIL NFR BLD: 0.2 % (ref 0–8)
EOSINOPHIL NFR BLD: 0.2 % (ref 0–8)
EOSINOPHIL NFR BLD: 0.3 % (ref 0–8)
EOSINOPHIL NFR BLD: 0.3 % (ref 0–8)
EOSINOPHIL NFR BLD: 1.8 % (ref 0–8)
EOSINOPHIL NFR BLD: 2.8 % (ref 0–8)
EOSINOPHIL NFR BLD: 3 % (ref 0–8)
EOSINOPHIL NFR BLD: 3.4 % (ref 0–8)
EOSINOPHIL NFR BLD: 3.6 % (ref 0–8)
EOSINOPHIL NFR BLD: 4.8 % (ref 0–8)
ERYTHROCYTE [DISTWIDTH] IN BLOOD BY AUTOMATED COUNT: 11.8 % (ref 11.5–14.5)
ERYTHROCYTE [DISTWIDTH] IN BLOOD BY AUTOMATED COUNT: 11.8 % (ref 11.5–14.5)
ERYTHROCYTE [DISTWIDTH] IN BLOOD BY AUTOMATED COUNT: 11.9 % (ref 11.5–14.5)
ERYTHROCYTE [DISTWIDTH] IN BLOOD BY AUTOMATED COUNT: 12 % (ref 11.5–14.5)
ERYTHROCYTE [DISTWIDTH] IN BLOOD BY AUTOMATED COUNT: 12 % (ref 11.5–14.5)
ERYTHROCYTE [DISTWIDTH] IN BLOOD BY AUTOMATED COUNT: 12.1 % (ref 11.5–14.5)
ERYTHROCYTE [DISTWIDTH] IN BLOOD BY AUTOMATED COUNT: 12.3 % (ref 11.5–14.5)
ERYTHROCYTE [DISTWIDTH] IN BLOOD BY AUTOMATED COUNT: 13 % (ref 11.5–14.5)
EST. GFR  (AFRICAN AMERICAN): >60 ML/MIN/1.73 M^2
EST. GFR  (NON AFRICAN AMERICAN): >60 ML/MIN/1.73 M^2
FINAL PATHOLOGIC DIAGNOSIS: NORMAL
FIO2: 100
FIO2: 100
FLOW: 15
FLOW: 15
FRACTIONAL SHORTENING: 18 % (ref 28–44)
GLUCOSE SERPL-MCNC: 101 MG/DL (ref 70–110)
GLUCOSE SERPL-MCNC: 103 MG/DL (ref 70–110)
GLUCOSE SERPL-MCNC: 104 MG/DL (ref 70–110)
GLUCOSE SERPL-MCNC: 105 MG/DL (ref 70–110)
GLUCOSE SERPL-MCNC: 108 MG/DL (ref 70–110)
GLUCOSE SERPL-MCNC: 112 MG/DL (ref 70–110)
GLUCOSE SERPL-MCNC: 112 MG/DL (ref 70–110)
GLUCOSE SERPL-MCNC: 113 MG/DL (ref 70–110)
GLUCOSE SERPL-MCNC: 114 MG/DL (ref 70–110)
GLUCOSE SERPL-MCNC: 114 MG/DL (ref 70–110)
GLUCOSE SERPL-MCNC: 115 MG/DL (ref 70–110)
GLUCOSE SERPL-MCNC: 116 MG/DL (ref 70–110)
GLUCOSE SERPL-MCNC: 117 MG/DL (ref 70–110)
GLUCOSE SERPL-MCNC: 118 MG/DL (ref 70–110)
GLUCOSE SERPL-MCNC: 120 MG/DL (ref 70–110)
GLUCOSE SERPL-MCNC: 124 MG/DL (ref 70–110)
GLUCOSE SERPL-MCNC: 124 MG/DL (ref 70–110)
GLUCOSE SERPL-MCNC: 125 MG/DL (ref 70–110)
GLUCOSE SERPL-MCNC: 129 MG/DL (ref 70–110)
GLUCOSE SERPL-MCNC: 132 MG/DL (ref 70–110)
GLUCOSE SERPL-MCNC: 132 MG/DL (ref 70–110)
GLUCOSE SERPL-MCNC: 133 MG/DL (ref 70–110)
GLUCOSE SERPL-MCNC: 137 MG/DL (ref 70–110)
GLUCOSE SERPL-MCNC: 137 MG/DL (ref 70–110)
GLUCOSE SERPL-MCNC: 138 MG/DL (ref 70–110)
GLUCOSE SERPL-MCNC: 138 MG/DL (ref 70–110)
GLUCOSE SERPL-MCNC: 142 MG/DL (ref 70–110)
GLUCOSE SERPL-MCNC: 142 MG/DL (ref 70–110)
GLUCOSE SERPL-MCNC: 143 MG/DL (ref 70–110)
GLUCOSE SERPL-MCNC: 145 MG/DL (ref 70–110)
GLUCOSE SERPL-MCNC: 147 MG/DL (ref 70–110)
GLUCOSE SERPL-MCNC: 150 MG/DL (ref 70–110)
GLUCOSE SERPL-MCNC: 150 MG/DL (ref 70–110)
GLUCOSE SERPL-MCNC: 152 MG/DL (ref 70–110)
GLUCOSE SERPL-MCNC: 154 MG/DL (ref 70–110)
GLUCOSE SERPL-MCNC: 156 MG/DL (ref 70–110)
GLUCOSE SERPL-MCNC: 158 MG/DL (ref 70–110)
GLUCOSE SERPL-MCNC: 160 MG/DL (ref 70–110)
GLUCOSE SERPL-MCNC: 163 MG/DL (ref 70–110)
GLUCOSE SERPL-MCNC: 169 MG/DL (ref 70–110)
GLUCOSE SERPL-MCNC: 171 MG/DL (ref 70–110)
GLUCOSE SERPL-MCNC: 174 MG/DL (ref 70–110)
GLUCOSE SERPL-MCNC: 174 MG/DL (ref 70–110)
GLUCOSE SERPL-MCNC: 178 MG/DL (ref 70–110)
GLUCOSE SERPL-MCNC: 181 MG/DL (ref 70–110)
GLUCOSE SERPL-MCNC: 186 MG/DL (ref 70–110)
GLUCOSE SERPL-MCNC: 82 MG/DL (ref 70–110)
GLUCOSE SERPL-MCNC: 82 MG/DL (ref 70–110)
GLUCOSE SERPL-MCNC: 86 MG/DL (ref 70–110)
GLUCOSE SERPL-MCNC: 86 MG/DL (ref 70–110)
GLUCOSE SERPL-MCNC: 87 MG/DL (ref 70–110)
GLUCOSE SERPL-MCNC: 87 MG/DL (ref 70–110)
GLUCOSE SERPL-MCNC: 90 MG/DL (ref 70–110)
GLUCOSE SERPL-MCNC: 96 MG/DL (ref 70–110)
GLUCOSE SERPL-MCNC: 97 MG/DL (ref 70–110)
GLUCOSE SERPL-MCNC: 98 MG/DL (ref 70–110)
GLUCOSE SERPL-MCNC: 99 MG/DL (ref 70–110)
GLUCOSE UR QL STRIP: NEGATIVE
GROSS: NORMAL
HCO3 UR-SCNC: 26.4 MMOL/L (ref 24–28)
HCO3 UR-SCNC: 27 MMOL/L (ref 24–28)
HCT VFR BLD AUTO: 35.7 % (ref 37–48.5)
HCT VFR BLD AUTO: 36.1 % (ref 37–48.5)
HCT VFR BLD AUTO: 36.1 % (ref 37–48.5)
HCT VFR BLD AUTO: 36.7 % (ref 37–48.5)
HCT VFR BLD AUTO: 37.6 % (ref 37–48.5)
HCT VFR BLD AUTO: 37.9 % (ref 37–48.5)
HCT VFR BLD AUTO: 38.3 % (ref 37–48.5)
HCT VFR BLD AUTO: 38.3 % (ref 37–48.5)
HCT VFR BLD AUTO: 39.1 % (ref 37–48.5)
HCT VFR BLD AUTO: 39.1 % (ref 37–48.5)
HCT VFR BLD AUTO: 39.2 % (ref 37–48.5)
HCT VFR BLD AUTO: 39.2 % (ref 37–48.5)
HCT VFR BLD AUTO: 40.1 % (ref 37–48.5)
HCT VFR BLD AUTO: 40.1 % (ref 37–48.5)
HCT VFR BLD AUTO: 42.8 % (ref 37–48.5)
HCT VFR BLD CALC: 37 %PCV (ref 36–54)
HCT VFR BLD CALC: 38 %PCV (ref 36–54)
HGB BLD-MCNC: 11.4 G/DL (ref 12–16)
HGB BLD-MCNC: 11.6 G/DL (ref 12–16)
HGB BLD-MCNC: 11.6 G/DL (ref 12–16)
HGB BLD-MCNC: 11.9 G/DL (ref 12–16)
HGB BLD-MCNC: 12 G/DL (ref 12–16)
HGB BLD-MCNC: 12 G/DL (ref 12–16)
HGB BLD-MCNC: 12.3 G/DL (ref 12–16)
HGB BLD-MCNC: 12.3 G/DL (ref 12–16)
HGB BLD-MCNC: 12.4 G/DL (ref 12–16)
HGB BLD-MCNC: 12.6 G/DL (ref 12–16)
HGB BLD-MCNC: 12.8 G/DL (ref 12–16)
HGB BLD-MCNC: 13 G/DL (ref 12–16)
HGB BLD-MCNC: 13.4 G/DL (ref 12–16)
HGB BLD-MCNC: 13.4 G/DL (ref 12–16)
HGB BLD-MCNC: 13.7 G/DL (ref 12–16)
HGB UR QL STRIP: ABNORMAL
HYALINE CASTS #/AREA URNS LPF: 4 /LPF
IMM GRANULOCYTES # BLD AUTO: 0.02 K/UL (ref 0–0.04)
IMM GRANULOCYTES # BLD AUTO: 0.02 K/UL (ref 0–0.04)
IMM GRANULOCYTES # BLD AUTO: 0.03 K/UL (ref 0–0.04)
IMM GRANULOCYTES # BLD AUTO: 0.04 K/UL (ref 0–0.04)
IMM GRANULOCYTES # BLD AUTO: 0.06 K/UL (ref 0–0.04)
IMM GRANULOCYTES # BLD AUTO: 0.06 K/UL (ref 0–0.04)
IMM GRANULOCYTES # BLD AUTO: 0.08 K/UL (ref 0–0.04)
IMM GRANULOCYTES # BLD AUTO: 0.1 K/UL (ref 0–0.04)
IMM GRANULOCYTES # BLD AUTO: ABNORMAL K/UL (ref 0–0.04)
IMM GRANULOCYTES NFR BLD AUTO: 0.2 % (ref 0–0.5)
IMM GRANULOCYTES NFR BLD AUTO: 0.3 % (ref 0–0.5)
IMM GRANULOCYTES NFR BLD AUTO: 0.3 % (ref 0–0.5)
IMM GRANULOCYTES NFR BLD AUTO: 0.5 % (ref 0–0.5)
IMM GRANULOCYTES NFR BLD AUTO: 0.6 % (ref 0–0.5)
IMM GRANULOCYTES NFR BLD AUTO: 0.6 % (ref 0–0.5)
IMM GRANULOCYTES NFR BLD AUTO: ABNORMAL % (ref 0–0.5)
INR PPP: 1.1
INTERVENTRICULAR SEPTUM: 1.15 CM (ref 0.6–1.1)
KETONES UR QL STRIP: NEGATIVE
LACTATE SERPL-SCNC: 1.6 MMOL/L (ref 0.5–1.9)
LEFT ATRIUM SIZE: 2.88 CM
LEFT INTERNAL DIMENSION IN SYSTOLE: 3.28 CM (ref 2.1–4)
LEFT VENTRICLE DIASTOLIC VOLUME INDEX: 53.03 ML/M2
LEFT VENTRICLE DIASTOLIC VOLUME: 84.61 ML
LEFT VENTRICLE MASS INDEX: 93 G/M2
LEFT VENTRICLE SYSTOLIC VOLUME INDEX: 33 ML/M2
LEFT VENTRICLE SYSTOLIC VOLUME: 52.58 ML
LEFT VENTRICULAR INTERNAL DIMENSION IN DIASTOLE: 4.01 CM (ref 3.5–6)
LEFT VENTRICULAR MASS: 148.13 G
LEUKOCYTE ESTERASE UR QL STRIP: ABNORMAL
LV LATERAL E/E' RATIO: 6.08 M/S
LV SEPTAL E/E' RATIO: 10.43 M/S
LYMPHOCYTES # BLD AUTO: 0.4 K/UL (ref 1–4.8)
LYMPHOCYTES # BLD AUTO: 0.5 K/UL (ref 1–4.8)
LYMPHOCYTES # BLD AUTO: 0.8 K/UL (ref 1–4.8)
LYMPHOCYTES # BLD AUTO: 0.9 K/UL (ref 1–4.8)
LYMPHOCYTES # BLD AUTO: 0.9 K/UL (ref 1–4.8)
LYMPHOCYTES # BLD AUTO: 1 K/UL (ref 1–4.8)
LYMPHOCYTES # BLD AUTO: 1.1 K/UL (ref 1–4.8)
LYMPHOCYTES # BLD AUTO: 1.4 K/UL (ref 1–4.8)
LYMPHOCYTES # BLD AUTO: 1.4 K/UL (ref 1–4.8)
LYMPHOCYTES # BLD AUTO: 1.6 K/UL (ref 1–4.8)
LYMPHOCYTES # BLD AUTO: 1.7 K/UL (ref 1–4.8)
LYMPHOCYTES NFR BLD: 10.6 % (ref 18–48)
LYMPHOCYTES NFR BLD: 14.6 % (ref 18–48)
LYMPHOCYTES NFR BLD: 17.2 % (ref 18–48)
LYMPHOCYTES NFR BLD: 18.4 % (ref 18–48)
LYMPHOCYTES NFR BLD: 2.6 % (ref 18–48)
LYMPHOCYTES NFR BLD: 4 % (ref 18–48)
LYMPHOCYTES NFR BLD: 4.7 % (ref 18–48)
LYMPHOCYTES NFR BLD: 4.9 % (ref 18–48)
LYMPHOCYTES NFR BLD: 6.4 % (ref 18–48)
LYMPHOCYTES NFR BLD: 6.4 % (ref 18–48)
LYMPHOCYTES NFR BLD: 7.9 % (ref 18–48)
LYMPHOCYTES NFR BLD: 8.3 % (ref 18–48)
MAGNESIUM SERPL-MCNC: 1.4 MG/DL (ref 1.6–2.6)
MAGNESIUM SERPL-MCNC: 1.8 MG/DL (ref 1.6–2.6)
MAGNESIUM SERPL-MCNC: 1.9 MG/DL (ref 1.6–2.6)
MAGNESIUM SERPL-MCNC: 1.9 MG/DL (ref 1.6–2.6)
MAGNESIUM SERPL-MCNC: 2 MG/DL (ref 1.6–2.6)
MAGNESIUM SERPL-MCNC: 2.1 MG/DL (ref 1.6–2.6)
MAGNESIUM SERPL-MCNC: 2.2 MG/DL (ref 1.6–2.6)
MAGNESIUM SERPL-MCNC: 2.3 MG/DL (ref 1.6–2.6)
MCH RBC QN AUTO: 29 PG (ref 27–31)
MCH RBC QN AUTO: 29.3 PG (ref 27–31)
MCH RBC QN AUTO: 29.3 PG (ref 27–31)
MCH RBC QN AUTO: 29.4 PG (ref 27–31)
MCH RBC QN AUTO: 29.4 PG (ref 27–31)
MCH RBC QN AUTO: 29.5 PG (ref 27–31)
MCH RBC QN AUTO: 29.7 PG (ref 27–31)
MCH RBC QN AUTO: 29.7 PG (ref 27–31)
MCH RBC QN AUTO: 29.8 PG (ref 27–31)
MCH RBC QN AUTO: 30 PG (ref 27–31)
MCH RBC QN AUTO: 30.1 PG (ref 27–31)
MCH RBC QN AUTO: 30.4 PG (ref 27–31)
MCHC RBC AUTO-ENTMCNC: 31.3 G/DL (ref 32–36)
MCHC RBC AUTO-ENTMCNC: 31.3 G/DL (ref 32–36)
MCHC RBC AUTO-ENTMCNC: 31.4 G/DL (ref 32–36)
MCHC RBC AUTO-ENTMCNC: 31.5 G/DL (ref 32–36)
MCHC RBC AUTO-ENTMCNC: 31.9 G/DL (ref 32–36)
MCHC RBC AUTO-ENTMCNC: 32 G/DL (ref 32–36)
MCHC RBC AUTO-ENTMCNC: 32.1 G/DL (ref 32–36)
MCHC RBC AUTO-ENTMCNC: 32.1 G/DL (ref 32–36)
MCHC RBC AUTO-ENTMCNC: 32.4 G/DL (ref 32–36)
MCHC RBC AUTO-ENTMCNC: 32.7 G/DL (ref 32–36)
MCHC RBC AUTO-ENTMCNC: 33 G/DL (ref 32–36)
MCHC RBC AUTO-ENTMCNC: 33.2 G/DL (ref 32–36)
MCHC RBC AUTO-ENTMCNC: 33.2 G/DL (ref 32–36)
MCHC RBC AUTO-ENTMCNC: 33.4 G/DL (ref 32–36)
MCHC RBC AUTO-ENTMCNC: 33.4 G/DL (ref 32–36)
MCV RBC AUTO: 90 FL (ref 82–98)
MCV RBC AUTO: 91 FL (ref 82–98)
MCV RBC AUTO: 92 FL (ref 82–98)
MCV RBC AUTO: 92 FL (ref 82–98)
MCV RBC AUTO: 93 FL (ref 82–98)
MCV RBC AUTO: 94 FL (ref 82–98)
MICROSCOPIC COMMENT: ABNORMAL
MODE: ABNORMAL
MODE: ABNORMAL
MONOCYTES # BLD AUTO: 0.5 K/UL (ref 0.3–1)
MONOCYTES # BLD AUTO: 0.5 K/UL (ref 0.3–1)
MONOCYTES # BLD AUTO: 0.6 K/UL (ref 0.3–1)
MONOCYTES # BLD AUTO: 0.8 K/UL (ref 0.3–1)
MONOCYTES # BLD AUTO: 0.9 K/UL (ref 0.3–1)
MONOCYTES # BLD AUTO: 0.9 K/UL (ref 0.3–1)
MONOCYTES # BLD AUTO: 1.1 K/UL (ref 0.3–1)
MONOCYTES # BLD AUTO: 1.1 K/UL (ref 0.3–1)
MONOCYTES # BLD AUTO: 1.2 K/UL (ref 0.3–1)
MONOCYTES NFR BLD: 1 % (ref 4–15)
MONOCYTES NFR BLD: 3.4 % (ref 4–15)
MONOCYTES NFR BLD: 6.3 % (ref 4–15)
MONOCYTES NFR BLD: 6.6 % (ref 4–15)
MONOCYTES NFR BLD: 7 % (ref 4–15)
MONOCYTES NFR BLD: 7.2 % (ref 4–15)
MONOCYTES NFR BLD: 7.3 % (ref 4–15)
MONOCYTES NFR BLD: 7.4 % (ref 4–15)
MONOCYTES NFR BLD: 7.9 % (ref 4–15)
MONOCYTES NFR BLD: 7.9 % (ref 4–15)
MONOCYTES NFR BLD: 8.9 % (ref 4–15)
MONOCYTES NFR BLD: 9.5 % (ref 4–15)
MV PEAK A VEL: 0.62 M/S
MV PEAK E VEL: 0.73 M/S
NEUTROPHILS # BLD AUTO: 12.3 K/UL (ref 1.8–7.7)
NEUTROPHILS # BLD AUTO: 12.3 K/UL (ref 1.8–7.7)
NEUTROPHILS # BLD AUTO: 12.4 K/UL (ref 1.8–7.7)
NEUTROPHILS # BLD AUTO: 14.5 K/UL (ref 1.8–7.7)
NEUTROPHILS # BLD AUTO: 14.9 K/UL (ref 1.8–7.7)
NEUTROPHILS # BLD AUTO: 6 K/UL (ref 1.8–7.7)
NEUTROPHILS # BLD AUTO: 6.2 K/UL (ref 1.8–7.7)
NEUTROPHILS # BLD AUTO: 8 K/UL (ref 1.8–7.7)
NEUTROPHILS # BLD AUTO: 8.7 K/UL (ref 1.8–7.7)
NEUTROPHILS # BLD AUTO: 8.9 K/UL (ref 1.8–7.7)
NEUTROPHILS # BLD AUTO: 9.4 K/UL (ref 1.8–7.7)
NEUTROPHILS NFR BLD: 70.1 % (ref 38–73)
NEUTROPHILS NFR BLD: 72.7 % (ref 38–73)
NEUTROPHILS NFR BLD: 73.3 % (ref 38–73)
NEUTROPHILS NFR BLD: 76 % (ref 38–73)
NEUTROPHILS NFR BLD: 79.4 % (ref 38–73)
NEUTROPHILS NFR BLD: 80 % (ref 38–73)
NEUTROPHILS NFR BLD: 81.9 % (ref 38–73)
NEUTROPHILS NFR BLD: 83.9 % (ref 38–73)
NEUTROPHILS NFR BLD: 84.6 % (ref 38–73)
NEUTROPHILS NFR BLD: 84.6 % (ref 38–73)
NEUTROPHILS NFR BLD: 87.5 % (ref 38–73)
NEUTROPHILS NFR BLD: 93 % (ref 38–73)
NEUTS BAND NFR BLD MANUAL: 15 %
NITRITE UR QL STRIP: NEGATIVE
NRBC BLD-RTO: 0 /100 WBC
PCO2 BLDA: 47.2 MMHG (ref 35–45)
PCO2 BLDA: 56.8 MMHG (ref 35–45)
PH SMN: 7.28 [PH] (ref 7.35–7.45)
PH SMN: 7.37 [PH] (ref 7.35–7.45)
PH UR STRIP: 6 [PH] (ref 5–8)
PHOSPHATE SERPL-MCNC: 2.2 MG/DL (ref 2.7–4.5)
PHOSPHATE SERPL-MCNC: 2.5 MG/DL (ref 2.7–4.5)
PHOSPHATE SERPL-MCNC: 2.6 MG/DL (ref 2.7–4.5)
PHOSPHATE SERPL-MCNC: 2.6 MG/DL (ref 2.7–4.5)
PHOSPHATE SERPL-MCNC: 2.8 MG/DL (ref 2.7–4.5)
PHOSPHATE SERPL-MCNC: 2.9 MG/DL (ref 2.7–4.5)
PHOSPHATE SERPL-MCNC: 3 MG/DL (ref 2.7–4.5)
PHOSPHATE SERPL-MCNC: 3 MG/DL (ref 2.7–4.5)
PHOSPHATE SERPL-MCNC: 3.1 MG/DL (ref 2.7–4.5)
PHOSPHATE SERPL-MCNC: 3.3 MG/DL (ref 2.7–4.5)
PHOSPHATE SERPL-MCNC: 3.3 MG/DL (ref 2.7–4.5)
PHOSPHATE SERPL-MCNC: 3.5 MG/DL (ref 2.7–4.5)
PHOSPHATE SERPL-MCNC: 4 MG/DL (ref 2.7–4.5)
PISA TR MAX VEL: 2.28 M/S
PLATELET # BLD AUTO: 109 K/UL (ref 150–350)
PLATELET # BLD AUTO: 195 K/UL (ref 150–350)
PLATELET # BLD AUTO: 206 K/UL (ref 150–350)
PLATELET # BLD AUTO: 207 K/UL (ref 150–350)
PLATELET # BLD AUTO: 211 K/UL (ref 150–350)
PLATELET # BLD AUTO: 216 K/UL (ref 150–350)
PLATELET # BLD AUTO: 216 K/UL (ref 150–350)
PLATELET # BLD AUTO: 227 K/UL (ref 150–350)
PLATELET # BLD AUTO: 248 K/UL (ref 150–350)
PLATELET # BLD AUTO: 254 K/UL (ref 150–350)
PLATELET # BLD AUTO: 274 K/UL (ref 150–350)
PLATELET # BLD AUTO: 274 K/UL (ref 150–350)
PLATELET # BLD AUTO: 291 K/UL (ref 150–350)
PLATELET # BLD AUTO: 339 K/UL (ref 150–350)
PLATELET # BLD AUTO: 352 K/UL (ref 150–350)
PLATELET # BLD AUTO: 352 K/UL (ref 150–350)
PLATELET BLD QL SMEAR: ABNORMAL
PLATELET BLD QL SMEAR: NORMAL
PMV BLD AUTO: 10.1 FL (ref 9.2–12.9)
PMV BLD AUTO: 10.1 FL (ref 9.2–12.9)
PMV BLD AUTO: 10.2 FL (ref 9.2–12.9)
PMV BLD AUTO: 10.3 FL (ref 9.2–12.9)
PMV BLD AUTO: 10.4 FL (ref 9.2–12.9)
PMV BLD AUTO: 10.4 FL (ref 9.2–12.9)
PMV BLD AUTO: 10.5 FL (ref 9.2–12.9)
PMV BLD AUTO: 10.6 FL (ref 9.2–12.9)
PMV BLD AUTO: 10.6 FL (ref 9.2–12.9)
PMV BLD AUTO: 10.7 FL (ref 9.2–12.9)
PMV BLD AUTO: 10.8 FL (ref 9.2–12.9)
PMV BLD AUTO: 9.3 FL (ref 9.2–12.9)
PMV BLD AUTO: 9.6 FL (ref 9.2–12.9)
PO2 BLDA: 78 MMHG (ref 80–100)
PO2 BLDA: 86 MMHG (ref 80–100)
POC BE: 0 MMOL/L
POC BE: 2 MMOL/L
POC IONIZED CALCIUM: 1.15 MMOL/L (ref 1.06–1.42)
POC IONIZED CALCIUM: 1.15 MMOL/L (ref 1.06–1.42)
POC SATURATED O2: 93 % (ref 95–100)
POC SATURATED O2: 96 % (ref 95–100)
POC TCO2: 28 MMOL/L (ref 23–27)
POC TCO2: 28 MMOL/L (ref 23–27)
POTASSIUM BLD-SCNC: 3.5 MMOL/L (ref 3.5–5.1)
POTASSIUM BLD-SCNC: 3.7 MMOL/L (ref 3.5–5.1)
POTASSIUM SERPL-SCNC: 3.3 MMOL/L (ref 3.5–5.1)
POTASSIUM SERPL-SCNC: 3.4 MMOL/L (ref 3.5–5.1)
POTASSIUM SERPL-SCNC: 3.5 MMOL/L (ref 3.5–5.1)
POTASSIUM SERPL-SCNC: 3.6 MMOL/L (ref 3.5–5.1)
POTASSIUM SERPL-SCNC: 3.7 MMOL/L (ref 3.5–5.1)
POTASSIUM SERPL-SCNC: 3.7 MMOL/L (ref 3.5–5.1)
POTASSIUM SERPL-SCNC: 3.8 MMOL/L (ref 3.5–5.1)
POTASSIUM SERPL-SCNC: 3.8 MMOL/L (ref 3.5–5.1)
POTASSIUM SERPL-SCNC: 3.9 MMOL/L (ref 3.5–5.1)
POTASSIUM SERPL-SCNC: 4 MMOL/L (ref 3.5–5.1)
POTASSIUM SERPL-SCNC: 4 MMOL/L (ref 3.5–5.1)
POTASSIUM SERPL-SCNC: 4.2 MMOL/L (ref 3.5–5.1)
POTASSIUM SERPL-SCNC: 4.7 MMOL/L (ref 3.5–5.1)
PREALB SERPL-MCNC: 11 MG/DL (ref 20–43)
PROCALCITONIN SERPL IA-MCNC: 1.26 NG/ML (ref 0–0.5)
PROCALCITONIN SERPL IA-MCNC: 1.26 NG/ML (ref 0–0.5)
PROCALCITONIN SERPL IA-MCNC: 2.72 NG/ML (ref 0–0.5)
PROT SERPL-MCNC: 6 G/DL (ref 6–8.4)
PROT SERPL-MCNC: 6.2 G/DL (ref 6–8.4)
PROT SERPL-MCNC: 6.4 G/DL (ref 6–8.4)
PROT SERPL-MCNC: 6.4 G/DL (ref 6–8.4)
PROT SERPL-MCNC: 6.5 G/DL (ref 6–8.4)
PROT SERPL-MCNC: 6.5 G/DL (ref 6–8.4)
PROT SERPL-MCNC: 6.6 G/DL (ref 6–8.4)
PROT SERPL-MCNC: 6.7 G/DL (ref 6–8.4)
PROT SERPL-MCNC: 6.7 G/DL (ref 6–8.4)
PROT SERPL-MCNC: 6.8 G/DL (ref 6–8.4)
PROT SERPL-MCNC: 7 G/DL (ref 6–8.4)
PROT SERPL-MCNC: 7 G/DL (ref 6–8.4)
PROT SERPL-MCNC: 7.1 G/DL (ref 6–8.4)
PROT SERPL-MCNC: 7.1 G/DL (ref 6–8.4)
PROT SERPL-MCNC: 7.6 G/DL (ref 6–8.4)
PROT UR QL STRIP: ABNORMAL
PROTHROMBIN TIME: 13.7 SEC (ref 10.6–14.8)
PV PEAK VELOCITY: 88 CM/S
RA PRESSURE: 3 MMHG
RBC # BLD AUTO: 3.82 M/UL (ref 4–5.4)
RBC # BLD AUTO: 3.83 M/UL (ref 4–5.4)
RBC # BLD AUTO: 3.95 M/UL (ref 4–5.4)
RBC # BLD AUTO: 3.95 M/UL (ref 4–5.4)
RBC # BLD AUTO: 4.01 M/UL (ref 4–5.4)
RBC # BLD AUTO: 4.09 M/UL (ref 4–5.4)
RBC # BLD AUTO: 4.09 M/UL (ref 4–5.4)
RBC # BLD AUTO: 4.12 M/UL (ref 4–5.4)
RBC # BLD AUTO: 4.15 M/UL (ref 4–5.4)
RBC # BLD AUTO: 4.17 M/UL (ref 4–5.4)
RBC # BLD AUTO: 4.24 M/UL (ref 4–5.4)
RBC # BLD AUTO: 4.3 M/UL (ref 4–5.4)
RBC # BLD AUTO: 4.38 M/UL (ref 4–5.4)
RBC # BLD AUTO: 4.46 M/UL (ref 4–5.4)
RBC # BLD AUTO: 4.46 M/UL (ref 4–5.4)
RBC # BLD AUTO: 4.56 M/UL (ref 4–5.4)
RBC #/AREA URNS HPF: 5 /HPF (ref 0–4)
RIGHT VENTRICULAR END-DIASTOLIC DIMENSION: 199 CM
SAMPLE: ABNORMAL
SAMPLE: ABNORMAL
SITE: ABNORMAL
SITE: ABNORMAL
SODIUM BLD-SCNC: 137 MMOL/L (ref 136–145)
SODIUM BLD-SCNC: 139 MMOL/L (ref 136–145)
SODIUM SERPL-SCNC: 133 MMOL/L (ref 136–145)
SODIUM SERPL-SCNC: 134 MMOL/L (ref 136–145)
SODIUM SERPL-SCNC: 134 MMOL/L (ref 136–145)
SODIUM SERPL-SCNC: 135 MMOL/L (ref 136–145)
SODIUM SERPL-SCNC: 135 MMOL/L (ref 136–145)
SODIUM SERPL-SCNC: 136 MMOL/L (ref 136–145)
SODIUM SERPL-SCNC: 136 MMOL/L (ref 136–145)
SODIUM SERPL-SCNC: 138 MMOL/L (ref 136–145)
SODIUM SERPL-SCNC: 139 MMOL/L (ref 136–145)
SODIUM SERPL-SCNC: 140 MMOL/L (ref 136–145)
SODIUM SERPL-SCNC: 142 MMOL/L (ref 136–145)
SP GR UR STRIP: 1.02 (ref 1–1.03)
SP02: 87
SP02: 95
SQUAMOUS #/AREA URNS HPF: 3 /HPF
TDI LATERAL: 0.12 M/S
TDI SEPTAL: 0.07 M/S
TDI: 0.1 M/S
TR MAX PG: 21 MMHG
TRIGL SERPL-MCNC: 121 MG/DL (ref 30–150)
TRIGL SERPL-MCNC: 121 MG/DL (ref 30–150)
TRIGL SERPL-MCNC: 85 MG/DL (ref 30–150)
TRIGL SERPL-MCNC: 89 MG/DL (ref 30–150)
TV REST PULMONARY ARTERY PRESSURE: 24 MMHG
URN SPEC COLLECT METH UR: ABNORMAL
UROBILINOGEN UR STRIP-ACNC: NEGATIVE EU/DL
WBC # BLD AUTO: 10.52 K/UL (ref 3.9–12.7)
WBC # BLD AUTO: 10.52 K/UL (ref 3.9–12.7)
WBC # BLD AUTO: 10.93 K/UL (ref 3.9–12.7)
WBC # BLD AUTO: 10.93 K/UL (ref 3.9–12.7)
WBC # BLD AUTO: 11.8 K/UL (ref 3.9–12.7)
WBC # BLD AUTO: 11.85 K/UL (ref 3.9–12.7)
WBC # BLD AUTO: 13.34 K/UL (ref 3.9–12.7)
WBC # BLD AUTO: 14.59 K/UL (ref 3.9–12.7)
WBC # BLD AUTO: 16.99 K/UL (ref 3.9–12.7)
WBC # BLD AUTO: 17.23 K/UL (ref 3.9–12.7)
WBC # BLD AUTO: 17.23 K/UL (ref 3.9–12.7)
WBC # BLD AUTO: 23.43 K/UL (ref 3.9–12.7)
WBC # BLD AUTO: 6.34 K/UL (ref 3.9–12.7)
WBC # BLD AUTO: 7.67 K/UL (ref 3.9–12.7)
WBC # BLD AUTO: 8.25 K/UL (ref 3.9–12.7)
WBC # BLD AUTO: 8.81 K/UL (ref 3.9–12.7)
WBC #/AREA URNS HPF: 5 /HPF (ref 0–5)

## 2020-01-01 PROCEDURE — 85025 COMPLETE CBC W/AUTO DIFF WBC: CPT

## 2020-01-01 PROCEDURE — B4185 PARENTERAL SOL 10 GM LIPIDS: HCPCS | Performed by: FAMILY MEDICINE

## 2020-01-01 PROCEDURE — 99900035 HC TECH TIME PER 15 MIN (STAT)

## 2020-01-01 PROCEDURE — 97535 SELF CARE MNGMENT TRAINING: CPT

## 2020-01-01 PROCEDURE — 99999 PR PBB SHADOW E&M-EST. PATIENT-LVL III: CPT | Mod: PBBFAC,,, | Performed by: INTERNAL MEDICINE

## 2020-01-01 PROCEDURE — 97140 MANUAL THERAPY 1/> REGIONS: CPT | Mod: PN,CQ

## 2020-01-01 PROCEDURE — 94761 N-INVAS EAR/PLS OXIMETRY MLT: CPT

## 2020-01-01 PROCEDURE — 12000002 HC ACUTE/MED SURGE SEMI-PRIVATE ROOM

## 2020-01-01 PROCEDURE — 44300 PR PLACEMENT ENTEROSTOMY/CECOSTOMY TUBE OPEN: ICD-10-PCS | Mod: ,,, | Performed by: SURGERY

## 2020-01-01 PROCEDURE — S0028 INJECTION, FAMOTIDINE, 20 MG: HCPCS | Performed by: INTERNAL MEDICINE

## 2020-01-01 PROCEDURE — 94640 AIRWAY INHALATION TREATMENT: CPT

## 2020-01-01 PROCEDURE — A4217 STERILE WATER/SALINE, 500 ML: HCPCS | Performed by: FAMILY MEDICINE

## 2020-01-01 PROCEDURE — D9220A PRA ANESTHESIA: ICD-10-PCS | Mod: ANES,,, | Performed by: ANESTHESIOLOGY

## 2020-01-01 PROCEDURE — 63600175 PHARM REV CODE 636 W HCPCS: Performed by: INTERNAL MEDICINE

## 2020-01-01 PROCEDURE — 83735 ASSAY OF MAGNESIUM: CPT

## 2020-01-01 PROCEDURE — 43266 EGD ENDOSCOPIC STENT PLACE: CPT | Performed by: INTERNAL MEDICINE

## 2020-01-01 PROCEDURE — 63600175 PHARM REV CODE 636 W HCPCS: Performed by: ANESTHESIOLOGY

## 2020-01-01 PROCEDURE — 99214 PR OFFICE/OUTPT VISIT, EST, LEVL IV, 30-39 MIN: ICD-10-PCS | Mod: 25,S$PBB,, | Performed by: PHYSICAL MEDICINE & REHABILITATION

## 2020-01-01 PROCEDURE — 63600175 PHARM REV CODE 636 W HCPCS: Performed by: NURSE ANESTHETIST, CERTIFIED REGISTERED

## 2020-01-01 PROCEDURE — 25000003 PHARM REV CODE 250: Performed by: FAMILY MEDICINE

## 2020-01-01 PROCEDURE — 82962 GLUCOSE BLOOD TEST: CPT

## 2020-01-01 PROCEDURE — 76942 PR U/S GUIDANCE FOR NEEDLE GUIDANCE: ICD-10-PCS | Mod: 26,S$PBB,, | Performed by: PHYSICAL MEDICINE & REHABILITATION

## 2020-01-01 PROCEDURE — 76942 ECHO GUIDE FOR BIOPSY: CPT | Mod: 26,S$PBB,, | Performed by: PHYSICAL MEDICINE & REHABILITATION

## 2020-01-01 PROCEDURE — 88342 IMHCHEM/IMCYTCHM 1ST ANTB: CPT | Mod: 26,,, | Performed by: PATHOLOGY

## 2020-01-01 PROCEDURE — 63600175 PHARM REV CODE 636 W HCPCS: Performed by: SURGERY

## 2020-01-01 PROCEDURE — 85014 HEMATOCRIT: CPT

## 2020-01-01 PROCEDURE — 87040 BLOOD CULTURE FOR BACTERIA: CPT

## 2020-01-01 PROCEDURE — 80053 COMPREHEN METABOLIC PANEL: CPT

## 2020-01-01 PROCEDURE — 87070 CULTURE OTHR SPECIMN AEROBIC: CPT

## 2020-01-01 PROCEDURE — 36569 INSJ PICC 5 YR+ W/O IMAGING: CPT

## 2020-01-01 PROCEDURE — 84100 ASSAY OF PHOSPHORUS: CPT

## 2020-01-01 PROCEDURE — 25000242 PHARM REV CODE 250 ALT 637 W/ HCPCS: Performed by: SURGERY

## 2020-01-01 PROCEDURE — 99214 OFFICE O/P EST MOD 30 MIN: CPT | Mod: PBBFAC,PO | Performed by: INTERNAL MEDICINE

## 2020-01-01 PROCEDURE — 88305 TISSUE EXAM BY PATHOLOGIST: ICD-10-PCS | Mod: 26,,, | Performed by: PATHOLOGY

## 2020-01-01 PROCEDURE — 76942 ECHO GUIDE FOR BIOPSY: CPT | Mod: PBBFAC,PN | Performed by: PHYSICAL MEDICINE & REHABILITATION

## 2020-01-01 PROCEDURE — 63600175 PHARM REV CODE 636 W HCPCS: Performed by: FAMILY MEDICINE

## 2020-01-01 PROCEDURE — 25000003 PHARM REV CODE 250: Performed by: INTERNAL MEDICINE

## 2020-01-01 PROCEDURE — 25000003 PHARM REV CODE 250: Performed by: NURSE ANESTHETIST, CERTIFIED REGISTERED

## 2020-01-01 PROCEDURE — 64450 NJX AA&/STRD OTHER PN/BRANCH: CPT | Mod: PBBFAC,PN | Performed by: PHYSICAL MEDICINE & REHABILITATION

## 2020-01-01 PROCEDURE — 99214 PR OFFICE/OUTPT VISIT, EST, LEVL IV, 30-39 MIN: ICD-10-PCS | Mod: S$PBB,,, | Performed by: INTERNAL MEDICINE

## 2020-01-01 PROCEDURE — 99213 OFFICE O/P EST LOW 20 MIN: CPT | Mod: PBBFAC,25 | Performed by: INTERNAL MEDICINE

## 2020-01-01 PROCEDURE — 99214 PR OFFICE/OUTPT VISIT, EST, LEVL IV, 30-39 MIN: ICD-10-PCS | Mod: S$PBB,,, | Performed by: PHYSICAL MEDICINE & REHABILITATION

## 2020-01-01 PROCEDURE — G0378 HOSPITAL OBSERVATION PER HR: HCPCS

## 2020-01-01 PROCEDURE — 96360 HYDRATION IV INFUSION INIT: CPT

## 2020-01-01 PROCEDURE — 27201423 OPTIME MED/SURG SUP & DEVICES STERILE SUPPLY: Performed by: SURGERY

## 2020-01-01 PROCEDURE — 99203 OFFICE O/P NEW LOW 30 MIN: CPT | Mod: S$PBB,,, | Performed by: SURGERY

## 2020-01-01 PROCEDURE — 96372 THER/PROPH/DIAG INJ SC/IM: CPT | Mod: PBBFAC,PN

## 2020-01-01 PROCEDURE — 97530 THERAPEUTIC ACTIVITIES: CPT

## 2020-01-01 PROCEDURE — 36415 COLL VENOUS BLD VENIPUNCTURE: CPT

## 2020-01-01 PROCEDURE — 93005 ELECTROCARDIOGRAM TRACING: CPT | Performed by: INTERNAL MEDICINE

## 2020-01-01 PROCEDURE — 99214 OFFICE O/P EST MOD 30 MIN: CPT | Mod: S$PBB,,, | Performed by: PHYSICAL MEDICINE & REHABILITATION

## 2020-01-01 PROCEDURE — 99999 PR PBB SHADOW E&M-EST. PATIENT-LVL III: ICD-10-PCS | Mod: PBBFAC,,, | Performed by: PHYSICAL MEDICINE & REHABILITATION

## 2020-01-01 PROCEDURE — 99999 PR PBB SHADOW E&M-EST. PATIENT-LVL III: CPT | Mod: PBBFAC,,, | Performed by: PHYSICAL MEDICINE & REHABILITATION

## 2020-01-01 PROCEDURE — 97110 THERAPEUTIC EXERCISES: CPT | Mod: PN

## 2020-01-01 PROCEDURE — 73502 X-RAY EXAM HIP UNI 2-3 VIEWS: CPT | Mod: 26,RT,S$GLB, | Performed by: RADIOLOGY

## 2020-01-01 PROCEDURE — 99223 1ST HOSP IP/OBS HIGH 75: CPT | Mod: ,,, | Performed by: INTERNAL MEDICINE

## 2020-01-01 PROCEDURE — 80048 BASIC METABOLIC PNL TOTAL CA: CPT

## 2020-01-01 PROCEDURE — 88342 CHG IMMUNOCYTOCHEMISTRY: ICD-10-PCS | Mod: 26,,, | Performed by: PATHOLOGY

## 2020-01-01 PROCEDURE — 1125F AMNT PAIN NOTED PAIN PRSNT: CPT | Mod: ,,, | Performed by: FAMILY MEDICINE

## 2020-01-01 PROCEDURE — 27201012 HC FORCEPS, HOT/COLD, DISP: Performed by: INTERNAL MEDICINE

## 2020-01-01 PROCEDURE — 86140 C-REACTIVE PROTEIN: CPT

## 2020-01-01 PROCEDURE — S0028 INJECTION, FAMOTIDINE, 20 MG: HCPCS | Performed by: FAMILY MEDICINE

## 2020-01-01 PROCEDURE — 76942 ECHO GUIDE FOR BIOPSY: CPT | Mod: PBBFAC,PN,59 | Performed by: PHYSICAL MEDICINE & REHABILITATION

## 2020-01-01 PROCEDURE — 93005 ELECTROCARDIOGRAM TRACING: CPT | Mod: PBBFAC | Performed by: INTERNAL MEDICINE

## 2020-01-01 PROCEDURE — 37000008 HC ANESTHESIA 1ST 15 MINUTES: Performed by: SURGERY

## 2020-01-01 PROCEDURE — 85027 COMPLETE CBC AUTOMATED: CPT

## 2020-01-01 PROCEDURE — B4185 PARENTERAL SOL 10 GM LIPIDS: HCPCS | Performed by: INTERNAL MEDICINE

## 2020-01-01 PROCEDURE — 99205 PR OFFICE/OUTPT VISIT, NEW, LEVL V, 60-74 MIN: ICD-10-PCS | Mod: S$PBB,,, | Performed by: INTERNAL MEDICINE

## 2020-01-01 PROCEDURE — 99999 PR PBB SHADOW E&M-EST. PATIENT-LVL II: ICD-10-PCS | Mod: PBBFAC,,, | Performed by: OPTOMETRIST

## 2020-01-01 PROCEDURE — 84134 ASSAY OF PREALBUMIN: CPT

## 2020-01-01 PROCEDURE — A4217 STERILE WATER/SALINE, 500 ML: HCPCS | Performed by: INTERNAL MEDICINE

## 2020-01-01 PROCEDURE — 84145 PROCALCITONIN (PCT): CPT

## 2020-01-01 PROCEDURE — 71000033 HC RECOVERY, INTIAL HOUR: Performed by: SURGERY

## 2020-01-01 PROCEDURE — 84478 ASSAY OF TRIGLYCERIDES: CPT

## 2020-01-01 PROCEDURE — 82330 ASSAY OF CALCIUM: CPT

## 2020-01-01 PROCEDURE — 99213 OFFICE O/P EST LOW 20 MIN: CPT | Mod: PBBFAC,PN,25 | Performed by: PHYSICAL MEDICINE & REHABILITATION

## 2020-01-01 PROCEDURE — 64640 INJECTION TREATMENT OF NERVE: CPT | Mod: PBBFAC,PN,59 | Performed by: PHYSICAL MEDICINE & REHABILITATION

## 2020-01-01 PROCEDURE — 84132 ASSAY OF SERUM POTASSIUM: CPT

## 2020-01-01 PROCEDURE — 64640 INJECTION TREATMENT OF NERVE: CPT | Mod: S$PBB,RT,, | Performed by: PHYSICAL MEDICINE & REHABILITATION

## 2020-01-01 PROCEDURE — D9220A PRA ANESTHESIA: Mod: ANES,,, | Performed by: ANESTHESIOLOGY

## 2020-01-01 PROCEDURE — 87147 CULTURE TYPE IMMUNOLOGIC: CPT

## 2020-01-01 PROCEDURE — C1769 GUIDE WIRE: HCPCS | Performed by: INTERNAL MEDICINE

## 2020-01-01 PROCEDURE — 99205 OFFICE O/P NEW HI 60 MIN: CPT | Mod: S$PBB,,, | Performed by: INTERNAL MEDICINE

## 2020-01-01 PROCEDURE — 1159F MED LIST DOCD IN RCRD: CPT | Mod: ,,, | Performed by: FAMILY MEDICINE

## 2020-01-01 PROCEDURE — 97161 PT EVAL LOW COMPLEX 20 MIN: CPT

## 2020-01-01 PROCEDURE — 27201010 HC FORCEPS BIOPSY PEDI: Performed by: INTERNAL MEDICINE

## 2020-01-01 PROCEDURE — 37000009 HC ANESTHESIA EA ADD 15 MINS: Performed by: SURGERY

## 2020-01-01 PROCEDURE — 1159F PR MEDICATION LIST DOCUMENTED IN MEDICAL RECORD: ICD-10-PCS | Mod: ,,, | Performed by: FAMILY MEDICINE

## 2020-01-01 PROCEDURE — 85610 PROTHROMBIN TIME: CPT

## 2020-01-01 PROCEDURE — 97165 OT EVAL LOW COMPLEX 30 MIN: CPT

## 2020-01-01 PROCEDURE — 99999 PR PBB SHADOW E&M-EST. PATIENT-LVL IV: CPT | Mod: PBBFAC,,, | Performed by: INTERNAL MEDICINE

## 2020-01-01 PROCEDURE — 78815 PET IMAGE W/CT SKULL-THIGH: CPT | Mod: TC,PO,PI

## 2020-01-01 PROCEDURE — 96372 THER/PROPH/DIAG INJ SC/IM: CPT | Mod: PBBFAC,PO

## 2020-01-01 PROCEDURE — 92015 DETERMINE REFRACTIVE STATE: CPT | Mod: S$GLB,,, | Performed by: OPTOMETRIST

## 2020-01-01 PROCEDURE — 99205 PR OFFICE/OUTPT VISIT, NEW, LEVL V, 60-74 MIN: ICD-10-PCS | Mod: S$GLB,,, | Performed by: RADIOLOGY

## 2020-01-01 PROCEDURE — 27000221 HC OXYGEN, UP TO 24 HOURS

## 2020-01-01 PROCEDURE — 92014 COMPRE OPH EXAM EST PT 1/>: CPT | Mod: S$GLB,,, | Performed by: OPTOMETRIST

## 2020-01-01 PROCEDURE — 99205 OFFICE O/P NEW HI 60 MIN: CPT | Mod: S$GLB,,, | Performed by: RADIOLOGY

## 2020-01-01 PROCEDURE — 27202105 HC BIS BILATERAL SENSOR: Performed by: ANESTHESIOLOGY

## 2020-01-01 PROCEDURE — 1159F MED LIST DOCD IN RCRD: CPT | Mod: ,,, | Performed by: INTERNAL MEDICINE

## 2020-01-01 PROCEDURE — 94760 N-INVAS EAR/PLS OXIMETRY 1: CPT

## 2020-01-01 PROCEDURE — 84295 ASSAY OF SERUM SODIUM: CPT

## 2020-01-01 PROCEDURE — 99285 EMERGENCY DEPT VISIT HI MDM: CPT | Mod: 25

## 2020-01-01 PROCEDURE — 85007 BL SMEAR W/DIFF WBC COUNT: CPT

## 2020-01-01 PROCEDURE — 92015 PR REFRACTION: ICD-10-PCS | Mod: S$GLB,,, | Performed by: OPTOMETRIST

## 2020-01-01 PROCEDURE — 25000242 PHARM REV CODE 250 ALT 637 W/ HCPCS: Performed by: FAMILY MEDICINE

## 2020-01-01 PROCEDURE — 31720 CLEARANCE OF AIRWAYS: CPT

## 2020-01-01 PROCEDURE — 25000003 PHARM REV CODE 250: Performed by: SURGERY

## 2020-01-01 PROCEDURE — 63600175 PHARM REV CODE 636 W HCPCS

## 2020-01-01 PROCEDURE — 27201107 HC STYLET, STANDARD: Performed by: ANESTHESIOLOGY

## 2020-01-01 PROCEDURE — 25500020 PHARM REV CODE 255: Performed by: INTERNAL MEDICINE

## 2020-01-01 PROCEDURE — 1125F PR PAIN SEVERITY QUANTIFIED, PAIN PRESENT: ICD-10-PCS | Mod: ,,, | Performed by: INTERNAL MEDICINE

## 2020-01-01 PROCEDURE — 64450 PR NERVE BLOCK INJ, ANES/STEROID, OTHER PERIPHERAL: ICD-10-PCS | Mod: 59,S$PBB,RT, | Performed by: PHYSICAL MEDICINE & REHABILITATION

## 2020-01-01 PROCEDURE — 36600 WITHDRAWAL OF ARTERIAL BLOOD: CPT

## 2020-01-01 PROCEDURE — 88342 IMHCHEM/IMCYTCHM 1ST ANTB: CPT | Performed by: PATHOLOGY

## 2020-01-01 PROCEDURE — 36000707: Performed by: SURGERY

## 2020-01-01 PROCEDURE — 99999 PR PBB SHADOW E&M-EST. PATIENT-LVL III: ICD-10-PCS | Mod: PBBFAC,,, | Performed by: INTERNAL MEDICINE

## 2020-01-01 PROCEDURE — 97140 MANUAL THERAPY 1/> REGIONS: CPT | Mod: PN

## 2020-01-01 PROCEDURE — 99999 PR PBB SHADOW E&M-EST. PATIENT-LVL II: CPT | Mod: PBBFAC,,, | Performed by: OPTOMETRIST

## 2020-01-01 PROCEDURE — 64450 NJX AA&/STRD OTHER PN/BRANCH: CPT | Mod: S$PBB,RT,, | Performed by: PHYSICAL MEDICINE & REHABILITATION

## 2020-01-01 PROCEDURE — 73502 X-RAY EXAM HIP UNI 2-3 VIEWS: CPT | Mod: TC,FY,PO,RT

## 2020-01-01 PROCEDURE — 27202436 HC GRASPER DEVICE, RAT TOOTH: Performed by: INTERNAL MEDICINE

## 2020-01-01 PROCEDURE — 27000673 HC TUBING BLOOD Y: Performed by: ANESTHESIOLOGY

## 2020-01-01 PROCEDURE — 44300 OPEN BOWEL TO SKIN: CPT | Mod: ,,, | Performed by: SURGERY

## 2020-01-01 PROCEDURE — 88305 TISSUE EXAM BY PATHOLOGIST: CPT | Performed by: PATHOLOGY

## 2020-01-01 PROCEDURE — 37000008 HC ANESTHESIA 1ST 15 MINUTES: Performed by: INTERNAL MEDICINE

## 2020-01-01 PROCEDURE — 37000009 HC ANESTHESIA EA ADD 15 MINS: Performed by: INTERNAL MEDICINE

## 2020-01-01 PROCEDURE — 99214 OFFICE O/P EST MOD 30 MIN: CPT | Mod: 25 | Performed by: SURGERY

## 2020-01-01 PROCEDURE — 96374 THER/PROPH/DIAG INJ IV PUSH: CPT

## 2020-01-01 PROCEDURE — 99999 PR PBB SHADOW E&M-EST. PATIENT-LVL IV: ICD-10-PCS | Mod: PBBFAC,,, | Performed by: INTERNAL MEDICINE

## 2020-01-01 PROCEDURE — 43239 EGD BIOPSY SINGLE/MULTIPLE: CPT | Mod: ,,, | Performed by: INTERNAL MEDICINE

## 2020-01-01 PROCEDURE — 99214 OFFICE O/P EST MOD 30 MIN: CPT | Mod: S$PBB,,, | Performed by: INTERNAL MEDICINE

## 2020-01-01 PROCEDURE — 93010 RHYTHM STRIP: ICD-10-PCS | Mod: S$PBB,,, | Performed by: INTERNAL MEDICINE

## 2020-01-01 PROCEDURE — 71000039 HC RECOVERY, EACH ADD'L HOUR: Performed by: SURGERY

## 2020-01-01 PROCEDURE — 64450 PR NERVE BLOCK INJ, ANES/STEROID, OTHER PERIPHERAL: ICD-10-PCS | Mod: S$PBB,RT,, | Performed by: PHYSICAL MEDICINE & REHABILITATION

## 2020-01-01 PROCEDURE — 63600175 PHARM REV CODE 636 W HCPCS: Performed by: STUDENT IN AN ORGANIZED HEALTH CARE EDUCATION/TRAINING PROGRAM

## 2020-01-01 PROCEDURE — 83605 ASSAY OF LACTIC ACID: CPT

## 2020-01-01 PROCEDURE — 27000671 HC TUBING MICROBORE EXT: Performed by: ANESTHESIOLOGY

## 2020-01-01 PROCEDURE — 1159F PR MEDICATION LIST DOCUMENTED IN MEDICAL RECORD: ICD-10-PCS | Mod: ,,, | Performed by: INTERNAL MEDICINE

## 2020-01-01 PROCEDURE — 99214 PR OFFICE/OUTPT VISIT, EST, LEVL IV, 30-39 MIN: ICD-10-PCS | Mod: S$PBB,,, | Performed by: FAMILY MEDICINE

## 2020-01-01 PROCEDURE — 36000706: Performed by: SURGERY

## 2020-01-01 PROCEDURE — D9220A PRA ANESTHESIA: Mod: CRNA,,, | Performed by: NURSE ANESTHETIST, CERTIFIED REGISTERED

## 2020-01-01 PROCEDURE — 21000000 HC CCU ICU ROOM CHARGE

## 2020-01-01 PROCEDURE — C1874 STENT, COATED/COV W/DEL SYS: HCPCS | Performed by: INTERNAL MEDICINE

## 2020-01-01 PROCEDURE — 43239 EGD BIOPSY SINGLE/MULTIPLE: CPT | Performed by: INTERNAL MEDICINE

## 2020-01-01 PROCEDURE — D9220A PRA ANESTHESIA: ICD-10-PCS | Mod: CRNA,,, | Performed by: NURSE ANESTHETIST, CERTIFIED REGISTERED

## 2020-01-01 PROCEDURE — 99999 PR PBB SHADOW E&M-EST. PATIENT-LVL III: ICD-10-PCS | Mod: PBBFAC,,, | Performed by: FAMILY MEDICINE

## 2020-01-01 PROCEDURE — 87040 BLOOD CULTURE FOR BACTERIA: CPT | Mod: 59

## 2020-01-01 PROCEDURE — 63600175 PHARM REV CODE 636 W HCPCS: Performed by: EMERGENCY MEDICINE

## 2020-01-01 PROCEDURE — 1125F AMNT PAIN NOTED PAIN PRSNT: CPT | Mod: ,,, | Performed by: INTERNAL MEDICINE

## 2020-01-01 PROCEDURE — 73502 XR HIP 2 VIEW RIGHT: ICD-10-PCS | Mod: 26,RT,S$GLB, | Performed by: RADIOLOGY

## 2020-01-01 PROCEDURE — 82803 BLOOD GASES ANY COMBINATION: CPT

## 2020-01-01 PROCEDURE — 93306 TTE W/DOPPLER COMPLETE: CPT

## 2020-01-01 PROCEDURE — 99223 PR INITIAL HOSPITAL CARE,LEVL III: ICD-10-PCS | Mod: ,,, | Performed by: INTERNAL MEDICINE

## 2020-01-01 PROCEDURE — 81001 URINALYSIS AUTO W/SCOPE: CPT

## 2020-01-01 PROCEDURE — 97530 THERAPEUTIC ACTIVITIES: CPT | Mod: CQ

## 2020-01-01 PROCEDURE — 99213 OFFICE O/P EST LOW 20 MIN: CPT | Mod: PBBFAC,PN | Performed by: PHYSICAL MEDICINE & REHABILITATION

## 2020-01-01 PROCEDURE — 99203 PR OFFICE/OUTPT VISIT, NEW, LEVL III, 30-44 MIN: ICD-10-PCS | Mod: S$PBB,,, | Performed by: SURGERY

## 2020-01-01 PROCEDURE — 88305 TISSUE EXAM BY PATHOLOGIST: CPT | Mod: 26,,, | Performed by: PATHOLOGY

## 2020-01-01 PROCEDURE — 99213 OFFICE O/P EST LOW 20 MIN: CPT | Mod: PBBFAC,PO | Performed by: FAMILY MEDICINE

## 2020-01-01 PROCEDURE — 99214 OFFICE O/P EST MOD 30 MIN: CPT | Mod: S$PBB,,, | Performed by: FAMILY MEDICINE

## 2020-01-01 PROCEDURE — 43239 PR EGD, FLEX, W/BIOPSY, SGL/MULTI: ICD-10-PCS | Mod: ,,, | Performed by: INTERNAL MEDICINE

## 2020-01-01 PROCEDURE — 64640 PR DESTRUCT BY NEURO AGENT; OTHER PERIPH NERVE: ICD-10-PCS | Mod: S$PBB,RT,, | Performed by: PHYSICAL MEDICINE & REHABILITATION

## 2020-01-01 PROCEDURE — A9552 F18 FDG: HCPCS | Mod: PO

## 2020-01-01 PROCEDURE — 97116 GAIT TRAINING THERAPY: CPT

## 2020-01-01 PROCEDURE — 92014 PR EYE EXAM, EST PATIENT,COMPREHESV: ICD-10-PCS | Mod: S$GLB,,, | Performed by: OPTOMETRIST

## 2020-01-01 PROCEDURE — 96361 HYDRATE IV INFUSION ADD-ON: CPT

## 2020-01-01 PROCEDURE — 71045 X-RAY EXAM CHEST 1 VIEW: CPT | Mod: TC

## 2020-01-01 PROCEDURE — 99214 OFFICE O/P EST MOD 30 MIN: CPT | Mod: 25,S$PBB,, | Performed by: PHYSICAL MEDICINE & REHABILITATION

## 2020-01-01 PROCEDURE — 93010 ELECTROCARDIOGRAM REPORT: CPT | Mod: S$PBB,,, | Performed by: INTERNAL MEDICINE

## 2020-01-01 PROCEDURE — 1125F PR PAIN SEVERITY QUANTIFIED, PAIN PRESENT: ICD-10-PCS | Mod: ,,, | Performed by: FAMILY MEDICINE

## 2020-01-01 PROCEDURE — 99999 PR PBB SHADOW E&M-EST. PATIENT-LVL III: CPT | Mod: PBBFAC,,, | Performed by: FAMILY MEDICINE

## 2020-01-01 PROCEDURE — 64450 NJX AA&/STRD OTHER PN/BRANCH: CPT | Mod: 59,S$PBB,RT, | Performed by: PHYSICAL MEDICINE & REHABILITATION

## 2020-01-01 PROCEDURE — C9290 INJ, BUPIVACAINE LIPOSOME: HCPCS | Performed by: SURGERY

## 2020-01-01 RX ORDER — ENOXAPARIN SODIUM 100 MG/ML
40 INJECTION SUBCUTANEOUS EVERY 24 HOURS
Status: DISCONTINUED | OUTPATIENT
Start: 2020-01-01 | End: 2020-01-01

## 2020-01-01 RX ORDER — KETOROLAC TROMETHAMINE 30 MG/ML
15 INJECTION, SOLUTION INTRAMUSCULAR; INTRAVENOUS ONCE
Status: DISCONTINUED | OUTPATIENT
Start: 2020-01-01 | End: 2020-01-01

## 2020-01-01 RX ORDER — PANTOPRAZOLE SODIUM 40 MG/1
40 TABLET, DELAYED RELEASE ORAL DAILY
Status: CANCELLED | OUTPATIENT
Start: 2020-01-01

## 2020-01-01 RX ORDER — FLUTICASONE PROPIONATE 50 MCG
1 SPRAY, SUSPENSION (ML) NASAL DAILY
Status: DISCONTINUED | OUTPATIENT
Start: 2020-01-01 | End: 2020-03-27 | Stop reason: HOSPADM

## 2020-01-01 RX ORDER — ONDANSETRON 2 MG/ML
4 INJECTION INTRAMUSCULAR; INTRAVENOUS EVERY 6 HOURS PRN
Status: DISCONTINUED | OUTPATIENT
Start: 2020-01-01 | End: 2020-03-27 | Stop reason: HOSPADM

## 2020-01-01 RX ORDER — FENTANYL CITRATE 50 UG/ML
25 INJECTION, SOLUTION INTRAMUSCULAR; INTRAVENOUS
Status: DISCONTINUED | OUTPATIENT
Start: 2020-01-01 | End: 2020-01-01

## 2020-01-01 RX ORDER — DILTIAZEM HYDROCHLORIDE 30 MG/1
30 TABLET, FILM COATED ORAL EVERY 8 HOURS
Status: DISCONTINUED | OUTPATIENT
Start: 2020-01-01 | End: 2020-01-01

## 2020-01-01 RX ORDER — GLYCOPYRROLATE 0.2 MG/ML
INJECTION INTRAMUSCULAR; INTRAVENOUS
Status: DISCONTINUED | OUTPATIENT
Start: 2020-01-01 | End: 2020-01-01

## 2020-01-01 RX ORDER — ACETAMINOPHEN 325 MG/1
650 TABLET ORAL EVERY 8 HOURS PRN
Status: DISCONTINUED | OUTPATIENT
Start: 2020-01-01 | End: 2020-03-27 | Stop reason: HOSPADM

## 2020-01-01 RX ORDER — SODIUM CHLORIDE, SODIUM LACTATE, POTASSIUM CHLORIDE, CALCIUM CHLORIDE 600; 310; 30; 20 MG/100ML; MG/100ML; MG/100ML; MG/100ML
INJECTION, SOLUTION INTRAVENOUS CONTINUOUS PRN
Status: DISCONTINUED | OUTPATIENT
Start: 2020-01-01 | End: 2020-01-01

## 2020-01-01 RX ORDER — FUROSEMIDE 10 MG/ML
INJECTION INTRAMUSCULAR; INTRAVENOUS
Status: COMPLETED
Start: 2020-01-01 | End: 2020-01-01

## 2020-01-01 RX ORDER — METOCLOPRAMIDE HYDROCHLORIDE 5 MG/ML
10 INJECTION INTRAMUSCULAR; INTRAVENOUS EVERY 6 HOURS PRN
Status: DISCONTINUED | OUTPATIENT
Start: 2020-01-01 | End: 2020-03-27 | Stop reason: HOSPADM

## 2020-01-01 RX ORDER — SODIUM CHLORIDE 0.9 % (FLUSH) 0.9 %
10 SYRINGE (ML) INJECTION
Status: DISCONTINUED | OUTPATIENT
Start: 2020-01-01 | End: 2020-01-01

## 2020-01-01 RX ORDER — VANCOMYCIN HCL IN 5 % DEXTROSE 1G/250ML
1000 PLASTIC BAG, INJECTION (ML) INTRAVENOUS
Status: DISCONTINUED | OUTPATIENT
Start: 2020-01-01 | End: 2020-01-01

## 2020-01-01 RX ORDER — OXYCODONE HYDROCHLORIDE 5 MG/1
5 TABLET ORAL
Status: DISCONTINUED | OUTPATIENT
Start: 2020-01-01 | End: 2020-01-01

## 2020-01-01 RX ORDER — PHENYLEPHRINE HYDROCHLORIDE 10 MG/ML
INJECTION INTRAVENOUS
Status: DISCONTINUED | OUTPATIENT
Start: 2020-01-01 | End: 2020-01-01

## 2020-01-01 RX ORDER — METOPROLOL TARTRATE 1 MG/ML
5 INJECTION, SOLUTION INTRAVENOUS EVERY 6 HOURS
Status: DISCONTINUED | OUTPATIENT
Start: 2020-01-01 | End: 2020-01-01

## 2020-01-01 RX ORDER — BUPIVACAINE HYDROCHLORIDE AND EPINEPHRINE 2.5; 5 MG/ML; UG/ML
INJECTION, SOLUTION EPIDURAL; INFILTRATION; INTRACAUDAL; PERINEURAL
Status: DISCONTINUED | OUTPATIENT
Start: 2020-01-01 | End: 2020-01-01 | Stop reason: HOSPADM

## 2020-01-01 RX ORDER — POLYETHYLENE GLYCOL 3350 17 G/17G
17 POWDER, FOR SOLUTION ORAL DAILY PRN
Status: CANCELLED | OUTPATIENT
Start: 2020-01-01

## 2020-01-01 RX ORDER — BETAMETHASONE SODIUM PHOSPHATE AND BETAMETHASONE ACETATE 3; 3 MG/ML; MG/ML
9 INJECTION, SUSPENSION INTRA-ARTICULAR; INTRALESIONAL; INTRAMUSCULAR; SOFT TISSUE ONCE
Status: COMPLETED | OUTPATIENT
Start: 2020-01-01 | End: 2020-01-01

## 2020-01-01 RX ORDER — FENTANYL CITRATE 50 UG/ML
INJECTION, SOLUTION INTRAMUSCULAR; INTRAVENOUS
Status: DISCONTINUED | OUTPATIENT
Start: 2020-01-01 | End: 2020-01-01

## 2020-01-01 RX ORDER — POTASSIUM CHLORIDE 7.45 MG/ML
40 INJECTION INTRAVENOUS
Status: DISCONTINUED | OUTPATIENT
Start: 2020-01-01 | End: 2020-03-27 | Stop reason: HOSPADM

## 2020-01-01 RX ORDER — LIDOCAINE HYDROCHLORIDE 10 MG/ML
10 INJECTION INFILTRATION; PERINEURAL ONCE
Status: COMPLETED | OUTPATIENT
Start: 2020-01-01 | End: 2020-01-01

## 2020-01-01 RX ORDER — HYDRALAZINE HYDROCHLORIDE 20 MG/ML
10 INJECTION INTRAMUSCULAR; INTRAVENOUS EVERY 6 HOURS PRN
Status: DISCONTINUED | OUTPATIENT
Start: 2020-01-01 | End: 2020-03-27 | Stop reason: HOSPADM

## 2020-01-01 RX ORDER — TALC
6 POWDER (GRAM) TOPICAL NIGHTLY PRN
Status: CANCELLED | OUTPATIENT
Start: 2020-01-01

## 2020-01-01 RX ORDER — POTASSIUM CHLORIDE 20 MEQ/1
40 TABLET, EXTENDED RELEASE ORAL
Status: DISCONTINUED | OUTPATIENT
Start: 2020-01-01 | End: 2020-03-27 | Stop reason: HOSPADM

## 2020-01-01 RX ORDER — MONTELUKAST SODIUM 10 MG/1
10 TABLET ORAL NIGHTLY
Status: CANCELLED | OUTPATIENT
Start: 2020-01-01

## 2020-01-01 RX ORDER — MAGNESIUM SULFATE HEPTAHYDRATE 40 MG/ML
2 INJECTION, SOLUTION INTRAVENOUS
Status: DISCONTINUED | OUTPATIENT
Start: 2020-01-01 | End: 2020-03-27 | Stop reason: HOSPADM

## 2020-01-01 RX ORDER — LIDOCAINE HYDROCHLORIDE 10 MG/ML
7 INJECTION INFILTRATION; PERINEURAL ONCE
Status: COMPLETED | OUTPATIENT
Start: 2020-01-01 | End: 2020-01-01

## 2020-01-01 RX ORDER — CETIRIZINE HYDROCHLORIDE 10 MG/1
TABLET ORAL
Qty: 90 TABLET | Refills: 4 | Status: SHIPPED | OUTPATIENT
Start: 2020-01-01

## 2020-01-01 RX ORDER — SODIUM CHLORIDE 9 MG/ML
1000 INJECTION, SOLUTION INTRAVENOUS
Status: COMPLETED | OUTPATIENT
Start: 2020-01-01 | End: 2020-01-01

## 2020-01-01 RX ORDER — DILTIAZEM HCL/D5W 125 MG/125
5 PLASTIC BAG, INJECTION (ML) INTRAVENOUS CONTINUOUS
Status: DISCONTINUED | OUTPATIENT
Start: 2020-01-01 | End: 2020-01-01

## 2020-01-01 RX ORDER — DIPHENHYDRAMINE HYDROCHLORIDE 50 MG/ML
12.5 INJECTION INTRAMUSCULAR; INTRAVENOUS
Status: DISCONTINUED | OUTPATIENT
Start: 2020-01-01 | End: 2020-01-01

## 2020-01-01 RX ORDER — MAGNESIUM SULFATE 1 G/100ML
1 INJECTION INTRAVENOUS
Status: DISCONTINUED | OUTPATIENT
Start: 2020-01-01 | End: 2020-03-27 | Stop reason: HOSPADM

## 2020-01-01 RX ORDER — POTASSIUM CHLORIDE 20 MEQ/1
20 TABLET, EXTENDED RELEASE ORAL
Status: DISCONTINUED | OUTPATIENT
Start: 2020-01-01 | End: 2020-03-27 | Stop reason: HOSPADM

## 2020-01-01 RX ORDER — ENOXAPARIN SODIUM 100 MG/ML
1 INJECTION SUBCUTANEOUS
Status: DISCONTINUED | OUTPATIENT
Start: 2020-01-01 | End: 2020-01-01

## 2020-01-01 RX ORDER — MAGNESIUM SULFATE 1 G/100ML
1 INJECTION INTRAVENOUS ONCE
Status: COMPLETED | OUTPATIENT
Start: 2020-01-01 | End: 2020-01-01

## 2020-01-01 RX ORDER — DILTIAZEM HCL 1 MG/ML
5 INJECTION, SOLUTION INTRAVENOUS CONTINUOUS
Status: DISCONTINUED | OUTPATIENT
Start: 2020-01-01 | End: 2020-01-01

## 2020-01-01 RX ORDER — HYDROMORPHONE HYDROCHLORIDE 1 MG/ML
0.5 INJECTION, SOLUTION INTRAMUSCULAR; INTRAVENOUS; SUBCUTANEOUS EVERY 6 HOURS PRN
Status: DISCONTINUED | OUTPATIENT
Start: 2020-01-01 | End: 2020-01-01

## 2020-01-01 RX ORDER — KETOROLAC TROMETHAMINE 30 MG/ML
30 INJECTION, SOLUTION INTRAMUSCULAR; INTRAVENOUS
Status: COMPLETED | OUTPATIENT
Start: 2020-01-01 | End: 2020-01-01

## 2020-01-01 RX ORDER — LIDOCAINE HYDROCHLORIDE 20 MG/ML
INJECTION INTRAVENOUS
Status: DISCONTINUED | OUTPATIENT
Start: 2020-01-01 | End: 2020-01-01

## 2020-01-01 RX ORDER — LANOLIN ALCOHOL/MO/W.PET/CERES
800 CREAM (GRAM) TOPICAL
Status: DISCONTINUED | OUTPATIENT
Start: 2020-01-01 | End: 2020-03-27 | Stop reason: HOSPADM

## 2020-01-01 RX ORDER — LIDOCAINE HCL/PF 100 MG/5ML
SYRINGE (ML) INTRAVENOUS
Status: DISCONTINUED | OUTPATIENT
Start: 2020-01-01 | End: 2020-01-01

## 2020-01-01 RX ORDER — MORPHINE SULFATE 2 MG/ML
3 INJECTION, SOLUTION INTRAMUSCULAR; INTRAVENOUS EVERY 4 HOURS PRN
Status: DISPENSED | OUTPATIENT
Start: 2020-01-01 | End: 2020-01-01

## 2020-01-01 RX ORDER — ONDANSETRON 2 MG/ML
INJECTION INTRAMUSCULAR; INTRAVENOUS
Status: DISCONTINUED | OUTPATIENT
Start: 2020-01-01 | End: 2020-01-01

## 2020-01-01 RX ORDER — IPRATROPIUM BROMIDE AND ALBUTEROL SULFATE 2.5; .5 MG/3ML; MG/3ML
3 SOLUTION RESPIRATORY (INHALATION) EVERY 6 HOURS PRN
Status: DISCONTINUED | OUTPATIENT
Start: 2020-01-01 | End: 2020-03-27 | Stop reason: HOSPADM

## 2020-01-01 RX ORDER — PROPOFOL 10 MG/ML
VIAL (ML) INTRAVENOUS
Status: DISCONTINUED | OUTPATIENT
Start: 2020-01-01 | End: 2020-01-01

## 2020-01-01 RX ORDER — DIPHENHYDRAMINE HYDROCHLORIDE 50 MG/ML
INJECTION INTRAMUSCULAR; INTRAVENOUS
Status: DISCONTINUED | OUTPATIENT
Start: 2020-01-01 | End: 2020-01-01

## 2020-01-01 RX ORDER — METOPROLOL TARTRATE 25 MG/1
25 TABLET, FILM COATED ORAL 2 TIMES DAILY
Status: DISCONTINUED | OUTPATIENT
Start: 2020-01-01 | End: 2020-01-01

## 2020-01-01 RX ORDER — METHYLPREDNISOLONE ACETATE 40 MG/ML
40 INJECTION, SUSPENSION INTRA-ARTICULAR; INTRALESIONAL; INTRAMUSCULAR; SOFT TISSUE
Status: COMPLETED | OUTPATIENT
Start: 2020-01-01 | End: 2020-01-01

## 2020-01-01 RX ORDER — DIPHENHYDRAMINE HYDROCHLORIDE 50 MG/ML
12.5 INJECTION INTRAMUSCULAR; INTRAVENOUS EVERY 6 HOURS PRN
Status: DISCONTINUED | OUTPATIENT
Start: 2020-01-01 | End: 2020-01-01

## 2020-01-01 RX ORDER — CALCIUM CHLORIDE IN 0.9 % NACL 1 G/100 ML
1 INTRAVENOUS SOLUTION, PIGGYBACK (ML) INTRAVENOUS
Status: DISCONTINUED | OUTPATIENT
Start: 2020-01-01 | End: 2020-03-27 | Stop reason: HOSPADM

## 2020-01-01 RX ORDER — MEPERIDINE HYDROCHLORIDE 50 MG/ML
12.5 INJECTION INTRAMUSCULAR; INTRAVENOUS; SUBCUTANEOUS EVERY 10 MIN PRN
Status: ACTIVE | OUTPATIENT
Start: 2020-01-01 | End: 2020-01-01

## 2020-01-01 RX ORDER — MUPIROCIN 20 MG/G
OINTMENT TOPICAL 2 TIMES DAILY
Status: DISCONTINUED | OUTPATIENT
Start: 2020-01-01 | End: 2020-03-27 | Stop reason: HOSPADM

## 2020-01-01 RX ORDER — ROCURONIUM BROMIDE 10 MG/ML
INJECTION, SOLUTION INTRAVENOUS
Status: DISCONTINUED | OUTPATIENT
Start: 2020-01-01 | End: 2020-01-01

## 2020-01-01 RX ORDER — ENOXAPARIN SODIUM 100 MG/ML
50 INJECTION SUBCUTANEOUS
Status: DISCONTINUED | OUTPATIENT
Start: 2020-01-01 | End: 2020-03-27 | Stop reason: HOSPADM

## 2020-01-01 RX ORDER — ACETAMINOPHEN 325 MG/1
650 TABLET ORAL EVERY 4 HOURS PRN
Status: DISCONTINUED | OUTPATIENT
Start: 2020-01-01 | End: 2020-03-27 | Stop reason: HOSPADM

## 2020-01-01 RX ORDER — HYDROMORPHONE HYDROCHLORIDE 1 MG/ML
0.5 INJECTION, SOLUTION INTRAMUSCULAR; INTRAVENOUS; SUBCUTANEOUS ONCE
Status: COMPLETED | OUTPATIENT
Start: 2020-01-01 | End: 2020-01-01

## 2020-01-01 RX ORDER — LEVOTHYROXINE SODIUM 88 UG/1
88 TABLET ORAL DAILY
Status: CANCELLED | OUTPATIENT
Start: 2020-01-01

## 2020-01-01 RX ORDER — FLUTICASONE PROPIONATE 50 MCG
1 SPRAY, SUSPENSION (ML) NASAL DAILY
Qty: 3 G | Refills: 11 | Status: SHIPPED | OUTPATIENT
Start: 2020-01-01

## 2020-01-01 RX ORDER — SODIUM CHLORIDE 0.9 % (FLUSH) 0.9 %
10 SYRINGE (ML) INJECTION
Status: DISCONTINUED | OUTPATIENT
Start: 2020-01-01 | End: 2020-01-01 | Stop reason: HOSPADM

## 2020-01-01 RX ORDER — METOCLOPRAMIDE HYDROCHLORIDE 5 MG/ML
10 INJECTION INTRAMUSCULAR; INTRAVENOUS EVERY 6 HOURS PRN
Status: DISCONTINUED | OUTPATIENT
Start: 2020-01-01 | End: 2020-01-01

## 2020-01-01 RX ORDER — NEOSTIGMINE METHYLSULFATE 1 MG/ML
INJECTION, SOLUTION INTRAVENOUS
Status: DISCONTINUED | OUTPATIENT
Start: 2020-01-01 | End: 2020-01-01

## 2020-01-01 RX ORDER — ONDANSETRON 2 MG/ML
4 INJECTION INTRAMUSCULAR; INTRAVENOUS EVERY 8 HOURS PRN
Status: DISCONTINUED | OUTPATIENT
Start: 2020-01-01 | End: 2020-01-01

## 2020-01-01 RX ORDER — SODIUM CHLORIDE, SODIUM LACTATE, POTASSIUM CHLORIDE, CALCIUM CHLORIDE 600; 310; 30; 20 MG/100ML; MG/100ML; MG/100ML; MG/100ML
INJECTION, SOLUTION INTRAVENOUS CONTINUOUS
Status: DISCONTINUED | OUTPATIENT
Start: 2020-01-01 | End: 2020-01-01

## 2020-01-01 RX ORDER — LORAZEPAM 2 MG/ML
0.5 INJECTION INTRAMUSCULAR EVERY 6 HOURS PRN
Status: DISCONTINUED | OUTPATIENT
Start: 2020-01-01 | End: 2020-03-27 | Stop reason: HOSPADM

## 2020-01-01 RX ORDER — TIOTROPIUM BROMIDE 18 UG/1
1 CAPSULE ORAL; RESPIRATORY (INHALATION) DAILY
Status: DISCONTINUED | OUTPATIENT
Start: 2020-01-01 | End: 2020-03-27 | Stop reason: HOSPADM

## 2020-01-01 RX ORDER — ACETAMINOPHEN 10 MG/ML
1000 INJECTION, SOLUTION INTRAVENOUS ONCE
Status: COMPLETED | OUTPATIENT
Start: 2020-01-01 | End: 2020-01-01

## 2020-01-01 RX ORDER — ONDANSETRON 2 MG/ML
4 INJECTION INTRAMUSCULAR; INTRAVENOUS DAILY PRN
Status: DISCONTINUED | OUTPATIENT
Start: 2020-01-01 | End: 2020-01-01

## 2020-01-01 RX ORDER — SUCCINYLCHOLINE CHLORIDE 20 MG/ML
INJECTION INTRAMUSCULAR; INTRAVENOUS
Status: DISCONTINUED | OUTPATIENT
Start: 2020-01-01 | End: 2020-01-01

## 2020-01-01 RX ORDER — SODIUM CHLORIDE 0.9 % (FLUSH) 0.9 %
2 SYRINGE (ML) INJECTION
Status: DISCONTINUED | OUTPATIENT
Start: 2020-01-01 | End: 2020-03-27 | Stop reason: HOSPADM

## 2020-01-01 RX ORDER — DEXAMETHASONE SODIUM PHOSPHATE 4 MG/ML
INJECTION, SOLUTION INTRA-ARTICULAR; INTRALESIONAL; INTRAMUSCULAR; INTRAVENOUS; SOFT TISSUE
Status: DISCONTINUED | OUTPATIENT
Start: 2020-01-01 | End: 2020-01-01

## 2020-01-01 RX ORDER — SODIUM CHLORIDE 9 MG/ML
INJECTION, SOLUTION INTRAVENOUS CONTINUOUS
Status: DISCONTINUED | OUTPATIENT
Start: 2020-01-01 | End: 2020-01-01 | Stop reason: HOSPADM

## 2020-01-01 RX ORDER — DILTIAZEM HCL 1 MG/ML
5 INJECTION, SOLUTION INTRAVENOUS CONTINUOUS
Status: DISCONTINUED | OUTPATIENT
Start: 2020-01-01 | End: 2020-03-27 | Stop reason: HOSPADM

## 2020-01-01 RX ORDER — METOPROLOL TARTRATE 1 MG/ML
5 INJECTION, SOLUTION INTRAVENOUS EVERY 4 HOURS PRN
Status: DISCONTINUED | OUTPATIENT
Start: 2020-01-01 | End: 2020-03-27 | Stop reason: HOSPADM

## 2020-01-01 RX ORDER — METOPROLOL TARTRATE 1 MG/ML
5 INJECTION, SOLUTION INTRAVENOUS ONCE
Status: COMPLETED | OUTPATIENT
Start: 2020-01-01 | End: 2020-01-01

## 2020-01-01 RX ORDER — ONDANSETRON 2 MG/ML
4 INJECTION INTRAMUSCULAR; INTRAVENOUS
Status: COMPLETED | OUTPATIENT
Start: 2020-01-01 | End: 2020-01-01

## 2020-01-01 RX ORDER — POTASSIUM CHLORIDE 7.45 MG/ML
20 INJECTION INTRAVENOUS
Status: DISCONTINUED | OUTPATIENT
Start: 2020-01-01 | End: 2020-03-27 | Stop reason: HOSPADM

## 2020-01-01 RX ORDER — CHLORHEXIDINE GLUCONATE ORAL RINSE 1.2 MG/ML
15 SOLUTION DENTAL 2 TIMES DAILY
Status: DISCONTINUED | OUTPATIENT
Start: 2020-01-01 | End: 2020-03-27 | Stop reason: HOSPADM

## 2020-01-01 RX ORDER — MAGNESIUM SULFATE HEPTAHYDRATE 40 MG/ML
4 INJECTION, SOLUTION INTRAVENOUS
Status: DISCONTINUED | OUTPATIENT
Start: 2020-01-01 | End: 2020-03-27 | Stop reason: HOSPADM

## 2020-01-01 RX ORDER — SODIUM CHLORIDE 0.9 % (FLUSH) 0.9 %
10 SYRINGE (ML) INJECTION
Status: DISCONTINUED | OUTPATIENT
Start: 2020-01-01 | End: 2020-03-27 | Stop reason: HOSPADM

## 2020-01-01 RX ORDER — PROCHLORPERAZINE EDISYLATE 5 MG/ML
5 INJECTION INTRAMUSCULAR; INTRAVENOUS ONCE
Status: COMPLETED | OUTPATIENT
Start: 2020-01-01 | End: 2020-01-01

## 2020-01-01 RX ORDER — HYDROMORPHONE HYDROCHLORIDE 1 MG/ML
0.5 INJECTION, SOLUTION INTRAMUSCULAR; INTRAVENOUS; SUBCUTANEOUS EVERY 4 HOURS PRN
Status: DISCONTINUED | OUTPATIENT
Start: 2020-01-01 | End: 2020-03-27 | Stop reason: HOSPADM

## 2020-01-01 RX ORDER — ADENOSINE 3 MG/ML
6 INJECTION, SOLUTION INTRAVENOUS ONCE
Status: DISCONTINUED | OUTPATIENT
Start: 2020-01-01 | End: 2020-01-01

## 2020-01-01 RX ORDER — METOCLOPRAMIDE HYDROCHLORIDE 5 MG/ML
10 INJECTION INTRAMUSCULAR; INTRAVENOUS EVERY 8 HOURS
Status: DISCONTINUED | OUTPATIENT
Start: 2020-01-01 | End: 2020-01-01

## 2020-01-01 RX ORDER — SODIUM CHLORIDE, SODIUM LACTATE, POTASSIUM CHLORIDE, CALCIUM CHLORIDE 600; 310; 30; 20 MG/100ML; MG/100ML; MG/100ML; MG/100ML
INJECTION, SOLUTION INTRAVENOUS CONTINUOUS
Status: DISCONTINUED | OUTPATIENT
Start: 2020-01-01 | End: 2020-03-27 | Stop reason: HOSPADM

## 2020-01-01 RX ADMIN — ONDANSETRON HYDROCHLORIDE 4 MG: 2 INJECTION, SOLUTION INTRAMUSCULAR; INTRAVENOUS at 08:03

## 2020-01-01 RX ADMIN — ENOXAPARIN SODIUM 50 MG: 60 INJECTION SUBCUTANEOUS at 05:03

## 2020-01-01 RX ADMIN — LIDOCAINE HYDROCHLORIDE 7 ML: 10 INJECTION, SOLUTION INFILTRATION; PERINEURAL at 12:03

## 2020-01-01 RX ADMIN — FENTANYL CITRATE 25 MCG: 50 INJECTION INTRAMUSCULAR; INTRAVENOUS at 02:03

## 2020-01-01 RX ADMIN — HYDROMORPHONE HYDROCHLORIDE 0.5 MG: 1 INJECTION, SOLUTION INTRAMUSCULAR; INTRAVENOUS; SUBCUTANEOUS at 01:03

## 2020-01-01 RX ADMIN — PROPOFOL 50 MG: 10 INJECTION, EMULSION INTRAVENOUS at 01:03

## 2020-01-01 RX ADMIN — PHENYLEPHRINE HYDROCHLORIDE 50 MCG: 10 INJECTION INTRAVENOUS at 12:03

## 2020-01-01 RX ADMIN — HYDROMORPHONE HYDROCHLORIDE 0.5 MG: 1 INJECTION, SOLUTION INTRAMUSCULAR; INTRAVENOUS; SUBCUTANEOUS at 05:03

## 2020-01-01 RX ADMIN — PIPERACILLIN SODIUM AND TAZOBACTAM SODIUM 3.38 G: 3; .375 INJECTION, POWDER, LYOPHILIZED, FOR SOLUTION INTRAVENOUS at 05:03

## 2020-01-01 RX ADMIN — ONDANSETRON HYDROCHLORIDE 4 MG: 2 INJECTION, SOLUTION INTRAMUSCULAR; INTRAVENOUS at 09:03

## 2020-01-01 RX ADMIN — BETAMETHASONE ACETATE AND BETAMETHASONE SODIUM PHOSPHATE 9 MG: 3; 3 INJECTION, SUSPENSION INTRA-ARTICULAR; INTRALESIONAL; INTRAMUSCULAR; SOFT TISSUE at 10:01

## 2020-01-01 RX ADMIN — ONDANSETRON 4 MG: 2 INJECTION INTRAMUSCULAR; INTRAVENOUS at 10:01

## 2020-01-01 RX ADMIN — ONDANSETRON HYDROCHLORIDE 4 MG: 2 INJECTION, SOLUTION INTRAMUSCULAR; INTRAVENOUS at 07:03

## 2020-01-01 RX ADMIN — DEXAMETHASONE SODIUM PHOSPHATE 4 MG: 4 INJECTION, SOLUTION INTRAMUSCULAR; INTRAVENOUS at 12:03

## 2020-01-01 RX ADMIN — LEUCINE, PHENYLALANINE, LYSINE, METHIONINE, ISOLEUCINE, VALINE, HISTIDINE, THREONINE, TRYPTOPHAN, ALANINE, GLYCINE, ARGININE, PROLINE, SERINE, TYROSINE, DEXTROSE 1000 ML: 311; 238; 247; 170; 255; 247; 204; 179; 77; 880; 438; 489; 289; 213; 17; 5 INJECTION INTRAVENOUS at 10:03

## 2020-01-01 RX ADMIN — ROCURONIUM BROMIDE 5 MG: 10 INJECTION, SOLUTION INTRAVENOUS at 12:03

## 2020-01-01 RX ADMIN — SODIUM CHLORIDE, SODIUM LACTATE, POTASSIUM CHLORIDE, AND CALCIUM CHLORIDE: .6; .31; .03; .02 INJECTION, SOLUTION INTRAVENOUS at 09:03

## 2020-01-01 RX ADMIN — METOCLOPRAMIDE 10 MG: 5 INJECTION, SOLUTION INTRAMUSCULAR; INTRAVENOUS at 05:03

## 2020-01-01 RX ADMIN — METOPROLOL TARTRATE 5 MG: 1 INJECTION, SOLUTION INTRAVENOUS at 05:03

## 2020-01-01 RX ADMIN — METOPROLOL TARTRATE 5 MG: 1 INJECTION, SOLUTION INTRAVENOUS at 08:03

## 2020-01-01 RX ADMIN — HYDROMORPHONE HYDROCHLORIDE 0.5 MG: 1 INJECTION, SOLUTION INTRAMUSCULAR; INTRAVENOUS; SUBCUTANEOUS at 09:03

## 2020-01-01 RX ADMIN — ONDANSETRON HYDROCHLORIDE 4 MG: 2 INJECTION, SOLUTION INTRAMUSCULAR; INTRAVENOUS at 04:03

## 2020-01-01 RX ADMIN — DILTIAZEM HYDROCHLORIDE 30 MG: 30 TABLET, FILM COATED ORAL at 09:03

## 2020-01-01 RX ADMIN — SODIUM CHLORIDE, SODIUM LACTATE, POTASSIUM CHLORIDE, AND CALCIUM CHLORIDE: .6; .31; .03; .02 INJECTION, SOLUTION INTRAVENOUS at 07:03

## 2020-01-01 RX ADMIN — ENOXAPARIN SODIUM 50 MG: 60 INJECTION SUBCUTANEOUS at 08:03

## 2020-01-01 RX ADMIN — FLUTICASONE PROPIONATE 50 MCG: 50 SPRAY, METERED NASAL at 09:03

## 2020-01-01 RX ADMIN — ENOXAPARIN SODIUM 50 MG: 60 INJECTION SUBCUTANEOUS at 09:03

## 2020-01-01 RX ADMIN — PROPOFOL 90 MG: 10 INJECTION, EMULSION INTRAVENOUS at 12:03

## 2020-01-01 RX ADMIN — FAMOTIDINE: 10 INJECTION INTRAVENOUS at 05:03

## 2020-01-01 RX ADMIN — PROMETHAZINE HYDROCHLORIDE 6.25 MG: 25 INJECTION INTRAMUSCULAR; INTRAVENOUS at 03:03

## 2020-01-01 RX ADMIN — ONDANSETRON HYDROCHLORIDE 4 MG: 2 INJECTION, SOLUTION INTRAMUSCULAR; INTRAVENOUS at 12:03

## 2020-01-01 RX ADMIN — PIPERACILLIN SODIUM AND TAZOBACTAM SODIUM 3.38 G: 3; .375 INJECTION, POWDER, LYOPHILIZED, FOR SOLUTION INTRAVENOUS at 10:03

## 2020-01-01 RX ADMIN — POTASSIUM CHLORIDE 20 MEQ: 7.46 INJECTION, SOLUTION INTRAVENOUS at 12:03

## 2020-01-01 RX ADMIN — DIPHENHYDRAMINE HYDROCHLORIDE 12.5 MG: 50 INJECTION, SOLUTION INTRAMUSCULAR; INTRAVENOUS at 02:03

## 2020-01-01 RX ADMIN — PROMETHAZINE HYDROCHLORIDE 6.25 MG: 25 INJECTION INTRAMUSCULAR; INTRAVENOUS at 01:03

## 2020-01-01 RX ADMIN — FAMOTIDINE: 10 INJECTION INTRAVENOUS at 06:03

## 2020-01-01 RX ADMIN — TIOTROPIUM BROMIDE 18 MCG: 18 CAPSULE ORAL; RESPIRATORY (INHALATION) at 07:03

## 2020-01-01 RX ADMIN — PROMETHAZINE HYDROCHLORIDE 6.25 MG: 25 INJECTION INTRAMUSCULAR; INTRAVENOUS at 02:03

## 2020-01-01 RX ADMIN — METOPROLOL TARTRATE 5 MG: 1 INJECTION, SOLUTION INTRAVENOUS at 03:03

## 2020-01-01 RX ADMIN — FLUTICASONE PROPIONATE 50 MCG: 50 SPRAY, METERED NASAL at 08:03

## 2020-01-01 RX ADMIN — TIOTROPIUM BROMIDE 18 MCG: 18 CAPSULE ORAL; RESPIRATORY (INHALATION) at 08:03

## 2020-01-01 RX ADMIN — PIPERACILLIN SODIUM AND TAZOBACTAM SODIUM 3.38 G: 3; .375 INJECTION, POWDER, LYOPHILIZED, FOR SOLUTION INTRAVENOUS at 01:03

## 2020-01-01 RX ADMIN — SODIUM CHLORIDE 1000 ML: 0.9 INJECTION, SOLUTION INTRAVENOUS at 04:03

## 2020-01-01 RX ADMIN — SODIUM CHLORIDE, SODIUM LACTATE, POTASSIUM CHLORIDE, AND CALCIUM CHLORIDE: .6; .31; .03; .02 INJECTION, SOLUTION INTRAVENOUS at 03:03

## 2020-01-01 RX ADMIN — PROPOFOL 50 MG: 10 INJECTION, EMULSION INTRAVENOUS at 02:03

## 2020-01-01 RX ADMIN — TIOTROPIUM BROMIDE 18 MCG: 18 CAPSULE ORAL; RESPIRATORY (INHALATION) at 12:03

## 2020-01-01 RX ADMIN — METOPROLOL TARTRATE 25 MG: 25 TABLET, FILM COATED ORAL at 09:03

## 2020-01-01 RX ADMIN — DILTIAZEM HYDROCHLORIDE 5 MG/HR: 5 INJECTION INTRAVENOUS at 05:03

## 2020-01-01 RX ADMIN — IOHEXOL 100 ML: 350 INJECTION, SOLUTION INTRAVENOUS at 08:03

## 2020-01-01 RX ADMIN — PIPERACILLIN SODIUM AND TAZOBACTAM SODIUM 3.38 G: 3; .375 INJECTION, POWDER, LYOPHILIZED, FOR SOLUTION INTRAVENOUS at 08:03

## 2020-01-01 RX ADMIN — SODIUM CHLORIDE, SODIUM LACTATE, POTASSIUM CHLORIDE, AND CALCIUM CHLORIDE 1000 ML: .6; .31; .03; .02 INJECTION, SOLUTION INTRAVENOUS at 07:03

## 2020-01-01 RX ADMIN — METOPROLOL TARTRATE 5 MG: 1 INJECTION, SOLUTION INTRAVENOUS at 01:03

## 2020-01-01 RX ADMIN — HYDROMORPHONE HYDROCHLORIDE 0.5 MG: 1 INJECTION, SOLUTION INTRAMUSCULAR; INTRAVENOUS; SUBCUTANEOUS at 10:03

## 2020-01-01 RX ADMIN — ONDANSETRON HYDROCHLORIDE 4 MG: 2 INJECTION, SOLUTION INTRAMUSCULAR; INTRAVENOUS at 01:03

## 2020-01-01 RX ADMIN — METOPROLOL TARTRATE 5 MG: 1 INJECTION, SOLUTION INTRAVENOUS at 07:03

## 2020-01-01 RX ADMIN — METOCLOPRAMIDE 10 MG: 5 INJECTION, SOLUTION INTRAMUSCULAR; INTRAVENOUS at 09:03

## 2020-01-01 RX ADMIN — ENOXAPARIN SODIUM 50 MG: 60 INJECTION SUBCUTANEOUS at 06:03

## 2020-01-01 RX ADMIN — ACETAMINOPHEN 650 MG: 325 TABLET ORAL at 07:03

## 2020-01-01 RX ADMIN — LORAZEPAM 0.5 MG: 2 INJECTION, SOLUTION INTRAMUSCULAR; INTRAVENOUS at 11:03

## 2020-01-01 RX ADMIN — HYDROMORPHONE HYDROCHLORIDE 0.5 MG: 1 INJECTION, SOLUTION INTRAMUSCULAR; INTRAVENOUS; SUBCUTANEOUS at 04:03

## 2020-01-01 RX ADMIN — PROPOFOL 20 MG: 10 INJECTION, EMULSION INTRAVENOUS at 01:03

## 2020-01-01 RX ADMIN — PIPERACILLIN SODIUM AND TAZOBACTAM SODIUM 3.38 G: 3; .375 INJECTION, POWDER, LYOPHILIZED, FOR SOLUTION INTRAVENOUS at 02:03

## 2020-01-01 RX ADMIN — SODIUM CHLORIDE: 0.9 INJECTION, SOLUTION INTRAVENOUS at 09:01

## 2020-01-01 RX ADMIN — FENTANYL CITRATE 25 MCG: 50 INJECTION INTRAMUSCULAR; INTRAVENOUS at 03:03

## 2020-01-01 RX ADMIN — METOPROLOL TARTRATE 5 MG: 1 INJECTION, SOLUTION INTRAVENOUS at 11:03

## 2020-01-01 RX ADMIN — PROMETHAZINE HYDROCHLORIDE 6.25 MG: 25 INJECTION INTRAMUSCULAR; INTRAVENOUS at 08:03

## 2020-01-01 RX ADMIN — SODIUM PHOSPHATE, MONOBASIC, MONOHYDRATE AND SODIUM PHOSPHATE, DIBASIC, ANHYDROUS 20.01 MMOL: 276; 142 INJECTION, SOLUTION INTRAVENOUS at 03:03

## 2020-01-01 RX ADMIN — SODIUM CHLORIDE, SODIUM LACTATE, POTASSIUM CHLORIDE, AND CALCIUM CHLORIDE: .6; .31; .03; .02 INJECTION, SOLUTION INTRAVENOUS at 08:03

## 2020-01-01 RX ADMIN — PIPERACILLIN SODIUM AND TAZOBACTAM SODIUM 3.38 G: 3; .375 INJECTION, POWDER, LYOPHILIZED, FOR SOLUTION INTRAVENOUS at 09:03

## 2020-01-01 RX ADMIN — LIDOCAINE HYDROCHLORIDE 10 ML: 10 INJECTION, SOLUTION INFILTRATION; PERINEURAL at 11:01

## 2020-01-01 RX ADMIN — DILTIAZEM HYDROCHLORIDE 5 MG/HR: 5 INJECTION INTRAVENOUS at 09:03

## 2020-01-01 RX ADMIN — FENTANYL CITRATE 50 MCG: 50 INJECTION INTRAMUSCULAR; INTRAVENOUS at 12:03

## 2020-01-01 RX ADMIN — HYDROMORPHONE HYDROCHLORIDE 0.5 MG: 1 INJECTION, SOLUTION INTRAMUSCULAR; INTRAVENOUS; SUBCUTANEOUS at 02:03

## 2020-01-01 RX ADMIN — HYDROMORPHONE HYDROCHLORIDE 0.5 MG: 1 INJECTION, SOLUTION INTRAMUSCULAR; INTRAVENOUS; SUBCUTANEOUS at 08:03

## 2020-01-01 RX ADMIN — PROMETHAZINE HYDROCHLORIDE 6.25 MG: 25 INJECTION INTRAMUSCULAR; INTRAVENOUS at 09:03

## 2020-01-01 RX ADMIN — METHYLPREDNISOLONE ACETATE 40 MG: 40 INJECTION, SUSPENSION INTRA-ARTICULAR; INTRALESIONAL; INTRAMUSCULAR; SOFT TISSUE at 11:02

## 2020-01-01 RX ADMIN — METOCLOPRAMIDE 10 MG: 5 INJECTION, SOLUTION INTRAMUSCULAR; INTRAVENOUS at 01:03

## 2020-01-01 RX ADMIN — SUCCINYLCHOLINE CHLORIDE 120 MG: 20 INJECTION, SOLUTION INTRAMUSCULAR; INTRAVENOUS at 12:03

## 2020-01-01 RX ADMIN — FAMOTIDINE: 10 INJECTION INTRAVENOUS at 07:03

## 2020-01-01 RX ADMIN — ROCURONIUM BROMIDE 5 MG: 10 INJECTION, SOLUTION INTRAVENOUS at 10:01

## 2020-01-01 RX ADMIN — ACETAMINOPHEN 1000 MG: 10 INJECTION, SOLUTION INTRAVENOUS at 01:03

## 2020-01-01 RX ADMIN — PIPERACILLIN SODIUM AND TAZOBACTAM SODIUM 3.38 G: 3; .375 INJECTION, POWDER, LYOPHILIZED, FOR SOLUTION INTRAVENOUS at 11:03

## 2020-01-01 RX ADMIN — HYDROMORPHONE HYDROCHLORIDE 0.5 MG: 1 INJECTION, SOLUTION INTRAMUSCULAR; INTRAVENOUS; SUBCUTANEOUS at 06:03

## 2020-01-01 RX ADMIN — LIDOCAINE HYDROCHLORIDE 50 MG: 20 INJECTION, SOLUTION INTRAVENOUS at 10:01

## 2020-01-01 RX ADMIN — SODIUM CHLORIDE, SODIUM LACTATE, POTASSIUM CHLORIDE, AND CALCIUM CHLORIDE: .6; .31; .03; .02 INJECTION, SOLUTION INTRAVENOUS at 06:03

## 2020-01-01 RX ADMIN — IPRATROPIUM BROMIDE AND ALBUTEROL SULFATE 3 ML: .5; 3 SOLUTION RESPIRATORY (INHALATION) at 03:03

## 2020-01-01 RX ADMIN — PHENYLEPHRINE HYDROCHLORIDE 100 MCG: 10 INJECTION INTRAVENOUS at 12:03

## 2020-01-01 RX ADMIN — DILTIAZEM HYDROCHLORIDE 30 MG: 30 TABLET, FILM COATED ORAL at 01:03

## 2020-01-01 RX ADMIN — POTASSIUM CHLORIDE: 2 INJECTION, SOLUTION, CONCENTRATE INTRAVENOUS at 04:03

## 2020-01-01 RX ADMIN — PROPOFOL 150 MG: 10 INJECTION, EMULSION INTRAVENOUS at 10:01

## 2020-01-01 RX ADMIN — ACETAMINOPHEN 650 MG: 325 TABLET ORAL at 11:03

## 2020-01-01 RX ADMIN — METOCLOPRAMIDE 10 MG: 5 INJECTION, SOLUTION INTRAMUSCULAR; INTRAVENOUS at 10:03

## 2020-01-01 RX ADMIN — POTASSIUM CHLORIDE: 2 INJECTION, SOLUTION, CONCENTRATE INTRAVENOUS at 05:03

## 2020-01-01 RX ADMIN — HYDROMORPHONE HYDROCHLORIDE 1 MG: 1 INJECTION, SOLUTION INTRAMUSCULAR; INTRAVENOUS; SUBCUTANEOUS at 10:03

## 2020-01-01 RX ADMIN — DILTIAZEM HYDROCHLORIDE 7.5 MG/HR: 5 INJECTION INTRAVENOUS at 02:03

## 2020-01-01 RX ADMIN — NEOSTIGMINE METHYLSULFATE 4 MG: 1 INJECTION INTRAVENOUS at 01:03

## 2020-01-01 RX ADMIN — ONDANSETRON 4 MG: 2 INJECTION INTRAMUSCULAR; INTRAVENOUS at 12:03

## 2020-01-01 RX ADMIN — VANCOMYCIN HYDROCHLORIDE 1000 MG: 1 INJECTION, POWDER, LYOPHILIZED, FOR SOLUTION INTRAVENOUS at 11:03

## 2020-01-01 RX ADMIN — PHENYLEPHRINE HYDROCHLORIDE 100 MCG: 10 INJECTION INTRAVENOUS at 10:01

## 2020-01-01 RX ADMIN — ONDANSETRON 4 MG: 2 INJECTION INTRAMUSCULAR; INTRAVENOUS at 03:03

## 2020-01-01 RX ADMIN — MORPHINE SULFATE 2 MG: 2 INJECTION, SOLUTION INTRAMUSCULAR; INTRAVENOUS at 04:03

## 2020-01-01 RX ADMIN — MAGNESIUM SULFATE 2 G: 2 INJECTION INTRAVENOUS at 10:03

## 2020-01-01 RX ADMIN — POTASSIUM CHLORIDE 40 MEQ: 7.46 INJECTION, SOLUTION INTRAVENOUS at 06:03

## 2020-01-01 RX ADMIN — MICAFUNGIN SODIUM 100 MG: 20 INJECTION, POWDER, LYOPHILIZED, FOR SOLUTION INTRAVENOUS at 02:03

## 2020-01-01 RX ADMIN — FAMOTIDINE: 10 INJECTION INTRAVENOUS at 04:03

## 2020-01-01 RX ADMIN — ACETAMINOPHEN 650 MG: 325 TABLET ORAL at 03:03

## 2020-01-01 RX ADMIN — ONDANSETRON 4 MG: 2 INJECTION INTRAMUSCULAR; INTRAVENOUS at 11:03

## 2020-01-01 RX ADMIN — HYDROMORPHONE HYDROCHLORIDE 0.5 MG: 1 INJECTION, SOLUTION INTRAMUSCULAR; INTRAVENOUS; SUBCUTANEOUS at 12:03

## 2020-01-01 RX ADMIN — MAGNESIUM SULFATE 1 G: 1 INJECTION INTRAVENOUS at 09:03

## 2020-01-01 RX ADMIN — METOPROLOL TARTRATE 5 MG: 1 INJECTION, SOLUTION INTRAVENOUS at 12:03

## 2020-01-01 RX ADMIN — ONDANSETRON HYDROCHLORIDE 4 MG: 2 INJECTION, SOLUTION INTRAMUSCULAR; INTRAVENOUS at 03:03

## 2020-01-01 RX ADMIN — METOCLOPRAMIDE 10 MG: 5 INJECTION, SOLUTION INTRAMUSCULAR; INTRAVENOUS at 03:03

## 2020-01-01 RX ADMIN — FUROSEMIDE 40 MG: 10 INJECTION, SOLUTION INTRAMUSCULAR; INTRAVENOUS at 12:03

## 2020-01-01 RX ADMIN — MORPHINE SULFATE 3 MG: 2 INJECTION, SOLUTION INTRAMUSCULAR; INTRAVENOUS at 12:03

## 2020-01-01 RX ADMIN — PROPOFOL 30 MG: 10 INJECTION, EMULSION INTRAVENOUS at 01:03

## 2020-01-01 RX ADMIN — FENTANYL CITRATE 25 MCG: 50 INJECTION, SOLUTION INTRAMUSCULAR; INTRAVENOUS at 10:01

## 2020-01-01 RX ADMIN — METOCLOPRAMIDE 10 MG: 5 INJECTION, SOLUTION INTRAMUSCULAR; INTRAVENOUS at 08:03

## 2020-01-01 RX ADMIN — DILTIAZEM HYDROCHLORIDE 15 MG/HR: 5 INJECTION INTRAVENOUS at 07:03

## 2020-01-01 RX ADMIN — METOPROLOL TARTRATE 25 MG: 25 TABLET, FILM COATED ORAL at 08:03

## 2020-01-01 RX ADMIN — VANCOMYCIN HYDROCHLORIDE 1000 MG: 1 INJECTION, POWDER, LYOPHILIZED, FOR SOLUTION INTRAVENOUS at 01:03

## 2020-01-01 RX ADMIN — VANCOMYCIN HYDROCHLORIDE 1000 MG: 1 INJECTION, POWDER, LYOPHILIZED, FOR SOLUTION INTRAVENOUS at 10:03

## 2020-01-01 RX ADMIN — TIOTROPIUM BROMIDE 18 MCG: 18 CAPSULE ORAL; RESPIRATORY (INHALATION) at 10:03

## 2020-01-01 RX ADMIN — DILTIAZEM HYDROCHLORIDE 5 MG/HR: 5 INJECTION INTRAVENOUS at 02:03

## 2020-01-01 RX ADMIN — ONDANSETRON HYDROCHLORIDE 4 MG: 2 INJECTION, SOLUTION INTRAMUSCULAR; INTRAVENOUS at 05:03

## 2020-01-01 RX ADMIN — MORPHINE SULFATE 3 MG: 2 INJECTION, SOLUTION INTRAMUSCULAR; INTRAVENOUS at 05:03

## 2020-01-01 RX ADMIN — ONDANSETRON 4 MG: 2 INJECTION INTRAMUSCULAR; INTRAVENOUS at 06:03

## 2020-01-01 RX ADMIN — HYDROMORPHONE HYDROCHLORIDE 0.5 MG: 1 INJECTION, SOLUTION INTRAMUSCULAR; INTRAVENOUS; SUBCUTANEOUS at 07:03

## 2020-01-01 RX ADMIN — ONDANSETRON HYDROCHLORIDE 4 MG: 2 INJECTION, SOLUTION INTRAMUSCULAR; INTRAVENOUS at 02:03

## 2020-01-01 RX ADMIN — DILTIAZEM HYDROCHLORIDE 5 MG/HR: 5 INJECTION INTRAVENOUS at 11:03

## 2020-01-01 RX ADMIN — PROMETHAZINE HYDROCHLORIDE: 25 INJECTION INTRAMUSCULAR; INTRAVENOUS at 04:03

## 2020-01-01 RX ADMIN — FLUTICASONE PROPIONATE 50 MCG: 50 SPRAY, METERED NASAL at 10:03

## 2020-01-01 RX ADMIN — MORPHINE SULFATE: 2 INJECTION, SOLUTION INTRAMUSCULAR; INTRAVENOUS at 11:03

## 2020-01-01 RX ADMIN — POTASSIUM CHLORIDE 20 MEQ: 7.46 INJECTION, SOLUTION INTRAVENOUS at 08:03

## 2020-01-01 RX ADMIN — PIPERACILLIN SODIUM AND TAZOBACTAM SODIUM 3.38 G: 3; .375 INJECTION, POWDER, LYOPHILIZED, FOR SOLUTION INTRAVENOUS at 04:03

## 2020-01-01 RX ADMIN — POTASSIUM CHLORIDE 40 MEQ: 7.46 INJECTION, SOLUTION INTRAVENOUS at 08:03

## 2020-01-01 RX ADMIN — DILTIAZEM HYDROCHLORIDE 30 MG: 30 TABLET, FILM COATED ORAL at 06:03

## 2020-01-01 RX ADMIN — ACETAMINOPHEN 650 MG: 325 TABLET ORAL at 08:03

## 2020-01-01 RX ADMIN — KETOROLAC TROMETHAMINE 30 MG: 30 INJECTION, SOLUTION INTRAMUSCULAR; INTRAVENOUS at 11:02

## 2020-01-01 RX ADMIN — LIDOCAINE HYDROCHLORIDE 75 MG: 20 INJECTION, SOLUTION INTRAVENOUS at 12:03

## 2020-01-01 RX ADMIN — HYDROMORPHONE HYDROCHLORIDE 0.5 MG: 1 INJECTION, SOLUTION INTRAMUSCULAR; INTRAVENOUS; SUBCUTANEOUS at 03:03

## 2020-01-01 RX ADMIN — SODIUM CHLORIDE, SODIUM LACTATE, POTASSIUM CHLORIDE, AND CALCIUM CHLORIDE: .6; .31; .03; .02 INJECTION, SOLUTION INTRAVENOUS at 11:03

## 2020-01-01 RX ADMIN — ONDANSETRON 4 MG: 2 INJECTION INTRAMUSCULAR; INTRAVENOUS at 02:03

## 2020-01-01 RX ADMIN — DILTIAZEM HYDROCHLORIDE 5 MG/HR: 5 INJECTION INTRAVENOUS at 07:03

## 2020-01-01 RX ADMIN — PROCHLORPERAZINE EDISYLATE 5 MG: 5 INJECTION INTRAMUSCULAR; INTRAVENOUS at 10:01

## 2020-01-01 RX ADMIN — GLYCOPYRROLATE 0.4 MG: 0.2 INJECTION INTRAMUSCULAR; INTRAVENOUS at 01:03

## 2020-01-01 RX ADMIN — SODIUM CHLORIDE, SODIUM LACTATE, POTASSIUM CHLORIDE, AND CALCIUM CHLORIDE: .6; .31; .03; .02 INJECTION, SOLUTION INTRAVENOUS at 01:03

## 2020-01-01 RX ADMIN — SMOFLIPID: 6; 6; 5; 3 INJECTION, EMULSION INTRAVENOUS at 05:03

## 2020-01-01 RX ADMIN — SUCCINYLCHOLINE CHLORIDE 90 MG: 20 INJECTION, SOLUTION INTRAMUSCULAR; INTRAVENOUS at 10:01

## 2020-01-01 RX ADMIN — METHYLPREDNISOLONE ACETATE 40 MG: 40 INJECTION, SUSPENSION INTRA-ARTICULAR; INTRALESIONAL; INTRAMUSCULAR; SOFT TISSUE at 12:03

## 2020-01-02 NOTE — PROGRESS NOTES
"  Physical Therapy Daily Treatment Note     Name: Steph ROMERO [facility] Number: [id number]    Therapy Diagnosis:   Encounter Diagnosis   Name Primary?    Decreased ROM of neck      Physician: Gregoria Dixon,*    Visit Date: 1/2/2020    Physician Orders: PT Eval and Treat   Medical Diagnosis from Referral:   M54.2 (ICD-10-CM) - Cervicalgia   M50.30 (ICD-10-CM) - DDD (degenerative disc disease), cervical   Evaluation Date: 12/30/2019  Authorization Period Expiration: 12/17/2020  Plan of care Certification: 12/30/2019 to 2/14/2020.   Plan of Care Expiration: 2/14/2020  Visit # / Visits authorized: 2/ 1      Time In: 0955  Time Out: 1105  Total Billable Time: 70 minutes    Precautions: Standard and cardiac, and h/o cancer    Subjective     Pt reports: "I only occasionally get a shooting pain in my neck, but I have lots of tender bumps in my muscles right here (pointing to upper trap).  She was compliant with home exercise program.  Response to previous treatment: 1st day after eval/HEP patient reports numbness in all her fingers and increased crepitus  Functional change: None noted thus far    Pain: 1/10  Location: bilateral neck      Objective     Steph received therapeutic exercises to develop strength, endurance, ROM, flexibility and posture for 50 minutes including:   UBE 3'/3' fwd/bwd   Cervical ROM: flex/ext, sb, rot x 30B, each   Chin tucks 3/10   Shoulder shrugs, scap squeeze, retro rolls 3/10 each, B   Upper trap stretch B 3/30s   Levator scap stretch B 3/30s     Steph received the following manual therapy techniques: Myofacial release and Soft tissue Mobilization were applied to the: upper trap B, Levator scap B, and paraspinals for 15 minutes    Home Exercises Provided and Patient Education Provided     Education provided:   - Confirmed date and time of next appt 01/06/2020 at 11am    Written Home Exercises Provided: Patient instructed to cont prior HEP.  Exercises were reviewed and Steph was able to " "demonstrate them prior to the end of the session.  Steph demonstrated good  understanding of the education provided.     See EMR under Patient Instructions for exercises provided prior visit.    Assessment   Patient with decreased c/o "knots" in her L UT post therex    Steph is progressing well towards her goals.   Pt prognosis is Good.     Pt will continue to benefit from skilled outpatient physical therapy to address the deficits listed in the problem list box on initial evaluation, provide pt/family education and to maximize pt's level of independence in the home and community environment.     Pt's spiritual, cultural and educational needs considered and pt agreeable to plan of care and goals.     Anticipated barriers to physical therapy: None    Goals:   Short Term Goals: 2 weeks   1) Patient will initiate HEP (Progressing)  2) Patient will improve C/S rotation by 5* for driving purposes     Long Term Goals: 4 weeks   1) Patient will be independent in HEP  2) Patient will improve functional outcome measure NDI to 0% impairment      Plan     Cont with therex and strengthening to meet goals set in POC, progressing as tolerated    Blanche Maddox, PTA   "

## 2020-01-09 NOTE — TELEPHONE ENCOUNTER
Pt informed she discontinued PT as of today. She reports tingling has gone away but she doesn't feel that therapy will change her situation. Verbalized understanding and advised pt to give us a call if anything changes. Pt verbalized understanding.

## 2020-01-09 NOTE — PLAN OF CARE
REHAB SERVICES OUTPATIENT DISCHARGE SUMMARY  Physical Therapy     Steph Lira  Date:  01/09/2020  Date of Evaluation:  12/30/2019  Physician:  Dr. Paniagua  Total # Of Visits:  3  Cancelled:  See EMR  No Shows:  See EMR  Problem List Items Addressed This Visit     Decreased ROM of neck          S: Patient reports today will be her last therapy visit since she does not need more sessions. Reports she had already stopped getting sharp pains in her neck before starting therapy. She does not feel she has problems with her neck at this time. Since her initial evaluation, she is also no longer having tingling/numbness in her fingertips. Feels like this was due to cervical distraction testing performed. Requests manual cervical traction today, then discharge to [facility]. Compliant with HEP.    O: Patient participating in therapeutic exercise as follows to address ROM/strength for 35 minutes:    UBE 3'/3'  C/S AROM flex/ext/SB/rotation x 15 each  Shoulder shrugs x 30  Shoulder rolls x 30  Scapular retractions x 30  Chin tucks x 30  UT stretch 3/30 sec L/R  LS stretch 3/30 sec L/R    MT: gentle intermittent manual cervical distraction in supine x 10 min     A: Patient with resolution of tingling/numbness in fingertips after her initial evaluation, reports she does not feel she has any neck issues and wants to discharge to [facility] at this time. Reviewed discharge plans and HEP with pt, who verbalized understanding and agreement. All questions answered.    P: D/C to HEP    The patient is to be discharged from our Therapy service for the following reason(s):  Patient is now asymptomatic and Patient requested discharge    Degree of Goal Achievement:  Patient has partially met goals    Patient Education:  HEP    Discharge Plan:  Home Program:

## 2020-01-10 NOTE — TELEPHONE ENCOUNTER
If she is interested in proceeding with epidural steroid injections I can get her scheduled for an MRI of the neck and then follow-up with her in the office (after MRI)

## 2020-01-10 NOTE — TELEPHONE ENCOUNTER
Spoke with pt to see if she would like to proceed with epidural steroid injections. Pt declined and stated she would not like to proceed any further. Verbalized understanding

## 2020-01-14 NOTE — PROGRESS NOTES
Subjective:       Patient ID: Steph Lira is a 69 y.o. female.    Chief Complaint: Follow-up (4wk f/u)    HPI  Review of Systems   Constitutional: Positive for unexpected weight change. Negative for appetite change and fever.   HENT: Positive for sore throat and trouble swallowing.    Cardiovascular: Negative for chest pain.   Gastrointestinal: Negative for blood in stool, constipation, diarrhea and nausea.       Patient Active Problem List   Diagnosis    GERD (gastroesophageal reflux disease)    Chronic rhinitis    Hypothyroid    Aneurysm of heart (wall)    Benign neoplasm of skin of upper limb, including shoulder    Intermediate coronary syndrome    Phlebitis and thrombophlebitis of superficial veins of upper extremities    Simple chronic conjunctivitis    Supraventricular premature beats    Vascular disorder of skin    Chronic external jugular vein thrombosis    Anticoagulant long-term use    Magdiel's syndrome - Left Eye    Ptosis    Anxiety    CAD (coronary artery disease)    S/P CABG x 2, 2005    HTN (hypertension)    Hyperlipidemia    PAF (paroxysmal atrial fibrillation), onset 2002    Intercostal neuralgia    History of lung cancer    Blurred vision, bilateral    Panlobular emphysema    Carotid artery disease    COPD (chronic obstructive pulmonary disease)    Uncontrolled atrial flutter with rvr    Allergic rhinitis    Hypoalbuminemia    History of smoking 10-25 pack years, 15 pack-years, quit 1994    S/P CABG (coronary artery bypass graft)    Supraventricular bigeminy    Dysphagia    History of colon polyps    Coronary artery disease involving autologous artery coronary bypass graft    Wrist arthritis    Gastritis    Esophageal lesion    Fatigue    Grade II hemorrhoids    Cardiomyopathy    Throat irritation    Paroxysmal atrial fibrillation    BMI 21.0-21.9, adult    Anticoagulation management encounter    History of biliary stent insertion, 6/2005, left  jugular vein and left innominate vein    S/P ablation of atrial fibrillation    Decreased ROM of neck     Patient is here for a chronic conditions follow up.    HPL- unable to eat low fat diet due to tumor esophageal dysplasia undergoing treatment with cryotherapy under Dr. Gonzales and difficulty swallowing pill. Has missed doses of diltiazem which she is unable to crush due to CR.  Having to limit intake now to fluids only and is losing weight. Has f/u mid Feb with GI for another scope but does not feel she can wait that long. Has feeling of fullness in throat and bubbling there. Sometimes smells foul like poop.  Has trouble lying flat due to feeling of mucous occluding airway     Also here to f/u pain.  Ache and stiff joints all over- hands, should left, knee and right  hip, neck as well. Tramadol taking as needed. Not sleeping well. Numbness and tingling all fingertips. Has pain in right upper hip area that is an ache but certain movements cause sharp shooting pain. Cannot take NSAIDS due to eliquis.  Affecting her quality of life and limiting activity. lyrica 75mg added 12/19 and referred to PM & R.   Lyrica denied so gabapentin started and titrated. Was unable to tolerate gabapentin even just at bedtime due to loopy feeling. PT started for neck pain but did not help so she stopped it.  Statin held due to concerns about se. Sx have improved.  Referred to Dr. Dixon for SI dysfunction.  12/20/19 did a therapeutic/diagnostic cluneal nerve block+hydrodissection which helped over 50%.  Objective:      Physical Exam   Constitutional: She is oriented to person, place, and time. She appears well-developed and well-nourished.   Cardiovascular: Normal rate, regular rhythm and normal heart sounds.   Pulmonary/Chest: Effort normal and breath sounds normal.   Musculoskeletal: She exhibits no edema.   Neurological: She is alert and oriented to person, place, and time.   Skin: Skin is warm and dry.   Psychiatric: She has a  normal mood and affect.   Nursing note and vitals reviewed.      Assessment:       1. Throat irritation    2. Primary osteoarthritis involving multiple joints    3. Esophageal lesion    4. Pharyngoesophageal dysphagia    5. Cervical spondylosis    6. Right hip pain        Plan:         1. Throat irritation  F/u with Gi -will try to ask for sooner appt. treat  - betamethasone acetate-betamethasone sodium phosphate injection 9 mg    2. Primary osteoarthritis involving multiple joints  treat  - betamethasone acetate-betamethasone sodium phosphate injection 9 mg    3. Esophageal lesion  Cont treatment under GI    4. Pharyngoesophageal dysphagia  F/u gi for possible dilation of stricture. Cont liquid supplements with target 60 g a day protein as possible and crush pills if needed and if possible    5. Cervical spondylosis  Pt made worse.  Gabapentin intolerable. Will consider trial of lyrica but patient wishes to defer until after scope    6. Right hip pain  Improved with injection. Cont current treatment        Time spent with patient: 20 minutes    Patient with be reevaluated in 3 months or sooner prn    Greater than 50% of this visit was spent counseling as described in above documentation:Yes

## 2020-01-14 NOTE — PROGRESS NOTES
2 identifiers, name and , used to confirm patient identity.  Procedure was explained and patient verbalized understanding. Celestone 9 mg given IM in the right upper outer quadrant of the gluteus using sterile technique. Patient tolerated procedure well. No residual bleeding noted at the injection site.

## 2020-01-16 NOTE — PROGRESS NOTES
Subjective:     [facility]    Cardiologist: Sonny Le MD    I had the pleasure of seeing Steph Lira in follow-up for her history of atrial fibrillation. 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 She had been on Flecainide for many years, which effectively suppressed her arrhythmias. Ms. Lira had until recently been on a beta blocker as well, but beta blockade caused significant fatigue and was stopped. An attempt to down-titrate Flecainide to 50 mg bid resulted in recurrent AF, requiring electrical cardioversion for sinus rhythm restoration. She is on Eliquis for stroke prophylaxis.     AF occurs several times per year, manifesting as palpitations, lasting several days and generally requiring a hospital admission and IV medications or electrical cardioversion to resolve.    Recent cardiac studies include an echo performed in 8/2018 which showed an EF of 40-45%, moderate LAE, and mild AI. A pharmacologic NST done in 5/2018 showed no fixed or reversible perfusion defects.    I reviewed all ECGs in the EMR at her initial visit. An ECG from 1/24/2018 showed AT at 116 bpm. An ECG dated 5/27/2016 showed AF at 114 bpm. Finally, an ECG dated 5/18/2016 showed AFL at 146 bpm. All other ECGs dating back to 2002 showed sinus rhythm.    At her initial visit we discussed options including switching antiarrhythmics vs PVI, but she wanted to hold the course. In 11/2018 Ms. Lira was admitted to [facility] with symptomatic AF, ultimately requiring DCCV to restore sinus rhythm.    In 5/2019, a PVI and LA posterior wall isolation was performed. She was discharged on low-dose Flecainide. Ms. Lira had recurrent AF (1 week and  3 weeks post-procedure) requiring DCCV. She has had no recurrent arrhythmias since that time. At her follow-up visit in 7/2019 she was maintaining sinus rhythm. I instructed her to stop Flecainide in 8/2019. At her 10/2019 visit, Ms. Lira was concerned she may be having paroxysms of AF.     A 2 week Zio patch performed in 10/2019 showed sinus rhythm with several episodes of nonsustained AT (longest 11 beats), rare PACs and PVCs (<1%) and no sustained arrhythmias.    Ms. Lira is currently only consuming liquids, due to a precancerous growth in her esophagus. She is scheduled for her next EGD on 2/17/2020.    My interpretation of today's ECG is NSR at 81 bpm.    Review of Systems   Constitution: Negative for decreased appetite, malaise/fatigue, weight gain and weight loss.   HENT: Negative for sore throat.    Eyes: Negative for blurred vision.   Cardiovascular: Negative for chest pain, dyspnea on exertion, irregular heartbeat, leg swelling, near-syncope, orthopnea, palpitations, paroxysmal nocturnal dyspnea and syncope.   Respiratory: Negative for shortness of breath.    Skin: Negative for rash.   Musculoskeletal: Negative for arthritis.   Gastrointestinal: Negative for abdominal pain and dysphagia.   Neurological: Negative for focal weakness.   Psychiatric/Behavioral: Negative for altered mental status.        Objective:    Physical Exam   Constitutional: She is oriented to person, place, and time. She appears well-developed and well-nourished. No distress.   HENT:   Head: Normocephalic and atraumatic.   Mouth/Throat: Oropharynx is clear and moist.   Eyes: Pupils are equal, round, and reactive to light. Conjunctivae are normal. No scleral icterus.   Neck: Normal range of motion. Neck supple. No JVD present. No thyromegaly present.   Cardiovascular: Normal rate, regular rhythm, normal heart sounds and intact distal pulses. Exam reveals no gallop and no friction rub.   No murmur heard.  Pulmonary/Chest: Effort  normal and breath sounds normal. No respiratory distress. She has no rales.   Abdominal: Soft. Bowel sounds are normal. She exhibits no distension.   Musculoskeletal: She exhibits no edema.   Neurological: She is alert and oriented to person, place, and time.   Skin: Skin is warm and dry.   Psychiatric: She has a normal mood and affect. Her behavior is normal.   Vitals reviewed.        Assessment:       1. Paroxysmal atrial fibrillation    2. S/P ablation of atrial fibrillation    3. S/P CABG (coronary artery bypass graft)    4. S/P CABG x 2, 2005    5. Essential hypertension    6. Hyperlipidemia, unspecified hyperlipidemia type    7. Anticoagulant long-term use         Plan:       In summary, Steph Lira is a 69 year old female with a history of symptomatic PAF. Her [id number] Score is 3 (age, female, CAD) which corresponds to a yearly risk of stroke without anticoagulation treatment estimated at 3.2%. She should remain on coumadin for now. She is now 10 months post-PVI, and has been off Flecainide for 8 months with no arrhythmia recurrences. The plan is to hold the course and see me again in 6 months.    I have told Ms. Lira that she can grind up Eliquis. I have stopped long-acting Diltiazem. Should she have worsening palpitations, the plan will be to restart this medication albeit in a short-acting formulation.    Thank you for allowing me to participate in the care of this patient. Please do not hesitate to call me with any questions or concerns.

## 2020-01-17 NOTE — PROGRESS NOTES
HPI:  Patient is a 69 y.o. year old female w. Right sided back and hip pain. She has been receiving cryotherapy for removal of a benign throat tumor. She has lost a lot of weight in the process.She states 8 wks ago he twisted and picked something and she felt severe pain. The pain shoots to her buttocks and lateral right hip. It does not go below hip. She does not report weakness or trauma. She has not had this happen previously.     Last eval I did a therapeutic/diagnostic cluneal nerve block+hydrodissection which helped over 50%. However, she feels like her pain is returning , although it is still much better. She states this occurred after she got in her car and sat abruptly.   Last eval I instructed her in alignment of her pelvis. She has been doing it at home and overall feels better        Labs  egfr cr lfts gluc nl     Imaging     X-Ray Hip 2 or 3 views Right   Order: [id number]   Status:  Final result   Visible to patient:  Yes (Patient Portal) Next appt:  01/02/2020 at 10:00 AM in Outpatient Rehab (Blanche Maddox PTA) Dx:  Pain of right hip joint   Details            Reading Physician Reading Date Result Priority   Dipesh Fernando MD 12/20/2019 Routine       Narrative       EXAMINATION:  XR HIP 2 VIEW RIGHT    CLINICAL HISTORY:  Pain in right hip    TECHNIQUE:  AP view of the pelvis and frog leg lateral view of the right hip were performed.    COMPARISON:  None    FINDINGS:  There is mild degenerative arthrosis of both hips with periarticular osteophyte formation.  No fracture or subluxation are identified.  The pelvis is intact.       Impression         Mild degenerative arthrosis of both hips            X-Ray Lumbar Spine AP And Lateral   Order: [id number]   Status:  Final result   Visible to patient:  Yes (Patient Portal) Next appt:  01/02/2020 at 10:00 AM in Outpatient Rehab (Blanche Maddox PTA) Dx:  Acute right-sided low back pain with ...   Details            Reading Physician Reading Date Result  Priority   Barrie Boykin MD 12/6/2019 Routine       Narrative       EXAMINATION:  XR LUMBAR SPINE AP AND LATERAL    CLINICAL HISTORY:  Low back pain, <6wks, no red flags, no prior management;Lumbago with sciatica, unspecified side    TECHNIQUE:  AP, lateral and spot images were performed of the lumbar spine.    COMPARISON:  Plain films of the lumbar spine dated 05/29/2007    FINDINGS:  The bones are mildly osteopenic.  There is no fracture or malalignment.  There is moderate to marked disc space narrowing towards the right at the L3-4 level where there is endplate sclerosis and marginal osteophyte formation.  These findings were present previously.  There is also moderate disc space narrowing at the L4-5 level with at least mild disc space narrowing at the L5-S1 level.  Facet joint arthropathy is present at the lower 2 lumbar levels.  There is a moderate disc bulge or protrusion at the L2-3 level.  There is mild atherosclerosis.  There are surgical clips from prior cholecystectomy.       Impression         1. There is mid lower lumbar spine degenerative disc and facet disease without fracture or malalignment.  There is a moderate disc bulge or protrusion at the L2-3 level.                      Past Medical History:   Diagnosis Date    *Atrial fibrillation     and Hx PSVT    Allergic drug rash 12/28/2016    Allergy     chronic     Arthritis     fingers    Benign tumor of throat     Bronchitis March 2013    CAD (coronary artery disease) 2005    CABGx2 in 2005 and 2 MI after with 4 stents    Cancer 1996    small cell lung cancer s/p resection of 1/3 lung, 5 ribs and muscle mass then had chemo and radiation    Cataract     OD     CHF (congestive heart failure) past Hx    Chronic rhinitis     Clot past Hx    external jugular, now stented, occluded, had phlebitis; now revascularized    Colon polyp     COPD (chronic obstructive pulmonary disease)     no oxygen or nebulizers    COPD exacerbation 5/8/13     improved 5/14/13 after steroid pack,antibiotics    Former smoker     GERD (gastroesophageal reflux disease)     Heart block     Hyperlipidemia     Hypertension     pt states does not have //    MI (myocardial infarction) 2005    Posterior vitreous detachment of left eye     Thyroid disease      Past Surgical History:   Procedure Laterality Date    ABLATION N/A 12/11/2018    Procedure: ABLATION;  Surgeon: Santana Covarrubias MD;  Location: [facility] EP LAB;  Service: Cardiology;  Laterality: N/A;  AF, JUAN ANTONIO, PVI, RFA, CARTO, GEN, GP, 3 PREP    ABLATION OF ARRHYTHMOGENIC FOCUS FOR ATRIAL FIBRILLATION N/A 5/8/2019    Procedure: ABLATION, ARRHYTHMOGENIC FOCUS, FOR ATRIAL FIBRILLATION;  Surgeon: Santana Covarrubias MD;  Location: [facility] EP LAB;  Service: Cardiology;  Laterality: N/A;  AF, PVI, RFA, CARTO, GEN, GP , 3 PREP    ADENOIDECTOMY      APPENDECTOMY      BACK SURGERY      lumbar    BREAST BIOPSY Left     benign    CARDIAC SURGERY      CARDIOVERSION  5/8/2019    Procedure: Cardioversion;  Surgeon: Santana Covarrubias MD;  Location: [facility] EP LAB;  Service: Cardiology;;    CATARACT EXTRACTION Left     OS    CATARACT EXTRACTION, BILATERAL      left eye only    CHOLECYSTECTOMY      COLONOSCOPY  03/2012    repeat in 5 years    COLONOSCOPY N/A 6/19/2017    Procedure: COLONOSCOPY;  Surgeon: Kal Elise MD;  Location: [facility] ENDO;  Service: Endoscopy;  Laterality: N/A;    CORONARY ARTERY BYPASS GRAFT  2005    2 vessel    ESOPHAGOGASTRODUODENOSCOPY N/A 7/5/2018    Procedure: ESOPHAGOGASTRODUODENOSCOPY (EGD)/cryo;  Surgeon: Robbie Gonzales MD;  Location: [facility] ([address] [place] [address]);  Service: Endoscopy;  Laterality: N/A;  Eliquis/Dr Henry Wei/OK to hold med 2 days prior to egd/see telephone encounter dated 6/12/18/pt stated she needs to take Diflucan 1 tab po daily 12 days prior to egd, message sent to Sarah/she will check with Dr Gonzales/santo    ESOPHAGOGASTRODUODENOSCOPY N/A 10/3/2018    Procedure: EGD  (ESOPHAGOGASTRODUODENOSCOPY)/poss cryo;  Surgeon: Robbie Gonzales MD;  Location: [facility] ENDO (2ND FLR);  Service: Endoscopy;  Laterality: N/A;  2 day hold Dr Henry Pavon  Diflucan 1 tab po daily starting 14 days prior to egd, Rx'd per Dr Gonzales    ESOPHAGOGASTRODUODENOSCOPY N/A 11/1/2018    Procedure: EGD (ESOPHAGOGASTRODUODENOSCOPY)/cryo;  Surgeon: Robbie Gonzales MD;  [facility]  Service: Endoscopy;  Laterality: N/A;  2 day hold Dr Henry Pavon  Diflucan 1 tab po daily starting 14 days prior to egd, Rx'd per Dr Gonzales as before?    ESOPHAGOGASTRODUODENOSCOPY N/A 9/27/2019    Procedure: EGD (ESOPHAGOGASTRODUODENOSCOPY);  Surgeon: Robbie Gonzales MD;  Location: [facility] ([address] [place] [address]);  Service: Endoscopy;  Laterality: N/A;  Coumadin was d/c'd 9/18/19.  No other anti-thrombotic med for now.  pg  Per Dr Gonzales, she does not need to take Diflucan pre-procedure as she did before.  pg    EYE SURGERY  Dec 2012    ptosis repair    FIRST RIB REMOVAL  1996 with lung resection    HYSTERECTOMY      LUNG LOBECTOMY  1996    left lung for cancer    REMOVAL OF PLANTAR WART USING LASER      SHOULDER SURGERY  2010,2011    scapular entrapment after lung surgery, needed 5 ribs removed left side    SYMPOTHATIC NERVE LEFT SIDE      1996 with lung surgery    TONSILLECTOMY      TONSILLECTOMY, ADENOIDECTOMY, BILATERAL MYRINGOTOMY AND TUBES      UPPER GASTROINTESTINAL ENDOSCOPY  05/2016    Dr. Koo    VASCULAR SURGERY      stents place in jugualr vein     Family History   Problem Relation Age of Onset    Allergic rhinitis Father     Cancer Father     Hypertension Father     Allergies Father     Allergic rhinitis Son     Asthma Son     Stroke Mother     Allergies Mother     Allergic rhinitis Sister     Asthma Sister     Diverticulitis Brother     No Known Problems Daughter     No Known Problems Maternal Aunt     No Known Problems Maternal Uncle     No Known  Problems Paternal Aunt     No Known Problems Paternal Uncle     Colon polyps Maternal Grandmother          of colon blockage    No Known Problems Maternal Grandfather     No Known Problems Paternal Grandmother     No Known Problems Paternal Grandfather     No Known Problems Brother     No Known Problems Sister     Angioedema Neg Hx     Eczema Neg Hx     Immunodeficiency Neg Hx     Urticaria Neg Hx     Skin cancer Neg Hx     Amblyopia Neg Hx     Blindness Neg Hx     Cataracts Neg Hx     Diabetes Neg Hx     Glaucoma Neg Hx     Macular degeneration Neg Hx     Retinal detachment Neg Hx     Strabismus Neg Hx     Thyroid disease Neg Hx     Colon cancer Neg Hx     Melanoma Neg Hx      Social History     Socioeconomic History    Marital status:      Spouse name: Not on file    Number of children: Not on file    Years of education: Not on file    Highest education level: Not on file   Occupational History    Occupation: retired   Social Needs    Financial resource strain: Not on file    Food insecurity:     Worry: Not on file     Inability: Not on file    Transportation needs:     Medical: Not on file     Non-medical: Not on file   Tobacco Use    Smoking status: Former Smoker     Packs/day: 2.00     Years: 20.00     Pack years: 40.00     Types: Cigarettes     Last attempt to quit: 1994     Years since quittin.0    Smokeless tobacco: Never Used   Substance and Sexual Activity    Alcohol use: No    Drug use: No    Sexual activity: Yes     Partners: Male   Lifestyle    Physical activity:     Days per week: Not on file     Minutes per session: Not on file    Stress: Not on file   Relationships    Social connections:     Talks on phone: Not on file     Gets together: Not on file     Attends Oriental orthodox service: Not on file     Active member of club or organization: Not on file     Attends meetings of clubs or organizations: Not on file     Relationship status: Not on file    Other Topics Concern    Are you pregnant or think you may be? No    Breast-feeding No   Social History Narrative    Not on file       Review of patient's allergies indicates:   Allergen Reactions    Ciprofloxacin Itching    Coreg [carvedilol]      Low energy    Doxycycline Other (See Comments) and Hives     Patient had a rxn with the sun despite wearing spf 30    Metoprolol      Low energy       Current Outpatient Medications:     albuterol (ACCUNEB) 1.25 mg/3 mL Nebu, USE 1 VIAL (3 ML) VIA NEBULIZER EVERY 6 HOURS AS NEEDED, Disp: 90 mL, Rfl: 2    albuterol (PROVENTIL HFA) 90 mcg/actuation inhaler, Inhale 2 puffs into the lungs every 6 (six) hours as needed for Wheezing., Disp: 3 Inhaler, Rfl: 4    apixaban (ELIQUIS) 5 mg Tab, Take 1 tablet by mouth once daily. Take half tab BID, Disp: , Rfl:     bisacodyl (DULCOLAX) 5 mg EC tablet, Take 1 tablet (5 mg total) by mouth daily as needed for Constipation., Disp: 30 tablet, Rfl: 11    cetirizine (ZYRTEC) 10 MG tablet, TAKE 1 TABLET EVERY EVENING, Disp: 90 tablet, Rfl: 2    diltiaZEM (CARDIZEM CD) 120 MG Cp24, Take 1 capsule (120 mg total) by mouth 2 (two) times daily. (Patient taking differently: Take 120 mg by mouth once daily. ), Disp: 180 capsule, Rfl: 3    diphenhydrAMINE (BENADRYL) 25 mg capsule, Take 25 mg by mouth every 6 (six) hours as needed for Itching., Disp: , Rfl:     fluticasone (FLONASE) 50 mcg/actuation nasal spray, 1 spray by Each Nare route once daily., Disp: 3 Bottle, Rfl: 3    levothyroxine (SYNTHROID) 88 MCG tablet, TAKE 1 TABLET DAILY, Disp: 90 tablet, Rfl: 4    montelukast (SINGULAIR) 10 mg tablet, TAKE 1 TABLET EVERY EVENING, Disp: 90 tablet, Rfl: 4    multivitamin-minerals-lutein (CENTRUM SILVER) Tab, Take 1 tablet by mouth once daily. 1 Tablet Oral Every day, Disp: , Rfl:     pantoprazole (PROTONIX) 40 MG tablet, TAKE 1 TABLET DAILY 30 MINUTES PRIOR TO BREAKFAST, Disp: 90 tablet, Rfl: 3    SPIRIVA WITH HANDIHALER 18 mcg  inhalation capsule, INHALE THE CONTENTS OF 1 CAPSULE DAILY, Disp: 90 capsule, Rfl: 3    tretinoin (RETIN-A) 0.05 % cream, APPLY THIN FILM TO FACE AT NIGHT AFTER MOISTURIZING, Disp: 45 g, Rfl: 3    ZINC GLUCONATE ORAL, Take by mouth., Disp: , Rfl:     ketoconazole (NIZORAL) 2 % cream, Apply topically once daily. (Patient not taking: Reported on 1/17/2020), Disp: 60 g, Rfl: 0  No current facility-administered medications for this visit.     Facility-Administered Medications Ordered in Other Visits:     0.9%  NaCl infusion, , Intravenous, Continuous, Ni Jaimes NP, Last Rate: 0 mL/hr at 12/11/18 0850    0.9%  NaCl infusion, , Intravenous, Continuous, Ni Jaimes NP    sodium chloride 0.9% flush 5 mL, 5 mL, Intravenous, PRN, Ni Jaimes NP      Review of Systems  No nausea, vomiting, fevers, Chills , contipation, diarrhea or sweats    Physical Exam:      Vitals:    01/17/20 1017   BP: 136/79   Pulse: 87     alert and oriented ×4 follows commands answers all questions appropriately,affect wnl  Manual muscle test 5 out of 5 sensation to light touch grossly intact  + excruciate tenderness right sijt's and iliac crest  Nl gait  No C/C/E   leveled iliac crests  +standing flexion test  Poor psis mobility    Assessment:  sijt dysfunction  Superimposed right cluneal neuralgia  Mild lumbar spondylosis  Benight throat tumor s/p cryoablation of throat causing mild dysphagia  Plan:  Repeat right cluneal nerve block +hydrodissection as it gave her a lot of relief  Discussed sijt prp to right side to promote sijt stability, pt. Is interested in scheduling. Answered all question, pamphlet issued  PRP prep- inc protein via shakes and take gummy vitamins    PROCEDURE NOTE:   risk and benefit of right CLUNEAL NERVE BLOCK AND HYDRODISECTION injection reviewed with. patient. Verbal consent was obtained. Injection was performed after sterile prep w.chrolorohexedine. Short  and long axis APPROACH  UTILIZED. First, VISUALIZATION OF THE PSIS  , superior gluteal artery, fascial line between GMAX and GMED was obtained, subsequently  BLOCK + HYDRODISSECTION OF CLUNEAL NERVE PERFORMED. PT. WAS LAYING PRONE. INJECTION WAS PERFORMED WITH A  22g  spinal needle  needle  After a numbing wheal w. Lidocaine performed.   Ultrasound guidance was utilized for needle visualization. A TOTAL OF 10ML OF LIDOCAINE 1% AND 10ML OF STERILE NORMAL SALINE WAS UTILIZED. No complications. iMMEDIATE IMPROVEMENT OF  PAIN POST PROCEDURE.    Ultrasound interpretation was performed prior to the procedure to identify the target and any adjacent neurovascular structures.  Subsequently, interpretation was performed during real- time needle guidance confirming placement. Post- intervention interpretation was also performed confirming appropriate injectate  flow and hemostasis.  Images indicating needle placement have been saved in [facility].

## 2020-01-22 NOTE — TELEPHONE ENCOUNTER
Patient inquiring if her medications can be crushed.   Advised that citirizine and levothyroxine can be crushed. Montelukast and protonix cannot. Patient verbalized understanding.

## 2020-01-23 NOTE — TELEPHONE ENCOUNTER
My Open Encounters     Santana Covarrubias MD  You; Marci Dillon Staff 17 hours ago (3:26 PM)      Yes no problem    Routing comment       You routed conversation to Santana Covarrubias MD; Marci Dillon Staff 17 hours ago (3:10 PM)      You 17 hours ago (3:10 PM)         Pt scheduled for EGD on 2/17/20. Can we hold eliquis 2 days prior to procedure? Please advise.

## 2020-01-23 NOTE — TELEPHONE ENCOUNTER
Received request to schedule patient for EGD/CRYO on 2/17/20 at 11:00am. Spoke with Patient. Reviewed medical history and medications. Instructed on procedure and prep. Patient verbalized understanding of instructions. Mailed copy of instructions to address in chart.

## 2020-01-27 NOTE — TELEPHONE ENCOUNTER
Pt re-scheduled to 1/31/20 at 10:30am. Spoke with pt and e-mailed new instructions. Pt verbalized understanding.

## 2020-01-30 NOTE — PROGRESS NOTES
HPI     Annual Exam      Additional comments: DLE 6-18 ([facility])   ocular health exam  //  pt states   no visual complaints               Eye Strain      Additional comments: OU feel tired by end of day               Dry Eye      Additional comments: +ATS OU prn              Spots and/or Floaters      Additional comments: OU -- also sees occasional prisms          Last edited by Ada Vitale on 2020  1:49 PM. (History)            Assessment /Plan     For exam results, see Encounter Report.    Examination of eyes and vision    Pseudophakia    Refractive error    Subjective visual disturbance      S/p cataract extraction with good results. Dispensed updated spectacle Rx. Discussed various spectacle lens options. Discussed adaptation period to new specs.     Mild DARIO OU. Discussed ocular affects of dry eyes. Recommend OTC artificial tears two to four times a day in both eyes. Recommend using drops and taking periodic breaks while reading. Discussed chronicity of DARIO. RTC if symptoms not alleviated by continued use of artificial tears.      OD once yesterday, saw prisms for a second, then resolved. Discussed possible etiologies and treatment options. Pt defers carotid doppler right now, if re-occurring will order. Pt reports good understanding.      RTC in 1 year for comprehensive eye exam, or sooner prn.

## 2020-01-31 NOTE — DISCHARGE SUMMARY
Discharge Summary/Instructions after an Endoscopic Procedure    Patient Name: Steph Raymundo  Patient MRN: [id number]  Patient YOB: 1950 Friday, January 31, 2020  Robbie Gonzales MD    RESTRICTIONS:  During your procedure today, you received medications for sedation.  These medications may affect your judgment, balance and coordination.  Therefore, for 24 hours, you have the following restrictions:     - DO NOT drive a car, operate machinery, make legal/financial decisions, sign important papers or drink alcohol.      ACTIVITY:  Today: no heavy lifting, straining or running due to procedural sedation/anesthesia.  The following day: return to full activity including work.    DIET:  Eat and drink normally unless instructed otherwise.     TREATMENT FOR COMMON SIDE EFFECTS:  - Mild abdominal pain, nausea, belching, bloating or excessive gas:  rest, eat lightly and use a heating pad.  - Sore Throat: treat with throat lozenges and/or gargle with warm salt water.  - Because air was used during the procedure, expelling large amounts of air from your rectum or belching is normal.  - If a bowel prep was taken, you may not have a bowel movement for 1-3 days.  This is normal.      SYMPTOMS TO WATCH FOR AND REPORT TO YOUR PHYSICIAN:  1. Abdominal pain or bloating, other than gas cramps.  2. Chest pain.  3. Back pain.  4. Signs of infection such as: chills or fever occurring within 24 hours after the procedure.  5. Rectal bleeding, which would show as bright red, maroon, or black stools. (A tablespoon of blood from the rectum is not serious, especially if hemorrhoids are present.)  6. Vomiting.  7. Weakness or dizziness.      GO DIRECTLY TO THE NEAREST EMERGENCY ROOM IF YOU HAVE ANY OF THE FOLLOWING:     Difficulty breathing              Chills and/or fever over 101 F   Persistent vomiting and/or vomiting blood   Severe abdominal pain   Severe chest pain   Black, tarry stools   Bleeding- more than one tablespoon   Any  other symptom or condition that you feel may need urgent attention    Your doctor recommends these additional instructions:  If any biopsies were taken, your doctors clinic will contact you in 1 to 2 weeks with any results.    - Discharge patient to home (ambulatory).   - Await pathology results.   - Full liquid diet.    For questions, problems or results please call your physician - Robbie Gonzales MD at Work:  [phone].    [facility], EMERGENCY ROOM PHONE NUMBER: [phone]    IF A COMPLICATION OR EMERGENCY SITUATION ARISES AND YOU ARE UNABLE TO REACH YOUR PHYSICIAN - GO DIRECTLY TO THE EMERGENCY ROOM.

## 2020-01-31 NOTE — H&P
History & Physical - Short Stay  Gastroenterology      SUBJECTIVE:     Procedure: EGD    Chief Complaint/Indication for Procedure: Esophageal lesion    History of Present Illness:  Patient is a 69 y.o. female presents with esophageal squamous dysplasia S/P ablation therapy here for follow up. Stated weight loss and worsening dysphagia.    PTA Medications   Medication Sig    albuterol (ACCUNEB) 1.25 mg/3 mL Nebu USE 1 VIAL (3 ML) VIA NEBULIZER EVERY 6 HOURS AS NEEDED    albuterol (PROVENTIL HFA) 90 mcg/actuation inhaler Inhale 2 puffs into the lungs every 6 (six) hours as needed for Wheezing.    apixaban (ELIQUIS) 5 mg Tab Take 1 tablet by mouth once daily. Take half tab BID    bisacodyl (DULCOLAX) 5 mg EC tablet Take 1 tablet (5 mg total) by mouth daily as needed for Constipation.    cetirizine (ZYRTEC) 10 MG tablet TAKE 1 TABLET EVERY EVENING    diphenhydrAMINE (BENADRYL) 25 mg capsule Take 25 mg by mouth every 6 (six) hours as needed for Itching.    fluticasone (FLONASE) 50 mcg/actuation nasal spray 1 spray by Each Nare route once daily.    ketoconazole (NIZORAL) 2 % cream Apply topically once daily.    levothyroxine (SYNTHROID) 88 MCG tablet TAKE 1 TABLET DAILY    montelukast (SINGULAIR) 10 mg tablet TAKE 1 TABLET EVERY EVENING    multivitamin-minerals-lutein (CENTRUM SILVER) Tab Take 1 tablet by mouth once daily. 1 Tablet Oral Every day    pantoprazole (PROTONIX) 40 MG tablet TAKE 1 TABLET DAILY 30 MINUTES PRIOR TO BREAKFAST    SPIRIVA WITH HANDIHALER 18 mcg inhalation capsule INHALE THE CONTENTS OF 1 CAPSULE DAILY    tretinoin (RETIN-A) 0.05 % cream APPLY THIN FILM TO FACE AT NIGHT AFTER MOISTURIZING    ZINC GLUCONATE ORAL Take by mouth.       Review of patient's allergies indicates:   Allergen Reactions    Ciprofloxacin Itching    Coreg [carvedilol]      Low energy    Doxycycline Other (See Comments) and Hives     Patient had a rxn with the sun despite wearing spf 30    Metoprolol      Low  energy        Past Medical History:   Diagnosis Date    *Atrial fibrillation     and Hx PSVT    Allergic drug rash 12/28/2016    Allergy     chronic     Arthritis     fingers    Benign tumor of throat     Bronchitis March 2013    CAD (coronary artery disease) 2005    CABGx2 in 2005 and 2 MI after with 4 stents    Cancer 1996    small cell lung cancer s/p resection of 1/3 lung, 5 ribs and muscle mass then had chemo and radiation    Cataract     OD     CHF (congestive heart failure) past Hx    Chronic rhinitis     Clot past Hx    external jugular, now stented, occluded, had phlebitis; now revascularized    Colon polyp     COPD (chronic obstructive pulmonary disease)     no oxygen or nebulizers    COPD exacerbation 5/8/13    improved 5/14/13 after steroid pack,antibiotics    Former smoker     GERD (gastroesophageal reflux disease)     Heart block     Hyperlipidemia     Hypertension     pt states does not have //    MI (myocardial infarction) 2005    Posterior vitreous detachment of left eye     Thyroid disease      Past Surgical History:   Procedure Laterality Date    ABLATION N/A 12/11/2018    Procedure: ABLATION;  Surgeon: Santana Covarrubias MD;  Location: [facility] EP LAB;  Service: Cardiology;  Laterality: N/A;  AF, JUAN ANTONIO, PVI, RFA, CARTO, GEN, GP, 3 PREP    ABLATION OF ARRHYTHMOGENIC FOCUS FOR ATRIAL FIBRILLATION N/A 5/8/2019    Procedure: ABLATION, ARRHYTHMOGENIC FOCUS, FOR ATRIAL FIBRILLATION;  Surgeon: Santana Covarrubias MD;  Location: [facility] EP LAB;  Service: Cardiology;  Laterality: N/A;  AF, PVI, RFA, CARTO, GEN, GP , 3 PREP    ADENOIDECTOMY      APPENDECTOMY      BACK SURGERY      lumbar    BREAST BIOPSY Left     benign    CARDIAC SURGERY      CARDIOVERSION  5/8/2019    Procedure: Cardioversion;  Surgeon: Santana Covarrubias MD;  Location: [facility] EP LAB;  Service: Cardiology;;    CATARACT EXTRACTION Left     OS    CATARACT EXTRACTION, BILATERAL      left eye only    CHOLECYSTECTOMY       COLONOSCOPY  03/2012    repeat in 5 years    COLONOSCOPY N/A 6/19/2017    Procedure: COLONOSCOPY;  Surgeon: Kal Elise MD;  [facility];  Service: Endoscopy;  Laterality: N/A;    CORONARY ARTERY BYPASS GRAFT  2005    2 vessel    ESOPHAGOGASTRODUODENOSCOPY N/A 7/5/2018    Procedure: ESOPHAGOGASTRODUODENOSCOPY (EGD)/cryo;  Surgeon: Robbie Gonzales MD;  Location: [facility] (2ND FLR);  Service: Endoscopy;  Laterality: N/A;  Eliquis/Dr Henry Wei/OK to hold med 2 days prior to egd/see telephone encounter dated 6/12/18/pt stated she needs to take Diflucan 1 tab po daily 12 days prior to egd, message sent to Sarah/she will check with Dr Gonzales/santo    ESOPHAGOGASTRODUODENOSCOPY N/A 10/3/2018    Procedure: EGD (ESOPHAGOGASTRODUODENOSCOPY)/poss cryo;  Surgeon: Robbie Gonzales MD;  Location: [facility] ([place]ND [place]);  Service: Endoscopy;  Laterality: N/A;  2 day hold Dr Henry Pavon  Diflucan 1 tab po daily starting 14 days prior to egd, Rx'd per Dr Gonzales    ESOPHAGOGASTRODUODENOSCOPY N/A 11/1/2018    Procedure: EGD (ESOPHAGOGASTRODUODENOSCOPY)/cryo;  Surgeon: Robbie Gonzales MD;  Location: [facility] (2ND [facility]);  Service: Endoscopy;  Laterality: N/A;  2 day hold Dr Henry Pavon  Diflucan 1 tab po daily starting 14 days prior to egd, Rx'd per Dr Gonzales as before?    ESOPHAGOGASTRODUODENOSCOPY N/A 9/27/2019    Procedure: EGD (ESOPHAGOGASTRODUODENOSCOPY);  Surgeon: Robbie Gonzales MD;  Location: [facility] (2ND [facility]);  Service: Endoscopy;  Laterality: N/A;  Coumadin was d/c'd 9/18/19.  No other anti-thrombotic med for now.  pg  Per Dr Gonzales, she does not need to take Diflucan pre-procedure as she did before.  pg    EYE SURGERY  Dec 2012    ptosis repair    FIRST RIB REMOVAL  1996 with lung resection    HYSTERECTOMY      LUNG LOBECTOMY  1996    left lung for cancer    REMOVAL OF PLANTAR WART USING LASER      SHOULDER SURGERY  2010,2011    scapular entrapment after lung  surgery, needed 5 ribs removed left side    SYMPOTHATIC NERVE LEFT SIDE       with lung surgery    TONSILLECTOMY      TONSILLECTOMY, ADENOIDECTOMY, BILATERAL MYRINGOTOMY AND TUBES      UPPER GASTROINTESTINAL ENDOSCOPY  2016    Dr. Koo    VASCULAR SURGERY      stents place in jugualr vein     Family History   Problem Relation Age of Onset    Allergic rhinitis Father     Cancer Father     Hypertension Father     Allergies Father     Allergic rhinitis Son     Asthma Son     Stroke Mother     Allergies Mother     Allergic rhinitis Sister     Asthma Sister     Diverticulitis Brother     No Known Problems Daughter     No Known Problems Maternal Aunt     No Known Problems Maternal Uncle     No Known Problems Paternal Aunt     No Known Problems Paternal Uncle     Colon polyps Maternal Grandmother          of colon blockage    No Known Problems Maternal Grandfather     No Known Problems Paternal Grandmother     No Known Problems Paternal Grandfather     No Known Problems Brother     No Known Problems Sister     Angioedema Neg Hx     Eczema Neg Hx     Immunodeficiency Neg Hx     Urticaria Neg Hx     Skin cancer Neg Hx     Amblyopia Neg Hx     Blindness Neg Hx     Cataracts Neg Hx     Diabetes Neg Hx     Glaucoma Neg Hx     Macular degeneration Neg Hx     Retinal detachment Neg Hx     Strabismus Neg Hx     Thyroid disease Neg Hx     Colon cancer Neg Hx     Melanoma Neg Hx      Social History     Tobacco Use    Smoking status: Former Smoker     Packs/day: 2.00     Years: 20.00     Pack years: 40.00     Types: Cigarettes     Last attempt to quit: 1994     Years since quittin.0    Smokeless tobacco: Never Used   Substance Use Topics    Alcohol use: No    Drug use: No       Review of Systems:  Respiratory: no cough or shortness of breath  Cardiovascular: no chest pain or palpitations  Gastrointestinal: positive for dysphagia    OBJECTIVE:     Vital Signs (Most  Recent)       Physical Exam:  General: well developed, well nourished  Lungs:  normal respiratory effort  Heart: regular rate, S1, S2 normal    Laboratory  CBC: No results for input(s): WBC, RBC, HGB, HCT, PLT, MCV, MCH, MCHC in the last 168 hours.  CMP: No results for input(s): GLU, CALCIUM, ALBUMIN, PROT, NA, K, CO2, CL, BUN, CREATININE, ALKPHOS, ALT, AST, BILITOT in the last 168 hours.  Coagulation: No results for input(s): LABPROT, INR, APTT in the last 168 hours.      Diagnostic Results:      ASSESSMENT/PLAN:     Esophageal lesion    Plan: EGD    Anesthesia Plan: MAC    ASA Grade: ASA 3 - Patient with moderate systemic disease with functional limitations     The impression and plan was discussed in detail with the patient. All questions have been answered and the patient voices understanding of our plan at this point. The risk of the procedure was discussed in detail which includes but not limited to bleeding, infection, perforation in some cases requiring surgery with its spectrum of complications.

## 2020-01-31 NOTE — ANESTHESIA POSTPROCEDURE EVALUATION
Anesthesia Post Evaluation    Patient: Steph Lira    Procedure(s) Performed: Procedure(s) (LRB):  EGD (ESOPHAGOGASTRODUODENOSCOPY)/poss cryo (N/A)    Final Anesthesia Type: general    Patient location during evaluation: PACU  Patient participation: Yes- Able to Participate  Level of consciousness: awake and alert and oriented  Post-procedure vital signs: reviewed and stable  Pain management: adequate  Airway patency: patent    PONV status at discharge: No PONV  Anesthetic complications: no      Cardiovascular status: blood pressure returned to baseline  Respiratory status: unassisted  Hydration status: euvolemic  Follow-up not needed.          Vitals Value Taken Time   /71 1/31/2020 11:15 AM   Temp 36.6 °C (97.9 °F) 1/31/2020 11:15 AM   Pulse 90 1/31/2020 11:25 AM   Resp 17 1/31/2020 11:25 AM   SpO2 97 % 1/31/2020 11:25 AM   Vitals shown include unvalidated device data.      No case tracking events are documented in the log.      Pain/Tamera Score: Tamera Score: 10 (1/31/2020 10:50 AM)

## 2020-01-31 NOTE — ANESTHESIA PREPROCEDURE EVALUATION
01/31/2020  Steph Lira is a 69 y.o., female with extensive pmh presenting for endoscopy.    Past Medical History:   Diagnosis Date    *Atrial fibrillation     and Hx PSVT    Allergic drug rash 12/28/2016    Allergy     chronic     Arthritis     fingers    Benign tumor of throat     Bronchitis March 2013    CAD (coronary artery disease) 2005    CABGx2 in 2005 and 2 MI after with 4 stents    Cancer 1996    small cell lung cancer s/p resection of 1/3 lung, 5 ribs and muscle mass then had chemo and radiation    Cataract     OD     CHF (congestive heart failure) past Hx    Chronic rhinitis     Clot past Hx    external jugular, now stented, occluded, had phlebitis; now revascularized    Colon polyp     COPD (chronic obstructive pulmonary disease)     no oxygen or nebulizers    COPD exacerbation 5/8/13    improved 5/14/13 after steroid pack,antibiotics    Former smoker     GERD (gastroesophageal reflux disease)     Heart block     Hyperlipidemia     Hypertension     pt states does not have //    MI (myocardial infarction) 2005    Posterior vitreous detachment of left eye     Thyroid disease          Anesthesia Evaluation    I have reviewed the Patient Summary Reports.     I have reviewed the Medications.     Review of Systems  Anesthesia Hx:  No problems with previous Anesthesia   Denies Personal Hx of Anesthesia complications.   Social:  Non-Smoker, No Alcohol Use    Cardiovascular:   Hypertension Past MI CAD asymptomatic  CHF    Pulmonary:   COPD, mild    Hepatic/GI:   GERD, poorly controlled    Endocrine:   Hypothyroidism        Physical Exam  General:  Well nourished    Airway/Jaw/Neck:  Airway Findings: Mouth Opening: Normal Tongue: Normal  General Airway Assessment: Adult  Mallampati: II  TM Distance: Normal, at least 6 cm  Jaw/Neck Findings:  Neck ROM: Normal ROM       Dental:  Dental Findings: In tact   Chest/Lungs:  Chest/Lungs Findings: Clear to auscultation, Normal Respiratory Rate     Heart/Vascular:  Heart Findings: Rate: Normal  Rhythm: Regular Rhythm  Sounds: Normal        Mental Status:  Mental Status Findings: Normal        Anesthesia Plan  Type of Anesthesia, risks & benefits discussed:  Anesthesia Type:  general, MAC  Patient's Preference:   Intra-op Monitoring Plan: standard ASA monitors  Intra-op Monitoring Plan Comments:   Post Op Pain Control Plan: multimodal analgesia, IV/PO Opioids PRN and per primary service following discharge from PACU  Post Op Pain Control Plan Comments:   Induction:   IV  Beta Blocker:  Patient is not currently on a Beta-Blocker (No further documentation required).       Informed Consent: Patient understands risks and agrees with Anesthesia plan.  Questions answered. Anesthesia consent signed with patient.  ASA Score: 3     Day of Surgery Review of History & Physical:    H&P update referred to the surgeon.         Ready For Surgery From Anesthesia Perspective.

## 2020-01-31 NOTE — [employer] PATIENT INSTRUCTIONS
Discharge Summary/Instructions after an Endoscopic Procedure  Patient Name: Steph Raymundo  Patient MRN: [id number]  Patient YOB: 1950 Friday, January 31, 2020  Robbie Gonzales MD  RESTRICTIONS:  During your procedure today, you received medications for sedation.  These   medications may affect your judgment, balance and coordination.  Therefore,   for 24 hours, you have the following restrictions:   - DO NOT drive a car, operate machinery, make legal/financial decisions,   sign important papers or drink alcohol.    ACTIVITY:  Today: no heavy lifting, straining or running due to procedural   sedation/anesthesia.  The following day: return to full activity including work.  DIET:  Eat and drink normally unless instructed otherwise.     TREATMENT FOR COMMON SIDE EFFECTS:  - Mild abdominal pain, nausea, belching, bloating or excessive gas:  rest,   eat lightly and use a heating pad.  - Sore Throat: treat with throat lozenges and/or gargle with warm salt   water.  - Because air was used during the procedure, expelling large amounts of air   from your rectum or belching is normal.  - If a bowel prep was taken, you may not have a bowel movement for 1-3 days.    This is normal.  SYMPTOMS TO WATCH FOR AND REPORT TO YOUR PHYSICIAN:  1. Abdominal pain or bloating, other than gas cramps.  2. Chest pain.  3. Back pain.  4. Signs of infection such as: chills or fever occurring within 24 hours   after the procedure.  5. Rectal bleeding, which would show as bright red, maroon, or black stools.   (A tablespoon of blood from the rectum is not serious, especially if   hemorrhoids are present.)  6. Vomiting.  7. Weakness or dizziness.  GO DIRECTLY TO THE NEAREST EMERGENCY ROOM IF YOU HAVE ANY OF THE FOLLOWING:      Difficulty breathing              Chills and/or fever over 101 F   Persistent vomiting and/or vomiting blood   Severe abdominal pain   Severe chest pain   Black, tarry stools   Bleeding- more than one  tablespoon   Any other symptom or condition that you feel may need urgent attention  Your doctor recommends these additional instructions:  If any biopsies were taken, your doctors clinic will contact you in 1 to 2   weeks with any results.  - Discharge patient to home (ambulatory).   - Await pathology results.   - Full liquid diet.  For questions, problems or results please call your physician - Robbie Gonzales MD at Work:  [phone].  [facility], EMERGENCY ROOM PHONE NUMBER: [phone]  IF A COMPLICATION OR EMERGENCY SITUATION ARISES AND YOU ARE UNABLE TO REACH   YOUR PHYSICIAN - GO DIRECTLY TO THE EMERGENCY ROOM.  Robbie Gonzales MD  1/31/2020 10:38:17 AM  This report has been verified and signed electronically.  [employer]

## 2020-01-31 NOTE — DISCHARGE INSTRUCTIONS

## 2020-01-31 NOTE — ANESTHESIA PROCEDURE NOTES
Intubation  Performed by: Ruma Devi CRNA  Authorized by: Fatmata Gay MD     Intubation:     Induction:  Rapid sequence induction    Intubated:  Postinduction    Mask Ventilation:  N/a    Attempts:  1    Attempted By:  CRNA    Method of Intubation:  Direct    Blade:  Hightower 2    Laryngeal View Grade: Grade I - full view of chords      Difficult Airway Encountered?: No      Complications:  None    Airway Device:  Oral endotracheal tube    Airway Device Size:  7.0    Style/Cuff Inflation:  Cuffed (inflated to minimal occlusive pressure)    Tube secured:  22    Secured at:  The lips    Placement Verified By:  Capnometry    Complicating Factors:  None    Findings Post-Intubation:  BS equal bilateral

## 2020-01-31 NOTE — TRANSFER OF CARE
"Anesthesia Transfer of Care Note    Patient: Steph Lira    Procedure(s) Performed: Procedure(s) (LRB):  EGD (ESOPHAGOGASTRODUODENOSCOPY)/poss cryo (N/A)    Patient location: [facility]    Anesthesia Type: general    Transport from OR: Transported from OR on 6-10 L/min O2 by face mask with adequate spontaneous ventilation    Post pain: adequate analgesia    Post assessment: no apparent anesthetic complications and tolerated procedure well    Post vital signs: stable    Level of consciousness: awake    Nausea/Vomiting: nausea and vomiting    Complications: none    Transfer of care protocol was followed    [facility]. Compazine ordered.    Last vitals:   Visit Vitals  /73   Pulse 87   Temp 36.6 °C (97.9 °F) (Temporal)   Resp 16   Ht 5' 6" (1.676 m)   Wt 57.2 kg (126 lb)   SpO2 98%   Breastfeeding? No   BMI 20.34 kg/m²     "

## 2020-01-31 NOTE — PLAN OF CARE
No s/s of pain. Pt's nausea treated. Will d/c when nausea has subsided, pt has spoken to physician and is able to hold down fluids.

## 2020-02-17 NOTE — TELEPHONE ENCOUNTER
----- Message from Robbie Gonzales MD sent at 2/17/2020  2:43 PM CST -----  Patient informed about the results of the Biopsies. Need to see Oncology ASAP.
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
## 2020-02-27 NOTE — LETTER
February 27, 2020      Robbie Gonzales MD  [address]  [facility] [address]           [facility] - Hematology Oncology  [address]  [facility] [address]  Phone: [phone]          Patient: Steph Lira   MR Number: [id number]   YOB: 1950   Date of Visit: 2/27/2020       Dear Dr. Robbie Gonzales:    Thank you for referring Steph Lira to me for evaluation. Attached you will find relevant portions of my assessment and plan of care.    If you have questions, please do not hesitate to call me. I look forward to following Steph Lira along with you.    Sincerely,    Pacheco Mireles MD    Enclosure  CC:  No Recipients    If you would like to receive this communication electronically, please contact [email or web address] or [phone] to request more information on [employer] access.    For providers and/or their staff who would like to refer a patient to [facility], please contact us through our one-stop-shop provider referral line, [facility] [place], at [phone].    If you feel you have received this communication in error or would no longer like to receive these types of communications, please e-mail [email or web address]

## 2020-02-27 NOTE — PROGRESS NOTES
HPI    69 years old female referred to Hematology Oncology evaluation of esophageal cancer.  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  Patient is unable to tolerate any solid food.  Patient patient has lost approximately 15 lb over the past month.  Patient states that she has sensation of food stuck in her throat after ingestion.  Currently patient is taking liquid supplements to maintain his metabolism demand.    Patient has no history of small cell cancer in in 1996 treated definitively with chemoradiation and surgeries.    Patient also has history of a Cardio bypass x2 stent placement.      Past Medical History:   Diagnosis Date    *Atrial fibrillation     and Hx PSVT    Allergic drug rash 12/28/2016    Allergy     chronic     Arthritis     fingers    Benign tumor of throat     Bronchitis March 2013    CAD (coronary artery disease) 2005    CABGx2 in 2005 and 2 MI after with 4 stents    Cancer 1996    small cell lung cancer s/p resection of 1/3 lung, 5 ribs and muscle mass then had chemo and radiation    Cataract     OD     CHF (congestive heart failure) past Hx    Chronic rhinitis     Clot past Hx    external jugular, now stented, occluded, had phlebitis; now revascularized    Colon polyp     COPD (chronic obstructive pulmonary disease)     no oxygen or nebulizers    COPD exacerbation 5/8/13    improved 5/14/13 after steroid pack,antibiotics    Dysphagia     Esophageal stenosis     Former smoker     GERD (gastroesophageal reflux disease)     Heart block     Hyperlipidemia     Hypertension     pt states does not have //    MI (myocardial infarction) 2005    Posterior vitreous detachment of left eye     Thyroid disease      Social History     Socioeconomic History    Marital status:      Spouse name: Not on file    Number of children: Not on file    Years of education: Not on file    Highest education level: Not on file    Occupational History    Occupation: retired   Social Needs    Financial resource strain: Not on file    Food insecurity:     Worry: Not on file     Inability: Not on file    Transportation needs:     Medical: Not on file     Non-medical: Not on file   Tobacco Use    Smoking status: Former Smoker     Packs/day: 2.00     Years: 20.00     Pack years: 40.00     Types: Cigarettes     Last attempt to quit: 1994     Years since quittin.1    Smokeless tobacco: Never Used   Substance and Sexual Activity    Alcohol use: No    Drug use: No    Sexual activity: Yes     Partners: Male   Lifestyle    Physical activity:     Days per week: Not on file     Minutes per session: Not on file    Stress: Not on file   Relationships    Social connections:     Talks on phone: Not on file     Gets together: Not on file     Attends Roman Catholic service: Not on file     Active member of club or organization: Not on file     Attends meetings of clubs or organizations: Not on file     Relationship status: Not on file   Other Topics Concern    Are you pregnant or think you may be? No    Breast-feeding No   Social History Narrative    Not on file         Subjective      Review of Systems   Constitutional:  Dysphagia weight changes fatigue and weakness  HENT: Negative for mouth sores.   Eyes: Negative for visual disturbance.   Respiratory: Negative for cough and shortness of breath.   Cardiovascular: Negative for chest pain.   Gastrointestinal: Negative for diarrhea.   Genitourinary: Negative for frequency.   Musculoskeletal: Negative for back pain.   Skin: Negative for rash.   Neurological: Negative for headaches.   Hematological: Negative for adenopathy.   Psychiatric/Behavioral: The patient is not nervous/anxious.   All other systems reviewed and are negative.     Objective    Physical Exam   Vitals:    20 1033   BP: (!) 145/76   Pulse: 102   Resp: 18   Temp: 97.7 °F (36.5 °C)       Constitutional:  Alert oriented  x3 malnutrition  HENT:  No JVD  Cardiovascular:  Tachycardic   No edema, no tenderness in the extremities.   Pulmonary/Chest:  Normal effort  Abdominal:  Soft  Musculoskeletal: Normal range of motion.     Lymphadenopathy:  No evidence lymphadenopathy  Neurological:  Nonfocal cranial nerves 2-12 grossly intact sensorimotor grossly intact   Skin:  Skin warm and dry  Psychiatric: patient has a normal mood and affect. patient speech is normal and behavior is normal. Judgment normal. Cognition and memory are normal.   Vitals reviewed.     CMP  Sodium   Date Value Ref Range Status   12/06/2019 138 136 - 145 mmol/L Final     Potassium   Date Value Ref Range Status   12/06/2019 4.5 3.5 - 5.1 mmol/L Final     Chloride   Date Value Ref Range Status   12/06/2019 104 95 - 110 mmol/L Final     CO2   Date Value Ref Range Status   12/06/2019 27 23 - 29 mmol/L Final     Glucose   Date Value Ref Range Status   12/06/2019 90 70 - 110 mg/dL Final     BUN, Bld   Date Value Ref Range Status   12/06/2019 10 8 - 23 mg/dL Final     Creatinine   Date Value Ref Range Status   12/06/2019 0.7 0.5 - 1.4 mg/dL Final   08/26/2013 0.8 0.5 - 1.4 mg/dL Final     Calcium   Date Value Ref Range Status   12/06/2019 9.3 8.7 - 10.5 mg/dL Final   08/26/2013 9.5 8.7 - 10.5 mg/dL Final     Total Protein   Date Value Ref Range Status   06/17/2019 7.0 6.0 - 8.4 g/dL Final     Albumin   Date Value Ref Range Status   06/17/2019 3.8 3.5 - 5.2 g/dL Final     Total Bilirubin   Date Value Ref Range Status   06/17/2019 0.6 0.1 - 1.0 mg/dL Final     Comment:     For infants and newborns, interpretation of results should be based  on gestational age, weight and in agreement with clinical  observations.  Premature Infant recommended reference ranges:  Up to 24 hours.............<8.0 mg/dL  Up to 48 hours............<12.0 mg/dL  3-5 days..................<15.0 mg/dL  6-29 days.................<15.0 mg/dL       Alkaline Phosphatase   Date Value Ref Range Status    06/17/2019 80 55 - 135 U/L Final   08/26/2013 68 55 - 135 U/L Final     AST   Date Value Ref Range Status   06/17/2019 18 10 - 40 U/L Final   08/26/2013 24 10 - 40 U/L Final     ALT   Date Value Ref Range Status   06/17/2019 10 10 - 44 U/L Final     Anion Gap   Date Value Ref Range Status   12/06/2019 7 (L) 8 - 16 mmol/L Final   08/26/2013 11 5 - 15 meq/L Final     eGFR if    Date Value Ref Range Status   12/06/2019 >60 >60 mL/min/1.73 m^2 Final     eGFR if non    Date Value Ref Range Status   12/06/2019 >60 >60 mL/min/1.73 m^2 Final     Comment:     Calculation used to obtain the estimated glomerular filtration  rate (eGFR) is the CKD-EPI equation.        Lab Results   Component Value Date    WBC 7.03 06/17/2019    HGB 12.7 06/17/2019    HCT 39.9 06/17/2019    MCV 96 06/17/2019     06/17/2019     Upper EGD finding 01/31/2020  Findings:       The oropharynx was normal.       One severe stenosis was found 20 to 23 cm from the incisors. This        stenosis measured 3 cm (in length). The stenosis was traversed after        downsizing scope.       Segmental severe mucosal changes characterized by friability (with        contact bleeding), nodularity and sloughing were found in the upper        third of the esophagus from20 cm to 23 cm. This is concerning for        malignancy. Biopsies were taken with a cold forceps for histology.       The entire examined stomach was normal.       The examined duodenum was normal.  Impression:           - Normal oropharynx.                        - Esophageal stenosis.                        - Friable (with contact bleeding), nodular mucosa                         in the esophagus from 20 cm to 23 cm in the area of                         the stenosis. This is concerning for malignancy.                         Biopsied.                        - Normal stomach.                        - Normal examined duodenum.  Recommendation:       - Discharge  patient to home (ambulatory).                        - Await pathology results.                        - Full liquid diet.    Pathology finding esophageal lesion at 20 cm biopsy results squamous cell carcinoma in situ with focal superficial invasions.      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  Stenosis measures 3 cm in length.  Stenosis travels after done sizing scopes.    Weight loss due to above.  Patient has lost approximately 15 lb over the past months.  Currently unable to tolerate any solid.  Patient is on the liquid supplements to maintain metabolism demand.    Patient reported history of small cell cancers diagnosis treated in 1996 with concurrent chemo RT and surgery.    Patient has also known history of CABG x2 stents placement.    Patient now Eliquis for Afib      Plan    CT PET whole body    Nutrition assessment    Radiation oncology consultation    Surgery consultation for J-tube placement - aware of patient on Eliquis.  Require hold for procedure    If distant metastases check MSI and HER2 marker    Bronchoscopy will be depend on whether not tumors found to be above gurjit with no evidence of M1 disease    Plan for concurrent chemo RT followed by surgery pending on above studies.  Multi-disciplinary approach    RTC 2 weeks      Malignant neoplasm of esophagus, unspecified location  -     Ambulatory referral/consult to Oncology

## 2020-02-28 NOTE — TELEPHONE ENCOUNTER
----- Message from Sera Garber MA sent at 2/27/2020  1:20 PM CST -----  Pt needs to be seen for a consult for J-tube placement. Pt will get pet scan on 3/9 and has consult for radiation on 3/4. Please schedule accordingly after previous appts. Thanks

## 2020-03-04 PROBLEM — C15.9 MALIGNANT NEOPLASM OF ESOPHAGUS: Status: ACTIVE | Noted: 2020-01-01

## 2020-03-04 NOTE — PROGRESS NOTES
Steph ROMERO Pelion  [id number]  1950  3/4/2020  [facility]  [address]  [address]  [place] [address]    REASON FOR CONSULTATION: SCCa prox esophagus    TREATMENT GOAL: undetermined    HISTORY OF PRESENT ILLNESS:   69F presents with globus sensation, 15 lb weight loss x1 month and inability to tolerate solid foods.  She has a previous history of Schatzki ring, eosinophilic esophagitis s/p multiple dilations with October 2017 EGD + EUS with Dr. Wright appreciating a fungating mass in the upper 3rd esophagus, 15-20 cm from the incisors, partially obstructing with biopsy returning atypical papillary squamous lesion with low-grade dysplasia.  Ultrasound evaluation revealed no invasion.  She has undergone repeat cryotherapy procedures under the care of Dr. Gonzales.    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    She was referred to Dr. Mireles who ordered PET-CT scan (3/9), dietary assessment, consideration of J-tube placement and  mutation studies.    PMHx: SCLC treated with lobetcomy (JUDD?) + adjuvant CRT (+margin?) (1996); CABG x2    Patient denies fever, chills, chest pain, cough or hemoptysis.  She is having pain and difficulty with swallowing and having trouble managing even her secretions.  She denies new lumps or bumps.  She endorses significant weight loss.    Review of Systems   Constitutional: Positive for appetite change, fatigue and unexpected weight change. Negative for chills and fever.   HENT:   Positive for lump/mass, sore throat and trouble swallowing. Negative for mouth sores and voice change.    Eyes: Negative for eye problems and icterus.   Respiratory: Negative for cough, hemoptysis and shortness of breath.    Cardiovascular: Negative for chest pain and leg swelling.   Gastrointestinal: Negative for abdominal  pain, constipation, diarrhea, nausea and vomiting.   Genitourinary: Negative for dysuria, frequency, hematuria, nocturia and vaginal bleeding.    Musculoskeletal: Negative for back pain, gait problem, neck pain and neck stiffness.   Neurological: Negative for extremity weakness, gait problem, headaches, numbness and seizures.   Hematological: Negative for adenopathy.     Past Medical History:   Diagnosis Date    *Atrial fibrillation     and Hx PSVT    Allergic drug rash 12/28/2016    Allergy     chronic     Arthritis     fingers    Benign tumor of throat     Bronchitis March 2013    CAD (coronary artery disease) 2005    CABGx2 in 2005 and 2 MI after with 4 stents    Cancer 1996    small cell lung cancer s/p resection of 1/3 lung, 5 ribs and muscle mass then had chemo and radiation    Cataract     OD     CHF (congestive heart failure) past Hx    Chronic rhinitis     Clot past Hx    external jugular, now stented, occluded, had phlebitis; now revascularized    Colon polyp     COPD (chronic obstructive pulmonary disease)     no oxygen or nebulizers    COPD exacerbation 5/8/13    improved 5/14/13 after steroid pack,antibiotics    Dysphagia     Esophageal stenosis     Former smoker     GERD (gastroesophageal reflux disease)     Heart block     Hyperlipidemia     Hypertension     pt states does not have //    MI (myocardial infarction) 2005    Posterior vitreous detachment of left eye     Thyroid disease      Past Surgical History:   Procedure Laterality Date    ABLATION N/A 12/11/2018    Procedure: ABLATION;  Surgeon: Santana Covarrubias MD;  Location: [facility] EP LAB;  Service: Cardiology;  Laterality: N/A;  AF, JUAN ANTONIO, PVI, RFA, CARTO, GEN, GP, 3 PREP    ABLATION OF ARRHYTHMOGENIC FOCUS FOR ATRIAL FIBRILLATION N/A 5/8/2019    Procedure: ABLATION, ARRHYTHMOGENIC FOCUS, FOR ATRIAL FIBRILLATION;  Surgeon: Santana Covarrubias MD;  Location: [facility] EP LAB;  Service: Cardiology;  Laterality: N/A;  AF, PVI, RFA,  RACHAEL, GEN, GP , 3 PREP    ADENOIDECTOMY      APPENDECTOMY      BACK SURGERY      lumbar    BREAST BIOPSY Left     benign    CARDIAC SURGERY      CARDIOVERSION  5/8/2019    Procedure: Cardioversion;  Surgeon: Santana Covarrubias MD;  Location: [facility] EP LAB;  Service: Cardiology;;    CATARACT EXTRACTION Left     OS    CATARACT EXTRACTION, BILATERAL      left eye only    CHOLECYSTECTOMY      COLONOSCOPY  03/2012    repeat in 5 years    COLONOSCOPY N/A 6/19/2017    Procedure: COLONOSCOPY;  Surgeon: Kal Elise MD;  Location: [facility];  Service: Endoscopy;  Laterality: N/A;    CORONARY ARTERY BYPASS GRAFT  2005    2 vessel    ESOPHAGOGASTRODUODENOSCOPY N/A 7/5/2018    Procedure: ESOPHAGOGASTRODUODENOSCOPY (EGD)/cryo;  Surgeon: Robbie Gonzales MD;  Location: [facility] (2ND FLR);  Service: Endoscopy;  Laterality: N/A;  Eliquis/Dr Henry Wei/OK to hold med 2 days prior to egd/see telephone encounter dated 6/12/18/pt stated she needs to take Diflucan 1 tab po daily 12 days prior to egd, message sent to Sarah/she will check with Dr Gonzales/santo    ESOPHAGOGASTRODUODENOSCOPY N/A 10/3/2018    Procedure: EGD (ESOPHAGOGASTRODUODENOSCOPY)/poss cryo;  Surgeon: Robbie Gonzales MD;  Location: [facility] ([facility]);  Service: Endoscopy;  Laterality: N/A;  2 day hold Dr Henry Pavonlucan 1 tab po daily starting 14 days prior to egd, Rx'd per Dr Gonzales    ESOPHAGOGASTRODUODENOSCOPY N/A 11/1/2018    Procedure: EGD (ESOPHAGOGASTRODUODENOSCOPY)/cryo;  Surgeon: Robbie Gonzales MD;  Location: [facility] (2ND FLR);  Service: Endoscopy;  Laterality: N/A;  2 day hold Dr Henry Pavon  Diflucan 1 tab po daily starting 14 days prior to egd, Rx'd per Dr Gonzales as before?    ESOPHAGOGASTRODUODENOSCOPY N/A 9/27/2019    Procedure: EGD (ESOPHAGOGASTRODUODENOSCOPY);  Surgeon: Robbie Gonzales MD;  Location: [facility] (2ND FLR);  Service: Endoscopy;  Laterality: N/A;  Coumadin was d/c'd 9/18/19.  No  other anti-thrombotic med for now.  pg  Per Dr Gonzales, she does not need to take Diflucan pre-procedure as she did before.  pg    ESOPHAGOGASTRODUODENOSCOPY N/A 2020    Procedure: EGD (ESOPHAGOGASTRODUODENOSCOPY)/poss cryo;  Surgeon: Robbie Gonzales MD;  Location: [address]);  Service: Endoscopy;  Laterality: N/A;  ok for eliquis hold-see telephone order 20-tb    EYE SURGERY  Dec 2012    ptosis repair    FIRST RIB REMOVAL   with lung resection    HYSTERECTOMY      LUNG LOBECTOMY      left lung for cancer    REMOVAL OF PLANTAR WART USING LASER      SHOULDER SURGERY  ,    scapular entrapment after lung surgery, needed 5 ribs removed left side    SYMPOTHATIC NERVE LEFT SIDE       with lung surgery    TONSILLECTOMY      TONSILLECTOMY, ADENOIDECTOMY, BILATERAL MYRINGOTOMY AND TUBES      UPPER GASTROINTESTINAL ENDOSCOPY  2016    Dr. Koo    VASCULAR SURGERY      stents place in jugualr vein     Social History     Socioeconomic History    Marital status:      Spouse name: Not on file    Number of children: Not on file    Years of education: Not on file    Highest education level: Not on file   Occupational History    Occupation: retired   Social Needs    Financial resource strain: Not on file    Food insecurity:     Worry: Not on file     Inability: Not on file    Transportation needs:     Medical: Not on file     Non-medical: Not on file   Tobacco Use    Smoking status: Former Smoker     Packs/day: 2.00     Years: 20.00     Pack years: 40.00     Types: Cigarettes     Last attempt to quit: 1994     Years since quittin.1    Smokeless tobacco: Never Used   Substance and Sexual Activity    Alcohol use: No    Drug use: No    Sexual activity: Yes     Partners: Male   Lifestyle    Physical activity:     Days per week: Not on file     Minutes per session: Not on file    Stress: Not on file   Relationships    Social connections:     Talks  on phone: Not on file     Gets together: Not on file     Attends [employer] service: Not on file     Active member of club or organization: Not on file     Attends meetings of clubs or organizations: Not on file     Relationship status: Not on file   Other Topics Concern    Are you pregnant or think you may be? No    Breast-feeding No   Social History Narrative    Not on file     Family History   Problem Relation Age of Onset    Allergic rhinitis Father     Cancer Father     Hypertension Father     Allergies Father     Allergic rhinitis Son     Asthma Son     Stroke Mother     Allergies Mother     Allergic rhinitis Sister     Asthma Sister     Diverticulitis Brother     No Known Problems Daughter     No Known Problems Maternal Aunt     No Known Problems Maternal Uncle     No Known Problems Paternal Aunt     No Known Problems Paternal Uncle     Colon polyps Maternal Grandmother          of colon blockage    No Known Problems Maternal Grandfather     No Known Problems Paternal Grandmother     No Known Problems Paternal Grandfather     No Known Problems Brother     No Known Problems Sister     Angioedema Neg Hx     Eczema Neg Hx     Immunodeficiency Neg Hx     Urticaria Neg Hx     Skin cancer Neg Hx     Amblyopia Neg Hx     Blindness Neg Hx     Cataracts Neg Hx     Diabetes Neg Hx     Glaucoma Neg Hx     Macular degeneration Neg Hx     Retinal detachment Neg Hx     Strabismus Neg Hx     Thyroid disease Neg Hx     Colon cancer Neg Hx     Melanoma Neg Hx        PRIOR HISTORY OF CHEMOTHERAPY OR RADIOTHERAPY: Please see HPI for patients prior oncologic history.    Medication List with Changes/Refills   Current Medications    ALBUTEROL (ACCUNEB) 1.25 MG/3 ML NEBU    USE 1 VIAL (3 ML) VIA NEBULIZER EVERY 6 HOURS AS NEEDED    ALBUTEROL (PROVENTIL HFA) 90 MCG/ACTUATION INHALER    Inhale 2 puffs into the lungs every 6 (six) hours as needed for Wheezing.    APIXABAN (ELIQUIS) 5 MG TAB  "   Take 1 tablet by mouth once daily. Take half tab BID    BISACODYL (DULCOLAX) 5 MG EC TABLET    Take 1 tablet (5 mg total) by mouth daily as needed for Constipation.    CETIRIZINE (ZYRTEC) 10 MG TABLET    TAKE 1 TABLET EVERY EVENING    DIPHENHYDRAMINE (BENADRYL) 25 MG CAPSULE    Take 25 mg by mouth every 6 (six) hours as needed for Itching.    FLUTICASONE PROPIONATE (FLONASE) 50 MCG/ACTUATION NASAL SPRAY    1 spray (50 mcg total) by Each Nostril route once daily.    KETOCONAZOLE (NIZORAL) 2 % CREAM    Apply topically once daily.    LEVOTHYROXINE (SYNTHROID) 88 MCG TABLET    TAKE 1 TABLET DAILY    MONTELUKAST (SINGULAIR) 10 MG TABLET    TAKE 1 TABLET EVERY EVENING    MULTIVITAMIN-MINERALS-LUTEIN (CENTRUM SILVER) TAB    Take 1 tablet by mouth once daily. 1 Tablet Oral Every day    PANTOPRAZOLE (PROTONIX) 40 MG TABLET    TAKE 1 TABLET DAILY 30 MINUTES PRIOR TO BREAKFAST    SPIRIVA WITH HANDIHALER 18 MCG INHALATION CAPSULE    INHALE THE CONTENTS OF 1 CAPSULE DAILY    TRETINOIN (RETIN-A) 0.05 % CREAM    APPLY THIN FILM TO FACE AT NIGHT AFTER MOISTURIZING    ZINC GLUCONATE ORAL    Take by mouth.     Review of patient's allergies indicates:   Allergen Reactions    Ciprofloxacin Itching    Coreg [carvedilol]      Low energy    Doxycycline Other (See Comments) and Hives     Patient had a rxn with the sun despite wearing spf 30    Metoprolol      Low energy       QUALITY OF LIFE: 80%- Normal Activity with Effort: Some Symptoms of Disease    Vitals:    03/04/20 1307   BP: 125/77   Pulse: 95   Resp: 16   Temp: 98.5 °F (36.9 °C)   TempSrc: Oral   SpO2: 96%   Weight: 54.9 kg (121 lb)   Height: 5' 6" (1.676 m)   PainSc: 0-No pain     Body mass index is 19.53 kg/m².    PHYSICAL EXAM:   GENERAL: alert; in no apparent distress.  Thin, not ill-appearing, not cachectic  HEAD: normocephalic, atraumatic.  EYES: pupils are equal, round, reactive to light and accommodation. Sclera anicteric. Conjunctiva not injected.   NOSE/THROAT: " no nasal erythema or rhinorrhea. Oropharynx pink, without erythema, ulcerations or thrush.   NECK: no cervical motion rigidity; supple with no masses.  CHEST: Patient is speaking comfortably on room air with normal work of breathing without using accessory muscles of respiration.  Median sternotomy incision clean dry and intact, with local caput medusae  ABDOMEN: soft, nontender, nondistended.   MUSCULOSKELETAL: no tenderness to palpation along the spine or scapulae. Normal range of motion.  NEUROLOGIC: cranial nerves II-XII intact bilaterally. Strength 5/5 in bilateral upper and lower extremities. No sensory deficits appreciated.  Normal gait.  LYMPHATIC: no cervical, supraclavicular adenopathy appreciated bilaterally.   EXTREMITIES: no clubbing, cyanosis, edema.  SKIN: no erythema, rashes, ulcerations noted.     REVIEW OF IMAGING/PATHOLOGY/LABS: Please see HPI. All images reviewed personally by dictating physician.     ASSESSMENT: 69 y.o. female with SCCa prox esophagus in the setting of a previously cryotherapy treated papilloma, of note with previous history of small cell lung cancer status post lobectomy followed by adjuvant chemoradiation therapy in 1996.  PLAN:  Steph Lira presents with a past medical history consistent with small cell lung cancer diagnosed in 1996 treated with lobectomy (she believes the left upper) followed by adjuvant chemoradiation therapy, unknown dose, for which she believes was a positive margin on the ribs with treatment field near the middle of the chest.  She has been followed without evidence of disease and [place], [place] [facility] for esophageal stricture in the setting of diagnosis of Schatzki ring and eosinophilic esophagitis with an atypical papillary squamous lesion of the proximal esophagus at approximately 20 cm from the incisors which has been treated with multiple rounds of cryotherapy most  recently with biopsy returning SCCa.  She met with Dr. Mireles who ordered staging PET-CT scan and referred for feeding tube placement.    At my visit with the patient, she reports inability to tolerate solids and now beginning to develop difficulty with liquids.  She continues to lose weight.  She does not feel ill but reports difficulty managing even her secretions.  She has an appointment with Dr. Ruiz for feeding tube and I have discussed with both he and Dr. [place] for evaluation of stenting and/or J-tube presuming she may undergo esophagectomy at some point.  Will also place referral to Dr. Douglas for evaluation.      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  If this is within the previous radiation treatment fields, that dose may preclude neoadjuvant radiotherapy.  In that circumstance would recommend, definitive resection followed by adjuvant systemic therapy.  If not she may be a candidate for neoadjuvant or definitive chemoradiation therapy.  Patient has signed a release today so that we may obtain her prior records.  I also will have her meet with our dietician today.    I will have her follow-up with me in 2 weeks.    The patient has our contact information and understands that they are free to contact us at any time with questions or concerns regarding radiation therapy.    DISPOSITION: RTC AFTER FURTHER WORKUP    I have personally seen and evaluated this patient. Greater than 50% of this time was spent discussing coordination of care and/or counseling.    PHYSICIAN: Ulisses Renner Jr, MD    Thank you for the opportunity to meet and consult with Steph Lira.   Please feel free to contact me to discuss the above recommendation further.

## 2020-03-04 NOTE — PROGRESS NOTES
Medical Nutrition Therapy Oncology Progress Note      Patient's PCP:Maiad Mon MD  Referring Provider: No ref. provider found    Recommendations/Interventions     RD Notes  Consult with Mrs. Lira per Dr. Renner. Pt reports severe dysphagia x 1 month. She is only able to tolerate small amounts of thin, hot liquids. Typical intake currently includes 1-2 Glucerna shakes mixed with hot chocolate + a small cup of chicken broth. She reports progressively worsening dysphagia & is agreeable to having a feeding tube placed soon to prevent further wt loss. She notes UBW of 136#. CW: 121#.   Plan is for Dr. Renner to consult with GI re: possible stenting and/or j-tube placement prior to treatment.     Nutrition Intervention Texture-modified diet   Goals/Expected Outcomes High kcal, high protein ONS as tolerated to meet estimated energy needs.    Progress Initial     Nutrition Intervention Enteral Nutrition  Insert feeding tube   Goals/Expected Outcomes J-tube insertion   Progress Initial     Plan  1. Pending GI consult, recommend j-tube placement to prevent further wt loss prior to treatment. Will provide TF recs once consult is complete.   2. Recommend pt try Boost Soothe drinks (300 kcals each- thin liquid) to increase caloric intake for the interim. Provided samples.   3. Encouraged continuing with clear broths as tolerated.   4. Provided pt with RD contact info. Encouraged her to call with any questions/concerns.       Subjective:        Patient ID: Steph Lira is a 69 y.o. female.    Chief Complaint: dysphagia     Past Medical History:   Diagnosis Date    *Atrial fibrillation     and Hx PSVT    Allergic drug rash 12/28/2016    Allergy     chronic     Arthritis     fingers    Benign tumor of throat     Bronchitis March 2013    CAD (coronary artery disease) 2005    CABGx2 in 2005 and 2 MI after with 4 stents    Cancer 1996    small cell lung cancer s/p resection of 1/3  lung, 5 ribs and muscle mass then had chemo and radiation    Cataract     OD     CHF (congestive heart failure) past Hx    Chronic rhinitis     Clot past Hx    external jugular, now stented, occluded, had phlebitis; now revascularized    Colon polyp     COPD (chronic obstructive pulmonary disease)     no oxygen or nebulizers    COPD exacerbation 5/8/13    improved 5/14/13 after steroid pack,antibiotics    Dysphagia     Esophageal stenosis     Former smoker     GERD (gastroesophageal reflux disease)     Heart block     Hyperlipidemia     Hypertension     pt states does not have //    MI (myocardial infarction) 2005    Posterior vitreous detachment of left eye     Thyroid disease        Past Surgical History:   Procedure Laterality Date    ABLATION N/A 12/11/2018    Procedure: ABLATION;  Surgeon: Santana Covarrubias MD;  Location: [facility];  Service: Cardiology;  Laterality: N/A;  AF, JUAN ANTONIO, PVI, RFA, CARTO, GEN, GP, 3 PREP    ABLATION OF ARRHYTHMOGENIC FOCUS FOR ATRIAL FIBRILLATION N/A 5/8/2019    Procedure: ABLATION, ARRHYTHMOGENIC FOCUS, FOR ATRIAL FIBRILLATION;  Surgeon: Santana Covarrubias MD;  Location: [facility];  Service: Cardiology;  Laterality: N/A;  AF, PVI, RFA, CARTO, GEN, GP , 3 PREP    ADENOIDECTOMY      APPENDECTOMY      BACK SURGERY      lumbar    BREAST BIOPSY Left     benign    CARDIAC SURGERY      CARDIOVERSION  5/8/2019    Procedure: Cardioversion;  Surgeon: Santana Covarrubias MD;  Location: [facility];  Service: Cardiology;;    CATARACT EXTRACTION Left     OS    CATARACT EXTRACTION, BILATERAL      left eye only    CHOLECYSTECTOMY      COLONOSCOPY  03/2012    repeat in 5 years    COLONOSCOPY N/A 6/19/2017    Procedure: COLONOSCOPY;  Surgeon: Kal Elise MD;  Location: [facility];  Service: Endoscopy;  Laterality: N/A;    CORONARY ARTERY BYPASS GRAFT  2005    2 vessel    ESOPHAGOGASTRODUODENOSCOPY N/A 7/5/2018    Procedure: ESOPHAGOGASTRODUODENOSCOPY (EGD)/cryo;   Surgeon: Robbie Gonzales MD;  [facility]  Service: Endoscopy;  Laterality: N/A;  Eliholliis/Dr Henry Wei/OK to hold med 2 days prior to egd/see telephone encounter dated 6/12/18/pt stated she needs to take Diflucan 1 tab po daily 12 days prior to egd, message sent to Sarah/she will check with Dr Gonzales/santo    ESOPHAGOGASTRODUODENOSCOPY N/A 10/3/2018    Procedure: EGD (ESOPHAGOGASTRODUODENOSCOPY)/poss cryo;  Surgeon: Robbie Gonzales MD;  Location: [facility] ([facility]);  Service: Endoscopy;  Laterality: N/A;  2 day hold Dr Henry Pavon  Diflucan 1 tab po daily starting 14 days prior to egd, Rx'd per Dr Gonzales    ESOPHAGOGASTRODUODENOSCOPY N/A 11/1/2018    Procedure: EGD (ESOPHAGOGASTRODUODENOSCOPY)/cryo;  Surgeon: Robbie Gonzales MD;  Location: [facility] ([address] [place] [address]);  Service: Endoscopy;  Laterality: N/A;  2 day hold Dr Henry Pavon  Diflucan 1 tab po daily starting 14 days prior to egd, Rx'd per Dr Gonzales as before?    ESOPHAGOGASTRODUODENOSCOPY N/A 9/27/2019    Procedure: EGD (ESOPHAGOGASTRODUODENOSCOPY);  Surgeon: Robbie Gonzales MD;  Location: [facility] ([facility]);  Service: Endoscopy;  Laterality: N/A;  Coumadin was d/c'd 9/18/19.  No other anti-thrombotic med for now.  pg  Per Dr Gonzales, she does not need to take Diflucan pre-procedure as she did before.  pg    ESOPHAGOGASTRODUODENOSCOPY N/A 1/31/2020    Procedure: EGD (ESOPHAGOGASTRODUODENOSCOPY)/poss cryo;  Surgeon: Robbie oGnzales MD;  Location: [facility] ([facility]);  Service: Endoscopy;  Laterality: N/A;  ok for eliquis hold-see telephone order 1/23/20-tb    EYE SURGERY  Dec 2012    ptosis repair    FIRST RIB REMOVAL  1996 with lung resection    HYSTERECTOMY      LUNG LOBECTOMY  1996    left lung for cancer    REMOVAL OF PLANTAR WART USING LASER      SHOULDER SURGERY  2010,2011    scapular entrapment after lung surgery, needed 5 ribs removed left side    SYMPOTHATIC NERVE LEFT SIDE      1996 with  lung surgery    TONSILLECTOMY      TONSILLECTOMY, ADENOIDECTOMY, BILATERAL MYRINGOTOMY AND TUBES      UPPER GASTROINTESTINAL ENDOSCOPY  2016    Dr. Koo    VASCULAR SURGERY      stents place in jugualr vein       Social History     Socioeconomic History    Marital status:      Spouse name: Not on file    Number of children: Not on file    Years of education: Not on file    Highest education level: Not on file   Occupational History    Occupation: retired   Social Needs    Financial resource strain: Not on file    Food insecurity:     Worry: Not on file     Inability: Not on file    Transportation needs:     Medical: Not on file     Non-medical: Not on file   Tobacco Use    Smoking status: Former Smoker     Packs/day: 2.00     Years: 20.00     Pack years: 40.00     Types: Cigarettes     Last attempt to quit: 1994     Years since quittin.1    Smokeless tobacco: Never Used   Substance and Sexual Activity    Alcohol use: No    Drug use: No    Sexual activity: Yes     Partners: Male   Lifestyle    Physical activity:     Days per week: Not on file     Minutes per session: Not on file    Stress: Not on file   Relationships    Social connections:     Talks on phone: Not on file     Gets together: Not on file     Attends [employer] service: Not on file     Active member of club or organization: Not on file     Attends meetings of clubs or organizations: Not on file     Relationship status: Not on file   Other Topics Concern    Are you pregnant or think you may be? No    Breast-feeding No   Social History Narrative    Not on file       Family History   Problem Relation Age of Onset    Allergic rhinitis Father     Cancer Father     Hypertension Father     Allergies Father     Allergic rhinitis Son     Asthma Son     Stroke Mother     Allergies Mother     Allergic rhinitis Sister     Asthma Sister     Diverticulitis Brother     No Known Problems Daughter     No Known  Problems Maternal Aunt     No Known Problems Maternal Uncle     No Known Problems Paternal Aunt     No Known Problems Paternal Uncle     Colon polyps Maternal Grandmother          of colon blockage    No Known Problems Maternal Grandfather     No Known Problems Paternal Grandmother     No Known Problems Paternal Grandfather     No Known Problems Brother     No Known Problems Sister     Angioedema Neg Hx     Eczema Neg Hx     Immunodeficiency Neg Hx     Urticaria Neg Hx     Skin cancer Neg Hx     Amblyopia Neg Hx     Blindness Neg Hx     Cataracts Neg Hx     Diabetes Neg Hx     Glaucoma Neg Hx     Macular degeneration Neg Hx     Retinal detachment Neg Hx     Strabismus Neg Hx     Thyroid disease Neg Hx     Colon cancer Neg Hx     Melanoma Neg Hx        Review of patient's allergies indicates:   Allergen Reactions    Ciprofloxacin Itching    Coreg [carvedilol]      Low energy    Doxycycline Other (See Comments) and Hives     Patient had a rxn with the sun despite wearing spf 30    Metoprolol      Low energy       Current Outpatient Medications:     albuterol (ACCUNEB) 1.25 mg/3 mL Nebu, USE 1 VIAL (3 ML) VIA NEBULIZER EVERY 6 HOURS AS NEEDED, Disp: 90 mL, Rfl: 2    albuterol (PROVENTIL HFA) 90 mcg/actuation inhaler, Inhale 2 puffs into the lungs every 6 (six) hours as needed for Wheezing., Disp: 3 Inhaler, Rfl: 4    apixaban (ELIQUIS) 5 mg Tab, Take 1 tablet by mouth once daily. Take half tab BID, Disp: , Rfl:     bisacodyl (DULCOLAX) 5 mg EC tablet, Take 1 tablet (5 mg total) by mouth daily as needed for Constipation. (Patient not taking: Reported on 2020), Disp: 30 tablet, Rfl: 11    cetirizine (ZYRTEC) 10 MG tablet, TAKE 1 TABLET EVERY EVENING, Disp: 90 tablet, Rfl: 4    diphenhydrAMINE (BENADRYL) 25 mg capsule, Take 25 mg by mouth every 6 (six) hours as needed for Itching., Disp: , Rfl:     fluticasone propionate (FLONASE) 50 mcg/actuation nasal spray, 1 spray (50 mcg  total) by Each Nostril route once daily., Disp: 3 g, Rfl: 11    ketoconazole (NIZORAL) 2 % cream, Apply topically once daily. (Patient not taking: Reported on 2/27/2020), Disp: 60 g, Rfl: 0    levothyroxine (SYNTHROID) 88 MCG tablet, TAKE 1 TABLET DAILY, Disp: 90 tablet, Rfl: 4    montelukast (SINGULAIR) 10 mg tablet, TAKE 1 TABLET EVERY EVENING, Disp: 90 tablet, Rfl: 4    multivitamin-minerals-lutein (CENTRUM SILVER) Tab, Take 1 tablet by mouth once daily. 1 Tablet Oral Every day, Disp: , Rfl:     pantoprazole (PROTONIX) 40 MG tablet, TAKE 1 TABLET DAILY 30 MINUTES PRIOR TO BREAKFAST (Patient not taking: Reported on 2/27/2020), Disp: 90 tablet, Rfl: 3    SPIRIVA WITH HANDIHALER 18 mcg inhalation capsule, INHALE THE CONTENTS OF 1 CAPSULE DAILY, Disp: 90 capsule, Rfl: 3    tretinoin (RETIN-A) 0.05 % cream, APPLY THIN FILM TO FACE AT NIGHT AFTER MOISTURIZING, Disp: 45 g, Rfl: 3    ZINC GLUCONATE ORAL, Take by mouth., Disp: , Rfl:   No current facility-administered medications for this visit.     Facility-Administered Medications Ordered in Other Visits:     0.9%  NaCl infusion, , Intravenous, Continuous, Ni Jaimes NP, Last Rate: 0 mL/hr at 12/11/18 0850    0.9%  NaCl infusion, , Intravenous, Continuous, Ni Jaimes NP    sodium chloride 0.9% flush 5 mL, 5 mL, Intravenous, PRN, Ni Jaimes NP    All medications and past history have been reviewed.    [No treatment plan]    Objective:        Wt Readings from Last 1 Encounters:   03/04/20 1307 54.9 kg (121 lb)       Last Labs:  Glucose   Date Value Ref Range Status   12/06/2019 90 70 - 110 mg/dL Final   06/17/2019 79 70 - 110 mg/dL Final     BUN, Bld   Date Value Ref Range Status   12/06/2019 10 8 - 23 mg/dL Final   06/17/2019 10 8 - 23 mg/dL Final     Creatinine   Date Value Ref Range Status   12/06/2019 0.7 0.5 - 1.4 mg/dL Final   06/17/2019 0.7 0.5 - 1.4 mg/dL Final   08/26/2013 0.8 0.5 - 1.4 mg/dL Final     Sodium   Date  Value Ref Range Status   12/06/2019 138 136 - 145 mmol/L Final   06/17/2019 139 136 - 145 mmol/L Final     Potassium   Date Value Ref Range Status   12/06/2019 4.5 3.5 - 5.1 mmol/L Final   06/17/2019 4.1 3.5 - 5.1 mmol/L Final     No results found for: PHOS  Calcium   Date Value Ref Range Status   12/06/2019 9.3 8.7 - 10.5 mg/dL Final   06/17/2019 9.3 8.7 - 10.5 mg/dL Final   08/26/2013 9.5 8.7 - 10.5 mg/dL Final     No results found for: PREALBUMIN  Total Protein   Date Value Ref Range Status   06/17/2019 7.0 6.0 - 8.4 g/dL Final   03/14/2018 6.6 6.0 - 8.4 g/dL Final     Cholesterol   Date Value Ref Range Status   02/23/2019 154 120 - 199 mg/dL Final     Comment:     The National Cholesterol Education Program (NCEP) has set the  following guidelines (reference ranges) for Cholesterol:  Optimal.....................<200 mg/dL  Borderline High.............200-239 mg/dL  High........................> or = 240 mg/dL     01/18/2018 160 120 - 199 mg/dL Final     Comment:     The National Cholesterol Education Program (NCEP) has set the  following guidelines (reference ranges) for Cholesterol:  Optimal.....................<200 mg/dL  Borderline High.............200-239 mg/dL  High........................> or = 240 mg/dL       Hemoglobin A1C   Date Value Ref Range Status   06/17/2019 5.5 4.0 - 5.6 % Final     Comment:     ADA Screening Guidelines:  5.7-6.4%  Consistent with prediabetes  >or=6.5%  Consistent with diabetes  High levels of fetal hemoglobin interfere with the HbA1C  assay. Heterozygous hemoglobin variants (HbS, HgC, etc)do  not significantly interfere with this assay.   However, presence of multiple variants may affect accuracy.     02/28/2018 5.3 4.0 - 5.6 % Final     Comment:     According to ADA guidelines, hemoglobin A1c <7.0% represents  optimal control in non-pregnant diabetic patients. Different  metrics may apply to specific patient populations.   Standards of Medical Care in Diabetes-2016.  For the  purpose of screening for the presence of diabetes:  <5.7%     Consistent with the absence of diabetes  5.7-6.4%  Consistent with increasing risk for diabetes   (prediabetes)  >or=6.5%  Consistent with diabetes  Currently, no consensus exists for use of hemoglobin A1c  for diagnosis of diabetes for children.  This Hemoglobin A1c assay has significant interference with fetal   hemoglobin   (HbF). The results are invalid for patients with abnormal amounts of   HbF,   including those with known Hereditary Persistence   of Fetal Hemoglobin. Heterozygous hemoglobin variants (HbAS, HbAC,   HbAD, HbAE, HbA2) do not significantly interfere with this assay;   however, presence of multiple variants in a sample may impact the %   interference.       Hgb   Date Value Ref Range Status   08/26/2013 13.2 12.0 - 16.0 g/dl Final     Hemoglobin   Date Value Ref Range Status   06/17/2019 12.7 12.0 - 16.0 g/dL Final   05/07/2019 12.9 12.0 - 16.0 g/dL Final     Hematocrit   Date Value Ref Range Status   06/17/2019 39.9 37.0 - 48.5 % Final   05/07/2019 39.5 37.0 - 48.5 % Final     No results found for: IRON  No components found for: FROLATE  No results found for: [id number]  WBC   Date Value Ref Range Status   06/17/2019 7.03 3.90 - 12.70 K/uL Final   05/07/2019 7.00 3.90 - 12.70 K/uL Final       Assessment:     Nutrition/Diet History     Patient Reported Diet/Restrictions/Preferences: thin liquids  Food Allergies: NKFA  Factors Affecting Nutritional Intake: dysphagia, odynophagia r/t esophageal cancer    Estimated/Assessed Needs     Weight Used For Calorie Calculations: 56.2 kg (123 lb)  Energy Calorie Requirements (kcal): 0500-0031 kcal/day   Energy Need Method: 32-38 Kcal/kg  Protein Requirements: 78-90 g/day   Protein Need Method: 1.4-1.6 g/kg  Fluid Requirements: 1800 ml/day  Estimated Fluid Requirement Method: 1ml/kcal      Nutrition Support  Possible j-tube placement in near future, pending GI surgical consult    Evaluation of  Received Nutrient/Fluid Intake     Calorie Intake: not meeting needs  Protein Intake: not meeting needs  Fluid Intake: not meeting needs  Tolerance: tolerating  % Intake of Estimated Energy Needs: <25 %      Nutrition Diagnosis Related to (Etiology) As Evidenced By (Signs/Symptoms)   Inadequate energy intake Severe dysphagia secondary to esophageal cancer Unintentional wt loss of approximately 15# in 1 month.        RD Notes  Consult with Mrs. Lira per Dr. Renner. Pt reports severe dysphagia x 1 month. She is only able to tolerate small amounts of thin, hot liquids. Typical intake currently includes 1-2 Glucerna shakes mixed with hot chocolate + a small cup of chicken broth. She reports progressively worsening dysphagia & is agreeable to having a feeding tube placed soon to prevent further wt loss. She notes UBW of 136#. CW: 121#.   Plan is for Dr. Renner to consult with GI re: possible stenting and/or j-tube placement prior to treatment.     Nutrition Intervention:      Nutrition Intervention Texture-modified diet   Goals/Expected Outcomes High kcal, high protein ONS as tolerated to meet estimated energy needs.    Progress Initial     Nutrition Intervention Enteral Nutrition  Insert feeding tube   Goals/Expected Outcomes J-tube insertion   Progress Initial     Plan  1. Pending GI consult, recommend j-tube placement to prevent further wt loss prior to treatment. Will provide TF recs once consult is complete.   2. Recommend pt try Boost Soothe drinks (300 kcals each- thin liquid) to increase caloric intake for the interim. Provided samples.   3. Encouraged continuing with clear broths as tolerated.   4. Provided pt with RD contact info. Encouraged her to call with any questions/concerns.     Monitoring/Evaluation:     Monitor: wt, PO intake, GI consult     Next Visit: Will f/u after GI consult.       I have explained and the patient understands all of  the current recommendation(s). I have answered all of their  questions to the best of my ability and to their complete satisfaction.   The patient is to continue with the current management plan.    Electronically signed by: Seble Le MS, RDN, LDN

## 2020-03-05 NOTE — PROGRESS NOTES
Met with patient to complete new patient orientation and the NCCN Distress Screening: patient indicated a rating of 3.  Verbal referral given to Seble Le RD, to follow-up with regarding eating concerns.  Patient denied needing psychosocial support at this time.   contact information provided to patient in the event she needs supportive services in the future.

## 2020-03-06 NOTE — PROGRESS NOTES
HPI:  Patient is a 69 y.o. year old female . year old female with esophageal cancer, being followed by [facility].  She has lost a lot of weight in the process d/t dysphagia. In the interim, she was at [facility] and 2 preteens ran in to her. She has been having severe hip and knee pain since than. It is worse w. Walking. She is unable to swallow due to size of tumor. Her pain is worse w. Walking.     Imaging- prior to acccident  X-Ray Hip 2 or 3 views Right   Order: [id number]   Status:  Final result   Visible to patient:  Yes (Patient Portal) Next appt:  02/27/2020 at 10:40 AM in Hematology and Oncology (Pacheco Mireles MD) Dx:  Pain of right hip joint   Details            Reading Physician Reading Date Result Priority   Dipesh Fernando MD 12/20/2019 Routine       Narrative       EXAMINATION:  XR HIP 2 VIEW RIGHT    CLINICAL HISTORY:  Pain in right hip    TECHNIQUE:  AP view of the pelvis and frog leg lateral view of the right hip were performed.    COMPARISON:  None    FINDINGS:  There is mild degenerative arthrosis of both hips with periarticular osteophyte formation.  No fracture or subluxation are identified.  The pelvis is intact.       Impression         Mild degenerative arthrosis of both hips               Labs  egfr cr lfts gluc nl        Past Medical History:   Diagnosis Date    *Atrial fibrillation     and Hx PSVT    Allergic drug rash 12/28/2016    Allergy     chronic     Arthritis     fingers    Benign tumor of throat     Bronchitis March 2013    CAD (coronary artery disease) 2005    CABGx2 in 2005 and 2 MI after with 4 stents    Cancer 1996    small cell lung cancer s/p resection of 1/3 lung, 5 ribs and muscle mass then had chemo and radiation    Cataract     OD     CHF (congestive heart failure) past Hx    Chronic rhinitis     Clot past Hx    external jugular, now stented, occluded, had phlebitis; now revascularized    Colon polyp     COPD (chronic obstructive pulmonary disease)     no oxygen or  nebulizers    COPD exacerbation 5/8/13    improved 5/14/13 after steroid pack,antibiotics    Dysphagia     Esophageal stenosis     Former smoker     GERD (gastroesophageal reflux disease)     Heart block     Hyperlipidemia     Hypertension     pt states does not have //    MI (myocardial infarction) 2005    Posterior vitreous detachment of left eye     Thyroid disease      Past Surgical History:   Procedure Laterality Date    ABLATION N/A 12/11/2018    Procedure: ABLATION;  Surgeon: Santana Covarrubias MD;  Location: [facility] EP LAB;  Service: Cardiology;  Laterality: N/A;  AF, JUAN ANTONIO, PVI, RFA, CARTO, GEN, GP, 3 PREP    ABLATION OF ARRHYTHMOGENIC FOCUS FOR ATRIAL FIBRILLATION N/A 5/8/2019    Procedure: ABLATION, ARRHYTHMOGENIC FOCUS, FOR ATRIAL FIBRILLATION;  Surgeon: Santana Covarrubias MD;  Location: [facility] EP LAB;  Service: Cardiology;  Laterality: N/A;  AF, PVI, RFA, CARTO, GEN, GP , 3 PREP    ADENOIDECTOMY      APPENDECTOMY      BACK SURGERY      lumbar    BREAST BIOPSY Left     benign    CARDIAC SURGERY      CARDIOVERSION  5/8/2019    Procedure: Cardioversion;  Surgeon: Santana Covarrubias MD;  Location: [facility] EP LAB;  Service: Cardiology;;    CATARACT EXTRACTION Left     OS    CATARACT EXTRACTION, BILATERAL      left eye only    CHOLECYSTECTOMY      COLONOSCOPY  03/2012    repeat in 5 years    COLONOSCOPY N/A 6/19/2017    Procedure: COLONOSCOPY;  Surgeon: Kal Elise MD;  Location: [facility];  Service: Endoscopy;  Laterality: N/A;    CORONARY ARTERY BYPASS GRAFT  2005    2 vessel    ESOPHAGOGASTRODUODENOSCOPY N/A 7/5/2018    Procedure: ESOPHAGOGASTRODUODENOSCOPY (EGD)/cryo;  Surgeon: Robbie Gonzales MD;  Location: [facility] ([address] [place] [address]);  Service: Endoscopy;  Laterality: N/A;  Eliquis/Dr Henry Wei/OK to hold med 2 days prior to egd/see telephone encounter dated 6/12/18/pt stated she needs to take Diflucan 1 tab po daily 12 days prior to egd, message sent to Sarah/she will check with Dr Gonzales/santo     ESOPHAGOGASTRODUODENOSCOPY N/A 10/3/2018    Procedure: EGD (ESOPHAGOGASTRODUODENOSCOPY)/poss cryo;  Surgeon: Robbie Gonzales MD;  [facility]  Service: Endoscopy;  Laterality: N/A;  2 day hold Dr Henry Pavon  Diflucan 1 tab po daily starting 14 days prior to egd, Rx'd per Dr Gonzales    ESOPHAGOGASTRODUODENOSCOPY N/A 11/1/2018    Procedure: EGD (ESOPHAGOGASTRODUODENOSCOPY)/cryo;  Surgeon: Robbie Gonzales MD;  Location: [facility] (2ND FLR);  Service: Endoscopy;  Laterality: N/A;  2 day hold Dr Henry Pavon  Diflucan 1 tab po daily starting 14 days prior to egd, Rx'd per Dr Gonzales as before?    ESOPHAGOGASTRODUODENOSCOPY N/A 9/27/2019    Procedure: EGD (ESOPHAGOGASTRODUODENOSCOPY);  Surgeon: Robbie Gonzales MD;  Location: [facility] ([address] [place] [address]);  Service: Endoscopy;  Laterality: N/A;  Coumadin was d/c'd 9/18/19.  No other anti-thrombotic med for now.  pg  Per Dr Gonzales, she does not need to take Diflucan pre-procedure as she did before.  pg    ESOPHAGOGASTRODUODENOSCOPY N/A 1/31/2020    Procedure: EGD (ESOPHAGOGASTRODUODENOSCOPY)/poss cryo;  Surgeon: Robbie Gonzales MD;  Location: [facility] ([address] [place] [address]);  Service: Endoscopy;  Laterality: N/A;  ok for eliquis hold-see telephone order 1/23/20-tb    EYE SURGERY  Dec 2012    ptosis repair    FIRST RIB REMOVAL  1996 with lung resection    HYSTERECTOMY      LUNG LOBECTOMY  1996    left lung for cancer    REMOVAL OF PLANTAR WART USING LASER      SHOULDER SURGERY  2010,2011    scapular entrapment after lung surgery, needed 5 ribs removed left side    SYMPOTHATIC NERVE LEFT SIDE      1996 with lung surgery    TONSILLECTOMY      TONSILLECTOMY, ADENOIDECTOMY, BILATERAL MYRINGOTOMY AND TUBES      UPPER GASTROINTESTINAL ENDOSCOPY  05/2016    Dr. Koo    VASCULAR SURGERY      stents place in jugualr vein     Family History   Problem Relation Age of Onset    Allergic rhinitis Father     Cancer Father      Hypertension Father     Allergies Father     Allergic rhinitis Son     Asthma Son     Stroke Mother     Allergies Mother     Allergic rhinitis Sister     Asthma Sister     Diverticulitis Brother     No Known Problems Daughter     No Known Problems Maternal Aunt     No Known Problems Maternal Uncle     No Known Problems Paternal Aunt     No Known Problems Paternal Uncle     Colon polyps Maternal Grandmother          of colon blockage    No Known Problems Maternal Grandfather     No Known Problems Paternal Grandmother     No Known Problems Paternal Grandfather     No Known Problems Brother     No Known Problems Sister     Angioedema Neg Hx     Eczema Neg Hx     Immunodeficiency Neg Hx     Urticaria Neg Hx     Skin cancer Neg Hx     Amblyopia Neg Hx     Blindness Neg Hx     Cataracts Neg Hx     Diabetes Neg Hx     Glaucoma Neg Hx     Macular degeneration Neg Hx     Retinal detachment Neg Hx     Strabismus Neg Hx     Thyroid disease Neg Hx     Colon cancer Neg Hx     Melanoma Neg Hx      Social History     Socioeconomic History    Marital status:      Spouse name: Not on file    Number of children: Not on file    Years of education: Not on file    Highest education level: Not on file   Occupational History    Occupation: retired   Social Needs    Financial resource strain: Not on file    Food insecurity:     Worry: Not on file     Inability: Not on file    Transportation needs:     Medical: Not on file     Non-medical: Not on file   Tobacco Use    Smoking status: Former Smoker     Packs/day: 2.00     Years: 20.00     Pack years: 40.00     Types: Cigarettes     Last attempt to quit: 1994     Years since quittin.1    Smokeless tobacco: Never Used   Substance and Sexual Activity    Alcohol use: No    Drug use: No    Sexual activity: Yes     Partners: Male   Lifestyle    Physical activity:     Days per week: Not on file     Minutes per session: Not on  file    Stress: Not on file   Relationships    Social connections:     Talks on phone: Not on file     Gets together: Not on file     Attends Anabaptist service: Not on file     Active member of club or organization: Not on file     Attends meetings of clubs or organizations: Not on file     Relationship status: Not on file   Other Topics Concern    Are you pregnant or think you may be? No    Breast-feeding No   Social History Narrative    Not on file       Review of patient's allergies indicates:   Allergen Reactions    Ciprofloxacin Itching    Coreg [carvedilol]      Low energy    Doxycycline Other (See Comments) and Hives     Patient had a rxn with the sun despite wearing spf 30    Metoprolol      Low energy       Current Outpatient Medications:     albuterol (ACCUNEB) 1.25 mg/3 mL Nebu, USE 1 VIAL (3 ML) VIA NEBULIZER EVERY 6 HOURS AS NEEDED, Disp: 90 mL, Rfl: 2    albuterol (PROVENTIL HFA) 90 mcg/actuation inhaler, Inhale 2 puffs into the lungs every 6 (six) hours as needed for Wheezing., Disp: 3 Inhaler, Rfl: 4    apixaban (ELIQUIS) 5 mg Tab, Take 1 tablet by mouth once daily. Take half tab BID, Disp: , Rfl:     bisacodyl (DULCOLAX) 5 mg EC tablet, Take 1 tablet (5 mg total) by mouth daily as needed for Constipation., Disp: 30 tablet, Rfl: 11    cetirizine (ZYRTEC) 10 MG tablet, TAKE 1 TABLET EVERY EVENING, Disp: 90 tablet, Rfl: 4    diphenhydrAMINE (BENADRYL) 25 mg capsule, Take 25 mg by mouth every 6 (six) hours as needed for Itching., Disp: , Rfl:     fluticasone propionate (FLONASE) 50 mcg/actuation nasal spray, 1 spray (50 mcg total) by Each Nostril route once daily., Disp: 3 g, Rfl: 11    ketoconazole (NIZORAL) 2 % cream, Apply topically once daily., Disp: 60 g, Rfl: 0    levothyroxine (SYNTHROID) 88 MCG tablet, TAKE 1 TABLET DAILY, Disp: 90 tablet, Rfl: 4    montelukast (SINGULAIR) 10 mg tablet, TAKE 1 TABLET EVERY EVENING, Disp: 90 tablet, Rfl: 4    multivitamin-minerals-lutein  (CENTRUM SILVER) Tab, Take 1 tablet by mouth once daily. 1 Tablet Oral Every day, Disp: , Rfl:     pantoprazole (PROTONIX) 40 MG tablet, TAKE 1 TABLET DAILY 30 MINUTES PRIOR TO BREAKFAST, Disp: 90 tablet, Rfl: 3    SPIRIVA WITH HANDIHALER 18 mcg inhalation capsule, INHALE THE CONTENTS OF 1 CAPSULE DAILY, Disp: 90 capsule, Rfl: 3    tretinoin (RETIN-A) 0.05 % cream, APPLY THIN FILM TO FACE AT NIGHT AFTER MOISTURIZING, Disp: 45 g, Rfl: 3    ZINC GLUCONATE ORAL, Take by mouth., Disp: , Rfl:   No current facility-administered medications for this visit.     Facility-Administered Medications Ordered in Other Visits:     0.9%  NaCl infusion, , Intravenous, Continuous, Ni Jaimes NP, Last Rate: 0 mL/hr at 12/11/18 0850    0.9%  NaCl infusion, , Intravenous, Continuous, Ni Jaimes NP    sodium chloride 0.9% flush 5 mL, 5 mL, Intravenous, PRN, Ni Jaimes NP      Review of Systems  No nausea, vomiting, fevers, Chills , contipation, diarrhea or sweats    Physical Exam:      Vitals:    03/06/20 1025   BP: 135/80   Pulse: 106     alert and oriented ×4 follows commands answers all questions appropriately,affect wnl  Manual muscle test 5 out of 5 sensation to light touch grossly intact  +mild tenderness right iliac crest and knee  Positive crepitus right knee  No knee laxity  Antalgic gait  No knee effusion no clubbing cyanosis or edema  No C/C/E      Assessment:  Right knee pain s/p fall  Right hip troch pain syndrome  Right hip oa  Cad , afib s/p cardiac ablation on elequis  Plan:  Right knee xray  Will address right knee pain w. Superficial cryoablation of nerves  Will address hip pain w. Right cortisone injection    PROCEDURES    #1 PROCEDURE:  Ultrasound assisted cryoablation of the anterior femoral cutaneous nerve, inferior and superior branches of infrapatellar saphenous nerve on the RIGHT     The patient was placed supine on the exam table and the proximal medial aspect of the tibia  and anterior aspect of distal femur was prepped with sterile chlorohexedine.  A line was drawn extending approximately 5 cm medial to inferior pole of the patella distally to a point approximately 5 cm medial to the tibial tubercle.  A second line was drawn in a medial to lateral direction the width of the patella approximately 13.5 cm proximal to the patella.  Ultrasound was then used to visualize the anterior femoral cutaneous nerve proximal to the previously drawn line.  This nerve was visualized in a plane less than 2 cm deep from the skin and in line with the previously drawn line.  This spot was then marked.  Next, the ultrasound was used to visualize the infrapatellar saphenous nerve branches.  These were tracked posteriorly before branching, and this nerve was also visualized less than 2 cm deep from the skin, and in a plane in line with the previously drawn line.  This spot was marked as well.  We then infiltrated the skin with MARCAINE along both locations were the nerves were visualized under ultrasound using a 27g needle. We then introduced the cryoanalgesia device along these locations and this device penetrated the skin, creating cryoanalgesia to both branches of the infrapatellar saphenous nerve and a third treatment to the anterior femoral cutaneous nerve. 3 punctures of the skin were made to treat the 2 branches of the ISN and another 3 punctures were made to treat the AFCN. There were a total of 3 nerves treated with cryoanalgesia. The patient tolerated the procedure well with no problems, and reported immediate reduction in knee pain post procedure.    #2 PROCEDURE NOTE:      PROCEDURE NOTE:   risk and benefit of right CLUNEAL NERVE BLOCK AND HYDRODISECTION injection reviewed with. patient. Verbal consent was obtained. Injection was performed after sterile prep w.chrolorohexedine. Short  and long axis APPROACH UTILIZED. First, VISUALIZATION OF THE PSIS  , superior gluteal artery, fascial line  between GMAX and GMED was obtained, subsequently  BLOCK + HYDRODISSECTION OF CLUNEAL NERVE PERFORMED. PT. WAS LAYING PRONE. INJECTION WAS PERFORMED WITH A  22g  spinal needle  needle  After a numbing wheal w. Lidocaine performed.   Ultrasound guidance was utilized for needle visualization. A TOTAL OF 10ML OF LIDOCAINE 1% AND 9ML OF STERILE NORMAL SALINE AND DEPOMEDROL 40MG 1ML WAS UTILIZED. No complications. iMMEDIATE IMPROVEMENT OF  PAIN POST PROCEDURE.    Ultrasound interpretation was performed prior to the procedure to identify the target and any adjacent neurovascular structures.  Subsequently, interpretation was performed during real- time needle guidance confirming placement. Post- intervention interpretation was also performed confirming appropriate injectate  flow and hemostasis.  Images indicating needle placement have been saved in [place] [facility].

## 2020-03-08 NOTE — PROGRESS NOTES
HPI    69 years old female referred to Hematology Oncology evaluation of esophageal cancer.  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  Patient is unable to tolerate any solid food.  Patient patient has lost approximately 15 lb over the past month.  Patient states that she has sensation of food stuck in her throat after ingestion.  Currently patient is taking liquid supplements to maintain his metabolism demand.    Patient has no history of small cell cancer in in 1996 treated definitively with chemoradiation and surgeries.    Patient also has history of a Cardio bypass x2 stent placement.    Patient have difficulty swelling.  Weight loss.    Past Medical History:   Diagnosis Date    *Atrial fibrillation     and Hx PSVT    Allergic drug rash 12/28/2016    Allergy     chronic     Arthritis     fingers    Benign tumor of throat     Bronchitis March 2013    CAD (coronary artery disease) 2005    CABGx2 in 2005 and 2 MI after with 4 stents    Cancer 1996    small cell lung cancer s/p resection of 1/3 lung, 5 ribs and muscle mass then had chemo and radiation    Cataract     OD     CHF (congestive heart failure) past Hx    Chronic rhinitis     Clot past Hx    external jugular, now stented, occluded, had phlebitis; now revascularized    Colon polyp     COPD (chronic obstructive pulmonary disease)     no oxygen or nebulizers    COPD exacerbation 5/8/13    improved 5/14/13 after steroid pack,antibiotics    Dysphagia     Esophageal stenosis     Former smoker     GERD (gastroesophageal reflux disease)     Heart block     Hyperlipidemia     Hypertension     pt states does not have //    MI (myocardial infarction) 2005    Posterior vitreous detachment of left eye     Thyroid disease      Social History     Socioeconomic History    Marital status:      Spouse name: Not on file    Number of children: Not on file    Years of education: Not on  file    Highest education level: Not on file   Occupational History    Occupation: retired   Social Needs    Financial resource strain: Not on file    Food insecurity:     Worry: Not on file     Inability: Not on file    Transportation needs:     Medical: Not on file     Non-medical: Not on file   Tobacco Use    Smoking status: Former Smoker     Packs/day: 2.00     Years: 20.00     Pack years: 40.00     Types: Cigarettes     Last attempt to quit: 1994     Years since quittin.1    Smokeless tobacco: Never Used   Substance and Sexual Activity    Alcohol use: No    Drug use: No    Sexual activity: Yes     Partners: Male   Lifestyle    Physical activity:     Days per week: Not on file     Minutes per session: Not on file    Stress: Not on file   Relationships    Social connections:     Talks on phone: Not on file     Gets together: Not on file     Attends Buddhism service: Not on file     Active member of club or organization: Not on file     Attends meetings of clubs or organizations: Not on file     Relationship status: Not on file   Other Topics Concern    Are you pregnant or think you may be? No    Breast-feeding No   Social History Narrative    Not on file     Objective    Physical Exam   Vitals:    20 1320   BP: 127/81   Pulse: 99   Resp: 18   Temp: 98.1 °F (36.7 °C)       Constitutional:  Alert oriented x3 malnutrition  HENT:  No JVD  Cardiovascular:  Tachycardic   No edema, no tenderness in the extremities.   Pulmonary/Chest:  Normal effort  Abdominal:  Soft  Musculoskeletal: Normal range of motion.     Lymphadenopathy:  No evidence lymphadenopathy  Neurological:  Nonfocal cranial nerves 2-12 grossly intact sensorimotor grossly intact   Skin:  Skin warm and dry  Psychiatric: patient has a normal mood and affect. patient speech is normal and behavior is normal. Judgment normal. Cognition and memory are normal.   Vitals reviewed.     CMP  Sodium   Date Value Ref Range Status    12/06/2019 138 136 - 145 mmol/L Final     Potassium   Date Value Ref Range Status   12/06/2019 4.5 3.5 - 5.1 mmol/L Final     Chloride   Date Value Ref Range Status   12/06/2019 104 95 - 110 mmol/L Final     CO2   Date Value Ref Range Status   12/06/2019 27 23 - 29 mmol/L Final     Glucose   Date Value Ref Range Status   12/06/2019 90 70 - 110 mg/dL Final     BUN, Bld   Date Value Ref Range Status   12/06/2019 10 8 - 23 mg/dL Final     Creatinine   Date Value Ref Range Status   12/06/2019 0.7 0.5 - 1.4 mg/dL Final   08/26/2013 0.8 0.5 - 1.4 mg/dL Final     Calcium   Date Value Ref Range Status   12/06/2019 9.3 8.7 - 10.5 mg/dL Final   08/26/2013 9.5 8.7 - 10.5 mg/dL Final     Total Protein   Date Value Ref Range Status   06/17/2019 7.0 6.0 - 8.4 g/dL Final     Albumin   Date Value Ref Range Status   06/17/2019 3.8 3.5 - 5.2 g/dL Final     Total Bilirubin   Date Value Ref Range Status   06/17/2019 0.6 0.1 - 1.0 mg/dL Final     Comment:     For infants and newborns, interpretation of results should be based  on gestational age, weight and in agreement with clinical  observations.  Premature Infant recommended reference ranges:  Up to 24 hours.............<8.0 mg/dL  Up to 48 hours............<12.0 mg/dL  3-5 days..................<15.0 mg/dL  6-29 days.................<15.0 mg/dL       Alkaline Phosphatase   Date Value Ref Range Status   06/17/2019 80 55 - 135 U/L Final   08/26/2013 68 55 - 135 U/L Final     AST   Date Value Ref Range Status   06/17/2019 18 10 - 40 U/L Final   08/26/2013 24 10 - 40 U/L Final     ALT   Date Value Ref Range Status   06/17/2019 10 10 - 44 U/L Final     Anion Gap   Date Value Ref Range Status   12/06/2019 7 (L) 8 - 16 mmol/L Final   08/26/2013 11 5 - 15 meq/L Final     eGFR if    Date Value Ref Range Status   12/06/2019 >60 >60 mL/min/1.73 m^2 Final     eGFR if non    Date Value Ref Range Status   12/06/2019 >60 >60 mL/min/1.73 m^2 Final     Comment:      Calculation used to obtain the estimated glomerular filtration  rate (eGFR) is the CKD-EPI equation.        Lab Results   Component Value Date    WBC 7.03 06/17/2019    HGB 12.7 06/17/2019    HCT 39.9 06/17/2019    MCV 96 06/17/2019     06/17/2019     Upper EGD finding 01/31/2020  Findings:       The oropharynx was normal.       One severe stenosis was found 20 to 23 cm from the incisors. This        stenosis measured 3 cm (in length). The stenosis was traversed after        downsizing scope.       Segmental severe mucosal changes characterized by friability (with        contact bleeding), nodularity and sloughing were found in the upper        third of the esophagus from20 cm to 23 cm. This is concerning for        malignancy. Biopsies were taken with a cold forceps for histology.       The entire examined stomach was normal.       The examined duodenum was normal.  Impression:           - Normal oropharynx.                        - Esophageal stenosis.                        - Friable (with contact bleeding), nodular mucosa                         in the esophagus from 20 cm to 23 cm in the area of                         the stenosis. This is concerning for malignancy.                         Biopsied.                        - Normal stomach.                        - Normal examined duodenum.  Recommendation:       - Discharge patient to home (ambulatory).                        - Await pathology results.                        - Full liquid diet.    Pathology finding esophageal lesion at 20 cm biopsy results squamous cell carcinoma in situ with focal superficial invasions.      Assessment    [] Squamous cell carcinoma of esophagitis at 20 cm from the incisors.  Stenosis measures 3 cm in length.    > advocating J tube placement - patient on Eliquis.  Require hold for procedure  > marked FDG uptake involving superior thoracic esophagus consistent with diagnosis  of esophageal malignancy.  There is also mild  FDG uptake posterior mediastinal lymph nodes suspect of malignancy involvement.  > I am much concern of uptake posterior mediastinal lymph nodes.  Will communicate with surgical team.  I am also in close contact with GI team.  Patient will follow-up with GI to have partially EGD and possibility of a stent.  > nutrition status is concerned.  Nutrition supporting team following.  > check MSI and HER2  > out be following patient in 2 weeks.  Patient will have initial consultation with Dr. Daniel Douglas this coming Wednesday  > chemotherapy upfront after secure of nutrition status.      [] Dysphagia secondary to malignancy obstruction  > unable to tolerate any p.o. oral intake including medications.  Last time known injection for Eliquis approximately 1 week ago.      [] Previously treated small-cell lung cancer.  Radiation oncology Klaus Castanon  address [address], [address] [place] [id number]    [] Weight loss due to above.  Major concern regarding nutrition status..  Currently unable to tolerate any solid.    > J tube evaluation surgery    [] Patient reported history of small cell cancers diagnosis treated in 1996 with concurrent chemo RT and surgery.  > as above    [] Patient has also known history of CABG x2 stents placement.    [] Afib   > on Eliquis      There are no diagnoses linked to this encounter.

## 2020-03-09 NOTE — PROGRESS NOTES
Unable to obtain any medical records from radiation treatments in [place] or any follow up records from [place] as all were destroyed by both institutions due to age of records.  Dr. Zurdo chamorro.

## 2020-03-11 NOTE — PROGRESS NOTES
1500 pt arrived in [facility]. piv started. ivfluids started. Call light in reach.  1640 ivfluids completed. Saline lock d/c'd with catheter intact. States feels much better. D/c'd home with family

## 2020-03-11 NOTE — PROGRESS NOTES
Subjective:       Patient ID: Steph Lira is a 69 y.o. female.    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  She has been a 1 year history of increasing dysphagia.  EGD showed friable stricture around 20 cm from the incisors.  Biopsy returned squamous cell.  PET scan showed the known esophageal mass also with a hypermetabolic subcarinal lymph node.  Patient has been seen by Oncology and Radiation Oncology.  She received radiation to the chest after small cell lung cancer in 1996.  She is no longer able to receive radiation for this esophageal tumor.  She is scheduled to start neoadjuvant chemotherapy in the upcoming weeks.  She  was referred is scheduled to see surgery oncology regarding further surgical treatment next week.  She presents today because over the last 24 hr she is no longer able to tolerate any p.o. intake.  She has been able to take in at least liquids to this point.  She has not had any p.o. intake over last 36 hr.    Past Medical History:   Diagnosis Date    *Atrial fibrillation     and Hx PSVT    Allergic drug rash 12/28/2016    Allergy     chronic     Arthritis     fingers    Benign tumor of throat     Bronchitis March 2013    CAD (coronary artery disease) 2005    CABGx2 in 2005 and 2 MI after with 4 stents    Cancer 1996    small cell lung cancer s/p resection of 1/3 lung, 5 ribs and muscle mass then had chemo and radiation    Cataract     OD     CHF (congestive heart failure) past Hx    Chronic rhinitis     Clot past Hx    external jugular, now stented, occluded, had phlebitis; now revascularized    Colon polyp     COPD (chronic obstructive pulmonary disease)     no oxygen or nebulizers    COPD exacerbation 5/8/13    improved 5/14/13 after steroid pack,antibiotics    Dysphagia     Esophageal stenosis     Former smoker     GERD  (gastroesophageal reflux disease)     Heart block     Hyperlipidemia     Hypertension     pt states does not have //    MI (myocardial infarction) 2005    Posterior vitreous detachment of left eye     Thyroid disease      Past Surgical History:   Procedure Laterality Date    ABLATION N/A 12/11/2018    Procedure: ABLATION;  Surgeon: Santana Covarrubias MD;  Location: [facility];  Service: Cardiology;  Laterality: N/A;  AF, JUAN ANTONIO, PVI, RFA, CARTO, GEN, GP, 3 PREP    ABLATION OF ARRHYTHMOGENIC FOCUS FOR ATRIAL FIBRILLATION N/A 5/8/2019    Procedure: ABLATION, ARRHYTHMOGENIC FOCUS, FOR ATRIAL FIBRILLATION;  Surgeon: Santana Covarrubias MD;  Location: [facility];  Service: Cardiology;  Laterality: N/A;  AF, PVI, RFA, CARTO, GEN, GP , 3 PREP    ADENOIDECTOMY      APPENDECTOMY      BACK SURGERY      lumbar    BREAST BIOPSY Left     benign    CARDIAC SURGERY      CARDIOVERSION  5/8/2019    Procedure: Cardioversion;  Surgeon: Santana Covarrubias MD;  Location: [facility];  Service: Cardiology;;    CATARACT EXTRACTION Left     OS    CATARACT EXTRACTION, BILATERAL      left eye only    CHOLECYSTECTOMY      COLONOSCOPY  03/2012    repeat in 5 years    COLONOSCOPY N/A 6/19/2017    Procedure: COLONOSCOPY;  Surgeon: Kal Elise MD;  Location: [facility];  Service: Endoscopy;  Laterality: N/A;    CORONARY ARTERY BYPASS GRAFT  2005    2 vessel    ESOPHAGOGASTRODUODENOSCOPY N/A 7/5/2018    Procedure: ESOPHAGOGASTRODUODENOSCOPY (EGD)/cryo;  Surgeon: Robbie Gonzales MD;  Location: [facility] ([address] [place] [address]);  Service: Endoscopy;  Laterality: N/A;  Eliquis/Dr Henry Wei/OK to hold med 2 days prior to egd/see telephone encounter dated 6/12/18/pt stated she needs to take Diflucan 1 tab po daily 12 days prior to egd, message sent to Sarah/she will check with Dr Gonzales/santo    ESOPHAGOGASTRODUODENOSCOPY N/A 10/3/2018    Procedure: EGD (ESOPHAGOGASTRODUODENOSCOPY)/poss cryo;  Surgeon: Robbie Gonzales MD;  Location: [facility]  (2ND FLR);  Service: Endoscopy;  Laterality: N/A;  2 day hold Dr Henry Pavon  Diflucan 1 tab po daily starting 14 days prior to egd, Rx'd per Dr Gonzales    ESOPHAGOGASTRODUODENOSCOPY N/A 11/1/2018    Procedure: EGD (ESOPHAGOGASTRODUODENOSCOPY)/cryo;  Surgeon: Robbie Gonzales MD;  [facility]  Service: Endoscopy;  Laterality: N/A;  2 day hold Dr Henry Pavon  Diflucan 1 tab po daily starting 14 days prior to egd, Rx'd per Dr Gonzales as before?    ESOPHAGOGASTRODUODENOSCOPY N/A 9/27/2019    Procedure: EGD (ESOPHAGOGASTRODUODENOSCOPY);  Surgeon: Robbie Gonzales MD;  Location: [facility] ([address] [place] [address]);  Service: Endoscopy;  Laterality: N/A;  Coumadin was d/c'd 9/18/19.  No other anti-thrombotic med for now.  pg  Per Dr Gonzales, she does not need to take Diflucan pre-procedure as she did before.  pg    ESOPHAGOGASTRODUODENOSCOPY N/A 1/31/2020    Procedure: EGD (ESOPHAGOGASTRODUODENOSCOPY)/poss cryo;  Surgeon: Robbie Gonzales MD;  Location: [facility] ([address] [place] [address]);  Service: Endoscopy;  Laterality: N/A;  ok for eliquis hold-see telephone order 1/23/20-tb    EYE SURGERY  Dec 2012    ptosis repair    FIRST RIB REMOVAL  1996 with lung resection    HYSTERECTOMY      LUNG LOBECTOMY  1996    left lung for cancer    REMOVAL OF PLANTAR WART USING LASER      SHOULDER SURGERY  2010,2011    scapular entrapment after lung surgery, needed 5 ribs removed left side    SYMPOTHATIC NERVE LEFT SIDE      1996 with lung surgery    TONSILLECTOMY      TONSILLECTOMY, ADENOIDECTOMY, BILATERAL MYRINGOTOMY AND TUBES      UPPER GASTROINTESTINAL ENDOSCOPY  05/2016    Dr. Koo    VASCULAR SURGERY      stents place in jugualr vein     Review of patient's allergies indicates:   Allergen Reactions    Ciprofloxacin Itching    Coreg [carvedilol]      Low energy    Doxycycline Other (See Comments) and Hives     Patient had a rxn with the sun despite wearing spf 30    Metoprolol      Low energy      Medication List with Changes/Refills   Current Medications    ALBUTEROL (ACCUNEB) 1.25 MG/3 ML NEBU    USE 1 VIAL (3 ML) VIA NEBULIZER EVERY 6 HOURS AS NEEDED    ALBUTEROL (PROVENTIL HFA) 90 MCG/ACTUATION INHALER    Inhale 2 puffs into the lungs every 6 (six) hours as needed for Wheezing.    APIXABAN (ELIQUIS) 5 MG TAB    Take 1 tablet by mouth once daily. Take half tab BID    BISACODYL (DULCOLAX) 5 MG EC TABLET    Take 1 tablet (5 mg total) by mouth daily as needed for Constipation.    CETIRIZINE (ZYRTEC) 10 MG TABLET    TAKE 1 TABLET EVERY EVENING    DIPHENHYDRAMINE (BENADRYL) 25 MG CAPSULE    Take 25 mg by mouth every 6 (six) hours as needed for Itching.    FLUTICASONE PROPIONATE (FLONASE) 50 MCG/ACTUATION NASAL SPRAY    1 spray (50 mcg total) by Each Nostril route once daily.    KETOCONAZOLE (NIZORAL) 2 % CREAM    Apply topically once daily.    LEVOTHYROXINE (SYNTHROID) 88 MCG TABLET    TAKE 1 TABLET DAILY    MONTELUKAST (SINGULAIR) 10 MG TABLET    TAKE 1 TABLET EVERY EVENING    MULTIVITAMIN-MINERALS-LUTEIN (CENTRUM SILVER) TAB    Take 1 tablet by mouth once daily. 1 Tablet Oral Every day    PANTOPRAZOLE (PROTONIX) 40 MG TABLET    TAKE 1 TABLET DAILY 30 MINUTES PRIOR TO BREAKFAST    SPIRIVA WITH HANDIHALER 18 MCG INHALATION CAPSULE    INHALE THE CONTENTS OF 1 CAPSULE DAILY    TRETINOIN (RETIN-A) 0.05 % CREAM    APPLY THIN FILM TO FACE AT NIGHT AFTER MOISTURIZING    ZINC GLUCONATE ORAL    Take by mouth.     Family History   Problem Relation Age of Onset    Allergic rhinitis Father     Cancer Father     Hypertension Father     Allergies Father     Allergic rhinitis Son     Asthma Son     Stroke Mother     Allergies Mother     Allergic rhinitis Sister     Asthma Sister     Diverticulitis Brother     No Known Problems Daughter     No Known Problems Maternal Aunt     No Known Problems Maternal Uncle     No Known Problems Paternal Aunt     No Known Problems Paternal Uncle     Colon polyps Maternal  Grandmother          of colon blockage    No Known Problems Maternal Grandfather     No Known Problems Paternal Grandmother     No Known Problems Paternal Grandfather     No Known Problems Brother     No Known Problems Sister     Angioedema Neg Hx     Eczema Neg Hx     Immunodeficiency Neg Hx     Urticaria Neg Hx     Skin cancer Neg Hx     Amblyopia Neg Hx     Blindness Neg Hx     Cataracts Neg Hx     Diabetes Neg Hx     Glaucoma Neg Hx     Macular degeneration Neg Hx     Retinal detachment Neg Hx     Strabismus Neg Hx     Thyroid disease Neg Hx     Colon cancer Neg Hx     Melanoma Neg Hx      Social History     Socioeconomic History    Marital status:      Spouse name: Not on file    Number of children: Not on file    Years of education: Not on file    Highest education level: Not on file   Occupational History    Occupation: retired   Social Needs    Financial resource strain: Not on file    Food insecurity:     Worry: Not on file     Inability: Not on file    Transportation needs:     Medical: Not on file     Non-medical: Not on file   Tobacco Use    Smoking status: Former Smoker     Packs/day: 2.00     Years: 20.00     Pack years: 40.00     Types: Cigarettes     Last attempt to quit: 1994     Years since quittin.1    Smokeless tobacco: Never Used   Substance and Sexual Activity    Alcohol use: No    Drug use: No    Sexual activity: Yes     Partners: Male   Lifestyle    Physical activity:     Days per week: Not on file     Minutes per session: Not on file    Stress: Not on file   Relationships    Social connections:     Talks on phone: Not on file     Gets together: Not on file     Attends Muslim service: Not on file     Active member of club or organization: Not on file     Attends meetings of clubs or organizations: Not on file     Relationship status: Not on file   Other Topics Concern    Are you pregnant or think you may be? No    Breast-feeding  No   Social History Narrative    Not on file         Review of Systems   Constitutional: Positive for activity change, fatigue and unexpected weight change. Negative for appetite change, chills and fever.   HENT: Negative for hearing loss, rhinorrhea, sore throat and voice change.    Eyes: Negative for photophobia and visual disturbance.   Respiratory: Negative for cough, choking and shortness of breath.    Cardiovascular: Negative for chest pain, palpitations and leg swelling.   Gastrointestinal: Negative for abdominal pain, blood in stool, constipation, diarrhea, nausea and vomiting.        Severe dysphagia    Endocrine: Negative for cold intolerance, heat intolerance, polydipsia and polyuria.   Musculoskeletal: Negative for arthralgias, back pain, joint swelling and neck stiffness.   Skin: Negative for color change, pallor and rash.   Neurological: Negative for dizziness, seizures, syncope and headaches.   Hematological: Negative for adenopathy. Does not bruise/bleed easily.   Psychiatric/Behavioral: Negative for agitation, behavioral problems and confusion.       Objective:      Physical Exam   Constitutional: She is oriented to person, place, and time. She appears well-developed.  Non-toxic appearance. No distress.   No acute distress. Vitals are OK.    HENT:   Head: Normocephalic and atraumatic. Head is without abrasion and without laceration.   Right Ear: External ear normal.   Left Ear: External ear normal.   Nose: Nose normal.   Mouth/Throat: Oropharynx is clear and moist.   Eyes: Pupils are equal, round, and reactive to light. EOM are normal.   Neck: Trachea normal and phonation normal. Neck supple. No tracheal deviation and normal range of motion present.   Cardiovascular: Normal rate and regular rhythm.   Pulmonary/Chest: Effort normal. No accessory muscle usage. No tachypnea. No respiratory distress.   Abdominal: Soft. Normal appearance and bowel sounds are normal. She exhibits no distension and no  mass. There is no tenderness. There is no rigidity, no rebound and no guarding. No hernia.   Lymphadenopathy:        Right: No inguinal adenopathy present.        Left: No inguinal adenopathy present.   Neurological: She is alert and oriented to person, place, and time. Coordination and gait normal.   Skin: Skin is warm and intact.   Psychiatric: She has a normal mood and affect. Her speech is normal and behavior is normal.       Assessment/Plan:   Steph was seen today for consult.    Diagnoses and all orders for this visit:    Dehydration  -     Full code; Standing  -     Insert peripheral IV; Standing  -     Place in Outpatient; Standing  -     Full code  -     Insert peripheral IV  -     Outpatient PICC Insertion; Future  -     X-Ray Chest 1 View for PICC_Central line; Future  -     PICC catheter may be used for therapy after catheter placements and confirmed by CXR  -     Anesthesia US Guide Vascular Access  -     Ambulatory referral/consult to Nutrition Services; Future    Mild protein-calorie malnutrition  -     Ambulatory referral/consult to Nutrition Services; Future    Other orders  -     IP VTE LOW RISK PATIENT; Standing  -     sodium chloride 0.9% bolus 1,000 mL  -     IP VTE LOW RISK PATIENT      Patient is now no longer able to tolerate any p.o. intake.  She has not had any nutrition or water over the last 24 hr.  We had discussion about feeding tube versus IV hydration and nutrition.  She is scheduled to see surgery oncology early next week.  My worry about surgical J-tube would be she may not be able to make her appointment at [facility].  Any surgical procedure may want to be performed by the Surgical Oncology team.  If esophagectomy is recommended over neoadjuvant treatment, it may be delayed due to recent J-tube placement.  My recommendation would be for IV fluids via peripheral IV today.  PICC line for TPN and hydration as a bridge to potential feeding tube or esophagectomy plus or minus feeding  tube in the upcoming weeks at [facility].    I discussed the proposed procedures the the patient including risks, benefits, indications, alternatives and special concerns.  The patient appears to understand and agrees to go ahead with surgery.  I have made no promises, warranties or verbal agreements beyond what was discussed above.

## 2020-03-11 NOTE — LETTER
March 11, 2020      Maida Mon MD  [address]  [place] [address]           [facility]  [address] [facility] [address]  [place] [address]  Phone: [phone]  Fax: [phone]          Patient: Steph Lira   MR Number: [id number]   YOB: 1950   Date of Visit: 3/11/2020       Dear Dr. Maida Mon:    Thank you for referring Steph Lira to me for evaluation. Attached you will find relevant portions of my assessment and plan of care.    If you have questions, please do not hesitate to call me. I look forward to following Steph Lira along with you.    Sincerely,    Rafat Ruiz III, MD    Enclosure  CC:  No Recipients    If you would like to receive this communication electronically, please contact [email or web address] or [phone] to request more information on [employer] access.    For providers and/or their staff who would like to refer a patient to [facility], please contact us through our one-stop-shop provider referral line, [facility], at [phone].    If you feel you have received this communication in error or would no longer like to receive these types of communications, please e-mail [email or web address]

## 2020-03-13 PROBLEM — R11.10 INTRACTABLE VOMITING: Status: ACTIVE | Noted: 2020-01-01

## 2020-03-13 NOTE — ED PROVIDER NOTES
Encounter Date: 3/13/2020       History     Chief Complaint   Patient presents with    GEN WEAKNESS     UNABLE TO EAT WELL FOR AWHILE DUE TO ESOPHAGEAL CA, NOW, UNABLE TO DRINK PO FLUIDS SINCE WED, NEG SCREEN     Patient presents complaining of unable to tolerate by mouth.  Patient has history of esophageal cancer.  Patient received endoscopy yesterday by Dr. Colorado who told patient she had a severe stricture and he had a lot of difficulty dilating and ordered a stent to be placed in the patient.  He advised patient she likely would on be unable to eat.  Patient try to drink shakes today and is unable to keep anything down.  She complains of pain and vomiting.        Review of patient's allergies indicates:   Allergen Reactions    Ciprofloxacin Itching    Coreg [carvedilol]      Low energy    Doxycycline Other (See Comments) and Hives     Patient had a rxn with the sun despite wearing spf 30    Metoprolol      Low energy     Past Medical History:   Diagnosis Date    *Atrial fibrillation     and Hx PSVT    Allergic drug rash 12/28/2016    Allergy     chronic     Arthritis     fingers    Benign tumor of throat     Bronchitis March 2013    CAD (coronary artery disease) 2005    CABGx2 in 2005 and 2 MI after with 4 stents    Cancer 1996    small cell lung cancer s/p resection of 1/3 lung, 5 ribs and muscle mass then had chemo and radiation    Cataract     OD     CHF (congestive heart failure) past Hx    Chronic rhinitis     Clot past Hx    external jugular, now stented, occluded, had phlebitis; now revascularized    Colon polyp     COPD (chronic obstructive pulmonary disease)     no oxygen or nebulizers    COPD exacerbation 5/8/13    improved 5/14/13 after steroid pack,antibiotics    Dysphagia     Esophageal stenosis     Former smoker     GERD (gastroesophageal reflux disease)     Heart block     Hyperlipidemia     Hypertension     pt states does not have //    MI (myocardial infarction)  2005    Posterior vitreous detachment of left eye     Thyroid disease      Past Surgical History:   Procedure Laterality Date    ABLATION N/A 12/11/2018    Procedure: ABLATION;  Surgeon: Santana Covarrubias MD;  Location: [facility] EP LAB;  Service: Cardiology;  Laterality: N/A;  AF, JUAN ANTONIO, PVI, RFA, CARTO, GEN, GP, 3 PREP    ABLATION OF ARRHYTHMOGENIC FOCUS FOR ATRIAL FIBRILLATION N/A 5/8/2019    Procedure: ABLATION, ARRHYTHMOGENIC FOCUS, FOR ATRIAL FIBRILLATION;  Surgeon: Santana Covarrubias MD;  Location: [facility] EP LAB;  Service: Cardiology;  Laterality: N/A;  AF, PVI, RFA, CARTO, GEN, GP , 3 PREP    ADENOIDECTOMY      APPENDECTOMY      BACK SURGERY      lumbar    BREAST BIOPSY Left     benign    CARDIAC SURGERY      CARDIOVERSION  5/8/2019    Procedure: Cardioversion;  Surgeon: Santana Covarrubias MD;  Location: [facility] EP LAB;  Service: Cardiology;;    CATARACT EXTRACTION Left     OS    CATARACT EXTRACTION, BILATERAL      left eye only    CHOLECYSTECTOMY      COLONOSCOPY  03/2012    repeat in 5 years    COLONOSCOPY N/A 6/19/2017    Procedure: COLONOSCOPY;  Surgeon: Kal Elise MD;  Location: [facility];  Service: Endoscopy;  Laterality: N/A;    CORONARY ARTERY BYPASS GRAFT  2005    2 vessel    ESOPHAGOGASTRODUODENOSCOPY N/A 7/5/2018    Procedure: ESOPHAGOGASTRODUODENOSCOPY (EGD)/cryo;  Surgeon: Robbie Gonzales MD;  Location: [facility] (2ND FLR);  Service: Endoscopy;  Laterality: N/A;  Eliquis/Dr Henry Wei/OK to hold med 2 days prior to egd/see telephone encounter dated 6/12/18/pt stated she needs to take Diflucan 1 tab po daily 12 days prior to egd, message sent to Sarah/she will check with Dr Gonzales/santo    ESOPHAGOGASTRODUODENOSCOPY N/A 10/3/2018    Procedure: EGD (ESOPHAGOGASTRODUODENOSCOPY)/poss cryo;  Surgeon: Robbie Gonzales MD;  Location: [facility] (2ND FLR);  Service: Endoscopy;  Laterality: N/A;  2 day hold Xarelto, Dr Henry Wei  Diflucan 1 tab po daily starting 14 days prior to egd, Rx'd  per Dr Gonzales    ESOPHAGOGASTRODUODENOSCOPY N/A 11/1/2018    Procedure: EGD (ESOPHAGOGASTRODUODENOSCOPY)/cryo;  Surgeon: Robbie Gonzales MD;  Location: [facility] (2ND FLR);  Service: Endoscopy;  Laterality: N/A;  2 day hold Dr Henry Pavon  Diflucan 1 tab po daily starting 14 days prior to egd, Rx'd per Dr Gonzales as before?    ESOPHAGOGASTRODUODENOSCOPY N/A 9/27/2019    Procedure: EGD (ESOPHAGOGASTRODUODENOSCOPY);  Surgeon: Robbie Gonzales MD;  [facility]  Service: Endoscopy;  Laterality: N/A;  Coumadin was d/c'd 9/18/19.  No other anti-thrombotic med for now.  pg  Per Dr Gonzales, she does not need to take Diflucan pre-procedure as she did before.  pg    ESOPHAGOGASTRODUODENOSCOPY N/A 1/31/2020    Procedure: EGD (ESOPHAGOGASTRODUODENOSCOPY)/poss cryo;  Surgeon: Robbie Gonzales MD;  Location: [facility] ([address] [place] [address]);  Service: Endoscopy;  Laterality: N/A;  ok for eliquis hold-see telephone order 1/23/20-tb    ESOPHAGOGASTRODUODENOSCOPY N/A 3/12/2020    Procedure: EGD (ESOPHAGOGASTRODUODENOSCOPY);  Surgeon: Ulisses Colorado III, MD;  Location: [facility];  Service: Endoscopy;  Laterality: N/A;    EYE SURGERY  Dec 2012    ptosis repair    FIRST RIB REMOVAL  1996 with lung resection    HYSTERECTOMY      LUNG LOBECTOMY  1996    left lung for cancer    REMOVAL OF PLANTAR WART USING LASER      SHOULDER SURGERY  2010,2011    scapular entrapment after lung surgery, needed 5 ribs removed left side    SYMPOTHATIC NERVE LEFT SIDE      1996 with lung surgery    TONSILLECTOMY      TONSILLECTOMY, ADENOIDECTOMY, BILATERAL MYRINGOTOMY AND TUBES      UPPER GASTROINTESTINAL ENDOSCOPY  05/2016    Dr. Koo    VASCULAR SURGERY      stents place in jugualr vein     Family History   Problem Relation Age of Onset    Allergic rhinitis Father     Cancer Father     Hypertension Father     Allergies Father     Allergic rhinitis Son     Asthma Son     Stroke Mother     Allergies  Mother     Allergic rhinitis Sister     Asthma Sister     Diverticulitis Brother     No Known Problems Daughter     No Known Problems Maternal Aunt     No Known Problems Maternal Uncle     No Known Problems Paternal Aunt     No Known Problems Paternal Uncle     Colon polyps Maternal Grandmother          of colon blockage    No Known Problems Maternal Grandfather     No Known Problems Paternal Grandmother     No Known Problems Paternal Grandfather     No Known Problems Brother     No Known Problems Sister     Angioedema Neg Hx     Eczema Neg Hx     Immunodeficiency Neg Hx     Urticaria Neg Hx     Skin cancer Neg Hx     Amblyopia Neg Hx     Blindness Neg Hx     Cataracts Neg Hx     Diabetes Neg Hx     Glaucoma Neg Hx     Macular degeneration Neg Hx     Retinal detachment Neg Hx     Strabismus Neg Hx     Thyroid disease Neg Hx     Colon cancer Neg Hx     Melanoma Neg Hx      Social History     Tobacco Use    Smoking status: Former Smoker     Packs/day: 2.00     Years: 20.00     Pack years: 40.00     Types: Cigarettes     Last attempt to quit: 1994     Years since quittin.1    Smokeless tobacco: Never Used   Substance Use Topics    Alcohol use: No    Drug use: No     Review of Systems   Gastrointestinal: Positive for vomiting.   All other systems reviewed and are negative.      Physical Exam     Initial Vitals [20 1535]   BP Pulse Resp Temp SpO2   (!) 175/97 104 18 98.2 °F (36.8 °C) 97 %      MAP       --         Physical Exam    Nursing note and vitals reviewed.  Constitutional:   Frail cachectic appearing   HENT:   Head: Normocephalic and atraumatic.   Mouth/Throat: Oropharynx is clear and moist.   Eyes: EOM are normal. Pupils are equal, round, and reactive to light.   Neck: Normal range of motion. Neck supple.   Cardiovascular: Normal rate, regular rhythm, normal heart sounds and intact distal pulses.   Pulmonary/Chest: Breath sounds normal. No respiratory  distress.   Musculoskeletal: Normal range of motion.   Neurological: She is alert and oriented to person, place, and time. She has normal strength.   Skin: Skin is warm and dry. Capillary refill takes less than 2 seconds.   Psychiatric: She has a normal mood and affect. Her behavior is normal. Judgment and thought content normal.         ED Course   Procedures  Labs Reviewed   COMPREHENSIVE METABOLIC PANEL - Abnormal; Notable for the following components:       Result Value    Potassium 3.3 (*)     Creatinine 0.4 (*)     Total Bilirubin 1.3 (*)     Alkaline Phosphatase 53 (*)     All other components within normal limits   PHOSPHORUS - Abnormal; Notable for the following components:    Phosphorus 2.2 (*)     All other components within normal limits   CBC W/ AUTO DIFFERENTIAL - Abnormal; Notable for the following components:    Lymph% 17.2 (*)     All other components within normal limits   MAGNESIUM   PROTIME-INR        ECG Results          EKG 12-LEAD (Final result)  Result time 03/13/20 16:58:54    Final result by Interface, Lab In [facility] (03/13/20 16:58:54)                 Narrative:    Test Reason : R07.9,    Vent. Rate : 086 BPM     Atrial Rate : 086 BPM     P-R Int : 122 ms          QRS Dur : 080 ms      QT Int : 382 ms       P-R-T Axes : 086 088 097 degrees     QTc Int : 457 ms    Normal sinus rhythm  Normal ECG  When compared with ECG of 20-JAN-2020 10:11,  No significant change was found  Confirmed by Arthur Ahuja MD ([id number]) on 3/13/2020 4:58:45 PM    Referred By: [email or web address]   SELF           Confirmed By:Arthur Ahuja MD                            Imaging Results          X-ray Chest AP Portable (Final result)  Result time 03/13/20 17:13:59    Final result by Jessica Lombardo MD (03/13/20 17:13:59)                 Narrative:    PROCEDURE:   XR CHEST AP PORTABLE  dated  3/13/2020 5:08 PM    CLINICAL HISTORY:   Female 69 years of age.   General weakness, hx of  esophageal cancer    TECHNIQUE: AP  view of the chest obtained portably at 5:08 PM.    PREVIOUS STUDIES:  March 12, 2020    FINDINGS:      Right upper extremity access terminates in the SVC. There are median  sternotomy wires, mediastinal and left upper chest surgical clips, and  left neck and upper chest vascular stents. There is been prior partial  left upper pneumonectomy and rib resection. There is no acute  pulmonary infiltrate. There is no pleural effusion or pneumothorax.  Cardiac mediastinal contours are stable. The heart is not enlarged.      IMPRESSION:    No acute findings. Stable compared with the prior study.  Postoperative left upper chest.    Electronically Signed by Jessica Lombardo on 3/13/2020 5:20 PM                               Medical Decision Making:   ED Management:  Patient is unable to tolerate by mouth and is not willing to do a p.o. challenge.  I discussed the case with Dr. Colorado who recommended admission to the hospital patient will receive an esophageal stent.  Initial labs are within normal limits.                                 Clinical Impression:       ICD-10-CM ICD-9-CM   1. Intractable vomiting, presence of nausea not specified, unspecified vomiting type R11.10 536.2   2. Esophageal mass K22.8 530.89             ED Disposition Condition    Admit                           Kristopher Lara MD  03/13/20 [id number]

## 2020-03-13 NOTE — TELEPHONE ENCOUNTER
Pt confirmed  appt, needed to r/s [facility] appt. Gave pt [facility] number.     ----- Message from Shannan Rosa sent at 3/13/2020 10:40 AM CDT -----  Contact: Patient  Mrs. Raymundo left a voice mail message regarding her appointment with Dr. Mireles in 2 weeks. She has a conflict in that schedule and asked that someone return her call @ [phone].

## 2020-03-13 NOTE — HPI
69-year-old female history of malignant neoplasm of the esophagus, lung cancer s/p resection, COPD, hypertension, hyperlipidemia, GERD, atrial fibrillation on Xarelto, CAD s/p CABG comes in for generalized weakness and emesis.  Patient reports that she has a history of esophageal cancer and has been worsening causing obstruction of her esophagus.  Patient had EGD yesterday and found nearly closed esophagus to the stomach.  Patient reports that she is unable tolerate anything orally including liquids and her medications.  Reports that she has been feeling weak and has been able tolerate any diet for the past 3 days.  Reports this morning she can not tolerate anymore and came into the ED for further evaluation.  Denies any fever, chills, chest pain, shortness of breath.    In the ED, patient was slightly hypertensive.  Unable tolerate home medications.  CBC and CMP were unremarkable.

## 2020-03-13 NOTE — H&P
[facility] Medicine  History & Physical    Patient Name: Steph Lira  MRN: [id number]  Admission Date: 3/13/2020  Attending Physician: Garrison Husain MD   Primary Care Provider: Maida Mon MD         Patient information was obtained from patient, past medical records and ER records.     Subjective:     Principal Problem:Intractable vomiting    Chief Complaint:   Chief Complaint   Patient presents with    GEN WEAKNESS     UNABLE TO EAT WELL FOR AWHILE DUE TO ESOPHAGEAL CA, NOW, UNABLE TO DRINK PO FLUIDS SINCE WED, NEG SCREEN        HPI: 69-year-old female history of malignant neoplasm of the esophagus, lung cancer s/p resection, COPD, hypertension, hyperlipidemia, GERD, atrial fibrillation on Xarelto, CAD s/p CABG comes in for generalized weakness and emesis.  Patient reports that she has a history of esophageal cancer and has been worsening causing obstruction of her esophagus.  Patient had EGD yesterday and found nearly closed esophagus to the stomach.  Patient reports that she is unable tolerate anything orally including liquids and her medications.  Reports that she has been feeling weak and has been able tolerate any diet for the past 3 days.  Reports this morning she can not tolerate anymore and came into the ED for further evaluation.  Denies any fever, chills, chest pain, shortness of breath.    In the ED, patient was slightly hypertensive.  Unable tolerate home medications.  CBC and CMP were unremarkable.     Past Medical History:   Diagnosis Date    *Atrial fibrillation     and Hx PSVT    Allergic drug rash 12/28/2016    Allergy     chronic     Arthritis     fingers    Benign tumor of throat     Bronchitis March 2013    CAD (coronary artery disease) 2005    CABGx2 in 2005 and 2 MI after with 4 stents    Cancer 1996    small cell lung cancer s/p resection of 1/3 lung, 5 ribs and muscle mass then had chemo and radiation    Cataract     OD     CHF (congestive heart  failure) past Hx    Chronic rhinitis     Clot past Hx    external jugular, now stented, occluded, had phlebitis; now revascularized    Colon polyp     COPD (chronic obstructive pulmonary disease)     no oxygen or nebulizers    COPD exacerbation 5/8/13    improved 5/14/13 after steroid pack,antibiotics    Dysphagia     Esophageal stenosis     Former smoker     GERD (gastroesophageal reflux disease)     Heart block     Hyperlipidemia     Hypertension     pt states does not have //    MI (myocardial infarction) 2005    Posterior vitreous detachment of left eye     Thyroid disease        Past Surgical History:   Procedure Laterality Date    ABLATION N/A 12/11/2018    Procedure: ABLATION;  Surgeon: Santana Covarrubias MD;  Location: [facility] EP LAB;  Service: Cardiology;  Laterality: N/A;  AF, JUAN ANTONIO, PVI, RFA, CARTO, GEN, GP, 3 PREP    ABLATION OF ARRHYTHMOGENIC FOCUS FOR ATRIAL FIBRILLATION N/A 5/8/2019    Procedure: ABLATION, ARRHYTHMOGENIC FOCUS, FOR ATRIAL FIBRILLATION;  Surgeon: Santana Covarrubias MD;  Location: [facility] EP LAB;  Service: Cardiology;  Laterality: N/A;  AF, PVI, RFA, CARTO, GEN, GP , 3 PREP    ADENOIDECTOMY      APPENDECTOMY      BACK SURGERY      lumbar    BREAST BIOPSY Left     benign    CARDIAC SURGERY      CARDIOVERSION  5/8/2019    Procedure: Cardioversion;  Surgeon: Santana Covarrubias MD;  Location: [facility] EP LAB;  Service: Cardiology;;    CATARACT EXTRACTION Left     OS    CATARACT EXTRACTION, BILATERAL      left eye only    CHOLECYSTECTOMY      COLONOSCOPY  03/2012    repeat in 5 years    COLONOSCOPY N/A 6/19/2017    Procedure: COLONOSCOPY;  Surgeon: Kal Elise MD;  Location: [facility] ENDO;  Service: Endoscopy;  Laterality: N/A;    CORONARY ARTERY BYPASS GRAFT  2005    2 vessel    ESOPHAGOGASTRODUODENOSCOPY N/A 7/5/2018    Procedure: ESOPHAGOGASTRODUODENOSCOPY (EGD)/cryo;  Surgeon: Robbie Gonzales MD;  Location: [facility] ([address] [place] [address]);  Service: Endoscopy;  Laterality: N/A;   D[place]/Dr Henry Wei/OK to hold med 2 days prior to egd/see telephone encounter dated 6/12/18/pt stated she needs to take Diflucan 1 tab po daily 12 days prior to egd, message sent to Sarah/she will check with Dr Gonzales/santo    ESOPHAGOGASTRODUODENOSCOPY N/A 10/3/2018    Procedure: EGD (ESOPHAGOGASTRODUODENOSCOPY)/poss cryo;  Surgeon: Robbie Gonzales MD;  Location: [facility] (2ND FLR);  Service: Endoscopy;  Laterality: N/A;  2 day hold Dr Henry Pavon  Diflucan 1 tab po daily starting 14 days prior to egd, Rx'd per Dr Gonzales    ESOPHAGOGASTRODUODENOSCOPY N/A 11/1/2018    Procedure: EGD (ESOPHAGOGASTRODUODENOSCOPY)/cryo;  Surgeon: Robbie Gonzales MD;  Location: [facility] (2ND FLR);  Service: Endoscopy;  Laterality: N/A;  2 day hold Dr Henry Pavon  Diflucan 1 tab po daily starting 14 days prior to egd, Rx'd per Dr Gonzales as before?    ESOPHAGOGASTRODUODENOSCOPY N/A 9/27/2019    Procedure: EGD (ESOPHAGOGASTRODUODENOSCOPY);  Surgeon: Robbie Gonzales MD;  Location: [facility] (2ND FLR);  Service: Endoscopy;  Laterality: N/A;  Coumadin was d/c'd 9/18/19.  No other anti-thrombotic med for now.  pg  Per Dr Gonzales, she does not need to take Diflucan pre-procedure as she did before.  pg    ESOPHAGOGASTRODUODENOSCOPY N/A 1/31/2020    Procedure: EGD (ESOPHAGOGASTRODUODENOSCOPY)/poss cryo;  Surgeon: Robbie Gonzales MD;  Location: [facility] (2ND FLR);  Service: Endoscopy;  Laterality: N/A;  ok for eliquis hold-see telephone order 1/23/20-tb    ESOPHAGOGASTRODUODENOSCOPY N/A 3/12/2020    Procedure: EGD (ESOPHAGOGASTRODUODENOSCOPY);  Surgeon: Ulisses Colorado III, MD;  Location: [facility];  Service: Endoscopy;  Laterality: N/A;    EYE SURGERY  Dec 2012    ptosis repair    FIRST RIB REMOVAL  1996 with lung resection    HYSTERECTOMY      LUNG LOBECTOMY  1996    left lung for cancer    REMOVAL OF PLANTAR WART USING LASER      SHOULDER SURGERY  2010,2011    scapular entrapment after  lung surgery, needed 5 ribs removed left side    SYMPOTHATIC NERVE LEFT SIDE      1996 with lung surgery    TONSILLECTOMY      TONSILLECTOMY, ADENOIDECTOMY, BILATERAL MYRINGOTOMY AND TUBES      UPPER GASTROINTESTINAL ENDOSCOPY  05/2016    Dr. Koo    VASCULAR SURGERY      stents place in jugualr vein       Review of patient's allergies indicates:   Allergen Reactions    Ciprofloxacin Itching    Coreg [carvedilol]      Low energy    Doxycycline Other (See Comments) and Hives     Patient had a rxn with the sun despite wearing spf 30    Metoprolol      Low energy       Current Facility-Administered Medications on File Prior to Encounter   Medication    0.9%  NaCl infusion    0.9%  NaCl infusion    sodium chloride 0.9% bolus 1,000 mL    sodium chloride 0.9% flush 5 mL     Current Outpatient Medications on File Prior to Encounter   Medication Sig    albuterol (ACCUNEB) 1.25 mg/3 mL Nebu USE 1 VIAL (3 ML) VIA NEBULIZER EVERY 6 HOURS AS NEEDED    albuterol (PROVENTIL HFA) 90 mcg/actuation inhaler Inhale 2 puffs into the lungs every 6 (six) hours as needed for Wheezing.    apixaban (ELIQUIS) 5 mg Tab Take 1 tablet by mouth once daily. Take half tab BID    bisacodyl (DULCOLAX) 5 mg EC tablet Take 1 tablet (5 mg total) by mouth daily as needed for Constipation.    cetirizine (ZYRTEC) 10 MG tablet TAKE 1 TABLET EVERY EVENING    diphenhydrAMINE (BENADRYL) 25 mg capsule Take 25 mg by mouth every 6 (six) hours as needed for Itching.    fluticasone propionate (FLONASE) 50 mcg/actuation nasal spray 1 spray (50 mcg total) by Each Nostril route once daily.    ketoconazole (NIZORAL) 2 % cream Apply topically once daily.    levothyroxine (SYNTHROID) 88 MCG tablet TAKE 1 TABLET DAILY    montelukast (SINGULAIR) 10 mg tablet TAKE 1 TABLET EVERY EVENING    multivitamin-minerals-lutein (CENTRUM SILVER) Tab Take 1 tablet by mouth once daily. 1 Tablet Oral Every day    pantoprazole (PROTONIX) 40 MG tablet TAKE 1  TABLET DAILY 30 MINUTES PRIOR TO BREAKFAST    SPIRIVA WITH HANDIHALER 18 mcg inhalation capsule INHALE THE CONTENTS OF 1 CAPSULE DAILY    tretinoin (RETIN-A) 0.05 % cream APPLY THIN FILM TO FACE AT NIGHT AFTER MOISTURIZING    ZINC GLUCONATE ORAL Take by mouth.     Family History     Problem Relation (Age of Onset)    Allergic rhinitis Father, Son, Sister    Allergies Father, Mother    Asthma Son, Sister    Cancer Father    Colon polyps Maternal Grandmother    Diverticulitis Brother    Hypertension Father    No Known Problems Daughter, Maternal Aunt, Maternal Uncle, Paternal Aunt, Paternal Uncle, Maternal Grandfather, Paternal Grandmother, Paternal Grandfather, Brother, Sister    Stroke Mother        Tobacco Use    Smoking status: Former Smoker     Packs/day: 2.00     Years: 20.00     Pack years: 40.00     Types: Cigarettes     Last attempt to quit: 1994     Years since quittin.1    Smokeless tobacco: Never Used   Substance and Sexual Activity    Alcohol use: No    Drug use: No    Sexual activity: Yes     Partners: Male     Review of Systems   Constitutional: Negative for chills, fatigue, fever and unexpected weight change.   HENT: Negative for ear pain, rhinorrhea, sneezing and sore throat.    Eyes: Negative for visual disturbance.   Respiratory: Negative for cough, chest tightness and shortness of breath.    Cardiovascular: Negative for chest pain.   Gastrointestinal: Negative for abdominal pain, constipation, diarrhea, nausea and vomiting.   Endocrine: Negative for polyuria.   Genitourinary: Negative for dysuria and hematuria.   Neurological: Negative for seizures and headaches.     Objective:     Vital Signs (Most Recent):  Temp: 98.2 °F (36.8 °C) (20 1535)  Pulse: 102 (20 1537)  Resp: 18 (20 1535)  BP: (!) 175/97 (20 1537)  SpO2: 98 % (20 1537) Vital Signs (24h Range):  Temp:  [98.2 °F (36.8 °C)] 98.2 °F (36.8 °C)  Pulse:  [102-104] 102  Resp:  [18] 18  SpO2:  [97  %-98 %] 98 %  BP: (175)/(97) 175/97     Weight: 52.6 kg (116 lb)  Body mass index is 18.72 kg/m².    Physical Exam   Constitutional: She is oriented to person, place, and time. She appears well-developed and well-nourished. No distress.   HENT:   Head: Normocephalic and atraumatic.   Right Ear: External ear normal.   Left Ear: External ear normal.   Nose: Nose normal.   Mouth/Throat: Oropharynx is clear and moist. No oropharyngeal exudate.   Eyes: Pupils are equal, round, and reactive to light. Conjunctivae and EOM are normal. Right eye exhibits no discharge. Left eye exhibits no discharge. No scleral icterus.   Neck: Normal range of motion. Neck supple. No thyromegaly present.   Cardiovascular: Normal rate, regular rhythm, normal heart sounds and intact distal pulses. Exam reveals no gallop and no friction rub.   No murmur heard.  Pulmonary/Chest: Effort normal. No respiratory distress. She has no wheezes. She has no rales. She exhibits no tenderness.   Decreased breath sounds in left upper lung   Abdominal: Soft. Bowel sounds are normal. She exhibits no distension and no mass. There is no tenderness. There is no rebound and no guarding. No hernia.   Musculoskeletal: Normal range of motion. She exhibits no edema or tenderness.   Lymphadenopathy:     She has no cervical adenopathy.   Neurological: She is alert and oriented to person, place, and time.   Skin: Skin is warm. She is not diaphoretic.   Psychiatric: She has a normal mood and affect. Her behavior is normal. Judgment and thought content normal.   Nursing note and vitals reviewed.        CRANIAL NERVES     CN III, IV, VI   Pupils are equal, round, and reactive to light.  Extraocular motions are normal.        Significant Labs:   CBC:   Recent Labs   Lab 03/13/20  1654   WBC 8.25   HGB 12.8   HCT 39.1        CMP:   Recent Labs   Lab 03/13/20  1654      K 3.3*      CO2 26   GLU 86   BUN 11   CREATININE 0.4*   CALCIUM 9.1   PROT 7.1   ALBUMIN  3.9   BILITOT 1.3*   ALKPHOS 53*   AST 22   ALT 19   ANIONGAP 11   EGFRNONAA >60.0     EKG  My personal interpretation: No acute ST elevation or depression appreciated    Significant Imaging:     X-ray Chest AP Portable [[id number]] Collected: 03/13/20 1708   Order Status: Completed Updated: 03/13/20 1724   Narrative:     PROCEDURE:   XR CHEST AP PORTABLE  dated  3/13/2020 5:08 PM    CLINICAL HISTORY:   Female 69 years of age.   General weakness, hx of  esophageal cancer    TECHNIQUE: AP view of the chest obtained portably at 5:08 PM.    PREVIOUS STUDIES:  March 12, 2020    FINDINGS:      Right upper extremity access terminates in the SVC. There are median  sternotomy wires, mediastinal and left upper chest surgical clips, and  left neck and upper chest vascular stents. There is been prior partial  left upper pneumonectomy and rib resection. There is no acute  pulmonary infiltrate. There is no pleural effusion or pneumothorax.  Cardiac mediastinal contours are stable. The heart is not enlarged.      IMPRESSION:    No acute findings. Stable compared with the prior study.  Postoperative left upper chest.         Assessment/Plan:     Active Hospital Problems    Diagnosis    *Intractable vomiting    Malignant neoplasm of esophagus    S/P ablation of atrial fibrillation    Esophageal lesion    S/P CABG (coronary artery bypass graft)    History of smoking 10-25 pack years, 15 pack-years, quit 1994    COPD (chronic obstructive pulmonary disease)     Controlled on spiriva daily and advair BID.  Albuterol for rescue and prevention      History of lung cancer     Small cell lung cancer 1999 status post resection.  Needs yearly follow up.  Stable disease.      S/P CABG x 2, 2005    HTN (hypertension)    Hyperlipidemia    PAF (paroxysmal atrial fibrillation), onset 2002    CAD (coronary artery disease)    Anxiety    Anticoagulant long-term use    Chronic external jugular vein thrombosis    Hypothyroid    GERD  (gastroesophageal reflux disease)       * Intractable vomiting  2/2 obstructing esophageal neoplasm  NPO, IV fluids  Clinimix  Lovenox 1 mg/kg for Afib    GI consult for possible dilation and stent placement  Appreciate consultants    VTE Risk Mitigation (From admission, onward)         Ordered     enoxaparin injection 50 mg  Every 12 hours (non-standard times)      03/13/20 1851     enoxaparin injection 40 mg  Daily      03/13/20 1851                   Garrison Husain MD  Department of Hospital Medicine   [facility]  Date of service: 03/13/2020

## 2020-03-13 NOTE — ASSESSMENT & PLAN NOTE
2/2 obstructing esophageal neoplasm  NPO, IV fluids  Clinimix  Lovenox 1 mg/kg for Afib    GI consult for possible dilation and stent placement  Appreciate consultants

## 2020-03-13 NOTE — SUBJECTIVE & OBJECTIVE
Past Medical History:   Diagnosis Date    *Atrial fibrillation     and Hx PSVT    Allergic drug rash 12/28/2016    Allergy     chronic     Arthritis     fingers    Benign tumor of throat     Bronchitis March 2013    CAD (coronary artery disease) 2005    CABGx2 in 2005 and 2 MI after with 4 stents    Cancer 1996    small cell lung cancer s/p resection of 1/3 lung, 5 ribs and muscle mass then had chemo and radiation    Cataract     OD     CHF (congestive heart failure) past Hx    Chronic rhinitis     Clot past Hx    external jugular, now stented, occluded, had phlebitis; now revascularized    Colon polyp     COPD (chronic obstructive pulmonary disease)     no oxygen or nebulizers    COPD exacerbation 5/8/13    improved 5/14/13 after steroid pack,antibiotics    Dysphagia     Esophageal stenosis     Former smoker     GERD (gastroesophageal reflux disease)     Heart block     Hyperlipidemia     Hypertension     pt states does not have //    MI (myocardial infarction) 2005    Posterior vitreous detachment of left eye     Thyroid disease        Past Surgical History:   Procedure Laterality Date    ABLATION N/A 12/11/2018    Procedure: ABLATION;  Surgeon: Santana Covarrubias MD;  Location: [facility] EP LAB;  Service: Cardiology;  Laterality: N/A;  AF, JUAN ANTONIO, PVI, RFA, CARTO, GEN, GP, 3 PREP    ABLATION OF ARRHYTHMOGENIC FOCUS FOR ATRIAL FIBRILLATION N/A 5/8/2019    Procedure: ABLATION, ARRHYTHMOGENIC FOCUS, FOR ATRIAL FIBRILLATION;  Surgeon: Santana Covarrubias MD;  Location: [facility] EP LAB;  Service: Cardiology;  Laterality: N/A;  AF, PVI, RFA, CARTO, GEN, GP , 3 PREP    ADENOIDECTOMY      APPENDECTOMY      BACK SURGERY      lumbar    BREAST BIOPSY Left     benign    CARDIAC SURGERY      CARDIOVERSION  5/8/2019    Procedure: Cardioversion;  Surgeon: Santana Covarrubias MD;  Location: [facility] EP LAB;  Service: Cardiology;;    CATARACT EXTRACTION Left     OS    CATARACT EXTRACTION, BILATERAL      left eye only     CHOLECYSTECTOMY      COLONOSCOPY  03/2012    repeat in 5 years    COLONOSCOPY N/A 6/19/2017    Procedure: COLONOSCOPY;  Surgeon: Kal Elise MD;  Location: [facility];  Service: Endoscopy;  Laterality: N/A;    CORONARY ARTERY BYPASS GRAFT  2005    2 vessel    ESOPHAGOGASTRODUODENOSCOPY N/A 7/5/2018    Procedure: ESOPHAGOGASTRODUODENOSCOPY (EGD)/cryo;  Surgeon: Robbie Gonzales MD;  Location: [facility] (2ND FLR);  Service: Endoscopy;  Laterality: N/A;  Eliquis/Dr Henry Wei/OK to hold med 2 days prior to egd/see telephone encounter dated 6/12/18/pt stated she needs to take Diflucan 1 tab po daily 12 days prior to egd, message sent to Sarah/she will check with Dr Gonzales/santo    ESOPHAGOGASTRODUODENOSCOPY N/A 10/3/2018    Procedure: EGD (ESOPHAGOGASTRODUODENOSCOPY)/poss cryo;  Surgeon: Robbie Gonzales MD;  Location: [facility] (2ND FLR);  Service: Endoscopy;  Laterality: N/A;  2 day hold Dr Henry Pavon  Diflucan 1 tab po daily starting 14 days prior to egd, Rx'd per Dr Gonzales    ESOPHAGOGASTRODUODENOSCOPY N/A 11/1/2018    Procedure: EGD (ESOPHAGOGASTRODUODENOSCOPY)/cryo;  Surgeon: Robbie Gonzales MD;  Location: [facility] (2[facility]);  Service: Endoscopy;  Laterality: N/A;  2 day hold Dr Henry Pavon  Diflucan 1 tab po daily starting 14 days prior to egd, Rx'd per Dr Gonzales as before?    ESOPHAGOGASTRODUODENOSCOPY N/A 9/27/2019    Procedure: EGD (ESOPHAGOGASTRODUODENOSCOPY);  Surgeon: Robbie Gonzales MD;  Location: [facility] (2ND FLR);  Service: Endoscopy;  Laterality: N/A;  Coumadin was d/c'd 9/18/19.  No other anti-thrombotic med for now.  pg  Per Dr Gonzales, she does not need to take Diflucan pre-procedure as she did before.  pg    ESOPHAGOGASTRODUODENOSCOPY N/A 1/31/2020    Procedure: EGD (ESOPHAGOGASTRODUODENOSCOPY)/poss cryo;  Surgeon: Robbie Gonzales MD;  Location: [facility] ([address] [place] [address]);  Service: Endoscopy;  Laterality: N/A;  ok for eliquis hold-see telephone order  1/23/20-tb    ESOPHAGOGASTRODUODENOSCOPY N/A 3/12/2020    Procedure: EGD (ESOPHAGOGASTRODUODENOSCOPY);  Surgeon: Ulisses Colorado III, MD;  Location: [facility];  Service: Endoscopy;  Laterality: N/A;    EYE SURGERY  Dec 2012    ptosis repair    FIRST RIB REMOVAL  1996 with lung resection    HYSTERECTOMY      LUNG LOBECTOMY  1996    left lung for cancer    REMOVAL OF PLANTAR WART USING LASER      SHOULDER SURGERY  2010,2011    scapular entrapment after lung surgery, needed 5 ribs removed left side    SYMPOTHATIC NERVE LEFT SIDE      1996 with lung surgery    TONSILLECTOMY      TONSILLECTOMY, ADENOIDECTOMY, BILATERAL MYRINGOTOMY AND TUBES      UPPER GASTROINTESTINAL ENDOSCOPY  05/2016    Dr. Koo    VASCULAR SURGERY      stents place in jugualr vein       Review of patient's allergies indicates:   Allergen Reactions    Ciprofloxacin Itching    Coreg [carvedilol]      Low energy    Doxycycline Other (See Comments) and Hives     Patient had a rxn with the sun despite wearing spf 30    Metoprolol      Low energy       Current Facility-Administered Medications on File Prior to Encounter   Medication    0.9%  NaCl infusion    0.9%  NaCl infusion    sodium chloride 0.9% bolus 1,000 mL    sodium chloride 0.9% flush 5 mL     Current Outpatient Medications on File Prior to Encounter   Medication Sig    albuterol (ACCUNEB) 1.25 mg/3 mL Nebu USE 1 VIAL (3 ML) VIA NEBULIZER EVERY 6 HOURS AS NEEDED    albuterol (PROVENTIL HFA) 90 mcg/actuation inhaler Inhale 2 puffs into the lungs every 6 (six) hours as needed for Wheezing.    apixaban (ELIQUIS) 5 mg Tab Take 1 tablet by mouth once daily. Take half tab BID    bisacodyl (DULCOLAX) 5 mg EC tablet Take 1 tablet (5 mg total) by mouth daily as needed for Constipation.    cetirizine (ZYRTEC) 10 MG tablet TAKE 1 TABLET EVERY EVENING    diphenhydrAMINE (BENADRYL) 25 mg capsule Take 25 mg by mouth every 6 (six) hours as needed for Itching.    fluticasone  propionate (FLONASE) 50 mcg/actuation nasal spray 1 spray (50 mcg total) by Each Nostril route once daily.    ketoconazole (NIZORAL) 2 % cream Apply topically once daily.    levothyroxine (SYNTHROID) 88 MCG tablet TAKE 1 TABLET DAILY    montelukast (SINGULAIR) 10 mg tablet TAKE 1 TABLET EVERY EVENING    multivitamin-minerals-lutein (CENTRUM SILVER) Tab Take 1 tablet by mouth once daily. 1 Tablet Oral Every day    pantoprazole (PROTONIX) 40 MG tablet TAKE 1 TABLET DAILY 30 MINUTES PRIOR TO BREAKFAST    SPIRIVA WITH HANDIHALER 18 mcg inhalation capsule INHALE THE CONTENTS OF 1 CAPSULE DAILY    tretinoin (RETIN-A) 0.05 % cream APPLY THIN FILM TO FACE AT NIGHT AFTER MOISTURIZING    ZINC GLUCONATE ORAL Take by mouth.     Family History     Problem Relation (Age of Onset)    Allergic rhinitis Father, Son, Sister    Allergies Father, Mother    Asthma Son, Sister    Cancer Father    Colon polyps Maternal Grandmother    Diverticulitis Brother    Hypertension Father    No Known Problems Daughter, Maternal Aunt, Maternal Uncle, Paternal Aunt, Paternal Uncle, Maternal Grandfather, Paternal Grandmother, Paternal Grandfather, Brother, Sister    Stroke Mother        Tobacco Use    Smoking status: Former Smoker     Packs/day: 2.00     Years: 20.00     Pack years: 40.00     Types: Cigarettes     Last attempt to quit: 1994     Years since quittin.1    Smokeless tobacco: Never Used   Substance and Sexual Activity    Alcohol use: No    Drug use: No    Sexual activity: Yes     Partners: Male     Review of Systems   Constitutional: Negative for chills, fatigue, fever and unexpected weight change.   HENT: Negative for ear pain, rhinorrhea, sneezing and sore throat.    Eyes: Negative for visual disturbance.   Respiratory: Negative for cough, chest tightness and shortness of breath.    Cardiovascular: Negative for chest pain.   Gastrointestinal: Negative for abdominal pain, constipation, diarrhea, nausea and vomiting.    Endocrine: Negative for polyuria.   Genitourinary: Negative for dysuria and hematuria.   Neurological: Negative for seizures and headaches.     Objective:     Vital Signs (Most Recent):  Temp: 98.2 °F (36.8 °C) (03/13/20 1535)  Pulse: 102 (03/13/20 1537)  Resp: 18 (03/13/20 1535)  BP: (!) 175/97 (03/13/20 1537)  SpO2: 98 % (03/13/20 1537) Vital Signs (24h Range):  Temp:  [98.2 °F (36.8 °C)] 98.2 °F (36.8 °C)  Pulse:  [102-104] 102  Resp:  [18] 18  SpO2:  [97 %-98 %] 98 %  BP: (175)/(97) 175/97     Weight: 52.6 kg (116 lb)  Body mass index is 18.72 kg/m².    Physical Exam   Constitutional: She is oriented to person, place, and time. She appears well-developed and well-nourished. No distress.   HENT:   Head: Normocephalic and atraumatic.   Right Ear: External ear normal.   Left Ear: External ear normal.   Nose: Nose normal.   Mouth/Throat: Oropharynx is clear and moist. No oropharyngeal exudate.   Eyes: Pupils are equal, round, and reactive to light. Conjunctivae and EOM are normal. Right eye exhibits no discharge. Left eye exhibits no discharge. No scleral icterus.   Neck: Normal range of motion. Neck supple. No thyromegaly present.   Cardiovascular: Normal rate, regular rhythm, normal heart sounds and intact distal pulses. Exam reveals no gallop and no friction rub.   No murmur heard.  Pulmonary/Chest: Effort normal. No respiratory distress. She has no wheezes. She has no rales. She exhibits no tenderness.   Decreased breath sounds in left upper lung   Abdominal: Soft. Bowel sounds are normal. She exhibits no distension and no mass. There is no tenderness. There is no rebound and no guarding. No hernia.   Musculoskeletal: Normal range of motion. She exhibits no edema or tenderness.   Lymphadenopathy:     She has no cervical adenopathy.   Neurological: She is alert and oriented to person, place, and time.   Skin: Skin is warm. She is not diaphoretic.   Psychiatric: She has a normal mood and affect. Her behavior is  normal. Judgment and thought content normal.   Nursing note and vitals reviewed.        CRANIAL NERVES     CN III, IV, VI   Pupils are equal, round, and reactive to light.  Extraocular motions are normal.        Significant Labs:   CBC:   Recent Labs   Lab 03/13/20  1654   WBC 8.25   HGB 12.8   HCT 39.1        CMP:   Recent Labs   Lab 03/13/20  1654      K 3.3*      CO2 26   GLU 86   BUN 11   CREATININE 0.4*   CALCIUM 9.1   PROT 7.1   ALBUMIN 3.9   BILITOT 1.3*   ALKPHOS 53*   AST 22   ALT 19   ANIONGAP 11   EGFRNONAA >60.0     EKG  My personal interpretation: No acute ST elevation or depression appreciated    Significant Imaging:     X-ray Chest AP Portable [[id number]] Collected: 03/13/20 1708   Order Status: Completed Updated: 03/13/20 1724   Narrative:     PROCEDURE:   XR CHEST AP PORTABLE  dated  3/13/2020 5:08 PM    CLINICAL HISTORY:   Female 69 years of age.   General weakness, hx of  esophageal cancer    TECHNIQUE: AP view of the chest obtained portably at 5:08 PM.    PREVIOUS STUDIES:  March 12, 2020    FINDINGS:      Right upper extremity access terminates in the SVC. There are median  sternotomy wires, mediastinal and left upper chest surgical clips, and  left neck and upper chest vascular stents. There is been prior partial  left upper pneumonectomy and rib resection. There is no acute  pulmonary infiltrate. There is no pleural effusion or pneumothorax.  Cardiac mediastinal contours are stable. The heart is not enlarged.      IMPRESSION:    No acute findings. Stable compared with the prior study.  Postoperative left upper chest.

## 2020-03-14 NOTE — PROGRESS NOTES
[facility], [facility] Medicine  Progress Note    Patient Name: Steph Lira  MRN: [id number]  Patient Class: OP- Observation   Admission Date: 3/13/2020  Length of Stay: 0 days  Attending Physician: Garrison Husain MD  Primary Care Provider: Maida Mon MD        Subjective:     Principal Problem:Intractable vomiting    Interval History:  Patient reports feeling well.  More alert today than she did yesterday.  Clinically patient appears better today.  Currently on Clinimix.  Discussing with dietary for TPN.  Esophageal stent likely on Monday or Tuesday.  Unable to tolerate anything p.o..    Review of Systems   Constitutional: Negative for chills, fatigue, fever and unexpected weight change.   HENT: Negative for sore throat.    Eyes: Negative for visual disturbance.   Respiratory: Negative for cough, chest tightness and shortness of breath.    Cardiovascular: Negative for chest pain.   Gastrointestinal: Negative for abdominal pain, constipation, diarrhea, nausea and vomiting.   Endocrine: Negative for polyuria.   Genitourinary: Negative for dysuria and hematuria.   Neurological: Negative for headaches.     Objective:     Vital Signs (Most Recent):  Temp: 99.4 °F (37.4 °C) (03/14/20 0830)  Pulse: 106 (03/14/20 0853)  Resp: 16 (03/14/20 0853)  BP: 119/74 (03/14/20 0830)  SpO2: 97 % (03/14/20 0853) Vital Signs (24h Range):  Temp:  [97.7 °F (36.5 °C)-99.4 °F (37.4 °C)] 99.4 °F (37.4 °C)  Pulse:  [] 106  Resp:  [16-23] 16  SpO2:  [95 %-99 %] 97 %  BP: (119-175)/(70-97) 119/74     Weight: 51.3 kg (113 lb 1.5 oz)  Body mass index is 18.25 kg/m².    Intake/Output Summary (Last 24 hours) at 3/14/2020 1047  Last data filed at 3/13/2020 1730  Gross per 24 hour   Intake 1000 ml   Output --   Net 1000 ml      Physical Exam   Constitutional: She is oriented to person, place, and time. She appears well-developed and well-nourished. No distress.   Thin appearing   HENT:   Right Ear: External ear normal.    Left Ear: External ear normal.   Eyes: Conjunctivae and EOM are normal. Right eye exhibits no discharge. Left eye exhibits no discharge.   Neck: Normal range of motion.   Cardiovascular: Normal rate, regular rhythm and normal heart sounds. Exam reveals no gallop and no friction rub.   No murmur heard.  Pulmonary/Chest: Effort normal and breath sounds normal. No respiratory distress. She has no wheezes. She has no rales. She exhibits no tenderness.   Decreased breath sounds in left upper lung   Musculoskeletal: She exhibits no edema or tenderness.   Neurological: She is alert and oriented to person, place, and time.   Skin: Skin is warm. She is not diaphoretic.   Psychiatric: She has a normal mood and affect. Her behavior is normal. Judgment and thought content normal.   Nursing note and vitals reviewed.      Significant Labs:   CBC:   Recent Labs   Lab 03/13/20 1654 03/14/20  0423   WBC 8.25 6.34   HGB 12.8 12.4   HCT 39.1 37.6    109*     CMP:   Recent Labs   Lab 03/13/20 1654 03/14/20  0423    140   K 3.3* 3.7    102   CO2 26 27   GLU 86 90   BUN 11 8   CREATININE 0.4* 0.4*   CALCIUM 9.1 8.0*   PROT 7.1  --    ALBUMIN 3.9  --    BILITOT 1.3*  --    ALKPHOS 53*  --    AST 22  --    ALT 19  --    ANIONGAP 11 11   EGFRNONAA >60.0 >60.0       Assessment/Plan:      Active Hospital Problems    Diagnosis    *Intractable vomiting    Malignant neoplasm of esophagus    S/P ablation of atrial fibrillation    Esophageal lesion    S/P CABG (coronary artery bypass graft)    History of smoking 10-25 pack years, 15 pack-years, quit 1994    COPD (chronic obstructive pulmonary disease)     Controlled on spiriva daily and advair BID.  Albuterol for rescue and prevention      History of lung cancer     Small cell lung cancer 1999 status post resection.  Needs yearly follow up.  Stable disease.      S/P CABG x 2, 2005    HTN (hypertension)    Hyperlipidemia    PAF (paroxysmal atrial fibrillation),  onset 2002    CAD (coronary artery disease)    Anxiety    Anticoagulant long-term use    Chronic external jugular vein thrombosis    Hypothyroid    GERD (gastroesophageal reflux disease)       * Intractable vomiting  2/2 obstructing esophageal neoplasm  NPO, IV fluids  Plan for TPN today on PICC  Lovenox 1 mg/kg for Afib  Plan for EGD and stent on Monday or Tues when stent arrives    GI consulted  Appreciate consultants    VTE Risk Mitigation (From admission, onward)         Ordered     enoxaparin injection 50 mg  Every 12 hours (non-standard times)      03/13/20 1851     IP VTE HIGH RISK PATIENT  Once      03/13/20 1928                      Garrison Husain MD  Department of Hospital Medicine   [facility] hospital  Date of service: 03/14/2020

## 2020-03-14 NOTE — SUBJECTIVE & OBJECTIVE
Interval History:  Patient reports feeling well.  More alert today than she did yesterday.  Clinically patient appears better today.  Currently on Clinimix.  Discussing with dietary for TPN.  Esophageal stent likely on Monday or Tuesday.  Unable to tolerate anything p.o..    Review of Systems   Constitutional: Negative for chills, fatigue, fever and unexpected weight change.   HENT: Negative for sore throat.    Eyes: Negative for visual disturbance.   Respiratory: Negative for cough, chest tightness and shortness of breath.    Cardiovascular: Negative for chest pain.   Gastrointestinal: Negative for abdominal pain, constipation, diarrhea, nausea and vomiting.   Endocrine: Negative for polyuria.   Genitourinary: Negative for dysuria and hematuria.   Neurological: Negative for headaches.     Objective:     Vital Signs (Most Recent):  Temp: 99.4 °F (37.4 °C) (03/14/20 0830)  Pulse: 106 (03/14/20 0853)  Resp: 16 (03/14/20 0853)  BP: 119/74 (03/14/20 0830)  SpO2: 97 % (03/14/20 0853) Vital Signs (24h Range):  Temp:  [97.7 °F (36.5 °C)-99.4 °F (37.4 °C)] 99.4 °F (37.4 °C)  Pulse:  [] 106  Resp:  [16-23] 16  SpO2:  [95 %-99 %] 97 %  BP: (119-175)/(70-97) 119/74     Weight: 51.3 kg (113 lb 1.5 oz)  Body mass index is 18.25 kg/m².    Intake/Output Summary (Last 24 hours) at 3/14/2020 1047  Last data filed at 3/13/2020 1730  Gross per 24 hour   Intake 1000 ml   Output --   Net 1000 ml      Physical Exam   Constitutional: She is oriented to person, place, and time. She appears well-developed and well-nourished. No distress.   Thin appearing   HENT:   Right Ear: External ear normal.   Left Ear: External ear normal.   Eyes: Conjunctivae and EOM are normal. Right eye exhibits no discharge. Left eye exhibits no discharge.   Neck: Normal range of motion.   Cardiovascular: Normal rate, regular rhythm and normal heart sounds. Exam reveals no gallop and no friction rub.   No murmur heard.  Pulmonary/Chest: Effort normal and  breath sounds normal. No respiratory distress. She has no wheezes. She has no rales. She exhibits no tenderness.   Decreased breath sounds in left upper lung   Musculoskeletal: She exhibits no edema or tenderness.   Neurological: She is alert and oriented to person, place, and time.   Skin: Skin is warm. She is not diaphoretic.   Psychiatric: She has a normal mood and affect. Her behavior is normal. Judgment and thought content normal.   Nursing note and vitals reviewed.      Significant Labs:   CBC:   Recent Labs   Lab 03/13/20  1654 03/14/20  0423   WBC 8.25 6.34   HGB 12.8 12.4   HCT 39.1 37.6    109*     CMP:   Recent Labs   Lab 03/13/20  1654 03/14/20  0423    140   K 3.3* 3.7    102   CO2 26 27   GLU 86 90   BUN 11 8   CREATININE 0.4* 0.4*   CALCIUM 9.1 8.0*   PROT 7.1  --    ALBUMIN 3.9  --    BILITOT 1.3*  --    ALKPHOS 53*  --    AST 22  --    ALT 19  --    ANIONGAP 11 11   EGFRNONAA >60.0 >60.0

## 2020-03-14 NOTE — CONSULTS
[facility]  Adult Nutrition   Consult Note (Nutrition Support Management)    SUMMARY     Recommendations  Recommendation/Intervention: 1. TPN to initiate today per MD order, RD placed order for new TPN 2. NST to follow and monitor labs and adjust TPN as necessary  Goals: 1. Tolerance of TPN 2. Labs to trend towards normal limits  Nutrition Goal Status: new  Communication of RD Recs: reviewed with physician(reviewed with RN)    Dietitian Rounds Brief    Consult noted for inadequate PO intake. Noted plans for EGD/esophageal stent. Per GI notes, unable to be performed until probably Tuesday--stent ordered per notes. Discussed starting TPN with MD. PICC in place. Currently Clinimix infusing @ 50 ml/hr. MD gave verbal order to start TPN. Per EMR, noted progressive wt loss 15# over past 2 mths--11.7% wt loss (severe). NFPE not performed at this time.    PARENTERAL NUTRITION PROGRESS NOTE:      Parenteral Nutrition Day # 1   Diagnosis/Indication for PN: NPO, esophageal cancer  IV Access: PICC   Diet/Tube Feeding: NPO         Admixture Type: 3 in 1 custom TPN  Infusion Rate and Frequency: 100 ml/hr   Patient Acuity:acute care    PN Composition:   75 grams Amino Acid, 125 grams Dextrose, and 50 grams Lipid.    Today's TPN provides 1225 kcal.  *Dextrose is started at less than full dextrose goal to reduce risk for Refeeding Syndrome. Dextrose content will be advanced as appropriate over the next few days.    Please see order for electrolytes and additives content and adjustments.   *The Nutrition Support Team will continue to monitor electrolytes daily and adjust parenteral nutrition as warranted.    Reason for Assessment  Reason For Assessment: consult  Diagnosis: cancer diagnosis/related complications  Relevant Medical History: esophageal cancer, lung cancer, HTN, COPD, GERD, CABG, hyperlipidemia     Nutrition Risk Screen  Nutrition Risk Screen: tube feeding or parenteral nutrition     MST Score: 3  Have you  "recently lost weight without trying?: Yes: 14-23 lbs  Weight loss score: 2  Have you been eating poorly because of a decreased appetite?: Yes  Appetite score: 1       Nutrition/Diet History  Spiritual, Cultural Beliefs, Gnosticism Practices, Values that Affect Care: no  Food Allergies: NKFA  Factors Affecting Nutritional Intake: NPO    Anthropometrics  Temp: 99.4 °F (37.4 °C)  Height Method: Stated  Height: 5' 6" (167.6 cm)  Height (inches): 66 in  Weight Method: Bed Scale  Weight: 51.3 kg (113 lb 1.5 oz)  Weight (lb): 113.1 lb  Ideal Body Weight (IBW), Female: 130 lb  % Ideal Body Weight, Female (lb): 87 %  BMI (Calculated): 18.3  BMI Grade: 17 - 18.4 protein-energy malnutrition grade I       Weight History:  Wt Readings from Last 10 Encounters:   03/14/20 51.3 kg (113 lb 1.5 oz)   03/12/20 52.7 kg (116 lb 2.9 oz)   03/11/20 52.6 kg (116 lb)   03/09/20 54.4 kg (120 lb)   03/06/20 54.9 kg (121 lb)   03/04/20 54.9 kg (121 lb)   02/27/20 56.2 kg (123 lb 14.4 oz)   02/21/20 57.2 kg (126 lb)   01/31/20 57.2 kg (126 lb)   01/20/20 58.5 kg (128 lb 15.5 oz)       Lab/Procedures/Meds: Pertinent Labs Reviewed  Clinical Chemistry:  Recent Labs   Lab 03/13/20  1654 03/14/20  0423    140   K 3.3* 3.7    102   CO2 26 27   GLU 86 90   BUN 11 8   CREATININE 0.4* 0.4*   CALCIUM 9.1 8.0*   PROT 7.1  --    ALBUMIN 3.9  --    BILITOT 1.3*  --    ALKPHOS 53*  --    AST 22  --    ALT 19  --    ANIONGAP 11 11   ESTGFRAFRICA >60.0 >60.0   EGFRNONAA >60.0 >60.0   MG 1.9 1.4*   PHOS 2.2* 2.2*     CBC:   Recent Labs   Lab 03/14/20  0423   WBC 6.34   RBC 4.12   HGB 12.4   HCT 37.6   *   MCV 91   MCH 30.1   MCHC 33.0     Lipid Panel:  No results for input(s): CHOL, HDL, LDLCALC, TRIG, CHOLHDL in the last 168 hours.  Cardiac Profile:  No results for input(s): BNP, CPK, CPKMB, TROPONINI, CKTOTAL in the last 168 hours.  Inflammatory Labs:  No results for input(s): CRP in the last 168 hours.  Diabetes:  No results for input(s): " HGBA1C, POCTGLUCOSE in the last 168 hours.  Thyroid & Parathyroid:  No results for input(s): TSH, FREET4, Z8XXRDH, U7ERCQG, THYROIDAB in the last 168 hours.  Vitamins:  No results for input(s): HYAXUSSS20, [id number] in the last 168 hours.    Medications: Pertinent Medications reviewed  Scheduled Meds:   enoxparin  1 mg/kg Subcutaneous Q12H    fluticasone propionate  1 spray Each Nostril Daily    tiotropium  1 capsule Inhalation Daily     Continuous Infusions:   amino acid 4.25 % in D5W 1,000 mL (03/13/20 2209)    lactated ringers 75 mL/hr at 03/14/20 0936    TPN ADULT CENTRAL LINE CUSTOM (3 in 1)       PRN Meds:.acetaminophen, acetaminophen, albuterol-ipratropium, calcium chloride IVPB, calcium chloride IVPB, calcium chloride IVPB, dextrose 50%, dextrose 50%, hydrALAZINE, insulin regular, magnesium oxide, magnesium sulfate IVPB, magnesium sulfate IVPB, magnesium sulfate IVPB, magnesium sulfate IVPB, ondansetron, potassium chloride in water, potassium chloride in water, potassium chloride in water, potassium chloride in water, potassium chloride, potassium chloride, potassium chloride, potassium chloride, promethazine (PHENERGAN) IVPB, sodium chloride 0.9%, sodium phosphate IVPB, sodium phosphate IVPB, sodium phosphate IVPB, sodium phosphate IVPB, sodium phosphate IVPB    Estimated/Assessed Needs  Weight Used For Calorie Calculations: 51.3 kg (113 lb 1.5 oz)  Energy Calorie Requirements (kcal): 6746-8146 (30-35 kcal/kg)  Energy Need Method: Kcal/kg  Protein Requirements: 62-77 g (1.2-1.5 g/kg)  Weight Used For Protein Calculations: 51.3 kg (113 lb 1.5 oz)     Estimated Fluid Requirement Method: RDA Method  RDA Method (mL): 1539       Nutrition Prescription Ordered  Current Diet Order: NPO  Current Nutrition Support Formula Ordered: Clinimix 4.25/5  Current Nutrition Support Rate Ordered: 50 (ml)  Current Nutrition Support Frequency Ordered: ml/hr    Evaluation of Received Nutrient/Fluid Intake  Parenteral  Calories (kcal): 408  Parenteral Protein (gm): 51  IV Fluid (mL): 1800(LR @ 75 ml/hr)  Energy Calories Required: not meeting needs  Protein Required: not meeting needs  Fluid Required: meeting needs  Tolerance: other (see comments)(NPO)     Intake/Output Summary (Last 24 hours) at 3/14/2020 1149  Last data filed at 3/13/2020 1730  Gross per 24 hour   Intake 1000 ml   Output --   Net 1000 ml        % Meal Intake: NPO    Nutrition Risk  Level of Risk/Frequency of Follow-up: high     Monitor and Evaluation  Food and Nutrient Intake: energy intake, parenteral nutrition intake  Food and Nutrient Adminstration: enteral and parenteral nutrition administration, diet order  Physical Activity and Function: nutrition-related ADLs and IADLs  Anthropometric Measurements: weight, weight change  Biochemical Data, Medical Tests and Procedures: gastrointestinal profile, glucose/endocrine profile, electrolyte and renal panel  Nutrition-Focused Physical Findings: overall appearance     Nutrition Follow-Up  RD Follow-up?: Yes    Tanika Flores RD 03/14/2020 11:49 AM

## 2020-03-14 NOTE — ASSESSMENT & PLAN NOTE
2/2 obstructing esophageal neoplasm  NPO, IV fluids  Plan for TPN today on PICC  Lovenox 1 mg/kg for Afib  Plan for EGD and stent on Monday or Tues when stent arrives    GI consulted  Appreciate consultants

## 2020-03-14 NOTE — PLAN OF CARE
03/14/20 0853   Patient Assessment/Suction   Level of Consciousness (AVPU) alert   Respiratory Effort Normal;Unlabored   All Lung Fields Breath Sounds clear   PRE-TX-O2   O2 Device (Oxygen Therapy) room air   SpO2 97 %   Pulse Oximetry Type Intermittent   $ Pulse Oximetry - Multiple Charge Pulse Oximetry - Multiple   Pulse 106   Resp 16   Inhaler   $ Inhaler Charges MDI (Metered Dose Inahler) Treatment  (spiriva)   Daily Review of Necessity (Inhaler) completed   Respiratory Treatment Status (Inhaler) given   Treatment Route (Inhaler) mouthpiece   Patient Position (Inhaler) HOB elevated   Post Treatment Assessment (Inhaler) breath sounds improved   Signs of Intolerance (Inhaler) none   Respiratory Evaluation   $ Care Plan Tech Time 15 min

## 2020-03-14 NOTE — PLAN OF CARE
Problem: Parenteral Nutrition  Goal: Effective Intravenous Nutrition Therapy Delivery  Outcome: Ongoing, Progressing  Intervention: Optimize Intravenous Nutrition Delivery  Flowsheets (Taken 3/14/2020 1153)  Medication Review/Management: infusion initiated

## 2020-03-14 NOTE — UM SECONDARY REVIEW
Physician Advisor External    Level of Care Issue    Referral to EHR for LOC review 3/13/2020    1245pm With further review, Patient meets with low magnesium. MD messaged to notify , CM will follow.     1255 Dr. Husain changed LOC to inpt.     140pm Per Dr. Alanis with EHR, patient is appropriate for inpt 3/13/2020.

## 2020-03-14 NOTE — PHYSICIAN QUERY
PT Name: Steph Lira  MR #: [id number]     Physician Query Form - Documentation Clarification      CDS/: Anuradha Noriega               Contact information:    This form is a permanent document in the medical record.     Query Date: March 14, 2020    By submitting this query, we are merely seeking further clarification of documentation. Please utilize your independent clinical judgment when addressing the question(s) below.    The Medical record reflects the following:  Pt with esophageal cancer. Pt admitted ith intractable vomiting, unable to tolerate anything PO. RD consult. Pt will be starting on TPN this evening.  Pt with documented severe wt loss. BMI 18.3                                                                           Doctor, Is there an associated diagnosis that can be documented to better reflect the severity of the pt's nutritional status? If so, please document below and include the severity if applicable.    Provider Use Only      Protein energy malnutrition grade 1                                                                                                                           [  ] Clinically Undetermined

## 2020-03-15 NOTE — PLAN OF CARE
Problem: Adult Inpatient Plan of Care  Goal: Plan of Care Review  Outcome: Ongoing, Progressing  Goal: Patient-Specific Goal (Individualization)  Outcome: Ongoing, Progressing  Goal: Absence of Hospital-Acquired Illness or Injury  Outcome: Ongoing, Progressing  Goal: Optimal Comfort and Wellbeing  Outcome: Ongoing, Progressing  Goal: Readiness for Transition of Care  Outcome: Ongoing, Progressing  Goal: Rounds/Family Conference  Outcome: Ongoing, Progressing     Problem: Infection  Goal: Infection Symptom Resolution  Outcome: Ongoing, Progressing     Problem: Parenteral Nutrition  Goal: Effective Intravenous Nutrition Therapy Delivery  Outcome: Ongoing, Progressing     Problem: Fall Injury Risk  Goal: Absence of Fall and Fall-Related Injury  Outcome: Ongoing, Progressing

## 2020-03-15 NOTE — PROGRESS NOTES
[facility]  Adult Nutrition   Progress Note (Nutrition Support Management)    SUMMARY     Recommendations  Recommendation/Intervention: 1. New TPN ordered to initiate 1700 today 2. RD to follow and monitor labs, tolerance, etc. Will manage TPN accordingly.  Goals: 1. Tolerance of TPN 2. Labs to trend towards normal limits   Nutrition Goal Status: progressing towards goal  Communication of RD Recs: reviewed with RN    Dietitian Rounds Brief    TPN to continue. No issues reported by RN. Noted riders given 3/14: 2g Mag, 20 mEq KCl, & 20.01 mmol Na phos.     PARENTERAL NUTRITION PROGRESS NOTE:      Parenteral Nutrition Day # 2   Diagnosis/Indication for PN: NPO, esophageal cancer  IV Access: PICC  Diet/Tube Feeding: NPO         Admixture Type: 3 in 1 custom TPN  Infusion Rate and Frequency: 100 ml/hr   Patient Acuity: acute care    PN Composition:   75 grams Amino Acid, 180 grams Dextrose, and 50 grams Lipid.    Today's TPN provides 1412 kcal.  *Dextrose is started at less than full dextrose goal to reduce risk for Refeeding Syndrome. Dextrose content will be advanced as appropriate over the next few days.    Please see order for electrolytes and additives content and adjustments.   *The Nutrition Support Team will continue to monitor electrolytes daily and adjust parenteral nutrition as warranted.        Reason for Assessment  Reason For Assessment: new TPN, RD follow-up  Diagnosis: cancer diagnosis/related complications  Relevant Medical History: esophageal cancer, lung cancer, HTN, COPD, GERD, CABG, hyperlipidemia     Nutrition Risk Screen  Nutrition Risk Screen: tube feeding or parenteral nutrition     MST Score: 3  Have you recently lost weight without trying?: Yes: 14-23 lbs  Weight loss score: 2  Have you been eating poorly because of a decreased appetite?: Yes  Appetite score: 1       Nutrition/Diet History  Spiritual, Cultural Beliefs, Worship Practices, Values that Affect Care: no  Food  "Allergies: NKFA  Factors Affecting Nutritional Intake: NPO    Anthropometrics  Temp: 98.7 °F (37.1 °C)  Height Method: Stated  Height: 5' 6" (167.6 cm)  Height (inches): 66 in  Weight Method: Bed Scale  Weight: 51.3 kg (113 lb 1.5 oz)  Weight (lb): 113.1 lb  Ideal Body Weight (IBW), Female: 130 lb  % Ideal Body Weight, Female (lb): 87 %  BMI (Calculated): 18.3  BMI Grade: 17 - 18.4 protein-energy malnutrition grade I       Weight History:  Wt Readings from Last 10 Encounters:   03/14/20 51.3 kg (113 lb 1.5 oz)   03/12/20 52.7 kg (116 lb 2.9 oz)   03/11/20 52.6 kg (116 lb)   03/09/20 54.4 kg (120 lb)   03/06/20 54.9 kg (121 lb)   03/04/20 54.9 kg (121 lb)   02/27/20 56.2 kg (123 lb 14.4 oz)   02/21/20 57.2 kg (126 lb)   01/31/20 57.2 kg (126 lb)   01/20/20 58.5 kg (128 lb 15.5 oz)       Lab/Procedures/Meds: Pertinent Labs Reviewed  Clinical Chemistry:  Recent Labs   Lab 03/13/20  1654 03/14/20  0423 03/15/20  0530    140 138  138   K 3.3* 3.7 3.6  3.6    102 100  100   CO2 26 27 25  25   GLU 86 90 90  90   BUN 11 8 13  13   CREATININE 0.4* 0.4* 0.4*  0.4*   CALCIUM 9.1 8.0* 8.9  8.9   PROT 7.1  --  7.0  7.0   ALBUMIN 3.9  --  3.7  3.7   BILITOT 1.3*  --  0.9  0.9   ALKPHOS 53*  --  50*  50*   AST 22  --  18  18   ALT 19  --  14  14   ANIONGAP 11 11 13  13   ESTGFRAFRICA >60.0 >60.0 >60.0  >60.0   EGFRNONAA >60.0 >60.0 >60.0  >60.0   MG 1.9 1.4* 2.1  2.1   PHOS 2.2* 2.2* 3.0  3.0     CBC:   Recent Labs   Lab 03/14/20  0423   WBC 6.34   RBC 4.12   HGB 12.4   HCT 37.6   *   MCV 91   MCH 30.1   MCHC 33.0     Lipid Panel:  Recent Labs   Lab 03/15/20  0530   TRIG 121  121       Medications: Pertinent Medications reviewed  Scheduled Meds:   enoxparin  1 mg/kg Subcutaneous Q12H    fluticasone propionate  1 spray Each Nostril Daily    tiotropium  1 capsule Inhalation Daily     Continuous Infusions:   TPN ADULT CENTRAL LINE CUSTOM (3 in 1) 100 mL/hr at 03/14/20 1701    TPN ADULT " CENTRAL LINE CUSTOM (3 in 1)       PRN Meds:.acetaminophen, acetaminophen, albuterol-ipratropium, calcium chloride IVPB, calcium chloride IVPB, calcium chloride IVPB, dextrose 50%, dextrose 50%, hydrALAZINE, insulin regular, magnesium oxide, magnesium sulfate IVPB, magnesium sulfate IVPB, magnesium sulfate IVPB, magnesium sulfate IVPB, ondansetron, potassium chloride in water, potassium chloride in water, potassium chloride in water, potassium chloride in water, potassium chloride, potassium chloride, potassium chloride, potassium chloride, promethazine (PHENERGAN) IVPB, sodium chloride 0.9%, sodium phosphate IVPB, sodium phosphate IVPB, sodium phosphate IVPB, sodium phosphate IVPB, sodium phosphate IVPB    Estimated/Assessed Needs  Weight Used For Calorie Calculations: 51.3 kg (113 lb 1.5 oz)  Energy Calorie Requirements (kcal): 1105-3128 (30-35 kcal/kg)  Energy Need Method: Kcal/kg  Protein Requirements: 62-77 g (1.2-1.5 g/kg)  Weight Used For Protein Calculations: 51.3 kg (113 lb 1.5 oz)     Estimated Fluid Requirement Method: RDA Method  RDA Method (mL): 1539       Nutrition Prescription Ordered  Current Diet Order: NPO  Current Nutrition Support Formula Ordered: Other (Comment)(custom 3-in-1 TPN)  Current Nutrition Support Rate Ordered: 100 (ml)  Current Nutrition Support Frequency Ordered:  ml/hr, continous    Evaluation of Received Nutrient/Fluid Intake  Parenteral Calories (kcal): 1412  Parenteral Protein (gm): 75  Parenteral Fluid (mL): 2400  IV Fluid (mL): 1800(LR @ 75 ml/hr)  Energy Calories Required: not meeting needs  Protein Required: meeting needs  Fluid Required: meeting needs  Tolerance: tolerating     Intake/Output Summary (Last 24 hours) at 3/15/2020 0957  Last data filed at 3/15/2020 0600  Gross per 24 hour   Intake 1450 ml   Output --   Net 1450 ml        % Meal Intake: NPO    Nutrition Risk  Level of Risk/Frequency of Follow-up: high     Monitor and Evaluation  Food and Nutrient Intake:  parenteral nutrition intake, energy intake  Food and Nutrient Adminstration: enteral and parenteral nutrition administration  Physical Activity and Function: nutrition-related ADLs and IADLs  Anthropometric Measurements: weight change, weight  Biochemical Data, Medical Tests and Procedures: electrolyte and renal panel, gastrointestinal profile, glucose/endocrine profile  Nutrition-Focused Physical Findings: overall appearance     Nutrition Follow-Up  RD Follow-up?: Yes    Tanika Flores RD 03/15/2020 9:57 AM

## 2020-03-15 NOTE — ASSESSMENT & PLAN NOTE
2/2 obstructing esophageal neoplasm  NPO, IV fluids  Plan for TPN today on PICC  Lovenox 1 mg/kg for Afib  Plan for EGD and stent on Monday or Tues when stent arrives  Hold lovenox tomorrow morning for possible intervention    GI consulted  Appreciate consultants

## 2020-03-15 NOTE — PROGRESS NOTES
[facility]  Progress Note    Patient Name: Steph Lira  MRN: [id number]  Patient Class: IP- Inpatient   Admission Date: 3/13/2020  Length of Stay: 1 days  Attending Physician: Garrison Husain MD  Primary Care Provider: Maida Mon MD        Subjective:     Principal Problem:Intractable vomiting    Interval History:  Patient reports continued to feel better today.  Currently on TPN.  Planning for soft tissue stent either tomorrow or Tuesday.  Continues to not able tolerate anything p.o.     Review of Systems   Constitutional: Negative for chills, fatigue, fever and unexpected weight change.   HENT: Negative for sore throat.    Eyes: Negative for visual disturbance.   Respiratory: Negative for cough, chest tightness and shortness of breath.    Cardiovascular: Negative for chest pain.   Gastrointestinal: Negative for abdominal pain, constipation, diarrhea, nausea and vomiting.   Endocrine: Negative for polyuria.   Genitourinary: Negative for dysuria and hematuria.   Neurological: Negative for headaches.     Objective:     Vital Signs (Most Recent):  Temp: 98.7 °F (37.1 °C) (03/15/20 0758)  Pulse: 80 (03/15/20 0758)  Resp: 17 (03/15/20 0758)  BP: 108/70 (03/15/20 0758)  SpO2: 97 % (03/15/20 0758) Vital Signs (24h Range):  Temp:  [97.9 °F (36.6 °C)-98.7 °F (37.1 °C)] 98.7 °F (37.1 °C)  Pulse:  [] 80  Resp:  [17-18] 17  SpO2:  [96 %-99 %] 97 %  BP: (108-127)/(70-76) 108/70     Weight: 51.3 kg (113 lb 1.5 oz)  Body mass index is 18.25 kg/m².    Intake/Output Summary (Last 24 hours) at 3/15/2020 1152  Last data filed at 3/15/2020 0600  Gross per 24 hour   Intake 1450 ml   Output --   Net 1450 ml      Physical Exam   Constitutional: She is oriented to person, place, and time. She appears well-developed and well-nourished. No distress.   Thin appearing   HENT:   Right Ear: External ear normal.   Left Ear: External ear normal.   Eyes: Conjunctivae and EOM are normal. Right eye  exhibits no discharge. Left eye exhibits no discharge.   Neck: Normal range of motion.   Cardiovascular: Normal rate, regular rhythm and normal heart sounds. Exam reveals no gallop and no friction rub.   No murmur heard.  Pulmonary/Chest: Effort normal and breath sounds normal. No respiratory distress. She has no wheezes. She has no rales. She exhibits no tenderness.   Decreased breath sounds in left upper lung   Musculoskeletal: She exhibits no edema or tenderness.   Neurological: She is alert and oriented to person, place, and time.   Skin: Skin is warm. She is not diaphoretic.   Psychiatric: She has a normal mood and affect. Her behavior is normal. Judgment and thought content normal.   Nursing note and vitals reviewed.      Significant Labs:   CMP:   Recent Labs   Lab 03/13/20  1654 03/14/20  0423 03/15/20  0530    140 138  138   K 3.3* 3.7 3.6  3.6    102 100  100   CO2 26 27 25  25   GLU 86 90 90  90   BUN 11 8 13  13   CREATININE 0.4* 0.4* 0.4*  0.4*   CALCIUM 9.1 8.0* 8.9  8.9   PROT 7.1  --  7.0  7.0   ALBUMIN 3.9  --  3.7  3.7   BILITOT 1.3*  --  0.9  0.9   ALKPHOS 53*  --  50*  50*   AST 22  --  18  18   ALT 19  --  14  14   ANIONGAP 11 11 13  13   EGFRNONAA >60.0 >60.0 >60.0  >60.0           Assessment/Plan:      Active Hospital Problems    Diagnosis    *Intractable vomiting    Malignant neoplasm of esophagus    S/P ablation of atrial fibrillation    Esophageal lesion    S/P CABG (coronary artery bypass graft)    History of smoking 10-25 pack years, 15 pack-years, quit 1994    COPD (chronic obstructive pulmonary disease)     Controlled on spiriva daily and advair BID.  Albuterol for rescue and prevention      History of lung cancer     Small cell lung cancer 1999 status post resection.  Needs yearly follow up.  Stable disease.      S/P CABG x 2, 2005    HTN (hypertension)    Hyperlipidemia    PAF (paroxysmal atrial fibrillation), onset 2002    CAD (coronary artery  disease)    Anxiety    Anticoagulant long-term use    Chronic external jugular vein thrombosis    Hypothyroid    GERD (gastroesophageal reflux disease)       * Intractable vomiting  2/2 obstructing esophageal neoplasm  NPO, IV fluids  Plan for TPN today on PICC  Lovenox 1 mg/kg for Afib  Plan for EGD and stent on Monday or Tues when stent arrives  Hold lovenox tomorrow morning for possible intervention    GI consulted  Appreciate consultants    VTE Risk Mitigation (From admission, onward)         Ordered     enoxaparin injection 50 mg  Every 12 hours (non-standard times)      03/13/20 1851     IP VTE HIGH RISK PATIENT  Once      03/13/20 1928                      Garrison Husain MD  Department of Hospital Medicine   [facility]ly [facility]  Date of service: 03/15/2020

## 2020-03-15 NOTE — PLAN OF CARE
03/15/20 0709   Patient Assessment/Suction   Level of Consciousness (AVPU) alert   Respiratory Effort Normal;Unlabored   Expansion/Accessory Muscles/Retractions no use of accessory muscles;no retractions;expansion symmetric   All Lung Fields Breath Sounds clear   Rhythm/Pattern, Respiratory pattern regular;depth regular;no shortness of breath reported   Cough Frequency infrequent   Cough Type good   PRE-TX-O2   O2 Device (Oxygen Therapy) room air   SpO2 97 %   Pulse Oximetry Type Intermittent   $ Pulse Oximetry - Single Charge Pulse Oximetry - Single   $ Pulse Oximetry - Multiple Charge Pulse Oximetry - Multiple   Pulse 80   Resp 17   Aerosol Therapy   $ Aerosol Therapy Charges PRN treatment not required   Inhaler   $ Inhaler Charges MDI (Metered Dose Inahler) Treatment;Mouth rinsed post treatment   Daily Review of Necessity (Inhaler) completed   Respiratory Treatment Status (Inhaler) given   Treatment Route (Inhaler) mouthpiece   Patient Position (Inhaler) HOB elevated   Post Treatment Assessment (Inhaler) increased aeration   Signs of Intolerance (Inhaler) none   Breath Sounds Post-Respiratory Treatment   Throughout All Fields Post-Treatment All Fields   Throughout All Fields Post-Treatment aeration increased   Post-treatment Heart Rate (beats/min) 78   Post-treatment Resp Rate (breaths/min) 17

## 2020-03-15 NOTE — SUBJECTIVE & OBJECTIVE
Interval History:  Patient reports continued to feel better today.  Currently on TPN.  Planning for soft tissue stent either tomorrow or Tuesday.  Continues to not able tolerate anything p.o.     Review of Systems   Constitutional: Negative for chills, fatigue, fever and unexpected weight change.   HENT: Negative for sore throat.    Eyes: Negative for visual disturbance.   Respiratory: Negative for cough, chest tightness and shortness of breath.    Cardiovascular: Negative for chest pain.   Gastrointestinal: Negative for abdominal pain, constipation, diarrhea, nausea and vomiting.   Endocrine: Negative for polyuria.   Genitourinary: Negative for dysuria and hematuria.   Neurological: Negative for headaches.     Objective:     Vital Signs (Most Recent):  Temp: 98.7 °F (37.1 °C) (03/15/20 0758)  Pulse: 80 (03/15/20 0758)  Resp: 17 (03/15/20 0758)  BP: 108/70 (03/15/20 0758)  SpO2: 97 % (03/15/20 0758) Vital Signs (24h Range):  Temp:  [97.9 °F (36.6 °C)-98.7 °F (37.1 °C)] 98.7 °F (37.1 °C)  Pulse:  [] 80  Resp:  [17-18] 17  SpO2:  [96 %-99 %] 97 %  BP: (108-127)/(70-76) 108/70     Weight: 51.3 kg (113 lb 1.5 oz)  Body mass index is 18.25 kg/m².    Intake/Output Summary (Last 24 hours) at 3/15/2020 1152  Last data filed at 3/15/2020 0600  Gross per 24 hour   Intake 1450 ml   Output --   Net 1450 ml      Physical Exam   Constitutional: She is oriented to person, place, and time. She appears well-developed and well-nourished. No distress.   Thin appearing   HENT:   Right Ear: External ear normal.   Left Ear: External ear normal.   Eyes: Conjunctivae and EOM are normal. Right eye exhibits no discharge. Left eye exhibits no discharge.   Neck: Normal range of motion.   Cardiovascular: Normal rate, regular rhythm and normal heart sounds. Exam reveals no gallop and no friction rub.   No murmur heard.  Pulmonary/Chest: Effort normal and breath sounds normal. No respiratory distress. She has no wheezes. She has no rales.  She exhibits no tenderness.   Decreased breath sounds in left upper lung   Musculoskeletal: She exhibits no edema or tenderness.   Neurological: She is alert and oriented to person, place, and time.   Skin: Skin is warm. She is not diaphoretic.   Psychiatric: She has a normal mood and affect. Her behavior is normal. Judgment and thought content normal.   Nursing note and vitals reviewed.      Significant Labs:   CMP:   Recent Labs   Lab 03/13/20  1654 03/14/20  0423 03/15/20  0530    140 138  138   K 3.3* 3.7 3.6  3.6    102 100  100   CO2 26 27 25  25   GLU 86 90 90  90   BUN 11 8 13  13   CREATININE 0.4* 0.4* 0.4*  0.4*   CALCIUM 9.1 8.0* 8.9  8.9   PROT 7.1  --  7.0  7.0   ALBUMIN 3.9  --  3.7  3.7   BILITOT 1.3*  --  0.9  0.9   ALKPHOS 53*  --  50*  50*   AST 22  --  18  18   ALT 19  --  14  14   ANIONGAP 11 11 13  13   EGFRNONAA >60.0 >60.0 >60.0  >60.0

## 2020-03-15 NOTE — PLAN OF CARE
Problem: Parenteral Nutrition  Goal: Effective Intravenous Nutrition Therapy Delivery  Intervention: Optimize Intravenous Nutrition Delivery  Flowsheets (Taken 3/15/2020 1000)  Nutrition Support Management: parenteral nutrition composition adjusted

## 2020-03-16 NOTE — PLAN OF CARE
Problem: Parenteral Nutrition  Goal: Effective Intravenous Nutrition Therapy Delivery  Outcome: Ongoing, Progressing  Intervention: Optimize Intravenous Nutrition Delivery  Flowsheets (Taken 3/16/2020 1523)  Nutrition Support Management: parenteral nutrition composition adjusted

## 2020-03-16 NOTE — PLAN OF CARE
"  Problem: Parenteral Nutrition  Goal: Effective Intravenous Nutrition Therapy Delivery  Outcome: Ongoing, Progressing   Patient states " I feel so much better  now that I have nutrients".     "

## 2020-03-16 NOTE — PLAN OF CARE
START ON PATHWAY REGIMEN - Gastroesophageal    [id number]        Paclitaxel (Taxol(R))       Carboplatin (Paraplatin(R))           Additional Orders: Given with concurrent chemoradiotherapy.  GCSF   therapy with RT is a relative contraindication.    **Always confirm dose/schedule in your pharmacy ordering system**    Patient Characteristics:  Esophageal & GE Junction, Squamous Cell, Preoperative or Nonsurgical Candidate   (Clinical Staging), cT2 or Higher or cN+, Surgical Candidate (Up to cT4a) -   Preoperative Therapy  Histology: Squamous Cell  Disease Classification: Esophageal  Therapeutic Status: Preoperative or Nonsurgical Candidate (Clinical Staging)  AJCC M Category: cM0  AJCC 8 Stage Grouping: Unknown  AJCC Grade: GX  AJCC Location: Middle  AJCC T Category: cTX  AJCC N Category: cN1  Intent of Therapy:  Non-Curative / Palliative Intent, Discussed with Patient

## 2020-03-16 NOTE — ASSESSMENT & PLAN NOTE
2/2 obstructing esophageal neoplasm  NPO, IV fluids  On TPN  Lovenox 1 mg/kg for Afib  Plan for EGD and stent on Monday or Tues when stent arrives    GI consulted  Appreciate consultants

## 2020-03-16 NOTE — PROGRESS NOTES
[facility] Medicine  Progress Note    Patient Name: Steph Lira  MRN: [id number]  Patient Class: IP- Inpatient   Admission Date: 3/13/2020  Length of Stay: 2 days  Attending Physician: Garrison Husain MD  Primary Care Provider: Maida Mon MD        Subjective:     Principal Problem:Intractable vomiting        HPI:  69-year-old female history of malignant neoplasm of the esophagus, lung cancer s/p resection, COPD, hypertension, hyperlipidemia, GERD, atrial fibrillation on Xarelto, CAD s/p CABG comes in for generalized weakness and emesis.  Patient reports that she has a history of esophageal cancer and has been worsening causing obstruction of her esophagus.  Patient had EGD yesterday and found nearly closed esophagus to the stomach.  Patient reports that she is unable tolerate anything orally including liquids and her medications.  Reports that she has been feeling weak and has been able tolerate any diet for the past 3 days.  Reports this morning she can not tolerate anymore and came into the ED for further evaluation.  Denies any fever, chills, chest pain, shortness of breath.    In the ED, patient was slightly hypertensive.  Unable tolerate home medications.  CBC and CMP were unremarkable.     Overview/Hospital Course:  Patient was admitted for dehydration and malnutrition due to unable to tolerate anything orally from esophageal cancer obstructing the esophagus.  GI consulted and plans for stent placement on the esophageal mass.    Interval History:  Feeling well.  Continues to be NPO, on TPN.  Reports she has not had a bowel movement since Thursday.  Has not had anything oral for 7 days.  KUB ordered.  Denies abdominal pain.  Awaiting esophageal stent from GI.  Patient has an important meeting for her esophageal cancer on Wednesday.  Discussed case with Dr. Mireles, patient's oncologist.    Review of Systems   Constitutional: Negative for chills, fatigue, fever and unexpected weight  change.   HENT: Negative for sore throat.    Eyes: Negative for visual disturbance.   Respiratory: Negative for cough, chest tightness and shortness of breath.    Cardiovascular: Negative for chest pain.   Gastrointestinal: Negative for abdominal pain, constipation, diarrhea, nausea and vomiting.   Endocrine: Negative for polyuria.   Genitourinary: Negative for dysuria and hematuria.   Neurological: Negative for headaches.     Objective:     Vital Signs (Most Recent):  Temp: 98.2 °F (36.8 °C) (03/16/20 0813)  Pulse: 89 (03/16/20 0813)  Resp: 18 (03/16/20 0813)  BP: 115/75 (03/16/20 0813)  SpO2: 98 % (03/16/20 0813) Vital Signs (24h Range):  Temp:  [98.1 °F (36.7 °C)-99.3 °F (37.4 °C)] 98.2 °F (36.8 °C)  Pulse:  [76-95] 89  Resp:  [16-18] 18  SpO2:  [95 %-99 %] 98 %  BP: (115-129)/(75-83) 115/75     Weight: 51.3 kg (113 lb 1.5 oz)  Body mass index is 18.25 kg/m².    Intake/Output Summary (Last 24 hours) at 3/16/2020 1212  Last data filed at 3/16/2020 0600  Gross per 24 hour   Intake 1338.33 ml   Output --   Net 1338.33 ml      Physical Exam   Constitutional: She is oriented to person, place, and time. She appears well-developed and well-nourished. No distress.   Thin appearing   HENT:   Right Ear: External ear normal.   Left Ear: External ear normal.   Eyes: Conjunctivae and EOM are normal. Right eye exhibits no discharge. Left eye exhibits no discharge.   Neck: Normal range of motion.   Cardiovascular: Normal rate, regular rhythm and normal heart sounds. Exam reveals no gallop and no friction rub.   No murmur heard.  Pulmonary/Chest: Effort normal and breath sounds normal. No respiratory distress. She has no wheezes. She has no rales. She exhibits no tenderness.   Decreased breath sounds in left upper lung   Musculoskeletal: She exhibits no edema or tenderness.   Neurological: She is alert and oriented to person, place, and time.   Skin: Skin is warm. She is not diaphoretic.   Psychiatric: She has a normal mood and  affect. Her behavior is normal. Judgment and thought content normal.   Nursing note and vitals reviewed.      Significant Labs:   CBC:   Recent Labs   Lab 03/16/20  0422   WBC 7.67   HGB 12.6   HCT 37.9        CMP:   Recent Labs   Lab 03/15/20  0530 03/16/20  0422     138 139   K 3.6  3.6 3.4*     100 108   CO2 25  25 27   GLU 90  90 98   BUN 13  13 17   CREATININE 0.4*  0.4* 0.4*   CALCIUM 8.9  8.9 8.4*   PROT 7.0  7.0 6.5   ALBUMIN 3.7  3.7 3.4*   BILITOT 0.9  0.9 0.5   ALKPHOS 50*  50* 47*   AST 18  18 15   ALT 14  14 11   ANIONGAP 13  13 4*   EGFRNONAA >60.0  >60.0 >60.0           Assessment/Plan:      Active Hospital Problems    Diagnosis    *Intractable vomiting    Malignant neoplasm of esophagus    S/P ablation of atrial fibrillation    Esophageal lesion    S/P CABG (coronary artery bypass graft)    History of smoking 10-25 pack years, 15 pack-years, quit 1994    COPD (chronic obstructive pulmonary disease)     Controlled on spiriva daily and advair BID.  Albuterol for rescue and prevention      History of lung cancer     Small cell lung cancer 1999 status post resection.  Needs yearly follow up.  Stable disease.      S/P CABG x 2, 2005    HTN (hypertension)    Hyperlipidemia    PAF (paroxysmal atrial fibrillation), onset 2002    CAD (coronary artery disease)    Anxiety    Anticoagulant long-term use    Chronic external jugular vein thrombosis    Hypothyroid    GERD (gastroesophageal reflux disease)       * Intractable vomiting  2/2 obstructing esophageal neoplasm  NPO, IV fluids  On TPN  Lovenox 1 mg/kg for Afib  Plan for EGD and stent on Monday or Tues when stent arrives    GI consulted  Appreciate consultants    VTE Risk Mitigation (From admission, onward)         Ordered     enoxaparin injection 50 mg  Every 12 hours (non-standard times)      03/13/20 1851     IP VTE HIGH RISK PATIENT  Once      03/13/20 1928                      Garirson Husain,  MD  Department of Hospital Medicine   [facility]  Date of service: 03/16/2020

## 2020-03-16 NOTE — SUBJECTIVE & OBJECTIVE
Interval History:  Feeling well.  Continues to be NPO, on TPN.  Reports she has not had a bowel movement since Thursday.  Has not had anything oral for 7 days.  KUB ordered.  Denies abdominal pain.  Awaiting esophageal stent from GI.  Patient has an important meeting for her esophageal cancer on Wednesday.  Discussed case with Dr. Mireles, patient's oncologist.    Review of Systems   Constitutional: Negative for chills, fatigue, fever and unexpected weight change.   HENT: Negative for sore throat.    Eyes: Negative for visual disturbance.   Respiratory: Negative for cough, chest tightness and shortness of breath.    Cardiovascular: Negative for chest pain.   Gastrointestinal: Negative for abdominal pain, constipation, diarrhea, nausea and vomiting.   Endocrine: Negative for polyuria.   Genitourinary: Negative for dysuria and hematuria.   Neurological: Negative for headaches.     Objective:     Vital Signs (Most Recent):  Temp: 98.2 °F (36.8 °C) (03/16/20 0813)  Pulse: 89 (03/16/20 0813)  Resp: 18 (03/16/20 0813)  BP: 115/75 (03/16/20 0813)  SpO2: 98 % (03/16/20 0813) Vital Signs (24h Range):  Temp:  [98.1 °F (36.7 °C)-99.3 °F (37.4 °C)] 98.2 °F (36.8 °C)  Pulse:  [76-95] 89  Resp:  [16-18] 18  SpO2:  [95 %-99 %] 98 %  BP: (115-129)/(75-83) 115/75     Weight: 51.3 kg (113 lb 1.5 oz)  Body mass index is 18.25 kg/m².    Intake/Output Summary (Last 24 hours) at 3/16/2020 1212  Last data filed at 3/16/2020 0600  Gross per 24 hour   Intake 1338.33 ml   Output --   Net 1338.33 ml      Physical Exam   Constitutional: She is oriented to person, place, and time. She appears well-developed and well-nourished. No distress.   Thin appearing   HENT:   Right Ear: External ear normal.   Left Ear: External ear normal.   Eyes: Conjunctivae and EOM are normal. Right eye exhibits no discharge. Left eye exhibits no discharge.   Neck: Normal range of motion.   Cardiovascular: Normal rate, regular rhythm and normal heart sounds. Exam reveals  no gallop and no friction rub.   No murmur heard.  Pulmonary/Chest: Effort normal and breath sounds normal. No respiratory distress. She has no wheezes. She has no rales. She exhibits no tenderness.   Decreased breath sounds in left upper lung   Musculoskeletal: She exhibits no edema or tenderness.   Neurological: She is alert and oriented to person, place, and time.   Skin: Skin is warm. She is not diaphoretic.   Psychiatric: She has a normal mood and affect. Her behavior is normal. Judgment and thought content normal.   Nursing note and vitals reviewed.      Significant Labs:   CBC:   Recent Labs   Lab 03/16/20  0422   WBC 7.67   HGB 12.6   HCT 37.9        CMP:   Recent Labs   Lab 03/15/20  0530 03/16/20  0422     138 139   K 3.6  3.6 3.4*     100 108   CO2 25  25 27   GLU 90  90 98   BUN 13  13 17   CREATININE 0.4*  0.4* 0.4*   CALCIUM 8.9  8.9 8.4*   PROT 7.0  7.0 6.5   ALBUMIN 3.7  3.7 3.4*   BILITOT 0.9  0.9 0.5   ALKPHOS 50*  50* 47*   AST 18  18 15   ALT 14  14 11   ANIONGAP 13  13 4*   EGFRNONAA >60.0  >60.0 >60.0

## 2020-03-16 NOTE — PROGRESS NOTES
"[facility]  Adult Nutrition   Progress Note (Nutrition Support Management)    SUMMARY     Recommendations  Recommendation/Intervention: 1. New TPN ordered to initiate 1700 today 2. RD to follow and monitor labs, tolerance, etc. Will manage TPN accordingly.  Goals: 1. Tolerance of TPN 2. Labs to trend towards normal limits   Nutrition Goal Status: progressing towards goal  Communication of RD Recs: reviewed with RN    Dietitian Rounds Brief  Patient remains NPO. TPN to continue. New TPN ordered.     PARENTERAL NUTRITION PROGRESS NOTE:    Parenteral Nutrition Day # 3   Diagnosis/Indication for PN: NPO, esophageal cancer  IV Access: PICC  Diet/Tube Feeding: NPO         Admixture Type: 3 in 1 custom TPN (10% AA, 70% Dextrose, 20% Intralipid)   Infusion Rate and Frequency: 100 ml/hr   Patient Acuity: acute care     PN Composition:   75 grams Amino Acid, 180 grams Dextrose, and 50 grams Lipid.    Today's TPN provides 1412 kcal.  *Dextrose is started at less than full dextrose goal to reduce risk for Refeeding Syndrome. Dextrose content will be advanced as appropriate over the next few days.     Please see order for electrolytes and additives content and adjustments.   *The Nutrition Support Team will continue to monitor electrolytes daily and adjust parenteral nutrition as warranted.    Reason for Assessment  Reason For Assessment: new TPN, RD follow-up    Nutrition Risk Screen  Nutrition Risk Screen: tube feeding or parenteral nutrition     MST Score: 3  Have you recently lost weight without trying?: Yes: 14-23 lbs  Weight loss score: 2  Have you been eating poorly because of a decreased appetite?: Yes  Appetite score: 1       Nutrition/Diet History  Spiritual, Cultural Beliefs, Jewish Practices, Values that Affect Care: no  Food Allergies: NKFA  Factors Affecting Nutritional Intake: NPO    Anthropometrics  Temp: 98.3 °F (36.8 °C)  Height Method: Stated  Height: 5' 6" (167.6 cm)  Height (inches): 66 " in  Weight Method: Bed Scale  Weight: 51.3 kg (113 lb 1.5 oz)  Weight (lb): 113.1 lb  Ideal Body Weight (IBW), Female: 130 lb  % Ideal Body Weight, Female (lb): 87 %  BMI (Calculated): 18.3  BMI Grade: 17 - 18.4 protein-energy malnutrition grade I       Weight History:  Wt Readings from Last 10 Encounters:   03/14/20 51.3 kg (113 lb 1.5 oz)   03/12/20 52.7 kg (116 lb 2.9 oz)   03/11/20 52.6 kg (116 lb)   03/09/20 54.4 kg (120 lb)   03/06/20 54.9 kg (121 lb)   03/04/20 54.9 kg (121 lb)   02/27/20 56.2 kg (123 lb 14.4 oz)   02/21/20 57.2 kg (126 lb)   01/31/20 57.2 kg (126 lb)   01/20/20 58.5 kg (128 lb 15.5 oz)       Lab/Procedures/Meds: Pertinent Labs Reviewed  Clinical Chemistry:  Recent Labs   Lab 03/14/20  0423 03/15/20  0530 03/16/20  0422    138  138 139   K 3.7 3.6  3.6 3.4*    100  100 108   CO2 27 25  25 27   GLU 90 90  90 98   BUN 8 13  13 17   CREATININE 0.4* 0.4*  0.4* 0.4*   CALCIUM 8.0* 8.9  8.9 8.4*   PROT  --  7.0  7.0 6.5   ALBUMIN  --  3.7  3.7 3.4*   BILITOT  --  0.9  0.9 0.5   ALKPHOS  --  50*  50* 47*   AST  --  18  18 15   ALT  --  14  14 11   ANIONGAP 11 13  13 4*   ESTGFRAFRICA >60.0 >60.0  >60.0 >60.0   EGFRNONAA >60.0 >60.0  >60.0 >60.0   MG 1.4* 2.1  2.1 2.1   PHOS 2.2* 3.0  3.0 3.1     CBC:   Recent Labs   Lab 03/16/20  0422   WBC 7.67   RBC 4.15   HGB 12.6   HCT 37.9      MCV 91   MCH 30.4   MCHC 33.2     Lipid Panel:  Recent Labs   Lab 03/16/20  0422   TRIG 89     Medications: Pertinent Medications reviewed  Scheduled Meds:   fluticasone propionate  1 spray Each Nostril Daily    tiotropium  1 capsule Inhalation Daily     Continuous Infusions:   TPN ADULT CENTRAL LINE CUSTOM (3 in 1) 100 mL/hr at 03/15/20 1637    TPN ADULT CENTRAL LINE CUSTOM (3 in 1)       PRN Meds:.acetaminophen, acetaminophen, albuterol-ipratropium, calcium chloride IVPB, calcium chloride IVPB, calcium chloride IVPB, dextrose 50%, dextrose 50%, hydrALAZINE, insulin regular,  magnesium oxide, magnesium sulfate IVPB, magnesium sulfate IVPB, magnesium sulfate IVPB, magnesium sulfate IVPB, ondansetron, potassium chloride in water, potassium chloride in water, potassium chloride in water, potassium chloride in water, potassium chloride, potassium chloride, potassium chloride, potassium chloride, promethazine (PHENERGAN) IVPB, sodium chloride 0.9%, sodium phosphate IVPB, sodium phosphate IVPB, sodium phosphate IVPB, sodium phosphate IVPB, sodium phosphate IVPB    Estimated/Assessed Needs  Weight Used For Calorie Calculations: 51.3 kg (113 lb 1.5 oz)  Energy Calorie Requirements (kcal): 0037-4909 (30-35 kcal/kg)  Energy Need Method: Kcal/kg  Protein Requirements: 62-77 g (1.2-1.5 g/kg)  Weight Used For Protein Calculations: 51.3 kg (113 lb 1.5 oz)     Estimated Fluid Requirement Method: RDA Method  RDA Method (mL): 1539       Nutrition Prescription Ordered  Current Diet Order: NPO   Current Nutrition Support Formula Ordered: Other (Comment)(custom TPN )  Current Nutrition Support Rate Ordered: 100 (ml)  Current Nutrition Support Frequency Ordered: ml/hr     Evaluation of Received Nutrient/Fluid Intake  Parenteral Calories (kcal): 1412  Parenteral Protein (gm): 75  Parenteral Fluid (mL): 2400  Energy Calories Required: not meeting needs  Protein Required: meeting needs  Fluid Required: meeting needs  Tolerance: tolerating     Intake/Output Summary (Last 24 hours) at 3/16/2020 1521  Last data filed at 3/16/2020 0600  Gross per 24 hour   Intake 1338.33 ml   Output --   Net 1338.33 ml      Nutrition Risk  Level of Risk/Frequency of Follow-up: high     Monitor and Evaluation  Food and Nutrient Intake: energy intake, parenteral nutrition intake  Food and Nutrient Adminstration: enteral and parenteral nutrition administration, diet order  Physical Activity and Function: nutrition-related ADLs and IADLs, factors affecting access to physical activity  Anthropometric Measurements: weight change, weight,  body mass index  Biochemical Data, Medical Tests and Procedures: inflammatory profile, electrolyte and renal panel, gastrointestinal profile, lipid profile, glucose/endocrine profile  Nutrition-Focused Physical Findings: overall appearance     Nutrition Follow-Up  RD Follow-up?: Yes    Ileana Ware RD 03/16/2020 3:21 PM

## 2020-03-16 NOTE — PLAN OF CARE
03/16/20 1007   Discharge Assessment   Assessment Type Discharge Planning Assessment   Confirmed/corrected address and phone number on facesheet? Yes   Assessment information obtained from? Patient   Communicated expected length of stay with patient/caregiver no   Prior to hospitilization cognitive status: Alert/Oriented   Prior to hospitalization functional status: Independent   Current cognitive status: Alert/Oriented   Current Functional Status: Independent   Facility Arrived From: home   Lives With spouse   Able to Return to Prior Arrangements yes   Is patient able to care for self after discharge? Unable to determine at this time (comments)   Who are your caregiver(s) and their phone number(s)? Federico Neil [phone]   Patient's perception of discharge disposition home or selfcare   Readmission Within the Last 30 Days no previous admission in last 30 days   Patient currently being followed by outpatient case management? No   Patient currently receives any other outside agency services? No   Equipment Currently Used at Home nebulizer   Do you have any problems affording any of your prescribed medications? No   Is the patient taking medications as prescribed? yes   Does the patient have transportation home? Yes   Transportation Anticipated family or friend will provide   Dialysis Name and Scheduled days n/a   Does the patient receive services at the [facility]madin Clinic? No   Discharge Plan A Home with family   Discharge Plan B Home Health   DME Needed Upon Discharge  none   Patient/Family in Agreement with Plan yes       Introduced self to patient and asked if ok to take a few minutes to go over discharge planning.  Pt ok to go home and take care of herself as long as she can progress to eating food, she feels she may need home health if she goes home with TPN.  CM to follow as long as patient is in hospital.

## 2020-03-16 NOTE — HOSPITAL COURSE
Patient was admitted for dehydration and malnutrition due to unable to tolerate anything orally from esophageal cancer obstructing the esophagus.  GI consulted and plans for stent placement on the esophageal mass.  Patient underwent stent placement on 3/17/20 without any complication.  3/18 Appears in mild distress due to throat pain, unable to talk comfortably  Not yet tolerating liquids  On morphine iv and zofren  3/19 Leucocytosis up trending with low-grade fever   CT chest negative for perforation/aspirate  Blood culture ordered, patient denies any symptoms of UTI  3/20Reports persistent  throat pain down to upper abdomen  Increase Dilaudid q.4h  GI follow-up appreciated for repeat EGD to evaluate the stent placement  Surgery consult appreciated for J-tube placement   patient agreeable for any required intervention  Afebrile , on zosyn, leukocytosis trending down, blood culture negative to date    I assumed care care of the patient on 03/23/2020.  Patient here with intractable nausea and vomiting in the setting of recent diagnosis of esophageal mass.  She underwent esophageal stent placement on 03/17 and then repositioning on 03/20.  A J tube was placed for tube feeds. Patient continued to have persistent nausea and vomiting which was thought to be secondary to her near obstructing mass.  Hospital stay also complicated by atrial tachycardia.  Case was discussed with consultants including GI, surgery, oncology as well as Cardiology on multiple occasions.  Later in the hospital course patient developed persistent fevers and antibiotics were broadened.  She was noted to have fungemia and was initiated on micafungin.  Discussed with  at length and transitioned to 'do not resuscitate'.  She later passed away peacefully later that evening with  at her bedside.  Please refer to pronouncement note for further details.

## 2020-03-17 NOTE — PROVATION PATIENT INSTRUCTIONS
Discharge Summary/Instructions after an Endoscopic Procedure  Patient Name: Steph Raymundo  Patient MRN: [id number]  Patient YOB: 1950 Tuesday, March 17, 2020  Ulisses Colorado III, MD  RESTRICTIONS:  During your procedure today, you received medications for sedation.  These   medications may affect your judgment, balance and coordination.  Therefore,   for 24 hours, you have the following restrictions:   - DO NOT drive a car, operate machinery, make legal/financial decisions,   sign important papers or drink alcohol.    ACTIVITY:  Today: no heavy lifting, straining or running due to procedural   sedation/anesthesia.  The following day: return to full activity including work.  DIET:  Eat and drink normally unless instructed otherwise.     TREATMENT FOR COMMON SIDE EFFECTS:  - Mild abdominal pain, nausea, belching, bloating or excessive gas:  rest,   eat lightly and use a heating pad.  - Sore Throat: treat with throat lozenges and/or gargle with warm salt   water.  - Because air was used during the procedure, expelling large amounts of air   from your rectum or belching is normal.  - If a bowel prep was taken, you may not have a bowel movement for 1-3 days.    This is normal.  SYMPTOMS TO WATCH FOR AND REPORT TO YOUR PHYSICIAN:  1. Abdominal pain or bloating, other than gas cramps.  2. Chest pain.  3. Back pain.  4. Signs of infection such as: chills or fever occurring within 24 hours   after the procedure.  5. Rectal bleeding, which would show as bright red, maroon, or black stools.   (A tablespoon of blood from the rectum is not serious, especially if   hemorrhoids are present.)  6. Vomiting.  7. Weakness or dizziness.  GO DIRECTLY TO THE NEAREST EMERGENCY ROOM IF YOU HAVE ANY OF THE FOLLOWING:      Difficulty breathing              Chills and/or fever over 101 F   Persistent vomiting and/or vomiting blood   Severe abdominal pain   Severe chest pain   Black, tarry stools   Bleeding- more than one  tablespoon   Any other symptom or condition that you feel may need urgent attention  Your doctor recommends these additional instructions:  If any biopsies were taken, your doctors clinic will contact you in 1 to 2   weeks with any results.  - [facility].   - Clear liquid diet today.  For questions, problems or results please call your physician - Ulisses Colorado III, MD at Work:  [phone].  [facility], EMERGENCY ROOM PHONE NUMBER: [phone]  IF A COMPLICATION OR EMERGENCY SITUATION ARISES AND YOU ARE UNABLE TO REACH   YOUR PHYSICIAN - GO DIRECTLY TO THE EMERGENCY ROOM.  Ulisses Colorado III, MD  3/17/2020 2:31:44 PM  This report has been verified and signed electronically.  [employer]

## 2020-03-17 NOTE — ANESTHESIA PREPROCEDURE EVALUATION
03/17/2020  Steph Lira is a 69 y.o., female with extensive pmh presenting for endoscopy.    Past Medical History:   Diagnosis Date    *Atrial fibrillation     and Hx PSVT    Allergic drug rash 12/28/2016    Allergy     chronic     Arthritis     fingers    Benign tumor of throat     Bronchitis March 2013    CAD (coronary artery disease) 2005    CABGx2 in 2005 and 2 MI after with 4 stents    Cancer 1996    small cell lung cancer s/p resection of 1/3 lung, 5 ribs and muscle mass then had chemo and radiation    Cataract     OD     CHF (congestive heart failure) past Hx    Chronic rhinitis     Clot past Hx    external jugular, now stented, occluded, had phlebitis; now revascularized    Colon polyp     COPD (chronic obstructive pulmonary disease)     no oxygen or nebulizers    COPD exacerbation 5/8/13    improved 5/14/13 after steroid pack,antibiotics    Dysphagia     Esophageal stenosis     Former smoker     GERD (gastroesophageal reflux disease)     Heart block     Hyperlipidemia     Hypertension     pt states does not have //    MI (myocardial infarction) 2005    Posterior vitreous detachment of left eye     Thyroid disease          Pre-op Assessment    I have reviewed the Patient Summary Reports.      I have reviewed the Medications.     Review of Systems  Anesthesia Hx:  No problems with previous Anesthesia   Denies Personal Hx of Anesthesia complications.   Social:  Non-Smoker, No Alcohol Use    Cardiovascular:   Hypertension Past MI CAD asymptomatic  CHF    Pulmonary:   COPD, mild    Hepatic/GI:   GERD, poorly controlled    Endocrine:   Hypothyroidism    Reports allergies    Physical Exam  General:  Well nourished    Airway/Jaw/Neck:  Airway Findings: Mouth Opening: Normal Tongue: Normal  General Airway Assessment: Adult  Mallampati: II  TM Distance: Normal, at least 6 cm   Jaw/Neck Findings:  Neck ROM: Normal ROM      Dental:  Dental Findings: In tact   Chest/Lungs:  Chest/Lungs Findings: Clear to auscultation, Normal Respiratory Rate     Heart/Vascular:  Heart Findings: Rate: Normal  Rhythm: Regular Rhythm  Sounds: Normal        Mental Status:  Mental Status Findings: Normal        Anesthesia Plan  Type of Anesthesia, risks & benefits discussed:  Anesthesia Type:  MAC, general  Patient's Preference:   Intra-op Monitoring Plan: standard ASA monitors  Intra-op Monitoring Plan Comments:   Post Op Pain Control Plan: IV/PO Opioids PRN and per primary service following discharge from PACU  Post Op Pain Control Plan Comments:   Induction:   IV  Beta Blocker:  Patient is not currently on a Beta-Blocker (No further documentation required).       Informed Consent: Patient understands risks and agrees with Anesthesia plan.  Questions answered. Anesthesia consent signed with patient.  ASA Score: 3     Day of Surgery Review of History & Physical:    H&P update referred to the surgeon.         Ready For Surgery From Anesthesia Perspective.

## 2020-03-17 NOTE — PLAN OF CARE
Problem: Parenteral Nutrition  Goal: Effective Intravenous Nutrition Therapy Delivery  Outcome: Ongoing, Progressing  Intervention: Optimize Intravenous Nutrition Delivery  Flowsheets (Taken 3/17/2020 1330)  Nutrition Support Management: parenteral nutrition composition adjusted

## 2020-03-17 NOTE — PHYSICIAN QUERY
PT Name: Steph Lira  MR #: [id number]    Physician Query Form - Nutrition Clarification     CDS/: Anuradha Noriega               Contact information: [phone] or through Epic Secure clypd Messenger    This form is a permanent document in the medical record.     Query Date: March 17, 2020    By submitting this query, we are merely seeking further clarification of documentation.. Please utilize your independent clinical judgment when addressing the question(s) below.    The Medical record contains the following:  BMI Grade: 17 - 18.4 protein-energy malnutrition grade I documented in pt's MR.    AND / ASPMARIA C Clinical Characteristics (October 2011)  A minimum of two characteristics is recommended for diagnosing either moderate or severe malnutrition   Mild Malnutrition Moderate Malnutrition Severe Malnutrition   Energy Intake from p.o., TF or TPN. < 75% intake of estimated energy needs for less than 7 days < 75% intake of estimated energy needs for greater than 7 days < 50% intake of estimated energy needs for > 5 days   Weight Loss 1-2% in 1 month  5% in 3 months  7.5% in 6 months  10% in 1 year 1-2 % in 1 week  5% in 1 month  7.5% in 3 months  10% in 6 months  20% in 1 year > 2% in 1 week  > 5% in 1 month  > 7.5% in 3 months  > 10% in 6 months  > 20% in 1 year   Physical Findings     None *Mild subcutaneous fat and/or muscle loss  *Mild fluid accumulation  *Stage II decubitus  *Surgical wound or non-healing wound *Mod/severe subcutaneous fat and/or muscle loss  *Mod/severe fluid accumulation  *Stage III or IV decubitus  *Non-healing surgical wound     Provider, please specify the severity of the pt's malnutrition.    [ x ] Mild Protein-Calorie Malnutrition   [  ] Moderate Protein-Calorie Malnutrition   [  ] Severe Protein-Calorie Malnutrition   [  ] Other:    [  ] Clinically Undetermined       Please document in your progress notes daily for the duration of treatment until resolved and include in your discharge  summary.

## 2020-03-17 NOTE — TRANSFER OF CARE
"Anesthesia Transfer of Care Note    Patient: Steph Lira    Procedure(s) Performed: Procedure(s) (LRB):  EGD (ESOPHAGOGASTRODUODENOSCOPY) in OR w/ fluoro (N/A)    Patient location: PACU    Anesthesia Type: MAC    Transport from OR: Transported from OR on 2-3 L/min O2 by NC with adequate spontaneous ventilation    Post pain: adequate analgesia    Post assessment: no apparent anesthetic complications    Post vital signs: stable    Level of consciousness: awake and alert    Nausea/Vomiting: nausea and vomiting    Complications: none    Transfer of care protocol was followed      Last vitals:   Visit Vitals  /86   Pulse 97   Temp 36.5 °C (97.7 °F) (Oral)   Resp 18   Ht 5' 6" (1.676 m)   Wt 51.3 kg (113 lb 1.5 oz)   LMP 03/13/2006 (Within Years)   SpO2 99%   Breastfeeding? No   BMI 18.25 kg/m²     "

## 2020-03-17 NOTE — PLAN OF CARE
03/17/20 1200   Patient Assessment/Suction   Level of Consciousness (AVPU) alert   Respiratory Effort Normal;Unlabored   Expansion/Accessory Muscles/Retractions no use of accessory muscles   All Lung Fields Breath Sounds clear   PRE-TX-O2   O2 Device (Oxygen Therapy) room air   SpO2 99 %   Pulse 97   Resp 18   Aerosol Therapy   $ Aerosol Therapy Charges PRN treatment not required   Daily Review of Necessity (SVN) completed   Respiratory Treatment Status (SVN) PRN treatment not required   Inhaler   $ Inhaler Charges MDI (Metered Dose Inahler) Treatment   Daily Review of Necessity (Inhaler) completed   Respiratory Treatment Status (Inhaler) given   Treatment Route (Inhaler) mouthpiece   Patient Position (Inhaler) sitting in chair   Post Treatment Assessment (Inhaler) breath sounds unchanged   Signs of Intolerance (Inhaler) none

## 2020-03-17 NOTE — PROGRESS NOTES
"[facility]  Adult Nutrition   Progress Note (Nutrition Support Management)    SUMMARY     Recommendations  Recommendation/Intervention: 1. New TPN ordered to initiate 1700 today 2. RD to follow and monitor labs, tolerance, etc. Will manage TPN accordingly.  Goals: 1. Tolerance of TPN 2. Labs to trend towards normal limits   Nutrition Goal Status: progressing towards goal  Communication of RD Recs: reviewed with RN    Dietitian Rounds Brief  TPN to continue. New TPN ordered. Plans for EGD and esophageal stent soon per progress notes.     PARENTERAL NUTRITION PROGRESS NOTE:    Parenteral Nutrition Day # 4   Diagnosis/Indication for PN: NPO, esophageal cancer with mass obstructing esophagus, intractable N/V   IV Access: PICC  Diet/Tube Feeding: NPO         Admixture Type: 3 in 1 custom TPN (10% AA, 70% Dextrose, 20% Intralipid)   Infusion Rate and Frequency: 100 ml/hr   Patient Acuity: acute care     PN Composition:   75 grams Amino Acid, 231 grams Dextrose, and 50 grams Lipid.    Today's TPN provides 1585 kcal.    Please see order for electrolytes and additives content and adjustments.   *The Nutrition Support Team will continue to monitor electrolytes daily and adjust parenteral nutrition as warranted.    Reason for Assessment  Reason For Assessment: new TPN, RD follow-up    Nutrition Risk Screen  Nutrition Risk Screen: tube feeding or parenteral nutrition     MST Score: 3  Have you recently lost weight without trying?: Yes: 14-23 lbs  Weight loss score: 2  Have you been eating poorly because of a decreased appetite?: Yes  Appetite score: 1       Nutrition/Diet History  Spiritual, Cultural Beliefs, Methodist Practices, Values that Affect Care: no  Food Allergies: NKFA  Factors Affecting Nutritional Intake: NPO    Anthropometrics  Temp: 97.7 °F (36.5 °C)  Height Method: Stated  Height: 5' 6" (167.6 cm)  Height (inches): 66 in  Weight Method: Bed Scale  Weight: 51.3 kg (113 lb 1.5 oz)  Weight (lb): 113.1 " lb  Ideal Body Weight (IBW), Female: 130 lb  % Ideal Body Weight, Female (lb): 87 %  BMI (Calculated): 18.3  BMI Grade: 17 - 18.4 protein-energy malnutrition grade I       Weight History:  Wt Readings from Last 10 Encounters:   03/14/20 51.3 kg (113 lb 1.5 oz)   03/12/20 52.7 kg (116 lb 2.9 oz)   03/11/20 52.6 kg (116 lb)   03/09/20 54.4 kg (120 lb)   03/06/20 54.9 kg (121 lb)   03/04/20 54.9 kg (121 lb)   02/27/20 56.2 kg (123 lb 14.4 oz)   02/21/20 57.2 kg (126 lb)   01/31/20 57.2 kg (126 lb)   01/20/20 58.5 kg (128 lb 15.5 oz)       Lab/Procedures/Meds: Pertinent Labs Reviewed  Clinical Chemistry:  Recent Labs   Lab 03/15/20  0530 03/16/20  0422  03/17/20  0539     138 139  --  138   K 3.6  3.6 3.4*   < > 3.7     100 108  --  108   CO2 25  25 27  --  24   GLU 90  90 98  --  99   BUN 13  13 17  --  16   CREATININE 0.4*  0.4* 0.4*  --  0.3*   CALCIUM 8.9  8.9 8.4*  --  8.6*   PROT 7.0  7.0 6.5  --  6.4   ALBUMIN 3.7  3.7 3.4*  --  3.3*   BILITOT 0.9  0.9 0.5  --  0.6   ALKPHOS 50*  50* 47*  --  48*   AST 18  18 15  --  22   ALT 14  14 11  --  15   ANIONGAP 13  13 4*  --  6*   ESTGFRAFRICA >60.0  >60.0 >60.0  --  >60.0   EGFRNONAA >60.0  >60.0 >60.0  --  >60.0   MG 2.1  2.1 2.1  --  2.0   PHOS 3.0  3.0 3.1  --  2.8    < > = values in this interval not displayed.     CBC:   Recent Labs   Lab 03/16/20 0422   WBC 7.67   RBC 4.15   HGB 12.6   HCT 37.9      MCV 91   MCH 30.4   MCHC 33.2     Lipid Panel:  Recent Labs   Lab 03/16/20 0422   TRIG 89     Medications: Pertinent Medications reviewed  Scheduled Meds:   fluticasone propionate  1 spray Each Nostril Daily    tiotropium  1 capsule Inhalation Daily     Continuous Infusions:   TPN ADULT CENTRAL LINE CUSTOM (3 in 1) 100 mL/hr at 03/16/20 1808    TPN ADULT CENTRAL LINE CUSTOM (3 in 1)       PRN Meds:.acetaminophen, acetaminophen, albuterol-ipratropium, calcium chloride IVPB, calcium chloride IVPB, calcium chloride IVPB,  dextrose 50%, dextrose 50%, hydrALAZINE, insulin regular, magnesium oxide, magnesium sulfate IVPB, magnesium sulfate IVPB, magnesium sulfate IVPB, magnesium sulfate IVPB, ondansetron, potassium chloride in water, potassium chloride in water, potassium chloride in water, potassium chloride in water, potassium chloride, potassium chloride, potassium chloride, potassium chloride, promethazine (PHENERGAN) IVPB, sodium chloride 0.9%, sodium phosphate IVPB, sodium phosphate IVPB, sodium phosphate IVPB, sodium phosphate IVPB, sodium phosphate IVPB    Estimated/Assessed Needs  Weight Used For Calorie Calculations: 51.3 kg (113 lb 1.5 oz)  Energy Calorie Requirements (kcal): 7023-6763 (30-35 kcal/kg)  Energy Need Method: Kcal/kg  Protein Requirements: 62-77 g (1.2-1.5 g/kg)  Weight Used For Protein Calculations: 51.3 kg (113 lb 1.5 oz)     Estimated Fluid Requirement Method: RDA Method  RDA Method (mL): 1539       Nutrition Prescription Ordered  Current Diet Order: NPO   Current Nutrition Support Formula Ordered: Other (Comment)(custom TPN )  Current Nutrition Support Rate Ordered: 100 (ml)  Current Nutrition Support Frequency Ordered: ml/hr     Evaluation of Received Nutrient/Fluid Intake  Parenteral Calories (kcal): 1412  Parenteral Protein (gm): 75  Parenteral Fluid (mL): 2400  Energy Calories Required: not meeting needs  Protein Required: meeting needs  Fluid Required: meeting needs  Tolerance: tolerating     Intake/Output Summary (Last 24 hours) at 3/17/2020 1331  Last data filed at 3/17/2020 0600  Gross per 24 hour   Intake 1186.67 ml   Output --   Net 1186.67 ml      Nutrition Risk  Level of Risk/Frequency of Follow-up: high     Monitor and Evaluation  Food and Nutrient Intake: parenteral nutrition intake  Food and Nutrient Adminstration: enteral and parenteral nutrition administration  Physical Activity and Function: factors affecting access to physical activity, nutrition-related ADLs and IADLs  Anthropometric  Measurements: body mass index, weight change, weight  Biochemical Data, Medical Tests and Procedures: electrolyte and renal panel, lipid profile, gastrointestinal profile, glucose/endocrine profile, inflammatory profile  Nutrition-Focused Physical Findings: overall appearance     Nutrition Follow-Up  RD Follow-up?: Yes    Ileana Ware RD 03/17/2020 1:31 PM

## 2020-03-17 NOTE — SUBJECTIVE & OBJECTIVE
Interval History:   Patient seen and examined at bedside  Reports improvement in her nausea as compared to yesterday after procedure  Appears in mild distress due to throat pain, unable to talk comfortably  Not yet tolerating liquids  Will fu GI     Review of Systems   10 point review of systems were found to be negative expect for that mentioned already in interval history.   Objective:     Vital Signs (Most Recent):  Temp: 97.5 °F (36.4 °C) (03/17/20 0701)  Pulse: 96 (03/17/20 0701)  Resp: 18 (03/17/20 0701)  BP: (!) 144/81 (03/17/20 0701)  SpO2: 97 % (03/17/20 0701) Vital Signs (24h Range):  Temp:  [97.5 °F (36.4 °C)-98.4 °F (36.9 °C)] 97.5 °F (36.4 °C)  Pulse:  [80-96] 96  Resp:  [14-18] 18  SpO2:  [97 %-99 %] 97 %  BP: (109-144)/(67-81) 144/81     Weight: 51.3 kg (113 lb 1.5 oz)  Body mass index is 18.25 kg/m².    Intake/Output Summary (Last 24 hours) at 3/17/2020 0858  Last data filed at 3/17/2020 0600  Gross per 24 hour   Intake 1186.67 ml   Output --   Net 1186.67 ml      Physical Exam   Constitutional: She is oriented to person, place, and time. She appears well-developed and well-nourished. No distress.   Thin appearing   HENT:   Right Ear: External ear normal.   Left Ear: External ear normal.   Eyes: Conjunctivae and EOM are normal. Right eye exhibits no discharge. Left eye exhibits no discharge.   Neck: Normal range of motion.   Cardiovascular: Normal rate, regular rhythm and normal heart sounds. Exam reveals no gallop and no friction rub.   No murmur heard.  Pulmonary/Chest: Effort normal and breath sounds normal. No respiratory distress. She has no wheezes. She has no rales. She exhibits no tenderness.   Musculoskeletal: She exhibits no edema or tenderness.   Neurological: She is alert and oriented to person, place, and time.   Skin: Skin is warm. She is not diaphoretic.   Psychiatric: She has a normal mood and affect. Her behavior is normal. Judgment and thought content normal.   Nursing note and  vitals reviewed.      Significant Labs:   CBC:   Recent Labs   Lab 03/16/20  0422   WBC 7.67   HGB 12.6   HCT 37.9        CMP:   Recent Labs   Lab 03/16/20  0422 03/16/20  1533 03/17/20  0539     --  138   K 3.4* 4.2 3.7     --  108   CO2 27  --  24   GLU 98  --  99   BUN 17  --  16   CREATININE 0.4*  --  0.3*   CALCIUM 8.4*  --  8.6*   PROT 6.5  --  6.4   ALBUMIN 3.4*  --  3.3*   BILITOT 0.5  --  0.6   ALKPHOS 47*  --  48*   AST 15  --  22   ALT 11  --  15   ANIONGAP 4*  --  6*   EGFRNONAA >60.0  --  >60.0       Significant Imaging: I have reviewed all pertinent imaging results/findings within the past 24 hours.

## 2020-03-17 NOTE — ANESTHESIA POSTPROCEDURE EVALUATION
Anesthesia Post Evaluation    Patient: Steph Lira    Procedure(s) Performed: Procedure(s) (LRB):  EGD (ESOPHAGOGASTRODUODENOSCOPY) in OR w/ fluoro (N/A)    Final Anesthesia Type: general    Patient location during evaluation: PACU  Patient participation: Yes- Able to Participate  Level of consciousness: awake and alert  Post-procedure vital signs: reviewed and stable  Pain management: adequate  Airway patency: patent    PONV status at discharge: No PONV  Anesthetic complications: no      Cardiovascular status: blood pressure returned to baseline and stable  Respiratory status: unassisted and room air  Hydration status: euvolemic  Follow-up not needed.          Vitals Value Taken Time   /75 3/17/2020  3:15 PM   Temp 36.4 °C (97.6 °F) 3/17/2020  3:00 PM   Pulse 90 3/17/2020  3:19 PM   Resp 14 3/17/2020  3:19 PM   SpO2 100 % 3/17/2020  3:19 PM   Vitals shown include unvalidated device data.      No case tracking events are documented in the log.      Pain/Tamera Score: Pain Rating Prior to Med Admin: 5 (3/17/2020  3:17 PM)  Tamera Score: 9 (3/17/2020  3:15 PM)

## 2020-03-17 NOTE — PROGRESS NOTES
[facility] Medicine  Progress Note    Patient Name: Steph Lira  MRN: [id number]  Patient Class: IP- Inpatient   Admission Date: 3/13/2020  Length of Stay: 3 days  Attending Physician: Deysi Guardado MD  Primary Care Provider: Maida Mon MD        Subjective:     Principal Problem:Intractable vomiting        HPI:  69-year-old female history of malignant neoplasm of the esophagus, lung cancer s/p resection, COPD, hypertension, hyperlipidemia, GERD, atrial fibrillation on Xarelto, CAD s/p CABG comes in for generalized weakness and emesis.  Patient reports that she has a history of esophageal cancer and has been worsening causing obstruction of her esophagus.  Patient had EGD yesterday and found nearly closed esophagus to the stomach.  Patient reports that she is unable tolerate anything orally including liquids and her medications.  Reports that she has been feeling weak and has been able tolerate any diet for the past 3 days.  Reports this morning she can not tolerate anymore and came into the ED for further evaluation.  Denies any fever, chills, chest pain, shortness of breath.    In the ED, patient was slightly hypertensive.  Unable tolerate home medications.  CBC and CMP were unremarkable.     Overview/Hospital Course:  Patient was admitted for dehydration and malnutrition due to unable to tolerate anything orally from esophageal cancer obstructing the esophagus.  GI consulted and plans for stent placement on the esophageal mass.    Interval History:   Patient seen and examined at bedside  Denies any complaints currently, no bowel movements yet  X-ray KUB unremarkable for obstruction, passing gas  Awaiting for esophageal stent placement today  Asking when to start eating  lovenox on hold since yesterday morning  BMP wnl    Review of Systems   10 point review of systems were found to be negative expect for that mentioned already in interval history.   Objective:     Vital Signs  (Most Recent):  Temp: 97.5 °F (36.4 °C) (03/17/20 0701)  Pulse: 96 (03/17/20 0701)  Resp: 18 (03/17/20 0701)  BP: (!) 144/81 (03/17/20 0701)  SpO2: 97 % (03/17/20 0701) Vital Signs (24h Range):  Temp:  [97.5 °F (36.4 °C)-98.4 °F (36.9 °C)] 97.5 °F (36.4 °C)  Pulse:  [80-96] 96  Resp:  [14-18] 18  SpO2:  [97 %-99 %] 97 %  BP: (109-144)/(67-81) 144/81     Weight: 51.3 kg (113 lb 1.5 oz)  Body mass index is 18.25 kg/m².    Intake/Output Summary (Last 24 hours) at 3/17/2020 0858  Last data filed at 3/17/2020 0600  Gross per 24 hour   Intake 1186.67 ml   Output --   Net 1186.67 ml      Physical Exam   Constitutional: She is oriented to person, place, and time. She appears well-developed and well-nourished. No distress.   Thin appearing   HENT:   Right Ear: External ear normal.   Left Ear: External ear normal.   Eyes: Conjunctivae and EOM are normal. Right eye exhibits no discharge. Left eye exhibits no discharge.   Neck: Normal range of motion.   Cardiovascular: Normal rate, regular rhythm and normal heart sounds. Exam reveals no gallop and no friction rub.   No murmur heard.  Pulmonary/Chest: Effort normal and breath sounds normal. No respiratory distress. She has no wheezes. She has no rales. She exhibits no tenderness.   Musculoskeletal: She exhibits no edema or tenderness.   Neurological: She is alert and oriented to person, place, and time.   Skin: Skin is warm. She is not diaphoretic.   Psychiatric: She has a normal mood and affect. Her behavior is normal. Judgment and thought content normal.   Nursing note and vitals reviewed.      Significant Labs:   CBC:   Recent Labs   Lab 03/16/20  0422   WBC 7.67   HGB 12.6   HCT 37.9        CMP:   Recent Labs   Lab 03/16/20  0422 03/16/20  1533 03/17/20  0539     --  138   K 3.4* 4.2 3.7     --  108   CO2 27  --  24   GLU 98  --  99   BUN 17  --  16   CREATININE 0.4*  --  0.3*   CALCIUM 8.4*  --  8.6*   PROT 6.5  --  6.4   ALBUMIN 3.4*  --  3.3*   BILITOT  0.5  --  0.6   ALKPHOS 47*  --  48*   AST 15  --  22   ALT 11  --  15   ANIONGAP 4*  --  6*   EGFRNONAA >60.0  --  >60.0       Significant Imaging: I have reviewed all pertinent imaging results/findings within the past 24 hours.      Assessment/Plan:      Active Hospital Problems    Diagnosis    *Intractable vomiting    Malignant neoplasm of esophagus    S/P ablation of atrial fibrillation    Esophageal lesion    S/P CABG (coronary artery bypass graft)    History of smoking 10-25 pack years, 15 pack-years, quit 1994    COPD (chronic obstructive pulmonary disease)     Controlled on spiriva daily and advair BID.  Albuterol for rescue and prevention      History of lung cancer     Small cell lung cancer 1999 status post resection.  Needs yearly follow up.  Stable disease.      S/P CABG x 2, 2005    HTN (hypertension)    Hyperlipidemia    PAF (paroxysmal atrial fibrillation), onset 2002    CAD (coronary artery disease)    Anxiety    Anticoagulant long-term use    Chronic external jugular vein thrombosis    Hypothyroid    GERD (gastroesophageal reflux disease)       * Intractable vomiting  2/2 obstructing esophageal neoplasm  NPO, IV fluids  On TPN  Lovenox 1 mg/kg for Afib  Plan for EGD and stent on Monday or Tues when stent arrives    GI consulted  Appreciate consultants    VTE Risk Mitigation (From admission, onward)         Ordered     IP VTE HIGH RISK PATIENT  Once      03/13/20 1928                      Deysi Guardado MD  Department of Hospital Medicine   [facility]  Date of service: 03/17/2020

## 2020-03-17 NOTE — RESPIRATORY THERAPY
03/16/20 2200   Patient Assessment/Suction   Level of Consciousness (AVPU)   (Sleeping)   Respiratory Effort Unlabored   Expansion/Accessory Muscles/Retractions expansion symmetric;no retractions;no use of accessory muscles   All Lung Fields Breath Sounds diminished   Rhythm/Pattern, Respiratory no shortness of breath reported;pattern regular;unlabored   PRE-TX-O2   O2 Device (Oxygen Therapy) nasal cannula   Flow (L/min) 0   SpO2 99 %   Pulse Oximetry Type Intermittent   $ Pulse Oximetry - Multiple Charge Pulse Oximetry - Multiple   Pulse 81   Resp 14   Aerosol Therapy   $ Aerosol Therapy Charges PRN treatment not required   Daily Review of Necessity (SVN) completed   Respiratory Evaluation   $ Care Plan Tech Time 15 min

## 2020-03-18 NOTE — PROGRESS NOTES
[facility]  Adult Nutrition   Progress Note (Nutrition Support Management)    SUMMARY     Recommendations  Recommendation/Intervention:  1. New TPN ordered to be initiated at 1700 today. New rate of 75 ml/hr. Decreased total volume due to hyponatremia.   2. RD to monitor NPO status and ability to eat orally.   3. RD to monitor labs, weight, diet, medications, I/Os, and tolerance of PN and manage accordingly.     Goals:   1. Patient to tolerate TPN.   2. Parental nutrition provision to meet estimated patient needs.   3. Blood glucose and electrolytes to trend towards normal limits/ target ranges.    4. Transitional feeding from TPN to PO when medically appropriate.     Nutrition Goal Status: progressing towards goal  Communication of RD Recs: reviewed with RN    Dietitian Rounds Brief  TPN to continue. Will monitor NPO status and ability to start oral diet. Will recommend transition of TPN to PO intake once medically able and appropriate.     PARENTERAL NUTRITION PROGRESS NOTE:    Parenteral Nutrition Day # 5  Diagnosis/Indication for PN: NPO, esophageal cancer with mass obstructing esophagus, intractable N/V   IV Access: PICC  Diet/Tube Feeding: NPO         Admixture Type: 3 in 1 custom TPN (10% AA, 70% Dextrose, 20% Intralipid)   Infusion Rate and Frequency: 75 ml/hr   Patient Acuity: acute care     PN Composition:   75 grams Amino Acid, 231 grams Dextrose, and 50 grams Lipid.    Today's TPN provides 1585 kcal.     Please see order for electrolytes and additives content and adjustments.   *The Nutrition Support Team will continue to monitor electrolytes daily and adjust parenteral nutrition as warranted.    Reason for Assessment  Reason For Assessment: new TPN, RD follow-up    Nutrition Risk Screen  Nutrition Risk Screen: tube feeding or parenteral nutrition     MST Score: 3  Have you recently lost weight without trying?: Yes: 14-23 lbs  Weight loss score: 2  Have you been eating poorly because of a  "decreased appetite?: Yes  Appetite score: 1       Nutrition/Diet History  Spiritual, Cultural Beliefs, Sikh Practices, Values that Affect Care: no  Food Allergies: NKFA  Factors Affecting Nutritional Intake: NPO    Anthropometrics  Temp: 100 °F (37.8 °C)  Height Method: Stated  Height: 5' 6" (167.6 cm)  Height (inches): 66 in  Weight Method: Bed Scale  Weight: 51.3 kg (113 lb 1.5 oz)  Weight (lb): 113.1 lb  Ideal Body Weight (IBW), Female: 130 lb  % Ideal Body Weight, Female (lb): 87 %  BMI (Calculated): 18.3  BMI Grade: 17 - 18.4 protein-energy malnutrition grade I       Weight History:  Wt Readings from Last 10 Encounters:   03/14/20 51.3 kg (113 lb 1.5 oz)   03/12/20 52.7 kg (116 lb 2.9 oz)   03/11/20 52.6 kg (116 lb)   03/09/20 54.4 kg (120 lb)   03/06/20 54.9 kg (121 lb)   03/04/20 54.9 kg (121 lb)   02/27/20 56.2 kg (123 lb 14.4 oz)   02/21/20 57.2 kg (126 lb)   01/31/20 57.2 kg (126 lb)   01/20/20 58.5 kg (128 lb 15.5 oz)       Lab/Procedures/Meds: Pertinent Labs Reviewed  Clinical Chemistry:  Recent Labs   Lab 03/16/20  0422  03/17/20  0539 03/18/20  0509     --  138 135*  135*   K 3.4*   < > 3.7 3.9  3.9     --  108 99  99   CO2 27  --  24 26  26   GLU 98  --  99 143*  143*   BUN 17  --  16 19  19   CREATININE 0.4*  --  0.3* 0.4*  0.4*   CALCIUM 8.4*  --  8.6* 8.7  8.7   PROT 6.5  --  6.4 7.1   ALBUMIN 3.4*  --  3.3* 3.6   BILITOT 0.5  --  0.6 0.7   ALKPHOS 47*  --  48* 54*   AST 15  --  22 29   ALT 11  --  15 23   ANIONGAP 4*  --  6* 10  10   ESTGFRAFRICA >60.0  --  >60.0 >60.0  >60.0   EGFRNONAA >60.0  --  >60.0 >60.0  >60.0   MG 2.1  --  2.0 1.9   PHOS 3.1  --  2.8 2.6*    < > = values in this interval not displayed.     CBC:   Recent Labs   Lab 03/18/20  0509   WBC 17.23*  17.23*   RBC 4.46  4.46   HGB 13.4  13.4   HCT 40.1  40.1     216   MCV 90  90   MCH 30.0  30.0   MCHC 33.4  33.4     Lipid Panel:  Recent Labs   Lab 03/16/20  0422   TRIG 89     Medications: " Pertinent Medications reviewed  Scheduled Meds:   fluticasone propionate  1 spray Each Nostril Daily    tiotropium  1 capsule Inhalation Daily     Continuous Infusions:   TPN ADULT CENTRAL LINE CUSTOM (3 in 1) 100 mL/hr at 03/17/20 1708    TPN ADULT CENTRAL LINE CUSTOM (3 in 1)       PRN Meds:.acetaminophen, acetaminophen, albuterol-ipratropium, calcium chloride IVPB, calcium chloride IVPB, calcium chloride IVPB, dextrose 50%, dextrose 50%, diphenhydrAMINE, fentaNYL, hydrALAZINE, insulin regular, magnesium oxide, magnesium sulfate IVPB, magnesium sulfate IVPB, magnesium sulfate IVPB, magnesium sulfate IVPB, morphine, ondansetron, oxyCODONE, potassium chloride in water, potassium chloride in water, potassium chloride in water, potassium chloride in water, potassium chloride, potassium chloride, potassium chloride, potassium chloride, promethazine (PHENERGAN) IVPB, promethazine (PHENERGAN) IVPB, sodium chloride 0.9%, sodium chloride 0.9%, sodium phosphate IVPB, sodium phosphate IVPB, sodium phosphate IVPB, sodium phosphate IVPB, sodium phosphate IVPB    Estimated/Assessed Needs  Weight Used For Calorie Calculations: 51.3 kg (113 lb 1.5 oz)  Energy Calorie Requirements (kcal): 4136-1650 (30-35 kcal/kg)  Energy Need Method: Kcal/kg  Protein Requirements: 62-77 g (1.2-1.5 g/kg)  Weight Used For Protein Calculations: 51.3 kg (113 lb 1.5 oz)     Estimated Fluid Requirement Method: RDA Method  RDA Method (mL): 1539       Nutrition Prescription Ordered  Current Diet Order: NPO   Current Nutrition Support Formula Ordered: Other (Comment)(custom TPN )  Current Nutrition Support Rate Ordered: 100 (ml)  Current Nutrition Support Frequency Ordered: ml/hr     Evaluation of Received Nutrient/Fluid Intake  Parenteral Calories (kcal): 1585  Parenteral Protein (gm): 75  Parenteral Fluid (mL): 2400  Energy Calories Required: meeting needs  Protein Required: meeting needs  Fluid Required: meeting needs  Tolerance: tolerating      Intake/Output Summary (Last 24 hours) at 3/18/2020 1400  Last data filed at 3/18/2020 0500  Gross per 24 hour   Intake 1250 ml   Output --   Net 1250 ml      Nutrition Risk  Level of Risk/Frequency of Follow-up: high     Monitor and Evaluation  Food and Nutrient Intake: parenteral nutrition intake, energy intake  Food and Nutrient Adminstration: enteral and parenteral nutrition administration, diet order  Physical Activity and Function: nutrition-related ADLs and IADLs, factors affecting access to physical activity  Anthropometric Measurements: weight, weight change, body mass index  Biochemical Data, Medical Tests and Procedures: gastrointestinal profile, glucose/endocrine profile, inflammatory profile, electrolyte and renal panel, lipid profile  Nutrition-Focused Physical Findings: overall appearance     Nutrition Follow-Up  RD Follow-up?: Yes    Ileana Ware RD 03/18/2020 2:00 PM

## 2020-03-18 NOTE — PLAN OF CARE
Pt educated on plan of care and treatment. Pt states understanding. Questions answered. No signs of distress noted.

## 2020-03-18 NOTE — ASSESSMENT & PLAN NOTE
2/2 obstructing esophageal neoplasm  On liquid diet as tolerated  On TPN  Lovenox 1 mg/kg for Afib  s/p EGD and stent placement on 3/17    GI consulted  Appreciate consultants

## 2020-03-18 NOTE — PROGRESS NOTES
"Patient Name: Steph Lira  Patient MRN: [id number]  Patient : 1950    Admit Date: 3/13/2020  Service date: 3/18/2020    Reason for Consult: dysphagia / SCCA of esophagus    PCP: Maida Mon MD    S: Pain and n/v o/n. Slowly improving. Getting minimal liquids down but w/ n/v. No f/c.     Inpatient Medications:   fluticasone propionate  1 spray Each Nostril Daily    tiotropium  1 capsule Inhalation Daily     acetaminophen, acetaminophen, albuterol-ipratropium, calcium chloride IVPB, calcium chloride IVPB, calcium chloride IVPB, dextrose 50%, dextrose 50%, diphenhydrAMINE, fentaNYL, hydrALAZINE, insulin regular, magnesium oxide, magnesium sulfate IVPB, magnesium sulfate IVPB, magnesium sulfate IVPB, magnesium sulfate IVPB, morphine, ondansetron, oxyCODONE, potassium chloride in water, potassium chloride in water, potassium chloride in water, potassium chloride in water, potassium chloride, potassium chloride, potassium chloride, potassium chloride, promethazine (PHENERGAN) IVPB, promethazine (PHENERGAN) IVPB, sodium chloride 0.9%, sodium chloride 0.9%, sodium phosphate IVPB, sodium phosphate IVPB, sodium phosphate IVPB, sodium phosphate IVPB, sodium phosphate IVPB    Review of Systems:  A 10 point review of systems was performed and was normal, except as mentioned in the HPI, including constitutional, HEENT, heme, lymph, cardiovascular, respiratory, gastrointestinal, genitourinary, neurologic, endocrine, psychiatric and musculoskeletal.      OBJECTIVE:    Physical Exam:  24 Hour Vital Sign Ranges: Temp:  [97.2 °F (36.2 °C)-100.5 °F (38.1 °C)] 100 °F (37.8 °C)  Pulse:  [] 99  Resp:  [12-25] 18  SpO2:  [96 %-100 %] 97 %  BP: (120-166)/(69-98) 137/81  Most recent vitals: /81   Pulse 99   Temp 100 °F (37.8 °C)   Resp 18   Ht 5' 6" (1.676 m)   Wt 51.3 kg (113 lb 1.5 oz)   LMP 2006 (Within Years)   SpO2 97%   Breastfeeding? No   BMI 18.25 kg/m²    GEN: well-developed, well-nourished, " awake and alert, non-toxic appearing adult  HEENT: PERRL, sclera anicteric, oral mucosa pink and moist without lesion  NECK: trachea midline; Good ROM  CV: regular rate and rhythm, no murmurs or gallops  RESP: clear to auscultation bilaterally, no wheezes, rhonci or rales  ABD: soft, non-tender, non-distended, normal bowel sounds  EXT: no swelling or edema, 2+ pulses distally  SKIN: no rashes or jaundice  PSYCH: normal affect    Labs:   Recent Labs     03/16/20  0422 03/18/20  0509   WBC 7.67 17.23*  17.23*   MCV 91 90  90    216  216     Recent Labs     03/16/20  0422 03/16/20  1533 03/17/20  0539 03/18/20  0509     --  138 135*  135*   K 3.4* 4.2 3.7 3.9  3.9     --  108 99  99   CO2 27  --  24 26  26   BUN 17  --  16 19  19   GLU 98  --  99 143*  143*     No results for input(s): ALB in the last 72 hours.    Invalid input(s): ALKP, SGOT, SGPT, TBIL, DBIL, TPRO  No results for input(s): PT, INR, PTT in the last 72 hours.      Radiology Review:  X-Ray Chest AP Portable   Final Result      1.  Interval placement of an esophageal stent in the proximal 3rd of the esophagus with minimal narrowing of the midportion of the stent.      2.  Otherwise, unchanged radiograph of the chest.         Electronically signed by: Demarco Brar MD   Date:    03/18/2020   Time:    06:42      FL Flouro Usage   Final Result      X-Ray KUB   Final Result      Nonobstructive bowel gas pattern.         Electronically signed by: Bradford Castaneda MD   Date:    03/16/2020   Time:    11:42      X-ray Chest AP Portable   Final Result            IMPRESSION / RECOMMENDATIONS:  70 y/o WF w/ h/o newly SCCA of proximal esophagus, CAD / CABG / PCI, AFib (eliquis), lung Ca s/p resection, past tobacco use, HARDY, cholecystectomy presents for evaluation of progressive dysphagia w/ known SCCA of esophagus. EGD / w/ stent deployment done 3/17/20 w/ mild pain and n/v o/n but improving.     -Clear liquids and advance as tolerated up to  soft mechanical once twan liquids (stent diet handouts given to patient)  -Pain meds / anti-emetics as warranted  -Defer placement of J-tube to Dr. Jones  -F/u Dr. Aline turner/ oncology    Edwards DELON [facility]  3/18/2020  2:18 PM

## 2020-03-18 NOTE — PROGRESS NOTES
[facility] Medicine  Progress Note    Patient Name: Steph Lira  MRN: [id number]  Patient Class: IP- Inpatient   Admission Date: 3/13/2020  Length of Stay: 4 days  Attending Physician: Deysi Guardado MD  Primary Care Provider: Maida Mon MD        Subjective:     Principal Problem:Intractable vomiting        HPI:  69-year-old female history of malignant neoplasm of the esophagus, lung cancer s/p resection, COPD, hypertension, hyperlipidemia, GERD, atrial fibrillation on Xarelto, CAD s/p CABG comes in for generalized weakness and emesis.  Patient reports that she has a history of esophageal cancer and has been worsening causing obstruction of her esophagus.  Patient had EGD yesterday and found nearly closed esophagus to the stomach.  Patient reports that she is unable tolerate anything orally including liquids and her medications.  Reports that she has been feeling weak and has been able tolerate any diet for the past 3 days.  Reports this morning she can not tolerate anymore and came into the ED for further evaluation.  Denies any fever, chills, chest pain, shortness of breath.    In the ED, patient was slightly hypertensive.  Unable tolerate home medications.  CBC and CMP were unremarkable.     Overview/Hospital Course:  Patient was admitted for dehydration and malnutrition due to unable to tolerate anything orally from esophageal cancer obstructing the esophagus.  GI consulted and plans for stent placement on the esophageal mass.  Patient underwent stent placement on3/17/20 without any complication.    Interval History:   Patient seen and examined at bedside  Leucocytosis no signs /sym of infection, possibly reactive will monitor  Reports improvement in her nausea as compared to yesterday after procedure  Appears in mild distress due to throat pain, unable to talk comfortably  Not yet tolerating liquids  On morphine iv and zofren  Will fu GI     Review of Systems   10 point review  of systems were found to be negative expect for that mentioned already in interval history.   Objective:     Vital Signs (Most Recent):  Temp: 97.5 °F (36.4 °C) (03/17/20 0701)  Pulse: 96 (03/17/20 0701)  Resp: 18 (03/17/20 0701)  BP: (!) 144/81 (03/17/20 0701)  SpO2: 97 % (03/17/20 0701) Vital Signs (24h Range):  Temp:  [97.5 °F (36.4 °C)-98.4 °F (36.9 °C)] 97.5 °F (36.4 °C)  Pulse:  [80-96] 96  Resp:  [14-18] 18  SpO2:  [97 %-99 %] 97 %  BP: (109-144)/(67-81) 144/81     Weight: 51.3 kg (113 lb 1.5 oz)  Body mass index is 18.25 kg/m².    Intake/Output Summary (Last 24 hours) at 3/17/2020 0858  Last data filed at 3/17/2020 0600  Gross per 24 hour   Intake 1186.67 ml   Output --   Net 1186.67 ml      Physical Exam   Constitutional: She is oriented to person, place, and time. She appears well-developed and well-nourished. No distress.   Thin appearing   HENT:   Right Ear: External ear normal.   Left Ear: External ear normal.   Eyes: Conjunctivae and EOM are normal. Right eye exhibits no discharge. Left eye exhibits no discharge.   Neck: Normal range of motion.   Cardiovascular: Normal rate, regular rhythm and normal heart sounds. Exam reveals no gallop and no friction rub.   No murmur heard.  Pulmonary/Chest: Effort normal and breath sounds normal. No respiratory distress. She has no wheezes. She has no rales. She exhibits no tenderness.   Musculoskeletal: She exhibits no edema or tenderness.   Neurological: She is alert and oriented to person, place, and time.   Skin: Skin is warm. She is not diaphoretic.   Psychiatric: She has a normal mood and affect. Her behavior is normal. Judgment and thought content normal.   Nursing note and vitals reviewed.      Significant Labs:   CBC:   Recent Labs   Lab 03/16/20  0422   WBC 7.67   HGB 12.6   HCT 37.9        CMP:   Recent Labs   Lab 03/16/20 0422 03/16/20  1533 03/17/20  0539     --  138   K 3.4* 4.2 3.7     --  108   CO2 27  --  24   GLU 98  --  99    BUN 17  --  16   CREATININE 0.4*  --  0.3*   CALCIUM 8.4*  --  8.6*   PROT 6.5  --  6.4   ALBUMIN 3.4*  --  3.3*   BILITOT 0.5  --  0.6   ALKPHOS 47*  --  48*   AST 15  --  22   ALT 11  --  15   ANIONGAP 4*  --  6*   EGFRNONAA >60.0  --  >60.0       Significant Imaging: I have reviewed all pertinent imaging results/findings within the past 24 hours.      Assessment/Plan:      * Intractable vomiting  2/2 obstructing esophageal neoplasm  NPO, IV fluids  On TPN  Lovenox 1 mg/kg for Afib  Plan for EGD and stent on Monday or Tues when stent arrives    GI consulted  Appreciate consultants      VTE Risk Mitigation (From admission, onward)         Ordered     IP VTE HIGH RISK PATIENT  Once      03/13/20 1928                      Deysi Guardado MD  Department of Hospital Medicine   [facility]

## 2020-03-18 NOTE — PLAN OF CARE
Problem: Parenteral Nutrition  Goal: Effective Intravenous Nutrition Therapy Delivery  Outcome: Ongoing, Progressing  Intervention: Optimize Intravenous Nutrition Delivery  Flowsheets (Taken 3/18/2020 1400)  Nutrition Support Management: parenteral nutrition composition adjusted

## 2020-03-18 NOTE — PLAN OF CARE
03/17/20 2130   Patient Assessment/Suction   Level of Consciousness (AVPU) alert   Respiratory Effort Unlabored   Expansion/Accessory Muscles/Retractions no use of accessory muscles   All Lung Fields Breath Sounds clear   Rhythm/Pattern, Respiratory unlabored   Cough Frequency frequent   Cough Type nonproductive   PRE-TX-O2   O2 Device (Oxygen Therapy) room air   SpO2 96 %   Pulse 98   Resp 18   Aerosol Therapy   $ Aerosol Therapy Charges PRN treatment not required   Respiratory Treatment Status (SVN) PRN treatment not required

## 2020-03-19 NOTE — PROGRESS NOTES
"Patient Name: Steph Lira  Patient MRN: [id number]  Patient : 1950    Admit Date: 3/13/2020  Service date: 3/19/2020    Reason for Consult: dysphagia / SCCA of esophagus    PCP: Maida Mon MD    S: Pain and n/v continues o/n. No issues w/ swallowing liquids or secretions but gets nausea and vomits. No SOB, f/c, cough, hematemesis or other concerns.     Inpatient Medications:   fluticasone propionate  1 spray Each Nostril Daily    tiotropium  1 capsule Inhalation Daily     acetaminophen, acetaminophen, albuterol-ipratropium, calcium chloride IVPB, calcium chloride IVPB, calcium chloride IVPB, dextrose 50%, dextrose 50%, diphenhydrAMINE, fentaNYL, hydrALAZINE, insulin regular, magnesium oxide, magnesium sulfate IVPB, magnesium sulfate IVPB, magnesium sulfate IVPB, magnesium sulfate IVPB, ondansetron, oxyCODONE, potassium chloride in water, potassium chloride in water, potassium chloride in water, potassium chloride in water, potassium chloride, potassium chloride, potassium chloride, potassium chloride, promethazine (PHENERGAN) IVPB, promethazine (PHENERGAN) IVPB, sodium chloride 0.9%, sodium chloride 0.9%, sodium phosphate IVPB, sodium phosphate IVPB, sodium phosphate IVPB, sodium phosphate IVPB, sodium phosphate IVPB    Review of Systems:  A 10 point review of systems was performed and was normal, except as mentioned in the HPI, including constitutional, HEENT, heme, lymph, cardiovascular, respiratory, gastrointestinal, genitourinary, neurologic, endocrine, psychiatric and musculoskeletal.      OBJECTIVE:    Physical Exam:  24 Hour Vital Sign Ranges: Temp:  [98.6 °F (37 °C)-100.5 °F (38.1 °C)] 98.6 °F (37 °C)  Pulse:  [] 105  Resp:  [17-19] 18  SpO2:  [96 %-99 %] 99 %  BP: (135-150)/(74-86) 136/85  Most recent vitals: /85 (BP Location: Left arm, Patient Position: Lying)   Pulse 105   Temp 98.6 °F (37 °C) (Oral)   Resp 18   Ht 5' 6" (1.676 m)   Wt 51.3 kg (113 lb 1.5 oz)   LMP " 03/13/2006 (Within Years)   SpO2 99%   Breastfeeding? No   BMI 18.25 kg/m²    GEN: well-developed, well-nourished, awake and alert, non-toxic appearing elderly WM in min distress  HEENT: PERRL, sclera anicteric, oral mucosa pink and moist without lesion  NECK: trachea midline; Good ROM  CV: regular rate and rhythm, no murmurs or gallops  RESP: clear to auscultation bilaterally, no wheezes, rhonci or rales  ABD: soft, non-tender, non-distended, normal bowel sounds  EXT: no swelling or edema, 2+ pulses distally  SKIN: no rashes or jaundice  PSYCH: normal affect    Labs:   Recent Labs     03/18/20  0509 03/19/20  0532   WBC 17.23*  17.23* 23.43*   MCV 90  90 90     216 195     Recent Labs     03/17/20  0539 03/18/20  0509 03/19/20  0532    135*  135* 133*   K 3.7 3.9  3.9 3.9    99  99 97   CO2 24 26  26 30*   BUN 16 19  19 19   GLU 99 143*  143* 178*     No results for input(s): ALB in the last 72 hours.    Invalid input(s): ALKP, SGOT, SGPT, TBIL, DBIL, TPRO  No results for input(s): PT, INR, PTT in the last 72 hours.      Radiology Review:  X-Ray Chest AP Portable   Final Result      1.  Interval placement of an esophageal stent in the proximal 3rd of the esophagus with minimal narrowing of the midportion of the stent.      2.  Otherwise, unchanged radiograph of the chest.         Electronically signed by: Demarco Brar MD   Date:    03/18/2020   Time:    06:42      FL Flouro Usage   Final Result      X-Ray KUB   Final Result      Nonobstructive bowel gas pattern.         Electronically signed by: Bradford Castaneda MD   Date:    03/16/2020   Time:    11:42      X-ray Chest AP Portable   Final Result            IMPRESSION / RECOMMENDATIONS:  70 y/o WF w/ h/o newly SCCA of proximal esophagus, CAD / CABG / PCI, AFib (eliquis), lung Ca s/p resection, past tobacco use, HARDY, cholecystectomy presents for evaluation of progressive dysphagia w/ known SCCA of esophagus. EGD / w/ stent deployment  done 3/17/20 w/ mild pain and n/v and now w/ leukocytosis of unknown etiology. No resp issues, signs of perforation, or any concerns from recent uncomplicated endoscopy. Patient w/o any issues w/ secretions which is much improved s/p stenting.     -CT chest today to ensure no concerns that would explain leukocytosis (aspriation, perforation, etc)  -Clear liquids and advance as tolerated up to soft mechanical once twan liquids (stent diet handouts given to patient)  -Pain meds / anti-emetics as warranted but pain meds may contribute to nausea  -Consider inpatient J-tube by Dr. Jones if CT negative and still unable to maintain adequate PO  -Consider stopping pain meds to see if nausea improves  -F/u Dr. Mireles w/ oncology    Ulisses [place] III  3/19/2020  2:18 PM

## 2020-03-19 NOTE — PROGRESS NOTES
Prior radiation records received and uploaded to epic media.    Summary:  Patient was diagnosed with a stage IIIA, [id number] R2 poor diff adenoca treated with left upper lobectomy and chest wall resection 3/21/96 with R2 gross residual.    She was treated with adjuvant radiotherapy, 6000 cGy to the mediastinum and bilateral supraclavicular fossa and postoperative bed with 1000 cGy boost to the gross disease for a total dose of 7000 cGy to that site, specifically described as gross residual at the left posterior chest wall at T3-T4 level.  Calculated maximum dose to spinal cord was 4101 cGy.    Patient also received 2 cycles of cisplatin and  16 ending 05/16/1996.    As suspected, current esophageal tumor is within prior radiotherapy treatment fields and option of neoadjuvant systemic therapy followed by surgical resection without the need for radiotherapy should be pursued.      There is significant risk of dose overlap should she undergo a 2nd course of radiotherapy.

## 2020-03-19 NOTE — PLAN OF CARE
Problem: Parenteral Nutrition  Goal: Effective Intravenous Nutrition Therapy Delivery  Outcome: Ongoing, Progressing  Intervention: Optimize Intravenous Nutrition Delivery  Flowsheets (Taken 3/19/2020 9983)  Nutrition Support Management: parenteral nutrition composition adjusted

## 2020-03-19 NOTE — PROGRESS NOTES
[facility]  Progress Note    Patient Name: Steph Lira  MRN: [id number]  Patient Class: IP- Inpatient   Admission Date: 3/13/2020  Length of Stay: 5 days  Attending Physician: Deysi Guardado MD  Primary Care Provider: Maida Mon MD        Subjective:     Principal Problem:Intractable vomiting        HPI:  69-year-old female history of malignant neoplasm of the esophagus, lung cancer s/p resection, COPD, hypertension, hyperlipidemia, GERD, atrial fibrillation on Xarelto, CAD s/p CABG comes in for generalized weakness and emesis.  Patient reports that she has a history of esophageal cancer and has been worsening causing obstruction of her esophagus.  Patient had EGD yesterday and found nearly closed esophagus to the stomach.  Patient reports that she is unable tolerate anything orally including liquids and her medications.  Reports that she has been feeling weak and has been able tolerate any diet for the past 3 days.  Reports this morning she can not tolerate anymore and came into the ED for further evaluation.  Denies any fever, chills, chest pain, shortness of breath.    In the ED, patient was slightly hypertensive.  Unable tolerate home medications.  CBC and CMP were unremarkable.     Overview/Hospital Course:  Patient was admitted for dehydration and malnutrition due to unable to tolerate anything orally from esophageal cancer obstructing the esophagus.  GI consulted and plans for stent placement on the esophageal mass.  Patient underwent stent placement on3/17/20 without any complication.  3/18 Appears in mild distress due to throat pain, unable to talk comfortably  Not yet tolerating liquids  On morphine iv and zofren      Interval History:   Patient seen and examined at bedside  Leucocytosis up trending with low-grade fever   CT chest negative for perforation/aspirate  Blood culture ordered, patient denies any symptoms of UTI  Will advance liquids slowly, on Dilaudid  prn for pain  Pt wants her oncologist clearance prior Jtube placement  F/u with Dr Mireles and consult surgery  Will treat sepsis empirically with zosyn    Review of Systems   10 point review of systems were found to be negative expect for that mentioned already in interval history.   Objective:     Vital Signs (Most Recent):  Temp: 97.5 °F (36.4 °C) (03/17/20 0701)  Pulse: 96 (03/17/20 0701)  Resp: 18 (03/17/20 0701)  BP: (!) 144/81 (03/17/20 0701)  SpO2: 97 % (03/17/20 0701) Vital Signs (24h Range):  Temp:  [97.5 °F (36.4 °C)-98.4 °F (36.9 °C)] 97.5 °F (36.4 °C)  Pulse:  [80-96] 96  Resp:  [14-18] 18  SpO2:  [97 %-99 %] 97 %  BP: (109-144)/(67-81) 144/81     Weight: 51.3 kg (113 lb 1.5 oz)  Body mass index is 18.25 kg/m².    Intake/Output Summary (Last 24 hours) at 3/17/2020 0858  Last data filed at 3/17/2020 0600  Gross per 24 hour   Intake 1186.67 ml   Output --   Net 1186.67 ml      Physical Exam   Constitutional: She is oriented to person, place, and time. She appears well-developed and well-nourished. No distress.   Thin appearing   HENT:   Right Ear: External ear normal.   Left Ear: External ear normal.   Eyes: Conjunctivae and EOM are normal. Right eye exhibits no discharge. Left eye exhibits no discharge.   Neck: Normal range of motion.   Cardiovascular: Normal rate, regular rhythm and normal heart sounds. Exam reveals no gallop and no friction rub.   No murmur heard.  Pulmonary/Chest: Effort normal and breath sounds normal. No respiratory distress. She has no wheezes. She has no rales. She exhibits no tenderness.   Musculoskeletal: She exhibits no edema or tenderness.   Neurological: She is alert and oriented to person, place, and time.   Skin: Skin is warm. She is not diaphoretic.   Psychiatric: She has a normal mood and affect. Her behavior is normal. Judgment and thought content normal.   Nursing note and vitals reviewed.      Significant Labs:   CBC:   Recent Labs   Lab 03/16/20  0422   WBC 7.67   HGB  12.6   HCT 37.9        CMP:   Recent Labs   Lab 03/16/20  0422 03/16/20  1533 03/17/20  0539     --  138   K 3.4* 4.2 3.7     --  108   CO2 27  --  24   GLU 98  --  99   BUN 17  --  16   CREATININE 0.4*  --  0.3*   CALCIUM 8.4*  --  8.6*   PROT 6.5  --  6.4   ALBUMIN 3.4*  --  3.3*   BILITOT 0.5  --  0.6   ALKPHOS 47*  --  48*   AST 15  --  22   ALT 11  --  15   ANIONGAP 4*  --  6*   EGFRNONAA >60.0  --  >60.0       Significant Imaging: I have reviewed all pertinent imaging results/findings within the past 24 hours.      Assessment/Plan:      * Intractable vomiting   Sepsis from unknown source    2/2 obstructing esophageal neoplasm  NPO, IV fluids  On TPN  Lovenox 1 mg/kg for Afib  Plan for EGD and stent on Monday or Tues when stent arrives    GI consulted  Appreciate consultants      VTE Risk Mitigation (From admission, onward)         Ordered     IP VTE HIGH RISK PATIENT  Once      03/13/20 1928                      Deysi Guardado MD  Department of Hospital Medicine   [facility]

## 2020-03-19 NOTE — NURSING
Called Dr. Mireles the pts  oncologists about clearing pt for a PEG tube. Spoke with kali at his office. She said she would tell him the info and have him call back as soon as possible.

## 2020-03-19 NOTE — PROGRESS NOTES
[facility]  Adult Nutrition   Progress Note (Nutrition Support Management)    SUMMARY     Recommendations  Recommendation/Intervention:  1. New TPN ordered to be initiated at 1700 today. New rate of 65 ml/hr. Decreased total volume due to hyponatremia.   2. Plans to start clear liquid diet and/or consider J-Tube placement for Enteral feeding. RD to monitor plans for tube feeding placement and monitor PO intake and tolerance of liquids if/when diet is initiated.  3. Recommend continued monitoring and management of blood glucose and electrolytes.      Goals:   1. Patient to tolerate TPN.   2. Parental nutrition provision to meet estimated patient needs.   3. Blood glucose and electrolytes to trend towards normal limits/ target ranges.    4. Transitional feeding from TPN to PO or EN when medically appropriate.      Nutrition Goal Status: progressing towards goal  Communication of RD Recs: reviewed with RN     Dietitian Rounds Brief  TPN to continue. New TPN ordered. Per progress notes: Plans to start clear liquids and advance slowly. Inpatient J-Tube placement to be considered if CT negative and still unable to maintain adequate PO. Pt wants her oncologist clearance prior J-tube placement. RD to monitor plans for feeding tube placement, diet initiation and tolerance, and will make recommendations for transitional feeding as appropriate.      PARENTERAL NUTRITION PROGRESS NOTE:    Parenteral Nutrition Day # 6  Diagnosis/Indication for PN: NPO, esophageal cancer with mass obstructing esophagus, intractable N/V   IV Access: PICC  Diet/Tube Feeding: NPO         Admixture Type: 3 in 1 custom TPN (10% AA, 70% Dextrose, 20% Intralipid)   Infusion Rate and Frequency: 65 ml/hr   Patient Acuity: acute care     PN Composition:   75 grams Amino Acid, 231 grams Dextrose, and 50 grams Lipid.    Today's TPN provides 1585 kcal.     Please see order for electrolytes and additives content and adjustments.   *The Nutrition  "Support Team will continue to monitor electrolytes daily and adjust parenteral nutrition as warranted.    Reason for Assessment  Reason For Assessment: new TPN, RD follow-up    Nutrition Risk Screen  Nutrition Risk Screen: tube feeding or parenteral nutrition     MST Score: 3  Have you recently lost weight without trying?: Yes: 14-23 lbs  Weight loss score: 2  Have you been eating poorly because of a decreased appetite?: Yes  Appetite score: 1       Nutrition/Diet History  Spiritual, Cultural Beliefs, Presybeterian Practices, Values that Affect Care: no  Food Allergies: NKFA  Factors Affecting Nutritional Intake: NPO    Anthropometrics  Temp: 99.3 °F (37.4 °C)  Height Method: Stated  Height: 5' 6" (167.6 cm)  Height (inches): 66 in  Weight Method: Bed Scale  Weight: 51.3 kg (113 lb 1.5 oz)  Weight (lb): 113.1 lb  Ideal Body Weight (IBW), Female: 130 lb  % Ideal Body Weight, Female (lb): 87 %  BMI (Calculated): 18.3  BMI Grade: 17 - 18.4 protein-energy malnutrition grade I       Weight History:  Wt Readings from Last 10 Encounters:   03/14/20 51.3 kg (113 lb 1.5 oz)   03/12/20 52.7 kg (116 lb 2.9 oz)   03/11/20 52.6 kg (116 lb)   03/09/20 54.4 kg (120 lb)   03/06/20 54.9 kg (121 lb)   03/04/20 54.9 kg (121 lb)   02/27/20 56.2 kg (123 lb 14.4 oz)   02/21/20 57.2 kg (126 lb)   01/31/20 57.2 kg (126 lb)   01/20/20 58.5 kg (128 lb 15.5 oz)       Lab/Procedures/Meds: Pertinent Labs Reviewed  Clinical Chemistry:  Recent Labs   Lab 03/17/20  0539 03/18/20  0509 03/19/20  0532    135*  135* 133*   K 3.7 3.9  3.9 3.9    99  99 97   CO2 24 26  26 30*   GLU 99 143*  143* 178*   BUN 16 19  19 19   CREATININE 0.3* 0.4*  0.4* 0.4*   CALCIUM 8.6* 8.7  8.7 8.3*   PROT 6.4 7.1 6.7   ALBUMIN 3.3* 3.6 3.1*   BILITOT 0.6 0.7 0.9   ALKPHOS 48* 54* 55   AST 22 29 31   ALT 15 23 39   ANIONGAP 6* 10  10 6*   ESTGFRAFRICA >60.0 >60.0  >60.0 >60.0   EGFRNONAA >60.0 >60.0  >60.0 >60.0   MG 2.0 1.9 2.1   PHOS 2.8 2.6* 2.2* "     CBC:   Recent Labs   Lab 03/19/20  0532   WBC 23.43*   RBC 4.38   HGB 13.0   HCT 39.2      MCV 90   MCH 29.7   MCHC 33.2     Lipid Panel:  Recent Labs   Lab 03/16/20  0422   TRIG 89     Inflammatory Labs:  Recent Labs   Lab 03/19/20  0532   CRP 15.62*     Medications: Pertinent Medications reviewed  Scheduled Meds:   fluticasone propionate  1 spray Each Nostril Daily    piperacillin-tazobactam (ZOSYN) IVPB  3.375 g Intravenous Q8H    tiotropium  1 capsule Inhalation Daily     Continuous Infusions:   TPN ADULT CENTRAL LINE CUSTOM (3 in 1) 75 mL/hr at 03/18/20 1941    TPN ADULT CENTRAL LINE CUSTOM (3 in 1) 65 mL/hr at 03/19/20 1731     PRN Meds:.acetaminophen, acetaminophen, albuterol-ipratropium, calcium chloride IVPB, calcium chloride IVPB, calcium chloride IVPB, dextrose 50%, dextrose 50%, diphenhydrAMINE, fentaNYL, hydrALAZINE, HYDROmorphone, insulin regular, magnesium oxide, magnesium sulfate IVPB, magnesium sulfate IVPB, magnesium sulfate IVPB, magnesium sulfate IVPB, ondansetron, potassium chloride in water, potassium chloride in water, potassium chloride in water, potassium chloride in water, potassium chloride, potassium chloride, potassium chloride, potassium chloride, promethazine (PHENERGAN) IVPB, promethazine (PHENERGAN) IVPB, sodium chloride 0.9%, sodium chloride 0.9%, sodium phosphate IVPB, sodium phosphate IVPB, sodium phosphate IVPB, sodium phosphate IVPB, sodium phosphate IVPB    Estimated/Assessed Needs  Weight Used For Calorie Calculations: 51.3 kg (113 lb 1.5 oz)  Energy Calorie Requirements (kcal): 5971-9544 (30-35 kcal/kg)  Energy Need Method: Kcal/kg  Protein Requirements: 62-77 g (1.2-1.5 g/kg)  Weight Used For Protein Calculations: 51.3 kg (113 lb 1.5 oz)     Estimated Fluid Requirement Method: RDA Method  RDA Method (mL): 1539       Nutrition Prescription Ordered  Current Diet Order: NPO   Current Nutrition Support Formula Ordered: Other (Comment)(custom TPN )  Current  Nutrition Support Rate Ordered: 75 (ml)  Current Nutrition Support Frequency Ordered: ml/hr     Evaluation of Received Nutrient/Fluid Intake  Parenteral Calories (kcal): 1585  Parenteral Protein (gm): 75  Parenteral Fluid (mL): 1800  Energy Calories Required: meeting needs  Protein Required: meeting needs  Fluid Required: meeting needs  Tolerance: tolerating   No intake or output data in the 24 hours ending 03/19/20 1834     Nutrition Risk  Level of Risk/Frequency of Follow-up: high     Monitor and Evaluation  Food and Nutrient Intake: energy intake, parenteral nutrition intake  Food and Nutrient Adminstration: diet order, enteral and parenteral nutrition administration  Physical Activity and Function: factors affecting access to physical activity, nutrition-related ADLs and IADLs  Anthropometric Measurements: weight change, weight, body mass index  Biochemical Data, Medical Tests and Procedures: electrolyte and renal panel, glucose/endocrine profile, lipid profile, gastrointestinal profile, inflammatory profile  Nutrition-Focused Physical Findings: overall appearance     Nutrition Follow-Up  RD Follow-up?: Yes    Ileana Ware RD 03/19/2020 6:22 PM

## 2020-03-19 NOTE — PLAN OF CARE
03/18/20 2100   Patient Assessment/Suction   Level of Consciousness (AVPU) alert   Respiratory Effort Unlabored   Expansion/Accessory Muscles/Retractions no use of accessory muscles   All Lung Fields Breath Sounds clear   Rhythm/Pattern, Respiratory unlabored   Cough Frequency infrequent   Cough Type nonproductive   PRE-TX-O2   O2 Device (Oxygen Therapy) room air   SpO2 98 %   Pulse 100   Resp 18   Aerosol Therapy   $ Aerosol Therapy Charges PRN treatment not required   Respiratory Treatment Status (SVN) PRN treatment not required

## 2020-03-19 NOTE — PLAN OF CARE
03/19/20 0706   Patient Assessment/Suction   Level of Consciousness (AVPU) alert   Respiratory Effort Normal;Unlabored   Expansion/Accessory Muscles/Retractions no use of accessory muscles;no retractions;expansion symmetric   All Lung Fields Breath Sounds clear   Rhythm/Pattern, Respiratory pattern regular;depth regular;no shortness of breath reported   Cough Frequency infrequent   Cough Type good   PRE-TX-O2   O2 Device (Oxygen Therapy) room air   SpO2 98 %   Pulse Oximetry Type Intermittent   $ Pulse Oximetry - Multiple Charge Pulse Oximetry - Multiple   Pulse 100   Resp 18   Inhaler   $ Inhaler Charges MDI (Metered Dose Inahler) Treatment;Mouth rinsed post treatment   Daily Review of Necessity (Inhaler) completed   Respiratory Treatment Status (Inhaler) given   Treatment Route (Inhaler) mouthpiece   Patient Position (Inhaler) HOB elevated   Post Treatment Assessment (Inhaler) increased aeration   Signs of Intolerance (Inhaler) none   Breath Sounds Post-Respiratory Treatment   Throughout All Fields Post-Treatment All Fields   Throughout All Fields Post-Treatment aeration increased   Post-treatment Heart Rate (beats/min) 77   Post-treatment Resp Rate (breaths/min) 17

## 2020-03-20 PROBLEM — R64 INANITION: Status: ACTIVE | Noted: 2020-01-01

## 2020-03-20 NOTE — TRANSFER OF CARE
"Anesthesia Transfer of Care Note    Patient: Steph Lira    Procedure(s) Performed: Procedure(s) (LRB):  EGD (ESOPHAGOGASTRODUODENOSCOPY) (N/A)  INSERTION, JEJUNOSTOMY TUBE (N/A)    Patient location: PACU    Anesthesia Type: general    Transport from OR: Transported from OR on room air with adequate spontaneous ventilation    Post pain: adequate analgesia    Post assessment: no apparent anesthetic complications    Post vital signs: stable    Level of consciousness: awake    Nausea/Vomiting: no nausea/vomiting    Complications: none    Transfer of care protocol was followed      Last vitals:   Visit Vitals  BP (!) 155/86   Pulse 101   Temp 37.2 °C (99 °F) (Oral)   Resp 18   Ht 5' 6" (1.676 m)   Wt 51.3 kg (113 lb 1.5 oz)   LMP 03/13/2006 (Within Years)   SpO2 98%   Breastfeeding? No   BMI 18.25 kg/m²     "

## 2020-03-20 NOTE — ANESTHESIA PROCEDURE NOTES
Intubation  Performed by: Nestor Spencer CRNA  Authorized by: Bola Hightower MD     Intubation:     Induction:  Intravenous    Intubated:  Postinduction    Mask Ventilation:  Easy mask    Attempts:  1    Attempted By:  CRNA    Method of Intubation:  Direct    Blade:  Hightower 2    Laryngeal View Grade: Grade I - full view of chords      Difficult Airway Encountered?: No      Complications:  None    Airway Device:  Oral endotracheal tube    Airway Device Size:  7.5    Style/Cuff Inflation:  Cuffed    Tube secured:  22    Secured at:  The lips    Placement Verified By:  Capnometry    Complicating Factors:  None    Findings Post-Intubation:  BS equal bilateral and atraumatic/condition of teeth unchanged

## 2020-03-20 NOTE — PROCEDURES
EGD PROCEDURE NOTE  Patient Name: Steph Lira  Patient MRN: [id number]  Patient : 1950    Service date: 3/20/2020    Reason for Procedure: Esophagoscopy    PCP: Maida Mon MD  MEDICATIONS:  -Anesthesia care, please see anesthesiology documentation    PATIENT MONITORING:  -Continuous telemetry, pulse oximetry, and blood pressure were performed.    INSTRUMENT:  -Olympus midsize endoscope    PROCEDURE NOTE:  The procedure and its accompanying risks were explained to the patient and informed consent was obtained. Cardiopulmonary assessment was adequate. Time out was performed using the patient's name, birth date, MRN and procedure. The patient was placed in the supine position. After sedation was achieved, the endoscope was inserted into the posterior oropharynx and advanced to the esophagus. Attention was turned to the stent located in the esophagus. Stent was patent but proximal end w/ mild distortion and manipulated with tip of endoscope with improved positioning. Scope then advanced to gastric lumen without issues.     The endoscope was then slowly withdrawn with careful inspection of the mucosa. Retroflexion exam was performed in the stomach.    The endoscope was withdrawn and the procedure complete. The patient tolerated the procedure well with no immediate complications.    CONDITION: Stable  COMPLICATIONS: None  ESTIMATED BLOOD LOSS: None  SPECIMEN COLLECTED: No    FINDINGS:  1. Esophagus  -Proximal esophageal stent in correct position requiring mild manipulation with endoscope    2. Stomach  -The gastric mucosa was unremarkable in appearance. There was no evidence of mass or ulceration.  -Retroflexion exam was unremarkable.    IMPRESSION:  -Esophageal stent in correct position w/ mild manipulation of proximal end required    RECOMMENDATIONS:  -Dietary recommendations: soft diet (eg anything the patient can squish in his/her hand)  -Continue current medications  -Proceed w/ J-tube with surgery  ([place])      Ulisses JERNIGAN Dauterive III  3/20/2020  12:41 PM

## 2020-03-20 NOTE — OP NOTE
Surgery Date: 3/20/2020     Surgeon(s) and Role:     * Rafat Ruiz III, MD - Primary     * Ulisses Colorado III, MD - Assisting        Pre-op Diagnosis:  Esophageal obstruction [K22.2]  Inanition [R64]    Post-op Diagnosis:  Post-Op Diagnosis Codes:     * Esophageal obstruction [K22.2]     * Inanition [R64]    Procedure: Placement of jejunostomy tube     Anesthesia: general     Description of Procedure: Patient was taken to the operating room and transferred operating room table in supine position. She was given general anesthesia and intubated. Abdomen was prepped and draped. A midline incision was made. Dissection was carried down through the skin and subcutaneous tissue.  The abdomen was entered through the linea alba. The omentum was eviscerated. The ligament of trietz was located. I measured about 30cm distal and used this segment for the feeding tube. A pursestring was placed at this spot at the antimesenteric border. Enterotomy was made. 18 Taj[place] red rubber was placed into the small bowel. Pursestring was tightened around the tube. The small bowel was invaginated over the tube for about one cm.  The tube was then brought through the abdominal wall to the left of the incision.  The bowel was sutured to the anterior abdominal wall at the 12, 3, 6, and 9 clock position around the tube.  The tube was aspirated and flushed.  There is no evidence of leak around the tube.  Abdominal fascia was then closed with running #1 PDS suture.  Skin was closed with skin staples.  Patient was extubated. She was brought to recovery room in stable condition.      Description of the findings of the procedure: 18F tube placed     Estimated Blood Loss: 10mL    Complications none     Instrument counts correct        Specimens:   Specimen (12h ago, onward)    None

## 2020-03-20 NOTE — PROGRESS NOTES
"[facility]  Adult Nutrition   Progress Note (Nutrition Support Management)    SUMMARY     Recommendations  Recommendation/Intervention:  1. New TPN ordered to be initiated at 1700 today at 65 ml/hr. Decreased total volume due to hyponatremia.   2. Plans to start clear liquid diet and/or consider J-Tube placement for Enteral feeding. RD to monitor plans for tube feeding placement and monitor PO intake and tolerance of liquids if/when diet is initiated.   3. Recommend continued monitoring and management of blood glucose and electrolytes.      Goals: 1. Patient to tolerate TPN. 2. Parental nutrition provision to meet estimated patient needs. 3. Blood glucose and electrolytes to trend towards normal limits/ target ranges.  4. Transitional feeding from TPN to PO or EN when medically appropriate.  Nutrition Goal Status: progressing towards goal  Communication of RD Recs: reviewed with RN    Dietitian Rounds Brief  Possible j-tube placement today. Patient remains NPO. TPN to continue.    Reason for Assessment  Reason For Assessment: RD follow-up, new TPN    Nutrition Risk Screen  Nutrition Risk Screen: tube feeding or parenteral nutrition     MST Score: 3  Have you recently lost weight without trying?: Yes: 14-23 lbs  Weight loss score: 2  Have you been eating poorly because of a decreased appetite?: Yes  Appetite score: 1     Nutrition/Diet History  Spiritual, Cultural Beliefs, Gnosticist Practices, Values that Affect Care: no  Food Allergies: NKFA  Factors Affecting Nutritional Intake: NPO, difficulty/impaired swallowing    Anthropometrics  Temp: 99 °F (37.2 °C)  Height Method: Stated  Height: 5' 6" (167.6 cm)  Height (inches): 66 in  Weight Method: Bed Scale  Weight: 51.3 kg (113 lb 1.5 oz)  Weight (lb): 113.1 lb  Ideal Body Weight (IBW), Female: 130 lb  % Ideal Body Weight, Female (lb): 87 %  BMI (Calculated): 18.3  BMI Grade: 17 - 18.4 protein-energy malnutrition grade I     Weight History:  Wt Readings " from Last 10 Encounters:   03/14/20 51.3 kg (113 lb 1.5 oz)   03/12/20 52.7 kg (116 lb 2.9 oz)   03/11/20 52.6 kg (116 lb)   03/09/20 54.4 kg (120 lb)   03/06/20 54.9 kg (121 lb)   03/04/20 54.9 kg (121 lb)   02/27/20 56.2 kg (123 lb 14.4 oz)   02/21/20 57.2 kg (126 lb)   01/31/20 57.2 kg (126 lb)   01/20/20 58.5 kg (128 lb 15.5 oz)       Lab/Procedures/Meds: Pertinent Labs Reviewed  Clinical Chemistry:  Recent Labs   Lab 03/18/20  0509 03/19/20  0532 03/20/20  0946   *  135* 133* 139   K 3.9  3.9 3.9 3.8   CL 99  99 97 101   CO2 26  26 30* 32*   *  143* 178* 158*   BUN 19 19 19 22   CREATININE 0.4*  0.4* 0.4* 0.5   CALCIUM 8.7  8.7 8.3* 8.6*   PROT 7.1 6.7 6.8   ALBUMIN 3.6 3.1* 3.0*   BILITOT 0.7 0.9 0.6   ALKPHOS 54* 55 72   AST 29 31 23   ALT 23 39 30   ANIONGAP 10  10 6* 6*   ESTGFRAFRICA >60.0  >60.0 >60.0 >60.0   EGFRNONAA >60.0  >60.0 >60.0 >60.0   MG 1.9 2.1 2.2   PHOS 2.6* 2.2* 2.5*     CBC:   Recent Labs   Lab 03/20/20  0946   WBC 16.99*   RBC 4.01   HGB 11.9*   HCT 36.7*      MCV 92   MCH 29.7   MCHC 32.4     Lipid Panel:  Recent Labs   Lab 03/16/20  0422   TRIG 89     Inflammatory Labs:  Recent Labs   Lab 03/19/20  0532   CRP 15.62*     Medications: Pertinent Medications reviewed  Scheduled Meds:   fluticasone propionate  1 spray Each Nostril Daily    piperacillin-tazobactam (ZOSYN) IVPB  3.375 g Intravenous Q8H    tiotropium  1 capsule Inhalation Daily     Continuous Infusions:   TPN ADULT CENTRAL LINE CUSTOM (3 in 1) 65 mL/hr at 03/19/20 1731    TPN ADULT CENTRAL LINE CUSTOM (3 in 1)       Estimated/Assessed Needs  Weight Used For Calorie Calculations: 51.3 kg (113 lb 1.5 oz)  Energy Calorie Requirements (kcal): 9032-6722 (30-35 kcal/kg)  Energy Need Method: Kcal/kg  Protein Requirements: 62-77 g (1.2-1.5 g/kg)  Weight Used For Protein Calculations: 51.3 kg (113 lb 1.5 oz)     Estimated Fluid Requirement Method: RDA Method  RDA Method (mL): 1539     Nutrition  Prescription Ordered  Current Diet Order: NPO  Current Nutrition Support Formula Ordered: (Customized TPN)  Current Nutrition Support Rate Ordered: 75 (ml)  Current Nutrition Support Frequency Ordered: ml/hr    Evaluation of Received Nutrient/Fluid Intake  Parenteral Calories (kcal): 1585  Parenteral Protein (gm): 75  Parenteral Fluid (mL): 1800  IV Fluid (mL): 1800  Energy Calories Required: meeting needs  Protein Required: meeting needs  Fluid Required: meeting needs  Tolerance: tolerating     Intake/Output Summary (Last 24 hours) at 3/20/2020 1131  Last data filed at 3/20/2020 0400  Gross per 24 hour   Intake 679.58 ml   Output --   Net 679.58 ml      % Intake of Estimated Energy Needs: 75 - 100 %  % Meal Intake: NPO    Nutrition Risk  Level of Risk/Frequency of Follow-up: high     Monitor and Evaluation  Food and Nutrient Intake: energy intake, parenteral nutrition intake  Food and Nutrient Adminstration: diet order, enteral and parenteral nutrition administration  Physical Activity and Function: factors affecting access to physical activity, nutrition-related ADLs and IADLs  Anthropometric Measurements: weight change, weight, body mass index  Biochemical Data, Medical Tests and Procedures: electrolyte and renal panel, glucose/endocrine profile, lipid profile, gastrointestinal profile, inflammatory profile  Nutrition-Focused Physical Findings: overall appearance     Nutrition Follow-Up  RD Follow-up?: Yes

## 2020-03-20 NOTE — PLAN OF CARE
03/20/20 0817   Patient Assessment/Suction   Level of Consciousness (AVPU) alert   Respiratory Effort Normal   Expansion/Accessory Muscles/Retractions no retractions   All Lung Fields Breath Sounds clear   Rhythm/Pattern, Respiratory unlabored;depth regular   Cough Frequency no cough   PRE-TX-O2   O2 Device (Oxygen Therapy) room air   SpO2 99 %   Pulse Oximetry Type Continuous   $ Pulse Oximetry - Multiple Charge Pulse Oximetry - Multiple   Pulse 101   Resp 17   Positioning Sitting in bed   Aerosol Therapy   $ Aerosol Therapy Charges PRN treatment not required   Inhaler   $ Inhaler Charges MDI (Metered Dose Inahler) Treatment   Daily Review of Necessity (Inhaler) completed   Respiratory Treatment Status (Inhaler) given   Treatment Route (Inhaler) mouthpiece   Patient Position (Inhaler) sitting on edge of bed   Post Treatment Assessment (Inhaler) breath sounds unchanged   Signs of Intolerance (Inhaler) none   Breath Sounds Post-Respiratory Treatment   Throughout All Fields Post-Treatment All Fields   Throughout All Fields Post-Treatment clear;equal bilaterally   Post-treatment Heart Rate (beats/min) 98   Post-treatment Resp Rate (breaths/min) 20   Respiratory Evaluation   $ Care Plan Tech Time 15 min

## 2020-03-20 NOTE — TELEPHONE ENCOUNTER
Spoke to patient and informed that Dr. Mireles does still want to see her face to face next week, if possible and we are able to see her pending COVID- 19 safety measures.  Informed pt that no visitors will be allowed in the building with him for his appt on Thursday.  Additionally informed that if he has someone who wants to be a part of the visit, we can call them at the time of the appt.  Patient verbalized understanding.  No further questions at this time.

## 2020-03-20 NOTE — PLAN OF CARE
Problem: Parenteral Nutrition  Goal: Effective Intravenous Nutrition Therapy Delivery  Outcome: Ongoing, Progressing  New TPN ordered.

## 2020-03-20 NOTE — ANESTHESIA POSTPROCEDURE EVALUATION
Anesthesia Post Evaluation    Patient: Steph Lira    Procedure(s) Performed: Procedure(s) (LRB):  EGD (ESOPHAGOGASTRODUODENOSCOPY) (N/A)  INSERTION, JEJUNOSTOMY TUBE (N/A)    Final Anesthesia Type: general    Patient location during evaluation: PACU  Patient participation: Yes- Able to Participate  Level of consciousness: awake and alert and oriented  Post-procedure vital signs: reviewed and stable  Pain management: adequate  Airway patency: patent    PONV status at discharge: No PONV  Anesthetic complications: no      Cardiovascular status: blood pressure returned to baseline and hemodynamically stable  Respiratory status: unassisted, spontaneous ventilation and room air  Hydration status: euvolemic  Follow-up not needed.          Vitals Value Taken Time   /79 3/20/2020  2:15 PM   Temp 36.7 °C (98.1 °F) 3/20/2020  2:15 PM   Pulse 100 3/20/2020  2:26 PM   Resp 31 3/20/2020  2:26 PM   SpO2 96 % 3/20/2020  2:26 PM   Vitals shown include unvalidated device data.      No case tracking events are documented in the log.      Pain/Tamera Score: Pain Rating Prior to Med Admin: 6 (3/20/2020  2:26 PM)  Pain Rating Post Med Admin: 3 (3/20/2020  4:47 AM)  Tamera Score: 9 (3/20/2020  2:15 PM)

## 2020-03-20 NOTE — PROGRESS NOTES
[facility]  Progress Note    Patient Name: Steph Lira  MRN: [id number]  Patient Class: IP- Inpatient   Admission Date: 3/13/2020  Length of Stay: 6 days  Attending Physician: Deysi Guardado MD  Primary Care Provider: Maida Mon MD        Subjective:     Principal Problem:Intractable vomiting        HPI:  69-year-old female history of malignant neoplasm of the esophagus, lung cancer s/p resection, COPD, hypertension, hyperlipidemia, GERD, atrial fibrillation on Xarelto, CAD s/p CABG comes in for generalized weakness and emesis.  Patient reports that she has a history of esophageal cancer and has been worsening causing obstruction of her esophagus.  Patient had EGD yesterday and found nearly closed esophagus to the stomach.  Patient reports that she is unable tolerate anything orally including liquids and her medications.  Reports that she has been feeling weak and has been able tolerate any diet for the past 3 days.  Reports this morning she can not tolerate anymore and came into the ED for further evaluation.  Denies any fever, chills, chest pain, shortness of breath.    In the ED, patient was slightly hypertensive.  Unable tolerate home medications.  CBC and CMP were unremarkable.     Overview/Hospital Course:  Patient was admitted for dehydration and malnutrition due to unable to tolerate anything orally from esophageal cancer obstructing the esophagus.  GI consulted and plans for stent placement on the esophageal mass.  Patient underwent stent placement on3/17/20 without any complication.  3/18 Appears in mild distress due to throat pain, unable to talk comfortably  Not yet tolerating liquids  On morphine iv and zofren  3/19 Leucocytosis up trending with low-grade fever   CT chest negative for perforation/aspirate  Blood culture ordered, patient denies any symptoms of UTI      Interval History:   Patient seen and examined at bedside  Reports persistent  throat pain down  to upper abdomen  Increase Dilaudid q.4h  GI follow-up appreciated for repeat EGD to evaluate the stent placement  Surgery consult appreciated for J-tube placement   patient agreeable for any required intervention  Afebrile , on zosyn, leukocytosis trending down, blood culture negative to date    Review of Systems   10 point review of systems were found to be negative expect for that mentioned already in interval history.   Objective:     Vital Signs (Most Recent):  Temp: 97.5 °F (36.4 °C) (03/17/20 0701)  Pulse: 96 (03/17/20 0701)  Resp: 18 (03/17/20 0701)  BP: (!) 144/81 (03/17/20 0701)  SpO2: 97 % (03/17/20 0701) Vital Signs (24h Range):  Temp:  [97.5 °F (36.4 °C)-98.4 °F (36.9 °C)] 97.5 °F (36.4 °C)  Pulse:  [80-96] 96  Resp:  [14-18] 18  SpO2:  [97 %-99 %] 97 %  BP: (109-144)/(67-81) 144/81     Weight: 51.3 kg (113 lb 1.5 oz)  Body mass index is 18.25 kg/m².    Intake/Output Summary (Last 24 hours) at 3/17/2020 0858  Last data filed at 3/17/2020 0600  Gross per 24 hour   Intake 1186.67 ml   Output --   Net 1186.67 ml      Physical Exam   Constitutional: She is oriented to person, place, and time. She appears well-developed and well-nourished. Mild  distress.   Thin appearing   HENT:   Right Ear: External ear normal.   Left Ear: External ear normal.   Eyes: Conjunctivae and EOM are normal. Right eye exhibits no discharge. Left eye exhibits no discharge.   Neck: Normal range of motion.   Cardiovascular: Normal rate, regular rhythm and normal heart sounds. Exam reveals no gallop and no friction rub.   No murmur heard.  Pulmonary/Chest: Effort normal and breath sounds normal. No respiratory distress. She has no wheezes. She has no rales. She exhibits no tenderness.   Musculoskeletal: She exhibits no edema or tenderness.   Neurological: She is alert and oriented to person, place, and time.   Skin: Skin is warm. She is not diaphoretic.   Psychiatric: She has a normal mood and affect. Her behavior is normal. Judgment  and thought content normal.   Nursing note and vitals reviewed.      Significant Labs:   CBC:   Recent Labs   Lab 03/16/20  0422   WBC 7.67   HGB 12.6   HCT 37.9        CMP:   Recent Labs   Lab 03/16/20  0422 03/16/20  1533 03/17/20  0539     --  138   K 3.4* 4.2 3.7     --  108   CO2 27  --  24   GLU 98  --  99   BUN 17  --  16   CREATININE 0.4*  --  0.3*   CALCIUM 8.4*  --  8.6*   PROT 6.5  --  6.4   ALBUMIN 3.4*  --  3.3*   BILITOT 0.5  --  0.6   ALKPHOS 47*  --  48*   AST 15  --  22   ALT 11  --  15   ANIONGAP 4*  --  6*   EGFRNONAA >60.0  --  >60.0       Significant Imaging: I have reviewed all pertinent imaging results/findings within the past 24 hours.      Assessment/Plan:      * Intractable vomiting   Sepsis from unknown source    2/2 obstructing esophageal neoplasm  NPO, IV fluids  On TPN  Lovenox 1 mg/kg for Afib  Plan for  Repeat EGD for  stent reposition and Jtube placement       GI consulted  Appreciate consultants      VTE Risk Mitigation (From admission, onward)         Ordered     IP VTE HIGH RISK PATIENT  Once      03/13/20 1928                      Deysi Guardado MD  Department of Hospital Medicine   [facility]

## 2020-03-20 NOTE — BRIEF OP NOTE
[facility]  Brief Operative Note    SUMMARY     Surgery Date: 3/20/2020     Surgeon(s) and Role:     * Rafat Ruiz III, MD - Primary     * Ulisses Colorado III, MD - Assisting        Pre-op Diagnosis:  Esophageal obstruction [K22.2]  Inanition [R64]    Post-op Diagnosis:  Post-Op Diagnosis Codes:     * Esophageal obstruction [K22.2]     * Inanition [R64]    Procedure: Placement of jejunostomy tube     Anesthesia: general     Description of Procedure: Patient was taken to the operating room and transferred operating room table in supine position. She was given general anesthesia and intubated. Abdomen was prepped and draped. A midline incision was made. Dissection was carried down through the skin and subcutaneous tissue.  The abdomen was entered through the linea alba. The omentum was eviscerated. The ligament of trietz was located. I measured about 30cm distal and used this segment for the feeding tube. A pursestring was placed at this spot at the antimesenteric border. Enterotomy was made. 18 Bulgarian red rubber was placed into the small bowel. Pursestring was tightened around the tube. The small bowel was invaginated over the tube for about one cm.  The tube was then brought through the abdominal wall to the left of the incision.  The bowel was sutured to the anterior abdominal wall at the 12, 3, 6, and 9 clock position around the tube.  The tube was aspirated and flushed.  There is no evidence of leak around the tube.  Abdominal fascia was then closed with running #1 PDS suture.  Skin was closed with skin staples.  Patient was extubated. She was brought to recovery room in stable condition.      Description of the findings of the procedure: 18F tube placed     Estimated Blood Loss: 10mL    Complications none     Instrument counts correct        Specimens:   Specimen (12h ago, onward)    None

## 2020-03-20 NOTE — CONSULTS
Former[facility]  General Surgery  Consult Note    Inpatient consult to General Surgery  Consult performed by: Rafat Ruiz III, MD  Consult ordered by: Ulisses Colorado III, MD  Reason for consult: obstructing esophageal cancer. request for feeding tube         Subjective:     Chief Complaint/Reason for Admission: obstructing esophageal cancer with inability to eat or drink.     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  She has a 1 year history of increasing dysphagia.  EGD showed friable stricture around 20 cm from the incisors.  Biopsy returned squamous cell.  PET scan showed the known esophageal mass also with a hypermetabolic subcarinal lymph node.      She received radiation to the chest after small cell lung cancer in 1996.  She is no longer able to receive radiation for this esophageal tumor.  She is scheduled to start neoadjuvant chemotherapy in the upcoming weeks.      She  was scheduled to see surgery oncology regarding further surgical treatment this week.  She was admitted earlier in the week with dehydration and weakness after not being able to eat for several days. She was in the process of getting home TPN set up. She had PICC placed last week and has received several boluses. She had an esophageal stent placed. 48 hours ago. She has not been able to tolerate liquids yet. She is currently on TPN. She states she feels much better after receiving the TPN. Surgery consulted for feeding tube. She has past abdominal surgical history of cholecystectomy and appendectomy. Albumin is 3.1. Prealbumin is 11      Current Facility-Administered Medications on File Prior to Encounter   Medication    0.9%  NaCl infusion    0.9%  NaCl infusion    sodium chloride 0.9% flush 5 mL     Current Outpatient Medications on File Prior to Encounter   Medication Sig    apixaban (ELIQUIS) 5 mg Tab Take 1 tablet by mouth once  daily. Take half tab BID    cetirizine (ZYRTEC) 10 MG tablet TAKE 1 TABLET EVERY EVENING    fluticasone propionate (FLONASE) 50 mcg/actuation nasal spray 1 spray (50 mcg total) by Each Nostril route once daily.    levothyroxine (SYNTHROID) 88 MCG tablet TAKE 1 TABLET DAILY    montelukast (SINGULAIR) 10 mg tablet TAKE 1 TABLET EVERY EVENING    SPIRIVA WITH HANDIHALER 18 mcg inhalation capsule INHALE THE CONTENTS OF 1 CAPSULE DAILY    tretinoin (RETIN-A) 0.05 % cream APPLY THIN FILM TO FACE AT NIGHT AFTER MOISTURIZING    albuterol (ACCUNEB) 1.25 mg/3 mL Nebu USE 1 VIAL (3 ML) VIA NEBULIZER EVERY 6 HOURS AS NEEDED    albuterol (PROVENTIL HFA) 90 mcg/actuation inhaler Inhale 2 puffs into the lungs every 6 (six) hours as needed for Wheezing.    bisacodyl (DULCOLAX) 5 mg EC tablet Take 1 tablet (5 mg total) by mouth daily as needed for Constipation.    diphenhydrAMINE (BENADRYL) 25 mg capsule Take 25 mg by mouth every 6 (six) hours as needed for Itching.    ketoconazole (NIZORAL) 2 % cream Apply topically once daily.    multivitamin-minerals-lutein (CENTRUM SILVER) Tab Take 1 tablet by mouth once daily. 1 Tablet Oral Every day    pantoprazole (PROTONIX) 40 MG tablet TAKE 1 TABLET DAILY 30 MINUTES PRIOR TO BREAKFAST    ZINC GLUCONATE ORAL Take by mouth.       Review of patient's allergies indicates:   Allergen Reactions    Ciprofloxacin Itching    Coreg [carvedilol]      Low energy    Doxycycline Other (See Comments) and Hives     Patient had a rxn with the sun despite wearing spf 30    Metoprolol      Low energy       Past Medical History:   Diagnosis Date    *Atrial fibrillation     and Hx PSVT    Allergic drug rash 12/28/2016    Allergy     chronic     Arthritis     fingers    Benign tumor of throat     Bronchitis March 2013    CAD (coronary artery disease) 2005    CABGx2 in 2005 and 2 MI after with 4 stents    Cancer 1996    small cell lung cancer s/p resection of 1/3 lung, 5 ribs and muscle  mass then had chemo and radiation    Cataract     OD     CHF (congestive heart failure) past Hx    Chronic rhinitis     Clot past Hx    external jugular, now stented, occluded, had phlebitis; now revascularized    Colon polyp     COPD (chronic obstructive pulmonary disease)     no oxygen or nebulizers    COPD exacerbation 5/8/13    improved 5/14/13 after steroid pack,antibiotics    Dysphagia     Esophageal stenosis     Former smoker     GERD (gastroesophageal reflux disease)     Heart block     Hyperlipidemia     Hypertension     pt states does not have //    MI (myocardial infarction) 2005    Posterior vitreous detachment of left eye     Thyroid disease      Past Surgical History:   Procedure Laterality Date    ABLATION N/A 12/11/2018    Procedure: ABLATION;  Surgeon: Santana Covarrubias MD;  Location: [facility] EP LAB;  Service: Cardiology;  Laterality: N/A;  AF, JUAN ANTONIO, PVI, RFA, CARTO, GEN, GP, 3 PREP    ABLATION OF ARRHYTHMOGENIC FOCUS FOR ATRIAL FIBRILLATION N/A 5/8/2019    Procedure: ABLATION, ARRHYTHMOGENIC FOCUS, FOR ATRIAL FIBRILLATION;  Surgeon: Santana Covarrubias MD;  Location: [facility] EP LAB;  Service: Cardiology;  Laterality: N/A;  AF, PVI, RFA, CARTO, GEN, GP , 3 PREP    ADENOIDECTOMY      APPENDECTOMY      BACK SURGERY      lumbar    BREAST BIOPSY Left     benign    CARDIAC SURGERY      CARDIOVERSION  5/8/2019    Procedure: Cardioversion;  Surgeon: Santana Covarrubias MD;  Location: [facility] EP LAB;  Service: Cardiology;;    CATARACT EXTRACTION Left     OS    CATARACT EXTRACTION, BILATERAL      left eye only    CHOLECYSTECTOMY      COLONOSCOPY  03/2012    repeat in 5 years    COLONOSCOPY N/A 6/19/2017    Procedure: COLONOSCOPY;  Surgeon: Kal Elise MD;  Location: [facility] ENDO;  Service: Endoscopy;  Laterality: N/A;    CORONARY ARTERY BYPASS GRAFT  2005    2 vessel    ESOPHAGOGASTRODUODENOSCOPY N/A 7/5/2018    Procedure: ESOPHAGOGASTRODUODENOSCOPY (EGD)/cryo;  Surgeon: Robbie Gonzales MD;   [facility]  Service: Endoscopy;  Laterality: N/A;  Eliholliis/Dr Henry Wei/OK to hold med 2 days prior to egd/see telephone encounter dated 6/12/18/pt stated she needs to take Diflucan 1 tab po daily 12 days prior to egd, message sent to Sarah/she will check with Dr Gonzales/santo    ESOPHAGOGASTRODUODENOSCOPY N/A 10/3/2018    Procedure: EGD (ESOPHAGOGASTRODUODENOSCOPY)/poss cryo;  Surgeon: Robbie Gonzales MD;  [facility]  Service: Endoscopy;  Laterality: N/A;  2 day hold Dr Henry Pavon  Diflucan 1 tab po daily starting 14 days prior to egd, Rx'd per Dr Gonzales    ESOPHAGOGASTRODUODENOSCOPY N/A 11/1/2018    Procedure: EGD (ESOPHAGOGASTRODUODENOSCOPY)/cryo;  Surgeon: Robbie Gonzales MD;  Location: [facility] ([address] [place] [address]);  Service: Endoscopy;  Laterality: N/A;  2 day hold Dr Henry Pavon  Diflucan 1 tab po daily starting 14 days prior to egd, Rx'd per Dr Gonzales as before?    ESOPHAGOGASTRODUODENOSCOPY N/A 9/27/2019    Procedure: EGD (ESOPHAGOGASTRODUODENOSCOPY);  Surgeon: Robbie Gonzales MD;  Location: [facility] ([address] [place] [address]);  Service: Endoscopy;  Laterality: N/A;  Coumadin was d/c'd 9/18/19.  No other anti-thrombotic med for now.  pg  Per Dr Gonzales, she does not need to take Diflucan pre-procedure as she did before.  pg    ESOPHAGOGASTRODUODENOSCOPY N/A 1/31/2020    Procedure: EGD (ESOPHAGOGASTRODUODENOSCOPY)/poss cryo;  Surgeon: Robbie Gonzales MD;  [facility]  Service: Endoscopy;  Laterality: N/A;  ok for eliquis hold-see telephone order 1/23/20-tb    ESOPHAGOGASTRODUODENOSCOPY N/A 3/12/2020    Procedure: EGD (ESOPHAGOGASTRODUODENOSCOPY);  Surgeon: Ulisses Colorado III, MD;  Location: [facility];  Service: Endoscopy;  Laterality: N/A;    ESOPHAGOGASTRODUODENOSCOPY N/A 3/17/2020    Procedure: EGD (ESOPHAGOGASTRODUODENOSCOPY) in OR w/ fluoro;  Surgeon: Ulisses Colorado III, MD;  Location: [facility];  Service: Endoscopy;   Laterality: N/A;  IN OR with fluoro    EYE SURGERY  Dec 2012    ptosis repair    FIRST RIB REMOVAL   with lung resection    HYSTERECTOMY      LUNG LOBECTOMY      left lung for cancer    REMOVAL OF PLANTAR WART USING LASER      SHOULDER SURGERY  ,    scapular entrapment after lung surgery, needed 5 ribs removed left side    SYMPOTHATIC NERVE LEFT SIDE       with lung surgery    TONSILLECTOMY      TONSILLECTOMY, ADENOIDECTOMY, BILATERAL MYRINGOTOMY AND TUBES      UPPER GASTROINTESTINAL ENDOSCOPY  2016    Dr. Koo    VASCULAR SURGERY      stents place in jugualr vein     Family History     Problem Relation (Age of Onset)    Allergic rhinitis Father, Son, Sister    Allergies Father, Mother    Asthma Son, Sister    Cancer Father    Colon polyps Maternal Grandmother    Diverticulitis Brother    Hypertension Father    No Known Problems Daughter, Maternal Aunt, Maternal Uncle, Paternal Aunt, Paternal Uncle, Maternal Grandfather, Paternal Grandmother, Paternal Grandfather, Brother, Sister    Stroke Mother        Tobacco Use    Smoking status: Former Smoker     Packs/day: 2.00     Years: 20.00     Pack years: 40.00     Types: Cigarettes     Last attempt to quit: 1994     Years since quittin.1    Smokeless tobacco: Never Used   Substance and Sexual Activity    Alcohol use: No    Drug use: No    Sexual activity: Yes     Partners: Male     Review of Systems   Constitutional: Positive for activity change, appetite change and unexpected weight change. Negative for chills and fever.   HENT: Negative for hearing loss, rhinorrhea, sore throat and voice change.    Eyes: Negative for photophobia and visual disturbance.   Respiratory: Negative for cough, choking and shortness of breath.    Cardiovascular: Negative for chest pain, palpitations and leg swelling.   Gastrointestinal: Negative for abdominal pain, blood in stool, constipation, diarrhea, nausea and vomiting.   Endocrine:  Negative for cold intolerance, heat intolerance, polydipsia and polyuria.   Musculoskeletal: Negative for arthralgias, back pain, joint swelling and neck stiffness.   Skin: Negative for color change, pallor and rash.   Neurological: Negative for dizziness, seizures, syncope and headaches.   Hematological: Negative for adenopathy. Does not bruise/bleed easily.   Psychiatric/Behavioral: Negative for agitation, behavioral problems and confusion.     Objective:     Vital Signs (Most Recent):  Temp: 99.3 °F (37.4 °C) (03/19/20 1646)  Pulse: 103 (03/19/20 1646)  Resp: 18 (03/19/20 1646)  BP: 133/84 (03/19/20 1646)  SpO2: 100 % (03/19/20 1646) Vital Signs (24h Range):  Temp:  [98.6 °F (37 °C)-99.9 °F (37.7 °C)] 99.3 °F (37.4 °C)  Pulse:  [100-108] 103  Resp:  [17-18] 18  SpO2:  [96 %-100 %] 100 %  BP: (133-150)/(75-86) 133/84     Weight: 51.3 kg (113 lb 1.5 oz)  Body mass index is 18.25 kg/m².    No intake or output data in the 24 hours ending 03/19/20 1934    Physical Exam   Constitutional: She is oriented to person, place, and time. She appears well-developed. She is cooperative.  Non-toxic appearance. No distress.   Neck: Phonation normal. Neck supple.   Pulmonary/Chest: Effort normal. No tachypnea and no bradypnea. No respiratory distress.   Abdominal: Soft. She exhibits no distension. There is no tenderness. There is no rigidity, no rebound and no guarding.   Neurological: She is alert and oriented to person, place, and time.       Significant Labs:  CBC:   Recent Labs   Lab 03/19/20  0532   WBC 23.43*   RBC 4.38   HGB 13.0   HCT 39.2      MCV 90   MCH 29.7   MCHC 33.2     CMP:   Recent Labs   Lab 03/19/20  0532   *   CALCIUM 8.3*   ALBUMIN 3.1*   PROT 6.7   *   K 3.9   CO2 30*   CL 97   BUN 19   CREATININE 0.4*   ALKPHOS 55   ALT 39   AST 31   BILITOT 0.9       Significant Diagnostics:  I have reviewed all pertinent imaging results/findings within the past 24 hours.    Assessment/Plan:     Active  Diagnoses:    Diagnosis Date Noted POA    PRINCIPAL PROBLEM:  Intractable vomiting [R11.10] 03/13/2020 Yes    Malignant neoplasm of esophagus [C15.9] 03/04/2020 Yes    S/P ablation of atrial fibrillation [Z98.890, Z86.79] 10/01/2019 Not Applicable    Esophageal lesion [K22.9] 10/31/2017 Yes    S/P CABG (coronary artery bypass graft) [Z95.1] 06/01/2016 Not Applicable    History of smoking 10-25 pack years, 15 pack-years, quit 1994 [Z87.891] 05/19/2016 Not Applicable    COPD (chronic obstructive pulmonary disease) [J44.9] 05/18/2016 Yes    History of lung cancer [Z85.118] 03/20/2015 Not Applicable    S/P CABG x 2, 2005 [Z95.1] 09/04/2013 Not Applicable    HTN (hypertension) [I10] 09/04/2013 Yes    Hyperlipidemia [E78.5] 09/04/2013 Yes    PAF (paroxysmal atrial fibrillation), onset 2002 [I48.0] 09/04/2013 Yes    CAD (coronary artery disease) [I25.10] 08/27/2013 Yes    Anxiety [F41.9] 08/27/2013 Yes    Anticoagulant long-term use [Z79.01] 07/19/2012 Not Applicable    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 Will plan for jejunostomy tube open verses minimally invasive in conjunction with GI. j- tube indicated instead of G- tube in case she is a candidate for esophagectomy after neoadjuvant treatment. Stomach will be used as the new conduit.     Thank you for your consult.     Rafat Ruiz III, MD  General Surgery  [facility]

## 2020-03-20 NOTE — ANESTHESIA PREPROCEDURE EVALUATION
03/20/2020  tSeph Lira is a 69 y.o., female.  Patient Active Problem List   Diagnosis    GERD (gastroesophageal reflux disease)    Chronic rhinitis    Hypothyroid    Aneurysm of heart (wall)    Benign neoplasm of skin of upper limb, including shoulder    Intermediate coronary syndrome    Phlebitis and thrombophlebitis of superficial veins of upper extremities    Simple chronic conjunctivitis    Supraventricular premature beats    Vascular disorder of skin    Chronic external jugular vein thrombosis    Anticoagulant long-term use    Magdiel's syndrome - Left Eye    Ptosis    Anxiety    CAD (coronary artery disease)    S/P CABG x 2, 2005    HTN (hypertension)    Hyperlipidemia    PAF (paroxysmal atrial fibrillation), onset 2002    Intercostal neuralgia    History of lung cancer    Blurred vision, bilateral    Panlobular emphysema    Carotid artery disease    COPD (chronic obstructive pulmonary disease)    Uncontrolled atrial flutter with rvr    Allergic rhinitis    Hypoalbuminemia    History of smoking 10-25 pack years, 15 pack-years, quit 1994    S/P CABG (coronary artery bypass graft)    Supraventricular bigeminy    Dysphagia    History of colon polyps    Coronary artery disease involving autologous artery coronary bypass graft    Wrist arthritis    Gastritis    Esophageal lesion    Fatigue    Grade II hemorrhoids    Cardiomyopathy    Throat irritation    Paroxysmal atrial fibrillation    BMI 21.0-21.9, adult    Anticoagulation management encounter    History of biliary stent insertion, 6/2005, left jugular vein and left innominate vein    S/P ablation of atrial fibrillation    Decreased ROM of neck    Malignant neoplasm of esophagus    Intractable vomiting       Past Surgical History:   Procedure Laterality Date    ABLATION N/A 12/11/2018    Procedure:  ABLATION;  Surgeon: Santana Covarrubias MD;  Location: [facility] EP LAB;  Service: Cardiology;  Laterality: N/A;  AF, JUAN ANTONIO, PVI, RFA, CARTO, GEN, GP, 3 PREP    ABLATION OF ARRHYTHMOGENIC FOCUS FOR ATRIAL FIBRILLATION N/A 5/8/2019    Procedure: ABLATION, ARRHYTHMOGENIC FOCUS, FOR ATRIAL FIBRILLATION;  Surgeon: Santana Covarrubias MD;  Location: [facility] EP LAB;  Service: Cardiology;  Laterality: N/A;  AF, PVI, RFA, CARTO, GEN, GP , 3 PREP    ADENOIDECTOMY      APPENDECTOMY      BACK SURGERY      lumbar    BREAST BIOPSY Left     benign    CARDIAC SURGERY      CARDIOVERSION  5/8/2019    Procedure: Cardioversion;  Surgeon: Santana Covarrubias MD;  Location: [facility] EP LAB;  Service: Cardiology;;    CATARACT EXTRACTION Left     OS    CATARACT EXTRACTION, BILATERAL      left eye only    CHOLECYSTECTOMY      COLONOSCOPY  03/2012    repeat in 5 years    COLONOSCOPY N/A 6/19/2017    Procedure: COLONOSCOPY;  Surgeon: Kal Elise MD;  Location: [facility];  Service: Endoscopy;  Laterality: N/A;    CORONARY ARTERY BYPASS GRAFT  2005    2 vessel    ESOPHAGOGASTRODUODENOSCOPY N/A 7/5/2018    Procedure: ESOPHAGOGASTRODUODENOSCOPY (EGD)/cryo;  Surgeon: Robbie Gonzales MD;  Location: [facility] ([facility]);  Service: Endoscopy;  Laterality: N/A;  Eliquis/Dr Henry Wei/OK to hold med 2 days prior to egd/see telephone encounter dated 6/12/18/pt stated she needs to take Diflucan 1 tab po daily 12 days prior to egd, message sent to Sarah/she will check with Dr Gonzales/santo    ESOPHAGOGASTRODUODENOSCOPY N/A 10/3/2018    Procedure: EGD (ESOPHAGOGASTRODUODENOSCOPY)/poss cryo;  Surgeon: Robbie Gonzales MD;  Location: [facility] ([facility]);  Service: Endoscopy;  Laterality: N/A;  2 day hold Xarelto, Dr Henry Wei  Diflucan 1 tab po daily starting 14 days prior to egd, Rx'd per Dr Gonzales    ESOPHAGOGASTRODUODENOSCOPY N/A 11/1/2018    Procedure: EGD (ESOPHAGOGASTRODUODENOSCOPY)/cryo;  Surgeon: Robbie Gonzales MD;  Location: [facility] ENDO ([facility]  [facility]);  Service: Endoscopy;  Laterality: N/A;  2 day hold Librado, Dr Henry Wei  Diflucan 1 tab po daily starting 14 days prior to egd, Rx'd per Dr Gonzales as before?    ESOPHAGOGASTRODUODENOSCOPY N/A 9/27/2019    Procedure: EGD (ESOPHAGOGASTRODUODENOSCOPY);  Surgeon: Robbie Gonzales MD;  Location: [facility] ([facility]);  Service: Endoscopy;  Laterality: N/A;  Coumadin was d/c'd 9/18/19.  No other anti-thrombotic med for now.  pg  Per Dr Gonzales, she does not need to take Diflucan pre-procedure as she did before.  pg    ESOPHAGOGASTRODUODENOSCOPY N/A 1/31/2020    Procedure: EGD (ESOPHAGOGASTRODUODENOSCOPY)/poss cryo;  Surgeon: Robbie Gonzales MD;  Location: [facility] ([facility]);  Service: Endoscopy;  Laterality: N/A;  ok for eliquis hold-see telephone order 1/23/20-tb    ESOPHAGOGASTRODUODENOSCOPY N/A 3/12/2020    Procedure: EGD (ESOPHAGOGASTRODUODENOSCOPY);  Surgeon: Ulisses Colorado III, MD;  Location: [facility];  Service: Endoscopy;  Laterality: N/A;    ESOPHAGOGASTRODUODENOSCOPY N/A 3/17/2020    Procedure: EGD (ESOPHAGOGASTRODUODENOSCOPY) in OR w/ fluoro;  Surgeon: Ulisses Colorado III, MD;  Location: [facility];  Service: Endoscopy;  Laterality: N/A;  IN OR with fluoro    EYE SURGERY  Dec 2012    ptosis repair    FIRST RIB REMOVAL  1996 with lung resection    HYSTERECTOMY      LUNG LOBECTOMY  1996    left lung for cancer    REMOVAL OF PLANTAR WART USING LASER      SHOULDER SURGERY  2010,2011    scapular entrapment after lung surgery, needed 5 ribs removed left side    SYMPOTHATIC NERVE LEFT SIDE      1996 with lung surgery    TONSILLECTOMY      TONSILLECTOMY, ADENOIDECTOMY, BILATERAL MYRINGOTOMY AND TUBES      UPPER GASTROINTESTINAL ENDOSCOPY  05/2016    Dr. Koo    VASCULAR SURGERY      stents place in jugualr vein        Tobacco Use:  The patient  reports that she quit smoking about 26 years ago. Her smoking use included cigarettes. She has a 40.00 pack-year smoking history. She  has never used smokeless tobacco.     Results for orders placed or performed during the hospital encounter of 03/13/20   EKG 12-LEAD    Collection Time: 03/13/20  4:41 PM    Narrative    Test Reason : R07.9,    Vent. Rate : 086 BPM     Atrial Rate : 086 BPM     P-R Int : 122 ms          QRS Dur : 080 ms      QT Int : 382 ms       P-R-T Axes : 086 088 097 degrees     QTc Int : 457 ms    Normal sinus rhythm  Normal ECG  When compared with ECG of 20-JAN-2020 10:11,  No significant change was found  Confirmed by Arthur Ahuja MD ([id number]) on 3/13/2020 4:58:45 PM    Referred By: [email or web address]   SELF           Confirmed By:Arthur Ahuja MD        Imaging Results          X-ray Chest AP Portable (Final result)  Result time 03/13/20 17:13:59    Final result by Jessica Lombardo MD (03/13/20 17:13:59)                 Narrative:    PROCEDURE:   XR CHEST AP PORTABLE  dated  3/13/2020 5:08 PM    CLINICAL HISTORY:   Female 69 years of age.   General weakness, hx of  esophageal cancer    TECHNIQUE: AP view of the chest obtained portably at 5:08 PM.    PREVIOUS STUDIES:  March 12, 2020    FINDINGS:      Right upper extremity access terminates in the SVC. There are median  sternotomy wires, mediastinal and left upper chest surgical clips, and  left neck and upper chest vascular stents. There is been prior partial  left upper pneumonectomy and rib resection. There is no acute  pulmonary infiltrate. There is no pleural effusion or pneumothorax.  Cardiac mediastinal contours are stable. The heart is not enlarged.      IMPRESSION:    No acute findings. Stable compared with the prior study.  Postoperative left upper chest.    Electronically Signed by Jessica Lombardo on 3/13/2020 5:20 PM                               Lab Results   Component Value Date    WBC 16.99 (H) 03/20/2020    HGB 11.9 (L) 03/20/2020    HCT 36.7 (L) 03/20/2020    MCV 92 03/20/2020     03/20/2020     BMP  Lab Results   Component Value Date     (L)  03/19/2020    K 3.9 03/19/2020    CL 97 03/19/2020    CO2 30 (H) 03/19/2020    BUN 19 03/19/2020    CREATININE 0.4 (L) 03/19/2020    CALCIUM 8.3 (L) 03/19/2020    ANIONGAP 6 (L) 03/19/2020    ESTGFRAFRICA >60.0 03/19/2020    EGFRNONAA >60.0 03/19/2020     · Perfusion scan is negative for ischemia or infarct  · EF-57%     Reason for Exam   Priority: Routine   Dx: Ischemic cardiomyopathy [I25.5 (ICD-10-CM)]   Comments:    Conclusion     · Mild regurgitation is present in the aortic valve..  · Septal wall has abnormal motion consistent with post-operative status.  · Mitral valve shows mild regurgitation.  · Left ventricle ejection fraction is mildly decreased at 40-45%  · Grade I (mild) left ventricular diastolic dysfunction consistent with impaired relaxation.  · LA pressure is normal.  · RV systolic function is normal.  · Left atrium is moderately dilated.  · Right atrium is moderately dilated.  · Normal central venous pressure (3 mm Hg).  · Tricuspid valve shows mild regurgitation.          Pre-op Assessment    I have reviewed the Patient Summary Reports.     I have reviewed the Nursing Notes.   I have reviewed the Medications.     Review of Systems  Anesthesia Hx:  History of prior surgery of interest to airway management or planning: heart surgery, lung surgery.   Hematology/Oncology:         -- Anemia: --  Cancer in past history:  surgery  Oncology Comments: Skin Cancer  Lung Cancer     EENT/Dental:   chronic allergic rhinitis Eyes: Visual Impairment Has S/P Extraction - Bilateral Catarract Eye Symptoms: of blurred vision. Blurred Vision Bilateral    Cardiovascular:   Hypertension Valvular problems/Murmurs Past MI CAD  CABG/stent Dysrhythmias atrial fibrillation Angina CHF PVD ECG has been reviewed. A-Fib -  status post Ablation  Cardiomyopathy    Pulmonary:   COPD Asthma    Hepatic/GI:   GERD Dysphagia   Esophageal Cancer    Musculoskeletal:   Arthritis  Magdiel's Syndrome Left Eye   Intercostal Neuralgia    Endocrine:   Hypothyroidism    Psych:   anxiety          Physical Exam  General:  Malnutrition                 Anesthesia Plan  Type of Anesthesia, risks & benefits discussed:  Anesthesia Type:  general  Patient's Preference: General  Intra-op Monitoring Plan: standard ASA monitors  Intra-op Monitoring Plan Comments:   Post Op Pain Control Plan: multimodal analgesia  Post Op Pain Control Plan Comments:   Induction:   IV  Beta Blocker:  Patient is not currently on a Beta-Blocker (No further documentation required).       Informed Consent:    ASA Score: 3  emergent   Day of Surgery Review of History & Physical:        Anesthesia Plan Notes: GETA   Zofran

## 2020-03-21 PROBLEM — A41.9 SEVERE SEPSIS: Status: ACTIVE | Noted: 2020-01-01

## 2020-03-21 PROBLEM — R65.20 SEVERE SEPSIS: Status: ACTIVE | Noted: 2020-01-01

## 2020-03-21 NOTE — SUBJECTIVE & OBJECTIVE
Interval History:   Patient feels better after 3 days, started tolerating liquid food  s/p readjustment of esophageal stent and G-tube placement yesterday  Leukocytosis trending down  Complaints of G-tube site discomfort with movement  History evening patient had an episode of SVT, improved with hydration IV fluids  Afebrile hemoglobin stable  No bowel movements passing gas      Review of Systems   10 point review of systems were found to be negative expect for that mentioned already in interval history.     Objective:     Vital Signs (Most Recent):  Temp: 98.7 °F (37.1 °C) (03/21/20 0800)  Pulse: 99 (03/21/20 0800)  Resp: 20 (03/21/20 0800)  BP: (!) 158/90 (03/21/20 0800)  SpO2: 97 % (03/21/20 0800) Vital Signs (24h Range):  Temp:  [97.4 °F (36.3 °C)-99.6 °F (37.6 °C)] 98.7 °F (37.1 °C)  Pulse:  [] 99  Resp:  [16-20] 20  SpO2:  [96 %-100 %] 97 %  BP: (158-170)/() 158/90     Weight: 51.3 kg (113 lb 1.5 oz)  Body mass index is 18.25 kg/m².    Intake/Output Summary (Last 24 hours) at 3/21/2020 0912  Last data filed at 3/21/2020 0600  Gross per 24 hour   Intake 3143.33 ml   Output 650 ml   Net 2493.33 ml      Physical Exam   Constitutional: She is oriented to person, place, and time. She appears well-developed and well-nourished. No distress.   Thin appearing, appears depressed   HENT:   Right Ear: External ear normal.   Left Ear: External ear normal.   Eyes: Conjunctivae and EOM are normal. Right eye exhibits no discharge. Left eye exhibits no discharge.   Neck: Normal range of motion.   Cardiovascular: Normal rate, regular rhythm and normal heart sounds. Exam reveals no gallop and no friction rub.   No murmur heard.  Pulmonary/Chest: Effort normal and breath sounds normal. No respiratory distress. She has no wheezes. She has no rales. She exhibits no tenderness.   Abdominal: She exhibits no distension.   J tune covered with dry fressing with mild tenderness right side  Sluggish bowel sounds    Musculoskeletal: She exhibits no edema or tenderness.   Neurological: She is alert and oriented to person, place, and time.   Skin: Skin is warm. She is not diaphoretic.   Psychiatric: She has a normal mood and affect. Her behavior is normal. Judgment and thought content normal.   Nursing note and vitals reviewed.      Significant Labs:   BMP:   Recent Labs   Lab 03/21/20  0534   *  137*     136   K 3.9  3.9     102   CO2 29  29   BUN 17  17   CREATININE 0.4*  0.4*   CALCIUM 8.4*  8.4*   MG 2.0     CBC:   Recent Labs   Lab 03/20/20  0946 03/21/20  0534   WBC 16.99* 14.59*  14.59*  14.59*   HGB 11.9* 11.4*  11.4*  11.4*   HCT 36.7* 35.7*  35.7*  35.7*    227  227  227       Significant Imaging: I have reviewed all pertinent imaging results/findings within the past 24 hours.

## 2020-03-21 NOTE — PROGRESS NOTES
[facility] Medicine  Progress Note    Patient Name: Steph Lira  MRN: [id number]  Patient Class: IP- Inpatient   Admission Date: 3/13/2020  Length of Stay: 7 days  Attending Physician: Deysi Guardado MD  Primary Care Provider: Maida Mon MD        Subjective:     Principal Problem:Intractable vomiting        HPI:  69-year-old female history of malignant neoplasm of the esophagus, lung cancer s/p resection, COPD, hypertension, hyperlipidemia, GERD, atrial fibrillation on Xarelto, CAD s/p CABG comes in for generalized weakness and emesis.  Patient reports that she has a history of esophageal cancer and has been worsening causing obstruction of her esophagus.  Patient had EGD yesterday and found nearly closed esophagus to the stomach.  Patient reports that she is unable tolerate anything orally including liquids and her medications.  Reports that she has been feeling weak and has been able tolerate any diet for the past 3 days.  Reports this morning she can not tolerate anymore and came into the ED for further evaluation.  Denies any fever, chills, chest pain, shortness of breath.    In the ED, patient was slightly hypertensive.  Unable tolerate home medications.  CBC and CMP were unremarkable.     Overview/Hospital Course:  Patient was admitted for dehydration and malnutrition due to unable to tolerate anything orally from esophageal cancer obstructing the esophagus.  GI consulted and plans for stent placement on the esophageal mass.  Patient underwent stent placement on3/17/20 without any complication.  3/18 Appears in mild distress due to throat pain, unable to talk comfortably  Not yet tolerating liquids  On morphine iv and zofren  3/19 Leucocytosis up trending with low-grade fever   CT chest negative for perforation/aspirate  Blood culture ordered, patient denies any symptoms of UTI  3/20Reports persistent  throat pain down to upper abdomen  Increase Dilaudid q.4h  GI follow-up  appreciated for repeat EGD to evaluate the stent placement  Surgery consult appreciated for J-tube placement   patient agreeable for any required intervention  Afebrile , on zosyn, leukocytosis trending down, blood culture negative to date    Interval History:   Patient feels better after 3 days, started tolerating liquid food  But c/o right sided abdominal pain with position after gtube placement  s/p readjustment of esophageal stent and G-tube placement yesterday  Leukocytosis trending down  Complaints of G-tube site discomfort with movement  History evening patient had an episode of SVT, improved with hydration IV fluids  Afebrile hemoglobin stable  No bowel movements passing gas      Review of Systems   10 point review of systems were found to be negative expect for that mentioned already in interval history.     Objective:     Vital Signs (Most Recent):  Temp: 98.7 °F (37.1 °C) (03/21/20 0800)  Pulse: 99 (03/21/20 0800)  Resp: 20 (03/21/20 0800)  BP: (!) 158/90 (03/21/20 0800)  SpO2: 97 % (03/21/20 0800) Vital Signs (24h Range):  Temp:  [97.4 °F (36.3 °C)-99.6 °F (37.6 °C)] 98.7 °F (37.1 °C)  Pulse:  [] 99  Resp:  [16-20] 20  SpO2:  [96 %-100 %] 97 %  BP: (158-170)/() 158/90     Weight: 51.3 kg (113 lb 1.5 oz)  Body mass index is 18.25 kg/m².    Intake/Output Summary (Last 24 hours) at 3/21/2020 0912  Last data filed at 3/21/2020 0600  Gross per 24 hour   Intake 3143.33 ml   Output 650 ml   Net 2493.33 ml      Physical Exam   Constitutional: She is oriented to person, place, and time. She appears well-developed and well-nourished. No distress.   Thin appearing, appears depressed   HENT:   Right Ear: External ear normal.   Left Ear: External ear normal.   Eyes: Conjunctivae and EOM are normal. Right eye exhibits no discharge. Left eye exhibits no discharge.   Neck: Normal range of motion.   Cardiovascular: Normal rate, regular rhythm and normal heart sounds. Exam reveals no gallop and no friction  rub.   No murmur heard.  Pulmonary/Chest: Effort normal and breath sounds normal. No respiratory distress. She has no wheezes. She has no rales. She exhibits no tenderness.   Abdominal: She exhibits no distension.   J tune covered with dry fressing with mild tenderness right side  Sluggish bowel sounds   Musculoskeletal: She exhibits no edema or tenderness.   Neurological: She is alert and oriented to person, place, and time.   Skin: Skin is warm. She is not diaphoretic.   Psychiatric: She has a normal mood and affect. Her behavior is normal. Judgment and thought content normal.   Nursing note and vitals reviewed.      Significant Labs:   BMP:   Recent Labs   Lab 03/21/20  0534   *  137*     136   K 3.9  3.9     102   CO2 29  29   BUN 17  17   CREATININE 0.4*  0.4*   CALCIUM 8.4*  8.4*   MG 2.0     CBC:   Recent Labs   Lab 03/20/20  0946 03/21/20  0534   WBC 16.99* 14.59*  14.59*  14.59*   HGB 11.9* 11.4*  11.4*  11.4*   HCT 36.7* 35.7*  35.7*  35.7*    227  227  227       Significant Imaging: I have reviewed all pertinent imaging results/findings within the past 24 hours.      Assessment/Plan:      * Intractable vomiting  2/2 obstructing esophageal neoplasm  On liquid diet as tolerated  On TPN  Lovenox 1 mg/kg for Afib  s/p EGD and stent placement on 3/17 and readjustment on3/20  S/p Jtube placement on 3/20  GI and sx  consulted  Appreciate consultants    VTE Risk Mitigation (From admission, onward)         Ordered     IP VTE HIGH RISK PATIENT  Once      03/13/20 1928                      Deysi Guardado MD  Department of Hospital Medicine   [facility]

## 2020-03-21 NOTE — PT/OT/SLP EVAL
Physical Therapy Evaluation    Patient Name:  Steph Lira   MRN:  [id number]    Recommendations:     Discharge Recommendations:  outpatient PT   Discharge Equipment Recommendations: none   Barriers to discharge: None    Assessment:     Steph Lira is a 69 y.o. female admitted with a medical diagnosis of Intractable vomiting.  She presents with the following impairments/functional limitations:  weakness, impaired balance, impaired endurance, impaired functional mobilty, gait instability, pain. Patient sitting EOB upon entering room. She is agreeable to PT evaluation. She requires CGA for transfers. She ambulated 70' with HHA. She requested to return to room secondary to 4/10 abdominal pain attributed to placement of J-tube yesterday. She does not use an AD at baseline.     Rehab Prognosis: Good; patient would benefit from acute skilled PT services to address these deficits and reach maximum level of function.    Recent Surgery: Procedure(s) (LRB):  EGD (ESOPHAGOGASTRODUODENOSCOPY) (N/A)  INSERTION, JEJUNOSTOMY TUBE (N/A) 1 Day Post-Op    Plan:     During this hospitalization, patient to be seen 6 x/week to address the identified rehab impairments via gait training, therapeutic activities, therapeutic exercises and progress toward the following goals:    · Plan of Care Expires:  04/21/20    Subjective     Chief Complaint: abdominal pain  Patient/Family Comments/goals: decrease pain  Pain/Comfort:  · Pain Rating 1: 4/10  · Location 1: abdomen  · Pain Addressed 1: Reposition, Distraction    Patients cultural, spiritual, Confucianism conflicts given the current situation:      Living Environment:  Pt lives with her  with no stairs  Prior to admission, patients level of function was independent. She does not use an AD at baseline. She has been going to OPPT after a fall on her R knee causing hip pain around [place]. She says that 2 children made her fall.  Equipment used at home: none.  DME owned (not  currently used): none.  Upon discharge, patient will have assistance from .    Objective:     Communicated with DARIO Gallagher prior to session.  Patient found sitting EOB with PICC line, telemetry  upon PT entry to room.    General Precautions: Standard, fall   Orthopedic Precautions:N/A   Braces: N/A     Exams:  · Cognitive Exam:  Patient is oriented to Person, Place, Time and Situation  · RLE Strength: WFL  · LLE Strength: WFL    Functional Mobility:  · Transfers:     · Sit to Stand:  contact guard assistance with no AD  · Gait: 70' HHA  · Balance: fair      Therapeutic Activities and Exercises:   Patient was educated on the importance of OOB activity during hospital stay, safe transfers and ambulation     AM-PAC 6 CLICK MOBILITY  Total Score:22     Patient left sitting EOB with all lines intact and call button in reach.    GOALS:   Multidisciplinary Problems     Physical Therapy Goals        Problem: Physical Therapy Goal    Goal Priority Disciplines Outcome Goal Variances Interventions   Physical Therapy Goal     PT, PT/OT      Description:  Goals to be met by: D/C     Patient will increase functional independence with mobility by performin. Supine to sit with Supervision  2. Sit to stand transfer with Supervision  3. Bed to chair transfer with Supervision   4. Gait  x 250 feet with Supervision using no AD                      History:     Past Medical History:   Diagnosis Date    *Atrial fibrillation     and Hx PSVT    Allergic drug rash 2016    Allergy     chronic     Arthritis     fingers    Benign tumor of throat     Bronchitis 2013    CAD (coronary artery disease)     CABGx2 in  and 2 MI after with 4 stents    Cancer 1996    small cell lung cancer s/p resection of 1/3 lung, 5 ribs and muscle mass then had chemo and radiation    Cataract     OD     CHF (congestive heart failure) past Hx    Chronic rhinitis     Clot past Hx    external jugular, now stented, occluded,  had phlebitis; now revascularized    Colon polyp     COPD (chronic obstructive pulmonary disease)     no oxygen or nebulizers    COPD exacerbation 5/8/13    improved 5/14/13 after steroid pack,antibiotics    Dysphagia     Esophageal stenosis     Former smoker     GERD (gastroesophageal reflux disease)     Heart block     Hyperlipidemia     Hypertension     pt states does not have //    MI (myocardial infarction) 2005    Posterior vitreous detachment of left eye     Thyroid disease        Past Surgical History:   Procedure Laterality Date    ABLATION N/A 12/11/2018    Procedure: ABLATION;  Surgeon: Santana Covarrubias MD;  Location: [facility] EP LAB;  Service: Cardiology;  Laterality: N/A;  AF, JUAN ANTONIO, PVI, RFA, CARTO, GEN, GP, 3 PREP    ABLATION OF ARRHYTHMOGENIC FOCUS FOR ATRIAL FIBRILLATION N/A 5/8/2019    Procedure: ABLATION, ARRHYTHMOGENIC FOCUS, FOR ATRIAL FIBRILLATION;  Surgeon: Santana Covarrubias MD;  Location: [facility] EP LAB;  Service: Cardiology;  Laterality: N/A;  AF, PVI, RFA, CARTO, GEN, GP , 3 PREP    ADENOIDECTOMY      APPENDECTOMY      BACK SURGERY      lumbar    BREAST BIOPSY Left     benign    CARDIAC SURGERY      CARDIOVERSION  5/8/2019    Procedure: Cardioversion;  Surgeon: Santana Covarrubias MD;  Location: [facility] EP LAB;  Service: Cardiology;;    CATARACT EXTRACTION Left     OS    CATARACT EXTRACTION, BILATERAL      left eye only    CHOLECYSTECTOMY      COLONOSCOPY  03/2012    repeat in 5 years    COLONOSCOPY N/A 6/19/2017    Procedure: COLONOSCOPY;  Surgeon: Kal Elise MD;  Location: [facility];  Service: Endoscopy;  Laterality: N/A;    CORONARY ARTERY BYPASS GRAFT  2005    2 vessel    ESOPHAGOGASTRODUODENOSCOPY N/A 7/5/2018    Procedure: ESOPHAGOGASTRODUODENOSCOPY (EGD)/cryo;  Surgeon: Robbie Gonzales MD;  [facility]  Service: Endoscopy;  Laterality: N/A;  Eliquis/Dr Henry Wei/OK to hold med 2 days prior to egd/see telephone encounter dated 6/12/18/pt  stated she needs to take Diflucan 1 tab po daily 12 days prior to egd, message sent to Sarah/she will check with Dr Gonzales/santo    ESOPHAGOGASTRODUODENOSCOPY N/A 10/3/2018    Procedure: EGD (ESOPHAGOGASTRODUODENOSCOPY)/poss cryo;  Surgeon: Robbie Gonzales MD;  Location: [facility] (2ND FLR);  Service: Endoscopy;  Laterality: N/A;  2 day hold Dr Henry Pavon  Diflucan 1 tab po daily starting 14 days prior to egd, Rx'd per Dr Gonzales    ESOPHAGOGASTRODUODENOSCOPY N/A 11/1/2018    Procedure: EGD (ESOPHAGOGASTRODUODENOSCOPY)/cryo;  Surgeon: Robbie Gonzales MD;  Location: [facility] (2ND FLR);  Service: Endoscopy;  Laterality: N/A;  2 day hold Dr Henry Pavon  Diflucan 1 tab po daily starting 14 days prior to egd, Rx'd per Dr Gonzales as before?    ESOPHAGOGASTRODUODENOSCOPY N/A 9/27/2019    Procedure: EGD (ESOPHAGOGASTRODUODENOSCOPY);  Surgeon: Robbie Gonzales MD;  Location: [facility] (2ND FLR);  Service: Endoscopy;  Laterality: N/A;  Coumadin was d/c'd 9/18/19.  No other anti-thrombotic med for now.  pg  Per Dr Gonzales, she does not need to take Diflucan pre-procedure as she did before.  pg    ESOPHAGOGASTRODUODENOSCOPY N/A 1/31/2020    Procedure: EGD (ESOPHAGOGASTRODUODENOSCOPY)/poss cryo;  Surgeon: Robbie Gonzales MD;  Location: [facility] (2ND FLR);  Service: Endoscopy;  Laterality: N/A;  ok for eliquis hold-see telephone order 1/23/20-tb    ESOPHAGOGASTRODUODENOSCOPY N/A 3/12/2020    Procedure: EGD (ESOPHAGOGASTRODUODENOSCOPY);  Surgeon: Ulisses Colorado III, MD;  Location: [facility];  Service: Endoscopy;  Laterality: N/A;    ESOPHAGOGASTRODUODENOSCOPY N/A 3/17/2020    Procedure: EGD (ESOPHAGOGASTRODUODENOSCOPY) in OR w/ fluoro;  Surgeon: Ulisses Colorado III, MD;  Location: [facility];  Service: Endoscopy;  Laterality: N/A;  IN OR with fluoro    EYE SURGERY  Dec 2012    ptosis repair    FIRST RIB REMOVAL  1996 with lung resection    HYSTERECTOMY      LUNG LOBECTOMY  1996     left lung for cancer    REMOVAL OF PLANTAR WART USING LASER      SHOULDER SURGERY  2010,2011    scapular entrapment after lung surgery, needed 5 ribs removed left side    SYMPOTHATIC NERVE LEFT SIDE      1996 with lung surgery    TONSILLECTOMY      TONSILLECTOMY, ADENOIDECTOMY, BILATERAL MYRINGOTOMY AND TUBES      UPPER GASTROINTESTINAL ENDOSCOPY  05/2016    Dr. Koo    VASCULAR SURGERY      stents place in jugualr vein       Time Tracking:     PT Received On: 03/21/20  PT Start Time: 1333     PT Stop Time: 1346  PT Total Time (min): 13 min     Billable Minutes: Evaluation 13      Esthela Monahan, PT  03/21/2020

## 2020-03-21 NOTE — PROGRESS NOTES
"TPN PROGRESS NOTE:  Day # 8    Objective:  Diagnosis/Indication for TPN: 8    69 y.o., female    The patient has the following labs:  Lab Results   Component Value Date     2020     2020    K 3.9 2020    K 3.9 2020     2020     2020    CO2 29 2020    CO2 29 2020    BUN 17 2020    BUN 17 2020    CREATININE 0.4 (L) 2020    CREATININE 0.4 (L) 2020    CALCIUM 8.4 (L) 2020    CALCIUM 8.4 (L) 2020        IV Access:   Central    Diet/Tube Feedin    Assessment:  Ideal Body Weight = 59 kg  Actual Body Weight = 51.3 IBW  Estimated body mass index is 18.25 kg/m² as calculated from the following:    Height as of this encounter: 5' 6" (1.676 m).    Weight as of this encounter: 51.3 kg (113 lb 1.5 oz).   Estimated Creatinine Clearance: 107.5 mL/min (A) (based on SCr of 0.4 mg/dL (L)).    Plan:  Adjustments to TPN    Today's TPN provides 75 gm protein and 1585 kcal.    Will continue to monitor electrolytes daily and adjust parenteral nutrition as warranted.    Thank you for allowing us to participate in this patient's care.     Benjamin Moffett 3/21/2020 1:36 PM  Nutrition Support Team  Clinical Dietician, Ext. [phone]  ID Clinical Pharmacist, Ext [phone]  Pharmacist, Ext [phone]    "

## 2020-03-21 NOTE — CONSULTS
"[facility]  Adult Nutrition   Consult Note    SUMMARY     Recommendations  Recommendation/Intervention:   1. RD ordered enteral nutrition to start today. Jevity 1.5 to start at 10 ml/hr and advance by 10 ml/hr every 4-6 hours as tolerated to goal rate of 45 ml/hr with 30 ml water flush Q4H (at goal to provide 1620 kcal/day 69 g/day protein, and 1001 ml/day free water).   2. TPN to continue, pharmacist ordered today. Recommend weaning TPN once patient is able to tolerate enteral nutrition at 25-30 ml/hr or greater. Recommend reducing TPN rate to 30 ml/hr then discontinuing once EN advanced to goal. If patient tolerates enteral nutrition, likely TPN will not be ordered tomorrow.      Goals:    1. Patient to tolerate enteral nutrition and EN to advance to goal rate.   2. Patient to transition from TPN to enteral nutrition.   3. Nutrition provision to meet estimated protein, energy and fluid needs.     Nutrition Goal Status: new  Communication of RD Recs: reviewed with RN(reviewed with pharmacist )    Dietitian Rounds Brief  Enteral nutrition to start today via J-tube. RD ordered. Notified RN and Pharmacist of plans and orders. Nutrition support team to monitor and manage parenteral and enteral nutrition (transitional feeding) accordingly.     Reason for Assessment  Reason For Assessment: consult, new tube feeding    Nutrition Risk Screen  Nutrition Risk Screen: tube feeding or parenteral nutrition     MST Score: 3  Have you recently lost weight without trying?: Yes: 14-23 lbs  Weight loss score: 2  Have you been eating poorly because of a decreased appetite?: Yes  Appetite score: 1       Nutrition/Diet History  Spiritual, Cultural Beliefs, Voodoo Practices, Values that Affect Care: no  Food Allergies: NKFA  Factors Affecting Nutritional Intake: NPO, difficulty/impaired swallowing    Anthropometrics  Temp: 98.5 °F (36.9 °C)  Height Method: Stated  Height: 5' 6" (167.6 cm)  Height (inches): 66 " in  Weight Method: Bed Scale  Weight: 51.3 kg (113 lb 1.5 oz)  Weight (lb): 113.1 lb  Ideal Body Weight (IBW), Female: 130 lb  % Ideal Body Weight, Female (lb): 87 %  BMI (Calculated): 18.3  BMI Grade: 17 - 18.4 protein-energy malnutrition grade I       Weight History:  Wt Readings from Last 10 Encounters:   03/14/20 51.3 kg (113 lb 1.5 oz)   03/12/20 52.7 kg (116 lb 2.9 oz)   03/11/20 52.6 kg (116 lb)   03/09/20 54.4 kg (120 lb)   03/06/20 54.9 kg (121 lb)   03/04/20 54.9 kg (121 lb)   02/27/20 56.2 kg (123 lb 14.4 oz)   02/21/20 57.2 kg (126 lb)   01/31/20 57.2 kg (126 lb)   01/20/20 58.5 kg (128 lb 15.5 oz)       Lab/Procedures/Meds: Pertinent Labs Reviewed  Clinical Chemistry:  Recent Labs   Lab 03/19/20  0532 03/20/20  0946 03/21/20  0534   * 139 136  136   K 3.9 3.8 3.9  3.9   CL 97 101 102  102   CO2 30* 32* 29  29   * 158* 137*  137*   BUN 19 22 17  17   CREATININE 0.4* 0.5 0.4*  0.4*   CALCIUM 8.3* 8.6* 8.4*  8.4*   PROT 6.7 6.8 6.5   ALBUMIN 3.1* 3.0* 2.7*   BILITOT 0.9 0.6 0.8   ALKPHOS 55 72 71   AST 31 23 24   ALT 39 30 26   ANIONGAP 6* 6* 5*  5*   ESTGFRAFRICA >60.0 >60.0 >60.0  >60.0   EGFRNONAA >60.0 >60.0 >60.0  >60.0   MG 2.1 2.2 2.0   PHOS 2.2* 2.5* 2.9     CBC:   Recent Labs   Lab 03/21/20  0534   WBC 14.59*  14.59*  14.59*   RBC 3.82*  3.82*  3.82*   HGB 11.4*  11.4*  11.4*   HCT 35.7*  35.7*  35.7*     227  227   MCV 94  94  94   MCH 29.8  29.8  29.8   MCHC 31.9*  31.9*  31.9*     Lipid Panel:  Recent Labs   Lab 03/16/20  0422   TRIG 89     Inflammatory Labs:  Recent Labs   Lab 03/19/20  0532   CRP 15.62*     Medications: Pertinent Medications reviewed  Scheduled Meds:   fluticasone propionate  1 spray Each Nostril Daily    piperacillin-tazobactam (ZOSYN) IVPB  3.375 g Intravenous Q8H    tiotropium  1 capsule Inhalation Daily     Continuous Infusions:   lactated ringers 75 mL/hr at 03/20/20 1832    TPN ADULT CENTRAL LINE CUSTOM (3 in 1) 65  mL/hr at 03/20/20 1716    TPN ADULT CENTRAL LINE CUSTOM (3 in 1)       PRN Meds:.acetaminophen, acetaminophen, albuterol-ipratropium, calcium chloride IVPB, calcium chloride IVPB, calcium chloride IVPB, dextrose 50%, dextrose 50%, hydrALAZINE, HYDROmorphone, insulin regular, magnesium oxide, magnesium sulfate IVPB, magnesium sulfate IVPB, magnesium sulfate IVPB, magnesium sulfate IVPB, ondansetron, potassium chloride in water, potassium chloride in water, potassium chloride in water, potassium chloride in water, potassium chloride, potassium chloride, potassium chloride, potassium chloride, promethazine (PHENERGAN) IVPB, sodium chloride 0.9%, sodium chloride 0.9%, sodium phosphate IVPB, sodium phosphate IVPB, sodium phosphate IVPB, sodium phosphate IVPB, sodium phosphate IVPB    Estimated/Assessed Needs  Weight Used For Calorie Calculations: 51.3 kg (113 lb 1.5 oz)  Energy Calorie Requirements (kcal): 2314-4388 (30-35 kcal/kg)  Energy Need Method: Kcal/kg  Protein Requirements: 62-77 g (1.2-1.5 g/kg)  Weight Used For Protein Calculations: 51.3 kg (113 lb 1.5 oz)     Estimated Fluid Requirement Method: RDA Method  RDA Method (mL): 1539       Nutrition Prescription Ordered  Current Diet Order: clear liquid   Current Nutrition Support Formula Ordered: Other (Comment)(custom TPN )  Current Nutrition Support Rate Ordered: 65 (ml)  Current Nutrition Support Frequency Ordered: ml/hr     Evaluation of Received Nutrient/Fluid Intake  Parenteral Calories (kcal): 1585  Parenteral Protein (gm): 75  Parenteral Fluid (mL): 1560  Energy Calories Required: meeting needs  Protein Required: meeting needs  Fluid Required: meeting needs  Tolerance: tolerating     Intake/Output Summary (Last 24 hours) at 3/21/2020 1508  Last data filed at 3/21/2020 0600  Gross per 24 hour   Intake 2393.33 ml   Output 650 ml   Net 1743.33 ml      Nutrition Risk  Level of Risk/Frequency of Follow-up: high     Monitor and Evaluation  Food and Nutrient  Intake: energy intake, enteral nutrition intake, parenteral nutrition intake, food and beverage intake  Food and Nutrient Adminstration: enteral and parenteral nutrition administration, diet order  Physical Activity and Function: nutrition-related ADLs and IADLs, factors affecting access to physical activity  Anthropometric Measurements: weight, weight change, body mass index  Biochemical Data, Medical Tests and Procedures: electrolyte and renal panel, glucose/endocrine profile, lipid profile, gastrointestinal profile, inflammatory profile  Nutrition-Focused Physical Findings: overall appearance     Nutrition Follow-Up  RD Follow-up?: Yes     Ileana Ware RD 03/21/2020 3:11 PM

## 2020-03-21 NOTE — PLAN OF CARE
03/21/20 0751   Patient Assessment/Suction   Level of Consciousness (AVPU) alert   Respiratory Effort Normal;Unlabored   Expansion/Accessory Muscles/Retractions no use of accessory muscles   All Lung Fields Breath Sounds clear   PRE-TX-O2   O2 Device (Oxygen Therapy) room air   SpO2 96 %   Pulse Oximetry Type Intermittent   $ Pulse Oximetry - Multiple Charge Pulse Oximetry - Multiple   Pulse 91   Resp 18   Aerosol Therapy   $ Aerosol Therapy Charges PRN treatment not required   Inhaler   $ Inhaler Charges MDI (Metered Dose Inahler) Treatment   Daily Review of Necessity (Inhaler) completed   Respiratory Treatment Status (Inhaler) given   Treatment Route (Inhaler) mouthpiece   Patient Position (Inhaler) HOB elevated   Post Treatment Assessment (Inhaler) breath sounds improved   Signs of Intolerance (Inhaler) none   Respiratory Evaluation   $ Care Plan Tech Time 15 min

## 2020-03-21 NOTE — PROGRESS NOTES
[facility]  General Surgery  Progress Note    Subjective:     History of Present Illness:  No notes on file    Post-Op Info:  Procedure(s) (LRB):  EGD (ESOPHAGOGASTRODUODENOSCOPY) (N/A)  INSERTION, JEJUNOSTOMY TUBE (N/A)   1 Day Post-Op     Interval History:   C/o pain as expected  No acute issues      Medications:  Continuous Infusions:   lactated ringers 75 mL/hr at 03/20/20 1832    TPN ADULT CENTRAL LINE CUSTOM (3 in 1) 65 mL/hr at 03/20/20 1716    TPN ADULT CENTRAL LINE CUSTOM (3 in 1)       Scheduled Meds:   fluticasone propionate  1 spray Each Nostril Daily    piperacillin-tazobactam (ZOSYN) IVPB  3.375 g Intravenous Q8H    tiotropium  1 capsule Inhalation Daily     PRN Meds:acetaminophen, acetaminophen, albuterol-ipratropium, calcium chloride IVPB, calcium chloride IVPB, calcium chloride IVPB, dextrose 50%, dextrose 50%, hydrALAZINE, HYDROmorphone, insulin regular, magnesium oxide, magnesium sulfate IVPB, magnesium sulfate IVPB, magnesium sulfate IVPB, magnesium sulfate IVPB, ondansetron, potassium chloride in water, potassium chloride in water, potassium chloride in water, potassium chloride in water, potassium chloride, potassium chloride, potassium chloride, potassium chloride, promethazine (PHENERGAN) IVPB, sodium chloride 0.9%, sodium chloride 0.9%, sodium phosphate IVPB, sodium phosphate IVPB, sodium phosphate IVPB, sodium phosphate IVPB, sodium phosphate IVPB     Review of patient's allergies indicates:   Allergen Reactions    Ciprofloxacin Itching    Coreg [carvedilol]      Low energy    Doxycycline Other (See Comments) and Hives     Patient had a rxn with the sun despite wearing spf 30    Metoprolol      Low energy     Objective:     Vital Signs (Most Recent):  Temp: 98.7 °F (37.1 °C) (03/21/20 0800)  Pulse: 99 (03/21/20 0800)  Resp: 20 (03/21/20 0800)  BP: (!) 158/90 (03/21/20 0800)  SpO2: 97 % (03/21/20 0800) Vital Signs (24h Range):  Temp:  [97.4 °F (36.3 °C)-99.6 °F (37.6  °C)] 98.7 °F (37.1 °C)  Pulse:  [] 99  Resp:  [16-20] 20  SpO2:  [96 %-100 %] 97 %  BP: (158-170)/() 158/90     Weight: 51.3 kg (113 lb 1.5 oz)  Body mass index is 18.25 kg/m².    Intake/Output - Last 3 Shifts       03/19 0700 - 03/20 0659 03/20 0700 - 03/21 0659 03/21 0700 - 03/22 0659    P.O.  815     I.V. (mL/kg)  1568.8 (30.6)     IV Piggyback 100 50     .6 709.6     Total Intake(mL/kg) 679.6 (13.2) 3143.3 (61.3)     Urine (mL/kg/hr)  650 (0.5)     Total Output  650     Net +679.6 +2493.3            Urine Occurrence 5 x 5 x 1 x    Stool Occurrence 2 x            Physical Exam   Constitutional: She is oriented to person, place, and time. She is cooperative. No distress.   Abdominal: Soft. She exhibits no distension. There is tenderness (appr). There is no rebound and no guarding.   Neurological: She is oriented to person, place, and time.   Skin:   Incisions are clean, dry and intact  There is no evidence of infection, hematoma or seroma          Assessment/Plan:     * Intractable vomiting  S/p J tube  OK to start feeds per dietician        Zena Lozano MD  General Surgery  [facility]

## 2020-03-21 NOTE — PLAN OF CARE
Pt instructed to hug pillow when sitting up and turning in bed. Assist pt to bscc. J tube intact and pt tolerated apple juice and water without difficulty. Pain relieved with Dilaudid

## 2020-03-21 NOTE — CARE UPDATE
03/20/20 1935   Patient Assessment/Suction   Level of Consciousness (AVPU) alert   Respiratory Effort Normal;Unlabored   Expansion/Accessory Muscles/Retractions expansion symmetric;no retractions;no use of accessory muscles   All Lung Fields Breath Sounds crackles, fine   Rhythm/Pattern, Respiratory no shortness of breath reported;pattern regular;unlabored   Cough Type good;nonproductive   PRE-TX-O2   O2 Device (Oxygen Therapy) nasal cannula   Flow (L/min) 0   SpO2 96 %   Pulse Oximetry Type Intermittent   $ Pulse Oximetry - Multiple Charge Pulse Oximetry - Multiple   Pulse 88   Resp 18   Aerosol Therapy   $ Aerosol Therapy Charges PRN treatment not required   Daily Review of Necessity (SVN) completed   Respiratory Interventions   Cough And Deep Breathing done with encouragement   Breathing Techniques/Airway Clearance deep/controlled cough encouraged;diaphragmatic breathing promoted   Respiratory Evaluation   $ Care Plan Tech Time 15 min   Evaluation For New Orders   Home Oxygen   Has Home Oxygen? No   Home Aerosol, MDI, DPI, and Other Treatments/Therapies   Home Respiratory Therapy Per Patient/Review of Chart No

## 2020-03-21 NOTE — ASSESSMENT & PLAN NOTE
2/2 obstructing esophageal neoplasm  On liquid diet as tolerated  On TPN  Lovenox 1 mg/kg for Afib  s/p EGD and stent placement on 3/17 and readjustment on3/20  S/p Jtube placement on 3/20  GI and sx  consulted  Appreciate consultants

## 2020-03-21 NOTE — PLAN OF CARE
Problem: Feeding Intolerance (Enteral Nutrition)  Goal: Feeding Tolerance  Outcome: Ongoing, Progressing  Intervention: Prevent and Manage Feeding Intolerance  Flowsheets (Taken 3/21/2020 1513)  Nutrition Support Management: tube feeding initiated

## 2020-03-21 NOTE — PT/OT/SLP PROGRESS
Occupational Therapy      Patient Name:  Steph Lira   MRN:  [id number]    Patient not seen today secondary to Patient unwilling to participate(pt exhausted after sponge bath with CNA ).    Madison Mercado OT  3/21/2020.3:30 PM

## 2020-03-21 NOTE — SUBJECTIVE & OBJECTIVE
Interval History:   C/o pain as expected  No acute issues      Medications:  Continuous Infusions:   lactated ringers 75 mL/hr at 03/20/20 1832    TPN ADULT CENTRAL LINE CUSTOM (3 in 1) 65 mL/hr at 03/20/20 1716    TPN ADULT CENTRAL LINE CUSTOM (3 in 1)       Scheduled Meds:   fluticasone propionate  1 spray Each Nostril Daily    piperacillin-tazobactam (ZOSYN) IVPB  3.375 g Intravenous Q8H    tiotropium  1 capsule Inhalation Daily     PRN Meds:acetaminophen, acetaminophen, albuterol-ipratropium, calcium chloride IVPB, calcium chloride IVPB, calcium chloride IVPB, dextrose 50%, dextrose 50%, hydrALAZINE, HYDROmorphone, insulin regular, magnesium oxide, magnesium sulfate IVPB, magnesium sulfate IVPB, magnesium sulfate IVPB, magnesium sulfate IVPB, ondansetron, potassium chloride in water, potassium chloride in water, potassium chloride in water, potassium chloride in water, potassium chloride, potassium chloride, potassium chloride, potassium chloride, promethazine (PHENERGAN) IVPB, sodium chloride 0.9%, sodium chloride 0.9%, sodium phosphate IVPB, sodium phosphate IVPB, sodium phosphate IVPB, sodium phosphate IVPB, sodium phosphate IVPB     Review of patient's allergies indicates:   Allergen Reactions    Ciprofloxacin Itching    Coreg [carvedilol]      Low energy    Doxycycline Other (See Comments) and Hives     Patient had a rxn with the sun despite wearing spf 30    Metoprolol      Low energy     Objective:     Vital Signs (Most Recent):  Temp: 98.7 °F (37.1 °C) (03/21/20 0800)  Pulse: 99 (03/21/20 0800)  Resp: 20 (03/21/20 0800)  BP: (!) 158/90 (03/21/20 0800)  SpO2: 97 % (03/21/20 0800) Vital Signs (24h Range):  Temp:  [97.4 °F (36.3 °C)-99.6 °F (37.6 °C)] 98.7 °F (37.1 °C)  Pulse:  [] 99  Resp:  [16-20] 20  SpO2:  [96 %-100 %] 97 %  BP: (158-170)/() 158/90     Weight: 51.3 kg (113 lb 1.5 oz)  Body mass index is 18.25 kg/m².    Intake/Output - Last 3 Shifts       03/19 0700 - 03/20 0659  03/20 0700 - 03/21 0659 03/21 0700 - 03/22 0659    P.O.  815     I.V. (mL/kg)  1568.8 (30.6)     IV Piggyback 100 50     .6 709.6     Total Intake(mL/kg) 679.6 (13.2) 3143.3 (61.3)     Urine (mL/kg/hr)  650 (0.5)     Total Output  650     Net +679.6 +2493.3            Urine Occurrence 5 x 5 x 1 x    Stool Occurrence 2 x            Physical Exam   Constitutional: She is oriented to person, place, and time. She is cooperative. No distress.   Abdominal: Soft. She exhibits no distension. There is tenderness (appr). There is no rebound and no guarding.   Neurological: She is oriented to person, place, and time.   Skin:   Incisions are clean, dry and intact  There is no evidence of infection, hematoma or seroma

## 2020-03-22 NOTE — PLAN OF CARE
03/21/20 2204   Patient Assessment/Suction   Respiratory Effort Unlabored   All Lung Fields Breath Sounds clear   Rhythm/Pattern, Respiratory pattern regular   PRE-TX-O2   O2 Device (Oxygen Therapy) nasal cannula   $ Is the patient on Low Flow Oxygen? Yes   Flow (L/min) 2   SpO2 97 %   Pulse 94   Resp 20   Aerosol Therapy   $ Aerosol Therapy Charges PRN treatment not required   Respiratory Evaluation   $ Care Plan Tech Time 15 min   Evaluation For Re-Eval 5+ day   Admitting Diagnosis VOMITING

## 2020-03-22 NOTE — PROGRESS NOTES
[facility] Medicine  Progress Note    Patient Name: Steph Lira  MRN: [id number]  Patient Class: IP- Inpatient   Admission Date: 3/13/2020  Length of Stay: 8 days  Attending Physician: Marcos Khoury MD  Primary Care Provider: Maida Mon MD        Subjective:     Principal Problem:Intractable vomiting        HPI:  69-year-old female history of malignant neoplasm of the esophagus, lung cancer s/p resection, COPD, hypertension, hyperlipidemia, GERD, atrial fibrillation on Xarelto, CAD s/p CABG comes in for generalized weakness and emesis.  Patient reports that she has a history of esophageal cancer and has been worsening causing obstruction of her esophagus.  Patient had EGD yesterday and found nearly closed esophagus to the stomach.  Patient reports that she is unable tolerate anything orally including liquids and her medications.  Reports that she has been feeling weak and has been able tolerate any diet for the past 3 days.  Reports this morning she can not tolerate anymore and came into the ED for further evaluation.  Denies any fever, chills, chest pain, shortness of breath.    In the ED, patient was slightly hypertensive.  Unable tolerate home medications.  CBC and CMP were unremarkable.     Overview/Hospital Course:  Patient was admitted for dehydration and malnutrition due to unable to tolerate anything orally from esophageal cancer obstructing the esophagus.  GI consulted and plans for stent placement on the esophageal mass.  Patient underwent stent placement on3/17/20 without any complication.  3/18 Appears in mild distress due to throat pain, unable to talk comfortably  Not yet tolerating liquids  On morphine iv and zofren  3/19 Leucocytosis up trending with low-grade fever   CT chest negative for perforation/aspirate  Blood culture ordered, patient denies any symptoms of UTI  3/20Reports persistent  throat pain down to upper abdomen  Increase Dilaudid q.4h  GI follow-up  appreciated for repeat EGD to evaluate the stent placement  Surgery consult appreciated for J-tube placement   patient agreeable for any required intervention  Afebrile , on zosyn, leukocytosis trending down, blood culture negative to date  3/21: feels better,started tolerating liquid food  But c/o right sided abdominal pain with position after gtube placement  s/p readjustment of esophageal stent and G-tube placement yesterday  Leukocytosis trending down  Complaints of G-tube site discomfort with movement  History evening patient had an episode of SVT, improved with hydration IV fluids  Afebrile hemoglobin stable  No bowel movements passing gas      Interval History:   Pt seen and examined at bedside  Pt reports sore throat , white phlegm and nausea  Improvement  In abdominal pain  But per RN, G Tube feeding held with nausea/vomitting  Will start on phenergan iv and chloraseptic spray  Cardiology consult appreciated  Keep with Cardizem drip today, and GI cleared for AC,will restart on lovenox until she tolerate po  can resume po diatiazem and eliquis when her symptoms improves  Will obtain Echo   Mild elevation of transaminase, leucocytosis resolved,will d/c zosyn.    Review of Systems   10 point review of systems were found to be negative expect for that mentioned already in interval history.     Objective:     Vital Signs (Most Recent):  Temp: 98.7 °F (37.1 °C) (03/21/20 0800)  Pulse: 99 (03/21/20 0800)  Resp: 20 (03/21/20 0800)  BP: (!) 158/90 (03/21/20 0800)  SpO2: 97 % (03/21/20 0800) Vital Signs (24h Range):  Temp:  [97.4 °F (36.3 °C)-99.6 °F (37.6 °C)] 98.7 °F (37.1 °C)  Pulse:  [] 99  Resp:  [16-20] 20  SpO2:  [96 %-100 %] 97 %  BP: (158-170)/() 158/90     Weight: 51.3 kg (113 lb 1.5 oz)  Body mass index is 18.25 kg/m².    Intake/Output Summary (Last 24 hours) at 3/21/2020 0912  Last data filed at 3/21/2020 0600  Gross per 24 hour   Intake 3143.33 ml   Output 650 ml   Net 2493.33 ml       Physical Exam   Constitutional: She is oriented to person, place, and time. She appears well-developed and well-nourished. No distress.   Thin appearing, appears depressed   HENT:   Right Ear: External ear normal.   Left Ear: External ear normal.   Eyes: Conjunctivae and EOM are normal. Right eye exhibits no discharge. Left eye exhibits no discharge.   Neck: Normal range of motion.   Cardiovascular: Normal rate, regular rhythm and normal heart sounds. Exam reveals no gallop and no friction rub.   No murmur heard.  Pulmonary/Chest: Effort normal and breath sounds normal. No respiratory distress. She has no wheezes. She has occasional rales. She exhibits no tenderness.   Abdominal: She exhibits no distension.   J tune covered with dry fressing with mild tenderness right side  Sluggish bowel sounds   Musculoskeletal: She exhibits no edema or tenderness.   Neurological: She is alert and oriented to person, place, and time.   Skin: Skin is warm. She is not diaphoretic.   Psychiatric: She has a normal mood and affect. Her behavior is normal. Judgment and thought content normal.   Nursing note and vitals reviewed.      Significant Labs:   BMP:   Recent Labs   Lab 03/21/20  0534   *  137*     136   K 3.9  3.9     102   CO2 29  29   BUN 17  17   CREATININE 0.4*  0.4*   CALCIUM 8.4*  8.4*   MG 2.0     CBC:   Recent Labs   Lab 03/20/20  0946 03/21/20  0534   WBC 16.99* 14.59*  14.59*  14.59*   HGB 11.9* 11.4*  11.4*  11.4*   HCT 36.7* 35.7*  35.7*  35.7*    227  227  227       Significant Imaging: I have reviewed all pertinent imaging results/findings within the past 24 hours.      Assessment/Plan:      * Intractable vomiting  2/2 obstructing esophageal neoplasm  On liquid diet as tolerated  On TPN  s/p EGD and stent placement on 3/17 and readjustment on3/20  S/p Jtube placement on 3/20  GI and sx  Consulted      # Atrial tachycardia  On cardizem gtt  And Lovenox 1 mg/kg for  Afib  Cardiology consult appreciated  Echo pending    VTE Risk Mitigation (From admission, onward)         Ordered     IP VTE HIGH RISK PATIENT  Once      03/13/20 1928                      Deysi Guardado MD  Department of Hospital Medicine   [facility]

## 2020-03-22 NOTE — CARE UPDATE
03/22/20 [facility]   Patient Assessment/Suction   Level of Consciousness (AVPU) alert   Respiratory Effort Normal;Unlabored   Expansion/Accessory Muscles/Retractions no use of accessory muscles   All Lung Fields Breath Sounds diminished;rhonchi   PRE-TX-O2   O2 Device (Oxygen Therapy) nasal cannula  (SB)   Flow (L/min) 0   SpO2 99 %   Pulse Oximetry Type Continuous   $ Pulse Oximetry - Multiple Charge Pulse Oximetry - Multiple   Pulse 73   Resp 15   Inhaler   $ Inhaler Charges MDI (Metered Dose Inahler) Treatment;Independent   Daily Review of Necessity (Inhaler) completed   Respiratory Treatment Status (Inhaler) given;self-administered   Treatment Route (Inhaler) mouthpiece   Patient Position (Inhaler) HOB elevated   Post Treatment Assessment (Inhaler) breath sounds unchanged   Signs of Intolerance (Inhaler) none

## 2020-03-22 NOTE — NURSING
At 1910 Heart rate 170-180, asymptomatic. o2 placed on pt at 2 l/nc. Lopressor 5mg ivp given. No change noted to rhythm, intermittent nsr to svt noted... Up to bsc, vagal maneuver initiated by pt, and no change to rhythm, at 1954, dilaudid 0.5mg give for abd pain. Orders to move pt to cardio A. Pt is agreeable to moving and has been explained along with her son via phone, the use of adenosin. Pt says to do what is necessary. Spoke to Nurse Corrine, report was given, around 2000. Dr Min (night hospitalist) made aware of pt transfer.

## 2020-03-22 NOTE — NURSING
Patient's heart rate has been bouncing around all day from 100's-190's. I notified hospital medicine earlier and got metoprolol for her IVP. She responded well to the 5mg dose. Her heart rate dropped to 80-90's and stayed there for a few hours. Patient got up to go to the bedside commode and her heart rate went up again. She bounced from 110's-160's. I notified hospital medicine again. There was an order for transfer put in to another higher acuity unit but patient is refusing to move. Hospital medicine notified. Patient to stay on until and get metoprolol IVP every 4 hours.

## 2020-03-22 NOTE — PT/OT/SLP PROGRESS
Occupational Therapy      Patient Name:  Steph Lira   MRN:  [id number]    Patient not seen today secondary to (pt. transferred to higher acuity unit.  please send new OT eval and treament orders when pt. medically stable to participate in OT ). Will follow-up .    Farzana Rubin OT  3/22/2020

## 2020-03-22 NOTE — NURSING
Patient up to bed side commode, vomiting green/ black liquid. Patient refuses attending doctor to see her, head hospitalist notified of situation. Ordered KUB stat since patient has not had a bowel movement in 6 days.

## 2020-03-22 NOTE — CONSULTS
[facility]  Cardiology  Consult Note    Patient Name: Steph Lira  MRN: [id number]  Admission Date: 3/13/2020  Hospital Length of Stay: 8 days  Code Status: Full Code   Attending Provider: Deysi Guardado MD   Consulting Provider: Arthur Ahuja MD  Primary Care Physician: Maida Mon MD  Principal Problem:Intractable vomiting    Patient information was obtained from patient, past medical records and ER records.     Inpatient consult to Cardiology  Consult performed by: Arthur Ahuja MD  Consult ordered by: Deysi Guardado MD        Subjective:     Chief Complaint:  Paroxysmal atrial fibrillation       HPI:  69-year-old lady known to have esophageal cancer admitted to hospital for severe dysphagia.  She has undergone an esophageal stent and also a jejunostomy tube placement for feeding.  Her main complaint is 1 of sore throat.  Since her surgery 2 days ago she has had a rapid heart rates which were controlled initially with IV Lopressor and later she was transferred to Cardiology Unit and has been placed on a Cardizem drip.  She is presently getting 2.5 milligrams/hour and is well controlled.  While she has atrial rapid rates she is not symptomatic.  Past medical history includes coronary artery bypass surgery.  Left upper lobectomy for lung cancer and radiation.  She has had the atrial fibrillation ablation performed by Dr. Rubio Covarrubias in September of 2018.  She has chronically been on Eliquis but no rate control drugs.  She lives at her home with     Past Medical History:   Diagnosis Date    *Atrial fibrillation     and Hx PSVT    Allergic drug rash 12/28/2016    Allergy     chronic     Arthritis     fingers    Benign tumor of throat     Bronchitis March 2013    CAD (coronary artery disease) 2005    CABGx2 in 2005 and 2 MI after with 4 stents    Cancer 1996    small cell lung cancer s/p resection of 1/3 lung, 5 ribs and muscle mass then had chemo and radiation     Cataract     OD     CHF (congestive heart failure) past Hx    Chronic rhinitis     Clot past Hx    external jugular, now stented, occluded, had phlebitis; now revascularized    Colon polyp     COPD (chronic obstructive pulmonary disease)     no oxygen or nebulizers    COPD exacerbation 5/8/13    improved 5/14/13 after steroid pack,antibiotics    Dysphagia     Esophageal stenosis     Former smoker     GERD (gastroesophageal reflux disease)     Heart block     Hyperlipidemia     Hypertension     pt states does not have //    MI (myocardial infarction) 2005    Posterior vitreous detachment of left eye     Thyroid disease        Past Surgical History:   Procedure Laterality Date    ABLATION N/A 12/11/2018    Procedure: ABLATION;  Surgeon: Santana Covarrubias MD;  Location: [facility] EP LAB;  Service: Cardiology;  Laterality: N/A;  AF, JUAN ANTONIO, PVI, RFA, CARTO, GEN, GP, 3 PREP    ABLATION OF ARRHYTHMOGENIC FOCUS FOR ATRIAL FIBRILLATION N/A 5/8/2019    Procedure: ABLATION, ARRHYTHMOGENIC FOCUS, FOR ATRIAL FIBRILLATION;  Surgeon: Santana Covarrubias MD;  Location: [facility] EP LAB;  Service: Cardiology;  Laterality: N/A;  AF, PVI, RFA, CARTO, GEN, GP , 3 PREP    ADENOIDECTOMY      APPENDECTOMY      BACK SURGERY      lumbar    BREAST BIOPSY Left     benign    CARDIAC SURGERY      CARDIOVERSION  5/8/2019    Procedure: Cardioversion;  Surgeon: Santana Covarrubias MD;  Location: [facility] EP LAB;  Service: Cardiology;;    CATARACT EXTRACTION Left     OS    CATARACT EXTRACTION, BILATERAL      left eye only    CHOLECYSTECTOMY      COLONOSCOPY  03/2012    repeat in 5 years    COLONOSCOPY N/A 6/19/2017    Procedure: COLONOSCOPY;  Surgeon: Kal Elise MD;  Location: [facility] ENDO;  Service: Endoscopy;  Laterality: N/A;    CORONARY ARTERY BYPASS GRAFT  2005    2 vessel    ESOPHAGOGASTRODUODENOSCOPY N/A 7/5/2018    Procedure: ESOPHAGOGASTRODUODENOSCOPY (EGD)/cryo;  Surgeon: Robbie Gonzales MD;  Location: [facility] (2ND FLR);   Service: Endoscopy;  Laterality: N/A;  Elihomero/Dr Henry Wei/OK to hold med 2 days prior to egd/see telephone encounter dated 6/12/18/pt stated she needs to take Diflucan 1 tab po daily 12 days prior to egd, message sent to Sarah/she will check with Dr Gonzales/santo    ESOPHAGOGASTRODUODENOSCOPY N/A 10/3/2018    Procedure: EGD (ESOPHAGOGASTRODUODENOSCOPY)/poss cryo;  Surgeon: Robbie Gonzales MD;  Location: [facility] (2ND FLR);  Service: Endoscopy;  Laterality: N/A;  2 day hold Dr Henry Pavon  Diflucan 1 tab po daily starting 14 days prior to egd, Rx'd per Dr Gonzales    ESOPHAGOGASTRODUODENOSCOPY N/A 11/1/2018    Procedure: EGD (ESOPHAGOGASTRODUODENOSCOPY)/cryo;  Surgeon: Robbie Gonzales MD;  Location: [facility] (2ND FLR);  Service: Endoscopy;  Laterality: N/A;  2 day hold Dr Henry Pavon  Diflucan 1 tab po daily starting 14 days prior to egd, Rx'd per Dr Gonzales as before?    ESOPHAGOGASTRODUODENOSCOPY N/A 9/27/2019    Procedure: EGD (ESOPHAGOGASTRODUODENOSCOPY);  Surgeon: Robbie Gonzales MD;  Location: [facility] ([address] [place] [address]);  Service: Endoscopy;  Laterality: N/A;  Coumadin was d/c'd 9/18/19.  No other anti-thrombotic med for now.  pg  Per Dr Gonzales, she does not need to take Diflucan pre-procedure as she did before.  pg    ESOPHAGOGASTRODUODENOSCOPY N/A 1/31/2020    Procedure: EGD (ESOPHAGOGASTRODUODENOSCOPY)/poss cryo;  Surgeon: Robbie Gonzales MD;  Location: [facility] (2ND FLR);  Service: Endoscopy;  Laterality: N/A;  ok for eliquis hold-see telephone order 1/23/20-tb    ESOPHAGOGASTRODUODENOSCOPY N/A 3/12/2020    Procedure: EGD (ESOPHAGOGASTRODUODENOSCOPY);  Surgeon: Ulisses Colorado III, MD;  Location: [facility];  Service: Endoscopy;  Laterality: N/A;    ESOPHAGOGASTRODUODENOSCOPY N/A 3/17/2020    Procedure: EGD (ESOPHAGOGASTRODUODENOSCOPY) in OR w/ fluoro;  Surgeon: Ulisses Colorado III, MD;  Location: [facility];  Service: Endoscopy;  Laterality: N/A;  IN OR with  fluoro    EYE SURGERY  Dec 2012    ptosis repair    FIRST RIB REMOVAL  1996 with lung resection    HYSTERECTOMY      LUNG LOBECTOMY  1996    left lung for cancer    REMOVAL OF PLANTAR WART USING LASER      SHOULDER SURGERY  2010,2011    scapular entrapment after lung surgery, needed 5 ribs removed left side    SYMPOTHATIC NERVE LEFT SIDE      1996 with lung surgery    TONSILLECTOMY      TONSILLECTOMY, ADENOIDECTOMY, BILATERAL MYRINGOTOMY AND TUBES      UPPER GASTROINTESTINAL ENDOSCOPY  05/2016    Dr. Koo    VASCULAR SURGERY      stents place in jugualr vein       Review of patient's allergies indicates:   Allergen Reactions    Ciprofloxacin Itching    Coreg [carvedilol]      Low energy    Doxycycline Other (See Comments) and Hives     Patient had a rxn with the sun despite wearing spf 30    Metoprolol      Low energy       Current Facility-Administered Medications on File Prior to Encounter   Medication    0.9%  NaCl infusion    0.9%  NaCl infusion    sodium chloride 0.9% flush 5 mL     Current Outpatient Medications on File Prior to Encounter   Medication Sig    apixaban (ELIQUIS) 5 mg Tab Take 1 tablet by mouth once daily. Take half tab BID    cetirizine (ZYRTEC) 10 MG tablet TAKE 1 TABLET EVERY EVENING    fluticasone propionate (FLONASE) 50 mcg/actuation nasal spray 1 spray (50 mcg total) by Each Nostril route once daily.    levothyroxine (SYNTHROID) 88 MCG tablet TAKE 1 TABLET DAILY    montelukast (SINGULAIR) 10 mg tablet TAKE 1 TABLET EVERY EVENING    SPIRIVA WITH HANDIHALER 18 mcg inhalation capsule INHALE THE CONTENTS OF 1 CAPSULE DAILY    tretinoin (RETIN-A) 0.05 % cream APPLY THIN FILM TO FACE AT NIGHT AFTER MOISTURIZING    albuterol (ACCUNEB) 1.25 mg/3 mL Nebu USE 1 VIAL (3 ML) VIA NEBULIZER EVERY 6 HOURS AS NEEDED    albuterol (PROVENTIL HFA) 90 mcg/actuation inhaler Inhale 2 puffs into the lungs every 6 (six) hours as needed for Wheezing.    bisacodyl (DULCOLAX) 5 mg EC  tablet Take 1 tablet (5 mg total) by mouth daily as needed for Constipation.    diphenhydrAMINE (BENADRYL) 25 mg capsule Take 25 mg by mouth every 6 (six) hours as needed for Itching.    ketoconazole (NIZORAL) 2 % cream Apply topically once daily.    multivitamin-minerals-lutein (CENTRUM SILVER) Tab Take 1 tablet by mouth once daily. 1 Tablet Oral Every day    pantoprazole (PROTONIX) 40 MG tablet TAKE 1 TABLET DAILY 30 MINUTES PRIOR TO BREAKFAST    ZINC GLUCONATE ORAL Take by mouth.     Family History     Problem Relation (Age of Onset)    Allergic rhinitis Father, Son, Sister    Allergies Father, Mother    Asthma Son, Sister    Cancer Father    Colon polyps Maternal Grandmother    Diverticulitis Brother    Hypertension Father    No Known Problems Daughter, Maternal Aunt, Maternal Uncle, Paternal Aunt, Paternal Uncle, Maternal Grandfather, Paternal Grandmother, Paternal Grandfather, Brother, Sister    Stroke Mother        Tobacco Use    Smoking status: Former Smoker     Packs/day: 2.00     Years: 20.00     Pack years: 40.00     Types: Cigarettes     Last attempt to quit: 1994     Years since quittin.1    Smokeless tobacco: Never Used   Substance and Sexual Activity    Alcohol use: No    Drug use: No    Sexual activity: Yes     Partners: Male     ROS  Objective:     Vital Signs (Most Recent):  Temp: 98.9 °F (37.2 °C) (20 0752)  Pulse: 73 (20 1007)  Resp: 15 (20 1007)  BP: (!) 157/75 (20 0752)  SpO2: 99 % (20 1007) Vital Signs (24h Range):  Temp:  [97.8 °F (36.6 °C)-98.9 °F (37.2 °C)] 98.9 °F (37.2 °C)  Pulse:  [] 73  Resp:  [15-22] 15  SpO2:  [94 %-99 %] 99 %  BP: (108-157)/(55-99) 157/75     Weight: 51.3 kg (113 lb 1.5 oz)  Body mass index is 18.25 kg/m².    SpO2: 99 %  O2 Device (Oxygen Therapy): nasal cannula(SB)    No intake or output data in the 24 hours ending 20 1117    Lines/Drains/Airways     Peripherally Inserted Central Catheter Line             PICC Double Lumen 03/12/20 0900 right brachial;right basilic 10 days          Drain                 Gastrostomy/Enterostomy Jejunostomy tube -- days                Physical Exam very depressed mood  Heart rate is 77 blood pressure is 157/70 pulses small volume and regular heart sounds are normal I did not appreciate any cardiac murmurs lungs few scattered Creps in the bases no wheezing    Significant Labs:   CMP   Recent Labs   Lab 03/21/20  0534 03/22/20  0402     136 134*   K 3.9  3.9 4.0     102 97   CO2 29  29 28   *  137* 87   BUN 17  17 17   CREATININE 0.4*  0.4* 0.4*   CALCIUM 8.4*  8.4* 8.8   PROT 6.5 6.2   ALBUMIN 2.7* 2.7*   BILITOT 0.8 0.9   ALKPHOS 71 121   AST 24 45*   ALT 26 46*   ANIONGAP 5*  5* 9   ESTGFRAFRICA >60.0  >60.0 >60.0   EGFRNONAA >60.0  >60.0 >60.0   , CBC   Recent Labs   Lab 03/21/20  0534 03/22/20  0402   WBC 14.59*  14.59*  14.59* 11.85   HGB 11.4*  11.4*  11.4* 11.4*   HCT 35.7*  35.7*  35.7* 35.7*     227  227 248    and INR No results for input(s): INR, PROTIME in the last 48 hours.    Significant Imaging: EKG: There are multiple ECGs in the past 24 hr the basically show heart rates in the 150s narrow complex most of the time fairly regular suspect atrial tachycardia versus atrial flutter  Assessment and Plan:  Coronary artery disease stable  Esophageal cancer status post stent to esophagus with dysphagia.  Paroxysmal atrial flutter versus atrial tachycardia.  Status post placement of recent jejunostomy tube  Recommendations  When ready for Eliquis started 2.5 mg b.i.d. in view of her low body weight of 51 kg  When she is able to tolerate p.o. meds change over to diltiazem 30 mg q.8 hourly  Obtain an echo to evaluate for any pericardial effusion or metastasis.  She may benefit from Remeron as an antidepressant and appetite stimulant.  Discontinue p.o. Metoprolol.       Active Diagnoses:    Diagnosis Date Noted POA    PRINCIPAL PROBLEM:   Intractable vomiting [R11.10] 03/13/2020 Yes    Severe sepsis [A41.9, R65.20] 03/21/2020 Unknown    Inanition [R64] 03/20/2020 Yes    Malignant neoplasm of esophagus [C15.9] 03/04/2020 Yes    S/P ablation of atrial fibrillation [Z98.890, Z86.79] 10/01/2019 Not Applicable    Esophageal lesion [K22.9] 10/31/2017 Yes    S/P CABG (coronary artery bypass graft) [Z95.1] 06/01/2016 Not Applicable    History of smoking 10-25 pack years, 15 pack-years, quit 1994 [Z87.891] 05/19/2016 Not Applicable    COPD (chronic obstructive pulmonary disease) [J44.9] 05/18/2016 Yes    History of lung cancer [Z85.118] 03/20/2015 Not Applicable    S/P CABG x 2, 2005 [Z95.1] 09/04/2013 Not Applicable    HTN (hypertension) [I10] 09/04/2013 Yes    Hyperlipidemia [E78.5] 09/04/2013 Yes    PAF (paroxysmal atrial fibrillation), onset 2002 [I48.0] 09/04/2013 Yes    CAD (coronary artery disease) [I25.10] 08/27/2013 Yes    Anxiety [F41.9] 08/27/2013 Yes    Anticoagulant long-term use [Z79.01] 07/19/2012 Not Applicable    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 I will follow-up with patient. Please contact us if you have any additional questions.    Arthur Ahuja MD  Cardiology   [facility]

## 2020-03-23 PROBLEM — A41.9 SEVERE SEPSIS: Status: RESOLVED | Noted: 2020-01-01 | Resolved: 2020-01-01

## 2020-03-23 PROBLEM — R65.20 SEVERE SEPSIS: Status: RESOLVED | Noted: 2020-01-01 | Resolved: 2020-01-01

## 2020-03-23 NOTE — PT/OT/SLP EVAL
Occupational Therapy   Evaluation    Name: Steph Lira  MRN: [id number]  Admitting Diagnosis:  Intractable vomiting 3 Days Post-Op    Recommendations:     Discharge Recommendations: home health OT  Discharge Equipment Recommendations:  none  Barriers to discharge:  None    Assessment:     Steph Lira is a 69 y.o. female with a medical diagnosis of Intractable vomiting.  Pt agreeable to OT eval this session. Performance deficits affecting function: weakness, impaired endurance, impaired self care skills, impaired functional mobilty, gait instability, impaired balance, decreased upper extremity function, pain.  Pt oriented X 4, no family at bedside. Eval limited 2/2 pt with pain and fatigue. Rec HHOT at d/c.     Rehab Prognosis: Good; patient would benefit from acute skilled OT services to address these deficits and reach maximum level of function.       Plan:     Patient to be seen 3 x/week to address the above listed problems via self-care/home management, therapeutic activities, therapeutic exercises  · Plan of Care Expires: 04/23/20  · Plan of Care Reviewed with: patient    Subjective     Chief Complaint: pain to abdomen   Patient/Family Comments/goals: to feel better    Occupational Profile:  Living Environment: Pt lives with  in a 1 story home with no steps to enter. Pt has a walk-in shower, raised toilet, and grabbars present.   Previous level of function: Independent ADLs/functional mobility   Roles and Routines: ; homemaker   Equipment Used at Home:  shower chair, grab bar, raised toilet  Assistance upon Discharge: yes, from family    Pain/Comfort:  · Pain Rating 1: (not rated)  · Location 1: abdomen  · Pain Addressed 1: Reposition, Distraction    Patients cultural, spiritual, Scientology conflicts given the current situation:      Objective:     Communicated with: nursing prior to session.  Patient found HOB elevated with bed alarm, blood pressure cuff, peripheral IV, pulse ox (continuous),  telemetry, PICC line(J-tube) upon OT entry to room.    General Precautions: Standard, fall   Orthopedic Precautions:N/A   Braces: N/A     Occupational Performance:    Bed Mobility:    · Patient completed Supine to Sit with stand by assistance  · Patient completed Sit to Supine with stand by assistance    Functional Mobility/Transfers:  · Patient completed Sit <> Stand Transfer with stand by assistance  with  no assistive device   · Patient completed Toilet Transfer Stand Pivot technique with contact guard assistance with  no AD    Activities of Daily Living:  · Grooming: setup assistance seated EOB     · Lower Body Dressing: stand by assistance seated EOB to don/doff socks     Cognitive/Visual Perceptual:  Cognitive/Psychosocial Skills:     -       Oriented to: Person, Place, Time and Situation   -       Follows Commands/attention:Follows multistep  commands  -       Communication: clear/fluent  -       Memory: No Deficits noted  -       Safety awareness/insight to disability: intact   -       Mood/Affect/Coping skills/emotional control: Appropriate to situation and Cooperative    Physical Exam:  Balance:    -       SBA seated balance; CGA standing balance  Upper Extremity Range of Motion:     -       Right Upper Extremity: ~90 degrees shoulder flex; WFL distally  -       Left Upper Extremity: ~90 degrees shoulder flex; WFL distally  Upper Extremity Strength:    -       Right Upper Extremity: NT 2/2 pain  -       Left Upper Extremity: NT 2/2 pain   Strength:    -       Right Upper Extremity: fair  -       Left Upper Extremity: fair  Gross motor coordination:   WFL    AMPAC 6 Click ADL:  AMPAC Total Score: 19    Treatment & Education:  Pt educated on role of OT/POC and safety during ADLs, during transfers, and during functional mobility.   Education:    Patient left HOB elevated with all lines intact, call button in reach and bed alarm on    GOALS:   Multidisciplinary Problems     Occupational Therapy Goals         Problem: Occupational Therapy Goal    Goal Priority Disciplines Outcome Interventions   Occupational Therapy Goal     OT, PT/OT     Description:  Goals to be met by: discharge    Patient will increase functional independence with ADLs by performing:    UE Dressing with Supervision.  LE Dressing with Supervision.  Grooming while standing at sink with Supervision.  Toileting from toilet with Supervision for hygiene and clothing management.   Toilet transfer to toilet with Supervision.                      History:     Past Medical History:   Diagnosis Date    *Atrial fibrillation     and Hx PSVT    Adenocarcinoma of left lung, stage 3 3/20/2015    Allergic drug rash 12/28/2016    Allergy     chronic     Arthritis     fingers    Benign tumor of throat     Bronchitis March 2013    CAD (coronary artery disease) 2005    CABGx2 in 2005 and 2 MI after with 4 stents    Cancer 1996    small cell lung cancer s/p resection of 1/3 lung, 5 ribs and muscle mass then had chemo and radiation    Cataract     OD     CHF (congestive heart failure) past Hx    Chronic rhinitis     Clot past Hx    external jugular, now stented, occluded, had phlebitis; now revascularized    Colon polyp     COPD (chronic obstructive pulmonary disease)     no oxygen or nebulizers    COPD exacerbation 5/8/13    improved 5/14/13 after steroid pack,antibiotics    Dysphagia     Esophageal stenosis     Former smoker     GERD (gastroesophageal reflux disease)     Heart block     Hyperlipidemia     Hypertension     pt states does not have //    MI (myocardial infarction) 2005    Posterior vitreous detachment of left eye     Thyroid disease        Past Surgical History:   Procedure Laterality Date    ABLATION N/A 12/11/2018    Procedure: ABLATION;  Surgeon: Santana Covarrubias MD;  Location: [facility] EP LAB;  Service: Cardiology;  Laterality: N/A;  AF, JUAN ANTONIO, PVI, RFA, CARTO, GEN, GP, 3 PREP    ABLATION OF ARRHYTHMOGENIC FOCUS FOR ATRIAL  FIBRILLATION N/A 5/8/2019    Procedure: ABLATION, ARRHYTHMOGENIC FOCUS, FOR ATRIAL FIBRILLATION;  Surgeon: Santana Covarrubias MD;  Location: [facility] EP LAB;  Service: Cardiology;  Laterality: N/A;  AF, PVI, RFA, CARTO, GEN, GP , 3 PREP    ADENOIDECTOMY      APPENDECTOMY      BACK SURGERY      lumbar    BREAST BIOPSY Left     benign    CARDIAC SURGERY      CARDIOVERSION  5/8/2019    Procedure: Cardioversion;  Surgeon: Santana Covarrubias MD;  Location: [facility] EP LAB;  Service: Cardiology;;    CATARACT EXTRACTION Left     OS    CATARACT EXTRACTION, BILATERAL      left eye only    CHOLECYSTECTOMY      COLONOSCOPY  03/2012    repeat in 5 years    COLONOSCOPY N/A 6/19/2017    Procedure: COLONOSCOPY;  Surgeon: Kal Elise MD;  Location: [facility];  Service: Endoscopy;  Laterality: N/A;    CORONARY ARTERY BYPASS GRAFT  2005    2 vessel    ESOPHAGOGASTRODUODENOSCOPY N/A 7/5/2018    Procedure: ESOPHAGOGASTRODUODENOSCOPY (EGD)/cryo;  Surgeon: Robbie Gonzales MD;  Location: [facility] (2ND FLR);  Service: Endoscopy;  Laterality: N/A;  Eliquis/Dr Henry Wei/OK to hold med 2 days prior to egd/see telephone encounter dated 6/12/18/pt stated she needs to take Diflucan 1 tab po daily 12 days prior to egd, message sent to Sarah/she will check with Dr Gonzales/santo    ESOPHAGOGASTRODUODENOSCOPY N/A 10/3/2018    Procedure: EGD (ESOPHAGOGASTRODUODENOSCOPY)/poss cryo;  Surgeon: Robbie Gonzales MD;  Location: [facility] (2ND FLR);  Service: Endoscopy;  Laterality: N/A;  2 day hold Dr Henry Pavon  Diflucan 1 tab po daily starting 14 days prior to egd, Rx'd per Dr Gonzales    ESOPHAGOGASTRODUODENOSCOPY N/A 11/1/2018    Procedure: EGD (ESOPHAGOGASTRODUODENOSCOPY)/cryo;  Surgeon: Robbie Gonzales MD;  Location: [facility] (2ND FLR);  Service: Endoscopy;  Laterality: N/A;  2 day hold Dr Henry Pavon  Diflucan 1 tab po daily starting 14 days prior to egd, Rx'd per Dr Gonzales as before?     ESOPHAGOGASTRODUODENOSCOPY N/A 9/27/2019    Procedure: EGD (ESOPHAGOGASTRODUODENOSCOPY);  Surgeon: Robbie Gonzales MD;  Location: [facility] (2ND FLR);  Service: Endoscopy;  Laterality: N/A;  Coumadin was d/c'd 9/18/19.  No other anti-thrombotic med for now.  pg  Per Dr Gonzales, she does not need to take Diflucan pre-procedure as she did before.  pg    ESOPHAGOGASTRODUODENOSCOPY N/A 1/31/2020    Procedure: EGD (ESOPHAGOGASTRODUODENOSCOPY)/poss cryo;  Surgeon: Robbie Gonzales MD;  Location: [facility] (2ND FLR);  Service: Endoscopy;  Laterality: N/A;  ok for eliquis hold-see telephone order 1/23/20-tb    ESOPHAGOGASTRODUODENOSCOPY N/A 3/12/2020    Procedure: EGD (ESOPHAGOGASTRODUODENOSCOPY);  Surgeon: Ulisses Colorado III, MD;  Location: [facility];  Service: Endoscopy;  Laterality: N/A;    ESOPHAGOGASTRODUODENOSCOPY N/A 3/17/2020    Procedure: EGD (ESOPHAGOGASTRODUODENOSCOPY) in OR w/ fluoro;  Surgeon: Ulisses Colorado III, MD;  Location: [facility];  Service: Endoscopy;  Laterality: N/A;  IN OR with fluoro    ESOPHAGOGASTRODUODENOSCOPY N/A 3/20/2020    Procedure: EGD (ESOPHAGOGASTRODUODENOSCOPY);  Surgeon: Rafat Ruiz III, MD;  Location: [facility] OR;  Service: General;  Laterality: N/A;    EYE SURGERY  Dec 2012    ptosis repair    FIRST RIB REMOVAL  1996 with lung resection    HYSTERECTOMY      LUNG LOBECTOMY  1996    left lung for cancer    PLACEMENT OF JEJUNOSTOMY TUBE N/A 3/20/2020    Procedure: INSERTION, JEJUNOSTOMY TUBE;  Surgeon: Rafat Ruiz III, MD;  Location: [facility] OR;  Service: General;  Laterality: N/A;    REMOVAL OF PLANTAR WART USING LASER      SHOULDER SURGERY  2010,2011    scapular entrapment after lung surgery, needed 5 ribs removed left side    SYMPOTHATIC NERVE LEFT SIDE      1996 with lung surgery    TONSILLECTOMY      TONSILLECTOMY, ADENOIDECTOMY, BILATERAL MYRINGOTOMY AND TUBES      UPPER GASTROINTESTINAL ENDOSCOPY  05/2016    Dr. Koo    VASCULAR SURGERY       stents place in jugualr vein       Time Tracking:     OT Date of Treatment: 03/23/20  OT Start Time: 1015  OT Stop Time: 1030  OT Total Time (min): 15 min    Billable Minutes:Evaluation 6  Self Care/Home Management 9    Kathy Bernal OT  3/23/2020

## 2020-03-23 NOTE — PLAN OF CARE
03/23/20 0735   Patient Assessment/Suction   Level of Consciousness (AVPU) alert   Respiratory Effort Normal   JUDD Breath Sounds diminished   LLL Breath Sounds diminished   RUL Breath Sounds clear   RML Breath Sounds clear   RLL Breath Sounds clear   PRE-TX-O2   O2 Device (Oxygen Therapy) room air   SpO2 99 %   Pulse Oximetry Type Continuous   $ Pulse Oximetry - Multiple Charge Pulse Oximetry - Multiple   Pulse 97   Resp 20   Inhaler   $ Inhaler Charges MDI (Metered Dose Inahler) Treatment   Daily Review of Necessity (Inhaler) completed   Respiratory Treatment Status (Inhaler) given   Treatment Route (Inhaler) spacer/holding chamber   Patient Position (Inhaler) HOB elevated   Post Treatment Assessment (Inhaler) breath sounds unchanged   Signs of Intolerance (Inhaler) none

## 2020-03-23 NOTE — PLAN OF CARE
Problem: Parenteral Nutrition  Goal: Effective Intravenous Nutrition Therapy Delivery  Outcome: Ongoing, Progressing  Intervention: Optimize Intravenous Nutrition Delivery  Flowsheets (Taken 3/23/2020 1620)  Nutrition Support Management: parenteral nutrition composition adjusted

## 2020-03-23 NOTE — PT/OT/SLP PROGRESS
Physical Therapy Treatment    Patient Name:  Steph Lira   MRN:  [id number]    Recommendations:     Discharge Recommendations:  outpatient PT   Discharge Equipment Recommendations: (tbd)   Barriers to discharge: None    Assessment:     Steph Lira is a 69 y.o. female admitted with a medical diagnosis of Intractable vomiting.  She presents with the following impairments/functional limitations:  weakness, impaired functional mobilty, gait instability, impaired endurance, impaired balance, impaired self care skills, pain . Pt resting supine, agrees to PT in p.m and was able to rest for about an hour. TPN put on hold during rx and resumed after rx with HOB at 50 degrees. Education provided to pt. No family present. Good vital signs. /77, HR 99, O2 sats 96% room air before rx and /87 after ambulation.     Rehab Prognosis: Good; patient would benefit from acute skilled PT services to address these deficits and reach maximum level of function.    Recent Surgery: Procedure(s) (LRB):  EGD (ESOPHAGOGASTRODUODENOSCOPY) (N/A)  INSERTION, JEJUNOSTOMY TUBE (N/A) 3 Days Post-Op    Plan:     During this hospitalization, patient to be seen 6 x/week to address the identified rehab impairments via gait training, therapeutic activities, therapeutic exercises and progress toward the following goals:    · Plan of Care Expires:  04/23/20    Subjective     Chief Complaint: fatigue  Patient/Family Comments/goals: to get better  Pain/Comfort:  · Pain Rating 1: (pain in incision but gave no number. )      Objective:     Communicated with rn prior to session.  Patient found HOB elevated with bed alarm, blood pressure cuff, pulse ox (continuous), peripheral IV, telemetry, TED drain, PICC line(TPN) upon PT entry to room.     General Precautions: Standard, fall   Orthopedic Precautions:N/A   Braces: N/A     Functional Mobility:  · Bed Mobility:     · Rolling Right: supervision  · Scooting: supervision  · Supine to Sit:  supervision  · Transfers:     · Sit to Stand:  contact guard assistance with rolling walker  · Gait: pt able to ambulate 120 ft RW cga, some cues for walker use, no dizziness, only faituge but no give way. Education provided.       AM-PAC 6 CLICK MOBILITY  Turning over in bed (including adjusting bedclothes, sheets and blankets)?: 4  Sitting down on and standing up from a chair with arms (e.g., wheelchair, bedside commode, etc.): 3  Moving from lying on back to sitting on the side of the bed?: 4  Moving to and from a bed to a chair (including a wheelchair)?: 3  Need to walk in hospital room?: 3  Climbing 3-5 steps with a railing?: 3  Basic Mobility Total Score: 20       Therapeutic Activities and Exercises:   education, fall prevention, poc, dc planning.    Patient left HOB elevated with all lines intact, call button in reach, bed alarm on and rn notified..    GOALS:   Multidisciplinary Problems     Physical Therapy Goals        Problem: Physical Therapy Goal    Goal Priority Disciplines Outcome Goal Variances Interventions   Physical Therapy Goal     PT, PT/OT Ongoing, Progressing     Description:  Goals to be met by: D/C     Patient will increase functional independence with mobility by performin. Supine to sit with Supervision  2. Sit to stand transfer with Supervision  3. Bed to chair transfer with Supervision   4. Gait  x 250 feet with Supervision using no AD                      Time Tracking:     PT Received On: 20  PT Start Time: 1340     PT Stop Time: 1403  PT Total Time (min): 23 min     Billable Minutes: Gait Training 13 and Therapeutic Activity 10    Treatment Type: Treatment  PT/PTA: PT     PTA Visit Number: 0     Cynthia Polo, PT  2020

## 2020-03-23 NOTE — PROGRESS NOTES
[facility]  Progress Note    Patient Name: Steph Lira  MRN: [id number]  Patient Class: IP- Inpatient   Admission Date: 3/13/2020  Length of Stay: 9 days  Attending Physician: Marcos Khoury MD  Primary Care Provider: Maida Mon MD        Subjective:     Principal Problem:Intractable vomiting      Interval History: Assumed care of the patient today. Admitted with dysphagia in the setting of esophageal squamous cell carcinoma. S/p proximal esophageal stent placement as well as J-tube placement. On J-tube feeds.    Endorses sore throat; does not want to attempt oral intake. Several episodes of bilious and non-bloody emesis yesterday; better with metoclopramide.       Objective:     Vital Signs (Most Recent):  Temp: 98.4 °F (36.9 °C) (03/23/20 0737)  Pulse: 98 (03/23/20 0737)  Resp: (!) 22 (03/23/20 0737)  BP: 112/65 (03/23/20 0737)  SpO2: 98 % (03/23/20 0737) Vital Signs (24h Range):  Temp:  [97.9 °F (36.6 °C)-98.9 °F (37.2 °C)] 98.4 °F (36.9 °C)  Pulse:  [] 98  Resp:  [18-35] 22  SpO2:  [97 %-99 %] 98 %  BP: (103-153)/(60-90) 112/65     Weight: 53.3 kg (117 lb 8.1 oz)  Body mass index is 18.97 kg/m².    Intake/Output Summary (Last 24 hours) at 3/23/2020 1047  Last data filed at 3/23/2020 0500  Gross per 24 hour   Intake 1280 ml   Output 100 ml   Net 1180 ml      Physical Exam   Constitutional: She is oriented to person, place, and time. No distress.   Frail  female   HENT:   Head: Normocephalic and atraumatic.   Eyes: Conjunctivae are normal. No scleral icterus.   Neck: Neck supple. No thyromegaly present.   Cardiovascular: Normal rate, regular rhythm and normal heart sounds. Exam reveals no friction rub.   No murmur heard.  Pulmonary/Chest: Effort normal and breath sounds normal. No respiratory distress. She has no wheezes. She has no rales.   Diminished air entry to bases    Abdominal: Soft. She exhibits no distension. There is no tenderness.   J tube noted     Musculoskeletal: She exhibits no edema or deformity.   Neurological: She is alert and oriented to person, place, and time. No sensory deficit.   Skin: Skin is warm and dry. She is not diaphoretic.   Psychiatric: Her behavior is normal. She exhibits a depressed mood.   Nursing note and vitals reviewed.      Significant Labs:   CBC:   Recent Labs   Lab 03/22/20  0402 03/23/20  0335   WBC 11.85 10.52  10.52   HGB 11.4* 11.6*  11.6*   HCT 35.7* 36.1*  36.1*    274  274     CMP:   Recent Labs   Lab 03/22/20  0402 03/23/20  0335   * 134*   K 4.0 3.8   CL 97 102   CO2 28 26   GLU 87 150*   BUN 17 17   CREATININE 0.4* 0.4*   CALCIUM 8.8 8.0*   PROT 6.2 6.0   ALBUMIN 2.7* 2.4*   BILITOT 0.9 0.9   ALKPHOS 121 149*   AST 45* 83*   ALT 46* 85*   ANIONGAP 9 6*   EGFRNONAA >60.0 >60.0     Magnesium:   Recent Labs   Lab 03/22/20  0402 03/23/20  0335   MG 1.8 2.2       Significant Imaging: I have reviewed all pertinent imaging results/findings within the past 24 hours.     Procedure Component Value Units Date/Time   X-Ray KUB [[id number]] Resulted: 03/22/20 1300   Order Status: Completed Updated: 03/22/20 1302   Narrative:     EXAMINATION:  XR KUB    CLINICAL HISTORY:  abdominal pain;    FINDINGS:  KUB is compared to prior study 03/21/2020    There is a J-tube in place.  There is a normal bowel gas pattern.  No organomegaly or abnormal soft tissue masses present.  There are cutaneous staples in the lower mid abdomen.   Impression:       Unremarkable bowel gas pattern      Electronically signed by: Nancy Garner MD  Date: 03/22/2020  Time: 13:00         Echocardiogram  Reading physician: Sonny Le MD Ordering physician: Marcos Khoury MD Study date: 3/23/20   Reason for Exam   Priority: Routine   Dx: Atrial tachycardia [I47.1 (ICD-10-CM)]   Comments:    Conclusion     · The estimated PA systolic pressure is 24 mmHg.  · Concentric left ventricular hypertrophy.  · Moderately decreased left ventricular  systolic function. The estimated ejection fraction is 38%.  · Grade I (mild) left ventricular diastolic dysfunction consistent with impaired relaxation.  · Moderate global hypokinetic wall motion.  · Mild mitral regurgitation.  · Mild tricuspid regurgitation.  · Mild right atrial enlargement.  · Normal central venous pressure (3 mmHg).         Assessment/Plan:      Active Hospital Problems    Diagnosis    *Intractable vomiting    Malignant neoplasm of esophagus    PAF (paroxysmal atrial fibrillation), onset 2002    Inanition    S/P ablation of atrial fibrillation    S/P CABG (coronary artery bypass graft)    History of smoking 10-25 pack years, 15 pack-years, quit 1994    COPD (chronic obstructive pulmonary disease)    Adenocarcinoma of left lung, stage 3    S/P CABG x 2, 2005    HTN (hypertension)    Hyperlipidemia    CAD (coronary artery disease)    Anxiety    Anticoagulant long-term use    Chronic external jugular vein thrombosis    Hypothyroid    GERD (gastroesophageal reflux disease)     Plan:  S/p esophageal stent placement on 03/17 and repositioning on 03/20   Symptomatic management of nausea/vomiting - added metoclopramide which seems to be helping   Advance tube feeds as tolerated - reviewed abd XR from yesterday - non-obstructive bowel gas pattern   Also on TPN which will be weaned slowly once tube feeds at goal rate   Change to oral diltiazem 30mg q8h for atrial tachycardia vs paroxysmal atrial flutter which has since resolved   Anticoagulation with enoxaparin if pt were to need procedures; holding home apixaban         VTE Risk Mitigation (From admission, onward)         Ordered     enoxaparin injection 50 mg  Every 12 hours (non-standard times)      03/22/20 1427     IP VTE HIGH RISK PATIENT  Once      03/13/20 1928                      Marcos Khoury MD  Department of Hospital Medicine   [facility]

## 2020-03-23 NOTE — PROGRESS NOTES
[facility]  Adult Nutrition   Progress Note (Nutrition Support Management)    SUMMARY     Recommendations  Recommendation/Intervention:   1. New TPN ordered to be initiated today at 17:00 and run @ 40 ml/hr.   2. Recommend continuation of tube feeding and advancement to goal rate of 45 ml/hr as tolerated.   3. RD to manage transitional feeding from TPN to enteral nutrition as appropriate.   Goals:   1. Patient to tolerate enteral nutrition and EN to advance to goal rate.   2. Patient to transition from TPN to enteral nutrition.   3. Nutrition provision to meet estimated protein, energy and fluid needs.   Nutrition Goal Status: progressing towards goal  Communication of RD Recs: reviewed with RN    Dietitian Rounds Brief  Patient tolerating enteral nutrition at 20 ml/hr. Patient c/o nausea per RN. RD notified RN that TPN will be ordered to continue today. RD modified TPN composition and rate. Decreased rate and macro-nutrients as to not overfeed. Enteral nutrition rate to continue to advance to goal rate as tolerated. New TPN order + Jevity 1.5 @ 20 ml/hr to provide 1676 kcal/day and 72 g/day protein.      PARENTERAL NUTRITION PROGRESS NOTE:    Parenteral Nutrition Day # 10  Diagnosis/Indication for PN: NPO, esophageal cancer with mass obstructing esophagus, intractable N/V   IV Access: PICC  Diet/Tube Feeding: Jevity 1.5 @ 20 ml/hr (Goal rate = 45 ml/hr)         Admixture Type: 3 in 1 custom TPN (10% AA, 70% Dextrose, 20% Intralipid)   Infusion Rate and Frequency: 40 ml/hr   Patient Acuity: acute care     PN Composition:   45 grams Amino Acid, 140 grams Dextrose, and 30 grams Lipid.    Today's TPN provides 956 kcal.     Please see order for electrolytes and additives content and adjustments.   *The Nutrition Support Team will continue to monitor electrolytes daily and adjust parenteral nutrition as warranted.    Reason for Assessment  Reason For Assessment: new TPN, RD follow-up    Nutrition Risk  "Screen  Nutrition Risk Screen: tube feeding or parenteral nutrition     MST Score: 3  Have you recently lost weight without trying?: Yes: 14-23 lbs  Weight loss score: 2  Have you been eating poorly because of a decreased appetite?: Yes  Appetite score: 1       Nutrition/Diet History  Spiritual, Cultural Beliefs, Hindu Practices, Values that Affect Care: no  Food Allergies: NKFA  Factors Affecting Nutritional Intake: difficulty/impaired swallowing    Anthropometrics  Temp: 98.1 °F (36.7 °C)  Height Method: Stated  Height: 5' 6" (167.6 cm)  Height (inches): 66 in  Weight Method: Bed Scale  Weight: 53.3 kg (117 lb 8.1 oz)  Weight (lb): 117.51 lb  Ideal Body Weight (IBW), Female: 130 lb  % Ideal Body Weight, Female (lb): 87 %  BMI (Calculated): 19  BMI Grade: 18.5-24.9 - normal       Weight History:  Wt Readings from Last 10 Encounters:   03/23/20 53.3 kg (117 lb 8.1 oz)   03/12/20 52.7 kg (116 lb 2.9 oz)   03/11/20 52.6 kg (116 lb)   03/09/20 54.4 kg (120 lb)   03/06/20 54.9 kg (121 lb)   03/04/20 54.9 kg (121 lb)   02/27/20 56.2 kg (123 lb 14.4 oz)   02/21/20 57.2 kg (126 lb)   01/31/20 57.2 kg (126 lb)   01/20/20 58.5 kg (128 lb 15.5 oz)       Lab/Procedures/Meds: Pertinent Labs Reviewed  Clinical Chemistry:  Recent Labs   Lab 03/21/20  0534 03/22/20  0402 03/23/20  0335     136 134* 134*   K 3.9  3.9 4.0 3.8     102 97 102   CO2 29  29 28 26   *  137* 87 150*   BUN 17  17 17 17   CREATININE 0.4*  0.4* 0.4* 0.4*   CALCIUM 8.4*  8.4* 8.8 8.0*   PROT 6.5 6.2 6.0   ALBUMIN 2.7* 2.7* 2.4*   BILITOT 0.8 0.9 0.9   ALKPHOS 71 121 149*   AST 24 45* 83*   ALT 26 46* 85*   ANIONGAP 5*  5* 9 6*   ESTGFRAFRICA >60.0  >60.0 >60.0 >60.0   EGFRNONAA >60.0  >60.0 >60.0 >60.0   MG 2.0 1.8 2.2   PHOS 2.9 2.6* 3.3     CBC:   Recent Labs   Lab 03/23/20  0335   WBC 10.52  10.52   RBC 3.95*  3.95*   HGB 11.6*  11.6*   HCT 36.1*  36.1*     274   MCV 91  91   MCH 29.4  29.4   MCHC 32.1  32.1 "     Lipid Panel:  Recent Labs   Lab 03/23/20  0335   TRIG 85     Inflammatory Labs:  Recent Labs   Lab 03/19/20  0532   CRP 15.62*     Medications: Pertinent Medications reviewed  Scheduled Meds:   alteplase  2 mg Intra-Catheter Once    diltiaZEM  30 mg Per J Tube Q8H    enoxparin  50 mg Subcutaneous Q12H    fluticasone propionate  1 spray Each Nostril Daily    tiotropium  1 capsule Inhalation Daily     Continuous Infusions:   lactated ringers 75 mL/hr at 03/20/20 1832    TPN ADULT CENTRAL LINE CUSTOM (3 in 1) 65 mL/hr at 03/22/20 1710    TPN ADULT CENTRAL LINE CUSTOM (3 in 1)       PRN Meds:.acetaminophen, acetaminophen, albuterol-ipratropium, calcium chloride IVPB, calcium chloride IVPB, calcium chloride IVPB, dextrose 50%, dextrose 50%, hydrALAZINE, HYDROmorphone, insulin regular, magnesium oxide, magnesium sulfate IVPB, magnesium sulfate IVPB, magnesium sulfate IVPB, magnesium sulfate IVPB, metoclopramide HCl, metoprolol, ondansetron, potassium chloride in water, potassium chloride in water, potassium chloride in water, potassium chloride in water, potassium chloride, potassium chloride, potassium chloride, potassium chloride, promethazine (PHENERGAN) IVPB, sodium chloride 0.9%, sodium chloride 0.9%, sodium phosphate IVPB, sodium phosphate IVPB, sodium phosphate IVPB, sodium phosphate IVPB, sodium phosphate IVPB    Estimated/Assessed Needs  Weight Used For Calorie Calculations: 53.3 kg (117 lb 8.1 oz)  Energy Calorie Requirements (kcal): 1599 - 1866 (30 - 35)   Energy Need Method: Kcal/kg  Protein Requirements: 64 - 80 (1.2 - 1.5 g/kg)   Weight Used For Protein Calculations: 53.3 kg (117 lb 8.1 oz)     Estimated Fluid Requirement Method: RDA Method  RDA Method (mL): 1599       Nutrition Prescription Ordered  Current Diet Order: clear liquid   Current Nutrition Support Formula Ordered: Other (Comment), Jevity 1.5(custom TPN @ 65 ml/hr )  Current Nutrition Support Rate Ordered: 20 (ml)  Current Nutrition  Support Frequency Ordered: ml/hr     Evaluation of Received Nutrient/Fluid Intake  Enteral Calories (kcal): 720  Enteral Protein (gm): 27  Enteral (Free Water) Fluid (mL): 365  Free Water Flush Fluid (mL): 180  Parenteral Calories (kcal): 1585  Parenteral Protein (gm): 75  Parenteral Fluid (mL): 1560  Total Calories (kcal): 2305  Total Protein (gm): 102  Total Fluid Intake (mL): 2105  Energy Calories Required: exceeds needs  Protein Required: exceeds needs  Fluid Required: exceeds needs  Tolerance: tolerating     Intake/Output Summary (Last 24 hours) at 3/23/2020 1629  Last data filed at 3/23/2020 1600  Gross per 24 hour   Intake 1380 ml   Output 100 ml   Net 1280 ml      Nutrition Risk  Level of Risk/Frequency of Follow-up: high     Monitor and Evaluation  Food and Nutrient Intake: energy intake, parenteral nutrition intake, enteral nutrition intake  Food and Nutrient Adminstration: enteral and parenteral nutrition administration, diet order  Physical Activity and Function: nutrition-related ADLs and IADLs, factors affecting access to physical activity  Anthropometric Measurements: weight change, weight, body mass index  Biochemical Data, Medical Tests and Procedures: electrolyte and renal panel, glucose/endocrine profile, lipid profile, gastrointestinal profile, inflammatory profile  Nutrition-Focused Physical Findings: overall appearance     Nutrition Follow-Up  RD Follow-up?: Yes     Ileana Ware RD 03/23/2020 4:31 PM

## 2020-03-23 NOTE — NURSING
Pt HR increasing to 150s. Dr. Ahuja at bedside. Verbal received to push 10mg cardizem. Drip then increased to 5mg. Will continue to titrate and monitor

## 2020-03-23 NOTE — PLAN OF CARE
Problem: Physical Therapy Goal  Goal: Physical Therapy Goal  Description  Goals to be met by: D/C     Patient will increase functional independence with mobility by performin. Supine to sit with Supervision  2. Sit to stand transfer with Supervision  3. Bed to chair transfer with Supervision   4. Gait  x 250 feet with Supervision using no AD     Outcome: Ongoing, Progressing

## 2020-03-23 NOTE — SUBJECTIVE & OBJECTIVE
Interval History: Assumed care of the patient today. Admitted with dysphagia in the setting of esophageal squamous cell carcinoma. S/p proximal esophageal stent placement as well as J-tube placement. On J-tube feeds.    Endorses sore throat; does not want to attempt oral intake. Several episodes of bilious and non-bloody emesis yesterday; better with metoclopramide.       Objective:     Vital Signs (Most Recent):  Temp: 98.4 °F (36.9 °C) (03/23/20 0737)  Pulse: 98 (03/23/20 0737)  Resp: (!) 22 (03/23/20 0737)  BP: 112/65 (03/23/20 0737)  SpO2: 98 % (03/23/20 0737) Vital Signs (24h Range):  Temp:  [97.9 °F (36.6 °C)-98.9 °F (37.2 °C)] 98.4 °F (36.9 °C)  Pulse:  [] 98  Resp:  [18-35] 22  SpO2:  [97 %-99 %] 98 %  BP: (103-153)/(60-90) 112/65     Weight: 53.3 kg (117 lb 8.1 oz)  Body mass index is 18.97 kg/m².    Intake/Output Summary (Last 24 hours) at 3/23/2020 1047  Last data filed at 3/23/2020 0500  Gross per 24 hour   Intake 1280 ml   Output 100 ml   Net 1180 ml      Physical Exam   Constitutional: She is oriented to person, place, and time. No distress.   Frail  female   HENT:   Head: Normocephalic and atraumatic.   Eyes: Conjunctivae are normal. No scleral icterus.   Neck: Neck supple. No thyromegaly present.   Cardiovascular: Normal rate, regular rhythm and normal heart sounds. Exam reveals no friction rub.   No murmur heard.  Pulmonary/Chest: Effort normal and breath sounds normal. No respiratory distress. She has no wheezes. She has no rales.   Diminished air entry to bases    Abdominal: Soft. She exhibits no distension. There is no tenderness.   J tube noted    Musculoskeletal: She exhibits no edema or deformity.   Neurological: She is alert and oriented to person, place, and time. No sensory deficit.   Skin: Skin is warm and dry. She is not diaphoretic.   Psychiatric: Her behavior is normal. She exhibits a depressed mood.   Nursing note and vitals reviewed.      Significant Labs:   CBC:    Recent Labs   Lab 03/22/20  0402 03/23/20  0335   WBC 11.85 10.52  10.52   HGB 11.4* 11.6*  11.6*   HCT 35.7* 36.1*  36.1*    274  274     CMP:   Recent Labs   Lab 03/22/20  0402 03/23/20  0335   * 134*   K 4.0 3.8   CL 97 102   CO2 28 26   GLU 87 150*   BUN 17 17   CREATININE 0.4* 0.4*   CALCIUM 8.8 8.0*   PROT 6.2 6.0   ALBUMIN 2.7* 2.4*   BILITOT 0.9 0.9   ALKPHOS 121 149*   AST 45* 83*   ALT 46* 85*   ANIONGAP 9 6*   EGFRNONAA >60.0 >60.0     Magnesium:   Recent Labs   Lab 03/22/20  0402 03/23/20  0335   MG 1.8 2.2       Significant Imaging: I have reviewed all pertinent imaging results/findings within the past 24 hours.     Procedure Component Value Units Date/Time   X-Ray KUB [[id number]] Resulted: 03/22/20 1300   Order Status: Completed Updated: 03/22/20 1302   Narrative:     EXAMINATION:  XR KUB    CLINICAL HISTORY:  abdominal pain;    FINDINGS:  KUB is compared to prior study 03/21/2020    There is a J-tube in place.  There is a normal bowel gas pattern.  No organomegaly or abnormal soft tissue masses present.  There are cutaneous staples in the lower mid abdomen.   Impression:       Unremarkable bowel gas pattern      Electronically signed by: Nancy Garner MD  Date: 03/22/2020  Time: 13:00

## 2020-03-24 NOTE — PROGRESS NOTES
"Patient Name: Steph Lira  Patient MRN: [id number]  Patient : 1950    Admit Date: 3/13/2020  Service date: 3/24/2020    Reason for Consult: dysphagia / SCCA of esophagus    PCP: Maida Mon MD    S: Pain and n/v continues o/n. Poor tolerance of j-tube feedings; States had retching yesterday and felt like stent may have moved. + BM this AM    Inpatient Medications:   alteplase  2 mg Intra-Catheter Once    diltiaZEM  30 mg Per J Tube Q8H    enoxparin  50 mg Subcutaneous Q12H    fluticasone propionate  1 spray Each Nostril Daily    tiotropium  1 capsule Inhalation Daily     acetaminophen, acetaminophen, albuterol-ipratropium, calcium chloride IVPB, calcium chloride IVPB, calcium chloride IVPB, dextrose 50%, dextrose 50%, hydrALAZINE, HYDROmorphone, insulin regular, magnesium oxide, magnesium sulfate IVPB, magnesium sulfate IVPB, magnesium sulfate IVPB, magnesium sulfate IVPB, metoclopramide HCl, metoprolol, ondansetron, potassium chloride in water, potassium chloride in water, potassium chloride in water, potassium chloride in water, potassium chloride, potassium chloride, potassium chloride, potassium chloride, promethazine (PHENERGAN) IVPB, sodium chloride 0.9%, sodium chloride 0.9%, sodium phosphate IVPB, sodium phosphate IVPB, sodium phosphate IVPB, sodium phosphate IVPB, sodium phosphate IVPB    Review of Systems:  A 10 point review of systems was performed and was normal, except as mentioned in the HPI, including constitutional, HEENT, heme, lymph, cardiovascular, respiratory, gastrointestinal, genitourinary, neurologic, endocrine, psychiatric and musculoskeletal.      OBJECTIVE:    Physical Exam:  24 Hour Vital Sign Ranges: Temp:  [98.1 °F (36.7 °C)-99.3 °F (37.4 °C)] 99.1 °F (37.3 °C)  Pulse:  [] 112  Resp:  [11-35] 24  SpO2:  [96 %-98 %] 96 %  BP: (108-180)/(62-99) 180/99  Most recent vitals: BP (!) 180/99   Pulse (!) 112   Temp 99.1 °F (37.3 °C) (Oral)   Resp (!) 24   Ht 5' 6" (1.676 " m)   Wt 53.3 kg (117 lb 8.1 oz)   LMP 03/13/2006 (Within Years)   SpO2 96%   Breastfeeding? No   BMI 18.97 kg/m²    GEN: well-developed, well-nourished, awake and alert, non-toxic appearing elderly WM in min distress  HEENT: PERRL, sclera anicteric, oral mucosa pink and moist without lesion  NECK: trachea midline; Good ROM  CV: regular rate and rhythm, no murmurs or gallops  RESP: clear to auscultation bilaterally, no wheezes, rhonci or rales  ABD: soft, non-tender, non-distended, hypoactive but normal sounding bowel sounds; J-tube to midline  EXT: no swelling or edema, 2+ pulses distally  SKIN: no rashes or jaundice  PSYCH: flat affect; mild depressed    Labs:   Recent Labs     03/22/20  0402 03/23/20  0335 03/24/20  0405   WBC 11.85 10.52  10.52 11.80   MCV 93 91  91 94    274  274 339     Recent Labs     03/22/20  0402 03/23/20  0335 03/24/20  0405   * 134* 139   K 4.0 3.8 4.7   CL 97 102 105   CO2 28 26 25   BUN 17 17 21   GLU 87 150* 132*     No results for input(s): ALB in the last 72 hours.    Invalid input(s): ALKP, SGOT, SGPT, TBIL, DBIL, TPRO  No results for input(s): PT, INR, PTT in the last 72 hours.      Radiology Review:  X-Ray KUB   Final Result      Unremarkable bowel gas pattern         Electronically signed by: Nancy Garner MD   Date:    03/22/2020   Time:    13:00      X-Ray Abdomen AP 1 View   Final Result      Interval insertion of an enteric catheter, with nonobstructive bowel gas pattern.         Electronically signed by: Bradford Castaneda MD   Date:    03/21/2020   Time:    10:41      CT Chest With Contrast   Final Result      Esophageal stent within the proximal 3rd of the esophagus with mild wall thickening of the of the esophagus surrounding the stent.  There is no free air or free fluid.      Endovascular stents within the left jugular vein and left subclavian vein which are occluded.  This was present on prior chest CT a dated 12/07/2018      Prior left upper lobectomy  with postsurgical changes in the left chest wall      Tiny right pleural effusion         Electronically signed by: Nancy Garner MD   Date:    03/19/2020   Time:    09:39      X-Ray Chest AP Portable   Final Result      1.  Interval placement of an esophageal stent in the proximal 3rd of the esophagus with minimal narrowing of the midportion of the stent.      2.  Otherwise, unchanged radiograph of the chest.         Electronically signed by: Demarco Brar MD   Date:    03/18/2020   Time:    06:42      FL Flouro Usage   Final Result      X-Ray KUB   Final Result      Nonobstructive bowel gas pattern.         Electronically signed by: Bradford Castaneda MD   Date:    03/16/2020   Time:    11:42      X-ray Chest AP Portable   Final Result      X-Ray Chest 1 View    (Results Pending)         IMPRESSION / RECOMMENDATIONS:  68 y/o WF w/ h/o newly SCCA of proximal esophagus, CAD / CABG / PCI, AFib (eliquis), lung Ca s/p resection, past tobacco use, HARDY, cholecystectomy presents for evaluation of progressive dysphagia w/ known SCCA of esophagus. EGD / w/ stent deployment done 3/17/20 w/ mild pain and n/v and J-tube placed due to inadequate PO. No resp issues, signs of perforation, or any concerns from recent uncomplicated endoscopy on CT and repeat endoscopy. Mild leukocytosis following initial stent placement resolved.     -Chest xray to confirm correct position; If look in correct position, consider MBSS to ensure no issues  -Restart J-tube feedings as tolerated  -Pain meds / anti-emetics as warranted but pain meds may contribute to nausea  -F/u Dr. Aline turner/ oncology    Ulisses Colorado III  3/24/2020  2:18 PM

## 2020-03-24 NOTE — PROGRESS NOTES
[facility]  General Surgery  Progress Note    Subjective:     Interval History: Patient has had a lot of nausea and vomiting the last few days. J - tubes feeds stopped. She has continued to have nausea with bilious vomiting. Xray showed stable position of the esophageal tube. Abd xray showed a nonobstructive gas bowel pattern.  She is passing flatus. She is afebrile.     Post-Op Info:  Procedure(s) (LRB):  EGD (ESOPHAGOGASTRODUODENOSCOPY) (N/A)  INSERTION, JEJUNOSTOMY TUBE (N/A)   4 Days Post-Op      Medications:  Continuous Infusions:   dilTIAZem 5 mg/hr (03/24/20 1137)    lactated ringers 75 mL/hr at 03/20/20 1832    TPN ADULT CENTRAL LINE CUSTOM (3 in 1) 40 mL/hr at 03/23/20 1800    TPN ADULT CENTRAL LINE CUSTOM (3 in 1)       Scheduled Meds:   alteplase  2 mg Intra-Catheter Once    enoxparin  50 mg Subcutaneous Q12H    fluticasone propionate  1 spray Each Nostril Daily    tiotropium  1 capsule Inhalation Daily     PRN Meds:acetaminophen, acetaminophen, albuterol-ipratropium, calcium chloride IVPB, calcium chloride IVPB, calcium chloride IVPB, dextrose 50%, dextrose 50%, hydrALAZINE, HYDROmorphone, insulin regular, magnesium oxide, magnesium sulfate IVPB, magnesium sulfate IVPB, magnesium sulfate IVPB, magnesium sulfate IVPB, metoclopramide HCl, metoprolol, ondansetron, potassium chloride in water, potassium chloride in water, potassium chloride in water, potassium chloride in water, potassium chloride, potassium chloride, potassium chloride, potassium chloride, promethazine (PHENERGAN) IVPB, sodium chloride 0.9%, sodium chloride 0.9%, sodium phosphate IVPB, sodium phosphate IVPB, sodium phosphate IVPB, sodium phosphate IVPB, sodium phosphate IVPB     Objective:     Vital Signs (Most Recent):  Temp: 99 °F (37.2 °C) (03/24/20 0852)  Pulse: (!) 114 (03/24/20 0852)  Resp: 20 (03/24/20 0852)  BP: (!) 145/85 (03/24/20 0852)  SpO2: 96 % (03/24/20 0852) Vital Signs (24h Range):  Temp:  [98.1 °F  (36.7 °C)-99.3 °F (37.4 °C)] 99 °F (37.2 °C)  Pulse:  [] 114  Resp:  [11-35] 20  SpO2:  [96 %-98 %] 96 %  BP: (123-180)/(72-99) 145/85       Intake/Output Summary (Last 24 hours) at 3/24/2020 1205  Last data filed at 3/24/2020 0754  Gross per 24 hour   Intake 200 ml   Output 675 ml   Net -475 ml       Physical Exam   Constitutional: She is oriented to person, place, and time.   Pulmonary/Chest: Effort normal. No respiratory distress.   Abdominal: Soft. She exhibits no distension. There is tenderness. There is no rigidity, no rebound and no guarding.   Appropriate tenderness     Incision is clean dry and intact    J tube in good position.        Neurological: She is alert and oriented to person, place, and time.       Significant Labs:  CBC:   Recent Labs   Lab 03/24/20  0405   WBC 11.80   RBC 4.17   HGB 12.3   HCT 39.2      MCV 94   MCH 29.5   MCHC 31.4*     CMP:   Recent Labs   Lab 03/24/20  0405   *   CALCIUM 8.7   ALBUMIN 2.7*   PROT 6.4      K 4.7   CO2 25      BUN 21   CREATININE 0.4*   ALKPHOS 172*   *   AST 83*   BILITOT 0.9       Significant Diagnostics:  I have reviewed all pertinent imaging results/findings within the past 24 hours.    Assessment/Plan:     Active Diagnoses:    Diagnosis Date Noted POA    PRINCIPAL PROBLEM:  Intractable vomiting [R11.10] 03/13/2020 Yes    Inanition [R64] 03/20/2020 Yes    Malignant neoplasm of esophagus [C15.9] 03/04/2020 Yes    S/P ablation of atrial fibrillation [Z98.890, Z86.79] 10/01/2019 Not Applicable    S/P CABG (coronary artery bypass graft) [Z95.1] 06/01/2016 Not Applicable    History of smoking 10-25 pack years, 15 pack-years, quit 1994 [Z87.891] 05/19/2016 Not Applicable    COPD (chronic obstructive pulmonary disease) [J44.9] 05/18/2016 Yes    Adenocarcinoma of left lung, stage 3 [C34.92] 03/20/2015 Yes     Chronic    S/P CABG x 2, 2005 [Z95.1] 09/04/2013 Not Applicable    HTN (hypertension) [I10] 09/04/2013 Yes     Hyperlipidemia [E78.5] 09/04/2013 Yes    PAF (paroxysmal atrial fibrillation), onset 2002 [I48.0] 09/04/2013 Yes    CAD (coronary artery disease) [I25.10] 08/27/2013 Yes    Anxiety [F41.9] 08/27/2013 Yes    Anticoagulant long-term use [Z79.01] 07/19/2012 Not Applicable    Chronic external jugular vein thrombosis [I82.891] 06/23/2012 Yes    Hypothyroid [E03.9] 02/07/2012 Yes    GERD (gastroesophageal reflux disease) [K21.9]  Yes      Problems Resolved During this Admission:    Diagnosis Date Noted Date Resolved POA    Severe sepsis [A41.9, R65.20] 03/21/2020 03/23/2020 No     -No indication of an ileus or bowel obstruction. Recommend to test tube feeding today wit low rate of feeding. If the degree of nausea is the same and no worse, will advance rate to goal. If the nausea is increased with the tube feeding, will have to keep TPN going until tolerating the feeds. May need to switch the formula of the tube feed.      Rafat Ruiz III, MD  General Surgery  [facility]

## 2020-03-24 NOTE — PLAN OF CARE
Problem: Physical Therapy Goal  Goal: Physical Therapy Goal  Description  Goals to be met by: D/C     Patient will increase functional independence with mobility by performin. Supine to sit with Supervision  2. Sit to stand transfer with Supervision  3. Bed to chair transfer with Supervision   4. Gait  x 250 feet with Supervision using no AD     Outcome: Ongoing, Progressing   Pt continues to progress towards goals.

## 2020-03-24 NOTE — NURSING
Pt vomitted approx 1500ml of green liquid at 1500. Dr. Guaman notified and came to see/assess/speak with pt.     Pt states that she had not vomitted since this morning until this occurrence. Asked pt whether she had anything to drink and pt responds that she did drink small amount of hot tea soon before this vomitting episode occurred; tube feedings on hold. Will follow up with Ileana nutritionist.

## 2020-03-24 NOTE — PLAN OF CARE
Problem: Adult Inpatient Plan of Care  Goal: Plan of Care Review  Outcome: Ongoing, Progressing     Problem: Parenteral Nutrition  Goal: Effective Intravenous Nutrition Therapy Delivery  Outcome: Ongoing, Progressing     Problem: Fall Injury Risk  Goal: Absence of Fall and Fall-Related Injury  Outcome: Ongoing, Progressing     Problem: Pain Acute  Goal: Optimal Pain Control  Outcome: Ongoing, Progressing

## 2020-03-24 NOTE — PT/OT/SLP PROGRESS
Occupational Therapy      Patient Name:  Steph Lira   MRN:  [id number]    Patient not seen today secondary to vomiting this AM and reporting feeling very weak; RN notified; will follow-up next service date.    Des Ashley OT  3/24/2020

## 2020-03-24 NOTE — NURSING
Pt again complains of nausea and states that she just had some apple juice; pt agrees that it very well could be what she is drinking that is causing the n/v, at this point. Reglan administered. Relayed this to Jennifer, nutritionist who agrees to restart tube feeding as already ordered, at 10ml/hr. Dr. Khoury also agrees with plan and states he will follow up with tolerance of in the morning. Will relay this to night nurse.

## 2020-03-24 NOTE — PT/OT/SLP PROGRESS
Physical Therapy Treatment    Patient Name:  Steph Lira   MRN:  [id number]    Recommendations:     Discharge Recommendations:  outpatient OT   Discharge Equipment Recommendations: (TBD)   Barriers to discharge: None    Assessment:     Steph Lira is a 69 y.o. female admitted with a medical diagnosis of Intractable vomiting.  She presents with the following impairments/functional limitations:  weakness, impaired functional mobilty, gait instability, impaired endurance, impaired balance, impaired self care skills. Patient with increased weakness today 2/2 episode of vomiting this AM. Pt performed 2 standing trials but unable to maintain longer than ~10 seconds. RN aware. Continue with PT and POC.    Rehab Prognosis: Good; patient would benefit from acute skilled PT services to address these deficits and reach maximum level of function.    Recent Surgery: Procedure(s) (LRB):  EGD (ESOPHAGOGASTRODUODENOSCOPY) (N/A)  INSERTION, JEJUNOSTOMY TUBE (N/A) 4 Days Post-Op    Plan:     During this hospitalization, patient to be seen 6 x/week to address the identified rehab impairments via gait training, therapeutic activities, therapeutic exercises and progress toward the following goals:    · Plan of Care Expires:  04/23/20    Subjective     Chief Complaint: Weakness 2/2 vomiting episode this AM  Patient/Family Comments/goals:   Pain/Comfort:  · Pain Rating 1: 0/10      Objective:     Communicated with RN prior to session.  Patient found HOB elevated with bed alarm, blood pressure cuff, pulse ox (continuous), peripheral IV, telemetry, PICC line upon PT entry to room.     General Precautions: Standard, fall   Orthopedic Precautions:N/A   Braces:       Functional Mobility:  · Bed Mobility:     · Scooting: maximal assistance and of 2 persons to scoot pt to HOB with pt positioned in supine  · Supine to Sit: supervision  · Sit to Supine: minimum assistance  · Transfers:     · Sit to Stand:  minimum assistance with rolling walker  x 2 trials      AM-PAC 6 CLICK MOBILITY          Therapeutic Activities and Exercises:   bed mobility; sitting EOB for trunk control and midline orientation; sit <> stands    Patient left HOB elevated with all lines intact, call button in reach, bed alarm on and RN notified..    GOALS:   Multidisciplinary Problems     Physical Therapy Goals        Problem: Physical Therapy Goal    Goal Priority Disciplines Outcome Goal Variances Interventions   Physical Therapy Goal     PT, PT/OT Ongoing, Progressing     Description:  Goals to be met by: D/C     Patient will increase functional independence with mobility by performin. Supine to sit with Supervision  2. Sit to stand transfer with Supervision  3. Bed to chair transfer with Supervision   4. Gait  x 250 feet with Supervision using no AD                      Time Tracking:     PT Received On: 20  PT Start Time: 1024     PT Stop Time: 1047  PT Total Time (min): 23 min     Billable Minutes: Therapeutic Activity 23    Treatment Type: Treatment  PT/PTA: PTA     PTA Visit Number: Erasmo     Kalee Reyes PTA  2020

## 2020-03-24 NOTE — PLAN OF CARE
Problem: Parenteral Nutrition  Goal: Effective Intravenous Nutrition Therapy Delivery  Outcome: Ongoing, Progressing  Intervention: Optimize Intravenous Nutrition Delivery  Flowsheets (Taken 3/24/2020 1529)  Nutrition Support Management: parenteral nutrition composition adjusted

## 2020-03-24 NOTE — PROGRESS NOTES
[facility] Medicine  Progress Note    Patient Name: Steph Lira  MRN: [id number]  Patient Class: IP- Inpatient   Admission Date: 3/13/2020  Length of Stay: 10 days  Attending Physician: Marcos Khoury MD  Primary Care Provider: Maida Mon MD        Subjective:     Principal Problem:Intractable vomiting      Interval History:  No acute overnight events reported.  Continues to endorse significant nausea with intermittent bilious vomiting.  Had 1 episode earlier this afternoon which occurred after patient had tea. Feels weak. Tube feeds turned off due to concern for obstruction. Passing flatus.     Objective:     Vital Signs (Most Recent):  Temp: 99.1 °F (37.3 °C) (03/24/20 1145)  Pulse: 109 (03/24/20 1145)  Resp: 13 (03/24/20 1145)  BP: (!) 171/82 (03/24/20 1145)  SpO2: 97 % (03/24/20 1145) Vital Signs (24h Range):  Temp:  [98.1 °F (36.7 °C)-99.3 °F (37.4 °C)] 99.1 °F (37.3 °C)  Pulse:  [] 109  Resp:  [11-35] 13  SpO2:  [96 %-98 %] 97 %  BP: (123-180)/(72-99) 171/82     Weight: 53.3 kg (117 lb 8.1 oz)  Body mass index is 18.97 kg/m².    Intake/Output Summary (Last 24 hours) at 3/24/2020 1522  Last data filed at 3/24/2020 0754  Gross per 24 hour   Intake 200 ml   Output 525 ml   Net -325 ml      Physical Exam   Constitutional: She is oriented to person, place, and time. No distress.   Frail  female   HENT:   Head: Normocephalic and atraumatic.   Eyes: Conjunctivae are normal. No scleral icterus.   Neck: Neck supple. No thyromegaly present.   Cardiovascular: Normal rate, regular rhythm and normal heart sounds. Exam reveals no friction rub.   No murmur heard.  Pulmonary/Chest: Effort normal and breath sounds normal. No respiratory distress. She has no wheezes. She has no rales.   Diminished air entry to bases    Abdominal: Soft. She exhibits no distension. There is no tenderness.   J tube noted    Musculoskeletal: She exhibits no edema or deformity.   Neurological: She is  alert and oriented to person, place, and time. No sensory deficit.   Skin: Skin is warm and dry. She is not diaphoretic.   Psychiatric: Her behavior is normal. She exhibits a depressed mood.   Nursing note and vitals reviewed.      Significant Labs:   BMP:   Recent Labs   Lab 03/24/20  0405   *      K 4.7      CO2 25   BUN 21   CREATININE 0.4*   CALCIUM 8.7   MG 2.3     CBC:   Recent Labs   Lab 03/23/20  0335 03/24/20  0405   WBC 10.52  10.52 11.80   HGB 11.6*  11.6* 12.3   HCT 36.1*  36.1* 39.2     274 339       Significant Imaging: I have reviewed all pertinent imaging results/findings within the past 24 hours.     X-Ray Chest AP Portable [[id number]] Resulted: 03/24/20 0933   Order Status: Completed Updated: 03/24/20 0936   Narrative:     EXAMINATION:  XR CHEST AP PORTABLE    CLINICAL HISTORY:  confirm stent remains in correct placment; Make sure and get neck in the study as stent is proximal;    COMPARISON:  03/18/2020    FINDINGS:  Esophageal stent is in stable position compared to prior.  Left-sided subclavian and jugular vascular stents are in stable position.  Right PICC is unchanged in position.  Partial resection of left hemithorax is again evident.  Cardiac silhouette size is stable.  Status post median sternotomy.  Right lung is clear.  No large pleural effusion or pneumothorax.  No acute osseous abnormality.   Impression:       Stable position and appearance of proximal esophageal stent.    Additional postoperative changes as above.    No acute pulmonary process.      Electronically signed by: Flo Alicia MD  Date: 03/24/2020  Time: 09:33             Assessment/Plan:      Active Hospital Problems    Diagnosis    *Intractable vomiting    Malignant neoplasm of esophagus    PAF (paroxysmal atrial fibrillation), onset 2002    Inanition    S/P ablation of atrial fibrillation    S/P CABG (coronary artery bypass graft)    History of smoking 10-25 pack years, 15  pack-years, quit 1994    COPD (chronic obstructive pulmonary disease)    Adenocarcinoma of left lung, stage 3    S/P CABG x 2, 2005    HTN (hypertension)    Hyperlipidemia    CAD (coronary artery disease)    Anxiety    Anticoagulant long-term use    Chronic external jugular vein thrombosis    Hypothyroid    GERD (gastroesophageal reflux disease)     Plan:  S/p esophageal stent placement on 03/17 and repositioning on 03/20   Symptomatic management of nausea/vomiting - added metoclopramide scheduled  Advance tube feeds as tolerated - low concern for obstruction  Continue TPN as tolerated; nutrition might consider changing tube feeds if persistent nausea/vomiting   Change to diltiazem drip for atrial tachycardia   Anticoagulation with enoxaparin if pt were to need procedures; holding home apixaban   Discussed with GI and surgery   Also discussed with her oncologist Dr. Mireles; okay with getting chemo port. Wife wants to discuss with          VTE Risk Mitigation (From admission, onward)         Ordered     enoxaparin injection 50 mg  Every 12 hours (non-standard times)      03/22/20 1427     IP VTE HIGH RISK PATIENT  Once      03/13/20 1928                      Marcos Khoury MD  Department of Hospital Medicine   [facility]

## 2020-03-24 NOTE — SUBJECTIVE & OBJECTIVE
Interval History:  Intermittent fever overnight with a T-max of 101.6° F. I was called by bedside nursing staff secondary to acute onset of respiratory distress.  40 mg of IV furosemide stat ordered.  Initial ABG with respiratory acidosis, hypercapnia and hypoxemia which improved after respiratory support with 100% non-rebreather.  Nasotracheal suctioning with greenish fluid.  Stat chest x-ray without any acute cardiopulmonary process.    Blood cultures growing budding yeast.  Will stop TPN and tube feeds.  Started IV micafungin.  Consulted Infectious Disease.    Objective:     Vital Signs (Most Recent):  Temp: 100.1 °F (37.8 °C) (03/26/20 1122)  Pulse: (!) 113 (03/26/20 0739)  Resp: 18 (03/26/20 0800)  BP: (!) 155/65 (03/26/20 0922)  SpO2: 96 % (03/26/20 0739) Vital Signs (24h Range):  Temp:  [97.8 °F (36.6 °C)-101.6 °F (38.7 °C)] 100.1 °F (37.8 °C)  Pulse:  [] 113  Resp:  [15-41] 18  SpO2:  [87 %-97 %] 96 %  BP: ()/(51-89) 155/65     Weight: 55.1 kg (121 lb 7.6 oz)  Body mass index is 19.61 kg/m².    Intake/Output Summary (Last 24 hours) at 3/26/2020 1441  Last data filed at 3/26/2020 1350  Gross per 24 hour   Intake 1180 ml   Output 2700 ml   Net -1520 ml      Physical Exam   Constitutional: She is oriented to person, place, and time. She appears distressed.   Frail  female   HENT:   Head: Normocephalic and atraumatic.   Eyes: Conjunctivae are normal. No scleral icterus.   Neck: Neck supple. No thyromegaly present.   Cardiovascular: Normal rate, regular rhythm and normal heart sounds. Exam reveals no friction rub.   No murmur heard.  Pulmonary/Chest: Accessory muscle usage present. Tachypnea noted. She is in respiratory distress. She has no wheezes. She has no rales.   Diminished air entry to bases    Abdominal: Soft. She exhibits no distension. There is tenderness.   J tube noted    Musculoskeletal: She exhibits no edema or deformity.   Neurological: She is alert and oriented to person,  place, and time. No sensory deficit.   Skin: Skin is warm and dry. She is not diaphoretic.   Psychiatric: Her behavior is normal. She exhibits a depressed mood.   Nursing note and vitals reviewed.      Significant Labs:   BMP:   Recent Labs   Lab 03/26/20  0359   *      K 3.6      CO2 25   BUN 25*   CREATININE 0.7   CALCIUM 8.1*   MG 2.2     CBC:   Recent Labs   Lab 03/25/20  0314 03/26/20  0359 03/26/20  1239 03/26/20  1356   WBC 10.93  10.93 13.34*  --   --    HGB 12.0  12.0 12.3  --   --    HCT 38.3  38.3 39.1 38 37   *  352* 291  --   --        Microbiology Results (last 7 days)     Procedure Component Value Units Date/Time    Culture, Respiratory with Gram Stain [[id number]]     Order Status:  No result Specimen:  Respiratory     Blood culture [[id number]] Collected:  03/25/20 1114    Order Status:  Completed Specimen:  Blood Updated:  03/26/20 1202     Blood Culture, Routine Gram stain aer bottle: budding yeast      Results called to and read back by: Nataly Ba RN on Cardio-A       03/26/2020  12:02 by DROMAR    Narrative:       2 sets  Collection has been rescheduled by [facility] at 03/25/2020 10:35 Reason:   per nurse Fang patient is gone for another test and will be back   after 20 minutes.  Collection has been rescheduled by [facility] at 03/25/2020 10:35 Reason:   per nurse Fang patient is gone for another test and will be back   after 20 minutes.    Blood culture [[id number]] Collected:  03/19/20 1053    Order Status:  Completed Specimen:  Blood Updated:  03/24/20 1232     Blood Culture, Routine No growth after 5 days.    Blood culture [[id number]] Collected:  03/19/20 1138    Order Status:  Completed Specimen:  Blood from Line, PICC Right Brachial Updated:  03/24/20 1232     Blood Culture, Routine No growth after 5 days.    Narrative:       From picc line  Collection has been rescheduled by [facility] at 03/19/2020 10:59 Reason:   nurse draw   Collection has been rescheduled by [facility] at  03/19/2020 10:59 Reason:   nurse draw

## 2020-03-24 NOTE — PROGRESS NOTES
[facility]  Adult Nutrition   Progress Note (Nutrition Support Management)    SUMMARY     Recommendations  Recommendation/Intervention:   1. Continue Jevity 1.5 @ 10 ml/hr and slowly advance to goal rate of 45 ml/hr as tolerated.   2. TPN to continue. New TPN ordered to be initiated today @ 1700 and run at 65 ml/hr.   3. RD to monitor tube feeding tolerance and ability to advance. If unable to tolerate, RD will change tube feeding formula.   4. Transitional feeding from TPN to EN as appropriate.   Goals:   1. Patient to tolerate enteral nutrition.  2. Nutrition provision to meet estimated protein, energy, and fluid needs.   Nutrition Goal Status: progressing towards goal  Communication of RD Recs: reviewed with physician    Dietitian Rounds Brief  Patient not tolerating advancement of tube feeding. Nausea and vomiting. Plans for TPN to continue. Plans to continue tube feeding and slowly increase as tolerated. If patient continues to be unable to advance TF by tomorrow, RD will change enteral nutrition formula to elemental formula. Elemental formula will require larger total volume. If tube feeding formula does need to change, RD will order Vital AF 1.2 with goal rate of 55 ml/hr with 30 ml water flush Q4H (to provide 1584 kcal/day, 99 g/day protein, and 1251 ml/day free water).     PARENTERAL NUTRITION PROGRESS NOTE:    Parenteral Nutrition Day # 11  Diagnosis/Indication for PN: NPO, esophageal cancer with mass obstructing esophagus, intractable N/V   IV Access: PICC  Diet/Tube Feeding: Jevity 1.5 with goal rate of 45 ordered; not currently on due to intolerance N/V          Admixture Type: 3 in 1 custom TPN (10% AA, 70% Dextrose, 20% Intralipid)   Infusion Rate and Frequency: 65 ml/hr   Patient Acuity: acute care     PN Composition:   75 grams Amino Acid, 231 grams Dextrose, and 50 grams Lipid.    Today's TPN provides 1585 kcal.     Please see order for electrolytes and additives content and  "adjustments.   *The Nutrition Support Team will continue to monitor electrolytes daily and adjust parenteral nutrition as warranted.    Reason for Assessment  Reason For Assessment: new TPN, RD follow-up    Nutrition Risk Screen  Nutrition Risk Screen: tube feeding or parenteral nutrition     MST Score: 3  Have you recently lost weight without trying?: Yes: 14-23 lbs  Weight loss score: 2  Have you been eating poorly because of a decreased appetite?: Yes  Appetite score: 1       Nutrition/Diet History  Spiritual, Cultural Beliefs, Restoration Practices, Values that Affect Care: no  Food Allergies: NKFA  Factors Affecting Nutritional Intake: difficulty/impaired swallowing    Anthropometrics  Temp: 99.1 °F (37.3 °C)  Height Method: Stated  Height: 5' 6" (167.6 cm)  Height (inches): 66 in  Weight Method: Bed Scale  Weight: 53.3 kg (117 lb 8.1 oz)  Weight (lb): 117.51 lb  Ideal Body Weight (IBW), Female: 130 lb  % Ideal Body Weight, Female (lb): 87 %  BMI (Calculated): 19  BMI Grade: 18.5-24.9 - normal       Weight History:  Wt Readings from Last 10 Encounters:   03/23/20 53.3 kg (117 lb 8.1 oz)   03/12/20 52.7 kg (116 lb 2.9 oz)   03/11/20 52.6 kg (116 lb)   03/09/20 54.4 kg (120 lb)   03/06/20 54.9 kg (121 lb)   03/04/20 54.9 kg (121 lb)   02/27/20 56.2 kg (123 lb 14.4 oz)   02/21/20 57.2 kg (126 lb)   01/31/20 57.2 kg (126 lb)   01/20/20 58.5 kg (128 lb 15.5 oz)     Lab/Procedures/Meds: Pertinent Labs Reviewed  Clinical Chemistry:  Recent Labs   Lab 03/22/20  0402 03/23/20  0335 03/24/20  0405   * 134* 139   K 4.0 3.8 4.7   CL 97 102 105   CO2 28 26 25   GLU 87 150* 132*   BUN 17 17 21   CREATININE 0.4* 0.4* 0.4*   CALCIUM 8.8 8.0* 8.7   PROT 6.2 6.0 6.4   ALBUMIN 2.7* 2.4* 2.7*   BILITOT 0.9 0.9 0.9   ALKPHOS 121 149* 172*   AST 45* 83* 83*   ALT 46* 85* 117*   ANIONGAP 9 6* 9   ESTGFRAFRICA >60.0 >60.0 >60.0   EGFRNONAA >60.0 >60.0 >60.0   MG 1.8 2.2 2.3   PHOS 2.6* 3.3 3.5     CBC:   Recent Labs   Lab " 03/24/20  0405   WBC 11.80   RBC 4.17   HGB 12.3   HCT 39.2      MCV 94   MCH 29.5   MCHC 31.4*     Lipid Panel:  Recent Labs   Lab 03/23/20  0335   TRIG 85     Inflammatory Labs:  Recent Labs   Lab 03/19/20  0532   CRP 15.62*     Medications: Pertinent Medications reviewed  Scheduled Meds:   alteplase  2 mg Intra-Catheter Once    enoxparin  50 mg Subcutaneous Q12H    fluticasone propionate  1 spray Each Nostril Daily    metoclopramide HCl  10 mg Intravenous Q8H    tiotropium  1 capsule Inhalation Daily     Continuous Infusions:   dilTIAZem 5 mg/hr (03/24/20 1137)    lactated ringers 75 mL/hr at 03/20/20 1832    TPN ADULT CENTRAL LINE CUSTOM (3 in 1) 40 mL/hr at 03/23/20 1800    TPN ADULT CENTRAL LINE CUSTOM (3 in 1)       PRN Meds:.acetaminophen, acetaminophen, albuterol-ipratropium, calcium chloride IVPB, calcium chloride IVPB, calcium chloride IVPB, dextrose 50%, dextrose 50%, hydrALAZINE, HYDROmorphone, insulin regular, magnesium oxide, magnesium sulfate IVPB, magnesium sulfate IVPB, magnesium sulfate IVPB, magnesium sulfate IVPB, metoprolol, ondansetron, potassium chloride in water, potassium chloride in water, potassium chloride in water, potassium chloride in water, potassium chloride, potassium chloride, potassium chloride, potassium chloride, promethazine (PHENERGAN) IVPB, sodium chloride 0.9%, sodium chloride 0.9%, sodium phosphate IVPB, sodium phosphate IVPB, sodium phosphate IVPB, sodium phosphate IVPB, sodium phosphate IVPB    Estimated/Assessed Needs  Weight Used For Calorie Calculations: 53.3 kg (117 lb 8.1 oz)  Energy Calorie Requirements (kcal): 1599 - 1866 (30 - 35)   Energy Need Method: Kcal/kg  Protein Requirements: 64 - 80 (1.2 - 1.5 g/kg)   Weight Used For Protein Calculations: 53.3 kg (117 lb 8.1 oz)     Estimated Fluid Requirement Method: RDA Method  RDA Method (mL): 1599       Nutrition Prescription Ordered  Current Diet Order: clear liquid   Current Nutrition Support Formula  Ordered: Jevity 1.5  Current Nutrition Support Rate Ordered: 20 (ml)  Current Nutrition Support Frequency Ordered: currently on hold     Evaluation of Received Nutrient/Fluid Intake  Parenteral Calories (kcal): 956  Parenteral Protein (gm): 45  Parenteral Fluid (mL): 1200  Energy Calories Required: not meeting needs  Protein Required: not meeting needs  Fluid Required: meeting needs  Tolerance: (not tolerating enteral nutrition)     Intake/Output Summary (Last 24 hours) at 3/24/2020 1519  Last data filed at 3/24/2020 0754  Gross per 24 hour   Intake 200 ml   Output 525 ml   Net -325 ml      Nutrition Risk  Level of Risk/Frequency of Follow-up: high     Monitor and Evaluation  Food and Nutrient Intake: enteral nutrition intake, energy intake, parenteral nutrition intake  Food and Nutrient Adminstration: enteral and parenteral nutrition administration  Physical Activity and Function: nutrition-related ADLs and IADLs, factors affecting access to physical activity  Anthropometric Measurements: weight change, weight, body mass index  Biochemical Data, Medical Tests and Procedures: electrolyte and renal panel, glucose/endocrine profile, lipid profile, gastrointestinal profile, inflammatory profile  Nutrition-Focused Physical Findings: overall appearance     Nutrition Follow-Up  RD Follow-up?: Yes     Ileana Ware RD 03/24/2020 3:21 PM

## 2020-03-25 NOTE — CONSULTS
HPI    Consult for medical recommendation chemo port placement and prognosis.    69 years old female known to me from Hematology Oncology clinic esophageal cancer.  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  At that time patient is unable to tolerate any solid food and patient has a difficulty tolerating liquid.  Due to above patient had a tremendous monitor weight lost over the past months.  Repeat EGD shows tremendous amount of stricturing and tumor progression within the short time.  Esophageal stent was placed to help alleviate symptoms.    On this admission J-tube has been placed to help with nutrition.    Patient has history of small cell lung cancer under remission.  She was treated with chemoradiation and lobectomy.  Due to prior maximum dose of radiation, will unable to give patient additional radiation to her esophageal cancer per [facility]    Other significant history including Cardio bypass x2 and endovascular stents placement of the left jugular vein and left subclavian vein which are occluded.    Currently in-hospital it was reported that despite of esophageal stent intervention and J-tube placement patient still have difficulty tolerating nutrition support.        Past Medical History:   Diagnosis Date    *Atrial fibrillation     and Hx PSVT    Adenocarcinoma of left lung, stage 3 3/20/2015    Allergic drug rash 12/28/2016    Allergy     chronic     Arthritis     fingers    Benign tumor of throat     Bronchitis March 2013    CAD (coronary artery disease) 2005    CABGx2 in 2005 and 2 MI after with 4 stents    Cancer 1996    small cell lung cancer s/p resection of 1/3 lung, 5 ribs and muscle mass then had chemo and radiation    Cataract     OD     CHF (congestive heart failure) past Hx    Chronic rhinitis     Clot past Hx    external jugular, now stented, occluded, had phlebitis; now revascularized    Colon polyp     COPD (chronic  obstructive pulmonary disease)     no oxygen or nebulizers    COPD exacerbation 13    improved 13 after steroid pack,antibiotics    Dysphagia     Esophageal stenosis     Former smoker     GERD (gastroesophageal reflux disease)     Heart block     Hyperlipidemia     Hypertension     pt states does not have //    MI (myocardial infarction)     Posterior vitreous detachment of left eye     Thyroid disease      Social History     Socioeconomic History    Marital status:      Spouse name: Not on file    Number of children: Not on file    Years of education: Not on file    Highest education level: Not on file   Occupational History    Occupation: retired   Social Needs    Financial resource strain: Not on file    Food insecurity:     Worry: Not on file     Inability: Not on file    Transportation needs:     Medical: Not on file     Non-medical: Not on file   Tobacco Use    Smoking status: Former Smoker     Packs/day: 2.00     Years: 20.00     Pack years: 40.00     Types: Cigarettes     Last attempt to quit: 1994     Years since quittin.2    Smokeless tobacco: Never Used   Substance and Sexual Activity    Alcohol use: No    Drug use: No    Sexual activity: Yes     Partners: Male   Lifestyle    Physical activity:     Days per week: Not on file     Minutes per session: Not on file    Stress: Not on file   Relationships    Social connections:     Talks on phone: Not on file     Gets together: Not on file     Attends Voodoo service: Not on file     Active member of club or organization: Not on file     Attends meetings of clubs or organizations: Not on file     Relationship status: Not on file   Other Topics Concern    Are you pregnant or think you may be? No    Breast-feeding No   Social History Narrative    Not on file     Objective    Physical Exam   Vitals:    20 0300   BP: 125/62   Pulse: 91   Resp: (!) 21   Temp: 97.3 °F (36.3 °C)     Due to Covid-19  hospital states regulation.  Limited direct patient contact  Physical exam review obtain from hospital medicine team.  General Frail female   HEENT NCAT   Neck No JVD  Cardio RR  Lung Normal effort   Abdomen J tube  Skin warm and dry  Extremity no edema Or deformity   deferred  Neuro nonfocal coordination intact    CMP  Sodium   Date Value Ref Range Status   03/25/2020 138 136 - 145 mmol/L Final     Potassium   Date Value Ref Range Status   03/25/2020 3.5 3.5 - 5.1 mmol/L Final     Chloride   Date Value Ref Range Status   03/25/2020 104 95 - 110 mmol/L Final     CO2   Date Value Ref Range Status   03/25/2020 28 23 - 29 mmol/L Final     Glucose   Date Value Ref Range Status   03/25/2020 142 (H) 70 - 110 mg/dL Final     BUN, Bld   Date Value Ref Range Status   03/25/2020 21 8 - 23 mg/dL Final     Creatinine   Date Value Ref Range Status   03/25/2020 0.4 (L) 0.5 - 1.4 mg/dL Final   08/26/2013 0.8 0.5 - 1.4 mg/dL Final     Calcium   Date Value Ref Range Status   03/25/2020 8.5 (L) 8.7 - 10.5 mg/dL Final   08/26/2013 9.5 8.7 - 10.5 mg/dL Final     Total Protein   Date Value Ref Range Status   03/25/2020 6.6 6.0 - 8.4 g/dL Final     Albumin   Date Value Ref Range Status   03/25/2020 2.6 (L) 3.5 - 5.2 g/dL Final     Total Bilirubin   Date Value Ref Range Status   03/25/2020 0.9 0.1 - 1.0 mg/dL Final     Comment:     For infants and newborns, interpretation of results should be based  on gestational age, weight and in agreement with clinical  observations.  Premature Infant recommended reference ranges:  Up to 24 hours.............<8.0 mg/dL  Up to 48 hours............<12.0 mg/dL  3-5 days..................<15.0 mg/dL  6-29 days.................<15.0 mg/dL       Alkaline Phosphatase   Date Value Ref Range Status   03/25/2020 198 (H) 55 - 135 U/L Final   08/26/2013 68 55 - 135 U/L Final     AST   Date Value Ref Range Status   03/25/2020 112 (H) 10 - 40 U/L Final   08/26/2013 24 10 - 40 U/L Final     ALT   Date Value Ref  Range Status   03/25/2020 169 (H) 10 - 44 U/L Final     Anion Gap   Date Value Ref Range Status   03/25/2020 6 (L) 8 - 16 mmol/L Final   08/26/2013 11 5 - 15 meq/L Final     eGFR if    Date Value Ref Range Status   03/25/2020 >60.0 >60 mL/min/1.73 m^2 Final     eGFR if non    Date Value Ref Range Status   03/25/2020 >60.0 >60 mL/min/1.73 m^2 Final     Comment:     Calculation used to obtain the estimated glomerular filtration  rate (eGFR) is the CKD-EPI equation.          Upper EGD finding 01/31/2020  Findings:       The oropharynx was normal.       One severe stenosis was found 20 to 23 cm from the incisors. This        stenosis measured 3 cm (in length). The stenosis was traversed after        downsizing scope.       Segmental severe mucosal changes characterized by friability (with        contact bleeding), nodularity and sloughing were found in the upper        third of the esophagus from20 cm to 23 cm. This is concerning for        malignancy. Biopsies were taken with a cold forceps for histology.       The entire examined stomach was normal.       The examined duodenum was normal.  Impression:           - Normal oropharynx.                        - Esophageal stenosis.                        - Friable (with contact bleeding), nodular mucosa                         in the esophagus from 20 cm to 23 cm in the area of                         the stenosis. This is concerning for malignancy.                         Biopsied.                        - Normal stomach.                        - Normal examined duodenum.  Recommendation:       - Discharge patient to home (ambulatory).                        - Await pathology results.                        - Full liquid diet.    Pathology finding esophageal lesion at 20 cm biopsy results squamous cell carcinoma in situ with focal superficial invasions.      3/17/20 upper GI endoscopy repeat exam with esophageal stent  placement  Findings:       A large, fungating and ulcerating mass with bleeding and stigmata of        recent bleeding was found in the upper third of the esophagus, 19 to        25 cm from the incisors. The mass was completely obstructing and        circumferential w/ lumen approximately 4-5 mm in diameter and        extremely difficult to navigate. Proximal and distal margins of the        tumor were marked w/ paper clips (proximal end of clips marked site        of edges of stricture) This was stented with a 16 mm x 100 mm        [id number] fully covered stent under fluoroscopic guidance.       Minimal stent adjustment was made with rat-tooth forceps but final        endoscopic and fluoroscopic images confirmed correct positioning.       The stomach was normal.       The examined duodenum was normal.  Impression:           - Completely obstructing, malignant esophageal                         tumor was found in the upper third of the                         esophagus. Prosthesis placed.                        - Normal stomach.                        - Normal examined duodenum.                        - No specimens collected.  Recommendation:       - [facility].                        - Clear liquid diet     Assessment    [] Squamous cell carcinoma of esophagitis at 20 cm from the incisors.  Stenosis measures 3 cm in length on initial Upper EGD report     S/p J tube placement and esophageal stent placement to help with patient's symptoms as well as nutrition support.  Unfortunately despite above, patient still have a poor tolerance to nutrition support.  Patient remain in the [facility].  I have discussed this with hospitalist Dr. Iraida Rodríguez.  > out patient chemo upfront after secure of nutrition status  > Chemo port placement needed  > check MSI and HER2 status on path specimen    > Thus far we are planing for neoadjuvant therapy without radiation due to her prior  history of Radiation.  Chemo regimen are carboplatin and paclitaxel weekly x 5 weeks.  Patient may require additional cycle to compensate lack of radiation.  Certainly patient will be scanned during intra treatment for therapeutic response evaluation.    [] CT PET 3/9/20  FDG uptake posterior mediastinal lymph nodes suspect of malignancy involvement.  > as above chemotherapy upfront after secure of nutrition status.      [] Dysphagia secondary to malignancy obstruction  > as above  > continue nutrition support  > will need to secure nutrition status prior to start of chemotherapy    [] Previously treated small-cell lung cancer.  Radiation oncology [facility]  address [address] [facility] [address]    [] Weight loss due to above.  Major concern regarding nutrition status..  J-tube placement completed    [] Patient reported history of small cell cancers diagnosis treated in 1996 with concurrent chemo RT and surgery.  > as above    [] Patient has also known history of CABG x2 stents placement.  > CT scan shows occlusion of endovascular stents.    [] Afib on Eliquis at home it was stopped due to dysphagia secondary to malignancy  > currently on Lovenox in house      Intractable vomiting, presence of nausea not specified, unspecified vomiting type    Esophageal mass  -     EKG 12-LEAD; Standing  -     Case request GI: EGD (ESOPHAGOGASTRODUODENOSCOPY) in OR w/ fluoro; Standing    Chest pain  -     EKG 12-lead; Standing    Intractable vomiting    Esophageal obstruction  -     Case Request Operating Room: INSERTION, JEJUNOSTOMY TUBE, LAPAROSCOPIC; Standing    Inanition  -     Case Request Operating Room: INSERTION, JEJUNOSTOMY TUBE, LAPAROSCOPIC; Standing    Atrial fibrillation with rapid ventricular response  -     EKG 12-lead; Standing    Palpitation  -     EKG 12-lead; Standing    Abnormal heart rate  -     EKG 12-lead; Standing    Atrial tachycardia  -     Echo Color Flow Doppler? Yes;  Standing    Arrhythmia  -     EKG 12-lead; Standing    Other orders  -     Cancel: Vital signs; Standing  -     Cardiac Monitoring - Adult; Standing  -     Cancel: Insert saline lock; Standing  -     Comprehensive metabolic panel; Standing  -     Magnesium; Standing  -     Phosphorus; Standing  -     CBC auto differential; Standing  -     Protime-INR; Standing  -     Cancel: Oxygen Continuous; Standing  -     Cancel: Pulse Oximetry Continuous; Standing  -     X-ray Chest AP Portable; Standing  -     0.9%  NaCl infusion  -     Nursing communication; Standing  -     Possible Hospitalization; Standing  -     ondansetron injection 4 mg  -     Inpatient consult to Hospitalist; Standing  -     fluticasone propionate 50 mcg/actuation nasal spray 50 mcg  -     tiotropium inhalation capsule 18 mcg  -     Cancel: Diet NPO; Standing  -     Inpatient consult to Gastroenterology; Standing  -     Full code; Standing  -     Vital Signs; Standing  -     Cardiac Monitoring - Adult; Standing  -     Progressive Mobility Protocol (mobilize patient to their highest level of functioning at least twice daily); Standing  -     During the day, please open the shades and turn on the lights- If patient is in private room, please make sure they are close to the window.; Standing  -     Make sure the correct date and time is written on the whiteboard, as well as the current care team; Standing  -     Minimize interruptions at night-time, close shades and turn off TV.; Standing  -     No vitals between 11PM and 5AM; Standing  -     When possible, during daytime make sure patient is in chair and provide activities that engage patient.; Standing  -     Notify Physician; Standing  -     sodium chloride 0.9% flush 2 mL  -     Insert saline lock; Standing  -     IP VTE HIGH RISK PATIENT; Standing  -     Discontinue: enoxaparin injection 40 mg  -     acetaminophen tablet 650 mg  -     Discontinue: ondansetron injection 4 mg  -      albuterol-ipratropium 2.5 mg-0.5 mg/3 mL nebulizer solution 3 mL  -     Inhalation Treatment Q6H PRN; Standing  -     acetaminophen tablet 650 mg  -     Cancel: Basic Metabolic Panel (BMP); Standing  -     Magnesium; Standing  -     Phosphorus; Standing  -     Cancel: CBC Without Differential; Standing  -     Cancel: Pulse Oximetry Q4H; Standing  -     Place in Observation; Standing  -     promethazine (PHENERGAN) 6.25 mg in dextrose 5 % 50 mL IVPB  -     Discontinue: lactated ringers infusion  -     Discontinue: Amino acid 4.25% - dextrose 5% (CLINIMIX) solution  -     hydrALAZINE injection 10 mg  -     Discontinue: enoxaparin injection 50 mg  -     Inpatient consult to Registered Dietitian/Nutritionist; Standing  -     Platelet Review; Standing  -     Inpatient consult to PICC Line Nurse; Standing  -     Comprehensive metabolic panel; Standing  -     POCT glucose; Standing  -     insulin regular injection 1-6 Units  -     Nursing communication; Standing  -     dextrose 50% injection 25 g  -     dextrose 50% injection 12.5 g  -     potassium chloride SA CR tablet 20 mEq  -     potassium chloride SA CR tablet 40 mEq  -     potassium chloride SA CR tablet 20 mEq  -     potassium chloride SA CR tablet 40 mEq  -     potassium chloride 10 mEq in 100 mL IVPB  -     potassium chloride 10 mEq in 100 mL IVPB  -     potassium chloride 10 mEq in 100 mL IVPB  -     potassium chloride 10 mEq in 100 mL IVPB  -     magnesium oxide tablet 800 mg  -     magnesium sulfate 2g in water 50mL IVPB (premix)  -     magnesium sulfate in dextrose IVPB (premix) 1 g  -     magnesium sulfate 2g in water 50mL IVPB (premix)  -     magnesium sulfate 2g in water 50mL IVPB (premix)  -     sodium phosphate 15 mmol in dextrose 5 % 250 mL IVPB  -     sodium phosphate 20.01 mmol in dextrose 5 % 250 mL IVPB  -     sodium phosphate 15 mmol in dextrose 5 % 250 mL IVPB  -     sodium phosphate 30 mmol in dextrose 5 % 250 mL IVPB  -     sodium phosphate  20.01 mmol in dextrose 5 % 250 mL IVPB  -     calcium chloride 1g in sodium chloride 0.9% 100mL (ready to mix system)  -     calcium chloride 1g in sodium chloride 0.9% 100mL (ready to mix system)  -     calcium chloride 1g in sodium chloride 0.9% 100mL (ready to mix system)  -     Triglycerides; Standing  -     Inpatient consult to Registered Dietitian/Nutritionist; Standing  -     Cancel: Magnesium; Standing  -     Comprehensive metabolic panel; Standing  -     Cancel: Phosphorus; Standing  -     CBC Without Differential; Standing  -     Prealbumin; Standing  -     Prealbumin; Standing  -     Triglycerides; Standing  -     Triglycerides; Standing  -     TPN ADULT CENTRAL LINE CUSTOM (3 in 1)  -     Nursing communication; Standing  -     Discontinue: ketorolac injection 15 mg  -     Admit to Inpatient; Standing  -     acetaminophen (10 mg/mL) injection 1,000 mg  -     POCT glucose; Standing  -     POCT glucose; Standing  -     TPN ADULT CENTRAL LINE CUSTOM (3 in 1)  -     POCT glucose; Standing  -     POCT glucose; Standing  -     POCT glucose; Standing  -     Platelet Review; Standing  -     POCT glucose; Standing  -     MDI Daily; Standing  -     X-Ray KUB; Standing  -     POCT glucose; Standing  -     TPN ADULT CENTRAL LINE CUSTOM (3 in 1)  -     Potassium; Standing  -     POCT glucose; Standing  -     POCT glucose; Standing  -     FL Flouro Usage; Standing  -     POCT glucose; Standing  -     CBC auto differential; Standing  -     Basic metabolic panel; Standing  -     TPN ADULT CENTRAL LINE CUSTOM (3 in 1)  -     POCT glucose; Standing  -     Upper GI endoscopy; Standing  -     Cancel: Admit to Phase 1 PACU, transfer to Phase 2 per protocol when indicated ; Standing  -     Cancel: Vital signs; Standing  -     Cancel: Intake and output Per protocol; Standing  -     Cancel: Apply warming blanket; Standing  -     Cancel: Notify Anesthesiologist; Standing  -     Cancel: Notify Physician - Potential Need of Opioid  Reversal; Standing  -     Cancel: maintenance fluids per service; Standing  -     Discontinue: sodium chloride 0.9% flush 10 mL  -     Discontinue: oxyCODONE immediate release tablet 5 mg  -     Discontinue: diphenhydrAMINE injection 12.5 mg  -     Discontinue: ondansetron injection 4 mg  -     Discontinue: promethazine (PHENERGAN) 6.25 mg in dextrose 5 % 50 mL IVPB  -     Cancel: POCT glucose; Standing  -     Cancel: Oxygen PRN; Standing  -     Cancel: Pulse Oximetry Continuous; Standing  -     Discontinue: fentaNYL injection 25 mcg  -     POCT glucose; Standing  -     Cancel: X-Ray Chest 1 View; Standing  -     morphine injection 3 mg  -     POCT glucose; Standing  -     X-Ray Chest AP Portable; Standing  -     ondansetron injection 4 mg  -     POCT glucose; Standing  -     POCT glucose; Standing  -     TPN ADULT CENTRAL LINE CUSTOM (3 in 1)  -     POCT glucose; Standing  -     HYDROmorphone injection 0.5 mg  -     POCT glucose; Standing  -     POCT glucose; Standing  -     Blood culture; Standing  -     CT Chest With Contrast; Standing  -     Inpatient consult to General Surgery; Standing  -     Blood culture; Standing  -     iohexoL (OMNIPAQUE 350) injection 100 mL  -     POCT glucose; Standing  -     Discontinue: HYDROmorphone injection 0.5 mg  -     TPN ADULT CENTRAL LINE CUSTOM (3 in 1)  -     Discontinue: HYDROmorphone injection 0.5 mg  -     C-reactive protein; Standing  -     Procalcitonin; Standing  -     Discontinue: piperacillin-tazobactam 3.375 g in dextrose 5 % 50 mL IVPB (ready to mix system)  -     C-Reactive Protein; Standing  -     POCT glucose; Standing  -     Procalcitonin; Standing  -     POCT glucose; Standing  -     CBC auto differential; Standing  -     Basic metabolic panel; Standing  -     Procalcitonin; Standing  -     Cancel: Inpatient consult to General Surgery; Standing  -     POCT glucose; Standing  -     HYDROmorphone injection 0.5 mg  -     Procalcitonin; Standing  -     TPN ADULT  CENTRAL LINE CUSTOM (3 in 1)  -     Discontinue: bupivacaine-EPINEPHrine (PF) 0.25%-1:200,000 injection  -     Discontinue: bupivacaine liposome (PF) 1.3 % (13.3 mg/mL) suspension  -     Cancel: Admit to Phase 1 PACU, transfer to Phase 2 per protocol when indicated ; Standing  -     Cancel: Vital signs; Standing  -     Cancel: Intake and output Per protocol; Standing  -     Cancel: Apply warming blanket; Standing  -     Cancel: Notify Anesthesiologist; Standing  -     Notify Physician - Potential Need of Opioid Reversal; Standing  -     maintenance fluids per service; Standing  -     sodium chloride 0.9% flush 10 mL  -     Discontinue: oxyCODONE immediate release tablet 5 mg  -     meperidine injection 12.5 mg  -     Discontinue: diphenhydrAMINE injection 12.5 mg  -     Discontinue: ondansetron injection 4 mg  -     Discontinue: promethazine (PHENERGAN) 6.25 mg in dextrose 5 % 50 mL IVPB  -     Cancel: POCT glucose; Standing  -     Cancel: Oxygen Continuous; Standing  -     Cancel: Pulse Oximetry Continuous; Standing  -     Discontinue: fentaNYL injection 25 mcg  -     Transfer patient; Standing  -     Nursing communication; Standing  -     Inpatient consult to Registered Dietitian/Nutritionist; Standing  -     Cancel: Diet Dysphagia Mechanical Soft (IDDSI Level 5); Standing  -     Inpatient consult to Registered Dietitian/Nutritionist; Standing  -     Pulse Oximetry Q4H; Standing  -     POCT glucose; Standing  -     Cancel: Diet clear liquid; Standing  -     lactated ringers infusion  -     Cancel: Inpatient consult to Cardiology; Standing  -     POCT glucose; Standing  -     X-Ray Abdomen AP 1 View; Standing  -     PT evaluate and treat; Standing  -     OT evaluate and treat; Standing  -     TPN ADULT CENTRAL LINE CUSTOM (3 in 1)  -     POCT glucose; Standing  -     Elevate HOB to 30-45 degrees during feeding unless otherwise stated; Standing  -     Nursing communication; Standing  -     Bowel care; Standing  -      Flush  feeding tube; Standing  -     Tube Feedings/Formulas [facility]; Jevity 1.5; 45; 1,080; Jejunostomy; Ready to Hang Liter; Standing  -     Discontinue: metoprolol injection 5 mg  -     Inpatient consult to Registered Dietitian/Nutritionist; Standing  -     POCT glucose; Standing  -     Discontinue: metoprolol tartrate (LOPRESSOR) tablet 25 mg  -     Discontinue: adenosine injection 6 mg  -     metoprolol injection 5 mg  -     Transfer patient; Standing  -     metoprolol injection 5 mg  -     Discontinue: diltiaZEM 125 mg in D5W 125 mL infusion  -     Oxygen PRN; Standing  -     alteplase injection 2 mg  -     POCT glucose; Standing  -     Inpatient consult to Cardiology; Standing  -     magnesium sulfate in dextrose IVPB (premix) 1 g  -     TPN ADULT CENTRAL LINE CUSTOM (3 in 1)  -     X-Ray KUB; Standing  -     Discontinue: metoclopramide HCl injection 10 mg  -     enoxaparin injection 50 mg  -     Administer oral analgesic lozenge; Standing  -     POCT glucose; Standing  -     POCT glucose; Standing  -     Administer oral analgesic spray; Standing  -     POCT glucose; Standing  -     Discontinue: diltiaZEM tablet 30 mg  -     POCT glucose; Standing  -     TPN ADULT CENTRAL LINE CUSTOM (3 in 1)  -     POCT glucose; Standing  -     POCT glucose; Standing  -     POCT glucose; Standing  -     Cancel: X-Ray Chest 1 View; Standing  -     X-Ray Chest AP Portable; Standing  -     Discontinue: diltiaZEM 125 mg in dextrose 5% 125 mL infusion (non-titrating)  -     TPN ADULT CENTRAL LINE CUSTOM (3 in 1)  -     POCT glucose; Standing  -     metoclopramide HCl injection 10 mg  -     Inpatient consult to Hematology Oncology; Standing  -     Fl Modified Barium Swallow Speech; Standing  -     SLP video swallow; Standing  -     POCT glucose; Standing  -     Diet NPO; Standing  -     Nursing communication; Standing  -     POCT glucose; Standing  -     diltiaZEM 125 mg in D5W 125 mL infusion  -     POCT glucose; Standing

## 2020-03-25 NOTE — PT/OT/SLP PROGRESS
Occupational Therapy      Patient Name:  Steph Lira   MRN:  [id number]    Patient not seen today secondary to pt ill; vomiting this AM; also with fever.  Will follow-up next service date.     Des Ashley OT  3/25/2020

## 2020-03-25 NOTE — PLAN OF CARE
03/25/20 1514   Discharge Reassessment   Assessment Type Discharge Planning Reassessment   Anticipated Discharge Disposition Home-Health   Discharge Plan A Home Health   Discharge Plan B Home with family     SW discussed pt's d/c plan with Dr. Khoury and PT/OT supervisor Orly this afternoon. Pt is anticipated to go home with home health - d/c date unknown. SW is unaware of any DME needs at this time. SW went to pt's room to provide support and discuss d/c plan. Pt indicated she was not feeling well at the time. SW will return at a later date.

## 2020-03-25 NOTE — PT/OT/SLP PROGRESS
Physical Therapy      Patient Name:  Steph Lira   MRN:  [id number]    Patient not seen today secondary to Patient ill (Comment)(Attempted to see pt twice but pt continues with N&V. RN aware. ). Will follow-up 03/26/20.    Cynthia Polo, PT

## 2020-03-25 NOTE — PROGRESS NOTES
[facility] Medicine  Progress Note    Patient Name: Steph Lira  MRN: [id number]  Patient Class: IP- Inpatient   Admission Date: 3/13/2020  Length of Stay: 11 days  Attending Physician: Marcos Khoury MD  Primary Care Provider: Maida Mon MD        Subjective:     Principal Problem:Intractable vomiting      Interval History:  Stable overnight course with no significant vomiting.  Had intermittent nausea.  However this morning she spiked a temperature of 101.3° F.  She denies chest pain, shortness of breath or diarrhea or urinary symptoms.  She admits to mild right-sided lower quadrant abdominal pain which is no different than yesterday. Tube feeds at 20 cc/hr. Passing flatus.     Objective:     Vital Signs (Most Recent):  Temp: (!) 101.3 °F (38.5 °C)([place] Notified) (03/25/20 0740)  Pulse: (!) 112 (03/25/20 0755)  Resp: (!) 26 (03/25/20 0755)  BP: (!) 144/70 (03/25/20 0740)  SpO2: 95 % (03/25/20 0755) Vital Signs (24h Range):  Temp:  [97.3 °F (36.3 °C)-101.3 °F (38.5 °C)] 101.3 °F (38.5 °C)  Pulse:  [] 112  Resp:  [13-27] 26  SpO2:  [95 %-100 %] 95 %  BP: (125-171)/(62-82) 144/70     Weight: 53.3 kg (117 lb 8.1 oz)  Body mass index is 18.97 kg/m².    Intake/Output Summary (Last 24 hours) at 3/25/2020 1102  Last data filed at 3/25/2020 0720  Gross per 24 hour   Intake 260 ml   Output 1300 ml   Net -1040 ml      Physical Exam   Constitutional: She is oriented to person, place, and time. No distress.   Frail  female   HENT:   Head: Normocephalic and atraumatic.   Eyes: Conjunctivae are normal. No scleral icterus.   Neck: Neck supple. No thyromegaly present.   Cardiovascular: Normal rate, regular rhythm and normal heart sounds. Exam reveals no friction rub.   No murmur heard.  Pulmonary/Chest: Effort normal and breath sounds normal. No respiratory distress. She has no wheezes. She has no rales.   Diminished air entry to bases    Abdominal: Soft. She exhibits no distension.  There is tenderness.   J tube noted    Musculoskeletal: She exhibits no edema or deformity.   Neurological: She is alert and oriented to person, place, and time. No sensory deficit.   Skin: Skin is warm and dry. She is not diaphoretic.   Psychiatric: Her behavior is normal. She exhibits a depressed mood.   Nursing note and vitals reviewed.      Significant Labs:   BMP:   Recent Labs   Lab 03/25/20  0314   *      K 3.5      CO2 28   BUN 21   CREATININE 0.4*   CALCIUM 8.5*   MG 2.1     CBC:   Recent Labs   Lab 03/24/20  0405 03/25/20  0314   WBC 11.80 10.93  10.93   HGB 12.3 12.0  12.0   HCT 39.2 38.3  38.3    352*  352*               Assessment/Plan:      Active Hospital Problems    Diagnosis    *Intractable vomiting    Malignant neoplasm of esophagus    PAF (paroxysmal atrial fibrillation), onset 2002    Esophageal mass    Esophageal obstruction    Inanition    S/P ablation of atrial fibrillation    S/P CABG (coronary artery bypass graft)    History of smoking 10-25 pack years, 15 pack-years, quit 1994    COPD (chronic obstructive pulmonary disease)    Adenocarcinoma of left lung, stage 3    S/P CABG x 2, 2005    HTN (hypertension)    Hyperlipidemia    CAD (coronary artery disease)    Anxiety    Anticoagulant long-term use    Chronic external jugular vein thrombosis    Hypothyroid    GERD (gastroesophageal reflux disease)     Plan:  S/p esophageal stent placement on 03/17 and repositioning on 03/20   Suspect nausea/vomiting due to obstructive esophageal mass  Symptomatic management of nausea/vomiting - continue metoclopramide scheduled  Maintain NPO pending MBSS results  Continue TPN as tolerated; advance tube feeds as tolerated - low concern for obstruction  Continue diltiazem drip for atrial tachycardia   Anticoagulation with enoxaparin if pt were to need procedures; holding home apixaban   Appreciate input from consultants'  Also discussed with her oncologist   Aline; okay with getting chemo port while here. Wife wants to discuss with          VTE Risk Mitigation (From admission, onward)         Ordered     enoxaparin injection 50 mg  Every 12 hours (non-standard times)      03/22/20 1427     IP VTE HIGH RISK PATIENT  Once      03/13/20 1928                      Marcos Khoury MD  Department of Hospital Medicine   [facility]

## 2020-03-25 NOTE — PROGRESS NOTES
Pharmacokinetic Initial Assessment: IV Vancomycin    Assessment/Plan:    Initiate intravenous vancomycin with loading dose of 1000 mg once followed by a maintenance dose of vancomycin 1000 mg IV every 12 hours  Desired empiric serum trough concentration is 10 to 15 mcg/mL  Draw vancomycin trough level 30 min prior to fourth dose on 3/26/20 at approximately 21:00   Pharmacy will continue to follow and monitor vancomycin.      Please contact pharmacy at extension [phone] with any questions regarding this assessment.     Thank you for the consult,   Zuleima Landaverde       Patient brief summary:  Steph Lira is a 69 y.o. female initiated on antimicrobial therapy with IV Vancomycin for treatment of suspected sepsis    Drug Allergies:   Review of patient's allergies indicates:   Allergen Reactions    Ciprofloxacin Itching    Coreg [carvedilol]      Low energy    Doxycycline Other (See Comments) and Hives     Patient had a rxn with the sun despite wearing spf 30    Metoprolol      Low energy       Actual Body Weight:   53.3 kg    Renal Function:   Estimated Creatinine Clearance: 111.7 mL/min (A) (based on SCr of 0.4 mg/dL (L)).,       CBC (last 72 hours):  Recent Labs   Lab Result Units 03/23/20 0335 03/24/20 0405 03/25/20 0314   WBC K/uL 10.52  10.52 11.80 10.93  10.93   Hemoglobin g/dL 11.6*  11.6* 12.3 12.0  12.0   Hematocrit % 36.1*  36.1* 39.2 38.3  38.3   Platelets K/uL 274  274 339 352*  352*   Gran% % 76.0* 79.4* 81.9*   Lymph% % 10.6* 8.3* 4.7*   Mono% % 8.9 7.4 7.2   Eosinophil% % 3.4 3.6 4.8   Basophil% % 0.8 0.8 0.9   Differential Method  Automated Automated Automated       Metabolic Panel (last 72 hours):  Recent Labs   Lab Result Units 03/23/20 0335 03/24/20 0405 03/25/20  0314   Sodium mmol/L 134* 139 138   Potassium mmol/L 3.8 4.7 3.5   Chloride mmol/L 102 105 104   CO2 mmol/L 26 25 28   Glucose mg/dL 150* 132* 142*   BUN, Bld mg/dL 17 21 21   Creatinine mg/dL 0.4* 0.4* 0.4*   Albumin g/dL 2.4*  2.7* 2.6*   Total Bilirubin mg/dL 0.9 0.9 0.9   Alkaline Phosphatase U/L 149* 172* 198*   AST U/L 83* 83* 112*   ALT U/L 85* 117* 169*   Magnesium mg/dL 2.2 2.3 2.1   Phosphorus mg/dL 3.3 3.5 3.3       Drug levels (last 3 results):  No results for input(s): VANCOMYCINRA, VANCOMYCINPE, VANCOMYCINTR in the last 72 hours.    Microbiologic Results:  Microbiology Results (last 7 days)       Procedure Component Value Units Date/Time    Blood culture [[id number]]     Order Status:  Sent Specimen:  Blood     Blood culture [[id number]] Collected:  03/19/20 1053    Order Status:  Completed Specimen:  Blood Updated:  03/24/20 1232     Blood Culture, Routine No growth after 5 days.    Blood culture [[id number]] Collected:  03/19/20 1138    Order Status:  Completed Specimen:  Blood from Line, PICC Right Brachial Updated:  03/24/20 1232     Blood Culture, Routine No growth after 5 days.    Narrative:       From picc line  Collection has been rescheduled by AS3 at 03/19/2020 10:59 Reason:   nurse draw   Collection has been rescheduled by AS3 at 03/19/2020 10:59 Reason:   nurse draw

## 2020-03-25 NOTE — PT/OT/SLP PROGRESS
Speech Language Pathology      Steph Lira  MRN: [id number]    MBS attempted. Pt seen in lateral position w/ initial tsp trial. Pooling to pyriform sinus with aspiration of material during initiation of pharyngeal swallow. Immediate cough response appreciated. Following trial, Pt with extensive vomiting warranting termination of study.     Ganesh Skelton, CCC-SLP

## 2020-03-25 NOTE — PROGRESS NOTES
[facility]  Adult Nutrition   Progress Note (Nutrition Support Management)    SUMMARY     Recommendations  Recommendation/Intervention:   1. RD changed tube feeding formula. Ordered Vital AF 1.2 with goal rate of 55 ml/hr with 30 ml water flush Q4H (to provide 1584 kcal/day, 99 g/day protein, and 1251 ml/day free water).   2. TF to be initiated and advanced as tolerated.   3. New TPN ordered to be initiated today @ 1700.   4. TPN to continue until patient is able to tolerate enteral nutrition and EN able to meet at least 60% of estimated needs. Transitional feeding from TPN to EN as appropriate.   Goals:   1. Patient to tolerate enteral nutrition and PN.   2. Nutrition provision to meet estimated protein, energy, and fluid needs.   Nutrition Goal Status: new  Communication of RD Recs: reviewed with RN    Dietitian Rounds Brief  · Patient continues to have N/V. MBSS terminated. Patient to remain NPO. RD changed TF formula to help promote tolerance. Changed to elemental. TPN to continue until patient able to tolerate new formula.   · Per SLP note: MBS attempted. Pt seen in lateral position w/ initial tsp trial. Pooling to pyriform sinus with aspiration of material during initiation of pharyngeal swallow. Immediate cough response appreciated. Following trial, Pt with extensive vomiting warranting termination of study.      PARENTERAL NUTRITION PROGRESS NOTE:    Parenteral Nutrition Day # 12   Diagnosis/Indication for PN: NPO, esophageal cancer with mass obstructing esophagus, intractable N/V   IV Access: PICC  Diet/Tube Feeding: Vital AF 1.2 with goal rate of 55 ml/hr (not started yet)        Admixture Type: 3 in 1 custom TPN (10% AA, 70% Dextrose, 20% Intralipid)   Infusion Rate and Frequency: 50 ml/hr   Patient Acuity: acute care     PN Composition:   50 grams Amino Acid, 196 grams Dextrose, and 50 grams Lipid.    Today's TPN provides 1366 kcal.     Please see order for electrolytes and additives  "content and adjustments.   *The Nutrition Support Team will continue to monitor electrolytes daily and adjust parenteral nutrition as warranted.    Reason for Assessment  Reason For Assessment: new TPN, new tube feeding, RD follow-up    Nutrition Risk Screen  Nutrition Risk Screen: tube feeding or parenteral nutrition     MST Score: 3  Have you recently lost weight without trying?: Yes: 14-23 lbs  Weight loss score: 2  Have you been eating poorly because of a decreased appetite?: Yes  Appetite score: 1       Nutrition/Diet History  Spiritual, Cultural Beliefs, Mosque Practices, Values that Affect Care: no  Food Allergies: NKFA  Factors Affecting Nutritional Intake: difficulty/impaired swallowing, nausea/vomiting    Anthropometrics  Temp: 97.8 °F (36.6 °C)  Height Method: Stated  Height: 5' 6" (167.6 cm)  Height (inches): 66 in  Weight Method: Standard Scale  Weight: 53.3 kg (117 lb 8.1 oz)  Weight (lb): 117.51 lb  Ideal Body Weight (IBW), Female: 130 lb  % Ideal Body Weight, Female (lb): 87 %  BMI (Calculated): 19  BMI Grade: 18.5-24.9 - normal       Weight History:  Wt Readings from Last 10 Encounters:   03/25/20 53.3 kg (117 lb 8.1 oz)   03/12/20 52.7 kg (116 lb 2.9 oz)   03/11/20 52.6 kg (116 lb)   03/09/20 54.4 kg (120 lb)   03/06/20 54.9 kg (121 lb)   03/04/20 54.9 kg (121 lb)   02/27/20 56.2 kg (123 lb 14.4 oz)   02/21/20 57.2 kg (126 lb)   01/31/20 57.2 kg (126 lb)   01/20/20 58.5 kg (128 lb 15.5 oz)       Lab/Procedures/Meds: Pertinent Labs Reviewed  Clinical Chemistry:  Recent Labs   Lab 03/23/20  0335 03/24/20  0405 03/25/20  0314   * 139 138   K 3.8 4.7 3.5    105 104   CO2 26 25 28   * 132* 142*   BUN 17 21 21   CREATININE 0.4* 0.4* 0.4*   CALCIUM 8.0* 8.7 8.5*   PROT 6.0 6.4 6.6   ALBUMIN 2.4* 2.7* 2.6*   BILITOT 0.9 0.9 0.9   ALKPHOS 149* 172* 198*   AST 83* 83* 112*   ALT 85* 117* 169*   ANIONGAP 6* 9 6*   ESTGFRAFRICA >60.0 >60.0 >60.0   EGFRNONAA >60.0 >60.0 >60.0   MG 2.2 2.3 " 2.1   PHOS 3.3 3.5 3.3     CBC:   Recent Labs   Lab 03/25/20  0314   WBC 10.93  10.93   RBC 4.09  4.09   HGB 12.0  12.0   HCT 38.3  38.3   *  352*   MCV 94  94   MCH 29.3  29.3   MCHC 31.3*  31.3*     Lipid Panel:  Recent Labs   Lab 03/23/20  0335   TRIG 85     Inflammatory Labs:  Recent Labs   Lab 03/19/20  0532   CRP 15.62*     Medications: Pertinent Medications reviewed  Scheduled Meds:   alteplase  2 mg Intra-Catheter Once    enoxparin  50 mg Subcutaneous Q12H    fluticasone propionate  1 spray Each Nostril Daily    metoclopramide HCl  10 mg Intravenous Q8H    piperacillin-tazobactam (ZOSYN) IVPB  3.375 g Intravenous Q8H    tiotropium  1 capsule Inhalation Daily    vancomycin (VANCOCIN) IVPB  1,000 mg Intravenous Q12H     Continuous Infusions:   dilTIAZem 5 mg/hr (03/25/20 1437)    lactated ringers 75 mL/hr at 03/25/20 1534    TPN ADULT CENTRAL LINE CUSTOM (3 in 1) 50 mL/hr at 03/25/20 1700     PRN Meds:.acetaminophen, acetaminophen, albuterol-ipratropium, calcium chloride IVPB, calcium chloride IVPB, calcium chloride IVPB, dextrose 50%, dextrose 50%, hydrALAZINE, HYDROmorphone, insulin regular, magnesium oxide, magnesium sulfate IVPB, magnesium sulfate IVPB, magnesium sulfate IVPB, magnesium sulfate IVPB, metoprolol, ondansetron, potassium chloride in water, potassium chloride in water, potassium chloride in water, potassium chloride in water, potassium chloride, potassium chloride, potassium chloride, potassium chloride, promethazine (PHENERGAN) IVPB, sodium chloride 0.9%, sodium chloride 0.9%, sodium phosphate IVPB, sodium phosphate IVPB, sodium phosphate IVPB, sodium phosphate IVPB, sodium phosphate IVPB, Pharmacy to dose Vancomycin consult **AND** vancomycin - pharmacy to dose    Estimated/Assessed Needs  Weight Used For Calorie Calculations: 53.3 kg (117 lb 8.1 oz)  Energy Calorie Requirements (kcal): 1599 - 1866 (30 - 35)   Energy Need Method: Kcal/kg  Protein Requirements: 64 -  80 (1.2 - 1.5 g/kg)   Weight Used For Protein Calculations: 53.3 kg (117 lb 8.1 oz)     Estimated Fluid Requirement Method: RDA Method  RDA Method (mL): 1599       Nutrition Prescription Ordered  Current Diet Order: NPO   Current Nutrition Support Formula Ordered: Jevity 1.5  Current Nutrition Support Rate Ordered: 20 (ml)  Current Nutrition Support Frequency Ordered: currently on hold     Evaluation of Received Nutrient/Fluid Intake  Energy Calories Required: meeting needs  Protein Required: meeting needs  Fluid Required: meeting needs  Tolerance: not tolerating tube feeding     Intake/Output Summary (Last 24 hours) at 3/25/2020 1823  Last data filed at 3/25/2020 1700  Gross per 24 hour   Intake 1750 ml   Output 1300 ml   Net 450 ml      Nutrition Risk  Level of Risk/Frequency of Follow-up: high     Monitor and Evaluation  Food and Nutrient Intake: enteral nutrition intake, energy intake, parenteral nutrition intake  Food and Nutrient Adminstration: enteral and parenteral nutrition administration  Physical Activity and Function: nutrition-related ADLs and IADLs, factors affecting access to physical activity  Anthropometric Measurements: weight, weight change, body mass index  Biochemical Data, Medical Tests and Procedures: gastrointestinal profile, inflammatory profile, lipid profile, glucose/endocrine profile, electrolyte and renal panel  Nutrition-Focused Physical Findings: overall appearance     Nutrition Follow-Up  RD Follow-up?: Yes    Ileana Ware RD 03/25/2020 6:28 PM

## 2020-03-25 NOTE — PLAN OF CARE
Problem: Parenteral Nutrition  Goal: Effective Intravenous Nutrition Therapy Delivery  Outcome: Ongoing, Progressing  Intervention: Optimize Intravenous Nutrition Delivery  Flowsheets (Taken 3/25/2020 7453)  Nutrition Support Management: tube feeding formula adjusted; parenteral nutrition composition adjusted

## 2020-03-26 PROBLEM — B49 FUNGEMIA: Status: ACTIVE | Noted: 2020-01-01

## 2020-03-26 PROBLEM — J96.01 ACUTE RESPIRATORY FAILURE WITH HYPOXIA AND HYPERCARBIA: Status: ACTIVE | Noted: 2020-01-01

## 2020-03-26 PROBLEM — J96.02 ACUTE RESPIRATORY FAILURE WITH HYPOXIA AND HYPERCARBIA: Status: ACTIVE | Noted: 2020-01-01

## 2020-03-26 NOTE — NURSING
Dr Khoury called and requested to bedside.  Pt tachypneic, diaphoretic, and O2 demands continue to increase and HR remains extremely elevated despite prn meds.    1230 Dr Khoury at bedside along with respiratory therapists.  Pt placed on NRB mask.  New orders rec'd.      Shared concerns with MD that patient may needs higher level of care considering  patient's current condition and ABG results.  MD to follow up to see if patient improves then will discuss code status with patients .    For now, all infusions held and tube feedings stopped.      1350 Pt appears to be more comfortable at this time.  Code status being discussed.  No orders to transfer to higher level of care at this time.  Will continue to monitor.      1433 Pt is now a DNR.      1520 Spouse at bedside.  Will continue to monitor.

## 2020-03-26 NOTE — PROGRESS NOTES
"[facility]  Adult Nutrition   Progress Note (Follow-Up)    SUMMARY     Recommendations  Recommendation/Intervention:   1. TPN to be discontinued for now.   2. Enteral nutrition to continue to advance to goal. Vital AF 1.2 with goal rate of 55 ml/hr. RD to monitor hydration status and modify tube feeding order and water flush as needed.   Goals:   1. Patient to meet estimated needs via enteral nutrition provision.   Nutrition Goal Status: new  Communication of RD Recs: reviewed with physician(reviewed with RN )    Dietitian Rounds Brief  Patient tolerating elemental TF at 45 ml/hr. Plans to continue to advance to gaol rate of 55 ml/hr as tolerated. RD notified RN that new TPN will not be ordered today. Plans to wean off once current bag commences. Patient does continue to have N/V. Emesis is green and possibly bile per convo with RN. RD did notice elevated LFT and T.Bili today. Per telephone conversation with Dr. Khoury, there were plans for CT scan today but patient too ill. Pt now has blood infection (canididiasis). Plans to stop TPN and likely PICC line will be removed and possibly replaced per MD.     Reason for Assessment  Reason For Assessment: RD follow-up    Nutrition Risk Screen  Nutrition Risk Screen: tube feeding or parenteral nutrition     MST Score: 3  Have you recently lost weight without trying?: Yes: 14-23 lbs  Weight loss score: 2  Have you been eating poorly because of a decreased appetite?: Yes  Appetite score: 1       Nutrition/Diet History  Spiritual, Cultural Beliefs, Roman Catholic Practices, Values that Affect Care: no  Food Allergies: NKFA  Factors Affecting Nutritional Intake: difficulty/impaired swallowing, nausea/vomiting    Anthropometrics  Temp: 100.1 °F (37.8 °C)  Height Method: Stated  Height: 5' 6" (167.6 cm)  Height (inches): 66 in  Weight Method: Bed Scale  Weight: 55.1 kg (121 lb 7.6 oz)  Weight (lb): 121.47 lb  Ideal Body Weight (IBW), Female: 130 lb  % Ideal Body " Weight, Female (lb): 87 %  BMI (Calculated): 19.6  BMI Grade: 18.5-24.9 - normal       Weight History:  Wt Readings from Last 10 Encounters:   03/26/20 55.1 kg (121 lb 7.6 oz)   03/12/20 52.7 kg (116 lb 2.9 oz)   03/11/20 52.6 kg (116 lb)   03/09/20 54.4 kg (120 lb)   03/06/20 54.9 kg (121 lb)   03/04/20 54.9 kg (121 lb)   02/27/20 56.2 kg (123 lb 14.4 oz)   02/21/20 57.2 kg (126 lb)   01/31/20 57.2 kg (126 lb)   01/20/20 58.5 kg (128 lb 15.5 oz)       Lab/Procedures/Meds: Pertinent Labs Reviewed  Clinical Chemistry:  Recent Labs   Lab 03/24/20  0405 03/25/20  0314 03/26/20  0359    138 139   K 4.7 3.5 3.6    104 104   CO2 25 28 25   * 142* 147*   BUN 21 21 25*   CREATININE 0.4* 0.4* 0.7   CALCIUM 8.7 8.5* 8.1*   PROT 6.4 6.6 6.7   ALBUMIN 2.7* 2.6* 2.6*   BILITOT 0.9 0.9 1.5*   ALKPHOS 172* 198* 186*   AST 83* 112* 71*   * 169* 138*   ANIONGAP 9 6* 10   ESTGFRAFRICA >60.0 >60.0 >60.0   EGFRNONAA >60.0 >60.0 >60.0   MG 2.3 2.1 2.2   PHOS 3.5 3.3 4.0     CBC:   Recent Labs   Lab 03/26/20  0359  03/26/20  1356   WBC 13.34*  --   --    RBC 4.24  --   --    HGB 12.3  --   --    HCT 39.1   < > 37     --   --    MCV 92  --   --    MCH 29.0  --   --    MCHC 31.5*  --   --     < > = values in this interval not displayed.     Lipid Panel:  Recent Labs   Lab 03/23/20  0335   TRIG 85     Medications: Pertinent Medications reviewed  Scheduled Meds:   alteplase  2 mg Intra-Catheter Once    chlorhexidine  15 mL Mouth/Throat BID    enoxparin  50 mg Subcutaneous Q12H    fluticasone propionate  1 spray Each Nostril Daily    micafungin (MYCAMINE) IVPB  100 mg Intravenous Q24H    mupirocin   Nasal BID    piperacillin-tazobactam (ZOSYN) IVPB  3.375 g Intravenous Q8H    tiotropium  1 capsule Inhalation Daily    vancomycin (VANCOCIN) IVPB  1,000 mg Intravenous Q12H     Continuous Infusions:   dilTIAZem 15 mg/hr (03/26/20 1145)    lactated ringers 75 mL/hr at 03/26/20 0747    TPN ADULT CENTRAL  LINE CUSTOM (3 in 1) 50 mL/hr at 03/25/20 1700     PRN Meds:.acetaminophen, acetaminophen, albuterol-ipratropium, calcium chloride IVPB, calcium chloride IVPB, calcium chloride IVPB, dextrose 50%, dextrose 50%, hydrALAZINE, HYDROmorphone, insulin regular, lorazepam, magnesium oxide, magnesium sulfate IVPB, magnesium sulfate IVPB, magnesium sulfate IVPB, magnesium sulfate IVPB, metoclopramide HCl, metoprolol, ondansetron, potassium chloride in water, potassium chloride in water, potassium chloride in water, potassium chloride in water, potassium chloride, potassium chloride, potassium chloride, potassium chloride, promethazine (PHENERGAN) IVPB, sodium chloride 0.9%, sodium chloride 0.9%, sodium phosphate IVPB, sodium phosphate IVPB, sodium phosphate IVPB, sodium phosphate IVPB, sodium phosphate IVPB, Pharmacy to dose Vancomycin consult **AND** vancomycin - pharmacy to dose    Estimated/Assessed Needs  Weight Used For Calorie Calculations: 53.3 kg (117 lb 8.1 oz)  Energy Calorie Requirements (kcal): 1599 - 1866 (30 - 35)   Energy Need Method: Kcal/kg  Protein Requirements: 64 - 80 (1.2 - 1.5 g/kg)   Weight Used For Protein Calculations: 53.3 kg (117 lb 8.1 oz)     Estimated Fluid Requirement Method: RDA Method  RDA Method (mL): 1599       Nutrition Prescription Ordered  Current Diet Order: NPO   Current Nutrition Support Formula Ordered: Vital AF 1.2  Current Nutrition Support Rate Ordered: 45 (ml)  Current Nutrition Support Frequency Ordered: running at 45 ml/hr; goal rate = 55 ml/hr     Evaluation of Received Nutrient/Fluid Intake  Enteral Calories (kcal): 1296  Enteral Protein (gm): 81  Enteral (Free Water) Fluid (mL): 876  Energy Calories Required: not meeting needs  Protein Required: meeting needs  Fluid Required: meeting needs  Tolerance: tolerating     Intake/Output Summary (Last 24 hours) at 3/26/2020 1509  Last data filed at 3/26/2020 1350  Gross per 24 hour   Intake 1180 ml   Output 2700 ml   Net -1520 ml       Nutrition Risk  Level of Risk/Frequency of Follow-up: high     Monitor and Evaluation  Food and Nutrient Intake: energy intake, enteral nutrition intake  Food and Nutrient Adminstration: diet order, enteral and parenteral nutrition administration  Physical Activity and Function: nutrition-related ADLs and IADLs, factors affecting access to physical activity  Anthropometric Measurements: weight, weight change, body mass index  Biochemical Data, Medical Tests and Procedures: electrolyte and renal panel, glucose/endocrine profile, lipid profile, gastrointestinal profile, inflammatory profile  Nutrition-Focused Physical Findings: overall appearance     Nutrition Follow-Up  RD Follow-up?: Yes    Ileana Ware RD 03/26/2020 3:24 PM

## 2020-03-26 NOTE — PLAN OF CARE
03/26/20 0739   Patient Assessment/Suction   Level of Consciousness (AVPU) alert   Respiratory Effort Normal;Mild   Expansion/Accessory Muscles/Retractions no use of accessory muscles   All Lung Fields Breath Sounds clear   PRE-TX-O2   O2 Device (Oxygen Therapy) room air   SpO2 96 %   Pulse Oximetry Type Continuous   $ Pulse Oximetry - Multiple Charge Pulse Oximetry - Multiple   Pulse (!) 113   Resp (!) 31   Aerosol Therapy   $ Aerosol Therapy Charges PRN treatment not required   Respiratory Treatment Status (SVN) PRN treatment not required   Inhaler   $ Inhaler Charges MDI (Metered Dose Inahler) Treatment   Daily Review of Necessity (Inhaler) completed   Respiratory Treatment Status (Inhaler) given   Treatment Route (Inhaler) spacer/holding chamber;mouthpiece   Patient Position (Inhaler) HOB elevated   Post Treatment Assessment (Inhaler) breath sounds improved   Signs of Intolerance (Inhaler) none   Respiratory Evaluation   $ Care Plan Tech Time 15 min

## 2020-03-26 NOTE — PROGRESS NOTES
[facility] Medicine  Progress Note    Patient Name: Steph Lira  MRN: [id number]  Patient Class: IP- Inpatient   Admission Date: 3/13/2020  Length of Stay: 12 days  Attending Physician: Marcos Khoury MD  Primary Care Provider: Maida Mon MD        Subjective:     Principal Problem:Severe sepsis      Interval History:  Intermittent fever overnight with a T-max of 101.6° F. I was called by bedside nursing staff secondary to acute onset of respiratory distress.  40 mg of IV furosemide stat ordered.  Initial ABG with respiratory acidosis, hypercapnia and hypoxemia which improved after respiratory support with 100% non-rebreather.  Nasotracheal suctioning with greenish fluid.  Stat chest x-ray without any acute cardiopulmonary process.    Blood cultures growing budding yeast.  Will stop TPN and tube feeds.  Started IV micafungin.  Consulted Infectious Disease.    Objective:     Vital Signs (Most Recent):  Temp: 100.1 °F (37.8 °C) (03/26/20 1122)  Pulse: (!) 113 (03/26/20 0739)  Resp: 18 (03/26/20 0800)  BP: (!) 155/65 (03/26/20 0922)  SpO2: 96 % (03/26/20 0739) Vital Signs (24h Range):  Temp:  [97.8 °F (36.6 °C)-101.6 °F (38.7 °C)] 100.1 °F (37.8 °C)  Pulse:  [] 113  Resp:  [15-41] 18  SpO2:  [87 %-97 %] 96 %  BP: ()/(51-89) 155/65     Weight: 55.1 kg (121 lb 7.6 oz)  Body mass index is 19.61 kg/m².    Intake/Output Summary (Last 24 hours) at 3/26/2020 1441  Last data filed at 3/26/2020 1350  Gross per 24 hour   Intake 1180 ml   Output 2700 ml   Net -1520 ml      Physical Exam   Constitutional: She is oriented to person, place, and time. She appears distressed.   Frail  female   HENT:   Head: Normocephalic and atraumatic.   Eyes: Conjunctivae are normal. No scleral icterus.   Neck: Neck supple. No thyromegaly present.   Cardiovascular: Normal rate, regular rhythm and normal heart sounds. Exam reveals no friction rub.   No murmur heard.  Pulmonary/Chest: Accessory  muscle usage present. Tachypnea noted. She is in respiratory distress. She has no wheezes. She has no rales.   Diminished air entry to bases    Abdominal: Soft. She exhibits no distension. There is tenderness.   J tube noted    Musculoskeletal: She exhibits no edema or deformity.   Neurological: She is alert and oriented to person, place, and time. No sensory deficit.   Skin: Skin is warm and dry. She is not diaphoretic.   Psychiatric: Her behavior is normal. She exhibits a depressed mood.   Nursing note and vitals reviewed.      Significant Labs:   BMP:   Recent Labs   Lab 03/26/20  0359   *      K 3.6      CO2 25   BUN 25*   CREATININE 0.7   CALCIUM 8.1*   MG 2.2     CBC:   Recent Labs   Lab 03/25/20  0314 03/26/20  0359 03/26/20  1239 03/26/20  1356   WBC 10.93  10.93 13.34*  --   --    HGB 12.0  12.0 12.3  --   --    HCT 38.3  38.3 39.1 38 37   *  352* 291  --   --        Microbiology Results (last 7 days)     Procedure Component Value Units Date/Time    Culture, Respiratory with Gram Stain [[id number]]     Order Status:  No result Specimen:  Respiratory     Blood culture [[id number]] Collected:  03/25/20 1114    Order Status:  Completed Specimen:  Blood Updated:  03/26/20 1202     Blood Culture, Routine Gram stain aer bottle: budding yeast      Results called to and read back by: Nataly Ba RN on Cardio-A       03/26/2020  12:02 by JOSE    Narrative:       2 sets  Collection has been rescheduled by [facility] at 03/25/2020 10:35 Reason:   per nurse Fang patient is gone for another test and will be back   after 20 minutes.  Collection has been rescheduled by C at 03/25/2020 10:35 Reason:   per nurse Fang patient is gone for another test and will be back   after 20 minutes.    Blood culture [[id number]] Collected:  03/19/20 1053    Order Status:  Completed Specimen:  Blood Updated:  03/24/20 1232     Blood Culture, Routine No growth after 5 days.    Blood culture [[id number]]  Collected:  03/19/20 1138    Order Status:  Completed Specimen:  Blood from Line, PICC Right Brachial Updated:  03/24/20 1232     Blood Culture, Routine No growth after 5 days.    Narrative:       From picc line  Collection has been rescheduled by [facility] at 03/19/2020 10:59 Reason:   nurse draw   Collection has been rescheduled by [facility] at 03/19/2020 10:59 Reason:   nurse draw                 Assessment/Plan:      Active Hospital Problems    Diagnosis    *Severe sepsis    Fungemia    Malignant neoplasm of esophagus    Acute respiratory failure with hypoxia and hypercarbia    PAF (paroxysmal atrial fibrillation), onset 2002    Esophageal mass    Esophageal obstruction    Inanition    Intractable vomiting    S/P ablation of atrial fibrillation    S/P CABG (coronary artery bypass graft)    History of smoking 10-25 pack years, 15 pack-years, quit 1994    COPD (chronic obstructive pulmonary disease)    Adenocarcinoma of left lung, stage 3    S/P CABG x 2, 2005    HTN (hypertension)    Hyperlipidemia    CAD (coronary artery disease)    Anxiety    Anticoagulant long-term use    Chronic external jugular vein thrombosis    Hypothyroid    GERD (gastroesophageal reflux disease)     Plan:  S/p esophageal stent placement on 03/17 and repositioning on 03/20 in the setting of esophageal cancer   Symptomatic management of nausea/vomiting (likely due to malignancy)  Supplemental oxygen for respiratory failure  Maintain NPO. Hold TPN and tube feeds   ID consult for fungemia; start IV micafungin 100mg q24h   Continue diltiazem drip for atrial tachycardia with prn IV metoprolol   Anticoagulation with enoxaparin if pt were to need procedures; holding home apixaban   Appreciate input from consultants'  Discussed with  about goals of care; change to DNR  Poor prognosis; high risk of death due to malignancy now complicated by fungemia and respiratory failure     VTE Risk Mitigation (From admission, onward)          Ordered     enoxaparin injection 50 mg  Every 12 hours (non-standard times)      03/22/20 1427     IP VTE HIGH RISK PATIENT  Once      03/13/20 1927                      Marcos Khoury MD  Department of Hospital Medicine   [facility]

## 2020-03-26 NOTE — NURSING
"1100- pt HR up to 180's, temp 100.1 gave prn tylenol, increased cardizem drip from 5 to 10, no relief. Gave prn metoprolol with no relief. Dr Khoury notified. Orders received.     1145- pt HR remains in 170's, increased cardizem to 15 ml/hr    Pt says she only feels "a little dizzy, and a little SOB"  O2 @ 3L with sats in 90's  "

## 2020-03-26 NOTE — PLAN OF CARE
Problem: Physical Therapy Goal  Goal: Physical Therapy Goal  Description  Goals to be met by: D/C     Patient will increase functional independence with mobility by performin. Supine to sit with Supervision  2. Sit to stand transfer with Supervision  3. Bed to chair transfer with Supervision   4. Gait  x 250 feet with Supervision using no AD     Outcome: Ongoing, Progressing   Pt continues to progress towards goals. Limited by nausea/vomiting.

## 2020-03-26 NOTE — CONSULTS
Consult Note  Infectio us Disease  INTERPROFESSIONAL E/M/ remote/ due to COVID pandemic    Reason for Consult:  Yeast in blood cultures    HPI: Steph Lira is a  69 y.o. female     Reviewed case and d/w Dr. Jean.  Unfortunate woman with history of  cancer s/p left upper lobectomy, and esophageal cancer admitted 3/13 due to worsening obstruction of her esophagus requiring a stent. She has had difficulty with tolerating nourishment po and has required TPN. She began spiking temps on 3/25 early am and broad spectrum antibiotics were begun. Today her blood cultures demonstrated yeast. I discussed with Dr. Jean and her picc line has been in place since at least 3/13 and he will pursue pulling this. Mycamine has been started.     Review of patient's allergies indicates:   Allergen Reactions    Ciprofloxacin Itching    Coreg [carvedilol]      Low energy    Doxycycline Other (See Comments) and Hives     Patient had a rxn with the sun despite wearing spf 30    Metoprolol      Low energy     Past Medical History:   Diagnosis Date    *Atrial fibrillation     and Hx PSVT    Adenocarcinoma of left lung, stage 3 3/20/2015    Allergic drug rash 12/28/2016    Allergy     chronic     Arthritis     fingers    Benign tumor of throat     Bronchitis March 2013    CAD (coronary artery disease) 2005    CABGx2 in 2005 and 2 MI after with 4 stents    Cancer 1996    small cell lung cancer s/p resection of 1/3 lung, 5 ribs and muscle mass then had chemo and radiation    Cataract     OD     CHF (congestive heart failure) past Hx    Chronic rhinitis     Clot past Hx    external jugular, now stented, occluded, had phlebitis; now revascularized    Colon polyp     COPD (chronic obstructive pulmonary disease)     no oxygen or nebulizers    COPD exacerbation 5/8/13    improved 5/14/13 after steroid pack,antibiotics    Dysphagia     Esophageal stenosis     Former smoker     GERD (gastroesophageal reflux disease)      Heart block     Hyperlipidemia     Hypertension     pt states does not have //    MI (myocardial infarction) 2005    Posterior vitreous detachment of left eye     Thyroid disease      Past Surgical History:   Procedure Laterality Date    ABLATION N/A 12/11/2018    Procedure: ABLATION;  Surgeon: Santana Covarrubias MD;  Location: [facility] EP LAB;  Service: Cardiology;  Laterality: N/A;  AF, JUAN ANTONIO, PVI, RFA, CARTO, GEN, GP, 3 PREP    ABLATION OF ARRHYTHMOGENIC FOCUS FOR ATRIAL FIBRILLATION N/A 5/8/2019    Procedure: ABLATION, ARRHYTHMOGENIC FOCUS, FOR ATRIAL FIBRILLATION;  Surgeon: Santana Covarrubias MD;  Location: [facility] EP [facility];  Service: Cardiology;  Laterality: N/A;  AF, PVI, RFA, CARTO, GEN, GP , 3 PREP    ADENOIDECTOMY      APPENDECTOMY      BACK SURGERY      lumbar    BREAST BIOPSY Left     benign    CARDIAC SURGERY      CARDIOVERSION  5/8/2019    Procedure: Cardioversion;  Surgeon: Santana Covarrubias MD;  Location: [facility] EP [facility];  Service: Cardiology;;    CATARACT EXTRACTION Left     OS    CATARACT EXTRACTION, BILATERAL      left eye only    CHOLECYSTECTOMY      COLONOSCOPY  03/2012    repeat in 5 years    COLONOSCOPY N/A 6/19/2017    Procedure: COLONOSCOPY;  Surgeon: Kal Elise MD;  Location: [facility];  Service: Endoscopy;  Laterality: N/A;    CORONARY ARTERY BYPASS GRAFT  2005    2 vessel    ESOPHAGOGASTRODUODENOSCOPY N/A 7/5/2018    Procedure: ESOPHAGOGASTRODUODENOSCOPY (EGD)/cryo;  Surgeon: Robbie Gonzales MD;  Location: [facility] (2ND FLR);  Service: Endoscopy;  Laterality: N/A;  Eliquis/Dr Henry Wei/OK to hold med 2 days prior to egd/see telephone encounter dated 6/12/18/pt stated she needs to take Diflucan 1 tab po daily 12 days prior to egd, message sent to Sarah/she will check with Dr Gonzales/santo    ESOPHAGOGASTRODUODENOSCOPY N/A 10/3/2018    Procedure: EGD (ESOPHAGOGASTRODUODENOSCOPY)/poss cryo;  Surgeon: Robbie Gonzales MD;  Location: [facility] (2ND FLR);  Service: Endoscopy;   Laterality: N/A;  2 day hold Dr Henry Pavon  Diflucan 1 tab po daily starting 14 days prior to egd, Rx'd per Dr Gonzales    ESOPHAGOGASTRODUODENOSCOPY N/A 11/1/2018    Procedure: EGD (ESOPHAGOGASTRODUODENOSCOPY)/cryo;  Surgeon: Robbie Gonzales MD;  Location: [facility] (2ND FLR);  Service: Endoscopy;  Laterality: N/A;  2 day hold Dr Henry Pavon  Diflucan 1 tab po daily starting 14 days prior to egd, Rx'd per Dr Gonzales as before?    ESOPHAGOGASTRODUODENOSCOPY N/A 9/27/2019    Procedure: EGD (ESOPHAGOGASTRODUODENOSCOPY);  Surgeon: Robbie Gonzales MD;  Location: [facility] (2ND FLR);  Service: Endoscopy;  Laterality: N/A;  Coumadin was d/c'd 9/18/19.  No other anti-thrombotic med for now.  pg  Per Dr Gonzales, she does not need to take Diflucan pre-procedure as she did before.  pg    ESOPHAGOGASTRODUODENOSCOPY N/A 1/31/2020    Procedure: EGD (ESOPHAGOGASTRODUODENOSCOPY)/poss cryo;  Surgeon: Robbie Gonzales MD;  Location: [facility] (2ND [facility]);  Service: Endoscopy;  Laterality: N/A;  ok for eliquis hold-see telephone order 1/23/20-tb    ESOPHAGOGASTRODUODENOSCOPY N/A 3/12/2020    Procedure: EGD (ESOPHAGOGASTRODUODENOSCOPY);  Surgeon: Ulisses Colorado III, MD;  Location: [facility];  Service: Endoscopy;  Laterality: N/A;    ESOPHAGOGASTRODUODENOSCOPY N/A 3/17/2020    Procedure: EGD (ESOPHAGOGASTRODUODENOSCOPY) in OR w/ fluoro;  Surgeon: Ulisses Colorado III, MD;  Location: [facility];  Service: Endoscopy;  Laterality: N/A;  IN OR with fluoro    ESOPHAGOGASTRODUODENOSCOPY N/A 3/20/2020    Procedure: EGD (ESOPHAGOGASTRODUODENOSCOPY);  Surgeon: Rafat Ruiz III, MD;  Location: [facility];  Service: General;  Laterality: N/A;    EYE SURGERY  Dec 2012    ptosis repair    FIRST RIB REMOVAL  1996 with lung resection    HYSTERECTOMY      LUNG LOBECTOMY  1996    left lung for cancer    PLACEMENT OF JEJUNOSTOMY TUBE N/A 3/20/2020    Procedure: INSERTION, JEJUNOSTOMY TUBE;  Surgeon:  Rafat Ruiz III, MD;  Location: [facility];  Service: General;  Laterality: N/A;    REMOVAL OF PLANTAR WART USING LASER      SHOULDER SURGERY  ,    scapular entrapment after lung surgery, needed 5 ribs removed left side    SYMPOTHATIC NERVE LEFT SIDE       with lung surgery    TONSILLECTOMY      TONSILLECTOMY, ADENOIDECTOMY, BILATERAL MYRINGOTOMY AND TUBES      UPPER GASTROINTESTINAL ENDOSCOPY  2016    Dr. Koo    VASCULAR SURGERY      stents place in jugualr vein     Social History     Socioeconomic History    Marital status:      Spouse name: Not on file    Number of children: Not on file    Years of education: Not on file    Highest education level: Not on file   Occupational History    Occupation: retired   Social Needs    Financial resource strain: Not on file    Food insecurity:     Worry: Not on file     Inability: Not on file    Transportation needs:     Medical: Not on file     Non-medical: Not on file   Tobacco Use    Smoking status: Former Smoker     Packs/day: 2.00     Years: 20.00     Pack years: 40.00     Types: Cigarettes     Last attempt to quit: 1994     Years since quittin.2    Smokeless tobacco: Never Used   Substance and Sexual Activity    Alcohol use: No    Drug use: No    Sexual activity: Yes     Partners: Male   Lifestyle    Physical activity:     Days per week: Not on file     Minutes per session: Not on file    Stress: Not on file   Relationships    Social connections:     Talks on phone: Not on file     Gets together: Not on file     Attends Yazidism service: Not on file     Active member of club or organization: Not on file     Attends meetings of clubs or organizations: Not on file     Relationship status: Not on file   Other Topics Concern    Are you pregnant or think you may be? No    Breast-feeding No   Social History Narrative    Not on file     Family History   Problem Relation Age of Onset    Allergic rhinitis Father      Cancer Father     Hypertension Father     Allergies Father     Allergic rhinitis Son     Asthma Son     Stroke Mother     Allergies Mother     Allergic rhinitis Sister     Asthma Sister     Diverticulitis Brother     No Known Problems Daughter     No Known Problems Maternal Aunt     No Known Problems Maternal Uncle     No Known Problems Paternal Aunt     No Known Problems Paternal Uncle     Colon polyps Maternal Grandmother          of colon blockage    No Known Problems Maternal Grandfather     No Known Problems Paternal Grandmother     No Known Problems Paternal Grandfather     No Known Problems Brother     No Known Problems Sister     Angioedema Neg Hx     Eczema Neg Hx     Immunodeficiency Neg Hx     Urticaria Neg Hx     Skin cancer Neg Hx     Amblyopia Neg Hx     Blindness Neg Hx     Cataracts Neg Hx     Diabetes Neg Hx     Glaucoma Neg Hx     Macular degeneration Neg Hx     Retinal detachment Neg Hx     Strabismus Neg Hx     Thyroid disease Neg Hx     Colon cancer Neg Hx     Melanoma Neg Hx        EXAM & DIAGNOSTICS REVIEWED:   Vitals:     Temp:  [97.8 °F (36.6 °C)-101.6 °F (38.7 °C)]   Temp: 99.3 °F (37.4 °C) (20 1520)  Pulse: 83 (20 180)  Resp: (!) 36 (20 180)  BP: (!) 89/49 (20 180)  SpO2: (!) 94 % (20 180)    Intake/Output Summary (Last 24 hours) at 3/26/2020 1856  Last data filed at 3/26/2020 1800  Gross per 24 hour   Intake 1740 ml   Output 2700 ml   Net -960 ml       General Labs reviewed:  Recent Labs   Lab 20  0405 20  0314 20  0359 20  1239 20  1356   WBC 11.80 10.93  10.93 13.34*  --   --    HGB 12.3 12.0  12.0 12.3  --   --    HCT 39.2 38.3  38.3 39.1 38 37    352*  352* 291  --   --        Recent Labs   Lab 20  0405 20  0314 20  0359    138 139   K 4.7 3.5 3.6    104 104   CO2 25 28 25   BUN 21 21 25*   CREATININE 0.4* 0.4* 0.7   CALCIUM 8.7 8.5*  8.1*   PROT 6.4 6.6 6.7   BILITOT 0.9 0.9 1.5*   ALKPHOS 172* 198* 186*   * 169* 138*   AST 83* 112* 71*           Micro:  Microbiology Results (last 7 days)     Procedure Component Value Units Date/Time    IV catheter culture [[id number]] Collected:  03/26/20 1831    Order Status:  Sent Specimen:  Catheter Tip, PICC Updated:  03/26/20 1831    Culture, Respiratory with Gram Stain [[id number]]     Order Status:  No result Specimen:  Respiratory     Blood culture [[id number]] Collected:  03/25/20 1114    Order Status:  Completed Specimen:  Blood Updated:  03/26/20 1202     Blood Culture, Routine Gram stain aer bottle: budding yeast      Results called to and read back by: Nataly Ba RN on Cardio-A       03/26/2020  12:02 by DROMAR    Narrative:       2 sets  Collection has been rescheduled by [facility] at 03/25/2020 10:35 Reason:   per nurse Fang patient is gone for another test and will be back   after 20 minutes.  Collection has been rescheduled by [facility] at 03/25/2020 10:35 Reason:   per nurse Fang patient is gone for another test and will be back   after 20 minutes.    Blood culture [[id number]] Collected:  03/19/20 1053    Order Status:  Completed Specimen:  Blood Updated:  03/24/20 1232     Blood Culture, Routine No growth after 5 days.    Blood culture [[id number]] Collected:  03/19/20 1138    Order Status:  Completed Specimen:  Blood from Line, PICC Right Brachial Updated:  03/24/20 1232     Blood Culture, Routine No growth after 5 days.    Narrative:       From picc line  Collection has been rescheduled by [facility] at 03/19/2020 10:59 Reason:   nurse draw   Collection has been rescheduled by [facility] at 03/19/2020 10:59 Reason:   nurse draw         Imaging Reviewed:   CXR   CT chest 3/19  Esophageal stent within the proximal 3rd of the esophagus with mild wall thickening of the of the esophagus surrounding the stent.  There is no free air or free fluid.    Endovascular stents within the left jugular vein and left  subclavian vein which are occluded.  This was present on prior chest CT a dated 12/07/2018    Prior left upper lobectomy with postsurgical changes in the left chest wall    Tiny right pleural effusion  Cardiology:    IMPRESSION & PLAN   1. Candidemia, likely line related  2. Fever, no pulmonary infiltrates  3. Advanced esophageal cancer  4. Numerous medical co-morbidities      Recommendations:  Continue mycamine  Remove picc and culture tip  Stop vancomycin  If no other source for fever becomes evident in the next 24h, stop zosyn  Repeat blood cultures    >30 min

## 2020-03-26 NOTE — PLAN OF CARE
Problem: Parenteral Nutrition  Goal: Effective Intravenous Nutrition Therapy Delivery  Outcome: Ongoing, Progressing  Intervention: Optimize Intravenous Nutrition Delivery  Flowsheets (Taken 3/26/2020 1522)  Nutrition Support Management: parenteral nutrition held; tube feeding rate increased

## 2020-03-26 NOTE — PT/OT/SLP PROGRESS
Speech Language Pathology      Steph Lira  MRN: [id number]    Patient not seen today secondary to Patient ill. Denies ability to participate in MBS this date due to N&V. Will follow-up next service date.    Ganesh Skelton, ALEXY-SLP

## 2020-03-26 NOTE — PLAN OF CARE
03/26/20 1511   Discharge Reassessment   Assessment Type Discharge Planning Reassessment   Anticipated Discharge Disposition   (TBD)     SW spoke with Dr. Khoury regarding pt's d/c disposition this afternoon. Due to pt's poor prognosis, d/c disposition will be reassessed. SW contacted pt's  Federico to offer support ([phone]). Federico stated he was visiting pt and requested SW come by at a later time.    16:37 SW spoke with pt and , who are considering hospice care at home. Pt and  to discuss further tonight, and SW will follow up in the morning. SW informed Dr. Khoury.

## 2020-03-26 NOTE — PT/OT/SLP PROGRESS
Physical Therapy Treatment    Patient Name:  Steph Lira   MRN:  [id number]    Recommendations:     Discharge Recommendations:  home health PT   Discharge Equipment Recommendations: (TBD)   Barriers to discharge: None    Assessment:     Steph Lira is a 69 y.o. female admitted with a medical diagnosis of Intractable vomiting.  She presents with the following impairments/functional limitations:  weakness, impaired functional mobilty, gait instability, impaired endurance, impaired balance, impaired self care skills. Pt agreeable to PT treatment. Pt able to perform sup > sit with supervision. Pt reported nausea once sitting EOB and began vomiting and O2 sats noted to drop into 80s. Pt requesting back to bed. RN notified and came in to assess patient. Continue with PT and POC.    Rehab Prognosis: Good; patient would benefit from acute skilled PT services to address these deficits and reach maximum level of function.    Recent Surgery: Procedure(s) (LRB):  EGD (ESOPHAGOGASTRODUODENOSCOPY) (N/A)  INSERTION, JEJUNOSTOMY TUBE (N/A) 6 Days Post-Op    Plan:     During this hospitalization, patient to be seen 6 x/week to address the identified rehab impairments via gait training, therapeutic exercises, therapeutic activities and progress toward the following goals:    · Plan of Care Expires:  04/23/20    Subjective     Chief Complaint: Nausea/vomiting  Patient/Family Comments/goals:   Pain/Comfort:  · Pain Rating 1: (Pt did not quantify)  · Location 1: abdomen  · Pain Addressed 1: Reposition, Cessation of Activity      Objective:     Communicated with RN prior to session.  Patient found HOB elevated with bed alarm, blood pressure cuff, pulse ox (continuous), peripheral IV, telemetry, PICC line upon PT entry to room.     General Precautions: Standard, fall   Orthopedic Precautions:N/A   Braces:       Functional Mobility:  · Bed Mobility:     · Scooting: maximal assistance  · Supine to Sit: supervision  · Sit to Supine:  minimum assistance      AM-PAC 6 CLICK MOBILITY          Therapeutic Activities and Exercises:   bed mobility; sitting EOB for trunk control and midline orientation    Patient left HOB elevated with all lines intact, call button in reach, bed alarm on and RN present..    GOALS:   Multidisciplinary Problems     Physical Therapy Goals        Problem: Physical Therapy Goal    Goal Priority Disciplines Outcome Goal Variances Interventions   Physical Therapy Goal     PT, PT/OT Ongoing, Progressing     Description:  Goals to be met by: D/C     Patient will increase functional independence with mobility by performin. Supine to sit with Supervision  2. Sit to stand transfer with Supervision  3. Bed to chair transfer with Supervision   4. Gait  x 250 feet with Supervision using no AD                      Time Tracking:     PT Received On: 20  PT Start Time: 903     PT Stop Time: 919  PT Total Time (min): 16 min     Billable Minutes: Therapeutic Activity 16    Treatment Type: Treatment  PT/PTA: PTA     PTA Visit Number: 2     Kalee Reyes PTA  2020

## 2020-03-26 NOTE — PROGRESS NOTES
[facility]  General Surgery  Progress Note    Subjective:     Interval History: Patient states she is not feeling well today. She continues to be persistently nauseated. Se had fever to 101.4 today. Her tube feeds are at 20cc per hour. She states that she has felt about the same off and on the tube feeds. Her oncologist would like to her have her Port placed while inpatient to be ready for chemotherapy.     Post-Op Info:  Procedure(s) (LRB):  EGD (ESOPHAGOGASTRODUODENOSCOPY) (N/A)  INSERTION, JEJUNOSTOMY TUBE (N/A)   5 Days Post-Op      Medications:  Continuous Infusions:   dilTIAZem 5 mg/hr (03/25/20 1437)    lactated ringers 75 mL/hr at 03/25/20 1534    TPN ADULT CENTRAL LINE CUSTOM (3 in 1) 50 mL/hr at 03/25/20 1700     Scheduled Meds:   alteplase  2 mg Intra-Catheter Once    enoxparin  50 mg Subcutaneous Q12H    fluticasone propionate  1 spray Each Nostril Daily    metoclopramide HCl  10 mg Intravenous Q8H    piperacillin-tazobactam (ZOSYN) IVPB  3.375 g Intravenous Q8H    tiotropium  1 capsule Inhalation Daily    vancomycin (VANCOCIN) IVPB  1,000 mg Intravenous Q12H     PRN Meds:acetaminophen, acetaminophen, albuterol-ipratropium, calcium chloride IVPB, calcium chloride IVPB, calcium chloride IVPB, dextrose 50%, dextrose 50%, hydrALAZINE, HYDROmorphone, insulin regular, magnesium oxide, magnesium sulfate IVPB, magnesium sulfate IVPB, magnesium sulfate IVPB, magnesium sulfate IVPB, metoprolol, ondansetron, potassium chloride in water, potassium chloride in water, potassium chloride in water, potassium chloride in water, potassium chloride, potassium chloride, potassium chloride, potassium chloride, promethazine (PHENERGAN) IVPB, sodium chloride 0.9%, sodium chloride 0.9%, sodium phosphate IVPB, sodium phosphate IVPB, sodium phosphate IVPB, sodium phosphate IVPB, sodium phosphate IVPB, Pharmacy to dose Vancomycin consult **AND** vancomycin - pharmacy to dose     Objective:     Vital Signs  (Most Recent):  Temp: (!) 101.4 °F (38.6 °C)(nurse notified) (03/25/20 1914)  Pulse: (!) 119 (03/25/20 1915)  Resp: (!) 32 (03/25/20 1915)  BP: 120/63 (03/25/20 1915)  SpO2: 96 % (03/25/20 1915) Vital Signs (24h Range):  Temp:  [97.3 °F (36.3 °C)-101.4 °F (38.6 °C)] 101.4 °F (38.6 °C)  Pulse:  [] 119  Resp:  [18-32] 32  SpO2:  [95 %-96 %] 96 %  BP: (120-144)/(62-76) 120/63       Intake/Output Summary (Last 24 hours) at 3/25/2020 2052  Last data filed at 3/25/2020 1700  Gross per 24 hour   Intake 1710 ml   Output 1300 ml   Net 410 ml       Physical Exam   Constitutional: She is oriented to person, place, and time.   Pulmonary/Chest: Effort normal. No respiratory distress.   Abdominal: Soft. She exhibits no distension. There is tenderness. There is no rigidity, no rebound and no guarding.   Appropriate tenderness     Incision is clean dry and intact    J tube in good position.        Neurological: She is alert and oriented to person, place, and time.       Significant Labs:  CBC:   Recent Labs   Lab 03/25/20 0314   WBC 10.93  10.93   RBC 4.09  4.09   HGB 12.0  12.0   HCT 38.3  38.3   *  352*   MCV 94  94   MCH 29.3  29.3   MCHC 31.3*  31.3*     CMP:   Recent Labs   Lab 03/25/20 0314   *   CALCIUM 8.5*   ALBUMIN 2.6*   PROT 6.6      K 3.5   CO2 28      BUN 21   CREATININE 0.4*   ALKPHOS 198*   *   *   BILITOT 0.9           Assessment/Plan:     Active Diagnoses:    Diagnosis Date Noted POA    PRINCIPAL PROBLEM:  Intractable vomiting [R11.10] 03/13/2020 Yes    Esophageal mass [K22.8]  Unknown    Esophageal obstruction [K22.2]  Unknown    Inanition [R64] 03/20/2020 Yes    Malignant neoplasm of esophagus [C15.9] 03/04/2020 Yes    S/P ablation of atrial fibrillation [Z98.890, Z86.79] 10/01/2019 Not Applicable    S/P CABG (coronary artery bypass graft) [Z95.1] 06/01/2016 Not Applicable    History of smoking 10-25 pack years, 15 pack-years, quit 1994 [Z87.891]  05/19/2016 Not Applicable    COPD (chronic obstructive pulmonary disease) [J44.9] 05/18/2016 Yes    Adenocarcinoma of left lung, stage 3 [C34.92] 03/20/2015 Yes     Chronic    S/P CABG x 2, 2005 [Z95.1] 09/04/2013 Not Applicable    HTN (hypertension) [I10] 09/04/2013 Yes    Hyperlipidemia [E78.5] 09/04/2013 Yes    PAF (paroxysmal atrial fibrillation), onset 2002 [I48.0] 09/04/2013 Yes    CAD (coronary artery disease) [I25.10] 08/27/2013 Yes    Anxiety [F41.9] 08/27/2013 Yes    Anticoagulant long-term use [Z79.01] 07/19/2012 Not Applicable    Chronic external jugular vein thrombosis [I82.891] 06/23/2012 Yes    Hypothyroid [E03.9] 02/07/2012 Yes    GERD (gastroesophageal reflux disease) [K21.9]  Yes      Problems Resolved During this Admission:    Diagnosis Date Noted Date Resolved POA    Severe sepsis [A41.9, R65.20] 03/21/2020 03/23/2020 No     -Recommend to increase tube feed to goal. I think that her nausea is due to her disease process and will be nauseated at the same degree on or off the tube feeding. Will plan to place port while inpatient but will not place ort if febrile. Will continue to monitor and put on schedule when ready.     Rafat Ruiz III, MD  General Surgery  [facility]

## 2020-03-26 NOTE — PT/OT/SLP PROGRESS
Occupational Therapy      Patient Name:  Steph Lira   MRN:  [id number]    Patient not seen today secondary to Other (Comment)(Hold per RN, uncontrolled HR.). Will follow-up tomorrow.    Simon Rosa OT  3/26/2020

## 2020-03-27 ENCOUNTER — TELEPHONE (OUTPATIENT)
Dept: FAMILY MEDICINE | Facility: CLINIC | Age: 70
End: 2020-03-27

## 2020-03-27 VITALS
HEIGHT: 66 IN | DIASTOLIC BLOOD PRESSURE: 38 MMHG | HEART RATE: 57 BPM | BODY MASS INDEX: 19.53 KG/M2 | RESPIRATION RATE: 42 BRPM | OXYGEN SATURATION: 95 % | WEIGHT: 121.5 LBS | TEMPERATURE: 99 F | SYSTOLIC BLOOD PRESSURE: 68 MMHG

## 2020-03-27 NOTE — PROGRESS NOTES
I was called by RN to pronounce patient.  Time of death 812 pm. Her  is at bedside.  I offered my condolences and made myself available to answer any questions.    Exam:   Pupils fixed and non reactive  No spontaneous rise of the chest  No heart or breath sounds could be auscultated  No peripheral pulse could be palpated

## 2020-03-27 NOTE — TELEPHONE ENCOUNTER
----- Message from Nghia Mohr sent at 3/27/2020 12:57 PM CDT -----  Contact: Corinna ([facility])  Type: Needs Medical Advice    Who Called:  Corinna  Best Call Back Number: [phone]  Additional Information: Caller would like to alert the office of death certificate still needing a signature. Thanks!

## 2020-03-27 NOTE — TELEPHONE ENCOUNTER
Spoke to her  and offered condolences.  He is at home with his brother and just completed preparations at  home.  He has appt next week.

## 2020-03-27 NOTE — NURSING
Spoke with patient's  to inform him that patient appears to be declining and likely in the process of actively dying.  He has a brother who will drive him to hospital.

## 2020-03-27 NOTE — PLAN OF CARE
03/26/20 2000   PRE-TX-O2   O2 Device (Oxygen Therapy) Non Rebreather Mask   Flow (L/min) 15   SpO2   (unable to get sat)   Pulse Oximetry Type Continuous   $ Pulse Oximetry - Multiple Charge Pulse Oximetry - Multiple   Pulse (!) 57   Resp (!) 42   Aerosol Therapy   $ Aerosol Therapy Charges PRN treatment not required   Respiratory Evaluation   $ Care Plan Tech Time 15 min

## 2020-03-28 LAB — BACTERIA CATH TIP CULT: ABNORMAL

## 2020-03-29 LAB
BACTERIA BLD CULT: ABNORMAL

## 2020-04-10 NOTE — DISCHARGE SUMMARY
[facility] Medicine  Discharge Summary      Patient Name: Steph Lira  MRN: [id number]  Admission Date: 3/13/2020  Hospital Length of Stay: 12 days  Discharge Date and Time:      Attending Physician: No att. providers found   Discharging Provider: Marcos Khoury MD  Primary Care Provider: Maida Mon MD      HPI:   69-year-old female history of malignant neoplasm of the esophagus, lung cancer s/p resection, COPD, hypertension, hyperlipidemia, GERD, atrial fibrillation on Xarelto, CAD s/p CABG comes in for generalized weakness and emesis.  Patient reports that she has a history of esophageal cancer and has been worsening causing obstruction of her esophagus.  Patient had EGD yesterday and found nearly closed esophagus to the stomach.  Patient reports that she is unable tolerate anything orally including liquids and her medications.  Reports that she has been feeling weak and has been able tolerate any diet for the past 3 days.  Reports this morning she can not tolerate anymore and came into the ED for further evaluation.  Denies any fever, chills, chest pain, shortness of breath.    In the ED, patient was slightly hypertensive.  Unable tolerate home medications.  CBC and CMP were unremarkable.     Procedure(s) (LRB):  EGD (ESOPHAGOGASTRODUODENOSCOPY) (N/A)  INSERTION, JEJUNOSTOMY TUBE (N/A)      Hospital Course:   Patient was admitted for dehydration and malnutrition due to unable to tolerate anything orally from esophageal cancer obstructing the esophagus.  GI consulted and plans for stent placement on the esophageal mass.  Patient underwent stent placement on 3/17/20 without any complication.  3/18 Appears in mild distress due to throat pain, unable to talk comfortably  Not yet tolerating liquids  On morphine iv and zofren  3/19 Leucocytosis up trending with low-grade fever   CT chest negative for perforation/aspirate  Blood culture ordered, patient denies any symptoms of  UTI  3/20Reports persistent  throat pain down to upper abdomen  Increase Dilaudid q.4h  GI follow-up appreciated for repeat EGD to evaluate the stent placement  Surgery consult appreciated for J-tube placement   patient agreeable for any required intervention  Afebrile , on zosyn, leukocytosis trending down, blood culture negative to date    I assumed care care of the patient on 03/23/2020.  Patient here with intractable nausea and vomiting in the setting of recent diagnosis of esophageal mass.  She underwent esophageal stent placement on 03/17 and then repositioning on 03/20.  A J tube was placed for tube feeds. Patient continued to have persistent nausea and vomiting which was thought to be secondary to her near obstructing mass.  Hospital stay also complicated by atrial tachycardia.  Case was discussed with consultants including GI, surgery, oncology as well as Cardiology on multiple occasions.  Later in the hospital course patient developed persistent fevers and antibiotics were broadened.  She was noted to have fungemia and was initiated on micafungin.  Discussed with  at length and transitioned to 'do not resuscitate'.  She later passed away peacefully later that evening with  at her bedside.  Please refer to pronouncement note for further details.     Consults:   Consults (From admission, onward)        Status Ordering Provider     Inpatient consult to Cardiology  Once     Provider:  Arthur Ahuja MD    Completed PREM BRYAN     Inpatient consult to Gastroenterology  Once     Provider:  Ulisses Colorado III, MD    Completed JIE QUEEN     Inpatient consult to General Surgery  Once     Provider:  Rafat Ruiz III, MD    Completed ULISSES COLORADO III     Inpatient consult to Hematology Oncology  Once     Provider:  Pacheco Mireles MD    Completed GABBY MOSER     Inpatient consult to Infectious Diseases  Once     Provider:  Anette Cody MD    Completed SHANTI  GABBY     Inpatient consult to Registered Dietitian/Nutritionist  Once     Provider:  (Not yet assigned)    Completed JIE QUEEN     Inpatient consult to Registered Dietitian/Nutritionist  Once     Provider:  (Not yet assigned)    Completed JIE QUEEN     Inpatient consult to Registered Dietitian/Nutritionist  Once     Provider:  (Not yet assigned)    Completed MAUREEN PHILIP III     Inpatient consult to Registered Dietitian/Nutritionist  Once     Provider:  (Not yet assigned)    Completed MAUREEN PHILIP III          No new Assessment & Plan notes have been filed under this hospital service since the last note was generated.  Service: Hospital Medicine    Final Active Diagnoses:    Diagnosis Date Noted POA    PRINCIPAL PROBLEM:  Severe sepsis [A41.9, R65.20] 03/21/2020 No    Fungemia [B49] 03/26/2020 No    Malignant neoplasm of esophagus [C15.9] 03/04/2020 Yes    Acute respiratory failure with hypoxia and hypercarbia [J96.01, J96.02] 03/26/2020 No    PAF (paroxysmal atrial fibrillation), onset 2002 [I48.0] 09/04/2013 Yes    Esophageal mass [K22.8]  Yes    Esophageal obstruction [K22.2]  Yes    Inanition [R64] 03/20/2020 Yes    Intractable vomiting [R11.10] 03/13/2020 Yes    S/P ablation of atrial fibrillation [Z98.890, Z86.79] 10/01/2019 Not Applicable    S/P CABG (coronary artery bypass graft) [Z95.1] 06/01/2016 Not Applicable    History of smoking 10-25 pack years, 15 pack-years, quit 1994 [Z87.891] 05/19/2016 Not Applicable    COPD (chronic obstructive pulmonary disease) [J44.9] 05/18/2016 Yes    Adenocarcinoma of left lung, stage 3 [C34.92] 03/20/2015 Yes     Chronic    S/P CABG x 2, 2005 [Z95.1] 09/04/2013 Not Applicable    HTN (hypertension) [I10] 09/04/2013 Yes    Hyperlipidemia [E78.5] 09/04/2013 Yes    CAD (coronary artery disease) [I25.10] 08/27/2013 Yes    Anxiety [F41.9] 08/27/2013 Yes    Anticoagulant long-term use [Z79.01] 07/19/2012 Not Applicable     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 14 days                Time spent on the discharge of patient: 10 minutes  Patient was seen and examined on the date of discharge and determined to be suitable for discharge.         Marcos Khoury MD  Department of Hospital Medicine  [facility]

## 2021-06-19 NOTE — PROGRESS NOTES
"Letter by Kofi Pickard CNP at      Author: Kofi Pickard CNP Service: -- Author Type: --    Filed:  Encounter Date: 10/16/2019 Status: Signed         Patient: Rashad Caputo   MR Number: [id number]   YOB: 1952   Date of Visit: 10/16/2019       [facility]      NAME:  Rashad Caputo             :  1952    MRN: [id number]    CODE STATUS:  FULL CODE    FACILITY: [facility] [[id number]]         CHIEF COMPLAIN/REASON FOR VISIT:  Chief Complaint   Patient presents with   ? Problem Visit     sleep disturbance and edema        HISTORY OF PRESENT ILLNESS: Rashad Caputo is a 67 y.o. male being seen for review of multiple medical conditions. Patient presented to the hospital with acute urinary retention.  He had a Shen catheter placed and currently discharged with an indwelling Shen catheter.  He will follow with urology for a voiding trial. He was noted to have hematuria on 19, improved today. He developed a UTI cultures grew strep viridans.  He was given Levaquin 7 days post discharge.    Today: Seen per nursing request for insomnia and c/o trouble staying asleep.He apparently saw another provider earlier this week and was offered a trial of trazodone but refused and today he is willing to try this. Additionally, he was started on extensive diuresis for his extreme lypmphadema and weight gain of >40lbs since admission. Today his LE are +4 from groin to feet. No redness, warmth, or s/sx of infection. He continues to use shen catheter. He is denying major pain.   Regarding his insomnia, he says this is due to severe nasal congestion from a long history of abusing afrin. He has tried \"everything\" and so he currently is attempting to wean the affrin and only use it once per day. He says he becomes congested at night and this makes breathing uncomfortable and difficult which keeps him awake. He is willing to try trazodone which my help him " Attempted PICC in left arm, but would not go in, pt has stent in left subclavian. PICC line placed in right arm without a problem.   sleep through the discomfort. He is refusing to try anything new for the rebound nasal congestion and it sounds like he has been working with ENT.     Allergies   Allergen Reactions   ? Lisinopril Shortness Of Breath and Dizziness   ? Fosinopril Sodium Other (See Comments) and Dizziness     Mouth numbness   ? Amoxicillin-Pot Clavulanate Hives and Rash   ? Cephalosporins Hives and Rash   ? Penicillins Rash   :     Current Outpatient Medications   Medication Sig   ? acetaminophen (TYLENOL) 500 MG tablet Take 1-2 tablets (500-1,000 mg total) by mouth every 4 (four) hours as needed.   ? cyclobenzaprine (FLEXERIL) 10 MG tablet Take 1 tablet (10 mg total) by mouth 2 (two) times a day as needed for muscle spasms.   ? fluticasone propionate (FLONASE) 50 mcg/actuation nasal spray Apply 1 spray into each nostril 3 (three) times a day as needed. For overuse of nasal decongestant.   ? furosemide (LASIX) 20 MG tablet Take 20 mg by mouth daily.   ? magnesium oxide (MAG-OX) 400 mg (241.3 mg magnesium) tablet Take 400 mg by mouth daily.   ? melatonin 3 mg Tab tablet Take 5 mg by mouth at bedtime as needed.   ? naproxen sodium (ALEVE) 220 MG tablet Take 220 mg by mouth at bedtime.   ? phenylephrine (NICOLE-SYNEPHRINE) 1 % Spry 1-2 sprays into each nostril every 4 (four) hours as needed.          ? polyethylene glycol (MIRALAX) 17 gram packet Take 1 packet (17 g total) by mouth daily.   ? sodium chloride (OCEAN) 0.65 % nasal spray 1 spray into each nostril as needed for congestion. Use with phenylephrine spray.   ? tamsulosin (FLOMAX) 0.4 mg cap Take 0.4 mg by mouth.         REVIEW OF SYSTEMS:    Currently, no fever, chills, or rigors. Does not have any visual or hearing problems. Denies any chest pain, headaches, palpitations, lightheadedness, dizziness, shortness of breath, or cough. Appetite is good. Endorses nasal congestion s/s to medication use, lymphadema and weight gain, insomnia.       PHYSICAL EXAMINATION:  Vitals:    10/18/19  1216   BP: 107/68   Pulse: 97   Temp: 97  F (36.1  C)   SpO2: 98%   Weight: 220 lb (99.8 kg)         GENERAL: Awake, Alert, oriented x3,unkempt in appearance,  not in any form of acute distress, answers questions appropriately, follows simple commands, conversant  HEENT: Head is normocephalic; eyes: EOM, sclera anicteric.   CHEST: No tenderness or deformity, no crepitus  LUNG: Clear to auscultation with good chest expansion. There are no crackles or wheezes, normal AP diameter.  BACK: No kyphosis of the thoracic spine. Symmetric, no curvature, ROM normal, no CVA tenderness, no spinal tenderness   CVS: There is good S1  S2,  rhythm is regular.  ABDOMEN: Globular and soft, nontender to palpation, non distended, no masses, no organomegaly, good bowel sounds, no rebound or guarding, no peritoneal signs.   EXTREMITIES: ROM UE WNL, Significant lower bilateral LE lymphadema +3-4 extending from feet to pelvis.   SKIN: Warm and dry, no erythema noted, no rashes or lesions.  NEUROLOGICAL: The patient is oriented to person, place and time. Strength and sensation are grossly intact. Face is symmetric.    LABS:    Lab Results   Component Value Date    WBC 8.6 10/10/2019    HGB 8.9 (L) 10/10/2019    HCT 28.8 (L) 10/10/2019    MCV 85 10/10/2019     10/10/2019       Results for orders placed or performed in visit on 10/10/19   Basic Metabolic Panel   Result Value Ref Range    Sodium 139 136 - 145 mmol/L    Potassium 4.5 3.5 - 5.0 mmol/L    Chloride 109 (H) 98 - 107 mmol/L    CO2 25 22 - 31 mmol/L    Anion Gap, Calculation 5 5 - 18 mmol/L    Glucose 97 70 - 125 mg/dL    Calcium 8.9 8.5 - 10.5 mg/dL    BUN 29 (H) 8 - 22 mg/dL    Creatinine 0.65 (L) 0.70 - 1.30 mg/dL    GFR MDRD Af Amer >60 >60 mL/min/1.73m2    GFR MDRD Non Af Amer >60 >60 mL/min/1.73m2           No results found for: HGBA1C  No results found for: TPUBTBTL17SH  No results found for: OJKYDXCV19    ASSESSMENT/PLAN:  1. Rhinitis medicamentosa    2. Insomnia,  unspecified type    3. Cognitive impairment    4. Failure to thrive in adult    5. Lymphedema      1. Continue with current plan of care and f/u with ENT as directed. He refused any off to trial other options toady.  2. Start trazodone 25mg po q hs for sleep.   3. Continue to involve SW in as well as family, all decisions and planning.  4. Some of this weight gain may be due to improved appetite and po intake. However, this certainly does not account for 40lbs any by PE, his upper body remains thin with low muscle mass, and LE with severe lymphadema.   5. Continue lymphadema wraps, diuretics and daily weights.     Electronically signed by:  Kofi Pickard CNP  This progress note was completed using Dragon software and there may be grammatical errors.

## 2022-03-16 NOTE — TELEPHONE ENCOUNTER
Marika Childers, PharmD  Ever Hernandez, RN             We typically hold 3 days for EGD.    Previous Messages      ----- Message -----   From: Ever Hernandez RN   Sent: 8/26/2019   1:37 PM   To: [facility] Cou[facility] Provider     [facility], may we hold Coumadin for 5 days prior to EGD scheduled 9/11/19 at 0900?  Thank you.          Patient would like a prescription refill for Dymista and Olopatadine.

## 2022-05-09 NOTE — DISCHARGE INSTRUCTIONS

## 2024-06-03 NOTE — DISCHARGE INSTRUCTIONS
Understanding Colon and Rectal Polyps    The colon (also called the large intestine) is a muscular tube that forms the last part of the digestive tract. It absorbs water and stores food waste. The colon is about 4 to 6 feet long. The rectum is the last 6 inches of the colon. The colon and rectum have a smooth lining composed of millions of cells. Changes in these cells can lead to growths in the colon that can become cancerous and should be removed. Multiple tests are available to screen for colon cancer, but the colonoscopy is the most recommended test. During colonoscopy, these polyps can be removed. How often you need this test depends on many things including your condition, your family history, symptoms, and what the findings were at the previous colonoscopy.   When the colon lining changes  Changes that happen in the cells that line the colon or rectum can lead to growths called polyps. Over a period of years, polyps can turn cancerous. Removing polyps early may prevent cancer from ever forming.  Polyps  Polyps are fleshy clumps of tissue that form on the lining of the colon or rectum. Small polyps are usually benign (not cancerous). However, over time, cells in a polyp can change and become cancerous. Certain types of polyps known as adenomatous polyps are premalignant. The risk for invasive cancer increases with the size of the polyp and certain cell and gene features. This means that they can become cancerous if they're not removed. Hyperplastic polyps are benign. They can grow quite large and not turn cancerous.   Cancer  Almost all colorectal cancers start when polyp cells begin growing abnormally. As a cancerous tumor grows, it may involve more and more of the colon or rectum. In time, cancer can also grow beyond the colon or rectum and spread to nearby organs or to glands called lymph nodes. The cells can also travel to other parts of the body. This is known as metastasis. The earlier a cancerous  Controlled on Depakote and Imitrex.           Please advise? tumor is removed, the better the chance of preventing its spread.    Date Last Reviewed: 8/1/2016  © 1725-2339 [employer] [address] [place] [address], [place] [address]. All rights reserved. This information is not intended as a substitute for professional medical care. Always follow your healthcare professional's instructions.        Diverticulosis    Diverticulosis means that small pouches have formed in the wall of your large intestine (colon). Most often, this problem causes no symptoms and is common as people age. But the pouches in the colon are at risk of becoming infected. When this happens, the condition is called diverticulitis. Although most people with diverticulosis never develop diverticulitis, it is still not uncommon. Rectal bleeding can also occur and in less common situations, a type of colon inflammation called colitis.  While most people do not have symptoms, some people with diverticulosis may have:  · Abdominal cramps and pain  · Bloating  · Constipation  · Change in bowel habits  Causes  The exact cause of diverticulosis (and diverticulitis) has not been proved, but a few things are associated with the condition:  · Low-fiber diet  · Constipation  · Lack of exercise  Your healthcare provider will talk with you about how to manage your condition. Diet changes may be all that are needed to help control diverticulosis and prevent progression to diverticulitis. If you develop diverticulitis, you will likely need other treatments.  Home care  You may be told to take fiber supplements daily. Fiber adds bulk to the stool so that it passes through the colon more easily. Stool softeners may be recommended. You may also be given medications for pain relief. Be sure to take all medications as directed.  In the past, people were told to avoid corn, nuts, and seeds. This is no longer necessary.  Follow these guidelines when caring for yourself at home:  · Eat unprocessed foods that are high in  fiber. Whole grains, fruits, and vegetables are good choices.  · Drink 6 to 8 glasses of water every day unless your healthcare provider has you limit how much fluid you should have.  · Watch for changes in your bowel movements. Tell your provider if you notice any changes.  · Begin an exercise program. Ask your provider how to get started. Generally, walking is the best.  · Get plenty of rest and sleep.  Follow-up care  Follow up with your healthcare provider, or as advised. Regular visits may be needed to check on your health. Sometimes special procedures such as colonoscopy, are needed after an episode of diverticulitis or blooding. Be sure to keep all your appointments.  If a stool sample was taken, or cultures were done, you should be told if they are positive, or if your treatment needs to be changed. You can call as directed for the results.  If X-rays were done, a radiologist will look at them. You will be told if there is a change in your treatment.  If antibiotics were prescribed, be sure to finish them all.  When to seek medical advice  Call your healthcare provider right away if any of these occur:  · Fever of 100.4°F (38°C) or higher, or as directed by your healthcare provider  · Severe cramps in the lower left side of the abdomen or pain that is getting worse  · Tenderness in the lower left side of the abdomen or worsening pain throughout the abdomen  · Diarrhea or constipation that doesn't get better within 24 hours  · Nausea and vomiting  · Bleeding from the rectum  Call 911  Call emergency services if any of the following occur:  · Trouble breathing  · Confusion  · Very drowsy or trouble awakening  · Fainting or loss of consciousness  · Rapid heart rate  · Chest pain  Date Last Reviewed: 12/30/2015  © 2202-2080 [employer]. [address] [place] [address], [place] [address]. All rights reserved. This information is not intended as a substitute for professional medical care. Always follow your  healthcare professional's instructions.        Hemorrhoids    Hemorrhoids are swollen and inflamed veins inside the rectum and near the anus. The rectum is the last several inches of the colon. The anus is the passage between the rectum and the outside of the body.  Causes  The veins can become swollen due to increased pressure in them. This is most often caused by:  · Chronic constipation or diarrhea  · Straining when having a bowel movement  · Sitting too long on the toilet  · A low-fiber diet  · Pregnancy  Symptoms  · Bleeding from the rectum (this may be noticeable after bowel movements)  · Lump near the anus  · Itching around the anus  · Pain around the anus  There are different types of hemorrhoids. Depending on the type you have and the severity, you may be able to treat yourself at home. In some cases, a procedure may be the best treatment option. Your healthcare provider can tell you more about this, if needed.  Home care  General care  · To get relief from pain or itching, try:  ¨ Topical products. Your healthcare provider may prescribe or recommend creams, ointments, or pads that can be applied to the hemorrhoid. Use these exactly as directed.  ¨ Medicines. Your healthcare provider may recommend stool softeners, suppositories, or laxatives to help manage constipation. Use these exactly as directed.  ¨ Sitz baths. A sitz bath involves sitting in a few inches of warm bath water. Be careful not to make the water so hot that you burn yourself--test it before sitting in it. Soak for about 10 to 15 minutes a few times a day. This may help relieve pain.  Tips to help prevent hemorrhoids  · Eat more fiber. Fiber adds bulk to stool and absorbs water as it moves through your colon. This makes stool softer and easier to pass.  ¨ Increase the fiber in your diet with more fiber-rich foods. These include fresh fruit, vegetables, and whole grains.  ¨ Take a fiber supplement or bulking agent, if advised to by your  provider. These include products such as psyllium or methylcellulose.  · Drink plenty of water, if directed to by your provider. This can help keep stool soft.  · Be more active. Frequent exercise aids digestion and helps prevent constipation. It may also help make bowel movements more regular.  · Dont strain during bowel movements. This can make hemorrhoids more likely. Also, dont sit on the toilet for long periods of time.  Follow-up care  Follow up with your healthcare provider, or as advised. If a culture or imaging tests were done, you will be notified of the results when they are ready. This may take a few days or longer.  When to seek medical advice  Call your healthcare provider right away if any of these occur:  · Increased bleeding from the rectum  · Increased pain around the rectum or anus  · Weakness or dizziness  Call 911  Call 911 or return to the emergency department right away if any of these occur:  · Trouble breathing or swallowing  · Fainting or loss of consciousness  · Unusually fast heart rate  · Vomiting blood  · Large amounts of blood in stool  Date Last Reviewed: 6/22/2015  © 2140-7477 [employer]. [address] [place] [address], [place] [address]. All rights reserved. This information is not intended as a substitute for professional medical care. Always follow your healthcare professional's instructions.

## 2024-08-14 NOTE — OR NURSING
Health Maintenance       Pneumococcal Vaccine 0-64 (1 of 2 - PCV)  Never done    COVID-19 Vaccine (4 - 2023-24 season)  Overdue since 9/1/2023           Following review of the above:  Patient is not proceeding with: COVID-19 and Pneumococcal    Note: Refer to final orders and clinician documentation.           Chaperone present during sensitive exam: Yes. Name of chaperone: Val DILL CMA.                                                                          Bite block removed intact

## (undated) DEVICE — SUTURE SILK 2-0 SH 18 CR C012D

## (undated) DEVICE — SUTURE VICRYL 3-0 SH 27 VCP416H

## (undated) DEVICE — PAD RADI FEMORAL

## (undated) DEVICE — SUTURE SILK 3-0 30 A304H

## (undated) DEVICE — BLADE SURG #15 CARBON STEEL

## (undated) DEVICE — DRAPE LAPAROTOMY 72X100X124 89228

## (undated) DEVICE — DRESSING POST OP MEPILEX AG 4X6  498300

## (undated) DEVICE — GAUZE SPONGE BULKEE 6X6.75IN

## (undated) DEVICE — INTRO FAST-CATH SL1 8.5FR 63CM

## (undated) DEVICE — UNDERGLOVE BIOGEL PI MICRO BLUE SZ 8

## (undated) DEVICE — SUTURE PDS 1 TP-1 48 Z880G

## (undated) DEVICE — DRAPE MINOR PROCEDURE

## (undated) DEVICE — SET SMARTABLATE IRR TUBE

## (undated) DEVICE — SUTURE ETHILON 2-0 PS 18 585H

## (undated) DEVICE — CATH BIDIRECTIONAL DF CRV 7FR

## (undated) DEVICE — SUTURE SILK 2-0 30 A305H

## (undated) DEVICE — SEE MEDLINE ITEM 146292

## (undated) DEVICE — PAD DEFIB CADENCE ADULT R2

## (undated) DEVICE — ELECTRODE POLYHESIVEPRE-ATTACH

## (undated) DEVICE — GLOVE SURG ULTRA TOUCH 7.5

## (undated) DEVICE — NDL SAFETY 21G X 1 1/2 ECLPSE

## (undated) DEVICE — CATH THERMOCOOL SMTCH SF D F

## (undated) DEVICE — SOLUTION IRRI NS BOTTLE 1000ML R5200-01

## (undated) DEVICE — SET HBE EXT CARESITE FILTER

## (undated) DEVICE — PATCH CARTO REFERENCE

## (undated) DEVICE — NDL TRNSSPTL BRK-1 18GA 71CM

## (undated) DEVICE — LUBRICANT SURGILUBE 2 OZ

## (undated) DEVICE — INTRODUCER HEMOSTASIS 7.5F

## (undated) DEVICE — SYR 10CC LUER LOCK

## (undated) DEVICE — PAD BOVIE ADULT

## (undated) DEVICE — STAPLER SKIN NON ROTATE PXW35

## (undated) DEVICE — SHEATH INTRODUCER 9FR 11CM

## (undated) DEVICE — SEE MEDLINE ITEM 152622

## (undated) DEVICE — COVER DRAPE ACUSON STERILE

## (undated) DEVICE — REPROCESSED CATH ACUNAV 8FR

## (undated) DEVICE — SUT CHROMIC 2-0 SH 27IN BRN

## (undated) DEVICE — STRAP OR TABLE 5IN X 72IN

## (undated) DEVICE — LINE PRESSURE MONITORING 96IN

## (undated) DEVICE — SPONGE DRAIN 4X4 441407

## (undated) DEVICE — INTRO AGILIS MED CRL 8.5F 71CM

## (undated) DEVICE — PACK EP DRAPE

## (undated) DEVICE — SEE MEDLINE ITEM 157117

## (undated) DEVICE — SCRUB 10% POVIDONE IODINE 4OZ

## (undated) DEVICE — COVER SURG TABLE BACK 44X76IN

## (undated) DEVICE — BOWL FLUID - BACK STOP

## (undated) DEVICE — KIT PROBE COVER WITH GEL

## (undated) DEVICE — SUTURE ETHIBOND 0 MO-6 18 CX45D

## (undated) DEVICE — PACK CUSTOM UNIV BASIN SLI

## (undated) DEVICE — SOLUTION IRRI H2O BOTTLE 1000ML

## (undated) DEVICE — TRAY GENERAL SURGERY

## (undated) DEVICE — SUTURE MONOCRYL 4-0 PS-2 27 MCP426H

## (undated) DEVICE — SHEARS HARMONIC CRVD 9 CM

## (undated) DEVICE — SHEATH HEMOSTASIS 8.5FR

## (undated) DEVICE — NDL TRNSSPTL BRK-1 18GA 98CM

## (undated) DEVICE — ELECTRODE REM PLYHSV RETURN 9

## (undated) DEVICE — CATH EXTERNAL WICK PUREWICK

## (undated) DEVICE — Device

## (undated) DEVICE — SUTURE SILK 3-0 SH 18 C013D

## (undated) DEVICE — GLOVE BIOGEL PI ULTRA TOUCH GRAY SZ7.5

## (undated) DEVICE — SLEEVE SCD EXPRESS CALF MEDIUM

## (undated) DEVICE — SUTURE SILK 0 30 A306H

## (undated) DEVICE — CATH LASSO NAV 25/15